# Patient Record
Sex: FEMALE | Race: WHITE | NOT HISPANIC OR LATINO | Employment: OTHER | URBAN - METROPOLITAN AREA
[De-identification: names, ages, dates, MRNs, and addresses within clinical notes are randomized per-mention and may not be internally consistent; named-entity substitution may affect disease eponyms.]

---

## 2017-01-09 ENCOUNTER — OFFICE VISIT (OUTPATIENT)
Dept: CARDIOLOGY | Facility: CLINIC | Age: 72
End: 2017-01-09

## 2017-01-09 VITALS
HEIGHT: 69 IN | SYSTOLIC BLOOD PRESSURE: 108 MMHG | WEIGHT: 149 LBS | BODY MASS INDEX: 22.07 KG/M2 | HEART RATE: 95 BPM | DIASTOLIC BLOOD PRESSURE: 66 MMHG

## 2017-01-09 DIAGNOSIS — Z79.01 ANTICOAGULATED: ICD-10-CM

## 2017-01-09 DIAGNOSIS — I48.91 ATRIAL FIBRILLATION, UNSPECIFIED TYPE (HCC): Primary | ICD-10-CM

## 2017-01-09 DIAGNOSIS — I48.20 CHRONIC ATRIAL FIBRILLATION (HCC): Primary | ICD-10-CM

## 2017-01-09 DIAGNOSIS — I10 BENIGN ESSENTIAL HTN: ICD-10-CM

## 2017-01-09 DIAGNOSIS — I49.5 SICK SINUS SYNDROME (HCC): ICD-10-CM

## 2017-01-09 PROCEDURE — 99214 OFFICE O/P EST MOD 30 MIN: CPT | Performed by: INTERNAL MEDICINE

## 2017-01-09 PROCEDURE — 93000 ELECTROCARDIOGRAM COMPLETE: CPT | Performed by: INTERNAL MEDICINE

## 2017-01-09 RX ORDER — SUMATRIPTAN 50 MG/1
50 TABLET, FILM COATED ORAL
Refills: 4 | COMMUNITY
Start: 2016-12-27 | End: 2023-02-12 | Stop reason: HOSPADM

## 2017-01-09 NOTE — PROGRESS NOTES
Subjective:       Raina Forrest is a 71 y.o. female who here for follow up    CC  Atrial fibrillation follow-up  HPI  71-year-old white female with known chronic atrial Coblation as well as a hypertension sick sinus syndrome on chronic anticoagulation here for the follow-up after being on medication    Patient denies any chest pains or tightness in chest but complains of significant shortness of breath weakness and fatigue     Problem List Items Addressed This Visit        Cardiovascular and Mediastinum    Chronic atrial fibrillation - Primary    Benign essential HTN    Sick sinus syndrome       Other    Anticoagulated        Previous treatments/evaluations include: ASA and beta blocker. Cardiac risk factors: advanced age (older than 55 for men, 65 for women) and hypertension.    The following portions of the patient's history were reviewed and updated as appropriate: allergies, current medications, past family history, past medical history, past social history, past surgical history and problem list.    Past Medical History   Diagnosis Date   • Abdominal pain    • Atrial fibrillation    • Fung's esophagus    • Benign essential hypertension    • Blood in stool    • Chest pain    • Chronic gastritis    • Congestive heart failure    • Degenerative disc disease    • Depression with anxiety    • Disease of thyroid gland    • Diverticulitis of colon    • Dysphagia    • GERD (gastroesophageal reflux disease)    • History of migraine headaches    • Osteoporosis    • Palpitations    • Thyroid disorder    • Ulcerative colitis    • Vomiting    • Weight loss     reports that she has never smoked. She does not have any smokeless tobacco history on file. She reports that she does not drink alcohol or use illicit drugs.  Family History   Problem Relation Age of Onset   • Lung cancer Other    • Ulcerative colitis Sister        Review of Systems  Constitutional: No wt loss, fever, fatigue  Gastrointestinal: No nausea,  "abdominal pain  Behavioral/Psych: No insomnia or anxiety   Cardiovascular shortness of breath  Objective:       Physical Exam             Physical Exam  Visit Vitals   • /66   • Pulse 95   • Ht 69\" (175.3 cm)   • Wt 149 lb (67.6 kg)   • BMI 22 kg/m2     General appearance: alert, appears stated age and cooperative, oriented x 3  Neck: no JVD and supple, symmetrical, trachea midline  Lungs: clear to auscultation bilaterally  Heart:Normal PMI,  S1, S2 irregular, no murmur, rub or gallop  Extremities: normal range of motion, no cyanosis or edema,  Pulses: 2+ and symmetric  Skin: Skin color, texture, turgor normal. No rashes or lesions  Psych:  Pleasant and cooperative        Cardiographics  @  ECG 12 Lead  Date/Time: 1/9/2017 2:53 PM  Performed by: GIACOMO PARKER  Authorized by: GIACOMO PARKER   Comparison: compared with previous ECG   Similar to previous ECG  Rhythm: atrial fibrillation  ST Flattening: all  T flattening: all  Clinical impression: abnormal ECG            Echocardiogram:        Current Outpatient Prescriptions:   •  apixaban (ELIQUIS) 5 MG tablet tablet, Take 1 tablet by mouth 2 (Two) Times a Day., Disp: 60 tablet, Rfl: 5  •  bisoprolol-hydrochlorothiazide (ZIAC) 5-6.25 MG per tablet, Take 1 tablet by mouth daily., Disp: 30 tablet, Rfl: 6  •  fluticasone (FLONASE) 50 MCG/ACT nasal spray, into each nostril., Disp: , Rfl:   •  furosemide (LASIX) 40 MG tablet, Take 1 tablet by mouth Daily., Disp: 30 tablet, Rfl: 6  •  levothyroxine (LEVOTHROID) 25 MCG tablet, Take 25 mcg by mouth., Disp: , Rfl:   •  lisinopril (PRINIVIL,ZESTRIL) 5 MG tablet, Take 1 tablet by mouth Every Night., Disp: 30 tablet, Rfl: 6  •  MECLIZINE HCL PO, Take 25 mg by mouth 3 (three) times a day., Disp: , Rfl:   •  potassium chloride (K-DUR,KLOR-CON) 10 MEQ CR tablet, Take 1 tablet by mouth Daily., Disp: 30 tablet, Rfl: 6  •  sertraline (ZOLOFT) 100 MG tablet, Take 1 tablet by mouth daily., Disp: , Rfl:   •  " SUMAtriptan (IMITREX) 50 MG tablet, TAKE 1 TABLET EVERY 2 HOURS AS NEEDED FOR MIGRAINE,MAY REPEAT ONCE IN 2 HOURS, Disp: , Rfl: 4   Assessment:        Patient Active Problem List   Diagnosis   • Chronic atrial fibrillation   • Benign essential HTN   • Bradycardia   • Chest pain   • Can't get food down   • Accumulation of fluid in tissues   • Esophageal lump   • Adynamia   • Itch of skin   • Anorexia   • Chronic nausea   • Gastroesophageal reflux disease   • Breath shortness   • Emesis   • Decreased body weight   • Anxiety   • Narrowing of intervertebral disc space   • Diverticulosis of intestine   • Hypertension   • Migraine   • Osteoporosis   • Palpitations   • Pituitary adenoma   • Sick sinus syndrome   • Diarrhea   • Paroxysmal atrial fibrillation   • Cardiomyopathy   • Anticoagulated               Plan:         71-year-old white female with the atrial fibrillation highly symptomatic with significant shortness of breath on minimum exertion, now has been on anticoagulation for more than one month will need cardioversion, patient will be started on IV amiodarone prior to that    ICD-10-CM ICD-9-CM   1. Chronic atrial fibrillation I48.2 427.31   2. Benign essential HTN I10 401.1   3. Sick sinus syndrome I49.5 427.81   4. Anticoagulated Z79.01 V58.61     cv stable    No more bloodin stool      Will proceed with cv    Procedure , risks and options of cardioversion has been explained. Raina Forrest understands well and agrees.    Amiodarone prior to that  COUNSELING:    Raina ForrestCounseling was given to patient for the following topics: diagnostic results, risk factor reductions, impressions, risks and benefits of treatment options and importance of treatment compliance .       SMOKING COUNSELING:    Counseling given: Not Answered      EMR Dragon/Transcription disclaimer:   Much of this encounter note is an electronic transcription/translation of spoken language to printed text. The electronic translation of  spoken language may permit erroneous, or at times, nonsensical words or phrases to be inadvertently transcribed; Although I have reviewed the note for such errors, some may still exist.

## 2017-01-09 NOTE — MR AVS SNAPSHOT
Raina Forrest   1/9/2017 2:00 PM   Office Visit    Dept Phone:  [phone]   Encounter #:  [id number]    Provider:  Robinson Salazar MD   Department:  [facility]                Your Full Care Plan              Today's Medication Changes          These changes are accurate as of: 1/9/17  3:11 PM.  If you have any questions, ask your nurse or doctor.               Stop taking medication(s)listed here:     SUMAtriptan 20 MG/ACT nasal spray   Commonly known as:  IMITREX   Stopped by:  Robinson Salazar MD                      Your Updated Medication List          This list is accurate as of: 1/9/17  3:11 PM.  Always use your most recent med list.                apixaban 5 MG tablet tablet   Commonly known as:  ELIQUIS   Take 1 tablet by mouth 2 (Two) Times a Day.       bisoprolol-hydrochlorothiazide 5-6.25 MG per tablet   Commonly known as:  ZIAC   Take 1 tablet by mouth daily.       fluticasone 50 MCG/ACT nasal spray   Commonly known as:  FLONASE       furosemide 40 MG tablet   Commonly known as:  LASIX   Take 1 tablet by mouth Daily.       LEVOTHROID 25 MCG tablet   Generic drug:  levothyroxine       lisinopril 5 MG tablet   Commonly known as:  PRINIVIL,ZESTRIL   Take 1 tablet by mouth Every Night.       MECLIZINE HCL PO       potassium chloride 10 MEQ CR tablet   Commonly known as:  K-DUR,KLOR-CON   Take 1 tablet by mouth Daily.       sertraline 100 MG tablet   Commonly known as:  ZOLOFT       SUMAtriptan 50 MG tablet   Commonly known as:  IMITREX               We Performed the Following     ECG 12 Lead       You Were Diagnosed With        Codes Comments    Chronic atrial fibrillation    -  Primary ICD-10-CM: I48.2  ICD-9-CM: 427.31     Benign essential HTN     ICD-10-CM: I10  ICD-9-CM: 401.1     Sick sinus syndrome     ICD-10-CM: I49.5  ICD-9-CM: 427.81     Anticoagulated     ICD-10-CM: Z79.01  ICD-9-CM: V58.61       Instructions     "   None    Patient Instructions History      Upcoming Appointments     Visit Type Date Time Department    OFFICE VISIT 1/9/2017  2:00 PM [facility]      MyChart Signup     Our records indicate that you have declined [facility] MyChart signup. If you would like to sign up for [facility], please email [facility], [place], operated by [facility] HealthRosa [email or web address] or call [phone] to obtain an activation code.             Other Info from Your Visit           Allergies     Amoxicillin-pot Clavulanate  Shortness Of Breath    CAN take 500mg Amoxils without itching    Bupivacaine Hcl  Anaphylaxis    Doxycycline  Shortness Of Breath    Nepafenac  Itching    Eye Drops    Amoxicillin  Hives    Bactrim [Sulfamethoxazole-trimethoprim]      Barium-containing Compounds      Bextra [Valdecoxib]  Itching    Bupivacaine      Cefdinir      Clarithromycin      Contrast Dye      Doxycycline Monohydrate      Iodinated Diagnostic Agents      Iodine      Keflex [Cephalexin]      Levaquin [Levofloxacin]      Medrol [Methylprednisolone]      Mesalamine      Nsaids      Polymyxin B-trimethoprim      Rivaroxaban      Tetanus Toxoids  Swelling    Tetanus-diphtheria Toxoids Td      Clindamycin  Rash      Reason for Visit     Follow-up Atrial Fibrillation      Vital Signs     Blood Pressure Pulse Height Weight Body Mass Index Smoking Status    108/66 95 69\" (175.3 cm) 149 lb (67.6 kg) 22 kg/m2 Never Smoker      Problems and Diagnoses Noted     Taking blood thinners    Benign essential HTN    Atrial fibrillation (irregular heartbeat)    Sick sinus syndrome        "

## 2017-01-18 ENCOUNTER — APPOINTMENT (OUTPATIENT)
Dept: GENERAL RADIOLOGY | Facility: HOSPITAL | Age: 72
End: 2017-01-18

## 2017-01-18 ENCOUNTER — HOSPITAL ENCOUNTER (OUTPATIENT)
Facility: HOSPITAL | Age: 72
Setting detail: OBSERVATION
Discharge: HOME | End: 2017-01-21
Attending: INTERNAL MEDICINE | Admitting: INTERNAL MEDICINE

## 2017-01-18 PROBLEM — I48.91 ATRIAL FIBRILLATION (HCC): Status: ACTIVE | Noted: 2017-01-18

## 2017-01-18 LAB
ALBUMIN SERPL-MCNC: 4.2 G/DL (ref 3.5–5.2)
ALBUMIN/GLOB SERPL: 1.8 G/DL
ALP SERPL-CCNC: 119 U/L (ref 39–117)
ALT SERPL W P-5'-P-CCNC: 18 U/L (ref 1–33)
ANION GAP SERPL CALCULATED.3IONS-SCNC: 10.2 MMOL/L
AST SERPL-CCNC: 29 U/L (ref 1–32)
BASOPHILS # BLD AUTO: 0.06 10*3/MM3 (ref 0–0.2)
BASOPHILS NFR BLD AUTO: 0.7 % (ref 0–1.5)
BILIRUB SERPL-MCNC: 0.5 MG/DL (ref 0.1–1.2)
BUN BLD-MCNC: 10 MG/DL (ref 8–23)
BUN/CREAT SERPL: 13.5 (ref 7–25)
CALCIUM SPEC-SCNC: 9.6 MG/DL (ref 8.6–10.5)
CHLORIDE SERPL-SCNC: 97 MMOL/L (ref 98–107)
CO2 SERPL-SCNC: 28.8 MMOL/L (ref 22–29)
CREAT BLD-MCNC: 0.74 MG/DL (ref 0.57–1)
DEPRECATED RDW RBC AUTO: 50.2 FL (ref 37–54)
EOSINOPHIL # BLD AUTO: 0.16 10*3/MM3 (ref 0–0.7)
EOSINOPHIL NFR BLD AUTO: 1.8 % (ref 0.3–6.2)
ERYTHROCYTE [DISTWIDTH] IN BLOOD BY AUTOMATED COUNT: 14 % (ref 11.7–13)
GFR SERPL CREATININE-BSD FRML MDRD: 77 ML/MIN/1.73
GLOBULIN UR ELPH-MCNC: 2.3 GM/DL
GLUCOSE BLD-MCNC: 107 MG/DL (ref 65–99)
HCT VFR BLD AUTO: 39.4 % (ref 35.6–45.5)
HGB BLD-MCNC: 13.1 G/DL (ref 11.9–15.5)
IMM GRANULOCYTES # BLD: 0.05 10*3/MM3 (ref 0–0.03)
IMM GRANULOCYTES NFR BLD: 0.6 % (ref 0–0.5)
INR PPP: 1.32 (ref 0.9–1.1)
LYMPHOCYTES # BLD AUTO: 1.64 10*3/MM3 (ref 0.9–4.8)
LYMPHOCYTES NFR BLD AUTO: 18.3 % (ref 19.6–45.3)
MAGNESIUM SERPL-MCNC: 2.1 MG/DL (ref 1.6–2.4)
MCH RBC QN AUTO: 32.3 PG (ref 26.9–32)
MCHC RBC AUTO-ENTMCNC: 33.2 G/DL (ref 32.4–36.3)
MCV RBC AUTO: 97 FL (ref 80.5–98.2)
MONOCYTES # BLD AUTO: 0.68 10*3/MM3 (ref 0.2–1.2)
MONOCYTES NFR BLD AUTO: 7.6 % (ref 5–12)
NEUTROPHILS # BLD AUTO: 6.38 10*3/MM3 (ref 1.9–8.1)
NEUTROPHILS NFR BLD AUTO: 71 % (ref 42.7–76)
NT-PROBNP SERPL-MCNC: 1637 PG/ML (ref 5–900)
PLATELET # BLD AUTO: 258 10*3/MM3 (ref 140–500)
PMV BLD AUTO: 10.5 FL (ref 6–12)
POTASSIUM BLD-SCNC: 4.9 MMOL/L (ref 3.5–5.2)
PROT SERPL-MCNC: 6.5 G/DL (ref 6–8.5)
PROTHROMBIN TIME: 15.9 SECONDS (ref 11.7–14.2)
RBC # BLD AUTO: 4.06 10*6/MM3 (ref 3.9–5.2)
SODIUM BLD-SCNC: 136 MMOL/L (ref 136–145)
T3FREE SERPL-MCNC: 2.99 PG/ML (ref 2–4.4)
TSH SERPL DL<=0.05 MIU/L-ACNC: 5.04 MIU/ML (ref 0.27–4.2)
WBC NRBC COR # BLD: 8.97 10*3/MM3 (ref 4.5–10.7)

## 2017-01-18 PROCEDURE — 25010000002 DIPHENHYDRAMINE PER 50 MG: Performed by: NURSE PRACTITIONER

## 2017-01-18 PROCEDURE — 96365 THER/PROPH/DIAG IV INF INIT: CPT

## 2017-01-18 PROCEDURE — 83735 ASSAY OF MAGNESIUM: CPT | Performed by: NURSE PRACTITIONER

## 2017-01-18 PROCEDURE — 25010000002 AMIODARONE IN DEXTROSE 5% 150 MG/100ML SOLUTION: Performed by: NURSE PRACTITIONER

## 2017-01-18 PROCEDURE — 84481 FREE ASSAY (FT-3): CPT | Performed by: NURSE PRACTITIONER

## 2017-01-18 PROCEDURE — 96375 TX/PRO/DX INJ NEW DRUG ADDON: CPT

## 2017-01-18 PROCEDURE — 84443 ASSAY THYROID STIM HORMONE: CPT | Performed by: NURSE PRACTITIONER

## 2017-01-18 PROCEDURE — G0378 HOSPITAL OBSERVATION PER HR: HCPCS

## 2017-01-18 PROCEDURE — 85610 PROTHROMBIN TIME: CPT | Performed by: NURSE PRACTITIONER

## 2017-01-18 PROCEDURE — 80053 COMPREHEN METABOLIC PANEL: CPT | Performed by: NURSE PRACTITIONER

## 2017-01-18 PROCEDURE — 85025 COMPLETE CBC W/AUTO DIFF WBC: CPT | Performed by: NURSE PRACTITIONER

## 2017-01-18 PROCEDURE — 83880 ASSAY OF NATRIURETIC PEPTIDE: CPT | Performed by: NURSE PRACTITIONER

## 2017-01-18 PROCEDURE — 71010 HC CHEST PA OR AP: CPT

## 2017-01-18 PROCEDURE — 93010 ELECTROCARDIOGRAM REPORT: CPT | Performed by: INTERNAL MEDICINE

## 2017-01-18 PROCEDURE — 25010000002 AMIODARONE IN DEXTROSE 5% 360 MG/200ML SOLUTION: Performed by: NURSE PRACTITIONER

## 2017-01-18 PROCEDURE — 93005 ELECTROCARDIOGRAM TRACING: CPT | Performed by: NURSE PRACTITIONER

## 2017-01-18 PROCEDURE — 96366 THER/PROPH/DIAG IV INF ADDON: CPT

## 2017-01-18 PROCEDURE — 99225 PR SBSQ OBSERVATION CARE/DAY 25 MINUTES: CPT | Performed by: INTERNAL MEDICINE

## 2017-01-18 RX ORDER — ACETAMINOPHEN 325 MG/1
650 TABLET ORAL EVERY 4 HOURS PRN
Status: DISCONTINUED | OUTPATIENT
Start: 2017-01-18 | End: 2017-01-21 | Stop reason: HOSPADM

## 2017-01-18 RX ORDER — LISINOPRIL 5 MG/1
5 TABLET ORAL NIGHTLY
Status: DISCONTINUED | OUTPATIENT
Start: 2017-01-18 | End: 2017-01-20

## 2017-01-18 RX ORDER — POTASSIUM CHLORIDE 750 MG/1
10 CAPSULE, EXTENDED RELEASE ORAL DAILY
Status: DISCONTINUED | OUTPATIENT
Start: 2017-01-18 | End: 2017-01-21 | Stop reason: HOSPADM

## 2017-01-18 RX ORDER — SODIUM CHLORIDE 0.9 % (FLUSH) 0.9 %
1-10 SYRINGE (ML) INJECTION AS NEEDED
Status: DISCONTINUED | OUTPATIENT
Start: 2017-01-18 | End: 2017-01-21 | Stop reason: HOSPADM

## 2017-01-18 RX ORDER — FLUTICASONE PROPIONATE 50 MCG
1 SPRAY, SUSPENSION (ML) NASAL DAILY
Status: DISCONTINUED | OUTPATIENT
Start: 2017-01-18 | End: 2017-01-21 | Stop reason: HOSPADM

## 2017-01-18 RX ORDER — POTASSIUM CHLORIDE 7.45 MG/ML
10 INJECTION INTRAVENOUS
Status: DISCONTINUED | OUTPATIENT
Start: 2017-01-18 | End: 2017-01-21 | Stop reason: HOSPADM

## 2017-01-18 RX ORDER — PANTOPRAZOLE SODIUM 40 MG/1
40 TABLET, DELAYED RELEASE ORAL
Status: DISCONTINUED | OUTPATIENT
Start: 2017-01-18 | End: 2017-01-21 | Stop reason: HOSPADM

## 2017-01-18 RX ORDER — NITROGLYCERIN 0.4 MG/1
0.4 TABLET SUBLINGUAL
Status: DISCONTINUED | OUTPATIENT
Start: 2017-01-18 | End: 2017-01-21 | Stop reason: HOSPADM

## 2017-01-18 RX ORDER — LEVOTHYROXINE SODIUM 0.03 MG/1
25 TABLET ORAL
Status: DISCONTINUED | OUTPATIENT
Start: 2017-01-19 | End: 2017-01-21 | Stop reason: HOSPADM

## 2017-01-18 RX ORDER — POTASSIUM CHLORIDE 1.5 G/1.77G
40 POWDER, FOR SOLUTION ORAL AS NEEDED
Status: DISCONTINUED | OUTPATIENT
Start: 2017-01-18 | End: 2017-01-21 | Stop reason: HOSPADM

## 2017-01-18 RX ORDER — POTASSIUM CHLORIDE 750 MG/1
40 CAPSULE, EXTENDED RELEASE ORAL AS NEEDED
Status: DISCONTINUED | OUTPATIENT
Start: 2017-01-18 | End: 2017-01-21 | Stop reason: HOSPADM

## 2017-01-18 RX ORDER — DIPHENHYDRAMINE HYDROCHLORIDE 50 MG/ML
25 INJECTION INTRAMUSCULAR; INTRAVENOUS ONCE
Status: COMPLETED | OUTPATIENT
Start: 2017-01-18 | End: 2017-01-18

## 2017-01-18 RX ORDER — FUROSEMIDE 40 MG/1
40 TABLET ORAL DAILY
Status: DISCONTINUED | OUTPATIENT
Start: 2017-01-18 | End: 2017-01-20

## 2017-01-18 RX ORDER — SERTRALINE HYDROCHLORIDE 100 MG/1
100 TABLET, FILM COATED ORAL DAILY
Status: DISCONTINUED | OUTPATIENT
Start: 2017-01-18 | End: 2017-01-21 | Stop reason: HOSPADM

## 2017-01-18 RX ORDER — BISOPROLOL FUMARATE AND HYDROCHLOROTHIAZIDE 5; 6.25 MG/1; MG/1
1 TABLET ORAL DAILY
Status: DISCONTINUED | OUTPATIENT
Start: 2017-01-18 | End: 2017-01-21

## 2017-01-18 RX ADMIN — AMIODARONE HYDROCHLORIDE 150 MG: 1.5 INJECTION, SOLUTION INTRAVENOUS at 12:56

## 2017-01-18 RX ADMIN — POTASSIUM CHLORIDE 10 MEQ: 750 CAPSULE, EXTENDED RELEASE ORAL at 14:40

## 2017-01-18 RX ADMIN — PANTOPRAZOLE SODIUM 40 MG: 40 TABLET, DELAYED RELEASE ORAL at 13:19

## 2017-01-18 RX ADMIN — AMIODARONE HYDROCHLORIDE 0.5 MG/MIN: 1.8 INJECTION, SOLUTION INTRAVENOUS at 19:55

## 2017-01-18 RX ADMIN — AMIODARONE HYDROCHLORIDE 0.5 MG/MIN: 1.8 INJECTION, SOLUTION INTRAVENOUS at 18:55

## 2017-01-18 RX ADMIN — APIXABAN 5 MG: 5 TABLET, FILM COATED ORAL at 18:48

## 2017-01-18 RX ADMIN — LISINOPRIL 5 MG: 5 TABLET ORAL at 19:55

## 2017-01-18 RX ADMIN — AMIODARONE HYDROCHLORIDE 1 MG/MIN: 1.8 INJECTION, SOLUTION INTRAVENOUS at 12:56

## 2017-01-18 RX ADMIN — DIPHENHYDRAMINE HYDROCHLORIDE 25 MG: 50 INJECTION, SOLUTION INTRAMUSCULAR; INTRAVENOUS at 13:19

## 2017-01-18 NOTE — H&P
Subjective   Raina Forrest is a 71 y.o. female who here for follow up     CC  Atrial fibrillation follow-up  HPI  71-year-old white female with known chronic atrial Coblation as well as a hypertension sick sinus syndrome on chronic anticoagulation here for the follow-up after being on medication     Patient denies any chest pains or tightness in chest but complains of significant shortness of breath weakness and fatigue          Problem List Items Addressed This Visit               Cardiovascular and Mediastinum     Chronic atrial fibrillation - Primary     Benign essential HTN     Sick sinus syndrome          Other     Anticoagulated          Previous treatments/evaluations include: ASA and beta blocker. Cardiac risk factors: advanced age (older than 55 for men, 65 for women) and hypertension.     The following portions of the patient's history were reviewed and updated as appropriate: allergies, current medications, past family history, past medical history, past social history, past surgical history and problem list.      Medical History         Past Medical History   Diagnosis Date   • Abdominal pain     • Atrial fibrillation     • Fung's esophagus     • Benign essential hypertension     • Blood in stool     • Chest pain     • Chronic gastritis     • Congestive heart failure     • Degenerative disc disease     • Depression with anxiety     • Disease of thyroid gland     • Diverticulitis of colon     • Dysphagia     • GERD (gastroesophageal reflux disease)     • History of migraine headaches     • Osteoporosis     • Palpitations     • Thyroid disorder     • Ulcerative colitis     • Vomiting     • Weight loss         reports that she has never smoked. She does not have any smokeless tobacco history on file. She reports that she does not drink alcohol or use illicit drugs.        Family History   Problem Relation Age of Onset   • Lung cancer Other     • Ulcerative colitis Sister           Review of  "Systems  Constitutional: No wt loss, fever, fatigue  Gastrointestinal: No nausea, abdominal pain  Behavioral/Psych: No insomnia or anxiety  Cardiovascular shortness of breath  Objective:       Objective   Physical Exam                Objective   Physical Exam       Visit Vitals   • /66   • Pulse 95   • Ht 69\" (175.3 cm)   • Wt 149 lb (67.6 kg)   • BMI 22 kg/m2      General appearance: alert, appears stated age and cooperative, oriented x 3  Neck: no JVD and supple, symmetrical, trachea midline  Lungs: clear to auscultation bilaterally  Heart:Normal PMI, S1, S2 irregular, no murmur, rub or gallop  Extremities: normal range of motion, no cyanosis or edema,  Pulses: 2+ and symmetric  Skin: Skin color, texture, turgor normal. No rashes or lesions  Psych: Pleasant and cooperative           Cardiographics  @  ECG 12 Lead  Date/Time: 1/9/2017 2:53 PM  Performed by: GIACOMO PARKER  Authorized by: GIACOMO PARKER   Comparison: compared with previous ECG   Similar to previous ECG  Rhythm: atrial fibrillation  ST Flattening: all  T flattening: all  Clinical impression: abnormal ECG            Echocardiogram:         Current Outpatient Prescriptions:   • apixaban (ELIQUIS) 5 MG tablet tablet, Take 1 tablet by mouth 2 (Two) Times a Day., Disp: 60 tablet, Rfl: 5  • bisoprolol-hydrochlorothiazide (ZIAC) 5-6.25 MG per tablet, Take 1 tablet by mouth daily., Disp: 30 tablet, Rfl: 6  • fluticasone (FLONASE) 50 MCG/ACT nasal spray, into each nostril., Disp: , Rfl:   • furosemide (LASIX) 40 MG tablet, Take 1 tablet by mouth Daily., Disp: 30 tablet, Rfl: 6  • levothyroxine (LEVOTHROID) 25 MCG tablet, Take 25 mcg by mouth., Disp: , Rfl:   • lisinopril (PRINIVIL,ZESTRIL) 5 MG tablet, Take 1 tablet by mouth Every Night., Disp: 30 tablet, Rfl: 6  • MECLIZINE HCL PO, Take 25 mg by mouth 3 (three) times a day., Disp: , Rfl:   • potassium chloride (K-DUR,KLOR-CON) 10 MEQ CR tablet, Take 1 tablet by mouth Daily., Disp: 30 " tablet, Rfl: 6  • sertraline (ZOLOFT) 100 MG tablet, Take 1 tablet by mouth daily., Disp: , Rfl:   • SUMAtriptan (IMITREX) 50 MG tablet, TAKE 1 TABLET EVERY 2 HOURS AS NEEDED FOR MIGRAINE,MAY REPEAT ONCE IN 2 HOURS, Disp: , Rfl: 4  Assessment:              Patient Active Problem List   Diagnosis   • Chronic atrial fibrillation   • Benign essential HTN   • Bradycardia   • Chest pain   • Can't get food down   • Accumulation of fluid in tissues   • Esophageal lump   • Adynamia   • Itch of skin   • Anorexia   • Chronic nausea   • Gastroesophageal reflux disease   • Breath shortness   • Emesis   • Decreased body weight   • Anxiety   • Narrowing of intervertebral disc space   • Diverticulosis of intestine   • Hypertension   • Migraine   • Osteoporosis   • Palpitations   • Pituitary adenoma   • Sick sinus syndrome   • Diarrhea   • Paroxysmal atrial fibrillation   • Cardiomyopathy   • Anticoagulated                 Plan:      Plan      71-year-old white female with the atrial fibrillation highly symptomatic with significant shortness of breath on minimum exertion, now has been on anticoagulation for more than one month will need cardioversion, patient will be started on IV amiodarone prior to that      ICD-10-CM ICD-9-CM   1. Chronic atrial fibrillation I48.2 427.31   2. Benign essential HTN I10 401.1   3. Sick sinus syndrome I49.5 427.81   4. Anticoagulated Z79.01 V58.61      cv stable     No more bloodin stool        Will proceed with cv     Procedure , risks and options of cardioversion has been explained. Raina Forrest understands well and agrees.     Amiodarone prior to that  COUNSELING:     Raina ForrestConay was given to patient for the following topics: diagnostic results, risk factor reductions, impressions, risks and benefits of treatment options and importance of treatment compliance .      H&P updated. The patient was examined and see progress note for any additions / changes/updates      EMR  Dragon/Transcription disclaimer:   Much of this encounter note is an electronic transcription/translation of spoken language to printed text. The electronic translation of spoken language may permit erroneous, or at times, nonsensical words or phrases to be inadvertently transcribed; Although I have reviewed the note for such errors, some may still exist.

## 2017-01-18 NOTE — IP AVS SNAPSHOT
AFTER VISIT SUMMARY             Raina Forrest           About your hospitalization     You were admitted on:  January 18, 2017 You last received care in the:  [address] [facility]       Procedures & Surgeries         Medications    If you or your caregiver advised us that you are currently taking a medication and that medication is marked below as “Resume”, this simply indicates that we have reviewed those medications to make sure our new therapy recommendations do not interfere.  If you have concerns about medications other than those new ones which we are prescribing today, please consult the physician who prescribed them (or your primary physician).  Our review of your home medications is not meant to indicate that we are directing their use.             Your Medications      START taking these medications     amiodarone 200 MG tablet   Take 1 tablet by mouth Every 12 (Twelve) Hours.   Last time this was given:  1/21/2017  8:41 PM   Commonly known as:  PACERONE   Next Dose Due:  01/22/17           pantoprazole 40 MG EC tablet   Take 1 tablet by mouth Daily.   Last time this was given:  1/20/2017  8:32 PM   Commonly known as:  PROTONIX   Next Dose Due:  01/22/17             CHANGE how you take these medications     bisoprolol-hydrochlorothiazide 5-6.25 MG per tablet   1/2 tablet by mouth daily   Commonly known as:  ZIAC   What changed:    - how much to take  - how to take this  - when to take this  - additional instructions   Next Dose Due:  01/22/17           furosemide 40 MG tablet   Take 0.5 tablets by mouth Daily.   Last time this was given:  1/21/2017  9:17 AM   Commonly known as:  LASIX   What changed:  how much to take   Next Dose Due:  01/22/17           lisinopril 5 MG tablet   Take 0.5 tablets by mouth Every Night.   Last time this was given:  1/20/2017  8:32 PM   Commonly known as:  PRINIVILZESTRIL   What changed:  how much to take   Next Dose Due:  01/22/17   Notes to Patient:   Please note, you are taking a 1/2 tablet every night.              CONTINUE taking these medications     apixaban 5 MG tablet tablet   Take 1 tablet by mouth 2 (Two) Times a Day.   Last time this was given:  1/21/2017  6:42 PM   Commonly known as:  ELIQUIS   Next Dose Due:  01/22/17           fluticasone 50 MCG/ACT nasal spray   into each nostril.   Last time this was given:  1/21/2017  9:23 AM   Commonly known as:  FLONASE   Next Dose Due:  01/22/17           LEVOTHROID 25 MCG tablet   Take 25 mcg by mouth.   Last time this was given:  1/21/2017  9:17 AM   Generic drug:  levothyroxine   Next Dose Due:  01/22/17           MECLIZINE HCL PO   Take 25 mg by mouth 3 (three) times a day.           potassium chloride 10 MEQ CR tablet   Take 1 tablet by mouth Daily.   Commonly known as:  K-DURKLOR-CON   Next Dose Due:  01/22/17           sertraline 100 MG tablet   Take 1 tablet by mouth daily.   Last time this was given:  1/21/2017  9:18 AM   Commonly known as:  ZOLOFT   Next Dose Due:  01/22/17           SUMAtriptan 50 MG tablet   TAKE 1 TABLET EVERY 2 HOURS AS NEEDED FOR MIGRAINE,MAY REPEAT ONCE IN 2 HOURS   Commonly known as:  IMITREX                Where to Get Your Medications      These medications were sent to [facility]pharm[facility] #[id number] - [place], [place] - [address] AT [facility] - [phone] [facility] [phone]   [address] [facility] [address]     Phone:  [phone]     amiodarone 200 MG tablet    pantoprazole 40 MG EC tablet         Information about where to get these medications is not yet available     ! Ask your nurse or doctor about these medications     bisoprolol-hydrochlorothiazide 5-6.25 MG per tablet    furosemide 40 MG tablet    lisinopril 5 MG tablet                  Your Medications      Your Medication List           Morning Noon Evening Bedtime As Needed    amiodarone 200 MG tablet   Take 1 tablet by mouth Every 12 (Twelve) Hours.   Commonly known as:  PACERONE                                       apixaban 5 MG tablet tablet   Take 1 tablet by mouth 2 (Two) Times a Day.   Commonly known as:  ELIQUIS                                      bisoprolol-hydrochlorothiazide 5-6.25 MG per tablet   1/2 tablet by mouth daily   Commonly known as:  ZIAC                                   fluticasone 50 MCG/ACT nasal spray   into each nostril.   Commonly known as:  FLONASE                                   furosemide 40 MG tablet   Take 0.5 tablets by mouth Daily.   Commonly known as:  LASIX                                   LEVOTHROID 25 MCG tablet   Take 25 mcg by mouth.   Generic drug:  levothyroxine                                   lisinopril 5 MG tablet   Take 0.5 tablets by mouth Every Night.   Commonly known as:  PRINIVIL,ZESTRIL   Notes to Patient:  Please note, you are taking a 1/2 tablet every night.                                    MECLIZINE HCL PO   Take 25 mg by mouth 3 (three) times a day.                                   pantoprazole 40 MG EC tablet   Take 1 tablet by mouth Daily.   Commonly known as:  PROTONIX                                   potassium chloride 10 MEQ CR tablet   Take 1 tablet by mouth Daily.   Commonly known as:  K-DUR,KLOR-CON                                   sertraline 100 MG tablet   Take 1 tablet by mouth daily.   Commonly known as:  ZOLOFT                                   SUMAtriptan 50 MG tablet   TAKE 1 TABLET EVERY 2 HOURS AS NEEDED FOR MIGRAINE,MAY REPEAT ONCE IN 2 HOURS   Commonly known as:  IMITREX                                            Instructions for After Discharge        Activity Instructions     Activity as Tolerated           Discharge Activity Restrictions       1) No driving for day of discharge and no longer taking narcotics.   2) May shower starting today.       Measure Blood Pressure       Check blood pressure every day and record for Dr. Salazar and review at your follow up.       Measure Weight       Weigh daily.  Call  Dr. Salazar's office for weight gain of 2 pounds in a day or 5 pounds in a week.             Diet Instructions     Diet: Regular, Cardiac; Thin Liquids, No Restrictions       Discharge Diet:   Regular  Cardiac      Fluid Consistency:  Thin Liquids, No Restrictions   Limit sodium to 2000 mg daily.             Discharge References/Attachments     ATRIAL FIBRILLATION (ENGLISH)    ELECTRICAL CARDIOVERSION (ENGLISH)    AMIODARONE TABLETS (ENGLISH)    CARDIAC DIET (ENGLISH)    HEART FAILURE (ENGLISH)       Follow-ups for After Discharge        Follow-up Information     Follow up with JORY Torre .    Specialty:  Nurse Practitioner    Contact information:    [address]  [place] [id number] [phone]        Referrals and Follow-ups to Schedule     Call MD With Problems / Concerns    As directed    Instructions: call Dr. Salazar for any swelling, increased weight of 2 pounds, or irregular heart rhythm.       Follow-Up    As directed    Follow up with Dr. Jean in 7-10 days with 12 lead EKG.  Pt to call office Monday 1/23/16 for apppointment.   Follow Up Details:  f/u with Dr. Salazar             [facility]     Our records indicate that you have declined [employer] signup. If you would like to sign up for [employer], please email [email or web address] or call [phone] to obtain an activation code.         Summary of Your Hospitalization        Reason for Hospitalization     Your primary diagnosis was:  Not on File    Your diagnoses also included:  Atrial Fibrillation (Irregular Heartbeat)      Care Providers     Provider Service Role Specialty    Robinson Salazar MD -- Attending Provider Cardiology      Your Allergies  Date Reviewed: 1/21/2017    Allergen Reactions    Amoxicillin-Pot Clavulanate Shortness Of Breath    CAN take 500mg Amoxils without itching         Bupivacaine Hcl Anaphylaxis         Doxycycline Shortness Of Breath         Nepafenac  Itching    Eye Drops         Amoxicillin Hives         Bactrim (Sulfamethoxazole-Trimethoprim) Not Noted         Barium-Containing Compounds Not Noted         Bextra (Valdecoxib) Itching         Bupivacaine Not Noted         Cefdinir Not Noted         Clarithromycin Not Noted         Contrast Dye Not Noted         Doxycycline Monohydrate Not Noted         Iodinated Diagnostic Agents Not Noted         Iodine Not Noted         Keflex (Cephalexin) Not Noted         Levaquin (Levofloxacin) Not Noted         Medrol (Methylprednisolone) Not Noted         Mesalamine Not Noted         Nsaids Not Noted         Polymyxin B-Trimethoprim Not Noted         Rivaroxaban Not Noted         Tetanus Toxoids Swelling         Tetanus-Diphtheria Toxoids Td Not Noted         Clindamycin Rash      Patient Belongings Returned     Document Return of Belongings Flowsheet     Were the patient bedside belongings sent home?   Yes   Belongings Retrieved from Security & Sent Home   Yes    Belongings Sent to Safe   --   Medications Retrieved from Pharmacy & Sent Home   N/A              More Information      Atrial Fibrillation  Atrial fibrillation is a type of irregular or rapid heartbeat (arrhythmia). In atrial fibrillation, the heart quivers continuously in a chaotic pattern. This occurs when parts of the heart receive disorganized signals that make the heart unable to pump blood normally. This can increase the risk for stroke, heart failure, and other heart-related conditions. There are different types of atrial fibrillation, including:  · Paroxysmal atrial fibrillation. This type starts suddenly, and it usually stops on its own shortly after it starts.  · Persistent atrial fibrillation. This type often lasts longer than a week. It may stop on its own or with treatment.  · Long-lasting persistent atrial fibrillation. This type lasts longer than 12 months.  · Permanent atrial fibrillation. This type does not go away.  Talk with your health care  provider to learn about the type of atrial fibrillation that you have.  CAUSES  This condition is caused by some heart-related conditions or procedures, including:  · A heart attack.  · Coronary artery disease.  · Heart failure.  · Heart valve conditions.  · High blood pressure.  · Inflammation of the sac that surrounds the heart (pericarditis).  · Heart surgery.  · Certain heart rhythm disorders, such as Richard-Parkinson-White syndrome.  Other causes include:  · Pneumonia.  · Obstructive sleep apnea.  · Blockage of an artery in the lungs (pulmonary embolism, or PE).  · Lung cancer.  · Chronic lung disease.  · Thyroid problems, especially if the thyroid is overactive (hyperthyroidism).  · Caffeine.  · Excessive alcohol use or illegal drug use.  · Use of some medicines, including certain decongestants and diet pills.  Sometimes, the cause cannot be found.  RISK FACTORS  This condition is more likely to develop in:  · People who are older in age.  · People who smoke.  · People who have diabetes mellitus.  · People who are overweight (obese).  · Athletes who exercise vigorously.  SYMPTOMS  Symptoms of this condition include:  · A feeling that your heart is beating rapidly or irregularly.  · A feeling of discomfort or pain in your chest.  · Shortness of breath.  · Sudden light-headedness or weakness.  · Getting tired easily during exercise.  In some cases, there are no symptoms.  DIAGNOSIS  Your health care provider may be able to detect atrial fibrillation when taking your pulse. If detected, this condition may be diagnosed with:  · An electrocardiogram (ECG).  · A Holter monitor test that records your heartbeat patterns over a 24-hour period.  · Transthoracic echocardiogram (TTE) to evaluate how blood flows through your heart.  · Transesophageal echocardiogram (MARTIN) to view more detailed images of your heart.  · A stress test.  · Imaging tests, such as a CT scan or chest X-ray.  · Blood tests.  TREATMENT  The main goals  of treatment are to prevent blood clots from forming and to keep your heart beating at a normal rate and rhythm. The type of treatment that you receive depends on many factors, such as your underlying medical conditions and how you feel when you are experiencing atrial fibrillation.  This condition may be treated with:  · Medicine to slow down the heart rate, bring the heart's rhythm back to normal, or prevent clots from forming.  · Electrical cardioversion. This is a procedure that resets your heart's rhythm by delivering a controlled, low-energy shock to the heart through your skin.  · Different types of ablation, such as catheter ablation, catheter ablation with pacemaker, or surgical ablation. These procedures destroy the heart tissues that send abnormal signals. When the pacemaker is used, it is placed under your skin to help your heart beat in a regular rhythm.  HOME CARE INSTRUCTIONS  · Take over-the counter and prescription medicines only as told by your health care provider.  · If your health care provider prescribed a blood-thinning medicine (anticoagulant), take it exactly as told. Taking too much blood-thinning medicine can cause bleeding. If you do not take enough blood-thinning medicine, you will not have the protection that you need against stroke and other problems.  · Do not use tobacco products, including cigarettes, chewing tobacco, and e-cigarettes. If you need help quitting, ask your health care provider.  · If you have obstructive sleep apnea, manage your condition as told by your health care provider.  · Do not drink alcohol.  · Do not drink beverages that contain caffeine, such as coffee, soda, and tea.  · Maintain a healthy weight. Do not use diet pills unless your health care provider approves. Diet pills may make heart problems worse.  · Follow diet instructions as told by your health care provider.  · Exercise regularly as told by your health care provider.  · Keep all follow-up visits as  told by your health care provider. This is important.  PREVENTION  · Avoid drinking beverages that contain caffeine or alcohol.  · Avoid certain medicines, especially medicines that are used for breathing problems.  · Avoid certain herbs and herbal medicines, such as those that contain ephedra or ginseng.  · Do not use illegal drugs, such as cocaine and amphetamines.  · Do not smoke.  · Manage your high blood pressure.  SEEK MEDICAL CARE IF:  · You notice a change in the rate, rhythm, or strength of your heartbeat.  · You are taking an anticoagulant and you notice increased bruising.  · You tire more easily when you exercise or exert yourself.  SEEK IMMEDIATE MEDICAL CARE IF:  · You have chest pain, abdominal pain, sweating, or weakness.  · You feel nauseous.  · You notice blood in your vomit, bowel movement, or urine.  · You have shortness of breath.  · You suddenly have swollen feet and ankles.  · You feel dizzy.  · You have sudden weakness or numbness of the face, arm, or leg, especially on one side of the body.  · You have trouble speaking, trouble understanding, or both (aphasia).  · Your face or your eyelid droops on one side.  These symptoms may represent a serious problem that is an emergency. Do not wait to see if the symptoms will go away. Get medical help right away. Call your local emergency services (911 in the [place] Do not drive yourself to the hospital.     This information is not intended to replace advice given to you by your health care provider. Make sure you discuss any questions you have with your health care provider.     Document Released: 12/18/2006 Document Revised: 09/07/2016 Document Reviewed: 04/13/2016  Elsevier Interactive Patient Education ©2016 [employer]          Electrical Cardioversion  Electrical cardioversion is the delivery of a jolt of electricity to change the rhythm of the heart. Sticky patches or metal paddles are placed on the chest to deliver the electricity from a  device. This is done to restore a normal rhythm. A rhythm that is too fast or not regular keeps the heart from pumping well.  Electrical cardioversion is done in an emergency if:   · There is low or no blood pressure as a result of the heart rhythm.    · Normal rhythm must be restored as fast as possible to protect the brain and heart from further damage.    · It may save a life.  Cardioversion may be done for heart rhythms that are not immediately life threatening, such as atrial fibrillation or flutter, in which:   · The heart is beating too fast or is not regular.    · Medicine to change the rhythm has not worked.    · It is safe to wait in order to allow time for preparation.  · Symptoms of the abnormal rhythm are bothersome.  · The risk of stroke and other serious problems can be reduced.  LET YOUR HEALTH CARE PROVIDER KNOW ABOUT:   · Any allergies you have.  · All medicines you are taking, including vitamins, herbs, eye drops, creams, and over-the-counter medicines.  · Previous problems you or members of your family have had with the use of anesthetics.    · Any blood disorders you have.    · Previous surgeries you have had.    · Medical conditions you have.  RISKS AND COMPLICATIONS   Generally, this is a safe procedure. However, problems can occur and include:   · Breathing problems related to the anesthetic used.  · A blood clot that breaks free and travels to other parts of your body. This could cause a stroke or other problems. The risk of this is lowered by use of blood-thinning medicine (anticoagulant) prior to the procedure.  · Cardiac arrest (rare).  BEFORE THE PROCEDURE   · You may have tests to detect blood clots in your heart and to evaluate heart function.   · You may start taking anticoagulants so your blood does not clot as easily.    · Medicines may be given to help stabilize your heart rate and rhythm.  PROCEDURE  · You will be given medicine through an IV tube to reduce discomfort and make you  sleepy (sedative).    · An electrical shock will be delivered.  AFTER THE PROCEDURE  Your heart rhythm will be watched to make sure it does not change.      This information is not intended to replace advice given to you by your health care provider. Make sure you discuss any questions you have with your health care provider.     Document Released: 12/08/2003 Document Revised: 01/08/2016 Document Reviewed: 07/02/2014  [employer] Interactive Patient Education ©2016 [employer] Inc.          Amiodarone tablets  What is this medicine?  AMIODARONE (a LAURITA oh da fausto) is an antiarrhythmic drug. It helps make your heart beat regularly. Because of the side effects caused by this medicine, it is only used when other medicines have not worked. It is usually used for heartbeat problems that may be life threatening.  This medicine may be used for other purposes; ask your health care provider or pharmacist if you have questions.  What should I tell my health care provider before I take this medicine?  They need to know if you have any of these conditions:  -liver disease  -lung disease  -other heart problems  -thyroid disease  -an unusual or allergic reaction to amiodarone, iodine, other medicines, foods, dyes, or preservatives  -pregnant or trying to get pregnant  -breast-feeding  How should I use this medicine?  Take this medicine by mouth with a glass of water. Follow the directions on the prescription label. You can take this medicine with or without food. However, you should always take it the same way each time. Take your doses at regular intervals. Do not take your medicine more often than directed. Do not stop taking except on the advice of your doctor or health care professional.  A special MedGuide will be given to you by the pharmacist with each prescription and refill. Be sure to read this information carefully each time.  Talk to your pediatrician regarding the use of this medicine in children. Special care may be  needed.  Overdosage: If you think you have taken too much of this medicine contact a poison control center or emergency room at once.  NOTE: This medicine is only for you. Do not share this medicine with others.  What if I miss a dose?  If you miss a dose, take it as soon as you can. If it is almost time for your next dose, take only that dose. Do not take double or extra doses.  What may interact with this medicine?  Do not take this medicine with any of the following medications:  -abarelix  -apomorphine  -arsenic trioxide  -certain antibiotics like erythromycin, gemifloxacin, levofloxacin, pentamidine  -certain medicines for depression like amoxapine, tricyclic antidepressants  -certain medicines for fungal infections like fluconazole, itraconazole, ketoconazole, posaconazole, voriconazole  -certain medicines for irregular heart beat like disopyramide, dofetilide, dronedarone, ibutilide, propafenone, sotalol  -certain medicines for malaria like chloroquine, halofantrine  -cisapride  -droperidol  -haloperidol  -hawthorn  -maprotiline  -methadone  -phenothiazines like chlorpromazine, mesoridazine, thioridazine  -pimozide  -ranolazine  -red yeast rice  -vardenafil  -ziprasidone  This medicine may also interact with the following medications:  -antiviral medicines for HIV or AIDS  -certain medicines for blood pressure, heart disease, irregular heart beat  -certain medicines for cholesterol like atorvastatin, cerivastatin, lovastatin, simvastatin  -certain medicines for hepatitis C like sofosbuvir and ledipasvir; sofosbuvir  -certain medicines for seizures like phenytoin  -certain medicines for thyroid problems  -certain medicines that treat or prevent blood clots like warfarin  -cholestyramine  -cimetidine  -clopidogrel  -cyclosporine  -dextromethorphan  -diuretics  -fentanyl  -general anesthetics  -grapefruit juice  -lidocaine  -loratadine  -methotrexate  -other medicines that prolong the QT interval (cause an  abnormal heart rhythm)  -procainamide  -quinidine  -rifabutin, rifampin, or rifapentine  -Millhousen's Wort  -trazodone  This list may not describe all possible interactions. Give your health care provider a list of all the medicines, herbs, non-prescription drugs, or dietary supplements you use. Also tell them if you smoke, drink alcohol, or use illegal drugs. Some items may interact with your medicine.  What should I watch for while using this medicine?  Your condition will be monitored closely when you first begin therapy. Often, this drug is first started in a hospital or other monitored health care setting. Once you are on maintenance therapy, visit your doctor or health care professional for regular checks on your progress. Because your condition and use of this medicine carry some risk, it is a good idea to carry an identification card, necklace or bracelet with details of your condition, medications, and doctor or health care professional.  You may get drowsy or dizzy. Do not drive, use machinery, or do anything that needs mental alertness until you know how this medicine affects you. Do not stand or sit up quickly, especially if you are an older patient. This reduces the risk of dizzy or fainting spells.  This medicine can make you more sensitive to the sun. Keep out of the sun. If you cannot avoid being in the sun, wear protective clothing and use sunscreen. Do not use sun lamps or tanning beds/booths.  You should have regular eye exams before and during treatment. Call your doctor if you have blurred vision, see halos, or your eyes become sensitive to light. Your eyes may get dry. It may be helpful to use a lubricating eye solution or artificial tears solution.  If you are going to have surgery or a procedure that requires contrast dyes, tell your doctor or health care professional that you are taking this medicine.  What side effects may I notice from receiving this medicine?  Side effects that you should  report to your doctor or health care professional as soon as possible:  -allergic reactions like skin rash, itching or hives, swelling of the face, lips, or tongue  -blue-gray coloring of the skin  -blurred vision, seeing blue green halos, increased sensitivity of the eyes to light  -breathing problems  -chest pain  -dark urine  -fast, irregular heartbeat  -feeling faint or light-headed  -intolerance to heat or cold  -nausea or vomiting  -pain and swelling of the scrotum  -pain, tingling, numbness in feet, hands  -redness, blistering, peeling or loosening of the skin, including inside the mouth  -spitting up blood  -stomach pain  -sweating  -unusual or uncontrolled movements of body  -unusually weak or tired  -weight gain or loss  -yellowing of the eyes or skin  Side effects that usually do not require medical attention (report to your doctor or health care professional if they continue or are bothersome):  -change in sex drive or performance  -constipation  -dizziness  -headache  -loss of appetite  -trouble sleeping  This list may not describe all possible side effects. Call your doctor for medical advice about side effects. You may report side effects to FDA at 7-938-FDA-1597.  Where should I keep my medicine?  Keep out of the reach of children.  Store at room temperature between 20 and 25 degrees C (68 and 77 degrees F). Protect from light. Keep container tightly closed. Throw away any unused medicine after the expiration date.  NOTE: This sheet is a summary. It may not cover all possible information. If you have questions about this medicine, talk to your doctor, pharmacist, or health care provider.     © 2016, [employer]. (2015-03-23 19:48:11)          Heart-Healthy Eating Plan  Many factors influence your heart health, including eating and exercise habits. Heart (coronary) risk increases with abnormal blood fat (lipid) levels. Heart-healthy meal planning includes limiting unhealthy fats, increasing  "healthy fats, and making other small dietary changes. This includes maintaining a healthy body weight to help keep lipid levels within a normal range.  WHAT IS MY PLAN?   Your health care provider recommends that you:  · Get no more than _________% of the total calories in your daily diet from fat.  · Limit your intake of saturated fat to less than _________% of your total calories each day.  · Limit the amount of cholesterol in your diet to less than _________ mg per day.  WHAT TYPES OF FAT SHOULD I CHOOSE?  · Choose healthy fats more often. Choose monounsaturated and polyunsaturated fats, such as olive oil and canola oil, flaxseeds, walnuts, almonds, and seeds.  · Eat more omega-3 fats. Good choices include salmon, mackerel, sardines, tuna, flaxseed oil, and ground flaxseeds. Aim to eat fish at least two times each week.  · Limit saturated fats. Saturated fats are primarily found in animal products, such as meats, butter, and cream. Plant sources of saturated fats include palm oil, palm kernel oil, and coconut oil.  · Avoid foods with partially hydrogenated oils in them. These contain trans fats. Examples of foods that contain trans fats are stick margarine, some tub margarines, cookies, crackers, and other baked goods.  WHAT GENERAL GUIDELINES DO I NEED TO FOLLOW?  · Check food labels carefully to identify foods with trans fats or high amounts of saturated fat.  · Fill one half of your plate with vegetables and green salads. Eat 4-5 servings of vegetables per day. A serving of vegetables equals 1 cup of raw leafy vegetables, ½ cup of raw or cooked cut-up vegetables, or ½ cup of vegetable juice.  · Fill one fourth of your plate with whole grains. Look for the word \"whole\" as the first word in the ingredient list.  · Fill one fourth of your plate with lean protein foods.  · Eat 4-5 servings of fruit per day. A serving of fruit equals one medium whole fruit, ¼ cup of dried fruit, ½ cup of fresh, frozen, or canned " fruit, or ½ cup of 100% fruit juice.  · Eat more foods that contain soluble fiber. Examples of foods that contain this type of fiber are apples, broccoli, carrots, beans, peas, and barley. Aim to get 20-30 g of fiber per day.  · Eat more home-cooked food and less restaurant, buffet, and fast food.  · Limit or avoid alcohol.  · Limit foods that are high in starch and sugar.  · Avoid fried foods.  · Cook foods by using methods other than frying. Baking, boiling, grilling, and broiling are all great options. Other fat-reducing suggestions include:    Removing the skin from poultry.    Removing all visible fats from meats.    Skimming the fat off of stews, soups, and gravies before serving them.    Steaming vegetables in water or broth.  · Lose weight if you are overweight. Losing just 5-10% of your initial body weight can help your overall health and prevent diseases such as diabetes and heart disease.  · Increase your consumption of nuts, legumes, and seeds to 4-5 servings per week. One serving of dried beans or legumes equals ½ cup after being cooked, one serving of nuts equals 1½ ounces, and one serving of seeds equals ½ ounce or 1 tablespoon.  · You may need to monitor your salt (sodium) intake, especially if you have high blood pressure. Talk with your health care provider or dietitian to get more information about reducing sodium.  WHAT FOODS CAN I EAT?  Grains  Breads, including Nigerian, white, dia, wheat, raisin, rye, oatmeal, and Italian. Tortillas that are neither fried nor made with lard or trans fat. Low-fat rolls, including hotdog and hamburger buns and English muffins. Biscuits. Muffins. Waffles. Pancakes. Light popcorn. Whole-grain cereals. Flatbread. Piper toast. Pretzels. Breadsticks. Rusks. Low-fat snacks and crackers, including oyster, saltine, matzo, pedro, animal, and rye. Rice and pasta, including brown rice and those that are made with whole wheat.  Vegetables  All vegetables.  Fruits  All  fruits, but limit coconut.  Meats and Other Protein Sources  Lean, well-trimmed beef, veal, pork, and lamb. Chicken and turkey without skin. All fish and shellfish. Wild duck, rabbit, pheasant, and venison. Egg whites or low-cholesterol egg substitutes. Dried beans, peas, lentils, and tofu. Seeds and most nuts.  Dairy  Low-fat or nonfat cheeses, including ricotta, string, and mozzarella. Skim or 1% milk that is liquid, powdered, or evaporated. Buttermilk that is made with low-fat milk. Nonfat or low-fat yogurt.  Beverages  Mineral water. Diet carbonated beverages.  Sweets and Desserts  Sherbets and fruit ices. Honey, jam, marmalade, jelly, and syrups. Meringues and gelatins. Pure sugar candy, such as hard candy, jelly beans, gumdrops, mints, marshmallows, and small amounts of dark chocolate. Mic food cake.  Eat all sweets and desserts in moderation.  Fats and Oils  Nonhydrogenated (trans-free) margarines. Vegetable oils, including soybean, sesame, sunflower, olive, peanut, safflower, corn, canola, and cottonseed. Salad dressings or mayonnaise that are made with a vegetable oil. Limit added fats and oils that you use for cooking, baking, salads, and as spreads.  Other  Cocoa powder. Coffee and tea. All seasonings and condiments.  The items listed above may not be a complete list of recommended foods or beverages. Contact your dietitian for more options.  WHAT FOODS ARE NOT RECOMMENDED?  Grains  Breads that are made with saturated or trans fats, oils, or whole milk. Croissants. Butter rolls. Cheese breads. Sweet rolls. Donuts. Buttered popcorn. Chow mein noodles. High-fat crackers, such as cheese or butter crackers.  Meats and Other Protein Sources  Fatty meats, such as hotdogs, short ribs, sausage, spareribs, man, ribeye roast or steak, and mutton. High-fat deli meats, such as salami and bologna. Caviar. Domestic duck and goose. Organ meats, such as kidney, liver, sweetbreads, brains, gizzard, chitterlings, and  heart.  Dairy  Cream, sour cream, cream cheese, and creamed cottage cheese. Whole milk cheeses, including blue (rosa), [place] Kaushik, Brie, Alejandro, American, Havarti, Swiss, cheddar, Camembert, and Quinton.  Whole or 2% milk that is liquid, evaporated, or condensed. Whole buttermilk. Cream sauce or high-fat cheese sauce. Yogurt that is made from whole milk.  Beverages  Regular sodas and drinks with added sugar.  Sweets and Desserts  Frosting. Pudding. Cookies. Cakes other than sima food cake. Candy that has milk chocolate or white chocolate, hydrogenated fat, butter, coconut, or unknown ingredients. Buttered syrups. Full-fat ice cream or ice cream drinks.  Fats and Oils  Gravy that has suet, meat fat, or shortening. Cocoa butter, hydrogenated oils, palm oil, coconut oil, palm kernel oil. These can often be found in baked products, candy, fried foods, nondairy creamers, and whipped toppings. Solid fats and shortenings, including man fat, salt pork, lard, and butter. Nondairy cream substitutes, such as coffee creamers and sour cream substitutes. Salad dressings that are made of unknown oils, cheese, or sour cream.  The items listed above may not be a complete list of foods and beverages to avoid. Contact your dietitian for more information.     This information is not intended to replace advice given to you by your health care provider. Make sure you discuss any questions you have with your health care provider.     Document Released: 09/26/2009 Document Revised: 01/08/2016 Document Reviewed: 06/11/2015  [employer] Interactive Patient Education ©2016 [employer]          Heart Failure  Heart failure is a condition in which the heart has trouble pumping blood. This means your heart does not pump blood efficiently for your body to work well. In some cases of heart failure, fluid may back up into your lungs or you may have swelling (edema) in your lower legs. Heart failure is usually a long-term (chronic) condition.  It is important for you to take good care of yourself and follow your health care provider's treatment plan.  CAUSES   Some health conditions can cause heart failure. Those health conditions include:  · High blood pressure (hypertension). Hypertension causes the heart muscle to work harder than normal. When pressure in the blood vessels is high, the heart needs to pump (contract) with more force in order to circulate blood throughout the body. High blood pressure eventually causes the heart to become stiff and weak.  · Coronary artery disease (CAD). CAD is the buildup of cholesterol and fat (plaque) in the arteries of the heart. The blockage in the arteries deprives the heart muscle of oxygen and blood. This can cause chest pain and may lead to a heart attack. High blood pressure can also contribute to CAD.  · Heart attack (myocardial infarction). A heart attack occurs when one or more arteries in the heart become blocked. The loss of oxygen damages the muscle tissue of the heart. When this happens, part of the heart muscle dies. The injured tissue does not contract as well and weakens the heart's ability to pump blood.  · Abnormal heart valves. When the heart valves do not open and close properly, it can cause heart failure. This makes the heart muscle pump harder to keep the blood flowing.  · Heart muscle disease (cardiomyopathy or myocarditis). Heart muscle disease is damage to the heart muscle from a variety of causes. These can include drug or alcohol abuse, infections, or unknown reasons. These can increase the risk of heart failure.  · Lung disease. Lung disease makes the heart work harder because the lungs do not work properly. This can cause a strain on the heart, leading it to fail.  · Diabetes. Diabetes increases the risk of heart failure. High blood sugar contributes to high fat (lipid) levels in the blood. Diabetes can also cause slow damage to tiny blood vessels that carry important nutrients to the  heart muscle. When the heart does not get enough oxygen and food, it can cause the heart to become weak and stiff. This leads to a heart that does not contract efficiently.  · Other conditions can contribute to heart failure. These include abnormal heart rhythms, thyroid problems, and low blood counts (anemia).  Certain unhealthy behaviors can increase the risk of heart failure, including:  · Being overweight.  · Smoking or chewing tobacco.  · Eating foods high in fat and cholesterol.  · Abusing illicit drugs or alcohol.  · Lacking physical activity.  SYMPTOMS   Heart failure symptoms may vary and can be hard to detect. Symptoms may include:  · Shortness of breath with activity, such as climbing stairs.  · Persistent cough.  · Swelling of the feet, ankles, legs, or abdomen.  · Unexplained weight gain.  · Difficulty breathing when lying flat (orthopnea).  · Waking from sleep because of the need to sit up and get more air.  · Rapid heartbeat.  · Fatigue and loss of energy.  · Feeling light-headed, dizzy, or close to fainting.  · Loss of appetite.  · Nausea.  · Increased urination during the night (nocturia).  DIAGNOSIS   A diagnosis of heart failure is based on your history, symptoms, physical examination, and diagnostic tests. Diagnostic tests for heart failure may include:  · Echocardiography.  · Electrocardiography.  · Chest X-ray.  · Blood tests.  · Exercise stress test.  · Cardiac angiography.  · Radionuclide scans.  TREATMENT   Treatment is aimed at managing the symptoms of heart failure. Medicines, behavioral changes, or surgical intervention may be necessary to treat heart failure.  · Medicines to help treat heart failure may include:    Angiotensin-converting enzyme (ACE) inhibitors. This type of medicine blocks the effects of a blood protein called angiotensin-converting enzyme. ACE inhibitors relax (dilate) the blood vessels and help lower blood pressure.    Angiotensin receptor blockers (ARBs). This type  of medicine blocks the actions of a blood protein called angiotensin. Angiotensin receptor blockers dilate the blood vessels and help lower blood pressure.    Water pills (diuretics). Diuretics cause the kidneys to remove salt and water from the blood. The extra fluid is removed through urination. This loss of extra fluid lowers the volume of blood the heart pumps.    Beta blockers. These prevent the heart from beating too fast and improve heart muscle strength.    Digitalis. This increases the force of the heartbeat.  · Healthy behavior changes include:    Obtaining and maintaining a healthy weight.    Stopping smoking or chewing tobacco.    Eating heart-healthy foods.    Limiting or avoiding alcohol.    Stopping illicit drug use.    Physical activity as directed by your health care provider.  · Surgical treatment for heart failure may include:    A procedure to open blocked arteries, repair damaged heart valves, or remove damaged heart muscle tissue.    A pacemaker to improve heart muscle function and control certain abnormal heart rhythms.    An internal cardioverter defibrillator to treat certain serious abnormal heart rhythms.    A left ventricular assist device (LVAD) to assist the pumping ability of the heart.  HOME CARE INSTRUCTIONS   · Take medicines only as directed by your health care provider. Medicines are important in reducing the workload of your heart, slowing the progression of heart failure, and improving your symptoms.    Do not stop taking your medicine unless directed by your health care provider.    Do not skip any dose of medicine.    Refill your prescriptions before you run out of medicine. Your medicines are needed every day.  · Engage in moderate physical activity if directed by your health care provider. Moderate physical activity can benefit some people. The elderly and people with severe heart failure should consult with a health care provider for physical activity  recommendations.  · Eat heart-healthy foods. Food choices should be free of trans fat and low in saturated fat, cholesterol, and salt (sodium). Healthy choices include fresh or frozen fruits and vegetables, fish, lean meats, legumes, fat-free or low-fat dairy products, and whole grain or high fiber foods. Talk to a dietitian to learn more about heart-healthy foods.  · Limit sodium if directed by your health care provider. Sodium restriction may reduce symptoms of heart failure in some people. Talk to a dietitian to learn more about heart-healthy seasonings.  · Use healthy cooking methods. Healthy cooking methods include roasting, grilling, broiling, baking, poaching, steaming, or stir-frying. Talk to a dietitian to learn more about healthy cooking methods.  · Limit fluids if directed by your health care provider. Fluid restriction may reduce symptoms of heart failure in some people.  · Weigh yourself every day. Daily weights are important in the early recognition of excess fluid. You should weigh yourself every morning after you urinate and before you eat breakfast. Wear the same amount of clothing each time you weigh yourself. Record your daily weight. Provide your health care provider with your weight record.  · Monitor and record your blood pressure if directed by your health care provider.  · Check your pulse if directed by your health care provider.  · Lose weight if directed by your health care provider. Weight loss may reduce symptoms of heart failure in some people.  · Stop smoking or chewing tobacco. Nicotine makes your heart work harder by causing your blood vessels to constrict. Do not use nicotine gum or patches before talking to your health care provider.  · Keep all follow-up visits as directed by your health care provider. This is important.  · Limit alcohol intake to no more than 1 drink per day for nonpregnant women and 2 drinks per day for men. One drink equals 12 ounces of beer, 5 ounces of wine,  or 1½ ounces of hard liquor. Drinking more than that is harmful to your heart. Tell your health care provider if you drink alcohol several times a week. Talk with your health care provider about whether alcohol is safe for you. If your heart has already been damaged by alcohol or you have severe heart failure, drinking alcohol should be stopped completely.  · Stop illicit drug use.  · Stay up-to-date with immunizations. It is especially important to prevent respiratory infections through current pneumococcal and influenza immunizations.  · Manage other health conditions such as hypertension, diabetes, thyroid disease, or abnormal heart rhythms as directed by your health care provider.  · Learn to manage stress.  · Plan rest periods when fatigued.  · Learn strategies to manage high temperatures. If the weather is extremely hot:    Avoid vigorous physical activity.    Use air conditioning or fans or seek a cooler location.    Avoid caffeine and alcohol.    Wear loose-fitting, lightweight, and light-colored clothing.  · Learn strategies to manage cold temperatures. If the weather is extremely cold:    Avoid vigorous physical activity.    Layer clothes.    Wear mittens or gloves, a hat, and a scarf when going outside.    Avoid alcohol.  · Obtain ongoing education and support as needed.  · Participate in or seek rehabilitation as needed to maintain or improve independence and quality of life.  SEEK MEDICAL CARE IF:   · You have a rapid weight gain.  · You have increasing shortness of breath that is unusual for you.  · You are unable to participate in your usual physical activities.  · You tire easily.  · You cough more than normal, especially with physical activity.  · You have any or more swelling in areas such as your hands, feet, ankles, or abdomen.  · You are unable to sleep because it is hard to breathe.  · You feel like your heart is beating fast (palpitations).  · You become dizzy or light-headed upon standing  up.  SEEK IMMEDIATE MEDICAL CARE IF:   · You have difficulty breathing.  · There is a change in mental status such as decreased alertness or difficulty with concentration.  · You have a pain or discomfort in your chest.  · You have an episode of fainting (syncope).  MAKE SURE YOU:   · Understand these instructions.  · Will watch your condition.  · Will get help right away if you are not doing well or get worse.     This information is not intended to replace advice given to you by your health care provider. Make sure you discuss any questions you have with your health care provider.     Document Released: 12/18/2006 Document Revised: 05/03/2016 Document Reviewed: 01/17/2014  [employer] Interactive Patient Education ©2016 [employer].            SYMPTOMS OF A STROKE    Call 911 or have someone take you to the Emergency Department if you have any of the following:    · Sudden numbness or weakness of your face, arm or leg especially on one side of the body  · Sudden confusion, diffiiculty speaking or trouble understanding   · Changes in your vision or loss of sight in one eye  · Sudden severe headache with no known cause  · sudden dizziness, trouble walking, loss of balance or coordination    It is important to seek emergency care right away if you have further stroke symptoms. If you get emergency help quickly, the powerful clot-dissolving medicines can reduce the disabilities caused by a stroke.     For more information:    American Stroke Association  3-343-1-STROKE  www.strokeassociation.org           IF YOU SMOKE OR USE TOBACCO PLEASE READ THE FOLLOWING:    Why is smoking bad for me?  Smoking increases the risk of heart disease, lung disease, vascular disease, stroke, and cancer.     If you smoke, STOP!    If you would like more information on quitting smoking, please visit the [employer] website: [email or web address]   This link will provide additional resources  including the QUIT line and the Beat the Pack support groups.     For more information:    American Cancer Society  ([phone]    American Heart Association  1-250.692.2027               YOU ARE THE MOST IMPORTANT FACTOR IN YOUR RECOVERY.     Follow all instructions carefully.     I have reviewed my discharge instructions with my nurse, including the following information, if applicable:     Information about my illness and diagnosis   Follow up appointments (including lab draws)   Wound Care   Equipment Needs   Medications (new and continuing) along with side effects   Preventative information such as vaccines and smoking cessations   Diet   Pain   I know when to contact my Doctor's office or seek emergency care      I want my nurse to describe the side effects of my medications: YES NO   If the answer is no, I understand the side effects of my medications: YES NO   My nurse described the side effects of my medications in a way that I could understand: YES NO   I have taken my personal belongings and my own medications with me at discharge: YES NO            I have received this information and my questions have been answered. I have discussed any concerns I see with this plan with the nurse or physician. I understand these instructions.    Signature of Patient or Responsible Person: _____________________________________    Date: _________________  Time: __________________    Signature of Healthcare Provider: _______________________________________  Date: _________________  Time: __________________

## 2017-01-19 ENCOUNTER — APPOINTMENT (OUTPATIENT)
Dept: POSTOP/PACU | Facility: HOSPITAL | Age: 72
End: 2017-01-19
Attending: INTERNAL MEDICINE

## 2017-01-19 ENCOUNTER — ANESTHESIA EVENT (OUTPATIENT)
Dept: POSTOP/PACU | Facility: HOSPITAL | Age: 72
End: 2017-01-19

## 2017-01-19 ENCOUNTER — ANESTHESIA (OUTPATIENT)
Dept: POSTOP/PACU | Facility: HOSPITAL | Age: 72
End: 2017-01-19

## 2017-01-19 VITALS — SYSTOLIC BLOOD PRESSURE: 99 MMHG | HEART RATE: 45 BPM | DIASTOLIC BLOOD PRESSURE: 52 MMHG

## 2017-01-19 LAB
ANION GAP SERPL CALCULATED.3IONS-SCNC: 10.1 MMOL/L
BUN BLD-MCNC: 12 MG/DL (ref 8–23)
BUN/CREAT SERPL: 19.4 (ref 7–25)
CALCIUM SPEC-SCNC: 9.3 MG/DL (ref 8.6–10.5)
CHLORIDE SERPL-SCNC: 102 MMOL/L (ref 98–107)
CHOLEST SERPL-MCNC: 151 MG/DL (ref 0–200)
CO2 SERPL-SCNC: 26.9 MMOL/L (ref 22–29)
CREAT BLD-MCNC: 0.62 MG/DL (ref 0.57–1)
GFR SERPL CREATININE-BSD FRML MDRD: 94 ML/MIN/1.73
GLUCOSE BLD-MCNC: 95 MG/DL (ref 65–99)
HDLC SERPL-MCNC: 38 MG/DL (ref 40–60)
LDLC SERPL CALC-MCNC: 96 MG/DL (ref 0–100)
LDLC/HDLC SERPL: 2.54 {RATIO}
POTASSIUM BLD-SCNC: 4.1 MMOL/L (ref 3.5–5.2)
SODIUM BLD-SCNC: 139 MMOL/L (ref 136–145)
TRIGL SERPL-MCNC: 83 MG/DL (ref 0–150)
VLDLC SERPL-MCNC: 16.6 MG/DL (ref 5–40)

## 2017-01-19 PROCEDURE — 93005 ELECTROCARDIOGRAM TRACING: CPT | Performed by: NURSE PRACTITIONER

## 2017-01-19 PROCEDURE — G0378 HOSPITAL OBSERVATION PER HR: HCPCS

## 2017-01-19 PROCEDURE — 25010000002 ONDANSETRON PER 1 MG: Performed by: NURSE PRACTITIONER

## 2017-01-19 PROCEDURE — 93005 ELECTROCARDIOGRAM TRACING: CPT | Performed by: INTERNAL MEDICINE

## 2017-01-19 PROCEDURE — 25010000002 AMIODARONE IN DEXTROSE 5% 360 MG/200ML SOLUTION: Performed by: NURSE PRACTITIONER

## 2017-01-19 PROCEDURE — 92960 CARDIOVERSION ELECTRIC EXT: CPT

## 2017-01-19 PROCEDURE — 80048 BASIC METABOLIC PNL TOTAL CA: CPT | Performed by: NURSE PRACTITIONER

## 2017-01-19 PROCEDURE — 25010000002 MIDAZOLAM PER 1 MG

## 2017-01-19 PROCEDURE — 25010000002 PROPOFOL 10 MG/ML EMULSION: Performed by: NURSE ANESTHETIST, CERTIFIED REGISTERED

## 2017-01-19 PROCEDURE — 80061 LIPID PANEL: CPT | Performed by: NURSE PRACTITIONER

## 2017-01-19 PROCEDURE — 93010 ELECTROCARDIOGRAM REPORT: CPT | Performed by: INTERNAL MEDICINE

## 2017-01-19 PROCEDURE — 25010000002 PHENYLEPHRINE PER 1 ML: Performed by: NURSE ANESTHETIST, CERTIFIED REGISTERED

## 2017-01-19 PROCEDURE — 92960 CARDIOVERSION ELECTRIC EXT: CPT | Performed by: INTERNAL MEDICINE

## 2017-01-19 RX ORDER — PROPOFOL 10 MG/ML
VIAL (ML) INTRAVENOUS AS NEEDED
Status: DISCONTINUED | OUTPATIENT
Start: 2017-01-19 | End: 2017-01-19 | Stop reason: SURG

## 2017-01-19 RX ORDER — SODIUM CHLORIDE, SODIUM LACTATE, POTASSIUM CHLORIDE, CALCIUM CHLORIDE 600; 310; 30; 20 MG/100ML; MG/100ML; MG/100ML; MG/100ML
9 INJECTION, SOLUTION INTRAVENOUS CONTINUOUS
Status: DISCONTINUED | OUTPATIENT
Start: 2017-01-19 | End: 2017-01-20

## 2017-01-19 RX ORDER — FAMOTIDINE 10 MG/ML
20 INJECTION, SOLUTION INTRAVENOUS ONCE
Status: COMPLETED | OUTPATIENT
Start: 2017-01-19 | End: 2017-01-19

## 2017-01-19 RX ORDER — MIDAZOLAM HYDROCHLORIDE 1 MG/ML
INJECTION INTRAMUSCULAR; INTRAVENOUS
Status: COMPLETED
Start: 2017-01-19 | End: 2017-01-19

## 2017-01-19 RX ORDER — FAMOTIDINE 10 MG/ML
INJECTION, SOLUTION INTRAVENOUS
Status: COMPLETED
Start: 2017-01-19 | End: 2017-01-19

## 2017-01-19 RX ORDER — SODIUM CHLORIDE 0.9 % (FLUSH) 0.9 %
1-10 SYRINGE (ML) INJECTION AS NEEDED
Status: DISCONTINUED | OUTPATIENT
Start: 2017-01-19 | End: 2017-01-21 | Stop reason: HOSPADM

## 2017-01-19 RX ORDER — AMIODARONE HYDROCHLORIDE 200 MG/1
200 TABLET ORAL EVERY 12 HOURS SCHEDULED
Status: DISCONTINUED | OUTPATIENT
Start: 2017-01-19 | End: 2017-01-21 | Stop reason: HOSPADM

## 2017-01-19 RX ORDER — MIDAZOLAM HYDROCHLORIDE 1 MG/ML
2 INJECTION INTRAMUSCULAR; INTRAVENOUS
Status: DISCONTINUED | OUTPATIENT
Start: 2017-01-19 | End: 2017-01-21 | Stop reason: HOSPADM

## 2017-01-19 RX ORDER — ONDANSETRON 2 MG/ML
4 INJECTION INTRAMUSCULAR; INTRAVENOUS ONCE
Status: COMPLETED | OUTPATIENT
Start: 2017-01-19 | End: 2017-01-19

## 2017-01-19 RX ORDER — MIDAZOLAM HYDROCHLORIDE 1 MG/ML
0.5 INJECTION INTRAMUSCULAR; INTRAVENOUS
Status: DISCONTINUED | OUTPATIENT
Start: 2017-01-19 | End: 2017-01-21 | Stop reason: HOSPADM

## 2017-01-19 RX ADMIN — APIXABAN 5 MG: 5 TABLET, FILM COATED ORAL at 18:17

## 2017-01-19 RX ADMIN — MIDAZOLAM 0.5 MG: 1 INJECTION INTRAMUSCULAR; INTRAVENOUS at 08:57

## 2017-01-19 RX ADMIN — AMIODARONE HYDROCHLORIDE 200 MG: 200 TABLET ORAL at 12:12

## 2017-01-19 RX ADMIN — SERTRALINE 100 MG: 100 TABLET, FILM COATED ORAL at 11:16

## 2017-01-19 RX ADMIN — PANTOPRAZOLE SODIUM 40 MG: 40 TABLET, DELAYED RELEASE ORAL at 06:23

## 2017-01-19 RX ADMIN — MIDAZOLAM HYDROCHLORIDE 0.5 MG: 1 INJECTION INTRAMUSCULAR; INTRAVENOUS at 08:57

## 2017-01-19 RX ADMIN — LISINOPRIL 5 MG: 5 TABLET ORAL at 21:40

## 2017-01-19 RX ADMIN — PHENYLEPHRINE HYDROCHLORIDE 50 MCG: 10 INJECTION INTRAVENOUS at 09:18

## 2017-01-19 RX ADMIN — PROPOFOL 70 MG: 10 INJECTION, EMULSION INTRAVENOUS at 09:08

## 2017-01-19 RX ADMIN — FAMOTIDINE 20 MG: 10 INJECTION, SOLUTION INTRAVENOUS at 08:55

## 2017-01-19 RX ADMIN — SODIUM CHLORIDE, POTASSIUM CHLORIDE, SODIUM LACTATE AND CALCIUM CHLORIDE: 600; 310; 30; 20 INJECTION, SOLUTION INTRAVENOUS at 09:05

## 2017-01-19 RX ADMIN — LEVOTHYROXINE SODIUM 25 MCG: 25 TABLET ORAL at 06:22

## 2017-01-19 RX ADMIN — ONDANSETRON 4 MG: 2 INJECTION INTRAMUSCULAR; INTRAVENOUS at 14:02

## 2017-01-19 RX ADMIN — APIXABAN 5 MG: 5 TABLET, FILM COATED ORAL at 11:17

## 2017-01-19 RX ADMIN — POTASSIUM CHLORIDE 10 MEQ: 750 CAPSULE, EXTENDED RELEASE ORAL at 11:17

## 2017-01-19 RX ADMIN — AMIODARONE HYDROCHLORIDE 200 MG: 200 TABLET ORAL at 21:40

## 2017-01-19 NOTE — ANESTHESIA PREPROCEDURE EVALUATION
Anesthesia Evaluation     Patient summary reviewed and Nursing notes reviewed    Airway   Mallampati: II  TM distance: >3 FB  Neck ROM: full  Dental - normal exam     Pulmonary - normal exam   (+) shortness of breath,   Cardiovascular - normal exam  (+) hypertension, dysrhythmias Atrial Fib, CHF (Cardiomyopathy),     Neuro/Psych  (+) headaches, psychiatric history Anxiety and Depression,    GI/Hepatic/Renal/Endo    (+)  GERD,     Musculoskeletal     Abdominal  - normal exam   Substance History - negative use     OB/GYN          Other   (+) arthritis                          Anesthesia Plan    ASA 3     MAC     Anesthetic plan and risks discussed with patient.

## 2017-01-19 NOTE — ANESTHESIA POSTPROCEDURE EVALUATION
Patient: Raina Forrest    Procedure Summary     Date Anesthesia Start Anesthesia Stop Room / Location    01/19/17 0905 0925 [facility] PACU       Procedure Diagnosis Scheduled Providers Provider    CARDIOVERSION EXTERNAL (afib) MD Moreno Oh MD          Anesthesia Type: MAC  Last vitals  BP 97/60 (01/19/17 1002)    Temp 36.4 °C (97.6 °F) (01/19/17 1002)    Pulse 52 (01/19/17 1002)   Resp 16 (01/19/17 1002)    SpO2 97 % (01/19/17 1002)      Post Anesthesia Care and Evaluation    Patient participation: complete - patient participated  Level of consciousness: awake  Pain management: adequate  Airway patency: patent  Anesthetic complications: No anesthetic complications    Cardiovascular status: acceptable  Respiratory status: acceptable  Hydration status: acceptable

## 2017-01-19 NOTE — PROGRESS NOTES
Discharge Planning Assessment  [facility]     Patient Name: Raina Forrest  MRN: [id number]  Today's Date: 1/19/2017    Admit Date: 1/18/2017          Discharge Needs Assessment       01/19/17 1643    Living Environment    Lives With spouse    Living Arrangements house    Provides Primary Care For no one    Quality Of Family Relationships supportive;helpful;involved    Discharge Needs Assessment    Concerns To Be Addressed denies needs/concerns at this time;no discharge needs identified    Anticipated Changes Related to Illness none    Equipment Currently Used at Home none    Transportation Available family or friend will provide    Discharge Disposition still a patient            Discharge Plan       01/19/17 1643    Case Management/Social Work Plan    Plan Home with spouse.  Denies d/c needs.     Patient/Family In Agreement With Plan unable to assess    Additional Comments S/W Pt at bedside.  Introduced self and role of CCP.  Confirmed information on f/s.  Pt IADL'S & drives short distances.  Pt denies HH, SNF AND DME in past.  Pt states she plans to return home with spouse and denies d/c needs.          Discharge Placement     No information found                Demographic Summary       01/19/17 1642    Referral Information    Admission Type observation    Primary Care Physician Information    Name JORY Torre            Functional Status       01/19/17 1642    Functional Status Current    Ambulation 0-->independent    Transferring 0-->independent    Toileting 0-->independent    Bathing 0-->independent    Dressing 0-->independent    Eating 0-->independent    Communication 0-->understands/communicates without difficulty    Swallowing (if score 2 or more for any item, consult Rehab Services) 0-->swallows foods/liquids without difficulty    Change in Functional Status Since Onset of Current Illness/Injury no    Functional Status Prior    Ambulation 0-->independent    Transferring 0-->independent     Toileting 0-->independent    Bathing 0-->independent    Dressing 0-->independent    Eating 0-->independent    Communication 0-->understands/communicates without difficulty    Swallowing 0-->swallows foods/liquids without difficulty    Cognitive/Perceptual/Developmental    Current Mental Status/Cognitive Functioning no deficits noted            Psychosocial     None            Abuse/Neglect     None            Legal     None            Substance Abuse     None            Patient Forms     None          Bertha Flynn, DEACON

## 2017-01-20 PROCEDURE — G0378 HOSPITAL OBSERVATION PER HR: HCPCS

## 2017-01-20 PROCEDURE — 99232 SBSQ HOSP IP/OBS MODERATE 35: CPT | Performed by: INTERNAL MEDICINE

## 2017-01-20 PROCEDURE — 93005 ELECTROCARDIOGRAM TRACING: CPT | Performed by: NURSE PRACTITIONER

## 2017-01-20 PROCEDURE — 93010 ELECTROCARDIOGRAM REPORT: CPT | Performed by: INTERNAL MEDICINE

## 2017-01-20 RX ORDER — BISOPROLOL FUMARATE AND HYDROCHLOROTHIAZIDE 2.5; 6.25 MG/1; MG/1
1 TABLET ORAL
Status: DISCONTINUED | OUTPATIENT
Start: 2017-01-20 | End: 2017-01-21 | Stop reason: HOSPADM

## 2017-01-20 RX ORDER — FUROSEMIDE 20 MG/1
20 TABLET ORAL DAILY
Status: DISCONTINUED | OUTPATIENT
Start: 2017-01-21 | End: 2017-01-21 | Stop reason: HOSPADM

## 2017-01-20 RX ORDER — LISINOPRIL 2.5 MG/1
2.5 TABLET ORAL NIGHTLY
Status: DISCONTINUED | OUTPATIENT
Start: 2017-01-20 | End: 2017-01-21 | Stop reason: HOSPADM

## 2017-01-20 RX ADMIN — AMIODARONE HYDROCHLORIDE 200 MG: 200 TABLET ORAL at 20:32

## 2017-01-20 RX ADMIN — SERTRALINE 100 MG: 100 TABLET, FILM COATED ORAL at 09:37

## 2017-01-20 RX ADMIN — PANTOPRAZOLE SODIUM 40 MG: 40 TABLET, DELAYED RELEASE ORAL at 20:32

## 2017-01-20 RX ADMIN — LISINOPRIL 2.5 MG: 2.5 TABLET ORAL at 20:32

## 2017-01-20 RX ADMIN — AMIODARONE HYDROCHLORIDE 200 MG: 200 TABLET ORAL at 09:37

## 2017-01-20 RX ADMIN — LEVOTHYROXINE SODIUM 25 MCG: 25 TABLET ORAL at 14:50

## 2017-01-20 RX ADMIN — APIXABAN 5 MG: 5 TABLET, FILM COATED ORAL at 09:36

## 2017-01-20 RX ADMIN — POTASSIUM CHLORIDE 10 MEQ: 750 CAPSULE, EXTENDED RELEASE ORAL at 09:36

## 2017-01-20 RX ADMIN — APIXABAN 5 MG: 5 TABLET, FILM COATED ORAL at 18:35

## 2017-01-20 NOTE — PROGRESS NOTES
"[facility]  Cardiology Progress Note    Patient Identification:  Name: Raina Forrest  Age: 71 y.o.  Sex: female  :  1945  MRN: [id number]                 Follow Up / Chief Complaint: Atrial fibrillation, sick sinus syndrome, hypertension,    Interval History:  71-year-old female with history of symptomatic atrial fibrillation seen in office reporting shortness of breath and dyspnea on exertion.  Admitted for initiation of antiarrhythmic therapy and cardioversion     Subjective:  Denies chest pain, pressure or shortness of breath but \"feel weak\".        Objective:  BP 90's/60's  Rhythm is sinus, rate 50s, no pauses    Past Medical History:  Past Medical History   Diagnosis Date   • Abdominal pain    • Atrial fibrillation    • Fung's esophagus    • Benign essential hypertension    • Blood in stool    • Chest pain    • Chronic gastritis    • Congestive heart failure    • Degenerative disc disease    • Depression with anxiety    • Disease of thyroid gland    • Diverticulitis of colon    • Dysphagia    • GERD (gastroesophageal reflux disease)    • History of migraine headaches    • Osteoporosis    • Palpitations    • Thyroid disorder    • Ulcerative colitis    • Vomiting    • Weight loss      Past Surgical History:  Past Surgical History   Procedure Laterality Date   • Bladder surgery     • Cholecystectomy     • Hysterectomy     • Knee surgery     • Thyroid surgery     • Tonsillectomy     • Appendectomy     • Colonoscopy     • Endoscopy  2015     submucosal nodule in esophagus, erythematous mucosa in antrum,moderate acute gastritis, no h-pylori   • Colonoscopy N/A 2016     Procedure: COLONOSCOPY;  Surgeon: Nomi Burnett MD;  Location: [facility] ENDOSCOPY;  Service:    • Endoscopy N/A 2016     Procedure: ESOPHAGOGASTRODUODENOSCOPY;  Surgeon: Nomi Burnett MD;  Location: [facility] ENDOSCOPY;  Service:    • Eye surgery     • Cardiac catheterization N/A 2016     " Procedure: Left Heart Cath;  Surgeon: Robinson Salazar MD;  Location:  [facility];  Service:         Social History:   Social History   Substance Use Topics   • Smoking status: Never Smoker   • Smokeless tobacco: Not on file   • Alcohol use No      Family History:  Family History   Problem Relation Age of Onset   • Lung cancer Other    • Ulcerative colitis Sister           Allergies:  Allergies   Allergen Reactions   • Amoxicillin-Pot Clavulanate Shortness Of Breath     CAN take 500mg Amoxils without itching   • Bupivacaine Hcl Anaphylaxis   • Doxycycline Shortness Of Breath   • Nepafenac Itching     Eye Drops   • Amoxicillin Hives   • Bactrim [Sulfamethoxazole-Trimethoprim]    • Barium-Containing Compounds    • Bextra [Valdecoxib] Itching   • Bupivacaine    • Cefdinir    • Clarithromycin    • Contrast Dye    • Doxycycline Monohydrate    • Iodinated Diagnostic Agents    • Iodine    • Keflex [Cephalexin]    • Levaquin [Levofloxacin]    • Medrol [Methylprednisolone]    • Mesalamine    • Nsaids    • Polymyxin B-Trimethoprim    • Rivaroxaban    • Tetanus Toxoids Swelling   • Tetanus-Diphtheria Toxoids Td    • Clindamycin Rash     Scheduled Meds:          INTAKE AND OUTPUT:  No intake or output data in the 24 hours ending 01/20/17 1253    Review of Systems:   GI:  intermittent (chronic) nausea. No vomiting  Cardiac: Denies chest pain  Pulmonary: GUIDRY, no cough    Constitutional:  Temp:  [97.5 °F (36.4 °C)-97.9 °F (36.6 °C)] 97.8 °F (36.6 °C)  Heart Rate:  [52-67] 55  Resp:  [16-18] 16  BP: ()/(47-83) 96/83    Physical Exam by Robinson Salazar MD  General:  Ambulating ad lorri. in her room  Appears in no acute distress  Eyes: PERTL,  HEENT:  No JVD. No mucosal pallor or cyanosis  Respiratory: Respirations regular and unlabored at rest. BBS with good air entry in all fields. No crackles or wheezes auscultated  Cardiovascular: S1S2 regular rate and rhythm. No murmur or gallop.  No pretibial  "pitting edema  Gastrointestinal: Abdomen soft, flat, non tender. Bowel sounds present.   Musculoskeletal: BROOKS x4. No abnormal movements  Extremities: No digital clubbing or cyanosis  Skin: Color pink. Skin warm and dry to touch. No rashes    Neuro: AAO x3 CN II-XII grossly intact  Psych: Mood and affect normal, pleasant and cooperative  Cardiographics  Telemetry: Sinus 55-60's, occasional PAC, no pauses    ECG:       Echocardiogram :   · The left ventricular cavity is mildly dilated.  · Left ventricular wall segments contract abnormally. Refer to wall scoring diagram for more information.  · Left atrial cavity size is mild-to-moderately dilated.  · Moderate mitral valve regurgitation is present  · Left Ventricle: Calculated EF = 45%  · Moderate mitral valve regurgitation  · Moderate tricuspid valve regurgitation is present.    Lab Review   pending        Assessment:  - symptomatic afib / sss  - elevated [id number] score   - HTN  - EF 45%, mod MR/TR        Plan:  - symptomatic afib / sss - s/p successful cardioversion, now on amiodarone twice a day.  Mildly bradycardic with complaint of weakness.  Normotensive    - elevated [id number] score -  on Eliquis    - HTN  Controlled on ACE-I, diuretic and BB    - EF 45% - clinically compensated. On ACE-I, diuretic, BB    Patient is maintaining a sinus rhythm but is mildly bradycardic and complaining of \"weakness\".  No near-syncope or syncopal symptoms.  Will cut back Ziac and diuretic.  Watch overnight.  Anticipate discharge tomorrow morning on titrating dose of amiodarone    While patient is taking Amiodarone,   yearly eye exams, thyroid function studies, liver function studies and chest x-ray are recommended    I reviewed the patient's new clinical results and treatment plan with Dr Salazar.  I personally viewed and interpreted the patient's EKG/Telemetry data    )1/20/2017  MD LYLE Oh Dragon/Transcription disclaimer:   Much of this " encounter note is an electronic transcription/translation of spoken language to printed text. The electronic translation of spoken language may permit erroneous, or at times, nonsensical words or phrases to be inadvertently transcribed; Although I have reviewed the note for such errors, some may still exist.

## 2017-01-20 NOTE — PLAN OF CARE
Problem: Patient Care Overview (Adult)  Goal: Plan of Care Review  Outcome: Ongoing (interventions implemented as appropriate)    01/20/17 0651   Coping/Psychosocial Response Interventions   Plan Of Care Reviewed With patient   Patient Care Overview   Progress improving   Outcome Evaluation   Outcome Summary/Follow up Plan VSS. S/P CV. Patient remains in SR with PAC's. No other c/o other than mild itching from the pads on her back. Continue to apply lotion. Should D/C today.

## 2017-01-20 NOTE — PLAN OF CARE
Problem: Patient Care Overview (Adult)  Goal: Plan of Care Review  Outcome: Ongoing (interventions implemented as appropriate)    01/20/17 1724   Coping/Psychosocial Response Interventions   Plan Of Care Reviewed With patient;spouse   Patient Care Overview   Progress improving   Outcome Evaluation   Outcome Summary/Follow up Plan pt meds adjusted. pt voiced complaints regarding SOA and nausea. RN notified Monica NP. No further orders at this time. patient stable.. Plans to DC pt tomorrow       Goal: Adult Individualization and Mutuality  Outcome: Ongoing (interventions implemented as appropriate)  Goal: Discharge Needs Assessment  Outcome: Ongoing (interventions implemented as appropriate)    Problem: Arrhythmia/Dysrhythmia (Symptomatic) (Adult)  Goal: Signs and Symptoms of Listed Potential Problems Will be Absent or Manageable (Arrhythmia/Dysrhythmia)  Outcome: Ongoing (interventions implemented as appropriate)

## 2017-01-21 VITALS
HEIGHT: 69 IN | BODY MASS INDEX: 22.81 KG/M2 | TEMPERATURE: 97.6 F | OXYGEN SATURATION: 98 % | SYSTOLIC BLOOD PRESSURE: 106 MMHG | DIASTOLIC BLOOD PRESSURE: 84 MMHG | WEIGHT: 154 LBS | RESPIRATION RATE: 18 BRPM | HEART RATE: 49 BPM

## 2017-01-21 PROCEDURE — 90661 CCIIV3 VAC ABX FR 0.5 ML IM: CPT | Performed by: INTERNAL MEDICINE

## 2017-01-21 PROCEDURE — 99238 HOSP IP/OBS DSCHRG MGMT 30/<: CPT | Performed by: INTERNAL MEDICINE

## 2017-01-21 PROCEDURE — 25010000004 INFLUENZA VAC SUBUNIT QUAD 0.5 ML SUSPENSION PREFILLED SYRINGE: Performed by: INTERNAL MEDICINE

## 2017-01-21 PROCEDURE — 93005 ELECTROCARDIOGRAM TRACING: CPT | Performed by: NURSE PRACTITIONER

## 2017-01-21 PROCEDURE — 93010 ELECTROCARDIOGRAM REPORT: CPT | Performed by: INTERNAL MEDICINE

## 2017-01-21 PROCEDURE — G0008 ADMIN INFLUENZA VIRUS VAC: HCPCS | Performed by: INTERNAL MEDICINE

## 2017-01-21 PROCEDURE — G0378 HOSPITAL OBSERVATION PER HR: HCPCS

## 2017-01-21 RX ORDER — AMIODARONE HYDROCHLORIDE 200 MG/1
200 TABLET ORAL EVERY 12 HOURS SCHEDULED
Qty: 60 TABLET | Refills: 3 | Status: SHIPPED | OUTPATIENT
Start: 2017-01-21 | End: 2017-02-02 | Stop reason: SINTOL

## 2017-01-21 RX ORDER — BISOPROLOL FUMARATE AND HYDROCHLOROTHIAZIDE 5; 6.25 MG/1; MG/1
TABLET ORAL
Qty: 30 TABLET | Refills: 6
Start: 2017-01-21 | End: 2018-10-31 | Stop reason: SDUPTHER

## 2017-01-21 RX ORDER — LISINOPRIL 5 MG/1
2.5 TABLET ORAL NIGHTLY
Qty: 30 TABLET | Refills: 6
Start: 2017-01-21 | End: 2018-06-08

## 2017-01-21 RX ORDER — PANTOPRAZOLE SODIUM 40 MG/1
40 TABLET, DELAYED RELEASE ORAL DAILY
Qty: 30 TABLET | Refills: 3 | Status: SHIPPED | OUTPATIENT
Start: 2017-01-21 | End: 2020-02-19

## 2017-01-21 RX ORDER — FUROSEMIDE 40 MG/1
20 TABLET ORAL DAILY
Qty: 30 TABLET | Refills: 6
Start: 2017-01-21 | End: 2018-05-01 | Stop reason: SDUPTHER

## 2017-01-21 RX ADMIN — POTASSIUM CHLORIDE 10 MEQ: 750 CAPSULE, EXTENDED RELEASE ORAL at 09:18

## 2017-01-21 RX ADMIN — AMIODARONE HYDROCHLORIDE 200 MG: 200 TABLET ORAL at 09:17

## 2017-01-21 RX ADMIN — BISOPROLOL FUMARATE AND HYDROCHLOROTHIAZIDE 1 TABLET: 6.25; 2.5 TABLET ORAL at 09:17

## 2017-01-21 RX ADMIN — FLUTICASONE PROPIONATE 1 SPRAY: 50 SPRAY, METERED NASAL at 09:23

## 2017-01-21 RX ADMIN — SERTRALINE 100 MG: 100 TABLET, FILM COATED ORAL at 09:18

## 2017-01-21 RX ADMIN — ACETAMINOPHEN 650 MG: 325 TABLET ORAL at 16:27

## 2017-01-21 RX ADMIN — APIXABAN 5 MG: 5 TABLET, FILM COATED ORAL at 09:17

## 2017-01-21 RX ADMIN — APIXABAN 5 MG: 5 TABLET, FILM COATED ORAL at 18:42

## 2017-01-21 RX ADMIN — INFLUENZA A VIRUS A/BRISBANE/10/2010 (H1N1) ANTIGEN (MDCK CELL DERIVED, PROPIOLACTONE INACTIVATED), INFLUENZA A VIRUS A/HONG KONG/4801/2014 (H3N2) ANTIGEN (MDCK CELL DERIVED, PROPIOLACTONE INACTIVATED), INFLUENZA B VIRUS B/UTAH/9/2014 ANTIGEN (MDCK CELL DERIVED, PROPIOLACTONE INACTIVATED) AND INFLUENZA B VIRUS B/HONG KONG/259/2010 ANTIGEN (MDCK CELL DERIVED, PROPIOLACTONE INACTIVATED) 0.5 ML: 15; 15; 15; 15 INJECTION, SUSPENSION INTRAMUSCULAR at 20:41

## 2017-01-21 RX ADMIN — LEVOTHYROXINE SODIUM 25 MCG: 25 TABLET ORAL at 09:17

## 2017-01-21 RX ADMIN — FUROSEMIDE 20 MG: 20 TABLET ORAL at 09:17

## 2017-01-21 RX ADMIN — AMIODARONE HYDROCHLORIDE 200 MG: 200 TABLET ORAL at 20:41

## 2017-01-21 NOTE — PLAN OF CARE
Problem: Patient Care Overview (Adult)  Goal: Plan of Care Review  Outcome: Ongoing (interventions implemented as appropriate)    01/21/17 0583   Coping/Psychosocial Response Interventions   Plan Of Care Reviewed With patient   Patient Care Overview   Progress improving   Outcome Evaluation   Outcome Summary/Follow up Plan VSS. Patient with no c/o this shift. Patient should D/C home today.

## 2017-01-22 NOTE — DISCHARGE SUMMARY
[facility]  Physician Discharge Summary    Patient Identification:  Name: Raina Forrest  Age: 71 y.o.  Sex: female  :  1945  MRN: [id number]    Admit date: 2017    Discharge date and time:        Admitting Physician: Robinson Salazar MD     Discharge Physician: Dr. Salazar    Discharge Diagnoses:   Patient Active Problem List   Diagnosis   • Chronic atrial fibrillation   • Benign essential HTN   • Bradycardia   • Chest pain   • Can't get food down   • Accumulation of fluid in tissues   • Esophageal lump   • Adynamia   • Itch of skin   • Anorexia   • Chronic nausea   • Gastroesophageal reflux disease   • Breath shortness   • Emesis   • Decreased body weight   • Anxiety   • Narrowing of intervertebral disc space   • Diverticulosis of intestine   • Hypertension   • Migraine   • Osteoporosis   • Palpitations   • Pituitary adenoma   • Sick sinus syndrome   • Diarrhea   • Paroxysmal atrial fibrillation   • Cardiomyopathy   • Anticoagulated   • Atrial fibrillation       Discharged Condition: stable    Hospital Course:   Patient is a [age]  female admitted on 2017 with atrial fibrillation, sick sinus syndrome, htn, and bleeding in stool while on Eliquis.  She was admitted for initiation of antiarrhythmic therapy as well as direct-current cardioversionevaluation and management and underwent  successful direct-current cardioversion on 2017.  Bleeding and stool resolved after admission.She did have echocardiogram performed 2016 showed an EF of 45% with moderate mitral valve regurg moderate and  tricuspid regurg.  Cardiac catheter performed 1121 on previous [place] showed normal coronary arteries.      Post cardioversion on 2017 it was noted patient had some mild bradycardia with heart rates dipping into the 50s with some complaints of weakness.  Blood pressures were trending in the low 80s systolic.  She was kept overnight and her  diuretic of Ziac and Lasix as well as her dose of lisinopril were all cut by half.  The day of discharge patient felt better and denied any further weakness and blood pressures are running in the 100s to 106 systolic.  HR at times while resting continued to run low 50's but would elevated up to 58- 60's with activity.  She denied any syncope or near syncopal type feeling, shortness of breath, chest pain, PND, orthopnea, or nausea/ vomiting. She was anxious for discharge home on 1/21/17.  Dr. Salazar did evaluated her and felt she was stable for discharge.     Consults:   None    Discharge Exam:   General: No acute distress   Skin: Warm and dry, no diaphoresis noted   EYES: PERTL   HEENT: external ear and nose normal; oral mucosa moist   Neck: Supple; no carotid bruits; no JVD   Heart: S1S2  Mild bradycard with HR at time mid 50's and SB- SR noted.  no murmurs; no gallop or rub appreciated   Pulmonary: Respirations regular, unlabored at rest, bilateral breath sounds have good air entry throughout all lung fields; no crackles, rubs or wheezes auscultated.     GI: Soft, non-tender, non-distended, positive bowel sounds  No hepatosplenomegaly   Extremities: Bilateral lower extremities have no pre-tibial pitting edema; DP/PT pulses are palpable   Neurological: Alert and oriented x 3; no neuro deficits        LABS:        Results from last 7 days  Lab Units 01/18/17  1155   MAGNESIUM mg/dL 2.1       Results from last 7 days  Lab Units 01/19/17  0445   SODIUM mmol/L 139   POTASSIUM mmol/L 4.1   BUN mg/dL 12   CREATININE mg/dL 0.62   CALCIUM mg/dL 9.3       Results from last 7 days  Lab Units 01/18/17  1155   WBC 10*3/mm3 8.97   HEMOGLOBIN g/dL 13.1   HEMATOCRIT % 39.4   PLATELETS 10*3/mm3 258       Results from last 7 days  Lab Units 01/18/17  1155   INR  1.32*       Results from last 7 days  Lab Units 01/19/17  0445   CHOLESTEROL mg/dL 151   TRIGLYCERIDES mg/dL 83   HDL CHOL mg/dL 38*       probnp 1637  1/18/17    Disposition:  home    Discharge Medications:    Raina Forrest   Home Medication Instructions JD:[id number]    Printed on:01/21/17 2114   Medication Information                      amiodarone (PACERONE) 200 MG tablet  Take 1 tablet by mouth Every 12 (Twelve) Hours.             apixaban (ELIQUIS) 5 MG tablet tablet  Take 1 tablet by mouth 2 (Two) Times a Day.             bisoprolol-hydrochlorothiazide (ZIAC) 5-6.25 MG per tablet  1/2 tablet by mouth daily             fluticasone (FLONASE) 50 MCG/ACT nasal spray  into each nostril.             furosemide (LASIX) 40 MG tablet  Take 0.5 tablets by mouth Daily.             levothyroxine (LEVOTHROID) 25 MCG tablet  Take 25 mcg by mouth.             lisinopril (PRINIVIL,ZESTRIL) 5 MG tablet  Take 0.5 tablets by mouth Every Night.             MECLIZINE HCL PO  Take 25 mg by mouth 3 (three) times a day.             pantoprazole (PROTONIX) 40 MG EC tablet  Take 1 tablet by mouth Daily.             potassium chloride (K-DUR,KLOR-CON) 10 MEQ CR tablet  Take 1 tablet by mouth Daily.             sertraline (ZOLOFT) 100 MG tablet  Take 1 tablet by mouth daily.             SUMAtriptan (IMITREX) 50 MG tablet  TAKE 1 TABLET EVERY 2 HOURS AS NEEDED FOR MIGRAINE,MAY REPEAT ONCE IN 2 HOURS                   Discharge Home Instructions:  1. Follow up with Dr. Salazar in 7-10 days with 12 lead EKG.    2.  She was counseled on need for yearly eye exams, liver function tests, thyroid function test, and chest x-ray while on amiodarone.  3.  She was instructed to contact our office for any swelling, weight gain of 2 pounds, shortness of breath, low blood pressure less than 100 systolic, bleeding, or orthopnea.  4.  She was to call 911 for any syncope or near syncopal type feelings.   5.  If patient is unable to maintain sinus rhythm or continues to have have symptomatic sick sinus syndrome symptoms of tachy/mira she may need permanent pacemaker implantation to help  regulate.       I reviewed the patient's new clinical results, discharge treatments, medications and follow up with Dr Salazar. I personally viewed and interpreted the patient's EKG/Telemetry data    Signed:  Robinson Salazar MD  1/21/2017  9:14 PM      EMR Dragon/Transcription disclaimer:   Much of this encounter note is an electronic transcription/translation of spoken language to printed text. The electronic translation of spoken language may permit erroneous, or at times, nonsensical words or phrases to be inadvertently transcribed; Although I have reviewed the note for such errors, some may still exist.

## 2017-01-23 ENCOUNTER — OFFICE VISIT (OUTPATIENT)
Dept: CARDIOLOGY | Facility: CLINIC | Age: 72
End: 2017-01-23

## 2017-01-23 VITALS
SYSTOLIC BLOOD PRESSURE: 104 MMHG | HEIGHT: 69 IN | HEART RATE: 53 BPM | BODY MASS INDEX: 22.81 KG/M2 | DIASTOLIC BLOOD PRESSURE: 62 MMHG | WEIGHT: 154 LBS

## 2017-01-23 DIAGNOSIS — Z79.01 ANTICOAGULATED: ICD-10-CM

## 2017-01-23 DIAGNOSIS — F41.9 ANXIETY: ICD-10-CM

## 2017-01-23 DIAGNOSIS — I48.0 PAROXYSMAL ATRIAL FIBRILLATION (HCC): ICD-10-CM

## 2017-01-23 DIAGNOSIS — R53.83 FATIGUE, UNSPECIFIED TYPE: ICD-10-CM

## 2017-01-23 DIAGNOSIS — Z79.899 ON AMIODARONE THERAPY: ICD-10-CM

## 2017-01-23 DIAGNOSIS — Z79.899 ON AMIODARONE THERAPY: Primary | ICD-10-CM

## 2017-01-23 DIAGNOSIS — I10 BENIGN ESSENTIAL HTN: Primary | ICD-10-CM

## 2017-01-23 DIAGNOSIS — I42.9 CARDIOMYOPATHY (HCC): ICD-10-CM

## 2017-01-23 PROCEDURE — 99213 OFFICE O/P EST LOW 20 MIN: CPT | Performed by: INTERNAL MEDICINE

## 2017-01-23 PROCEDURE — 93000 ELECTROCARDIOGRAM COMPLETE: CPT | Performed by: INTERNAL MEDICINE

## 2017-01-23 NOTE — MR AVS SNAPSHOT
Raina Forrest   1/23/2017 1:30 PM   Office Visit    Dept Phone:  [phone]   Encounter #:  [id number]    Provider:  Robinson Salazar MD   Department:  [facility]                Your Full Care Plan              Your Updated Medication List          This list is accurate as of: 1/23/17  2:56 PM.  Always use your most recent med list.                amiodarone 200 MG tablet   Commonly known as:  PACERONE   Take 1 tablet by mouth Every 12 (Twelve) Hours.       apixaban 5 MG tablet tablet   Commonly known as:  ELIQUIS   Take 1 tablet by mouth 2 (Two) Times a Day.       bisoprolol-hydrochlorothiazide 5-6.25 MG per tablet   Commonly known as:  ZIAC   1/2 tablet by mouth daily       fluticasone 50 MCG/ACT nasal spray   Commonly known as:  FLONASE       furosemide 40 MG tablet   Commonly known as:  LASIX   Take 0.5 tablets by mouth Daily.       LEVOTHROID 25 MCG tablet   Generic drug:  levothyroxine       lisinopril 5 MG tablet   Commonly known as:  PRINIVIL,ZESTRIL   Take 0.5 tablets by mouth Every Night.       MECLIZINE HCL PO       pantoprazole 40 MG EC tablet   Commonly known as:  PROTONIX   Take 1 tablet by mouth Daily.       potassium chloride 10 MEQ CR tablet   Commonly known as:  K-DUR,KLOR-CON   Take 1 tablet by mouth Daily.       sertraline 100 MG tablet   Commonly known as:  ZOLOFT       SUMAtriptan 50 MG tablet   Commonly known as:  IMITREX               We Performed the Following     ECG 12 Lead       You Were Diagnosed With        Codes Comments    Benign essential HTN    -  Primary ICD-10-CM: I10  ICD-9-CM: 401.1     Anticoagulated     ICD-10-CM: Z79.01  ICD-9-CM: V58.61     Paroxysmal atrial fibrillation     ICD-10-CM: I48.0  ICD-9-CM: 427.31     Anxiety     ICD-10-CM: F41.9  ICD-9-CM: 300.00     On amiodarone therapy     ICD-10-CM: Z79.899  ICD-9-CM: V58.69       Instructions     None    Patient Instructions History       "  Upcoming Appointments     Visit Type Date Time Department    HOSPITAL FOLLOW UP 1/23/2017  1:30 PM [facility]    FOLLOW UP 2/13/2017  2:45 PM [facility]      MyChart Signup     Our records indicate that you have declined [facility] MyChart signup. If you would like to sign up for MyChart, please email [email or web address] or call [phone] to obtain an activation code.             Other Info from Your Visit           Your Appointments     Feb 13, 2017  2:45 PM EST   Follow Up with Robinson Salazar MD   [facility] (--)    [address] [facility] [address] [phone]           Arrive 15 minutes prior to appointment.              Allergies     Amoxicillin-pot Clavulanate  Shortness Of Breath    CAN take 500mg Amoxils without itching    Bupivacaine Hcl  Anaphylaxis    Doxycycline  Shortness Of Breath    Nepafenac  Itching    Eye Drops    Amoxicillin  Hives    Bactrim [Sulfamethoxazole-trimethoprim]      Barium-containing Compounds      Bextra [Valdecoxib]  Itching    Bupivacaine      Cefdinir      Clarithromycin      Contrast Dye      Doxycycline Monohydrate      Iodinated Diagnostic Agents      Iodine      Keflex [Cephalexin]      Levaquin [Levofloxacin]      Medrol [Methylprednisolone]      Mesalamine      Nsaids      Polymyxin B-trimethoprim      Rivaroxaban      Tetanus Toxoids  Swelling    Tetanus-diphtheria Toxoids Td      Clindamycin  Rash      Reason for Visit     Follow-up Atrial Fibrillation s/p Cardioversion      Vital Signs     Blood Pressure Pulse Height Weight Body Mass Index Smoking Status    104/62 53 69\" (175.3 cm) 154 lb (69.9 kg) 22.74 kg/m2 Never Smoker      Problems and Diagnoses Noted     Taking blood thinners    Anxiety problem    Benign essential HTN    On amiodarone therapy    Atrial fibrillation (irregular heartbeat)        "

## 2017-01-23 NOTE — PROGRESS NOTES
Subjective:       Raina Forrest is a 71 y.o. female who here for follow up    CC  afib follow up  HPI  71-year-old white female with known history of atrial fibrillation, as well as a benign arterial hypertension on anticoagulation, also on the amiodarone here with significant nonspecific complaints of aching in the legs as well as headaches, shortness of breath on exertion with no angina pectoris     Problem List Items Addressed This Visit        Cardiovascular and Mediastinum    Benign essential HTN - Primary    Paroxysmal atrial fibrillation    Relevant Orders    ECG 12 Lead       Other    Anxiety    Anticoagulated    Relevant Orders    ECG 12 Lead    On amiodarone therapy        Previous treatments/evaluations include: ASA. Cardiac risk factors: advanced age (older than 55 for men, 65 for women) and hypertension.    The following portions of the patient's history were reviewed and updated as appropriate: allergies, current medications, past family history, past medical history, past social history, past surgical history and problem list.    Past Medical History   Diagnosis Date   • Abdominal pain    • Atrial fibrillation    • Fung's esophagus    • Benign essential hypertension    • Blood in stool    • Chest pain    • Chronic gastritis    • Congestive heart failure    • Degenerative disc disease    • Depression with anxiety    • Disease of thyroid gland    • Diverticulitis of colon    • Dysphagia    • GERD (gastroesophageal reflux disease)    • History of migraine headaches    • Osteoporosis    • Palpitations    • Thyroid disorder    • Ulcerative colitis    • Vomiting    • Weight loss     reports that she has never smoked. She does not have any smokeless tobacco history on file. She reports that she does not drink alcohol or use illicit drugs.  Family History   Problem Relation Age of Onset   • Lung cancer Other    • Ulcerative colitis Sister        Review of Systems  Constitutional: No wt loss, fever,  "fatigue  Gastrointestinal: No nausea, abdominal pain  Behavioral/Psych: No insomnia or anxiety   Cardiovascular no chest pains or tightness in chest  Objective:       Physical Exam           Physical Exam  Visit Vitals   • /62   • Pulse 53   • Ht 69\" (175.3 cm)   • Wt 154 lb (69.9 kg)   • BMI 22.74 kg/m2     General appearance: alert, appears stated age and cooperative, oriented x 3  Neck: no JVD and supple, symmetrical, trachea midline  Lungs: clear to auscultation bilaterally  Heart:Normal PMI,  S1, S2 normal, no murmur, rub or gallop  Extremities: normal range of motion, no cyanosis or edema,  Pulses: 2+ and symmetric  Skin: Skin color, texture, turgor normal. No rashes or lesions  Psych:  Pleasant and cooperative        Cardiographics  @  ECG 12 Lead  Date/Time: 1/23/2017 1:56 PM  Performed by: GIACOMO PARKER  Authorized by: GIACOMO PARKER   Comparison: compared with previous ECG   Comparison to previous ECG: Sinus bradycardia is new  Rhythm: sinus bradycardia  ST Flattening: all  T flattening: all  Clinical impression: abnormal ECG            Echocardiogram:        Current Outpatient Prescriptions:   •  amiodarone (PACERONE) 200 MG tablet, Take 1 tablet by mouth Every 12 (Twelve) Hours., Disp: 60 tablet, Rfl: 3  •  apixaban (ELIQUIS) 5 MG tablet tablet, Take 1 tablet by mouth 2 (Two) Times a Day., Disp: 42 tablet, Rfl: 0  •  bisoprolol-hydrochlorothiazide (ZIAC) 5-6.25 MG per tablet, 1/2 tablet by mouth daily, Disp: 30 tablet, Rfl: 6  •  fluticasone (FLONASE) 50 MCG/ACT nasal spray, into each nostril., Disp: , Rfl:   •  furosemide (LASIX) 40 MG tablet, Take 0.5 tablets by mouth Daily., Disp: 30 tablet, Rfl: 6  •  levothyroxine (LEVOTHROID) 25 MCG tablet, Take 25 mcg by mouth., Disp: , Rfl:   •  lisinopril (PRINIVIL,ZESTRIL) 5 MG tablet, Take 0.5 tablets by mouth Every Night., Disp: 30 tablet, Rfl: 6  •  MECLIZINE HCL PO, Take 25 mg by mouth 3 (three) times a day., Disp: , Rfl:   •  " pantoprazole (PROTONIX) 40 MG EC tablet, Take 1 tablet by mouth Daily., Disp: 30 tablet, Rfl: 3  •  potassium chloride (K-DUR,KLOR-CON) 10 MEQ CR tablet, Take 1 tablet by mouth Daily., Disp: 30 tablet, Rfl: 6  •  sertraline (ZOLOFT) 100 MG tablet, Take 1 tablet by mouth daily., Disp: , Rfl:   •  SUMAtriptan (IMITREX) 50 MG tablet, TAKE 1 TABLET EVERY 2 HOURS AS NEEDED FOR MIGRAINE,MAY REPEAT ONCE IN 2 HOURS, Disp: , Rfl: 4   Assessment:        Patient Active Problem List   Diagnosis   • Chronic atrial fibrillation   • Benign essential HTN   • Bradycardia   • Chest pain   • Can't get food down   • Accumulation of fluid in tissues   • Esophageal lump   • Adynamia   • Itch of skin   • Anorexia   • Chronic nausea   • Gastroesophageal reflux disease   • Breath shortness   • Emesis   • Decreased body weight   • Anxiety   • Narrowing of intervertebral disc space   • Diverticulosis of intestine   • Hypertension   • Migraine   • Osteoporosis   • Palpitations   • Pituitary adenoma   • Sick sinus syndrome   • Diarrhea   • Paroxysmal atrial fibrillation   • Cardiomyopathy   • Anticoagulated   • Atrial fibrillation               Plan:            ICD-10-CM ICD-9-CM   1. Benign essential HTN I10 401.1   2. Anticoagulated Z79.01 V58.61   3. Paroxysmal atrial fibrillation I48.0 427.31   4. Anxiety F41.9 300.00   5. On amiodarone therapy Z79.899 V58.69     Raina Forrest IS ON AMIODARONE,   Significant side effects associated with this medication has been explained     Pros and cons of the medications has been discussed, decision has been to continue the medication   6 months to yearly checkup for eye examination, thyroid function test, chest x-ray and liver function test has been advised    Patient understands well  1. Normal coronary arteries  2. LV function at lower limits of normal     Recommendations:      1. Medical management     Will proceed with CBC,TSH, CMP CXR    SEE US 2 WKS    May need pacemaker  COUNSELING:    Raina  KIM ForrestCohermelindoeling was given to patient for the following topics: diagnostic results, risk factor reductions, impressions, risks and benefits of treatment options and importance of treatment compliance .       SMOKING COUNSELING:    Counseling given: Not Answered      EMR Dragon/Transcription disclaimer:   Much of this encounter note is an electronic transcription/translation of spoken language to printed text. The electronic translation of spoken language may permit erroneous, or at times, nonsensical words or phrases to be inadvertently transcribed; Although I have reviewed the note for such errors, some may still exist.

## 2017-01-26 ENCOUNTER — LAB (OUTPATIENT)
Dept: LAB | Facility: HOSPITAL | Age: 72
End: 2017-01-26

## 2017-01-26 ENCOUNTER — HOSPITAL ENCOUNTER (OUTPATIENT)
Dept: GENERAL RADIOLOGY | Facility: HOSPITAL | Age: 72
Discharge: HOME | End: 2017-01-26
Attending: INTERNAL MEDICINE | Admitting: INTERNAL MEDICINE

## 2017-01-26 DIAGNOSIS — R53.83 FATIGUE, UNSPECIFIED TYPE: ICD-10-CM

## 2017-01-26 DIAGNOSIS — Z87.898 HX OF FATIGUE: ICD-10-CM

## 2017-01-26 DIAGNOSIS — Z79.899 ON AMIODARONE THERAPY: ICD-10-CM

## 2017-01-26 DIAGNOSIS — I42.9 CARDIOMYOPATHY (HCC): ICD-10-CM

## 2017-01-26 DIAGNOSIS — I48.0 PAROXYSMAL ATRIAL FIBRILLATION (HCC): ICD-10-CM

## 2017-01-26 LAB
ALBUMIN SERPL-MCNC: 4.2 G/DL (ref 3.5–5.2)
ALBUMIN/GLOB SERPL: 1.6 G/DL
ALP SERPL-CCNC: 109 U/L (ref 39–117)
ALT SERPL W P-5'-P-CCNC: 36 U/L (ref 1–33)
ANION GAP SERPL CALCULATED.3IONS-SCNC: 14.4 MMOL/L
AST SERPL-CCNC: 40 U/L (ref 1–32)
BASOPHILS # BLD AUTO: 0.05 10*3/MM3 (ref 0–0.2)
BASOPHILS NFR BLD AUTO: 0.7 % (ref 0–1.5)
BILIRUB SERPL-MCNC: 0.9 MG/DL (ref 0.1–1.2)
BUN BLD-MCNC: 10 MG/DL (ref 8–23)
BUN/CREAT SERPL: 12 (ref 7–25)
CALCIUM SPEC-SCNC: 9.2 MG/DL (ref 8.6–10.5)
CHLORIDE SERPL-SCNC: 97 MMOL/L (ref 98–107)
CO2 SERPL-SCNC: 24.6 MMOL/L (ref 22–29)
CREAT BLD-MCNC: 0.83 MG/DL (ref 0.57–1)
DEPRECATED RDW RBC AUTO: 51.1 FL (ref 37–54)
EOSINOPHIL # BLD AUTO: 0.09 10*3/MM3 (ref 0–0.7)
EOSINOPHIL NFR BLD AUTO: 1.3 % (ref 0.3–6.2)
ERYTHROCYTE [DISTWIDTH] IN BLOOD BY AUTOMATED COUNT: 14.1 % (ref 11.7–13)
GFR SERPL CREATININE-BSD FRML MDRD: 67 ML/MIN/1.73
GLOBULIN UR ELPH-MCNC: 2.6 GM/DL
GLUCOSE BLD-MCNC: 98 MG/DL (ref 65–99)
HCT VFR BLD AUTO: 38.2 % (ref 35.6–45.5)
HGB BLD-MCNC: 12.4 G/DL (ref 11.9–15.5)
IMM GRANULOCYTES # BLD: 0.02 10*3/MM3 (ref 0–0.03)
IMM GRANULOCYTES NFR BLD: 0.3 % (ref 0–0.5)
LYMPHOCYTES # BLD AUTO: 1.01 10*3/MM3 (ref 0.9–4.8)
LYMPHOCYTES NFR BLD AUTO: 14.7 % (ref 19.6–45.3)
MCH RBC QN AUTO: 32 PG (ref 26.9–32)
MCHC RBC AUTO-ENTMCNC: 32.5 G/DL (ref 32.4–36.3)
MCV RBC AUTO: 98.7 FL (ref 80.5–98.2)
MONOCYTES # BLD AUTO: 0.64 10*3/MM3 (ref 0.2–1.2)
MONOCYTES NFR BLD AUTO: 9.3 % (ref 5–12)
NEUTROPHILS # BLD AUTO: 5.08 10*3/MM3 (ref 1.9–8.1)
NEUTROPHILS NFR BLD AUTO: 73.7 % (ref 42.7–76)
PLATELET # BLD AUTO: 237 10*3/MM3 (ref 140–500)
PMV BLD AUTO: 12.3 FL (ref 6–12)
POTASSIUM BLD-SCNC: 3.6 MMOL/L (ref 3.5–5.2)
PROT SERPL-MCNC: 6.8 G/DL (ref 6–8.5)
RBC # BLD AUTO: 3.87 10*6/MM3 (ref 3.9–5.2)
SODIUM BLD-SCNC: 136 MMOL/L (ref 136–145)
T-UPTAKE NFR SERPL: 1.05 TBI (ref 0.8–1.3)
T4 SERPL-MCNC: 8.01 MCG/DL (ref 4.5–11.7)
TSH SERPL DL<=0.05 MIU/L-ACNC: 5.99 MIU/ML (ref 0.27–4.2)
WBC NRBC COR # BLD: 6.89 10*3/MM3 (ref 4.5–10.7)

## 2017-01-26 PROCEDURE — 71020 HC CHEST PA AND LATERAL: CPT

## 2017-01-26 PROCEDURE — 84436 ASSAY OF TOTAL THYROXINE: CPT

## 2017-01-26 PROCEDURE — 36415 COLL VENOUS BLD VENIPUNCTURE: CPT

## 2017-01-26 PROCEDURE — 80053 COMPREHEN METABOLIC PANEL: CPT

## 2017-01-26 PROCEDURE — 84443 ASSAY THYROID STIM HORMONE: CPT

## 2017-01-26 PROCEDURE — 84479 ASSAY OF THYROID (T3 OR T4): CPT

## 2017-01-26 PROCEDURE — 85025 COMPLETE CBC W/AUTO DIFF WBC: CPT

## 2017-02-02 ENCOUNTER — HOSPITAL ENCOUNTER (OUTPATIENT)
Dept: CARDIOLOGY | Facility: HOSPITAL | Age: 72
Discharge: HOME | End: 2017-02-02
Admitting: NURSE PRACTITIONER

## 2017-02-02 ENCOUNTER — OFFICE VISIT (OUTPATIENT)
Dept: CARDIOLOGY | Facility: CLINIC | Age: 72
End: 2017-02-02

## 2017-02-02 VITALS
HEIGHT: 69 IN | HEART RATE: 56 BPM | DIASTOLIC BLOOD PRESSURE: 46 MMHG | SYSTOLIC BLOOD PRESSURE: 98 MMHG | BODY MASS INDEX: 22.81 KG/M2 | WEIGHT: 154 LBS

## 2017-02-02 DIAGNOSIS — Z01.810 PRE-OPERATIVE CARDIOVASCULAR EXAMINATION: ICD-10-CM

## 2017-02-02 DIAGNOSIS — I49.5 SICK SINUS SYNDROME (HCC): ICD-10-CM

## 2017-02-02 DIAGNOSIS — I10 BENIGN ESSENTIAL HTN: ICD-10-CM

## 2017-02-02 DIAGNOSIS — I48.20 CHRONIC ATRIAL FIBRILLATION (HCC): ICD-10-CM

## 2017-02-02 DIAGNOSIS — R00.1 BRADYCARDIA: ICD-10-CM

## 2017-02-02 DIAGNOSIS — Z79.01 ANTICOAGULATED: ICD-10-CM

## 2017-02-02 DIAGNOSIS — I48.0 PAROXYSMAL ATRIAL FIBRILLATION (HCC): ICD-10-CM

## 2017-02-02 DIAGNOSIS — Z01.810 PRE-OPERATIVE CARDIOVASCULAR EXAMINATION: Primary | ICD-10-CM

## 2017-02-02 DIAGNOSIS — R00.1 BRADYCARDIA: Primary | ICD-10-CM

## 2017-02-02 DIAGNOSIS — R00.2 PALPITATIONS: ICD-10-CM

## 2017-02-02 LAB
ANION GAP SERPL CALCULATED.3IONS-SCNC: 13.9 MMOL/L
BASOPHILS # BLD AUTO: 0.07 10*3/MM3 (ref 0–0.2)
BASOPHILS NFR BLD AUTO: 0.7 % (ref 0–1.5)
BUN BLD-MCNC: 9 MG/DL (ref 8–23)
BUN/CREAT SERPL: 10.8 (ref 7–25)
CALCIUM SPEC-SCNC: 9.6 MG/DL (ref 8.6–10.5)
CHLORIDE SERPL-SCNC: 97 MMOL/L (ref 98–107)
CO2 SERPL-SCNC: 25.1 MMOL/L (ref 22–29)
CREAT BLD-MCNC: 0.83 MG/DL (ref 0.57–1)
DEPRECATED RDW RBC AUTO: 49.1 FL (ref 37–54)
EOSINOPHIL # BLD AUTO: 0.2 10*3/MM3 (ref 0–0.7)
EOSINOPHIL NFR BLD AUTO: 2 % (ref 0.3–6.2)
ERYTHROCYTE [DISTWIDTH] IN BLOOD BY AUTOMATED COUNT: 14.1 % (ref 11.7–13)
GFR SERPL CREATININE-BSD FRML MDRD: 67 ML/MIN/1.73
GLUCOSE BLD-MCNC: 91 MG/DL (ref 65–99)
HCT VFR BLD AUTO: 42.1 % (ref 35.6–45.5)
HGB BLD-MCNC: 14 G/DL (ref 11.9–15.5)
IMM GRANULOCYTES # BLD: 0.06 10*3/MM3 (ref 0–0.03)
IMM GRANULOCYTES NFR BLD: 0.6 % (ref 0–0.5)
LYMPHOCYTES # BLD AUTO: 1.9 10*3/MM3 (ref 0.9–4.8)
LYMPHOCYTES NFR BLD AUTO: 19.2 % (ref 19.6–45.3)
MCH RBC QN AUTO: 31.7 PG (ref 26.9–32)
MCHC RBC AUTO-ENTMCNC: 33.3 G/DL (ref 32.4–36.3)
MCV RBC AUTO: 95.2 FL (ref 80.5–98.2)
MONOCYTES # BLD AUTO: 0.89 10*3/MM3 (ref 0.2–1.2)
MONOCYTES NFR BLD AUTO: 9 % (ref 5–12)
NEUTROPHILS # BLD AUTO: 6.79 10*3/MM3 (ref 1.9–8.1)
NEUTROPHILS NFR BLD AUTO: 68.5 % (ref 42.7–76)
PLATELET # BLD AUTO: 294 10*3/MM3 (ref 140–500)
PMV BLD AUTO: 12.1 FL (ref 6–12)
POTASSIUM BLD-SCNC: 3.9 MMOL/L (ref 3.5–5.2)
RBC # BLD AUTO: 4.42 10*6/MM3 (ref 3.9–5.2)
SODIUM BLD-SCNC: 136 MMOL/L (ref 136–145)
WBC NRBC COR # BLD: 9.91 10*3/MM3 (ref 4.5–10.7)

## 2017-02-02 PROCEDURE — 85025 COMPLETE CBC W/AUTO DIFF WBC: CPT | Performed by: INTERNAL MEDICINE

## 2017-02-02 PROCEDURE — 36415 COLL VENOUS BLD VENIPUNCTURE: CPT | Performed by: INTERNAL MEDICINE

## 2017-02-02 PROCEDURE — 80048 BASIC METABOLIC PNL TOTAL CA: CPT | Performed by: INTERNAL MEDICINE

## 2017-02-02 PROCEDURE — 93000 ELECTROCARDIOGRAM COMPLETE: CPT | Performed by: INTERNAL MEDICINE

## 2017-02-02 PROCEDURE — 99214 OFFICE O/P EST MOD 30 MIN: CPT | Performed by: INTERNAL MEDICINE

## 2017-02-02 NOTE — PROGRESS NOTES
Subjective:       Raina Forrest is a 71 y.o. female who here for follow up    CC  Weakness and fatigue  HPI  71 yr old w/f here c/o weakness and fatigue gradually worsening over several months, Pt recently had CV for afib and has bradycardia, recent blood w/p also shows elevated liver enzymes and TSH    Pt denies cp,syncope near syncope     Problem List Items Addressed This Visit        Cardiovascular and Mediastinum    Benign essential HTN    Bradycardia - Primary    Relevant Orders    ECG 12 Lead    Palpitations    Sick sinus syndrome    Atrial fibrillation       Other    Anticoagulated        Previous treatments/evaluations include: ASA. Cardiac risk factors: advanced age (older than 55 for men, 65 for women).    The following portions of the patient's history were reviewed and updated as appropriate: allergies, current medications, past family history, past medical history, past social history, past surgical history and problem list.    Past Medical History   Diagnosis Date   • Abdominal pain    • Atrial fibrillation    • Fung's esophagus    • Benign essential hypertension    • Blood in stool    • Chest pain    • Chronic gastritis    • Congestive heart failure    • Degenerative disc disease    • Depression with anxiety    • Disease of thyroid gland    • Diverticulitis of colon    • Dysphagia    • GERD (gastroesophageal reflux disease)    • History of migraine headaches    • Osteoporosis    • Palpitations    • Thyroid disorder    • Ulcerative colitis    • Vomiting    • Weight loss     reports that she has never smoked. She does not have any smokeless tobacco history on file. She reports that she does not drink alcohol or use illicit drugs.  Family History   Problem Relation Age of Onset   • Lung cancer Other    • Ulcerative colitis Sister        Review of Systems  Constitutional: No wt loss, fever, fatigue  Gastrointestinal: No nausea, abdominal pain  Behavioral/Psych: No insomnia or anxiety    "Cardiovascular weakness, fatigue  Objective:       Physical Exam             Physical Exam  Visit Vitals   • BP 98/46   • Pulse 56   • Ht 69\" (175.3 cm)   • Wt 154 lb (69.9 kg)   • BMI 22.74 kg/m2       General appearance: NAD, conversant   Eyes: anicteric sclerae, moist conjunctivae; no lid-lag; PERRLA   HENT: Atraumatic; oropharynx clear with moist mucous membranes and no mucosal ulcerations;  normal hard and soft palate   Neck: Trachea midline; FROM, supple, no thyromegaly or lymphadenopathy   Lungs: CTA, with normal respiratory effort and no intercostal retractions   CV: S1-S2 regular, no murmurs, no rub, no gallop   Abdomen: Soft, non-tender; no masses or HSM   Extremities: No peripheral edema or extremity lymphadenopathy  Skin: Normal temperature, turgor and texture; no rash, ulcers or subcutaneous nodules   Psych: Appropriate affect, alert and oriented to person, place and time           Cardiographics  @  ECG 12 Lead  Date/Time: 2/2/2017 2:21 PM  Performed by: GIACOMO PARKER  Authorized by: GIACOMO PARKER   Comparison: compared with previous ECG   Comparison to previous ECG: Bradycardia is new  Rhythm: sinus bradycardia  Conduction: conduction normal  ST Flattening: all  T flattening: all  Clinical impression: abnormal ECG          TSH 0.270 - 4.200 mIU/mL 5.990 (H)   T Uptake 0.80 - 1.30 TBI 1.05   T4, Total 4.50 - 11.70 mcg/dL 8.01     Glucose 65 - 99 mg/dL 98   BUN 8 - 23 mg/dL 10   Creatinine 0.57 - 1.00 mg/dL 0.83   Sodium 136 - 145 mmol/L 136   Potassium 3.5 - 5.2 mmol/L 3.6   Chloride 98 - 107 mmol/L 97 (L)   CO2 22.0 - 29.0 mmol/L 24.6   Calcium 8.6 - 10.5 mg/dL 9.2   Total Protein 6.0 - 8.5 g/dL 6.8   Albumin 3.50 - 5.20 g/dL 4.20   ALT (SGPT) 1 - 33 U/L 36 (H)   AST (SGOT) 1 - 32 U/L 40 (H)   Alkaline Phosphatase 39 - 117 U/L 109   Total Bilirubin 0.1 - 1.2 mg/dL 0.9   eGFR Non African Amer >60 mL/min/1.73 67   Globulin gm/dL 2.6   A/G Ratio g/dL 1.6   BUN/Creatinine Ratio 7.0 - 25.0 " 12.0   Anion Gap mmol/L 14.4   Resulting Agency  [facility]        Ref Range & Units 7d ago     WBC 4.50 - 10.70 10*3/mm3 6.89   RBC 3.90 - 5.20 10*6/mm3 3.87 (L)   Hemoglobin 11.9 - 15.5 g/dL 12.4   Hematocrit 35.6 - 45.5 % 38.2   MCV 80.5 - 98.2 fL 98.7 (H)   MCH 26.9 - 32.0 pg 32.0   MCHC 32.4 - 36.3 g/dL 32.5   RDW 11.7 - 13.0 % 14.1 (H)   RDW-SD 37.0 - 54.0 fl 51.1   MPV 6.0 - 12.0 fL 12.3 (H)   Platelets 140 - 500 10*3/mm3 237   Neutrophil % 42.7 - 76.0 % 73.7   Lymphocyte % 19.6 - 45.3 % 14.7 (L)       Echocardiogram:        Current Outpatient Prescriptions:   •  amiodarone (PACERONE) 200 MG tablet, Take 1 tablet by mouth Every 12 (Twelve) Hours., Disp: 60 tablet, Rfl: 3  •  apixaban (ELIQUIS) 5 MG tablet tablet, Take 1 tablet by mouth 2 (Two) Times a Day., Disp: 42 tablet, Rfl: 0  •  bisoprolol-hydrochlorothiazide (ZIAC) 5-6.25 MG per tablet, 1/2 tablet by mouth daily, Disp: 30 tablet, Rfl: 6  •  fluticasone (FLONASE) 50 MCG/ACT nasal spray, into each nostril., Disp: , Rfl:   •  furosemide (LASIX) 40 MG tablet, Take 0.5 tablets by mouth Daily., Disp: 30 tablet, Rfl: 6  •  levothyroxine (LEVOTHROID) 25 MCG tablet, Take 25 mcg by mouth., Disp: , Rfl:   •  lisinopril (PRINIVIL,ZESTRIL) 5 MG tablet, Take 0.5 tablets by mouth Every Night., Disp: 30 tablet, Rfl: 6  •  MECLIZINE HCL PO, Take 25 mg by mouth 3 (three) times a day., Disp: , Rfl:   •  pantoprazole (PROTONIX) 40 MG EC tablet, Take 1 tablet by mouth Daily., Disp: 30 tablet, Rfl: 3  •  potassium chloride (K-DUR,KLOR-CON) 10 MEQ CR tablet, Take 1 tablet by mouth Daily., Disp: 30 tablet, Rfl: 6  •  sertraline (ZOLOFT) 100 MG tablet, Take 1 tablet by mouth daily., Disp: , Rfl:   •  SUMAtriptan (IMITREX) 50 MG tablet, TAKE 1 TABLET EVERY 2 HOURS AS NEEDED FOR MIGRAINE,MAY REPEAT ONCE IN 2 HOURS, Disp: , Rfl: 4   Assessment:        Patient Active Problem List   Diagnosis   • Chronic atrial fibrillation   • Benign essential HTN   • Bradycardia   • Chest pain    • Can't get food down   • Accumulation of fluid in tissues   • Esophageal lump   • Adynamia   • Itch of skin   • Anorexia   • Chronic nausea   • Gastroesophageal reflux disease   • Breath shortness   • Emesis   • Decreased body weight   • Anxiety   • Narrowing of intervertebral disc space   • Diverticulosis of intestine   • Hypertension   • Migraine   • Osteoporosis   • Palpitations   • Pituitary adenoma   • Sick sinus syndrome   • Diarrhea   • Paroxysmal atrial fibrillation   • Cardiomyopathy   • Anticoagulated   • Atrial fibrillation   • On amiodarone therapy               Plan:            ICD-10-CM ICD-9-CM   1. Bradycardia R00.1 427.89   2. Benign essential HTN I10 401.1   3. Sick sinus syndrome I49.5 427.81   4. Palpitations R00.2 785.1   5. Paroxysmal atrial fibrillation I48.0 427.31   6. Anticoagulated Z79.01 V58.61     Stop amiodarone due to high TSH  And elevated liver enzymes    Also needs pacer as pt is weak, fatigue and HR 42    Will proceed with Pacemaker    Procedure risks and options explained  COUNSELING:    Raina Perkins was given to patient for the following topics: diagnostic results, risk factor reductions, impressions, risks and benefits of treatment options and importance of treatment compliance .       SMOKING COUNSELING:    Counseling given: Not Answered      EMR Dragon/Transcription disclaimer:   Much of this encounter note is an electronic transcription/translation of spoken language to printed text. The electronic translation of spoken language may permit erroneous, or at times, nonsensical words or phrases to be inadvertently transcribed; Although I have reviewed the note for such errors, some may still exist.

## 2017-02-07 ENCOUNTER — HOSPITAL ENCOUNTER (OUTPATIENT)
Facility: HOSPITAL | Age: 72
Discharge: HOME | End: 2017-02-08
Attending: INTERNAL MEDICINE | Admitting: INTERNAL MEDICINE

## 2017-02-07 DIAGNOSIS — R00.1 BRADYCARDIA: ICD-10-CM

## 2017-02-07 LAB
ANION GAP SERPL CALCULATED.3IONS-SCNC: 11.7 MMOL/L
BASOPHILS # BLD AUTO: 0.05 10*3/MM3 (ref 0–0.2)
BASOPHILS NFR BLD AUTO: 0.6 % (ref 0–1.5)
BUN BLD-MCNC: 11 MG/DL (ref 8–23)
BUN/CREAT SERPL: 13.6 (ref 7–25)
CALCIUM SPEC-SCNC: 9.7 MG/DL (ref 8.6–10.5)
CHLORIDE SERPL-SCNC: 98 MMOL/L (ref 98–107)
CO2 SERPL-SCNC: 29.3 MMOL/L (ref 22–29)
CREAT BLD-MCNC: 0.81 MG/DL (ref 0.57–1)
DEPRECATED RDW RBC AUTO: 47.6 FL (ref 37–54)
EOSINOPHIL # BLD AUTO: 0.2 10*3/MM3 (ref 0–0.7)
EOSINOPHIL NFR BLD AUTO: 2.3 % (ref 0.3–6.2)
ERYTHROCYTE [DISTWIDTH] IN BLOOD BY AUTOMATED COUNT: 13.8 % (ref 11.7–13)
GFR SERPL CREATININE-BSD FRML MDRD: 69 ML/MIN/1.73
GLUCOSE BLD-MCNC: 89 MG/DL (ref 65–99)
HCT VFR BLD AUTO: 40.9 % (ref 35.6–45.5)
HGB BLD-MCNC: 13.9 G/DL (ref 11.9–15.5)
IMM GRANULOCYTES # BLD: 0.02 10*3/MM3 (ref 0–0.03)
IMM GRANULOCYTES NFR BLD: 0.2 % (ref 0–0.5)
LYMPHOCYTES # BLD AUTO: 1.99 10*3/MM3 (ref 0.9–4.8)
LYMPHOCYTES NFR BLD AUTO: 22.5 % (ref 19.6–45.3)
MCH RBC QN AUTO: 32.3 PG (ref 26.9–32)
MCHC RBC AUTO-ENTMCNC: 34 G/DL (ref 32.4–36.3)
MCV RBC AUTO: 95.1 FL (ref 80.5–98.2)
MONOCYTES # BLD AUTO: 0.75 10*3/MM3 (ref 0.2–1.2)
MONOCYTES NFR BLD AUTO: 8.5 % (ref 5–12)
NEUTROPHILS # BLD AUTO: 5.85 10*3/MM3 (ref 1.9–8.1)
NEUTROPHILS NFR BLD AUTO: 65.9 % (ref 42.7–76)
PLATELET # BLD AUTO: 241 10*3/MM3 (ref 140–500)
PMV BLD AUTO: 10.9 FL (ref 6–12)
POTASSIUM BLD-SCNC: 3.9 MMOL/L (ref 3.5–5.2)
RBC # BLD AUTO: 4.3 10*6/MM3 (ref 3.9–5.2)
SODIUM BLD-SCNC: 139 MMOL/L (ref 136–145)
WBC NRBC COR # BLD: 8.86 10*3/MM3 (ref 4.5–10.7)

## 2017-02-07 PROCEDURE — 80048 BASIC METABOLIC PNL TOTAL CA: CPT | Performed by: INTERNAL MEDICINE

## 2017-02-07 PROCEDURE — C1892 INTRO/SHEATH,FIXED,PEEL-AWAY: HCPCS | Performed by: INTERNAL MEDICINE

## 2017-02-07 PROCEDURE — 33208 INSRT HEART PM ATRIAL & VENT: CPT | Performed by: INTERNAL MEDICINE

## 2017-02-07 PROCEDURE — 25010000002 DIPHENHYDRAMINE PER 50 MG: Performed by: INTERNAL MEDICINE

## 2017-02-07 PROCEDURE — G0378 HOSPITAL OBSERVATION PER HR: HCPCS

## 2017-02-07 PROCEDURE — C1898 LEAD, PMKR, OTHER THAN TRANS: HCPCS | Performed by: INTERNAL MEDICINE

## 2017-02-07 PROCEDURE — 25010000002 FENTANYL CITRATE (PF) 100 MCG/2ML SOLUTION: Performed by: INTERNAL MEDICINE

## 2017-02-07 PROCEDURE — 99153 MOD SED SAME PHYS/QHP EA: CPT | Performed by: INTERNAL MEDICINE

## 2017-02-07 PROCEDURE — 25010000002 VANCOMYCIN PER 500 MG: Performed by: INTERNAL MEDICINE

## 2017-02-07 PROCEDURE — 99152 MOD SED SAME PHYS/QHP 5/>YRS: CPT | Performed by: INTERNAL MEDICINE

## 2017-02-07 PROCEDURE — 0 IOPAMIDOL PER 1 ML: Performed by: INTERNAL MEDICINE

## 2017-02-07 PROCEDURE — 25010000002 MIDAZOLAM PER 1 MG: Performed by: INTERNAL MEDICINE

## 2017-02-07 PROCEDURE — C1785 PMKR, DUAL, RATE-RESP: HCPCS | Performed by: INTERNAL MEDICINE

## 2017-02-07 PROCEDURE — 85025 COMPLETE CBC W/AUTO DIFF WBC: CPT | Performed by: INTERNAL MEDICINE

## 2017-02-07 PROCEDURE — 25010000002 CHLOROPROCAINE PER 30 ML: Performed by: INTERNAL MEDICINE

## 2017-02-07 DEVICE — LD PM CAPSUREFIX NOVUS5076 45CM: Type: IMPLANTABLE DEVICE | Status: FUNCTIONAL

## 2017-02-07 DEVICE — GEN PM ADVISA SURESCAN DR MRI: Type: IMPLANTABLE DEVICE | Status: FUNCTIONAL

## 2017-02-07 DEVICE — LD PM CAPSUREFIX NOVUS5076 52CM: Type: IMPLANTABLE DEVICE | Status: FUNCTIONAL

## 2017-02-07 RX ORDER — METOCLOPRAMIDE HYDROCHLORIDE 5 MG/ML
10 INJECTION INTRAMUSCULAR; INTRAVENOUS EVERY 6 HOURS PRN
Status: DISCONTINUED | OUTPATIENT
Start: 2017-02-07 | End: 2017-02-07 | Stop reason: HOSPADM

## 2017-02-07 RX ORDER — FENTANYL CITRATE 50 UG/ML
INJECTION, SOLUTION INTRAMUSCULAR; INTRAVENOUS AS NEEDED
Status: DISCONTINUED | OUTPATIENT
Start: 2017-02-07 | End: 2017-02-07 | Stop reason: HOSPADM

## 2017-02-07 RX ORDER — LISINOPRIL 2.5 MG/1
2.5 TABLET ORAL NIGHTLY
Status: DISCONTINUED | OUTPATIENT
Start: 2017-02-07 | End: 2017-02-08 | Stop reason: HOSPADM

## 2017-02-07 RX ORDER — POTASSIUM CHLORIDE 750 MG/1
10 CAPSULE, EXTENDED RELEASE ORAL DAILY
Status: DISCONTINUED | OUTPATIENT
Start: 2017-02-07 | End: 2017-02-08 | Stop reason: HOSPADM

## 2017-02-07 RX ORDER — BISOPROLOL FUMARATE AND HYDROCHLOROTHIAZIDE 5; 6.25 MG/1; MG/1
0.5 TABLET ORAL DAILY
Status: DISCONTINUED | OUTPATIENT
Start: 2017-02-07 | End: 2017-02-08 | Stop reason: HOSPADM

## 2017-02-07 RX ORDER — VANCOMYCIN HYDROCHLORIDE 1 G/200ML
INJECTION, SOLUTION INTRAVENOUS CONTINUOUS PRN
Status: DISCONTINUED | OUTPATIENT
Start: 2017-02-07 | End: 2017-02-07 | Stop reason: HOSPADM

## 2017-02-07 RX ORDER — PANTOPRAZOLE SODIUM 40 MG/1
40 TABLET, DELAYED RELEASE ORAL DAILY
Status: DISCONTINUED | OUTPATIENT
Start: 2017-02-07 | End: 2017-02-08 | Stop reason: HOSPADM

## 2017-02-07 RX ORDER — ONDANSETRON 2 MG/ML
4 INJECTION INTRAMUSCULAR; INTRAVENOUS EVERY 6 HOURS PRN
Status: DISCONTINUED | OUTPATIENT
Start: 2017-02-07 | End: 2017-02-07 | Stop reason: HOSPADM

## 2017-02-07 RX ORDER — DIPHENHYDRAMINE HYDROCHLORIDE 50 MG/ML
INJECTION INTRAMUSCULAR; INTRAVENOUS AS NEEDED
Status: DISCONTINUED | OUTPATIENT
Start: 2017-02-07 | End: 2017-02-07 | Stop reason: HOSPADM

## 2017-02-07 RX ORDER — FUROSEMIDE 20 MG/1
20 TABLET ORAL DAILY
Status: DISCONTINUED | OUTPATIENT
Start: 2017-02-07 | End: 2017-02-08 | Stop reason: HOSPADM

## 2017-02-07 RX ORDER — MIDAZOLAM HYDROCHLORIDE 1 MG/ML
1 INJECTION INTRAMUSCULAR; INTRAVENOUS
Status: DISCONTINUED | OUTPATIENT
Start: 2017-02-07 | End: 2017-02-07 | Stop reason: HOSPADM

## 2017-02-07 RX ORDER — MIDAZOLAM HYDROCHLORIDE 1 MG/ML
INJECTION INTRAMUSCULAR; INTRAVENOUS AS NEEDED
Status: DISCONTINUED | OUTPATIENT
Start: 2017-02-07 | End: 2017-02-07 | Stop reason: HOSPADM

## 2017-02-07 RX ORDER — SODIUM CHLORIDE 9 MG/ML
75 INJECTION, SOLUTION INTRAVENOUS CONTINUOUS
Status: DISCONTINUED | OUTPATIENT
Start: 2017-02-07 | End: 2017-02-08 | Stop reason: HOSPADM

## 2017-02-07 RX ORDER — ACETAMINOPHEN 325 MG/1
650 TABLET ORAL EVERY 4 HOURS PRN
Status: DISCONTINUED | OUTPATIENT
Start: 2017-02-07 | End: 2017-02-08 | Stop reason: HOSPADM

## 2017-02-07 RX ORDER — SODIUM CHLORIDE 0.9 % (FLUSH) 0.9 %
1-10 SYRINGE (ML) INJECTION AS NEEDED
Status: DISCONTINUED | OUTPATIENT
Start: 2017-02-07 | End: 2017-02-08 | Stop reason: HOSPADM

## 2017-02-07 RX ORDER — HYDROCODONE BITARTRATE AND ACETAMINOPHEN 5; 325 MG/1; MG/1
1 TABLET ORAL EVERY 4 HOURS PRN
Status: DISCONTINUED | OUTPATIENT
Start: 2017-02-07 | End: 2017-02-07 | Stop reason: HOSPADM

## 2017-02-07 RX ORDER — NITROGLYCERIN 0.4 MG/1
0.4 TABLET SUBLINGUAL
Status: DISCONTINUED | OUTPATIENT
Start: 2017-02-07 | End: 2017-02-07 | Stop reason: HOSPADM

## 2017-02-07 RX ORDER — ACETAMINOPHEN 325 MG/1
650 TABLET ORAL EVERY 4 HOURS PRN
Status: DISCONTINUED | OUTPATIENT
Start: 2017-02-07 | End: 2017-02-07 | Stop reason: HOSPADM

## 2017-02-07 RX ORDER — PROMETHAZINE HYDROCHLORIDE 25 MG/ML
12.5 INJECTION, SOLUTION INTRAMUSCULAR; INTRAVENOUS EVERY 4 HOURS PRN
Status: DISCONTINUED | OUTPATIENT
Start: 2017-02-07 | End: 2017-02-07 | Stop reason: HOSPADM

## 2017-02-07 RX ORDER — OXYCODONE HYDROCHLORIDE AND ACETAMINOPHEN 5; 325 MG/1; MG/1
1 TABLET ORAL EVERY 6 HOURS PRN
Status: DISCONTINUED | OUTPATIENT
Start: 2017-02-07 | End: 2017-02-08 | Stop reason: HOSPADM

## 2017-02-07 RX ORDER — FLUTICASONE PROPIONATE 50 MCG
2 SPRAY, SUSPENSION (ML) NASAL DAILY
Status: DISCONTINUED | OUTPATIENT
Start: 2017-02-07 | End: 2017-02-08 | Stop reason: HOSPADM

## 2017-02-07 RX ORDER — LEVOTHYROXINE SODIUM 0.03 MG/1
25 TABLET ORAL DAILY
Status: DISCONTINUED | OUTPATIENT
Start: 2017-02-07 | End: 2017-02-08 | Stop reason: HOSPADM

## 2017-02-07 RX ORDER — SODIUM CHLORIDE 0.9 % (FLUSH) 0.9 %
1-10 SYRINGE (ML) INJECTION AS NEEDED
Status: DISCONTINUED | OUTPATIENT
Start: 2017-02-07 | End: 2017-02-07 | Stop reason: HOSPADM

## 2017-02-07 RX ADMIN — LISINOPRIL 2.5 MG: 2.5 TABLET ORAL at 20:05

## 2017-02-07 RX ADMIN — APIXABAN 5 MG: 5 TABLET, FILM COATED ORAL at 13:47

## 2017-02-07 RX ADMIN — APIXABAN 5 MG: 5 TABLET, FILM COATED ORAL at 22:39

## 2017-02-07 RX ADMIN — SODIUM CHLORIDE 75 ML/HR: 9 INJECTION, SOLUTION INTRAVENOUS at 07:30

## 2017-02-07 RX ADMIN — POTASSIUM CHLORIDE 10 MEQ: 750 CAPSULE, EXTENDED RELEASE ORAL at 13:47

## 2017-02-07 RX ADMIN — ACETAMINOPHEN 650 MG: 325 TABLET ORAL at 20:04

## 2017-02-07 NOTE — PLAN OF CARE
Problem: Patient Care Overview (Adult)  Goal: Plan of Care Review  Outcome: Ongoing (interventions implemented as appropriate)    02/07/17 1504   Coping/Psychosocial Response Interventions   Plan Of Care Reviewed With patient   Patient Care Overview   Progress improving   Outcome Evaluation   Outcome Summary/Follow up Plan Left chest pacemaker dressing dry and intact. sling in place. Ice bag to incision for mild edema at site. VSS. Family at bedside.        Goal: Adult Individualization and Mutuality  Outcome: Ongoing (interventions implemented as appropriate)  Goal: Discharge Needs Assessment  Outcome: Ongoing (interventions implemented as appropriate)    Problem: Cardiac Rhythm Management Device (Adult)  Goal: Signs and Symptoms of Listed Potential Problems Will be Absent or Manageable (Cardiac Rhythm Management Device)  Outcome: Ongoing (interventions implemented as appropriate)

## 2017-02-08 ENCOUNTER — APPOINTMENT (OUTPATIENT)
Dept: GENERAL RADIOLOGY | Facility: HOSPITAL | Age: 72
End: 2017-02-08
Attending: INTERNAL MEDICINE

## 2017-02-08 VITALS
BODY MASS INDEX: 22.25 KG/M2 | HEART RATE: 59 BPM | TEMPERATURE: 98.2 F | DIASTOLIC BLOOD PRESSURE: 54 MMHG | SYSTOLIC BLOOD PRESSURE: 111 MMHG | OXYGEN SATURATION: 98 % | HEIGHT: 69 IN | RESPIRATION RATE: 18 BRPM | WEIGHT: 150.2 LBS

## 2017-02-08 LAB
INR PPP: 1.28 (ref 0.9–1.1)
PROTHROMBIN TIME: 15.5 SECONDS (ref 11.7–14.2)

## 2017-02-08 PROCEDURE — 85610 PROTHROMBIN TIME: CPT | Performed by: INTERNAL MEDICINE

## 2017-02-08 PROCEDURE — 71020 HC CHEST PA AND LATERAL: CPT

## 2017-02-08 PROCEDURE — 99024 POSTOP FOLLOW-UP VISIT: CPT | Performed by: INTERNAL MEDICINE

## 2017-02-08 PROCEDURE — G0378 HOSPITAL OBSERVATION PER HR: HCPCS

## 2017-02-08 PROCEDURE — 93005 ELECTROCARDIOGRAM TRACING: CPT | Performed by: INTERNAL MEDICINE

## 2017-02-08 PROCEDURE — 93010 ELECTROCARDIOGRAM REPORT: CPT | Performed by: INTERNAL MEDICINE

## 2017-02-08 RX ADMIN — FLUTICASONE PROPIONATE 2 SPRAY: 50 SPRAY, METERED NASAL at 08:45

## 2017-02-08 RX ADMIN — OXYCODONE HYDROCHLORIDE AND ACETAMINOPHEN 1 TABLET: 5; 325 TABLET ORAL at 09:13

## 2017-02-08 RX ADMIN — SERTRALINE 150 MG: 100 TABLET, FILM COATED ORAL at 08:43

## 2017-02-08 RX ADMIN — ACETAMINOPHEN 650 MG: 325 TABLET ORAL at 07:18

## 2017-02-08 RX ADMIN — FUROSEMIDE 20 MG: 20 TABLET ORAL at 08:44

## 2017-02-08 RX ADMIN — APIXABAN 5 MG: 5 TABLET, FILM COATED ORAL at 08:44

## 2017-02-08 RX ADMIN — POTASSIUM CHLORIDE 10 MEQ: 750 CAPSULE, EXTENDED RELEASE ORAL at 08:44

## 2017-02-08 RX ADMIN — LEVOTHYROXINE SODIUM 25 MCG: 25 TABLET ORAL at 08:43

## 2017-02-08 RX ADMIN — BISOPROLOL FUMARATE AND HYDROCHLOROTHIAZIDE 0.5 TABLET: 6.25; 5 TABLET ORAL at 08:44

## 2017-02-08 RX ADMIN — PANTOPRAZOLE SODIUM 40 MG: 40 TABLET, DELAYED RELEASE ORAL at 10:42

## 2017-02-08 NOTE — H&P
Subjective   Raina Forrest is a 71 y.o. female who here for follow up     CC  Weakness and fatigue  HPI  71 yr old w/f here c/o weakness and fatigue gradually worsening over several months, Pt recently had CV for afib and has bradycardia, recent blood w/p also shows elevated liver enzymes and TSH     Pt denies cp,syncope near syncope          Problem List Items Addressed This Visit               Cardiovascular and Mediastinum     Benign essential HTN     Bradycardia - Primary     Relevant Orders     ECG 12 Lead     Palpitations     Sick sinus syndrome     Atrial fibrillation          Other     Anticoagulated          Previous treatments/evaluations include: ASA. Cardiac risk factors: advanced age (older than 55 for men, 65 for women).     The following portions of the patient's history were reviewed and updated as appropriate: allergies, current medications, past family history, past medical history, past social history, past surgical history and problem list.      Medical History         Past Medical History   Diagnosis Date   • Abdominal pain     • Atrial fibrillation     • Fung's esophagus     • Benign essential hypertension     • Blood in stool     • Chest pain     • Chronic gastritis     • Congestive heart failure     • Degenerative disc disease     • Depression with anxiety     • Disease of thyroid gland     • Diverticulitis of colon     • Dysphagia     • GERD (gastroesophageal reflux disease)     • History of migraine headaches     • Osteoporosis     • Palpitations     • Thyroid disorder     • Ulcerative colitis     • Vomiting     • Weight loss         reports that she has never smoked. She does not have any smokeless tobacco history on file. She reports that she does not drink alcohol or use illicit drugs.        Family History   Problem Relation Age of Onset   • Lung cancer Other     • Ulcerative colitis Sister           Review of Systems  Constitutional: No wt loss, fever, fatigue  Gastrointestinal:  "No nausea, abdominal pain  Behavioral/Psych: No insomnia or anxiety  Cardiovascular weakness, fatigue  Objective:       Objective   Physical Exam                Objective   Physical Exam       Visit Vitals   • BP 98/46   • Pulse 56   • Ht 69\" (175.3 cm)   • Wt 154 lb (69.9 kg)   • BMI 22.74 kg/m2        General appearance: NAD, conversant   Eyes: anicteric sclerae, moist conjunctivae; no lid-lag; PERRLA   HENT: Atraumatic; oropharynx clear with moist mucous membranes and no mucosal ulcerations;  normal hard and soft palate   Neck: Trachea midline; FROM, supple, no thyromegaly or lymphadenopathy   Lungs: CTA, with normal respiratory effort and no intercostal retractions   CV: S1-S2 regular, no murmurs, no rub, no gallop   Abdomen: Soft, non-tender; no masses or HSM   Extremities: No peripheral edema or extremity lymphadenopathy  Skin: Normal temperature, turgor and texture; no rash, ulcers or subcutaneous nodules   Psych: Appropriate affect, alert and oriented to person, place and time             Cardiographics  @  ECG 12 Lead  Date/Time: 2/2/2017 2:21 PM  Performed by: GIACOMO PARKER  Authorized by: GIACOMO PARKER   Comparison: compared with previous ECG   Comparison to previous ECG: Bradycardia is new  Rhythm: sinus bradycardia  Conduction: conduction normal  ST Flattening: all  T flattening: all  Clinical impression: abnormal ECG         TSH 0.270 - 4.200 mIU/mL 5.990 (H)   T Uptake 0.80 - 1.30 TBI 1.05   T4, Total 4.50 - 11.70 mcg/dL 8.01      Glucose 65 - 99 mg/dL 98   BUN 8 - 23 mg/dL 10   Creatinine 0.57 - 1.00 mg/dL 0.83   Sodium 136 - 145 mmol/L 136   Potassium 3.5 - 5.2 mmol/L 3.6   Chloride 98 - 107 mmol/L 97 (L)   CO2 22.0 - 29.0 mmol/L 24.6   Calcium 8.6 - 10.5 mg/dL 9.2   Total Protein 6.0 - 8.5 g/dL 6.8   Albumin 3.50 - 5.20 g/dL 4.20   ALT (SGPT) 1 - 33 U/L 36 (H)   AST (SGOT) 1 - 32 U/L 40 (H)   Alkaline Phosphatase 39 - 117 U/L 109   Total Bilirubin 0.1 - 1.2 mg/dL 0.9   eGFR Non "  Amer >60 mL/min/1.73 67   Globulin gm/dL 2.6   A/G Ratio g/dL 1.6   BUN/Creatinine Ratio 7.0 - 25.0 12.0   Anion Gap mmol/L 14.4   Resulting Agency   [facility]         Ref Range & Units 7d ago    WBC 4.50 - 10.70 10*3/mm3 6.89   RBC 3.90 - 5.20 10*6/mm3 3.87 (L)   Hemoglobin 11.9 - 15.5 g/dL 12.4   Hematocrit 35.6 - 45.5 % 38.2   MCV 80.5 - 98.2 fL 98.7 (H)   MCH 26.9 - 32.0 pg 32.0   MCHC 32.4 - 36.3 g/dL 32.5   RDW 11.7 - 13.0 % 14.1 (H)   RDW-SD 37.0 - 54.0 fl 51.1   MPV 6.0 - 12.0 fL 12.3 (H)   Platelets 140 - 500 10*3/mm3 237   Neutrophil % 42.7 - 76.0 % 73.7   Lymphocyte % 19.6 - 45.3 % 14.7 (L)         Echocardiogram:         Current Outpatient Prescriptions:   • amiodarone (PACERONE) 200 MG tablet, Take 1 tablet by mouth Every 12 (Twelve) Hours., Disp: 60 tablet, Rfl: 3  • apixaban (ELIQUIS) 5 MG tablet tablet, Take 1 tablet by mouth 2 (Two) Times a Day., Disp: 42 tablet, Rfl: 0  • bisoprolol-hydrochlorothiazide (ZIAC) 5-6.25 MG per tablet, 1/2 tablet by mouth daily, Disp: 30 tablet, Rfl: 6  • fluticasone (FLONASE) 50 MCG/ACT nasal spray, into each nostril., Disp: , Rfl:   • furosemide (LASIX) 40 MG tablet, Take 0.5 tablets by mouth Daily., Disp: 30 tablet, Rfl: 6  • levothyroxine (LEVOTHROID) 25 MCG tablet, Take 25 mcg by mouth., Disp: , Rfl:   • lisinopril (PRINIVIL,ZESTRIL) 5 MG tablet, Take 0.5 tablets by mouth Every Night., Disp: 30 tablet, Rfl: 6  • MECLIZINE HCL PO, Take 25 mg by mouth 3 (three) times a day., Disp: , Rfl:   • pantoprazole (PROTONIX) 40 MG EC tablet, Take 1 tablet by mouth Daily., Disp: 30 tablet, Rfl: 3  • potassium chloride (K-DUR,KLOR-CON) 10 MEQ CR tablet, Take 1 tablet by mouth Daily., Disp: 30 tablet, Rfl: 6  • sertraline (ZOLOFT) 100 MG tablet, Take 1 tablet by mouth daily., Disp: , Rfl:   • SUMAtriptan (IMITREX) 50 MG tablet, TAKE 1 TABLET EVERY 2 HOURS AS NEEDED FOR MIGRAINE,MAY REPEAT ONCE IN 2 HOURS, Disp: , Rfl: 4  Assessment:              Patient Active Problem  List   Diagnosis   • Chronic atrial fibrillation   • Benign essential HTN   • Bradycardia   • Chest pain   • Can't get food down   • Accumulation of fluid in tissues   • Esophageal lump   • Adynamia   • Itch of skin   • Anorexia   • Chronic nausea   • Gastroesophageal reflux disease   • Breath shortness   • Emesis   • Decreased body weight   • Anxiety   • Narrowing of intervertebral disc space   • Diverticulosis of intestine   • Hypertension   • Migraine   • Osteoporosis   • Palpitations   • Pituitary adenoma   • Sick sinus syndrome   • Diarrhea   • Paroxysmal atrial fibrillation   • Cardiomyopathy   • Anticoagulated   • Atrial fibrillation   • On amiodarone therapy                 Plan:      Plan           ICD-10-CM ICD-9-CM   1. Bradycardia R00.1 427.89   2. Benign essential HTN I10 401.1   3. Sick sinus syndrome I49.5 427.81   4. Palpitations R00.2 785.1   5. Paroxysmal atrial fibrillation I48.0 427.31   6. Anticoagulated Z79.01 V58.61      Stop amiodarone due to high TSH And elevated liver enzymes     Also needs pacer as pt is weak, fatigue and HR 42     Will proceed with Pacemaker     Procedure risks and options explained  COUNSELING:     Raina Perkins was given to patient for the following topics: diagnostic results, risk factor reductions, impressions, risks and benefits of treatment options and importance of treatment compliance .    H&P reviewed. The patient was examined and there are no changes to the H&P.     Patient personally interviewed and above subjective findings personally confirmed during a face to face contact with patient today  All findings of physical examination confirmed  All labs, cardiac procedures rtinent radiographs of the last 24 hours personally reviewed  Impression and plans discussed/elaborated and implemented jointly as described above            EMR Dragon/Transcription disclaimer:   Much of this encounter note is an electronic transcription/translation of spoken  language to printed text. The electronic translation of spoken language may permit erroneous, or at times, nonsensical words or phrases to be inadvertently transcribed; Although I have reviewed the note for such errors, some may still exist.

## 2017-02-08 NOTE — PLAN OF CARE
Problem: Patient Care Overview (Adult)  Goal: Plan of Care Review  Outcome: Outcome(s) achieved Date Met:  02/08/17 02/08/17 6538   Coping/Psychosocial Response Interventions   Plan Of Care Reviewed With patient;spouse   Patient Care Overview   Progress improving   Outcome Evaluation   Outcome Summary/Follow up Plan Pt ready for discharge. VS stable. Pacemaker capturing and sensing appropriately. Education complete, questions answered.

## 2017-02-08 NOTE — DISCHARGE SUMMARY
[facility]  Physician Discharge Summary    Patient Identification:  Name: Raina Forrest  Age: 71 y.o.  Sex: female  :  1945  MRN: [id number]    Admit date: 2017    Discharge date and time:  17    Admitting Physician: Robinson Salazar MD     Discharge Physician: Dr Salazar    Discharge Diagnoses:   - symptomatic afib / sss  - s/p dual-chamber permanent pacemaker implant  - elevated [id number] score -> Eliquis  - HTN  - EF 45%, mod MR/TR    Discharged Condition: stable    Hospital Course:   This patient was brought in for elective permanent pacemaker implant due to symptomatic A. fib/sick sinus syndrome including generalized weakness fatigability.  Eliquis was held prior to admission.  She received IV antibiotic prophylaxis before implant of a [employer] dual chamber permanent pacemaker utilizing the left subclavian.  She tolerated the procedure without complications.  She was observed overnight on telemetry and her activity is been increased without difficulty    On postprocedure day #1, she is awake, alert and resting quietly.  Minimal incisional discomfort.  No shortness of breath.  Device interrogation showed normal function and this morning's chest x-ray showed no acute abnormalities.  She's been seen and evaluated and cleared for discharge home.  I reviewed activity restrictions, site care and follow-up appointments.  An appointment card was provided.  She was advised to call us in the interim for any questions or concerns otherwise we will see her as scheduled on .  All questions were answered.  She voiced understanding and agreement with treatment plan    Consults:   None    Discharge Exam:  General:  awake, alert sitting quietly.  Appears in no acute distress   Skin: Warm and dry, no diaphoresis noted   EYES: PERTL   HEENT: external ear and nose normal; oral mucosa moist   Neck: No JVD   Heart: S1S2 regular rate and rhythm; no murmur appreciated.  Left  subclavian procedure site with well approximated edges and dry, intact Steri-Strips.  No oozing, erythema, ecchymosis or hematoma    Pulmonary: Respirations regular, unlabored at rest, bilateral breath sounds have good air entry throughout all lung fields; no crackles or wheezes auscultated.     GI: Soft, non-tender, non-distended, positive bowel sounds   Extremities: Bilateral lower extremities have no pre-tibial pitting edema   Neurological: Alert and oriented x 3; no neuro deficits        Tele:       CXR 2-8-17 IMPRESSION:  Interval placement of left subclavian cardiac pacing device.  No pneumothorax or other change    EKG 2-8-17      LABS:    Results from last 7 days  Lab Units 02/08/17  0852   INR  1.28*         Disposition:  home    Discharge Medications:    Raina Forrest   Home Medication Instructions JD:[id number]    Printed on:02/08/17 3593   Medication Information                      apixaban (ELIQUIS) 5 MG tablet tablet  Take 1 tablet by mouth 2 (Two) Times a Day.             bisoprolol-hydrochlorothiazide (ZIAC) 5-6.25 MG per tablet  1/2 tablet by mouth daily             fluticasone (FLONASE) 50 MCG/ACT nasal spray  2 sprays into each nostril Daily.             furosemide (LASIX) 40 MG tablet  Take 0.5 tablets by mouth Daily.             levothyroxine (LEVOTHROID) 25 MCG tablet  Take 25 mcg by mouth Daily.             lisinopril (PRINIVIL,ZESTRIL) 5 MG tablet  Take 0.5 tablets by mouth Every Night.             MECLIZINE HCL PO  Take 25 mg by mouth 3 (Three) Times a Day As Needed.             pantoprazole (PROTONIX) 40 MG EC tablet  Take 1 tablet by mouth Daily.             potassium chloride (K-DUR,KLOR-CON) 10 MEQ CR tablet  Take 1 tablet by mouth Daily.             sertraline (ZOLOFT) 100 MG tablet  Take 150 mg by mouth Daily.             SUMAtriptan (IMITREX) 50 MG tablet  TAKE 1 TABLET EVERY 2 HOURS AS NEEDED FOR MIGRAINE,MAY REPEAT ONCE IN 2 HOURS                   Discharge Home  Instructions:  1. Follow up with Dr Salazar at the [facility] [address] office on February 22 at 10:30 AM  2. Routine post pacemaker implant discharge home care instructions:   Do not get site wet for 72 hours.   Do not submerge device site below water for 7-10 days.   No lifting left upper extremity above head.   No lifting objects greater than 1 pound with affected extremity.   No driving until cleared by cardiologist at follow up appointment. .Wear sling until cleared by cardiologist.   Call office at [phone] or [phone] for any fevers, chills, redness,bleeding, drainage, increased pain, etc.   3.  Follow-up with PCP 7-10 days          I reviewed the patient's new clinical results, discharge treatments, medications and follow up with Dr Salazar. I personally viewed and interpreted the patient's EKG/Telemetry data  Signed:  Monica Gomez, APRN  2/8/2017  12:27 PM      Patient personally interviewed and above subjective findings personally confirmed during a face to face contact with patient today  All findings of physical examination confirmed  All labs, cardiac procedures rtinent radiographs of the last 24 hours personally reviewed  Impression and plans discussed/elaborated and implemented jointly as described above       EMR Dragon/Transcription disclaimer:   Much of this encounter note is an electronic transcription/translation of spoken language to printed text. The electronic translation of spoken language may permit erroneous, or at times, nonsensical words or phrases to be inadvertently transcribed; Although I have reviewed the note for such errors, some may still exist.

## 2017-02-08 NOTE — PLAN OF CARE
Problem: Patient Care Overview (Adult)  Goal: Plan of Care Review  Outcome: Ongoing (interventions implemented as appropriate)    02/08/17 0401   Coping/Psychosocial Response Interventions   Plan Of Care Reviewed With patient   Patient Care Overview   Progress improving       Goal: Adult Individualization and Mutuality  Outcome: Ongoing (interventions implemented as appropriate)  Goal: Discharge Needs Assessment  Outcome: Ongoing (interventions implemented as appropriate)    02/08/17 0401   Discharge Needs Assessment   Concerns To Be Addressed no discharge needs identified   Readmission Within The Last 30 Days no previous admission in last 30 days   Equipment Needed After Discharge sling   Discharge Disposition still a patient   Current Health   Anticipated Changes Related to Illness none   Self-Care   Equipment Currently Used at Home none   Living Environment   Transportation Available family or friend will provide         Problem: Cardiac Rhythm Management Device (Adult)  Goal: Signs and Symptoms of Listed Potential Problems Will be Absent or Manageable (Cardiac Rhythm Management Device)  Outcome: Ongoing (interventions implemented as appropriate)    02/08/17 0401   Cardiac Rhythm Management Device   Problems Assessed (Cardiac Rhythm Management Device) all   Problems Present (Cardiac Rhythm Management Device) pain

## 2017-02-08 NOTE — PROGRESS NOTES
Continued Stay Note  [facility]     Patient Name: Raina Forrest  MRN: [id number]  Today's Date: 2/8/2017    Admit Date: 2/7/2017          Discharge Plan       02/08/17 1113    Case Management/Social Work Plan    Plan Plan home with family.   DEACON Aguilar    Patient/Family In Agreement With Plan yes    Additional Comments FACE SHEET VERIFIED.  Spoke with pt at bedside.  Pt lives in a single story house with  (Benito).  Pt is independent with ADL's.  Pt gets prescriptions from [facility]  ([address]  Pt is not current with HH.  Pt has not been in a SNF.   Plan home with family.   DEACON Aguilar              Discharge Codes     None            Cayla Garcia, RN

## 2017-02-22 ENCOUNTER — OFFICE VISIT (OUTPATIENT)
Dept: CARDIOLOGY | Facility: CLINIC | Age: 72
End: 2017-02-22

## 2017-02-22 VITALS
WEIGHT: 157 LBS | HEIGHT: 69 IN | SYSTOLIC BLOOD PRESSURE: 122 MMHG | BODY MASS INDEX: 23.25 KG/M2 | DIASTOLIC BLOOD PRESSURE: 74 MMHG | HEART RATE: 74 BPM

## 2017-02-22 DIAGNOSIS — I10 ESSENTIAL HYPERTENSION: ICD-10-CM

## 2017-02-22 DIAGNOSIS — I10 BENIGN ESSENTIAL HTN: Primary | ICD-10-CM

## 2017-02-22 DIAGNOSIS — Z95.0 PACEMAKER: ICD-10-CM

## 2017-02-22 DIAGNOSIS — R00.1 BRADYCARDIA: ICD-10-CM

## 2017-02-22 DIAGNOSIS — Z79.01 ANTICOAGULATED: ICD-10-CM

## 2017-02-22 DIAGNOSIS — I48.20 CHRONIC ATRIAL FIBRILLATION (HCC): ICD-10-CM

## 2017-02-22 DIAGNOSIS — I48.19 PERSISTENT ATRIAL FIBRILLATION (HCC): ICD-10-CM

## 2017-02-22 PROCEDURE — 99213 OFFICE O/P EST LOW 20 MIN: CPT | Performed by: INTERNAL MEDICINE

## 2017-02-22 NOTE — PROGRESS NOTES
Subjective:       Raina Forrest is a 71 y.o. female who here for follow up    CC   Follow-up after the change in medication for hypertension  HPI  71-year-old white female with known history of atrial fibrillation, as well as a benign arterial hypertension, pacemaker as well as anticoagulation here for the follow-up, patient recently underwent cardioversion without infarction complications    Patient also recently had undergone pacemaker implantation without any complications     Problem List Items Addressed This Visit        Cardiovascular and Mediastinum    Chronic atrial fibrillation    Benign essential HTN - Primary    Bradycardia    Hypertension    Atrial fibrillation       Other    Anticoagulated    Pacemaker        Previous treatments/evaluations include: ASA and beta blocker. Cardiac risk factors: advanced age (older than 55 for men, 65 for women) and hypertension.    The following portions of the patient's history were reviewed and updated as appropriate: allergies, current medications, past family history, past medical history, past social history, past surgical history and problem list.    Past Medical History   Diagnosis Date   • Abdominal pain    • Atrial fibrillation    • Fung's esophagus    • Benign essential hypertension    • Blood in stool    • Chest pain    • Chronic gastritis    • Congestive heart failure    • Degenerative disc disease    • Depression with anxiety    • Disease of thyroid gland    • Diverticulitis of colon    • Dysphagia    • GERD (gastroesophageal reflux disease)    • History of migraine headaches    • Osteoporosis    • Palpitations    • Thyroid disorder    • Ulcerative colitis    • Vomiting    • Weight loss     reports that she has never smoked. She has never used smokeless tobacco. She reports that she does not drink alcohol or use illicit drugs.  Family History   Problem Relation Age of Onset   • Lung cancer Other    • Ulcerative colitis Sister        Review of  "Systems  Constitutional: No wt loss, fever, fatigue  Gastrointestinal: No nausea, abdominal pain  Behavioral/Psych: No insomnia or anxiety   Cardiovascular no chest pains or tightness in chest  Objective:       Physical Exam             Physical Exam  Visit Vitals   • /74   • Pulse 74   • Ht 69\" (175.3 cm)   • Wt 157 lb (71.2 kg)   • BMI 23.18 kg/m2       General appearance: NAD, conversant   Eyes: anicteric sclerae, moist conjunctivae; no lid-lag; PERRLA   HENT: Atraumatic; oropharynx clear with moist mucous membranes and no mucosal ulcerations;  normal hard and soft palate   Neck: Trachea midline; FROM, supple, no thyromegaly or lymphadenopathy   Lungs: CTA, with normal respiratory effort and no intercostal retractions   CV: S1-S2 regular, no murmurs, no rub, no gallop   Abdomen: Soft, non-tender; no masses or HSM   Extremities: No peripheral edema or extremity lymphadenopathy  Skin: Normal temperature, turgor and texture; no rash, ulcers or subcutaneous nodules   Psych: Appropriate affect, alert and oriented to person, place and time           Cardiographics  @Procedures    Echocardiogram:        Current Outpatient Prescriptions:   •  apixaban (ELIQUIS) 5 MG tablet tablet, Take 1 tablet by mouth 2 (Two) Times a Day., Disp: 42 tablet, Rfl: 0  •  bisoprolol-hydrochlorothiazide (ZIAC) 5-6.25 MG per tablet, 1/2 tablet by mouth daily (Patient taking differently: Take 0.5 tablets by mouth Daily. 1/2 tablet by mouth daily), Disp: 30 tablet, Rfl: 6  •  fluticasone (FLONASE) 50 MCG/ACT nasal spray, 2 sprays into each nostril Daily., Disp: , Rfl:   •  furosemide (LASIX) 40 MG tablet, Take 0.5 tablets by mouth Daily., Disp: 30 tablet, Rfl: 6  •  levothyroxine (LEVOTHROID) 25 MCG tablet, Take 25 mcg by mouth Daily., Disp: , Rfl:   •  lisinopril (PRINIVIL,ZESTRIL) 5 MG tablet, Take 0.5 tablets by mouth Every Night. (Patient taking differently: Take 5 mg by mouth Every Night.), Disp: 30 tablet, Rfl: 6  •  MECLIZINE HCL " PO, Take 25 mg by mouth 3 (Three) Times a Day As Needed., Disp: , Rfl:   •  pantoprazole (PROTONIX) 40 MG EC tablet, Take 1 tablet by mouth Daily., Disp: 30 tablet, Rfl: 3  •  potassium chloride (K-DUR,KLOR-CON) 10 MEQ CR tablet, Take 1 tablet by mouth Daily., Disp: 30 tablet, Rfl: 6  •  sertraline (ZOLOFT) 100 MG tablet, Take 150 mg by mouth Daily., Disp: , Rfl:   •  SUMAtriptan (IMITREX) 50 MG tablet, TAKE 1 TABLET EVERY 2 HOURS AS NEEDED FOR MIGRAINE,MAY REPEAT ONCE IN 2 HOURS, Disp: , Rfl: 4   Assessment:        Patient Active Problem List   Diagnosis   • Chronic atrial fibrillation   • Benign essential HTN   • Bradycardia   • Chest pain   • Can't get food down   • Accumulation of fluid in tissues   • Esophageal lump   • Adynamia   • Itch of skin   • Anorexia   • Chronic nausea   • Gastroesophageal reflux disease   • Breath shortness   • Emesis   • Decreased body weight   • Anxiety   • Narrowing of intervertebral disc space   • Diverticulosis of intestine   • Hypertension   • Migraine   • Osteoporosis   • Palpitations   • Pituitary adenoma   • Sick sinus syndrome   • Diarrhea   • Paroxysmal atrial fibrillation   • Cardiomyopathy   • Anticoagulated   • Atrial fibrillation   • On amiodarone therapy     Discharge Diagnoses:   - symptomatic afib / sss  - s/p dual-chamber permanent pacemaker implant  - elevated [id number] score -> Eliquis  - HTN  - EF 45%, mod MR/TR          Plan:       Importance of controlling hypertension and blood pressure  checkup on the regular basis has been explained  Hypertension as a silent killer has been discussed  Risk reduction of the weight and regular exercises to control the hypertension has been explained    Pros and cons as well as indication of the anticoagulation has been explained to the patient in detail    There are no obvious complications at this stage    Risk of  the bleedings has been explained    Need for the regular blood workup and adjust the dose has been  explained    Need for proper follow-up on anticoagulation also has been explained        ICD-10-CM ICD-9-CM   1. Benign essential HTN I10 401.1   2. Bradycardia R00.1 427.89   3. Essential hypertension I10 401.9   4. Anticoagulated Z79.01 V58.61   5. Pacemaker Z95.0 V45.01   6. Chronic atrial fibrillation I48.2 427.31       CV STABLE    PACER HEALED WELL    SEE US 6 MONTHS    COUNSELING:    Raina Perkins was given to patient for the following topics: diagnostic results, risk factor reductions, impressions, risks and benefits of treatment options and importance of treatment compliance .       SMOKING COUNSELING:    Counseling given: Not Answered      EMR Dragon/Transcription disclaimer:   Much of this encounter note is an electronic transcription/translation of spoken language to printed text. The electronic translation of spoken language may permit erroneous, or at times, nonsensical words or phrases to be inadvertently transcribed; Although I have reviewed the note for such errors, some may still exist.

## 2017-02-24 ENCOUNTER — TELEPHONE (OUTPATIENT)
Dept: CARDIOLOGY | Facility: CLINIC | Age: 72
End: 2017-02-24

## 2017-03-01 ENCOUNTER — TELEPHONE (OUTPATIENT)
Dept: CARDIOLOGY | Facility: CLINIC | Age: 72
End: 2017-03-01

## 2017-03-01 NOTE — TELEPHONE ENCOUNTER
Let patient know we only have 3 boxes available.  She states she will pick them up tomorrow around 2:30pm.  aw

## 2017-03-02 RX ORDER — BISOPROLOL FUMARATE AND HYDROCHLOROTHIAZIDE 5; 6.25 MG/1; MG/1
TABLET ORAL
Qty: 30 TABLET | Refills: 6 | Status: SHIPPED | OUTPATIENT
Start: 2017-03-02 | End: 2017-03-23 | Stop reason: SDUPTHER

## 2017-03-07 ENCOUNTER — TELEPHONE (OUTPATIENT)
Dept: CARDIOLOGY | Facility: CLINIC | Age: 72
End: 2017-03-07

## 2017-03-08 ENCOUNTER — TELEPHONE (OUTPATIENT)
Dept: CARDIOLOGY | Facility: CLINIC | Age: 72
End: 2017-03-08

## 2017-03-08 NOTE — TELEPHONE ENCOUNTER
"----- Message from MICHELLE Terry sent at 3/6/2017 10:08 AM EST -----  Regarding: ISSUES OVER THE WEEKEND  Contact: [phone]  PATIENT CALLED THIS AM AND WANTED TO LET US KNOW WHAT WAS GOING ON.  HAD PACEMAKER PLACED 2/7/2017. NOW HER BLOOD PRESSURE IS RUNNING IN THE 'S OVER MID 50'S/LOW 60'S, HR 50-60'S. THEN OVER THE WEEKEND HER FEET AND KNEES WERE SWELLING AND SHE TOOK AND EXTRA LASIX AND THEY WENT DOWN, SHE ALSO HAD A DROOPY LEFT EYE & AT TIMES HER HAND AND TOES ARE TURNING WHITE.  SHE WILL SEE HER PCP ON Wednesday, BUT IS CONCERNED AND WOULD LIKE TO KNOW OF SHE NEEDS TO DO ANYTHING DIFFERENT OR SEE DR. PARKER.  SHE ALSO STATED SHE FELT A \"PINCHING \" IN HER CHEST AT PACEMAKER SIT.     THANK YOU  "

## 2017-03-23 ENCOUNTER — OFFICE VISIT (OUTPATIENT)
Dept: CARDIOLOGY | Facility: CLINIC | Age: 72
End: 2017-03-23

## 2017-03-23 VITALS
WEIGHT: 159 LBS | SYSTOLIC BLOOD PRESSURE: 121 MMHG | BODY MASS INDEX: 23.55 KG/M2 | DIASTOLIC BLOOD PRESSURE: 70 MMHG | HEART RATE: 72 BPM | HEIGHT: 69 IN

## 2017-03-23 DIAGNOSIS — Z95.0 PACEMAKER: Primary | ICD-10-CM

## 2017-03-23 PROCEDURE — 99212 OFFICE O/P EST SF 10 MIN: CPT | Performed by: INTERNAL MEDICINE

## 2017-03-23 RX ORDER — CIPROFLOXACIN 500 MG/1
500 TABLET, FILM COATED ORAL 2 TIMES DAILY
Qty: 20 TABLET | Refills: 0 | Status: SHIPPED | OUTPATIENT
Start: 2017-03-23 | End: 2017-04-02

## 2017-03-23 RX ORDER — AMOXICILLIN 500 MG/1
500 CAPSULE ORAL 3 TIMES DAILY
Qty: 30 CAPSULE | Refills: 0 | Status: SHIPPED | OUTPATIENT
Start: 2017-03-23 | End: 2017-03-23

## 2017-03-23 NOTE — PROGRESS NOTES
" Subjective:       Raina Forrest is a 71 y.o. female who here for follow up    CC  Pacemaker site appears to be red and swollen  HPI  71-year-old white female had undergone pacemaker implantation approximately one month ago, last couple weeks ago patient was found to have redness of the incision as well as a mild swelling with no fever or the chills     Problem List Items Addressed This Visit        Cardiovascular and Mediastinum    Pacemaker - Primary        Previous treatments/evaluations include: ASA. Cardiac risk factors: advanced age (older than 55 for men, 65 for women).    The following portions of the patient's history were reviewed and updated as appropriate: allergies, current medications, past family history, past medical history, past social history, past surgical history and problem list.    Past Medical History:   Diagnosis Date   • Abdominal pain    • Atrial fibrillation    • Fung's esophagus    • Benign essential hypertension    • Blood in stool    • Chest pain    • Chronic gastritis    • Congestive heart failure    • Degenerative disc disease    • Depression with anxiety    • Disease of thyroid gland    • Diverticulitis of colon    • Dysphagia    • GERD (gastroesophageal reflux disease)    • History of migraine headaches    • Osteoporosis    • Palpitations    • Thyroid disorder    • Ulcerative colitis    • Vomiting    • Weight loss     reports that she has never smoked. She has never used smokeless tobacco. She reports that she does not drink alcohol or use illicit drugs.  Family History   Problem Relation Age of Onset   • Lung cancer Other    • Ulcerative colitis Sister        Review of Systems  Constitutional: No wt loss, fever, fatigue  Gastrointestinal: No nausea, abdominal pain  Behavioral/Psych: No insomnia or anxiety   Cardiovascular no chest pains or tightness in the chest  Objective:       Physical Exam             Physical Exam  /70  Pulse 72  Ht 69\" (175.3 cm)  Wt 159 lb " (72.1 kg)  BMI 23.48 kg/m2    General appearance: NAD, conversant   Eyes: anicteric sclerae, moist conjunctivae; no lid-lag; PERRLA   HENT: Atraumatic; oropharynx clear with moist mucous membranes and no mucosal ulcerations;  normal hard and soft palate   Neck: Trachea midline; FROM, supple, no thyromegaly or lymphadenopathy   Lungs: CTA, with normal respiratory effort and no intercostal retractions   CV: S1-S2 regular, no murmurs, no rub, no gallop   Abdomen: Soft, non-tender; no masses or HSM   Extremities: No peripheral edema or extremity lymphadenopathy  Skin: Normal temperature, turgor and texture; no rash, ulcers or subcutaneous nodules   Psych: Appropriate affect, alert and oriented to person, place and time     Pacemaker site appears to be red, with no oozing      Cardiographics  @Procedures    Echocardiogram:        Current Outpatient Prescriptions:   •  apixaban (ELIQUIS) 5 MG tablet tablet, Take 1 tablet by mouth 2 (Two) Times a Day., Disp: 28 tablet, Rfl: 0  •  bisoprolol-hydrochlorothiazide (ZIAC) 5-6.25 MG per tablet, 1/2 tablet by mouth daily (Patient taking differently: Take 0.5 tablets by mouth Daily. 1/2 tablet by mouth daily), Disp: 30 tablet, Rfl: 6  •  fluticasone (FLONASE) 50 MCG/ACT nasal spray, 2 sprays into each nostril Daily., Disp: , Rfl:   •  furosemide (LASIX) 40 MG tablet, Take 0.5 tablets by mouth Daily., Disp: 30 tablet, Rfl: 6  •  levothyroxine (LEVOTHROID) 25 MCG tablet, Take 25 mcg by mouth Daily., Disp: , Rfl:   •  lisinopril (PRINIVIL,ZESTRIL) 5 MG tablet, Take 0.5 tablets by mouth Every Night. (Patient taking differently: Take 5 mg by mouth Every Night.), Disp: 30 tablet, Rfl: 6  •  MECLIZINE HCL PO, Take 25 mg by mouth 3 (Three) Times a Day As Needed., Disp: , Rfl:   •  pantoprazole (PROTONIX) 40 MG EC tablet, Take 1 tablet by mouth Daily., Disp: 30 tablet, Rfl: 3  •  potassium chloride (K-DUR,KLOR-CON) 10 MEQ CR tablet, Take 1 tablet by mouth Daily., Disp: 30 tablet, Rfl:  6  •  sertraline (ZOLOFT) 100 MG tablet, Take 150 mg by mouth Daily., Disp: , Rfl:   •  SUMAtriptan (IMITREX) 50 MG tablet, TAKE 1 TABLET EVERY 2 HOURS AS NEEDED FOR MIGRAINE,MAY REPEAT ONCE IN 2 HOURS, Disp: , Rfl: 4   Assessment:        Patient Active Problem List   Diagnosis   • Chronic atrial fibrillation   • Benign essential HTN   • Bradycardia   • Chest pain   • Can't get food down   • Accumulation of fluid in tissues   • Esophageal lump   • Adynamia   • Itch of skin   • Anorexia   • Chronic nausea   • Gastroesophageal reflux disease   • Breath shortness   • Emesis   • Decreased body weight   • Anxiety   • Narrowing of intervertebral disc space   • Diverticulosis of intestine   • Hypertension   • Migraine   • Osteoporosis   • Palpitations   • Pituitary adenoma   • Sick sinus syndrome   • Diarrhea   • Paroxysmal atrial fibrillation   • Cardiomyopathy   • Anticoagulated   • Atrial fibrillation   • On amiodarone therapy   • Pacemaker               Plan:            ICD-10-CM ICD-9-CM   1. Pacemaker Z95.0 V45.01     Post pacer 6 wks ago    Possible superficial infection    Will startcipro for 10 days  See us 2 wks  COUNSELING:    Raina Perkins was given to patient for the following topics: diagnostic results, risk factor reductions, impressions, risks and benefits of treatment options and importance of treatment compliance .       SMOKING COUNSELING:    Counseling given: Not Answered      EMR Dragon/Transcription disclaimer:   Much of this encounter note is an electronic transcription/translation of spoken language to printed text. The electronic translation of spoken language may permit erroneous, or at times, nonsensical words or phrases to be inadvertently transcribed; Although I have reviewed the note for such errors, some may still exist.

## 2017-03-28 PROBLEM — Z95.0 CARDIAC RESYNCHRONIZATION THERAPY PACEMAKER (CRT-P) IN PLACE: Status: ACTIVE | Noted: 2017-03-28

## 2017-04-03 ENCOUNTER — HOSPITAL ENCOUNTER (EMERGENCY)
Facility: HOSPITAL | Age: 72
Discharge: HOME | End: 2017-04-03
Attending: FAMILY MEDICINE | Admitting: FAMILY MEDICINE

## 2017-04-03 VITALS
RESPIRATION RATE: 18 BRPM | BODY MASS INDEX: 22.36 KG/M2 | SYSTOLIC BLOOD PRESSURE: 124 MMHG | TEMPERATURE: 98.4 F | HEART RATE: 63 BPM | DIASTOLIC BLOOD PRESSURE: 75 MMHG | OXYGEN SATURATION: 99 % | HEIGHT: 69 IN | WEIGHT: 151 LBS

## 2017-04-03 DIAGNOSIS — L03.313 CELLULITIS OF CHEST WALL: Primary | ICD-10-CM

## 2017-04-03 RX ORDER — CEFTRIAXONE SODIUM 1 G/50ML
1 INJECTION, SOLUTION INTRAVENOUS ONCE
Status: COMPLETED | OUTPATIENT
Start: 2017-04-03 | End: 2017-04-03

## 2017-04-03 RX ORDER — CEFDINIR 300 MG/1
300 CAPSULE ORAL 2 TIMES DAILY
Qty: 14 CAPSULE | Refills: 0 | Status: SHIPPED | OUTPATIENT
Start: 2017-04-03 | End: 2017-04-12 | Stop reason: HOSPADM

## 2017-04-03 RX ADMIN — CEFTRIAXONE SODIUM 1 G: 1 INJECTION, SOLUTION INTRAVENOUS at 14:35

## 2017-04-06 ENCOUNTER — OFFICE VISIT (OUTPATIENT)
Dept: CARDIOLOGY | Facility: CLINIC | Age: 72
End: 2017-04-06

## 2017-04-06 ENCOUNTER — APPOINTMENT (OUTPATIENT)
Dept: GENERAL RADIOLOGY | Facility: HOSPITAL | Age: 72
End: 2017-04-06

## 2017-04-06 ENCOUNTER — HOSPITAL ENCOUNTER (INPATIENT)
Facility: HOSPITAL | Age: 72
LOS: 6 days | Discharge: HOME HEALTH | End: 2017-04-12
Attending: INTERNAL MEDICINE | Admitting: INTERNAL MEDICINE

## 2017-04-06 VITALS
SYSTOLIC BLOOD PRESSURE: 123 MMHG | HEART RATE: 81 BPM | BODY MASS INDEX: 23.7 KG/M2 | WEIGHT: 160 LBS | HEIGHT: 69 IN | DIASTOLIC BLOOD PRESSURE: 73 MMHG

## 2017-04-06 DIAGNOSIS — I48.20 CHRONIC ATRIAL FIBRILLATION (HCC): Primary | ICD-10-CM

## 2017-04-06 DIAGNOSIS — R07.2 PRECORDIAL PAIN: ICD-10-CM

## 2017-04-06 DIAGNOSIS — T82.7XXD INFECTED PACEMAKER, SUBSEQUENT ENCOUNTER: Primary | ICD-10-CM

## 2017-04-06 DIAGNOSIS — I10 BENIGN ESSENTIAL HTN: ICD-10-CM

## 2017-04-06 DIAGNOSIS — Z79.01 ANTICOAGULATED: ICD-10-CM

## 2017-04-06 DIAGNOSIS — Z79.899 ON AMIODARONE THERAPY: ICD-10-CM

## 2017-04-06 PROBLEM — T82.7XXA INFECTED PACEMAKER: Status: ACTIVE | Noted: 2017-04-06

## 2017-04-06 LAB
ALBUMIN SERPL-MCNC: 4.5 G/DL (ref 3.5–5.2)
ALBUMIN/GLOB SERPL: 1.9 G/DL
ALP SERPL-CCNC: 113 U/L (ref 39–117)
ALT SERPL W P-5'-P-CCNC: 20 U/L (ref 1–33)
ANION GAP SERPL CALCULATED.3IONS-SCNC: 12.3 MMOL/L
AST SERPL-CCNC: 39 U/L (ref 1–32)
BASOPHILS # BLD AUTO: 0.04 10*3/MM3 (ref 0–0.2)
BASOPHILS NFR BLD AUTO: 0.4 % (ref 0–1.5)
BILIRUB SERPL-MCNC: 0.4 MG/DL (ref 0.1–1.2)
BUN BLD-MCNC: 8 MG/DL (ref 8–23)
BUN/CREAT SERPL: 11.3 (ref 7–25)
CALCIUM SPEC-SCNC: 9.2 MG/DL (ref 8.6–10.5)
CHLORIDE SERPL-SCNC: 95 MMOL/L (ref 98–107)
CO2 SERPL-SCNC: 27.7 MMOL/L (ref 22–29)
CREAT BLD-MCNC: 0.71 MG/DL (ref 0.57–1)
DEPRECATED RDW RBC AUTO: 47 FL (ref 37–54)
EOSINOPHIL # BLD AUTO: 0.14 10*3/MM3 (ref 0–0.7)
EOSINOPHIL NFR BLD AUTO: 1.5 % (ref 0.3–6.2)
ERYTHROCYTE [DISTWIDTH] IN BLOOD BY AUTOMATED COUNT: 13.7 % (ref 11.7–13)
GFR SERPL CREATININE-BSD FRML MDRD: 81 ML/MIN/1.73
GLOBULIN UR ELPH-MCNC: 2.4 GM/DL
GLUCOSE BLD-MCNC: 99 MG/DL (ref 65–99)
HCT VFR BLD AUTO: 40.3 % (ref 35.6–45.5)
HGB BLD-MCNC: 13.7 G/DL (ref 11.9–15.5)
IMM GRANULOCYTES # BLD: 0.04 10*3/MM3 (ref 0–0.03)
IMM GRANULOCYTES NFR BLD: 0.4 % (ref 0–0.5)
INR PPP: 1.26 (ref 0.9–1.1)
LYMPHOCYTES # BLD AUTO: 1.8 10*3/MM3 (ref 0.9–4.8)
LYMPHOCYTES NFR BLD AUTO: 18.7 % (ref 19.6–45.3)
MAGNESIUM SERPL-MCNC: 2.1 MG/DL (ref 1.6–2.4)
MCH RBC QN AUTO: 32.2 PG (ref 26.9–32)
MCHC RBC AUTO-ENTMCNC: 34 G/DL (ref 32.4–36.3)
MCV RBC AUTO: 94.6 FL (ref 80.5–98.2)
MONOCYTES # BLD AUTO: 0.73 10*3/MM3 (ref 0.2–1.2)
MONOCYTES NFR BLD AUTO: 7.6 % (ref 5–12)
NEUTROPHILS # BLD AUTO: 6.88 10*3/MM3 (ref 1.9–8.1)
NEUTROPHILS NFR BLD AUTO: 71.4 % (ref 42.7–76)
NT-PROBNP SERPL-MCNC: 564.5 PG/ML (ref 5–900)
PLATELET # BLD AUTO: 243 10*3/MM3 (ref 140–500)
PMV BLD AUTO: 11.7 FL (ref 6–12)
POTASSIUM BLD-SCNC: 4.7 MMOL/L (ref 3.5–5.2)
PROT SERPL-MCNC: 6.9 G/DL (ref 6–8.5)
PROTHROMBIN TIME: 15.4 SECONDS (ref 11.7–14.2)
RBC # BLD AUTO: 4.26 10*6/MM3 (ref 3.9–5.2)
SODIUM BLD-SCNC: 135 MMOL/L (ref 136–145)
WBC NRBC COR # BLD: 9.63 10*3/MM3 (ref 4.5–10.7)

## 2017-04-06 PROCEDURE — 25010000002 VANCOMYCIN: Performed by: INTERNAL MEDICINE

## 2017-04-06 PROCEDURE — 71020 HC CHEST PA AND LATERAL: CPT

## 2017-04-06 PROCEDURE — 87040 BLOOD CULTURE FOR BACTERIA: CPT | Performed by: INTERNAL MEDICINE

## 2017-04-06 PROCEDURE — 85610 PROTHROMBIN TIME: CPT | Performed by: NURSE PRACTITIONER

## 2017-04-06 PROCEDURE — 93010 ELECTROCARDIOGRAM REPORT: CPT | Performed by: INTERNAL MEDICINE

## 2017-04-06 PROCEDURE — 93005 ELECTROCARDIOGRAM TRACING: CPT | Performed by: NURSE PRACTITIONER

## 2017-04-06 PROCEDURE — G0378 HOSPITAL OBSERVATION PER HR: HCPCS

## 2017-04-06 PROCEDURE — 83735 ASSAY OF MAGNESIUM: CPT | Performed by: NURSE PRACTITIONER

## 2017-04-06 PROCEDURE — 80053 COMPREHEN METABOLIC PANEL: CPT | Performed by: NURSE PRACTITIONER

## 2017-04-06 PROCEDURE — 99024 POSTOP FOLLOW-UP VISIT: CPT | Performed by: INTERNAL MEDICINE

## 2017-04-06 PROCEDURE — 83880 ASSAY OF NATRIURETIC PEPTIDE: CPT | Performed by: NURSE PRACTITIONER

## 2017-04-06 PROCEDURE — 85025 COMPLETE CBC W/AUTO DIFF WBC: CPT | Performed by: NURSE PRACTITIONER

## 2017-04-06 RX ORDER — SODIUM CHLORIDE 0.9 % (FLUSH) 0.9 %
1-10 SYRINGE (ML) INJECTION AS NEEDED
Status: CANCELLED | OUTPATIENT
Start: 2017-04-06

## 2017-04-06 RX ORDER — LISINOPRIL 5 MG/1
5 TABLET ORAL NIGHTLY
Status: DISCONTINUED | OUTPATIENT
Start: 2017-04-06 | End: 2017-04-12 | Stop reason: HOSPADM

## 2017-04-06 RX ORDER — CEFDINIR 300 MG/1
300 CAPSULE ORAL 2 TIMES DAILY
Status: DISCONTINUED | OUTPATIENT
Start: 2017-04-06 | End: 2017-04-09

## 2017-04-06 RX ORDER — BISOPROLOL FUMARATE AND HYDROCHLOROTHIAZIDE 5; 6.25 MG/1; MG/1
0.5 TABLET ORAL DAILY
Status: DISCONTINUED | OUTPATIENT
Start: 2017-04-06 | End: 2017-04-12 | Stop reason: HOSPADM

## 2017-04-06 RX ORDER — LEVOTHYROXINE SODIUM 0.03 MG/1
25 TABLET ORAL EVERY MORNING
Status: DISCONTINUED | OUTPATIENT
Start: 2017-04-07 | End: 2017-04-12 | Stop reason: HOSPADM

## 2017-04-06 RX ORDER — PANTOPRAZOLE SODIUM 40 MG/1
40 TABLET, DELAYED RELEASE ORAL DAILY
Status: DISCONTINUED | OUTPATIENT
Start: 2017-04-06 | End: 2017-04-12 | Stop reason: HOSPADM

## 2017-04-06 RX ORDER — SODIUM CHLORIDE 0.9 % (FLUSH) 0.9 %
1-10 SYRINGE (ML) INJECTION AS NEEDED
Status: DISCONTINUED | OUTPATIENT
Start: 2017-04-06 | End: 2017-04-12 | Stop reason: HOSPADM

## 2017-04-06 RX ORDER — ACETAMINOPHEN 325 MG/1
650 TABLET ORAL EVERY 4 HOURS PRN
Status: DISCONTINUED | OUTPATIENT
Start: 2017-04-06 | End: 2017-04-12 | Stop reason: HOSPADM

## 2017-04-06 RX ORDER — ACETAMINOPHEN 325 MG/1
650 TABLET ORAL EVERY 4 HOURS PRN
Status: CANCELLED | OUTPATIENT
Start: 2017-04-06

## 2017-04-06 RX ORDER — FUROSEMIDE 20 MG/1
20 TABLET ORAL DAILY
Status: DISCONTINUED | OUTPATIENT
Start: 2017-04-06 | End: 2017-04-12 | Stop reason: HOSPADM

## 2017-04-06 RX ORDER — POTASSIUM CHLORIDE 750 MG/1
10 CAPSULE, EXTENDED RELEASE ORAL DAILY
Status: DISCONTINUED | OUTPATIENT
Start: 2017-04-06 | End: 2017-04-12 | Stop reason: HOSPADM

## 2017-04-06 RX ORDER — FLUTICASONE PROPIONATE 50 MCG
2 SPRAY, SUSPENSION (ML) NASAL DAILY
Status: DISCONTINUED | OUTPATIENT
Start: 2017-04-06 | End: 2017-04-12 | Stop reason: HOSPADM

## 2017-04-06 RX ADMIN — CEFDINIR 300 MG: 300 CAPSULE ORAL at 20:10

## 2017-04-06 RX ADMIN — APIXABAN 5 MG: 5 TABLET, FILM COATED ORAL at 20:10

## 2017-04-06 RX ADMIN — VANCOMYCIN HYDROCHLORIDE 1500 MG: 1 INJECTION, POWDER, LYOPHILIZED, FOR SOLUTION INTRAVENOUS at 23:08

## 2017-04-06 NOTE — PROGRESS NOTES
" Subjective:       Raina Forrest is a 71 y.o. female who here for follow up    CC  Follow up for afib, pacer  HPI    71 yr old w/f here for follow up c/o pain around the pacer site     Problem List Items Addressed This Visit        Cardiovascular and Mediastinum    Chronic atrial fibrillation - Primary    Benign essential HTN       Nervous and Auditory    Chest pain       Other    Anticoagulated    On amiodarone therapy        Previous treatments/evaluations include: ASA. Cardiac risk factors: advanced age (older than 55 for men, 65 for women).    The following portions of the patient's history were reviewed and updated as appropriate: allergies, current medications, past family history, past medical history, past social history, past surgical history and problem list.    Past Medical History:   Diagnosis Date   • Abdominal pain    • Atrial fibrillation    • Fung's esophagus    • Benign essential hypertension    • Blood in stool    • Chest pain    • Chronic gastritis    • Congestive heart failure    • Degenerative disc disease    • Depression with anxiety    • Disease of thyroid gland    • Diverticulitis of colon    • Dysphagia    • GERD (gastroesophageal reflux disease)    • History of migraine headaches    • Osteoporosis    • Palpitations    • Thyroid disorder    • Ulcerative colitis    • Vomiting    • Weight loss     reports that she has never smoked. She has never used smokeless tobacco. She reports that she does not drink alcohol or use illicit drugs.  Family History   Problem Relation Age of Onset   • Lung cancer Other    • Ulcerative colitis Sister        Review of Systems  Constitutional: No wt loss, fever, fatigue  Gastrointestinal: No nausea, abdominal pain  Behavioral/Psych: No insomnia or anxiety   Cardiovascular pain near pacer site  Objective:       Physical Exam           Physical Exam  /73  Pulse 81  Ht 69\" (175.3 cm)  Wt 160 lb (72.6 kg)  BMI 23.63 kg/m2    General appearance: NAD, " conversant   Eyes: anicteric sclerae, moist conjunctivae; no lid-lag; PERRLA   HENT: Atraumatic; oropharynx clear with moist mucous membranes and no mucosal ulcerations;  normal hard and soft palate   Neck: Trachea midline; FROM, supple, no thyromegaly or lymphadenopathy   Lungs: CTA, with normal respiratory effort and no intercostal retractions   CV: S1-S2 regular, no murmurs, no rub, no gallop   Abdomen: Soft, non-tender; no masses or HSM   Extremities: No peripheral edema or extremity lymphadenopathy  Skin: Normal temperature, turgor and texture; no rash, ulcers or subcutaneous nodules   Psych: Appropriate affect, alert and oriented to person, place and time       PACER SITE  NO SWELLING, SOME REDNESS    Cardiographics  @Procedures    Echocardiogram:        Current Outpatient Prescriptions:   •  apixaban (ELIQUIS) 5 MG tablet tablet, Take 1 tablet by mouth 2 (Two) Times a Day., Disp: 28 tablet, Rfl: 0  •  bisoprolol-hydrochlorothiazide (ZIAC) 5-6.25 MG per tablet, 1/2 tablet by mouth daily (Patient taking differently: Take 0.5 tablets by mouth Daily. 1/2 tablet by mouth daily), Disp: 30 tablet, Rfl: 6  •  cefdinir (OMNICEF) 300 MG capsule, Take 1 capsule by mouth 2 (Two) Times a Day., Disp: 14 capsule, Rfl: 0  •  fluticasone (FLONASE) 50 MCG/ACT nasal spray, 2 sprays into each nostril Daily., Disp: , Rfl:   •  furosemide (LASIX) 40 MG tablet, Take 0.5 tablets by mouth Daily., Disp: 30 tablet, Rfl: 6  •  levothyroxine (LEVOTHROID) 25 MCG tablet, Take 25 mcg by mouth Daily., Disp: , Rfl:   •  lisinopril (PRINIVIL,ZESTRIL) 5 MG tablet, Take 0.5 tablets by mouth Every Night. (Patient taking differently: Take 5 mg by mouth Every Night.), Disp: 30 tablet, Rfl: 6  •  MECLIZINE HCL PO, Take 25 mg by mouth 3 (Three) Times a Day As Needed., Disp: , Rfl:   •  pantoprazole (PROTONIX) 40 MG EC tablet, Take 1 tablet by mouth Daily., Disp: 30 tablet, Rfl: 3  •  potassium chloride (K-DUR,KLOR-CON) 10 MEQ CR tablet, Take 1 tablet  by mouth Daily., Disp: 30 tablet, Rfl: 6  •  sertraline (ZOLOFT) 100 MG tablet, Take 150 mg by mouth Daily., Disp: , Rfl:   •  SUMAtriptan (IMITREX) 50 MG tablet, TAKE 1 TABLET EVERY 2 HOURS AS NEEDED FOR MIGRAINE,MAY REPEAT ONCE IN 2 HOURS, Disp: , Rfl: 4   Assessment:        Patient Active Problem List   Diagnosis   • Chronic atrial fibrillation   • Benign essential HTN   • Bradycardia   • Chest pain   • Can't get food down   • Accumulation of fluid in tissues   • Esophageal lump   • Adynamia   • Itch of skin   • Anorexia   • Chronic nausea   • Gastroesophageal reflux disease   • Breath shortness   • Emesis   • Decreased body weight   • Anxiety   • Narrowing of intervertebral disc space   • Diverticulosis of intestine   • Hypertension   • Migraine   • Osteoporosis   • Palpitations   • Pituitary adenoma   • Sick sinus syndrome   • Diarrhea   • Paroxysmal atrial fibrillation   • Cardiomyopathy   • Anticoagulated   • Atrial fibrillation   • On amiodarone therapy   • Pacemaker   • Cardiac resynchronization therapy pacemaker (CRT-P) in place               Plan:            ICD-10-CM ICD-9-CM   1. Chronic atrial fibrillation I48.2 427.31   2. Benign essential HTN I10 401.1   3. On amiodarone therapy Z79.899 V58.69   4. Anticoagulated Z79.01 V58.61   5. Precordial pain R07.2 786.51     1. Chronic atrial fibrillation  NSR     2. Benign essential HTN  WELL CONTROLLED    3. On amiodarone therapy  Raina Forrest IS ON AMIODARONE,   Significant side effects associated with this medication has been explained     Pros and cons of the medications has been discussed, decision has been to continue the medication   6 months to yearly checkup for eye examination, thyroid function test, chest x-ray and liver function test has been advised    Patient understands well      4. Anticoagulated  Pros and cons as well as indication of the anticoagulation has been explained to the patient in detail    There are no obvious complications at  this stage    Risk of  the bleedings has been explained    Need for the regular blood workup and adjust the dose has been explained    Need for proper follow-up on anticoagulation also has been explained      5. Precordial pain  PT HAVING SIG PAIN AT PACER SITE    admit to hosp for possible pacer infection  COUNSELING:    Raina Perkins was given to patient for the following topics: diagnostic results, risk factor reductions, impressions, risks and benefits of treatment options and importance of treatment compliance .       SMOKING COUNSELING:    Counseling given: Not Answered      EMR Dragon/Transcription disclaimer:   Much of this encounter note is an electronic transcription/translation of spoken language to printed text. The electronic translation of spoken language may permit erroneous, or at times, nonsensical words or phrases to be inadvertently transcribed; Although I have reviewed the note for such errors, some may still exist.

## 2017-04-07 PROBLEM — T14.8XXA WOUND INFECTION: Status: ACTIVE | Noted: 2017-04-07

## 2017-04-07 PROBLEM — L08.9 WOUND INFECTION: Status: ACTIVE | Noted: 2017-04-07

## 2017-04-07 PROCEDURE — 25010000002 VANCOMYCIN: Performed by: INTERNAL MEDICINE

## 2017-04-07 PROCEDURE — 99024 POSTOP FOLLOW-UP VISIT: CPT | Performed by: INTERNAL MEDICINE

## 2017-04-07 PROCEDURE — 93005 ELECTROCARDIOGRAM TRACING: CPT | Performed by: NURSE PRACTITIONER

## 2017-04-07 PROCEDURE — 93010 ELECTROCARDIOGRAM REPORT: CPT | Performed by: INTERNAL MEDICINE

## 2017-04-07 RX ADMIN — CEFDINIR 300 MG: 300 CAPSULE ORAL at 17:55

## 2017-04-07 RX ADMIN — LEVOTHYROXINE SODIUM 25 MCG: 25 TABLET ORAL at 07:30

## 2017-04-07 RX ADMIN — APIXABAN 5 MG: 5 TABLET, FILM COATED ORAL at 08:33

## 2017-04-07 RX ADMIN — PANTOPRAZOLE SODIUM 40 MG: 40 TABLET, DELAYED RELEASE ORAL at 08:33

## 2017-04-07 RX ADMIN — POTASSIUM CHLORIDE 10 MEQ: 750 CAPSULE, EXTENDED RELEASE ORAL at 08:33

## 2017-04-07 RX ADMIN — FUROSEMIDE 20 MG: 20 TABLET ORAL at 08:33

## 2017-04-07 RX ADMIN — APIXABAN 5 MG: 5 TABLET, FILM COATED ORAL at 17:55

## 2017-04-07 RX ADMIN — VANCOMYCIN HYDROCHLORIDE 1250 MG: 1 INJECTION, POWDER, LYOPHILIZED, FOR SOLUTION INTRAVENOUS at 20:24

## 2017-04-07 RX ADMIN — CEFDINIR 300 MG: 300 CAPSULE ORAL at 08:33

## 2017-04-07 RX ADMIN — VANCOMYCIN HYDROCHLORIDE 1250 MG: 1 INJECTION, POWDER, LYOPHILIZED, FOR SOLUTION INTRAVENOUS at 08:33

## 2017-04-07 RX ADMIN — SERTRALINE 150 MG: 100 TABLET, FILM COATED ORAL at 08:33

## 2017-04-07 RX ADMIN — BISOPROLOL FUMARATE AND HYDROCHLOROTHIAZIDE 0.5 TABLET: 6.25; 5 TABLET ORAL at 08:33

## 2017-04-08 LAB — VANCOMYCIN TROUGH SERPL-MCNC: 12.8 MCG/ML (ref 5–20)

## 2017-04-08 PROCEDURE — 25010000002 VANCOMYCIN: Performed by: INTERNAL MEDICINE

## 2017-04-08 PROCEDURE — 99024 POSTOP FOLLOW-UP VISIT: CPT | Performed by: INTERNAL MEDICINE

## 2017-04-08 PROCEDURE — 80202 ASSAY OF VANCOMYCIN: CPT | Performed by: INTERNAL MEDICINE

## 2017-04-08 RX ADMIN — PANTOPRAZOLE SODIUM 40 MG: 40 TABLET, DELAYED RELEASE ORAL at 09:07

## 2017-04-08 RX ADMIN — CEFDINIR 300 MG: 300 CAPSULE ORAL at 17:33

## 2017-04-08 RX ADMIN — FUROSEMIDE 20 MG: 20 TABLET ORAL at 09:06

## 2017-04-08 RX ADMIN — POTASSIUM CHLORIDE 10 MEQ: 750 CAPSULE, EXTENDED RELEASE ORAL at 09:06

## 2017-04-08 RX ADMIN — SERTRALINE 150 MG: 100 TABLET, FILM COATED ORAL at 09:06

## 2017-04-08 RX ADMIN — VANCOMYCIN HYDROCHLORIDE 1250 MG: 1 INJECTION, POWDER, LYOPHILIZED, FOR SOLUTION INTRAVENOUS at 09:06

## 2017-04-08 RX ADMIN — CEFDINIR 300 MG: 300 CAPSULE ORAL at 09:07

## 2017-04-08 RX ADMIN — BISOPROLOL FUMARATE AND HYDROCHLOROTHIAZIDE 0.5 TABLET: 6.25; 5 TABLET ORAL at 09:07

## 2017-04-08 RX ADMIN — FLUTICASONE PROPIONATE 2 SPRAY: 50 SPRAY, METERED NASAL at 09:07

## 2017-04-08 RX ADMIN — VANCOMYCIN HYDROCHLORIDE 1500 MG: 1 INJECTION, POWDER, LYOPHILIZED, FOR SOLUTION INTRAVENOUS at 20:22

## 2017-04-08 RX ADMIN — APIXABAN 5 MG: 5 TABLET, FILM COATED ORAL at 09:07

## 2017-04-08 RX ADMIN — APIXABAN 5 MG: 5 TABLET, FILM COATED ORAL at 17:33

## 2017-04-09 LAB
ANION GAP SERPL CALCULATED.3IONS-SCNC: 9.9 MMOL/L
BUN BLD-MCNC: 10 MG/DL (ref 8–23)
BUN/CREAT SERPL: 16.4 (ref 7–25)
CALCIUM SPEC-SCNC: 9.1 MG/DL (ref 8.6–10.5)
CHLORIDE SERPL-SCNC: 103 MMOL/L (ref 98–107)
CO2 SERPL-SCNC: 28.1 MMOL/L (ref 22–29)
CREAT BLD-MCNC: 0.61 MG/DL (ref 0.57–1)
DEPRECATED RDW RBC AUTO: 48.7 FL (ref 37–54)
ERYTHROCYTE [DISTWIDTH] IN BLOOD BY AUTOMATED COUNT: 14 % (ref 11.7–13)
GFR SERPL CREATININE-BSD FRML MDRD: 96 ML/MIN/1.73
GLUCOSE BLD-MCNC: 68 MG/DL (ref 65–99)
HCT VFR BLD AUTO: 37.2 % (ref 35.6–45.5)
HGB BLD-MCNC: 12.5 G/DL (ref 11.9–15.5)
MCH RBC QN AUTO: 32.2 PG (ref 26.9–32)
MCHC RBC AUTO-ENTMCNC: 33.6 G/DL (ref 32.4–36.3)
MCV RBC AUTO: 95.9 FL (ref 80.5–98.2)
PLATELET # BLD AUTO: 220 10*3/MM3 (ref 140–500)
PMV BLD AUTO: 11.6 FL (ref 6–12)
POTASSIUM BLD-SCNC: 4.1 MMOL/L (ref 3.5–5.2)
RBC # BLD AUTO: 3.88 10*6/MM3 (ref 3.9–5.2)
SODIUM BLD-SCNC: 141 MMOL/L (ref 136–145)
WBC NRBC COR # BLD: 7.7 10*3/MM3 (ref 4.5–10.7)

## 2017-04-09 PROCEDURE — 99024 POSTOP FOLLOW-UP VISIT: CPT | Performed by: INTERNAL MEDICINE

## 2017-04-09 PROCEDURE — 93010 ELECTROCARDIOGRAM REPORT: CPT | Performed by: INTERNAL MEDICINE

## 2017-04-09 PROCEDURE — 85027 COMPLETE CBC AUTOMATED: CPT | Performed by: INTERNAL MEDICINE

## 2017-04-09 PROCEDURE — 25010000002 VANCOMYCIN: Performed by: INTERNAL MEDICINE

## 2017-04-09 PROCEDURE — 80048 BASIC METABOLIC PNL TOTAL CA: CPT | Performed by: INTERNAL MEDICINE

## 2017-04-09 PROCEDURE — 93005 ELECTROCARDIOGRAM TRACING: CPT | Performed by: INTERNAL MEDICINE

## 2017-04-09 RX ORDER — SENNA AND DOCUSATE SODIUM 50; 8.6 MG/1; MG/1
1 TABLET, FILM COATED ORAL 2 TIMES DAILY PRN
Status: DISCONTINUED | OUTPATIENT
Start: 2017-04-09 | End: 2017-04-12 | Stop reason: HOSPADM

## 2017-04-09 RX ADMIN — VANCOMYCIN HYDROCHLORIDE 1500 MG: 1 INJECTION, POWDER, LYOPHILIZED, FOR SOLUTION INTRAVENOUS at 08:08

## 2017-04-09 RX ADMIN — SERTRALINE 150 MG: 100 TABLET, FILM COATED ORAL at 08:07

## 2017-04-09 RX ADMIN — LEVOTHYROXINE SODIUM 25 MCG: 25 TABLET ORAL at 10:45

## 2017-04-09 RX ADMIN — POTASSIUM CHLORIDE 10 MEQ: 750 CAPSULE, EXTENDED RELEASE ORAL at 08:07

## 2017-04-09 RX ADMIN — APIXABAN 5 MG: 5 TABLET, FILM COATED ORAL at 08:07

## 2017-04-09 RX ADMIN — VANCOMYCIN HYDROCHLORIDE 1500 MG: 1 INJECTION, POWDER, LYOPHILIZED, FOR SOLUTION INTRAVENOUS at 20:28

## 2017-04-09 RX ADMIN — FUROSEMIDE 20 MG: 20 TABLET ORAL at 08:07

## 2017-04-09 RX ADMIN — BISOPROLOL FUMARATE AND HYDROCHLOROTHIAZIDE 0.5 TABLET: 6.25; 5 TABLET ORAL at 08:08

## 2017-04-09 RX ADMIN — APIXABAN 5 MG: 5 TABLET, FILM COATED ORAL at 17:00

## 2017-04-09 RX ADMIN — PANTOPRAZOLE SODIUM 40 MG: 40 TABLET, DELAYED RELEASE ORAL at 08:08

## 2017-04-10 LAB — VANCOMYCIN TROUGH SERPL-MCNC: 21.7 MCG/ML (ref 5–20)

## 2017-04-10 PROCEDURE — 80202 ASSAY OF VANCOMYCIN: CPT | Performed by: INTERNAL MEDICINE

## 2017-04-10 PROCEDURE — 99024 POSTOP FOLLOW-UP VISIT: CPT | Performed by: INTERNAL MEDICINE

## 2017-04-10 PROCEDURE — 25010000002 VANCOMYCIN: Performed by: INTERNAL MEDICINE

## 2017-04-10 PROCEDURE — C1751 CATH, INF, PER/CENT/MIDLINE: HCPCS

## 2017-04-10 RX ADMIN — POTASSIUM CHLORIDE 10 MEQ: 750 CAPSULE, EXTENDED RELEASE ORAL at 08:34

## 2017-04-10 RX ADMIN — ACETAMINOPHEN 650 MG: 325 TABLET ORAL at 16:19

## 2017-04-10 RX ADMIN — VANCOMYCIN HYDROCHLORIDE 1250 MG: 1 INJECTION, POWDER, LYOPHILIZED, FOR SOLUTION INTRAVENOUS at 23:04

## 2017-04-10 RX ADMIN — APIXABAN 5 MG: 5 TABLET, FILM COATED ORAL at 17:52

## 2017-04-10 RX ADMIN — BISOPROLOL FUMARATE AND HYDROCHLOROTHIAZIDE 0.5 TABLET: 6.25; 5 TABLET ORAL at 08:33

## 2017-04-10 RX ADMIN — SERTRALINE 150 MG: 100 TABLET, FILM COATED ORAL at 08:34

## 2017-04-10 RX ADMIN — LEVOTHYROXINE SODIUM 25 MCG: 25 TABLET ORAL at 11:20

## 2017-04-10 RX ADMIN — FUROSEMIDE 20 MG: 20 TABLET ORAL at 08:34

## 2017-04-10 RX ADMIN — PANTOPRAZOLE SODIUM 40 MG: 40 TABLET, DELAYED RELEASE ORAL at 08:33

## 2017-04-10 RX ADMIN — APIXABAN 5 MG: 5 TABLET, FILM COATED ORAL at 08:34

## 2017-04-10 RX ADMIN — VANCOMYCIN HYDROCHLORIDE 1250 MG: 1 INJECTION, POWDER, LYOPHILIZED, FOR SOLUTION INTRAVENOUS at 12:36

## 2017-04-11 LAB
ANION GAP SERPL CALCULATED.3IONS-SCNC: 11.1 MMOL/L
BACTERIA SPEC AEROBE CULT: NORMAL
BACTERIA SPEC AEROBE CULT: NORMAL
BUN BLD-MCNC: 11 MG/DL (ref 8–23)
BUN/CREAT SERPL: 16.2 (ref 7–25)
CALCIUM SPEC-SCNC: 9.3 MG/DL (ref 8.6–10.5)
CHLORIDE SERPL-SCNC: 103 MMOL/L (ref 98–107)
CO2 SERPL-SCNC: 26.9 MMOL/L (ref 22–29)
CREAT BLD-MCNC: 0.68 MG/DL (ref 0.57–1)
DEPRECATED RDW RBC AUTO: 47.2 FL (ref 37–54)
ERYTHROCYTE [DISTWIDTH] IN BLOOD BY AUTOMATED COUNT: 13.6 % (ref 11.7–13)
GFR SERPL CREATININE-BSD FRML MDRD: 85 ML/MIN/1.73
GLUCOSE BLD-MCNC: 90 MG/DL (ref 65–99)
HCT VFR BLD AUTO: 38 % (ref 35.6–45.5)
HGB BLD-MCNC: 12.7 G/DL (ref 11.9–15.5)
MCH RBC QN AUTO: 32 PG (ref 26.9–32)
MCHC RBC AUTO-ENTMCNC: 33.4 G/DL (ref 32.4–36.3)
MCV RBC AUTO: 95.7 FL (ref 80.5–98.2)
PLATELET # BLD AUTO: 200 10*3/MM3 (ref 140–500)
PMV BLD AUTO: 11.1 FL (ref 6–12)
POTASSIUM BLD-SCNC: 4.8 MMOL/L (ref 3.5–5.2)
RBC # BLD AUTO: 3.97 10*6/MM3 (ref 3.9–5.2)
SODIUM BLD-SCNC: 141 MMOL/L (ref 136–145)
WBC NRBC COR # BLD: 8.07 10*3/MM3 (ref 4.5–10.7)

## 2017-04-11 PROCEDURE — 80048 BASIC METABOLIC PNL TOTAL CA: CPT | Performed by: NURSE PRACTITIONER

## 2017-04-11 PROCEDURE — 25010000002 VANCOMYCIN: Performed by: INTERNAL MEDICINE

## 2017-04-11 PROCEDURE — 85027 COMPLETE CBC AUTOMATED: CPT | Performed by: NURSE PRACTITIONER

## 2017-04-11 PROCEDURE — 99024 POSTOP FOLLOW-UP VISIT: CPT | Performed by: INTERNAL MEDICINE

## 2017-04-11 RX ADMIN — APIXABAN 5 MG: 5 TABLET, FILM COATED ORAL at 18:05

## 2017-04-11 RX ADMIN — FUROSEMIDE 20 MG: 20 TABLET ORAL at 08:24

## 2017-04-11 RX ADMIN — SERTRALINE 150 MG: 100 TABLET, FILM COATED ORAL at 08:23

## 2017-04-11 RX ADMIN — VANCOMYCIN HYDROCHLORIDE 1250 MG: 1 INJECTION, POWDER, LYOPHILIZED, FOR SOLUTION INTRAVENOUS at 12:03

## 2017-04-11 RX ADMIN — APIXABAN 5 MG: 5 TABLET, FILM COATED ORAL at 08:24

## 2017-04-11 RX ADMIN — PANTOPRAZOLE SODIUM 40 MG: 40 TABLET, DELAYED RELEASE ORAL at 08:23

## 2017-04-11 RX ADMIN — LEVOTHYROXINE SODIUM 25 MCG: 25 TABLET ORAL at 08:23

## 2017-04-11 RX ADMIN — BISOPROLOL FUMARATE AND HYDROCHLOROTHIAZIDE 0.5 TABLET: 6.25; 5 TABLET ORAL at 08:24

## 2017-04-11 RX ADMIN — VANCOMYCIN HYDROCHLORIDE 1250 MG: 1 INJECTION, POWDER, LYOPHILIZED, FOR SOLUTION INTRAVENOUS at 23:47

## 2017-04-11 RX ADMIN — POTASSIUM CHLORIDE 10 MEQ: 750 CAPSULE, EXTENDED RELEASE ORAL at 08:24

## 2017-04-12 VITALS
SYSTOLIC BLOOD PRESSURE: 117 MMHG | RESPIRATION RATE: 18 BRPM | HEIGHT: 69 IN | DIASTOLIC BLOOD PRESSURE: 74 MMHG | TEMPERATURE: 97.9 F | OXYGEN SATURATION: 100 % | BODY MASS INDEX: 22.81 KG/M2 | HEART RATE: 58 BPM | WEIGHT: 154 LBS

## 2017-04-12 LAB
DEPRECATED RDW RBC AUTO: 46.1 FL (ref 37–54)
ERYTHROCYTE [DISTWIDTH] IN BLOOD BY AUTOMATED COUNT: 13.4 % (ref 11.7–13)
HCT VFR BLD AUTO: 37 % (ref 35.6–45.5)
HGB BLD-MCNC: 12.6 G/DL (ref 11.9–15.5)
MCH RBC QN AUTO: 32.1 PG (ref 26.9–32)
MCHC RBC AUTO-ENTMCNC: 34.1 G/DL (ref 32.4–36.3)
MCV RBC AUTO: 94.1 FL (ref 80.5–98.2)
PLATELET # BLD AUTO: 198 10*3/MM3 (ref 140–500)
PMV BLD AUTO: 11.3 FL (ref 6–12)
RBC # BLD AUTO: 3.93 10*6/MM3 (ref 3.9–5.2)
VANCOMYCIN TROUGH SERPL-MCNC: 18.3 MCG/ML (ref 5–20)
WBC NRBC COR # BLD: 7.12 10*3/MM3 (ref 4.5–10.7)

## 2017-04-12 PROCEDURE — 80202 ASSAY OF VANCOMYCIN: CPT | Performed by: INTERNAL MEDICINE

## 2017-04-12 PROCEDURE — 85027 COMPLETE CBC AUTOMATED: CPT | Performed by: NURSE PRACTITIONER

## 2017-04-12 PROCEDURE — 25010000002 VANCOMYCIN: Performed by: INTERNAL MEDICINE

## 2017-04-12 PROCEDURE — 99024 POSTOP FOLLOW-UP VISIT: CPT | Performed by: INTERNAL MEDICINE

## 2017-04-12 RX ORDER — ACETAMINOPHEN 325 MG/1
650 TABLET ORAL EVERY 6 HOURS PRN
Refills: 0
Start: 2017-04-12 | End: 2020-02-19

## 2017-04-12 RX ADMIN — POTASSIUM CHLORIDE 10 MEQ: 750 CAPSULE, EXTENDED RELEASE ORAL at 08:59

## 2017-04-12 RX ADMIN — BISOPROLOL FUMARATE AND HYDROCHLOROTHIAZIDE 0.5 TABLET: 6.25; 5 TABLET ORAL at 08:58

## 2017-04-12 RX ADMIN — SERTRALINE 150 MG: 100 TABLET, FILM COATED ORAL at 08:59

## 2017-04-12 RX ADMIN — APIXABAN 5 MG: 5 TABLET, FILM COATED ORAL at 08:59

## 2017-04-12 RX ADMIN — FUROSEMIDE 20 MG: 20 TABLET ORAL at 08:59

## 2017-04-12 RX ADMIN — LEVOTHYROXINE SODIUM 25 MCG: 25 TABLET ORAL at 07:40

## 2017-04-12 RX ADMIN — VANCOMYCIN HYDROCHLORIDE 1250 MG: 1 INJECTION, POWDER, LYOPHILIZED, FOR SOLUTION INTRAVENOUS at 10:52

## 2017-04-12 RX ADMIN — PANTOPRAZOLE SODIUM 40 MG: 40 TABLET, DELAYED RELEASE ORAL at 08:59

## 2017-04-17 ENCOUNTER — LAB REQUISITION (OUTPATIENT)
Dept: LAB | Facility: HOSPITAL | Age: 72
End: 2017-04-17

## 2017-04-17 DIAGNOSIS — Z00.00 ENCOUNTER FOR GENERAL ADULT MEDICAL EXAMINATION WITHOUT ABNORMAL FINDINGS: ICD-10-CM

## 2017-04-17 LAB
ALBUMIN SERPL-MCNC: 3.3 G/DL (ref 3.5–5.2)
ALBUMIN/GLOB SERPL: 1.8 G/DL
ALP SERPL-CCNC: 85 U/L (ref 39–117)
ALT SERPL W P-5'-P-CCNC: 13 U/L (ref 1–33)
ANION GAP SERPL CALCULATED.3IONS-SCNC: 11.2 MMOL/L
AST SERPL-CCNC: 22 U/L (ref 1–32)
BILIRUB SERPL-MCNC: 0.3 MG/DL (ref 0.1–1.2)
BUN BLD-MCNC: 6 MG/DL (ref 8–23)
BUN/CREAT SERPL: 10.7 (ref 7–25)
CALCIUM SPEC-SCNC: 7.2 MG/DL (ref 8.6–10.5)
CHLORIDE SERPL-SCNC: 109 MMOL/L (ref 98–107)
CO2 SERPL-SCNC: 23.8 MMOL/L (ref 22–29)
CREAT BLD-MCNC: 0.56 MG/DL (ref 0.57–1)
CRP SERPL-MCNC: 0.22 MG/DL (ref 0–0.5)
DEPRECATED RDW RBC AUTO: 47.8 FL (ref 37–54)
ERYTHROCYTE [DISTWIDTH] IN BLOOD BY AUTOMATED COUNT: 13.8 % (ref 11.7–13)
GFR SERPL CREATININE-BSD FRML MDRD: 106 ML/MIN/1.73
GLOBULIN UR ELPH-MCNC: 1.8 GM/DL
GLUCOSE BLD-MCNC: 88 MG/DL (ref 65–99)
HCT VFR BLD AUTO: 31.9 % (ref 35.6–45.5)
HGB BLD-MCNC: 10.9 G/DL (ref 11.9–15.5)
MCH RBC QN AUTO: 32.4 PG (ref 26.9–32)
MCHC RBC AUTO-ENTMCNC: 34.2 G/DL (ref 32.4–36.3)
MCV RBC AUTO: 94.9 FL (ref 80.5–98.2)
PLATELET # BLD AUTO: 169 10*3/MM3 (ref 140–500)
PMV BLD AUTO: 11.7 FL (ref 6–12)
POTASSIUM BLD-SCNC: 3.2 MMOL/L (ref 3.5–5.2)
PROT SERPL-MCNC: 5.1 G/DL (ref 6–8.5)
RBC # BLD AUTO: 3.36 10*6/MM3 (ref 3.9–5.2)
SODIUM BLD-SCNC: 144 MMOL/L (ref 136–145)
VANCOMYCIN TROUGH SERPL-MCNC: 20.3 MCG/ML (ref 5–20)
WBC NRBC COR # BLD: 6.5 10*3/MM3 (ref 4.5–10.7)

## 2017-04-17 PROCEDURE — 80202 ASSAY OF VANCOMYCIN: CPT | Performed by: INTERNAL MEDICINE

## 2017-04-17 PROCEDURE — 85027 COMPLETE CBC AUTOMATED: CPT | Performed by: INTERNAL MEDICINE

## 2017-04-17 PROCEDURE — 86140 C-REACTIVE PROTEIN: CPT | Performed by: INTERNAL MEDICINE

## 2017-04-17 PROCEDURE — 80053 COMPREHEN METABOLIC PANEL: CPT | Performed by: INTERNAL MEDICINE

## 2017-04-18 ENCOUNTER — TELEPHONE (OUTPATIENT)
Dept: CARDIOLOGY | Facility: CLINIC | Age: 72
End: 2017-04-18

## 2017-04-18 NOTE — TELEPHONE ENCOUNTER
Called both numbers available  No option for vm  Will need to know what ID says about the removal if it is necessary    [id number] spoke with patient she states that Dr Henderson office number that was given is being told that he does not see patients out of the hospital

## 2017-04-18 NOTE — TELEPHONE ENCOUNTER
----- Message from Mayo Cano sent at 4/18/2017  3:27 PM EDT -----  Regarding: Staff Infection  Contact: [phone]  Patient calling concerned about getting a staff infection and/or wants to know if she will have to get her pacemaker taken out     Patient was seen at [facility]  by Dr Quiroga     Pt was put on vactomycin antibotic 2x a day for 10 days     Please call patient home or cell @ [phone] with advice    Ok to leave a vm    Thanks

## 2017-04-19 NOTE — TELEPHONE ENCOUNTER
Spoke with Dr Henderson-  He needs to see Raina in office   Called Dr Henderson office at [phone] made pt appt  4/26 at 9:45    Spoke with Raina and gave her the instructions and office # to call with any questions. She verbalized understanding-ap

## 2017-04-26 ENCOUNTER — DOCUMENTATION (OUTPATIENT)
Dept: INTERNAL MEDICINE | Facility: HOSPITAL | Age: 72
End: 2017-04-26

## 2017-04-26 NOTE — PROGRESS NOTES
Infectious Diseases Progress Note    Elaien Henderson MD     [facility]  Patient Identification:  Name: Raina Forrest  Age: 71 y.o.  Sex: female  :  1945  MRN: [id number]         Primary Care Physician: Georgiana Cisneros MD            Subjective: Here for the follow-up after being discharged from the hospital on 2017 on IV vancomycin for 1 more week for the treatment of superficial pacemaker site infection.  She finished a course of treatment on 2017 and has been doing well since then.  Feeling better denies any fever and chills.  Claims the pain and discomfort at the permanent pacemaker site has improved significantly.  She also denies any pain in her back area.  Doing well since the completion of antibiotic therapy week ago.  Also had her midline  removed.  Interval History: This patient was admitted to the hospital from 2017 through 2017 for further evaluation and treatment after reporting persistent redness, swelling and tenderness at the site of pacemaker implant. She denied any fever or chills. She had already been placed on oral antibiotic with no improvement in symptoms     She was seen in consultation by me and was placed on IV vancomycin for treatment of superficial wound infection.  evidence of deep infection of permanent pacemaker involvement noted Length of treatment recommended at an additional 7 days Blood cultures were negative ×2. The patient was afebrile with normal white count. A midline was placed to the left upper extremity for home IV antibiotic and home health nursing has been arranged.     On day of discharge, patient's awake and alert she is ambulating ad lorri. She reports minimal pocket tenderness. She denies any palpitations, dizziness, lightheadedness, fever, rigors, chills or shortness of breath    Objective:    Scheduled Meds:    Vital signs in last 24 hours:  Afebrile, vital signs stable        Exam:    General Appearance:    Alert,  cooperative, no distress, AAOx3, does not appear to be toxic or septic.                          Head:    Normocephalic, without obvious abnormality, atraumatic                           Eyes:    PERRL, conjunctiva/corneas clear, EOM's intact, both eyes                         Throat:   Lips, tongue, gums normal; oral mucosa pink and moist                           Neck:   Supple, symmetrical, trachea midline, no JVD                         Lungs:    Clear to auscultation bilaterally, respirations unlabored                 Chest Wall:    Mild residual erythema at the incision site with no tenderness noted there is resolving bruise on the lateral portion of the pacemaker site in the left upper chest.                          Heart:    Regular rate and rhythm, S1 and S2 normal, no murmur,no  Rub                                        or gallop                  Abdomen:     Soft, non-tender, bowel sounds active, no masses, no                                                          organomegaly                  Extremities:   Extremities normal, atraumatic, no cyanosis or edema                        Pulses:   Pulses palpable in all extremities                            Skin:   Skin is warm and dry,  no rashes or palpable lesions                  Neurologic:   CNII-XII intact, motor strength grossly intact, sensation grossly                                           intact to light touch, no focal deficits noted       Data Review:    I reviewed the patient's new clinical results.              Assessment: Improved  - Suspected pacemaker pocket infection -superficial cellulitis with risk of deep infection though clinical evidence is not clearly present for pacemaker infection at this time status post empiric treatment for superficial infection with IV vancomycin finished on 4/13/2017.  - Symptomatic A. fib/sick sinus syndrome with history of dual-chamber pacemaker implant 2-2017  - Elevated chads 2 vascular  or-Eliquis  - Hypertension  - EF 45%, moderate MR/TR  - h/o angiographically normal coronary arteries   Plan:  Discharged from infectious disease clinic and follow-up only as as needed basis if referred by Dr. Marie Henderson MD  4/26/2017  12:43 PM    Much of this encounter note is an electronic transcription/translation of spoken language to printed text. The electronic translation of spoken language may permit erroneous, or at times, nonsensical words or phrases to be inadvertently transcribed; Although I have reviewed the note for such errors, some may still exist

## 2017-05-08 ENCOUNTER — CLINICAL SUPPORT NO REQUIREMENTS (OUTPATIENT)
Dept: CARDIOLOGY | Facility: CLINIC | Age: 72
End: 2017-05-08

## 2017-05-08 DIAGNOSIS — Z95.0 PACEMAKER: Primary | ICD-10-CM

## 2017-05-08 PROCEDURE — 93294 REM INTERROG EVL PM/LDLS PM: CPT | Performed by: INTERNAL MEDICINE

## 2017-05-08 PROCEDURE — 93296 REM INTERROG EVL PM/IDS: CPT | Performed by: INTERNAL MEDICINE

## 2017-05-11 ENCOUNTER — OFFICE VISIT (OUTPATIENT)
Dept: CARDIOLOGY | Facility: CLINIC | Age: 72
End: 2017-05-11

## 2017-05-11 VITALS
WEIGHT: 156 LBS | HEIGHT: 69 IN | BODY MASS INDEX: 23.11 KG/M2 | SYSTOLIC BLOOD PRESSURE: 117 MMHG | DIASTOLIC BLOOD PRESSURE: 72 MMHG | HEART RATE: 71 BPM

## 2017-05-11 DIAGNOSIS — L08.9 WOUND INFECTION: Primary | ICD-10-CM

## 2017-05-11 DIAGNOSIS — T14.8XXA WOUND INFECTION: Primary | ICD-10-CM

## 2017-05-11 PROCEDURE — 99024 POSTOP FOLLOW-UP VISIT: CPT | Performed by: INTERNAL MEDICINE

## 2017-05-24 ENCOUNTER — OFFICE VISIT (OUTPATIENT)
Dept: CARDIOLOGY | Facility: CLINIC | Age: 72
End: 2017-05-24

## 2017-05-24 VITALS
DIASTOLIC BLOOD PRESSURE: 66 MMHG | HEART RATE: 68 BPM | BODY MASS INDEX: 23.55 KG/M2 | SYSTOLIC BLOOD PRESSURE: 115 MMHG | HEIGHT: 69 IN | WEIGHT: 159 LBS

## 2017-05-24 DIAGNOSIS — Z95.0 PACEMAKER: Primary | ICD-10-CM

## 2017-05-24 PROCEDURE — 99024 POSTOP FOLLOW-UP VISIT: CPT | Performed by: INTERNAL MEDICINE

## 2017-06-21 ENCOUNTER — OFFICE VISIT (OUTPATIENT)
Dept: CARDIOLOGY | Facility: CLINIC | Age: 72
End: 2017-06-21

## 2017-06-21 VITALS
DIASTOLIC BLOOD PRESSURE: 70 MMHG | WEIGHT: 164 LBS | SYSTOLIC BLOOD PRESSURE: 113 MMHG | HEIGHT: 69 IN | HEART RATE: 75 BPM | BODY MASS INDEX: 24.29 KG/M2

## 2017-06-21 DIAGNOSIS — I10 BENIGN ESSENTIAL HTN: ICD-10-CM

## 2017-06-21 DIAGNOSIS — Z79.01 ANTICOAGULATED: ICD-10-CM

## 2017-06-21 DIAGNOSIS — I48.20 CHRONIC ATRIAL FIBRILLATION (HCC): Primary | ICD-10-CM

## 2017-06-21 DIAGNOSIS — I10 ESSENTIAL HYPERTENSION: ICD-10-CM

## 2017-06-21 DIAGNOSIS — Z95.0 PACEMAKER: ICD-10-CM

## 2017-06-21 PROCEDURE — 99213 OFFICE O/P EST LOW 20 MIN: CPT | Performed by: INTERNAL MEDICINE

## 2017-06-21 PROCEDURE — 93000 ELECTROCARDIOGRAM COMPLETE: CPT | Performed by: INTERNAL MEDICINE

## 2017-06-21 RX ORDER — ONDANSETRON HYDROCHLORIDE 8 MG/1
TABLET, FILM COATED ORAL
COMMUNITY
Start: 2017-06-12 | End: 2019-07-09

## 2017-07-10 RX ORDER — FUROSEMIDE 40 MG/1
TABLET ORAL
Qty: 30 TABLET | Refills: 6 | Status: SHIPPED | OUTPATIENT
Start: 2017-07-10 | End: 2018-01-31 | Stop reason: SDUPTHER

## 2017-08-16 ENCOUNTER — CLINICAL SUPPORT NO REQUIREMENTS (OUTPATIENT)
Dept: CARDIOLOGY | Facility: CLINIC | Age: 72
End: 2017-08-16

## 2017-08-16 DIAGNOSIS — Z95.0 CARDIAC PACEMAKER: Primary | ICD-10-CM

## 2017-08-16 PROCEDURE — 93294 REM INTERROG EVL PM/LDLS PM: CPT | Performed by: INTERNAL MEDICINE

## 2017-08-29 NOTE — TELEPHONE ENCOUNTER
PATIENT REQUEST TO TAKE ONLY 2.5 BID -DR PARKER TALKED WITH PATIENT AND EXPLAINED RISK -SHE AGREED AND UNDERSTOOD

## 2017-10-10 ENCOUNTER — OFFICE (OUTPATIENT)
Dept: URBAN - METROPOLITAN AREA CLINIC 75 | Facility: CLINIC | Age: 72
End: 2017-10-10

## 2017-10-10 VITALS
HEIGHT: 69 IN | SYSTOLIC BLOOD PRESSURE: 104 MMHG | HEART RATE: 82 BPM | DIASTOLIC BLOOD PRESSURE: 74 MMHG | WEIGHT: 153 LBS

## 2017-10-10 DIAGNOSIS — R10.12 LEFT UPPER QUADRANT PAIN: ICD-10-CM

## 2017-10-10 DIAGNOSIS — R11.2 NAUSEA WITH VOMITING, UNSPECIFIED: ICD-10-CM

## 2017-10-10 DIAGNOSIS — K59.00 CONSTIPATION, UNSPECIFIED: ICD-10-CM

## 2017-10-10 PROCEDURE — 99203 OFFICE O/P NEW LOW 30 MIN: CPT | Performed by: INTERNAL MEDICINE

## 2017-10-31 ENCOUNTER — OFFICE (OUTPATIENT)
Dept: URBAN - METROPOLITAN AREA CLINIC 75 | Facility: CLINIC | Age: 72
End: 2017-10-31

## 2017-10-31 VITALS
HEIGHT: 69 IN | SYSTOLIC BLOOD PRESSURE: 135 MMHG | DIASTOLIC BLOOD PRESSURE: 75 MMHG | HEART RATE: 77 BPM | WEIGHT: 153 LBS

## 2017-10-31 DIAGNOSIS — K59.00 CONSTIPATION, UNSPECIFIED: ICD-10-CM

## 2017-10-31 DIAGNOSIS — R10.12 LEFT UPPER QUADRANT PAIN: ICD-10-CM

## 2017-10-31 DIAGNOSIS — R74.8 ABNORMAL LEVELS OF OTHER SERUM ENZYMES: ICD-10-CM

## 2017-10-31 DIAGNOSIS — R10.32 LEFT LOWER QUADRANT PAIN: ICD-10-CM

## 2017-10-31 DIAGNOSIS — R11.2 NAUSEA WITH VOMITING, UNSPECIFIED: ICD-10-CM

## 2017-10-31 PROCEDURE — 99213 OFFICE O/P EST LOW 20 MIN: CPT | Performed by: INTERNAL MEDICINE

## 2017-11-02 ENCOUNTER — TELEPHONE (OUTPATIENT)
Dept: CARDIOLOGY | Facility: CLINIC | Age: 72
End: 2017-11-02

## 2017-11-02 NOTE — TELEPHONE ENCOUNTER
S/w pt needs to have colonoscopy and EGD done by Dr Johnson [phone] needs to know if can hold Eliquis

## 2017-11-02 NOTE — TELEPHONE ENCOUNTER
----- Message from Marita OLUIS Waldo sent at 11/2/2017 10:02 AM EDT -----  Regarding: RE ELIQUIS AND COLONOSCOPY   Contact: [phone]  NEEDS TO KNOW IF SHE NEEDS TO STOP ELIQUIS PRIOR TO HER COLONOSCOPY    CELL [phone]

## 2017-11-06 NOTE — TELEPHONE ENCOUNTER
Per dr victoria ok to hold Eliquis for 2 days prior      Called l/m on pt vm  Called l/m for dr arrington office to call back

## 2017-11-08 NOTE — TELEPHONE ENCOUNTER
DR. CUELLAR'S OFFICE CALLED BACK AND WAS GIVEN THE VERBAL OK FOR PATIENT TO HOLD HER ELIQUIS FOR 2 DAYS PRIOR TO THE PROCEDURES.  THEY REQUESTED SOMETHING IN WRITING STATING THAT TO BE SENT TO FAX [phone].    LETTER WAS ENTERED INTO PATIENTS CHART AND FAXED TO DR. CUELLAR'S OFFICE AT THE NUMBER PROVIDED.

## 2017-11-17 RX ORDER — BISOPROLOL FUMARATE AND HYDROCHLOROTHIAZIDE 5; 6.25 MG/1; MG/1
TABLET ORAL
Qty: 30 TABLET | Refills: 6 | Status: SHIPPED | OUTPATIENT
Start: 2017-11-17 | End: 2018-06-08

## 2017-11-20 ENCOUNTER — ON CAMPUS - OUTPATIENT (OUTPATIENT)
Dept: URBAN - METROPOLITAN AREA HOSPITAL 108 | Facility: HOSPITAL | Age: 72
End: 2017-11-20
Payer: MEDICARE

## 2017-11-20 DIAGNOSIS — B96.81 HELICOBACTER PYLORI [H. PYLORI] AS THE CAUSE OF DISEASES CLA: ICD-10-CM

## 2017-11-20 DIAGNOSIS — R11.2 NAUSEA WITH VOMITING, UNSPECIFIED: ICD-10-CM

## 2017-11-20 DIAGNOSIS — K44.9 DIAPHRAGMATIC HERNIA WITHOUT OBSTRUCTION OR GANGRENE: ICD-10-CM

## 2017-11-20 DIAGNOSIS — K29.50 UNSPECIFIED CHRONIC GASTRITIS WITHOUT BLEEDING: ICD-10-CM

## 2017-11-20 PROCEDURE — 43239 EGD BIOPSY SINGLE/MULTIPLE: CPT | Performed by: INTERNAL MEDICINE

## 2017-11-27 ENCOUNTER — OFFICE VISIT (OUTPATIENT)
Dept: CARDIOLOGY | Facility: CLINIC | Age: 72
End: 2017-11-27

## 2017-11-27 VITALS
HEIGHT: 70 IN | SYSTOLIC BLOOD PRESSURE: 113 MMHG | BODY MASS INDEX: 22.19 KG/M2 | DIASTOLIC BLOOD PRESSURE: 73 MMHG | WEIGHT: 155 LBS | HEART RATE: 85 BPM

## 2017-11-27 DIAGNOSIS — I42.0 DILATED CARDIOMYOPATHY (HCC): ICD-10-CM

## 2017-11-27 DIAGNOSIS — I48.20 CHRONIC ATRIAL FIBRILLATION (HCC): Primary | ICD-10-CM

## 2017-11-27 DIAGNOSIS — I10 BENIGN ESSENTIAL HTN: ICD-10-CM

## 2017-11-27 DIAGNOSIS — Z95.0 PACEMAKER: ICD-10-CM

## 2017-11-27 DIAGNOSIS — R00.2 PALPITATIONS: ICD-10-CM

## 2017-11-27 DIAGNOSIS — R94.31 ABNORMAL EKG: ICD-10-CM

## 2017-11-27 DIAGNOSIS — R06.02 SOB (SHORTNESS OF BREATH): ICD-10-CM

## 2017-11-27 DIAGNOSIS — R07.2 PRECORDIAL PAIN: ICD-10-CM

## 2017-11-27 PROCEDURE — 93000 ELECTROCARDIOGRAM COMPLETE: CPT | Performed by: INTERNAL MEDICINE

## 2017-11-27 PROCEDURE — 99214 OFFICE O/P EST MOD 30 MIN: CPT | Performed by: INTERNAL MEDICINE

## 2017-11-27 NOTE — PROGRESS NOTES
Subjective:       Raina Forrest is a 71 y.o. female who here for follow up    CC  Nausea    Pinkish urine    Pain at pacer site     Occasional cp  HPI  70-year-old female with a known history of the pacemaker chronic atrial fibrillation on chronic anticoagulation has been complaining of the nausea intermittent mild to moderate in intensity, occasional complains of pinkish urine and occasional atypical sharp shooting pain under the left breast mild-to-moderate in intensity lasting for a few minutes associated with shortness of breath     Problem List Items Addressed This Visit        Cardiovascular and Mediastinum    Chronic atrial fibrillation - Primary    Relevant Orders    Stress Test With Myocardial Perfusion One Day    Adult Transthoracic Echo Complete W/ Cont if Necessary Per Protocol    Lipid Panel    Comprehensive Metabolic Panel    Benign essential HTN    Palpitations    Relevant Orders    Stress Test With Myocardial Perfusion One Day    Adult Transthoracic Echo Complete W/ Cont if Necessary Per Protocol    Lipid Panel    Comprehensive Metabolic Panel    Cardiomyopathy    Relevant Orders    Stress Test With Myocardial Perfusion One Day    Adult Transthoracic Echo Complete W/ Cont if Necessary Per Protocol    Lipid Panel    Comprehensive Metabolic Panel    Pacemaker    Relevant Orders    ECG 12 Lead    Stress Test With Myocardial Perfusion One Day    Adult Transthoracic Echo Complete W/ Cont if Necessary Per Protocol    Lipid Panel    Comprehensive Metabolic Panel       Nervous and Auditory    Chest pain    Relevant Orders    Stress Test With Myocardial Perfusion One Day    Adult Transthoracic Echo Complete W/ Cont if Necessary Per Protocol    Lipid Panel    Comprehensive Metabolic Panel      Other Visit Diagnoses     SOB (shortness of breath)        Relevant Orders    Stress Test With Myocardial Perfusion One Day    Adult Transthoracic Echo Complete W/ Cont if Necessary Per Protocol    Lipid Panel     "Comprehensive Metabolic Panel    Abnormal EKG        Relevant Orders    Stress Test With Myocardial Perfusion One Day    Adult Transthoracic Echo Complete W/ Cont if Necessary Per Protocol    Lipid Panel    Comprehensive Metabolic Panel        .    The following portions of the patient's history were reviewed and updated as appropriate: allergies, current medications, past family history, past medical history, past social history, past surgical history and problem list.    Past Medical History:   Diagnosis Date   • Abdominal pain    • Atrial fibrillation    • Fung's esophagus    • Benign essential hypertension    • Blood in stool    • Chest pain    • Chronic gastritis    • Congestive heart failure    • Degenerative disc disease    • Depression with anxiety    • Disease of thyroid gland    • Diverticulitis of colon    • Dysphagia    • GERD (gastroesophageal reflux disease)    • History of migraine headaches    • Osteoporosis    • Palpitations    • Thyroid disorder    • Ulcerative colitis    • Vomiting    • Weight loss     reports that she has never smoked. She has never used smokeless tobacco. She reports that she does not drink alcohol or use illicit drugs.  Family History   Problem Relation Age of Onset   • Lung cancer Other    • Ulcerative colitis Sister        Review of Systems  Constitutional: No wt loss, fever, fatigue  Gastrointestinal: No nausea, abdominal pain  Behavioral/Psych: No insomnia or anxiety   Cardiovascular Chest pain and shortness of breath  Objective:       Physical Exam             Physical Exam  /73  Pulse 85  Ht 69.5\" (176.5 cm)  Wt 155 lb (70.3 kg)  BMI 22.56 kg/m2    General appearance: NAD, conversant   Eyes: anicteric sclerae, moist conjunctivae; no lid-lag; PERRLA   HENT: Atraumatic; oropharynx clear with moist mucous membranes and no mucosal ulcerations;  normal hard and soft palate   Neck: Trachea midline; FROM, supple, no thyromegaly or lymphadenopathy   Lungs: CTA, with " normal respiratory effort and no intercostal retractions   CV: S1-S2 regular, no murmurs, no rub, no gallop   Abdomen: Soft, non-tender; no masses or HSM   Extremities: No peripheral edema or extremity lymphadenopathy  Skin: Normal temperature, turgor and texture; no rash, ulcers or subcutaneous nodules   Psych: Appropriate affect, alert and oriented to person, place and time           Cardiographics  @  ECG 12 Lead  Date/Time: 11/27/2017 10:08 AM  Performed by: GIACOMO PARKER  Authorized by: GIACOMO PARKER   Comparison: compared with previous ECG   Similar to previous ECG  Rhythm: atrial fibrillation  ST Flattening: all  Clinical impression: abnormal ECG            Echocardiogram:        Current Outpatient Prescriptions:   •  acetaminophen (TYLENOL) 325 MG tablet, Take 2 tablets by mouth Every 6 (Six) Hours As Needed for Moderate Pain (4-6) or Fever., Disp: , Rfl: 0  •  apixaban (ELIQUIS) 5 MG tablet tablet, 1/2 TAB BID, Disp: 30 tablet, Rfl: 0  •  bisoprolol-hydrochlorothiazide (ZIAC) 5-6.25 MG per tablet, 1/2 tablet by mouth daily (Patient taking differently: Take 0.5 tablets by mouth Daily. 1/2 tablet by mouth daily), Disp: 30 tablet, Rfl: 6  •  bisoprolol-hydrochlorothiazide (ZIAC) 5-6.25 MG per tablet, TAKE 1 TABLET BY MOUTH DAILY., Disp: 30 tablet, Rfl: 6  •  fluticasone (FLONASE) 50 MCG/ACT nasal spray, 2 sprays into each nostril Daily., Disp: , Rfl:   •  furosemide (LASIX) 40 MG tablet, Take 0.5 tablets by mouth Daily., Disp: 30 tablet, Rfl: 6  •  furosemide (LASIX) 40 MG tablet, TAKE 1 TABLET BY MOUTH DAILY., Disp: 30 tablet, Rfl: 6  •  levothyroxine (LEVOTHROID) 25 MCG tablet, Take 25 mcg by mouth Daily., Disp: , Rfl:   •  lisinopril (PRINIVIL,ZESTRIL) 5 MG tablet, Take 0.5 tablets by mouth Every Night., Disp: 30 tablet, Rfl: 6  •  MECLIZINE HCL PO, Take 25 mg by mouth 3 (Three) Times a Day As Needed., Disp: , Rfl:   •  ondansetron (ZOFRAN) 8 MG tablet, TAKE 1 TABLET BY MOUTH EVERY 8 (EIGHT)  HOURS AS NEEDED FOR NAUSEA FOR UP TO 7 DAYS, Disp: , Rfl:   •  pantoprazole (PROTONIX) 40 MG EC tablet, Take 1 tablet by mouth Daily., Disp: 30 tablet, Rfl: 3  •  potassium chloride (K-DUR,KLOR-CON) 10 MEQ CR tablet, Take 1 tablet by mouth Daily., Disp: 30 tablet, Rfl: 6  •  sertraline (ZOLOFT) 100 MG tablet, Take 150 mg by mouth Daily., Disp: , Rfl:   •  SUMAtriptan (IMITREX) 50 MG tablet, TAKE 1 TABLET EVERY 2 HOURS AS NEEDED FOR MIGRAINE,MAY REPEAT ONCE IN 2 HOURS, Disp: , Rfl: 4   Assessment:        Patient Active Problem List   Diagnosis   • Chronic atrial fibrillation   • Benign essential HTN   • Bradycardia   • Chest pain   • Can't get food down   • Accumulation of fluid in tissues   • Esophageal lump   • Adynamia   • Itch of skin   • Anorexia   • Chronic nausea   • Gastroesophageal reflux disease   • Breath shortness   • Emesis   • Decreased body weight   • Anxiety   • Narrowing of intervertebral disc space   • Diverticulosis of intestine   • Hypertension   • Migraine   • Osteoporosis   • Palpitations   • Pituitary adenoma   • Sick sinus syndrome   • Diarrhea   • Paroxysmal atrial fibrillation   • Cardiomyopathy   • Anticoagulated   • Atrial fibrillation   • On amiodarone therapy   • Pacemaker   • Cardiac resynchronization therapy pacemaker (CRT-P) in place   • Infected pacemaker   • Wound infection     1. Normal coronary arteries  2. LV function at lower limits of normal     Recommendations:      1. Medical management                     Plan:            ICD-10-CM ICD-9-CM   1. Chronic atrial fibrillation I48.2 427.31   2. Palpitations R00.2 785.1   3. Dilated cardiomyopathy I42.0 425.4   4. Pacemaker Z95.0 V45.01   5. Precordial pain R07.2 786.51   6. SOB (shortness of breath) R06.02 786.05   7. Abnormal EKG R94.31 794.31   8. Benign essential HTN I10 401.1     1. Chronic atrial fibrillation  Rate controlled  - Stress Test With Myocardial Perfusion One Day  - Adult Transthoracic Echo Complete W/ Cont if  Necessary Per Protocol  - Lipid Panel  - Comprehensive Metabolic Panel    2. Palpitations  Under control  - Stress Test With Myocardial Perfusion One Day  - Adult Transthoracic Echo Complete W/ Cont if Necessary Per Protocol  - Lipid Panel  - Comprehensive Metabolic Panel    3. Dilated cardiomyopathy  Compensated  - Stress Test With Myocardial Perfusion One Day  - Adult Transthoracic Echo Complete W/ Cont if Necessary Per Protocol  - Lipid Panel  - Comprehensive Metabolic Panel    4. Pacemaker  Pacemaker functioning normally  - ECG 12 Lead  - Stress Test With Myocardial Perfusion One Day  - Adult Transthoracic Echo Complete W/ Cont if Necessary Per Protocol  - Lipid Panel  - Comprehensive Metabolic Panel    5. Precordial pain  Considering the patient's symptoms as well as clinical situation and  EKG findings, along with cardiac risk factors, ischemic workup is necessary to rule out ischemic cardiomyopathy, stress induced arrhythmias, and functional capacity for diagnosis as well as prognostic consideration    - Stress Test With Myocardial Perfusion One Day  - Adult Transthoracic Echo Complete W/ Cont if Necessary Per Protocol  - Lipid Panel  - Comprehensive Metabolic Panel    6. SOB (shortness of breath)  Considering patient's medical condition as well as the risk factors, patient will require echocardiogram for further evaluation for the LV function, four-chamber evaluation, including the pressures, valvular function and  pericardial disease and pericardial effusion    - Stress Test With Myocardial Perfusion One Day  - Adult Transthoracic Echo Complete W/ Cont if Necessary Per Protocol  - Lipid Panel  - Comprehensive Metabolic Panel    7. Abnormal EKG    - Stress Test With Myocardial Perfusion One Day  - Adult Transthoracic Echo Complete W/ Cont if Necessary Per Protocol  - Lipid Panel  - Comprehensive Metabolic Panel    8. Benign essential HTN  Blood pressure under control  WALKING N LEXISCAQN/ ECHO    FLP/  CMP    SEE 1 MONTH  COUNSELING:    Raina Perkins was given to patient for the following topics: diagnostic results, risk factor reductions, impressions, risks and benefits of treatment options and importance of treatment compliance .       SMOKING COUNSELING:    Counseling given: Not Answered      EMR Dragon/Transcription disclaimer:   Much of this encounter note is an electronic transcription/translation of spoken language to printed text. The electronic translation of spoken language may permit erroneous, or at times, nonsensical words or phrases to be inadvertently transcribed; Although I have reviewed the note for such errors, some may still exist.

## 2017-12-26 ENCOUNTER — HOSPITAL ENCOUNTER (OUTPATIENT)
Dept: CARDIOLOGY | Facility: HOSPITAL | Age: 72
Discharge: HOME | End: 2017-12-26
Attending: INTERNAL MEDICINE

## 2017-12-26 ENCOUNTER — HOSPITAL ENCOUNTER (OUTPATIENT)
Dept: CARDIOLOGY | Facility: HOSPITAL | Age: 72
Discharge: HOME | End: 2017-12-26
Attending: INTERNAL MEDICINE | Admitting: INTERNAL MEDICINE

## 2017-12-26 VITALS
DIASTOLIC BLOOD PRESSURE: 67 MMHG | RESPIRATION RATE: 18 BRPM | HEIGHT: 69 IN | HEART RATE: 65 BPM | BODY MASS INDEX: 22.36 KG/M2 | SYSTOLIC BLOOD PRESSURE: 116 MMHG | WEIGHT: 151 LBS

## 2017-12-26 PROCEDURE — 93017 CV STRESS TEST TRACING ONLY: CPT

## 2017-12-26 PROCEDURE — 78452 HT MUSCLE IMAGE SPECT MULT: CPT

## 2017-12-26 PROCEDURE — A9500 TC99M SESTAMIBI: HCPCS | Performed by: INTERNAL MEDICINE

## 2017-12-26 PROCEDURE — 93016 CV STRESS TEST SUPVJ ONLY: CPT | Performed by: INTERNAL MEDICINE

## 2017-12-26 PROCEDURE — 25010000002 REGADENOSON 0.4 MG/5ML SOLUTION: Performed by: INTERNAL MEDICINE

## 2017-12-26 PROCEDURE — 0399T HC MYOCARDL STRAIN IMAG QUAN ASSMT PER SESS: CPT

## 2017-12-26 PROCEDURE — 0 TECHNETIUM SESTAMIBI: Performed by: INTERNAL MEDICINE

## 2017-12-26 PROCEDURE — 93018 CV STRESS TEST I&R ONLY: CPT | Performed by: INTERNAL MEDICINE

## 2017-12-26 PROCEDURE — 78452 HT MUSCLE IMAGE SPECT MULT: CPT | Performed by: INTERNAL MEDICINE

## 2017-12-26 PROCEDURE — 93306 TTE W/DOPPLER COMPLETE: CPT

## 2017-12-26 PROCEDURE — 0399T ADULT TRANSTHORACIC ECHO COMPLETE W/ CONT IF NECESSARY PER PROTOCOL: CPT | Performed by: INTERNAL MEDICINE

## 2017-12-26 PROCEDURE — 93306 TTE W/DOPPLER COMPLETE: CPT | Performed by: INTERNAL MEDICINE

## 2017-12-26 RX ADMIN — TECHNETIUM TC-99M SESTAMIBI 1 DOSE: 1 INJECTION INTRAVENOUS at 07:43

## 2017-12-26 RX ADMIN — TECHNETIUM TC-99M SESTAMIBI 1 DOSE: 1 INJECTION INTRAVENOUS at 10:24

## 2017-12-26 RX ADMIN — REGADENOSON 0.4 MG: 0.08 INJECTION, SOLUTION INTRAVENOUS at 10:21

## 2017-12-27 LAB
BH CV ECHO MEAS - ACS: 1.7 CM
BH CV ECHO MEAS - AO MAX PG (FULL): 3.6 MMHG
BH CV ECHO MEAS - AO MAX PG: 6.8 MMHG
BH CV ECHO MEAS - AO MEAN PG (FULL): 1 MMHG
BH CV ECHO MEAS - AO MEAN PG: 3 MMHG
BH CV ECHO MEAS - AO ROOT AREA (BSA CORRECTED): 1.7
BH CV ECHO MEAS - AO ROOT AREA: 8 CM^2
BH CV ECHO MEAS - AO ROOT DIAM: 3.2 CM
BH CV ECHO MEAS - AO V2 MAX: 130 CM/SEC
BH CV ECHO MEAS - AO V2 MEAN: 84 CM/SEC
BH CV ECHO MEAS - AO V2 VTI: 27.1 CM
BH CV ECHO MEAS - AVA(I,A): 2.2 CM^2
BH CV ECHO MEAS - AVA(I,D): 2.2 CM^2
BH CV ECHO MEAS - AVA(V,A): 2.2 CM^2
BH CV ECHO MEAS - AVA(V,D): 2.2 CM^2
BH CV ECHO MEAS - BSA(HAYCOCK): 1.9 M^2
BH CV ECHO MEAS - BSA: 1.9 M^2
BH CV ECHO MEAS - BZI_BMI: 22.9 KILOGRAMS/M^2
BH CV ECHO MEAS - BZI_METRIC_HEIGHT: 175.3 CM
BH CV ECHO MEAS - BZI_METRIC_WEIGHT: 70.3 KG
BH CV ECHO MEAS - CONTRAST EF (2CH): 59.6 ML/M^2
BH CV ECHO MEAS - CONTRAST EF 4CH: 50 ML/M^2
BH CV ECHO MEAS - EDV(CUBED): 140.6 ML
BH CV ECHO MEAS - EDV(MOD-SP2): 52 ML
BH CV ECHO MEAS - EDV(MOD-SP4): 56 ML
BH CV ECHO MEAS - EDV(TEICH): 129.5 ML
BH CV ECHO MEAS - EF(CUBED): 64 %
BH CV ECHO MEAS - EF(MOD-SP2): 59.6 %
BH CV ECHO MEAS - EF(MOD-SP4): 50 %
BH CV ECHO MEAS - EF(TEICH): 55.1 %
BH CV ECHO MEAS - ESV(CUBED): 50.7 ML
BH CV ECHO MEAS - ESV(MOD-SP2): 21 ML
BH CV ECHO MEAS - ESV(MOD-SP4): 28 ML
BH CV ECHO MEAS - ESV(TEICH): 58.1 ML
BH CV ECHO MEAS - FS: 28.8 %
BH CV ECHO MEAS - IVS/LVPW: 1
BH CV ECHO MEAS - IVSD: 0.9 CM
BH CV ECHO MEAS - LAT PEAK E' VEL: 10 CM/SEC
BH CV ECHO MEAS - LV DIASTOLIC VOL/BSA (35-75): 30.2 ML/M^2
BH CV ECHO MEAS - LV MASS(C)D: 169 GRAMS
BH CV ECHO MEAS - LV MASS(C)DI: 91.2 GRAMS/M^2
BH CV ECHO MEAS - LV MAX PG: 3.2 MMHG
BH CV ECHO MEAS - LV MEAN PG: 2 MMHG
BH CV ECHO MEAS - LV SYSTOLIC VOL/BSA (12-30): 15.1 ML/M^2
BH CV ECHO MEAS - LV V1 MAX: 89.2 CM/SEC
BH CV ECHO MEAS - LV V1 MEAN: 61.2 CM/SEC
BH CV ECHO MEAS - LV V1 VTI: 19.3 CM
BH CV ECHO MEAS - LVIDD: 5.2 CM
BH CV ECHO MEAS - LVIDS: 3.7 CM
BH CV ECHO MEAS - LVLD AP2: 6.2 CM
BH CV ECHO MEAS - LVLD AP4: 6.4 CM
BH CV ECHO MEAS - LVLS AP2: 5.2 CM
BH CV ECHO MEAS - LVLS AP4: 5.6 CM
BH CV ECHO MEAS - LVOT AREA (M): 3.1 CM^2
BH CV ECHO MEAS - LVOT AREA: 3.1 CM^2
BH CV ECHO MEAS - LVOT DIAM: 2 CM
BH CV ECHO MEAS - LVPWD: 0.9 CM
BH CV ECHO MEAS - MED PEAK E' VEL: 8 CM/SEC
BH CV ECHO MEAS - MV DEC SLOPE: 633 CM/SEC^2
BH CV ECHO MEAS - MV DEC TIME: 0.2 SEC
BH CV ECHO MEAS - MV E MAX VEL: 113 CM/SEC
BH CV ECHO MEAS - MV MAX PG: 6.6 MMHG
BH CV ECHO MEAS - MV MEAN PG: 2 MMHG
BH CV ECHO MEAS - MV P1/2T MAX VEL: 121 CM/SEC
BH CV ECHO MEAS - MV P1/2T: 56 MSEC
BH CV ECHO MEAS - MV V2 MAX: 128 CM/SEC
BH CV ECHO MEAS - MV V2 MEAN: 57.4 CM/SEC
BH CV ECHO MEAS - MV V2 VTI: 23 CM
BH CV ECHO MEAS - MVA P1/2T LCG: 1.8 CM^2
BH CV ECHO MEAS - MVA(P1/2T): 3.9 CM^2
BH CV ECHO MEAS - MVA(VTI): 2.6 CM^2
BH CV ECHO MEAS - PA ACC TIME: 0.12 SEC
BH CV ECHO MEAS - PA MAX PG (FULL): -0.21 MMHG
BH CV ECHO MEAS - PA MAX PG: 1.2 MMHG
BH CV ECHO MEAS - PA PR(ACCEL): 25 MMHG
BH CV ECHO MEAS - PA V2 MAX: 55.7 CM/SEC
BH CV ECHO MEAS - PVA(V,A): 2.8 CM^2
BH CV ECHO MEAS - PVA(V,D): 2.8 CM^2
BH CV ECHO MEAS - QP/QS: 0.56
BH CV ECHO MEAS - RAP SYSTOLE: 8 MMHG
BH CV ECHO MEAS - RV MAX PG: 1.4 MMHG
BH CV ECHO MEAS - RV MEAN PG: 1 MMHG
BH CV ECHO MEAS - RV V1 MAX: 60.2 CM/SEC
BH CV ECHO MEAS - RV V1 MEAN: 38.8 CM/SEC
BH CV ECHO MEAS - RV V1 VTI: 13.4 CM
BH CV ECHO MEAS - RVOT AREA: 2.5 CM^2
BH CV ECHO MEAS - RVOT DIAM: 1.8 CM
BH CV ECHO MEAS - RVSP: 41.9 MMHG
BH CV ECHO MEAS - SI(AO): 117.6 ML/M^2
BH CV ECHO MEAS - SI(CUBED): 48.5 ML/M^2
BH CV ECHO MEAS - SI(LVOT): 32.7 ML/M^2
BH CV ECHO MEAS - SI(MOD-SP2): 16.7 ML/M^2
BH CV ECHO MEAS - SI(MOD-SP4): 15.1 ML/M^2
BH CV ECHO MEAS - SI(TEICH): 38.5 ML/M^2
BH CV ECHO MEAS - SV(AO): 218 ML
BH CV ECHO MEAS - SV(CUBED): 90 ML
BH CV ECHO MEAS - SV(LVOT): 60.6 ML
BH CV ECHO MEAS - SV(MOD-SP2): 31 ML
BH CV ECHO MEAS - SV(MOD-SP4): 28 ML
BH CV ECHO MEAS - SV(RVOT): 34.1 ML
BH CV ECHO MEAS - SV(TEICH): 71.4 ML
BH CV ECHO MEAS - TAPSE (>1.6): 2.2 CM2
BH CV ECHO MEAS - TR MAX VEL: 291 CM/SEC
BH CV STRESS BP STAGE 1: NORMAL
BH CV STRESS COMMENTS STAGE 1: NORMAL
BH CV STRESS DOSE REGADENOSON STAGE 1: 0.4
BH CV STRESS DURATION MIN STAGE 1: 2
BH CV STRESS DURATION SEC STAGE 1: 51
BH CV STRESS GRADE STAGE 1: 0
BH CV STRESS HR STAGE 1: 114
BH CV STRESS METS STAGE 1: 1.4
BH CV STRESS PROTOCOL 1: NORMAL
BH CV STRESS RECOVERY BP: NORMAL MMHG
BH CV STRESS RECOVERY HR: 88 BPM
BH CV STRESS SPEED STAGE 1: 1
BH CV STRESS STAGE 1: 1
BH CV VAS BP RIGHT ARM: NORMAL MMHG
BH CV XLRA - RV BASE: 3.7 CM
BH CV XLRA - TDI S': 9 CM/SEC
E/E' RATIO: 13
LEFT ATRIUM VOLUME INDEX: 32 ML/M2
LV EF NUC BP: 67 %
MAXIMAL PREDICTED HEART RATE: 146 BPM
MAXIMAL PREDICTED HEART RATE: 146 BPM
PERCENT MAX PREDICTED HR: 78.08 %
STRESS BASELINE BP: NORMAL MMHG
STRESS BASELINE HR: 83 BPM
STRESS PERCENT HR: 92 %
STRESS POST ESTIMATED WORKLOAD: 1.4 METS
STRESS POST EXERCISE DUR MIN: 2 MIN
STRESS POST EXERCISE DUR SEC: 51 SEC
STRESS POST PEAK BP: NORMAL MMHG
STRESS POST PEAK HR: 114 BPM
STRESS TARGET HR: 124 BPM
STRESS TARGET HR: 124 BPM

## 2018-01-02 RX ORDER — POTASSIUM CHLORIDE 750 MG/1
TABLET, EXTENDED RELEASE ORAL
Qty: 30 TABLET | Refills: 3 | Status: SHIPPED | OUTPATIENT
Start: 2018-01-02 | End: 2018-05-23 | Stop reason: SDUPTHER

## 2018-01-03 ENCOUNTER — OFFICE VISIT (OUTPATIENT)
Dept: CARDIOLOGY | Facility: CLINIC | Age: 73
End: 2018-01-03

## 2018-01-03 VITALS
BODY MASS INDEX: 22.76 KG/M2 | WEIGHT: 159 LBS | HEART RATE: 86 BPM | DIASTOLIC BLOOD PRESSURE: 78 MMHG | SYSTOLIC BLOOD PRESSURE: 114 MMHG | HEIGHT: 70 IN

## 2018-01-03 DIAGNOSIS — I48.20 CHRONIC ATRIAL FIBRILLATION (HCC): Primary | ICD-10-CM

## 2018-01-03 DIAGNOSIS — Z95.0 PACEMAKER: ICD-10-CM

## 2018-01-03 DIAGNOSIS — I10 BENIGN ESSENTIAL HTN: ICD-10-CM

## 2018-01-03 PROCEDURE — 99214 OFFICE O/P EST MOD 30 MIN: CPT | Performed by: INTERNAL MEDICINE

## 2018-01-03 NOTE — PROGRESS NOTES
Subjective:       Raina Forrest is a 72 y.o. female who here for follow up    CC  Leg swelling     Pain at pacer site  HPI  72-year-old female with known history of chronic atrial fibrillation, benign essential arterial hypertension and pacemaker here for the follow-up    Patient was complaining of the pain and the pacemaker site, also complains of the month legs swelling, for the last few days up to the ankle worse at the end of the day     Patient also complaining of the significant upper respiratory infection and bronchitis for the last few days  Problem List Items Addressed This Visit        Cardiovascular and Mediastinum    Chronic atrial fibrillation - Primary    Benign essential HTN    Pacemaker        .    The following portions of the patient's history were reviewed and updated as appropriate: allergies, current medications, past family history, past medical history, past social history, past surgical history and problem list.    Past Medical History:   Diagnosis Date   • Abdominal pain    • Atrial fibrillation    • Fung's esophagus    • Benign essential hypertension    • Blood in stool    • Chest pain    • Chronic gastritis    • Congestive heart failure    • Degenerative disc disease    • Depression with anxiety    • Disease of thyroid gland    • Diverticulitis of colon    • Dysphagia    • GERD (gastroesophageal reflux disease)    • History of migraine headaches    • Osteoporosis    • Palpitations    • Thyroid disorder    • Ulcerative colitis    • Vomiting    • Weight loss     reports that she has never smoked. She has never used smokeless tobacco. She reports that she does not drink alcohol or use illicit drugs.  Family History   Problem Relation Age of Onset   • Lung cancer Other    • Ulcerative colitis Sister    • Heart failure Mother    • Heart disease Mother    • Heart failure Father    • Heart disease Father        Review of Systems  Constitutional: No wt loss, fever,  "fatigue  Gastrointestinal: No nausea, abdominal pain  Behavioral/Psych: No insomnia or anxiety   Cardiovascular The leg swelling  Objective:       Physical Exam             Physical Exam  /78  Pulse 86  Ht 176.5 cm (69.5\")  Wt 72.1 kg (159 lb)  BMI 23.14 kg/m2    General appearance: NAD, conversant   Eyes: anicteric sclerae, moist conjunctivae; no lid-lag; PERRLA   HENT: Atraumatic; oropharynx clear with moist mucous membranes and no mucosal ulcerations;  normal hard and soft palate   Neck: Trachea midline; FROM, supple, no thyromegaly or lymphadenopathy   Lungs: CTA, with normal respiratory effort and no intercostal retractions   CV: S1-S2 regular, no murmurs, no rub, no gallop   Abdomen: Soft, non-tender; no masses or HSM   Extremities: No peripheral edema or extremity lymphadenopathy  Skin: Normal temperature, turgor and texture; no rash, ulcers or subcutaneous nodules   Psych: Appropriate affect, alert and oriented to person, place and time           Cardiographics  @Procedures    Echocardiogram:        Current Outpatient Prescriptions:   •  acetaminophen (TYLENOL) 325 MG tablet, Take 2 tablets by mouth Every 6 (Six) Hours As Needed for Moderate Pain (4-6) or Fever., Disp: , Rfl: 0  •  apixaban (ELIQUIS) 5 MG tablet tablet, 1/2 TAB BID, Disp: 42 tablet, Rfl: 0  •  bisoprolol-hydrochlorothiazide (ZIAC) 5-6.25 MG per tablet, 1/2 tablet by mouth daily (Patient taking differently: Take 0.5 tablets by mouth Daily. 1/2 tablet by mouth daily), Disp: 30 tablet, Rfl: 6  •  bisoprolol-hydrochlorothiazide (ZIAC) 5-6.25 MG per tablet, TAKE 1 TABLET BY MOUTH DAILY., Disp: 30 tablet, Rfl: 6  •  fluticasone (FLONASE) 50 MCG/ACT nasal spray, 2 sprays into each nostril Daily., Disp: , Rfl:   •  furosemide (LASIX) 40 MG tablet, Take 0.5 tablets by mouth Daily., Disp: 30 tablet, Rfl: 6  •  furosemide (LASIX) 40 MG tablet, TAKE 1 TABLET BY MOUTH DAILY., Disp: 30 tablet, Rfl: 6  •  KLOR-CON 10 MEQ CR tablet, TAKE 1 TABLET " BY MOUTH DAILY., Disp: 30 tablet, Rfl: 3  •  levothyroxine (LEVOTHROID) 25 MCG tablet, Take 25 mcg by mouth Daily., Disp: , Rfl:   •  lisinopril (PRINIVIL,ZESTRIL) 5 MG tablet, Take 0.5 tablets by mouth Every Night., Disp: 30 tablet, Rfl: 6  •  MECLIZINE HCL PO, Take 25 mg by mouth 3 (Three) Times a Day As Needed., Disp: , Rfl:   •  ondansetron (ZOFRAN) 8 MG tablet, TAKE 1 TABLET BY MOUTH EVERY 8 (EIGHT) HOURS AS NEEDED FOR NAUSEA FOR UP TO 7 DAYS, Disp: , Rfl:   •  pantoprazole (PROTONIX) 40 MG EC tablet, Take 1 tablet by mouth Daily., Disp: 30 tablet, Rfl: 3  •  sertraline (ZOLOFT) 100 MG tablet, Take 150 mg by mouth Daily., Disp: , Rfl:   •  SUMAtriptan (IMITREX) 50 MG tablet, TAKE 1 TABLET EVERY 2 HOURS AS NEEDED FOR MIGRAINE,MAY REPEAT ONCE IN 2 HOURS, Disp: , Rfl: 4   Assessment:        Patient Active Problem List   Diagnosis   • Chronic atrial fibrillation   • Benign essential HTN   • Bradycardia   • Chest pain   • Can't get food down   • Accumulation of fluid in tissues   • Esophageal lump   • Adynamia   • Itch of skin   • Anorexia   • Chronic nausea   • Gastroesophageal reflux disease   • Breath shortness   • Emesis   • Decreased body weight   • Anxiety   • Narrowing of intervertebral disc space   • Diverticulosis of intestine   • Hypertension   • Migraine   • Osteoporosis   • Palpitations   • Pituitary adenoma   • Sick sinus syndrome   • Diarrhea   • Paroxysmal atrial fibrillation   • Cardiomyopathy   • Anticoagulated   • Atrial fibrillation   • On amiodarone therapy   • Pacemaker   • Cardiac resynchronization therapy pacemaker (CRT-P) in place   • Infected pacemaker   • Wound infection     Interpretation Summary      · Findings consistent with an abnormal ECG stress test.  · Left ventricular ejection fraction is normal (Calculated EF = 67%).  · Myocardial perfusion imaging indicates a normal myocardial perfusion study with no evidence of ischemia.  · Impressions are consistent with a low risk study          Interpretation Summary   · Mild tricuspid valve regurgitation is present.  · Left atrial cavity size is mild-to-moderately dilated.  · Left Ventricle: Calculated EF = 50%  · Trace-to-mild mitral valve regurgitation  · Mild tricuspid valve regurgitation is present. E  · There is no evidence of pericardial effusion           Plan:            ICD-10-CM ICD-9-CM   1. Chronic atrial fibrillation I48.2 427.31   2. Benign essential HTN I10 401.1   3. Pacemaker Z95.0 V45.01     1. Chronic atrial fibrillation  Under controll    2. Benign essential HTN  Importance of controlling hypertension and blood pressure  checkup on the regular basis has been explained  Hypertension as a silent killer has been discussed  Risk reduction of the weight and regular exercises to control the hypertension has been explained      3. Pacemaker  Pacemaker functioning normally    4.  Leg swelling     Extra half lasix for 2 days    Pros and cons of this new medication / change medication has been explained to  the patient    Possible side effects has been explained    Associated need of the blood  Work has been explained    Need for the compliance of the medication has been explained    5.  Bronchitis  z pack for bronchitis  COUNSELING:    Raina Perkins was given to patient for the following topics: diagnostic results, risk factor reductions, impressions, risks and benefits of treatment options and importance of treatment compliance .       SMOKING COUNSELING:    Counseling given: Not Answered      EMR Dragon/Transcription disclaimer:   Much of this encounter note is an electronic transcription/translation of spoken language to printed text. The electronic translation of spoken language may permit erroneous, or at times, nonsensical words or phrases to be inadvertently transcribed; Although I have reviewed the note for such errors, some may still exist.

## 2018-01-19 ENCOUNTER — OFFICE (OUTPATIENT)
Dept: URBAN - METROPOLITAN AREA CLINIC 75 | Facility: CLINIC | Age: 73
End: 2018-01-19

## 2018-01-19 VITALS
DIASTOLIC BLOOD PRESSURE: 75 MMHG | HEART RATE: 110 BPM | HEIGHT: 69 IN | WEIGHT: 158 LBS | SYSTOLIC BLOOD PRESSURE: 120 MMHG

## 2018-01-19 DIAGNOSIS — R10.32 LEFT LOWER QUADRANT PAIN: ICD-10-CM

## 2018-01-19 DIAGNOSIS — B96.81 HELICOBACTER PYLORI [H. PYLORI] AS THE CAUSE OF DISEASES CLA: ICD-10-CM

## 2018-01-19 DIAGNOSIS — R11.2 NAUSEA WITH VOMITING, UNSPECIFIED: ICD-10-CM

## 2018-01-19 DIAGNOSIS — K59.00 CONSTIPATION, UNSPECIFIED: ICD-10-CM

## 2018-01-19 DIAGNOSIS — R10.12 LEFT UPPER QUADRANT PAIN: ICD-10-CM

## 2018-01-19 PROCEDURE — 99214 OFFICE O/P EST MOD 30 MIN: CPT | Performed by: INTERNAL MEDICINE

## 2018-01-19 RX ORDER — LUBIPROSTONE 8 UG/1
16 CAPSULE, GELATIN COATED ORAL
Qty: 180 | Refills: 1 | Status: COMPLETED
Start: 2018-01-19 | End: 2019-07-11

## 2018-01-31 RX ORDER — FUROSEMIDE 40 MG/1
TABLET ORAL
Qty: 30 TABLET | Refills: 6 | Status: SHIPPED | OUTPATIENT
Start: 2018-01-31 | End: 2018-07-01 | Stop reason: SDUPTHER

## 2018-03-06 ENCOUNTER — CLINICAL SUPPORT NO REQUIREMENTS (OUTPATIENT)
Dept: CARDIOLOGY | Facility: CLINIC | Age: 73
End: 2018-03-06

## 2018-03-06 DIAGNOSIS — Z95.0 PACEMAKER: Primary | ICD-10-CM

## 2018-03-06 PROCEDURE — 93296 REM INTERROG EVL PM/IDS: CPT | Performed by: INTERNAL MEDICINE

## 2018-03-06 PROCEDURE — 93294 REM INTERROG EVL PM/LDLS PM: CPT | Performed by: INTERNAL MEDICINE

## 2018-03-15 ENCOUNTER — OFFICE (OUTPATIENT)
Dept: URBAN - METROPOLITAN AREA CLINIC 75 | Facility: CLINIC | Age: 73
End: 2018-03-15

## 2018-03-15 VITALS
HEIGHT: 69 IN | SYSTOLIC BLOOD PRESSURE: 112 MMHG | DIASTOLIC BLOOD PRESSURE: 72 MMHG | HEART RATE: 72 BPM | WEIGHT: 162 LBS

## 2018-03-15 DIAGNOSIS — B96.81 HELICOBACTER PYLORI [H. PYLORI] AS THE CAUSE OF DISEASES CLA: ICD-10-CM

## 2018-03-15 DIAGNOSIS — R19.7 DIARRHEA, UNSPECIFIED: ICD-10-CM

## 2018-03-15 PROCEDURE — 99214 OFFICE O/P EST MOD 30 MIN: CPT | Performed by: INTERNAL MEDICINE

## 2018-05-01 RX ORDER — FUROSEMIDE 40 MG/1
20 TABLET ORAL DAILY
Qty: 30 TABLET | Refills: 6
Start: 2018-05-01 | End: 2018-05-03 | Stop reason: DRUGHIGH

## 2018-05-03 RX ORDER — FUROSEMIDE 40 MG/1
TABLET ORAL
Qty: 45 TABLET | Refills: 3
Start: 2018-05-03 | End: 2018-06-08

## 2018-05-23 RX ORDER — POTASSIUM CHLORIDE 750 MG/1
TABLET, EXTENDED RELEASE ORAL
Qty: 30 TABLET | Refills: 3 | Status: SHIPPED | OUTPATIENT
Start: 2018-05-23 | End: 2018-09-25 | Stop reason: SDUPTHER

## 2018-06-04 ENCOUNTER — TELEPHONE (OUTPATIENT)
Dept: CARDIOLOGY | Facility: CLINIC | Age: 73
End: 2018-06-04

## 2018-06-04 NOTE — TELEPHONE ENCOUNTER
S/w pt she states she will not be having testing at this time do to her not feeling well. Will let us know when she decides to do so    Per dr Salazar ok to hold beta blocker but will need to monitor BP and HR

## 2018-06-04 NOTE — TELEPHONE ENCOUNTER
----- Message from Marita Haas sent at 6/4/2018 11:28 AM EDT -----  Regarding: STOPPING BETA BLOCKER DAY BEFORE ALLERGY TESTING  Contact: [phone]  SHE IS SCHEDULED ON 6/11  PLEASE ADVISE  THANKS

## 2018-06-08 ENCOUNTER — APPOINTMENT (OUTPATIENT)
Dept: GENERAL RADIOLOGY | Facility: HOSPITAL | Age: 73
End: 2018-06-08

## 2018-06-08 ENCOUNTER — HOSPITAL ENCOUNTER (EMERGENCY)
Facility: HOSPITAL | Age: 73
Discharge: HOME | End: 2018-06-08
Attending: EMERGENCY MEDICINE | Admitting: EMERGENCY MEDICINE

## 2018-06-08 ENCOUNTER — APPOINTMENT (OUTPATIENT)
Dept: CT IMAGING | Facility: HOSPITAL | Age: 73
End: 2018-06-08

## 2018-06-08 VITALS
OXYGEN SATURATION: 100 % | DIASTOLIC BLOOD PRESSURE: 81 MMHG | HEIGHT: 69 IN | BODY MASS INDEX: 23.25 KG/M2 | RESPIRATION RATE: 18 BRPM | TEMPERATURE: 97.3 F | HEART RATE: 86 BPM | SYSTOLIC BLOOD PRESSURE: 115 MMHG | WEIGHT: 157 LBS

## 2018-06-08 DIAGNOSIS — R23.3 EASY BRUISING: ICD-10-CM

## 2018-06-08 DIAGNOSIS — M79.18 GLUTEAL PAIN: Primary | ICD-10-CM

## 2018-06-08 DIAGNOSIS — M25.475 SWELLING OF FOOT JOINT, LEFT: ICD-10-CM

## 2018-06-08 LAB
ALBUMIN SERPL-MCNC: 4.5 G/DL (ref 3.5–5.2)
ALBUMIN/GLOB SERPL: 1.7 G/DL
ALP SERPL-CCNC: 107 U/L (ref 39–117)
ALT SERPL W P-5'-P-CCNC: 21 U/L (ref 1–33)
ANION GAP SERPL CALCULATED.3IONS-SCNC: 13.5 MMOL/L
AST SERPL-CCNC: 31 U/L (ref 1–32)
BASOPHILS # BLD AUTO: 0.05 10*3/MM3 (ref 0–0.2)
BASOPHILS NFR BLD AUTO: 0.5 % (ref 0–1.5)
BILIRUB SERPL-MCNC: 0.9 MG/DL (ref 0.1–1.2)
BILIRUB UR QL STRIP: NEGATIVE
BUN BLD-MCNC: 8 MG/DL (ref 8–23)
BUN/CREAT SERPL: 11.9 (ref 7–25)
CALCIUM SPEC-SCNC: 9.5 MG/DL (ref 8.6–10.5)
CHLORIDE SERPL-SCNC: 94 MMOL/L (ref 98–107)
CLARITY UR: CLEAR
CO2 SERPL-SCNC: 26.5 MMOL/L (ref 22–29)
COLOR UR: YELLOW
CREAT BLD-MCNC: 0.67 MG/DL (ref 0.57–1)
DEPRECATED RDW RBC AUTO: 49.7 FL (ref 37–54)
EOSINOPHIL # BLD AUTO: 0.14 10*3/MM3 (ref 0–0.7)
EOSINOPHIL NFR BLD AUTO: 1.5 % (ref 0.3–6.2)
ERYTHROCYTE [DISTWIDTH] IN BLOOD BY AUTOMATED COUNT: 14.1 % (ref 11.7–13)
GFR SERPL CREATININE-BSD FRML MDRD: 86 ML/MIN/1.73
GLOBULIN UR ELPH-MCNC: 2.7 GM/DL
GLUCOSE BLD-MCNC: 81 MG/DL (ref 65–99)
GLUCOSE UR STRIP-MCNC: NEGATIVE MG/DL
HCT VFR BLD AUTO: 41.6 % (ref 35.6–45.5)
HGB BLD-MCNC: 14.2 G/DL (ref 11.9–15.5)
HGB UR QL STRIP.AUTO: NEGATIVE
IMM GRANULOCYTES # BLD: 0.04 10*3/MM3 (ref 0–0.03)
IMM GRANULOCYTES NFR BLD: 0.4 % (ref 0–0.5)
KETONES UR QL STRIP: NEGATIVE
LEUKOCYTE ESTERASE UR QL STRIP.AUTO: NEGATIVE
LYMPHOCYTES # BLD AUTO: 1.71 10*3/MM3 (ref 0.9–4.8)
LYMPHOCYTES NFR BLD AUTO: 17.8 % (ref 19.6–45.3)
MCH RBC QN AUTO: 33.1 PG (ref 26.9–32)
MCHC RBC AUTO-ENTMCNC: 34.1 G/DL (ref 32.4–36.3)
MCV RBC AUTO: 97 FL (ref 80.5–98.2)
MONOCYTES # BLD AUTO: 0.74 10*3/MM3 (ref 0.2–1.2)
MONOCYTES NFR BLD AUTO: 7.7 % (ref 5–12)
NEUTROPHILS # BLD AUTO: 6.95 10*3/MM3 (ref 1.9–8.1)
NEUTROPHILS NFR BLD AUTO: 72.1 % (ref 42.7–76)
NITRITE UR QL STRIP: NEGATIVE
PH UR STRIP.AUTO: 8 [PH] (ref 5–8)
PLATELET # BLD AUTO: 230 10*3/MM3 (ref 140–500)
PMV BLD AUTO: 10.8 FL (ref 6–12)
POTASSIUM BLD-SCNC: 3.6 MMOL/L (ref 3.5–5.2)
PROT SERPL-MCNC: 7.2 G/DL (ref 6–8.5)
PROT UR QL STRIP: NEGATIVE
RBC # BLD AUTO: 4.29 10*6/MM3 (ref 3.9–5.2)
SODIUM BLD-SCNC: 134 MMOL/L (ref 136–145)
SP GR UR STRIP: <1.005 (ref 1–1.03)
UROBILINOGEN UR QL STRIP: ABNORMAL
WBC NRBC COR # BLD: 9.63 10*3/MM3 (ref 4.5–10.7)

## 2018-06-08 PROCEDURE — 80053 COMPREHEN METABOLIC PANEL: CPT | Performed by: EMERGENCY MEDICINE

## 2018-06-08 PROCEDURE — 85025 COMPLETE CBC W/AUTO DIFF WBC: CPT | Performed by: EMERGENCY MEDICINE

## 2018-06-08 PROCEDURE — 70450 CT HEAD/BRAIN W/O DYE: CPT

## 2018-06-08 PROCEDURE — 72170 X-RAY EXAM OF PELVIS: CPT

## 2018-06-08 PROCEDURE — 73630 X-RAY EXAM OF FOOT: CPT

## 2018-06-08 PROCEDURE — 81003 URINALYSIS AUTO W/O SCOPE: CPT | Performed by: EMERGENCY MEDICINE

## 2018-06-08 PROCEDURE — 99284 EMERGENCY DEPT VISIT MOD MDM: CPT

## 2018-06-08 NOTE — ED TRIAGE NOTES
Pt states that she had a steroid injection in her left knee on 5/15 and developed some mild hives on one arm. Was given a steroid dose pack after that and it caused her to have wide spread hives and pain. Pt states that she was seen and given IV benadryl and told to take PO benadryl but that it has not helped. Pt reports that her eyes and face are swollen and that she has blisters on her tongue and hasn't been able to eat. Pt is anxious and tearful at time of triage and does have redness on her arms and legs. Pt complains of bilateral hip and back pain for 1.5 weeks.

## 2018-06-08 NOTE — ED PROVIDER NOTES
" EMERGENCY DEPARTMENT ENCOUNTER    CHIEF COMPLAINT  Chief Complaint: Hip pain  History given by: patient  History limited by: vague historian  Room Number: 18/18  PMD: Georgiana Cisneros MD   [facility]      HPI:  Pt is a 72 y.o. female who presents for multiple complaints.     Patient has had worsening bilat hip pain that began several weeks ago. Patient has made her PMD aware of her symptom and is scheduled with them next week. Patient has taken Tylenol for her sx with minimal relief.     Patient also notes blurred vision.    Patient also notes a rash to her extremities that has been ongoing for over a month and facial swelling. Patient has been on multiple types of steroids, PO and IV benadryl, and seen by her MD and other EDs for her sx with significant relief.     Patient will also notes blisters to her mouth directly after she eats anything, which she denies currently.     Patient has noted increased bruising to her extremities, but states that she \"can't stop my Eliquis otherwise I'll get blood clots\".     Duration:  Several weeks ago  Onset: gradual  Timing: constant  Location: Bilat hips  Radiation: none  Quality: pain  Intensity/Severity: moderate  Progression: worsening  Associated Symptoms: rash, facial swelling  Aggravating Factors: none  Alleviating Factors: none  Previous Episodes: none  Treatment before arrival: seen by PMD    PAST MEDICAL HISTORY  Active Ambulatory Problems     Diagnosis Date Noted   • Chronic atrial fibrillation 04/07/2016   • Benign essential HTN 04/07/2016   • Bradycardia 04/07/2016   • Chest pain 04/07/2016   • Can't get food down 04/07/2016   • Accumulation of fluid in tissues 04/07/2016   • Esophageal lump 04/07/2016   • Adynamia 04/07/2016   • Itch of skin 04/07/2016   • Anorexia 04/07/2016   • Chronic nausea 04/07/2016   • Gastroesophageal reflux disease 04/07/2016   • Breath shortness 04/07/2016   • Emesis 04/07/2016   • Decreased body weight 04/07/2016   • " Anxiety 04/21/2016   • Narrowing of intervertebral disc space 04/21/2016   • Diverticulosis of intestine 04/18/2013   • Hypertension 04/21/2016   • Migraine 04/21/2016   • Osteoporosis 04/21/2016   • Palpitations 10/02/2012   • Pituitary adenoma 04/21/2016   • Sick sinus syndrome 08/28/2013   • Diarrhea 07/21/2016   • Paroxysmal atrial fibrillation 11/15/2016   • Cardiomyopathy 12/01/2016   • Anticoagulated 12/01/2016   • Atrial fibrillation 01/18/2017   • On amiodarone therapy 01/23/2017   • Pacemaker 02/22/2017   • Cardiac resynchronization therapy pacemaker (CRT-P) in place 03/28/2017   • Infected pacemaker 04/06/2017   • Wound infection 04/07/2017     Resolved Ambulatory Problems     Diagnosis Date Noted   • No Resolved Ambulatory Problems     Past Medical History:   Diagnosis Date   • Abdominal pain    • Atrial fibrillation    • Fung's esophagus    • Benign essential hypertension    • Blood in stool    • Chest pain    • Chronic gastritis    • Congestive heart failure    • Degenerative disc disease    • Depression with anxiety    • Disease of thyroid gland    • Diverticulitis of colon    • Dysphagia    • GERD (gastroesophageal reflux disease)    • History of migraine headaches    • Osteoporosis    • Palpitations    • Thyroid disorder    • Ulcerative colitis    • Vomiting    • Weight loss        PAST SURGICAL HISTORY  Past Surgical History:   Procedure Laterality Date   • APPENDECTOMY     • BLADDER SURGERY     • CARDIAC CATHETERIZATION N/A 11/21/2016    Procedure: Left Heart Cath;  Surgeon: Robinson Salazar MD;  Location:  [facility];  Service:    • CARDIAC ELECTROPHYSIOLOGY PROCEDURE N/A 2/7/2017    Procedure: Pacemaker DC new   [employer];  Surgeon: Robinson Salazar MD;  Location:  [facility];  Service:    • CARDIOVERSION  01/18/2017   • CHOLECYSTECTOMY     • COLONOSCOPY     • COLONOSCOPY N/A 7/29/2016    normal   • ENDOSCOPY  05/06/2015    submucosal nodule in  esophagus, erythematous mucosa in antrum,moderate acute gastritis, no h-pylori   • ENDOSCOPY N/A 9/2/2016    Erythematous mucosa in the antrum   • EYE SURGERY     • HYSTERECTOMY     • KNEE SURGERY     • THYROID SURGERY     • TONSILLECTOMY         FAMILY HISTORY  Family History   Problem Relation Age of Onset   • Lung cancer Other    • Ulcerative colitis Sister    • Heart failure Mother    • Heart disease Mother    • Heart failure Father    • Heart disease Father        SOCIAL HISTORY  Social History     Social History   • Marital status:      Spouse name: N/A   • Number of children: N/A   • Years of education: N/A     Occupational History   • Not on file.     Social History Main Topics   • Smoking status: Never Smoker   • Smokeless tobacco: Never Used   • Alcohol use No   • Drug use: No   • Sexual activity: Defer     Other Topics Concern   • Not on file     Social History Narrative   • No narrative on file       ALLERGIES  Amoxicillin-pot clavulanate; Bupivacaine hcl; Doxycycline; Nepafenac; Bactrim [sulfamethoxazole-trimethoprim]; Barium-containing compounds; Bextra [valdecoxib]; Bupivacaine; Clarithromycin; Contrast dye; Doxycycline monohydrate; Iodinated diagnostic agents; Iodine; Keflex [cephalexin]; Levaquin [levofloxacin]; Medrol [methylprednisolone]; Mesalamine; Nsaids; Polymyxin b-trimethoprim; Rivaroxaban; Tetanus toxoids; Tetanus-diphtheria toxoids td; and Clindamycin    REVIEW OF SYSTEMS  Review of Systems   Constitutional: Negative for fever.   HENT: Positive for facial swelling. Negative for sore throat.    Eyes: Positive for visual disturbance.   Respiratory: Negative for cough and shortness of breath.    Cardiovascular: Negative for chest pain.   Gastrointestinal: Negative for abdominal pain, diarrhea and vomiting.   Genitourinary: Negative for dysuria.   Musculoskeletal: Positive for arthralgias (bilat hips). Negative for neck pain.   Skin: Positive for rash.   Allergic/Immunologic: Negative.     Neurological: Negative for weakness, numbness and headaches.   Hematological: Bruises/bleeds easily.   Psychiatric/Behavioral: Negative.    All other systems reviewed and are negative.      PHYSICAL EXAM  ED Triage Vitals   Temp Heart Rate Resp BP SpO2   06/08/18 1430 06/08/18 1430 06/08/18 1430 06/08/18 0137 06/08/18 1430   97.3 °F (36.3 °C) 102 18 137/79 100 %      Temp src Heart Rate Source Patient Position BP Location FiO2 (%)   06/08/18 1430 06/08/18 1430 -- -- --   Tympanic Monitor          Physical Exam   Constitutional: She is oriented to person, place, and time. No distress.   HENT:   Head: Normocephalic and atraumatic.   Mouth/Throat: Oropharynx is clear and moist.   No mouth blisters   Eyes: EOM are normal. Pupils are equal, round, and reactive to light.   Neck: Normal range of motion. Neck supple.   Cardiovascular: Normal rate and normal heart sounds.  An irregularly irregular rhythm present.   Pulmonary/Chest: Effort normal and breath sounds normal. No respiratory distress.   Abdominal: Soft. There is no tenderness. There is no rebound and no guarding.   Musculoskeletal: Normal range of motion. She exhibits no edema.   TTP SI joint bilaterally. Small bruises to the lateral upper thigh and dorsum of the L foot.    Neurological: She is alert and oriented to person, place, and time. She has normal sensation and normal strength.   Skin: Skin is warm and dry. No rash noted.   Psychiatric: Mood and affect normal.   Nursing note and vitals reviewed.      LAB RESULTS  Lab Results (last 24 hours)     Procedure Component Value Units Date/Time    CBC & Differential [[id number]] Collected:  06/08/18 1822    Specimen:  Blood Updated:  06/08/18 1844    Narrative:       The following orders were created for panel order CBC & Differential.  Procedure                               Abnormality         Status                     ---------                               -----------         ------                     CBC  Auto Differential[[id number]]        Abnormal            Final result                 Please view results for these tests on the individual orders.    Comprehensive Metabolic Panel [[id number]]  (Abnormal) Collected:  06/08/18 1822    Specimen:  Blood Updated:  06/08/18 1855     Glucose 81 mg/dL      BUN 8 mg/dL      Creatinine 0.67 mg/dL      Sodium 134 (L) mmol/L      Potassium 3.6 mmol/L      Chloride 94 (L) mmol/L      CO2 26.5 mmol/L      Calcium 9.5 mg/dL      Total Protein 7.2 g/dL      Albumin 4.50 g/dL      ALT (SGPT) 21 U/L      AST (SGOT) 31 U/L      Alkaline Phosphatase 107 U/L      Total Bilirubin 0.9 mg/dL      eGFR Non African Amer 86 mL/min/1.73      Globulin 2.7 gm/dL      A/G Ratio 1.7 g/dL      BUN/Creatinine Ratio 11.9     Anion Gap 13.5 mmol/L     Narrative:       The MDRD GFR formula is only valid for adults with stable renal function between ages 18 and 70.    CBC Auto Differential [[id number]]  (Abnormal) Collected:  06/08/18 1822    Specimen:  Blood Updated:  06/08/18 1844     WBC 9.63 10*3/mm3      RBC 4.29 10*6/mm3      Hemoglobin 14.2 g/dL      Hematocrit 41.6 %      MCV 97.0 fL      MCH 33.1 (H) pg      MCHC 34.1 g/dL      RDW 14.1 (H) %      RDW-SD 49.7 fl      MPV 10.8 fL      Platelets 230 10*3/mm3      Neutrophil % 72.1 %      Lymphocyte % 17.8 (L) %      Monocyte % 7.7 %      Eosinophil % 1.5 %      Basophil % 0.5 %      Immature Grans % 0.4 %      Neutrophils, Absolute 6.95 10*3/mm3      Lymphocytes, Absolute 1.71 10*3/mm3      Monocytes, Absolute 0.74 10*3/mm3      Eosinophils, Absolute 0.14 10*3/mm3      Basophils, Absolute 0.05 10*3/mm3      Immature Grans, Absolute 0.04 (H) 10*3/mm3     Urinalysis With / Microscopic If Indicated (No Culture) - Urine, Clean Catch [[id number]]  (Abnormal) Collected:  06/08/18 1916    Specimen:  Urine from Urine, Clean Catch Updated:  06/08/18 1931     Color, UA Yellow     Appearance, UA Clear     pH, UA 8.0     Specific Gravity, UA <1.005 (L)      Glucose, UA Negative     Ketones, UA Negative     Bilirubin, UA Negative     Blood, UA Negative     Protein, UA Negative     Leuk Esterase, UA Negative     Nitrite, UA Negative     Urobilinogen, UA 0.2 E.U./dL    Narrative:       Urine microscopic not indicated.          I ordered the above labs and reviewed the results    RADIOLOGY  CT Head Without Contrast   Final Result   No evidence for acute intracranial abnormality.       This report was finalized on 6/8/2018 7:32 PM by Dr. Jose Oakes M.D.          XR Foot 3+ View Left   Final Result       Soft tissue swelling. No acute fracture or erosion is identified. If   there is clinical suspicion for radiographically occult osteomyelitis,   MRI or bone scan could be considered.       This report was finalized on 6/8/2018 6:57 PM by Dr. Saeid Lopez M.D.          XR Pelvis 1 or 2 View   Final Result       As described.       This report was finalized on 6/8/2018 6:54 PM by Dr. Saeid Lopez M.D.               I ordered the above noted radiological studies. Interpreted by radiologist.     PROCEDURES  Procedures      PROGRESS AND CONSULTS     1746: labs and Xray pelvis ordered after my evaluation.     1755: I discussed plan fro labs and Xrays to be ordered. Pt understands and agrees with the plan, all questions answered.    1759: I added Head CT on based on the patient's blurred vision.     1806: I ordered a Foot Xray based on the patient's bruise to her L foot.     2000: Discussed case with Julia CORLEY for Dr. Cisneros, LMJAYLON concerning the patient and the findings in the ED. She agrees with the plan and advises the patient to follow up in the office next week as scheduled.      2005: Discussed case with Dr. Jaquez, cardiology concerning the patient and the findings in the ED. Dr. Jaquez will see the patient outpatient with the office next week.     2030: Rechecked pt. I discussed labs, Xrays, and consultation and no indication for further  workup in the ED. I discussed plan for the patient to follow up outpatinent with the office. Pt understands and agrees with the plan, all questions answered.    MEDICAL DECISION MAKING  Results were reviewed/discussed with the patient and they were also made aware of online access. Pt also made aware that some labs, such as cultures, will not be resulted during ER visit and follow up with PMD is necessary.     MDM  Number of Diagnoses or Management Options     Amount and/or Complexity of Data Reviewed  Clinical lab tests: reviewed and ordered (UA no UTI, BUN 8, creatinine 0.67, WBC 9.63)  Tests in the radiology section of CPT®: reviewed and ordered (Head CT: no abnormalities.   Foot and Pelvis Xray: no fracture)  Decide to obtain previous medical records or to obtain history from someone other than the patient: yes  Review and summarize past medical records: yes (Patient was seen on 5/21/18 for her sx and was placed on triamcinolone cream.     Patient had prednisone called in for her.     Patient has been seen at [facility] on May 28 and June 2 2018 for her rash. )    Patient Progress  Patient progress: stable         DIAGNOSIS  Final diagnoses:   Gluteal pain   Easy bruising   Swelling of foot joint, left       DISPOSITION  DISCHARGE    Patient discharged in stable condition.    Reviewed implications of results, diagnosis, meds, responsibility to follow up, warning signs and symptoms of possible worsening, potential complications and reasons to return to ER.    Patient/Family voiced understanding of above instructions.    Discussed plan for discharge, as there is no emergent indication for admission. Patient referred to primary care provider for BP management due to today's BP. Pt/family is agreeable and understands need for follow up and repeat testing.  Pt is aware that discharge does not mean that nothing is wrong but it indicates no emergency is present that requires admission and they must continue care with  follow-up as given below or physician of their choice.     FOLLOW-UP  Georgiana Cisneros MD  [address]  [place] [address] [phone]      Keep your appointment next week with their office    Robinson Salazar MD  [address] [facility] [address] [phone]    Schedule an appointment as soon as possible for a visit in 2 days  As needed, If symptoms worsen         Medication List      Changed    bisoprolol-hydrochlorothiazide 5-6.25 MG per tablet  Commonly known as:  ZIAC  1/2 tablet by mouth daily  What changed:  how much to take  how to take this  when to take this  additional instructions  Another medication with the same name was removed. Continue taking this   medication, and follow the directions you see here.     furosemide 40 MG tablet  Commonly known as:  LASIX  TAKE 1 TABLET BY MOUTH DAILY.  What changed:  Another medication with the same name was removed. Continue   taking this medication, and follow the directions you see here.        Stop    fluticasone 50 MCG/ACT nasal spray  Commonly known as:  FLONASE     lisinopril 5 MG tablet  Commonly known as:  PRINIVIL,ZESTRIL            Latest Documented Vital Signs:  As of 10:49 PM  BP- 115/81 HR- 86 Temp- 97.3 °F (36.3 °C) (Tympanic) O2 sat- 100%    --  Documentation assistance provided by nikki Rivera for Robbi Roa MD.  Information recorded by the scribherlinda was done at my direction and has been verified and validated by me.       Janiya Rivera  06/08/18 2911       Robbi Roa MD  06/08/18 1220

## 2018-07-02 RX ORDER — FUROSEMIDE 40 MG/1
TABLET ORAL
Qty: 45 TABLET | Refills: 1 | Status: SHIPPED | OUTPATIENT
Start: 2018-07-02 | End: 2018-08-28 | Stop reason: SDUPTHER

## 2018-07-03 ENCOUNTER — CLINICAL SUPPORT NO REQUIREMENTS (OUTPATIENT)
Dept: CARDIOLOGY | Facility: CLINIC | Age: 73
End: 2018-07-03

## 2018-07-03 ENCOUNTER — OFFICE VISIT (OUTPATIENT)
Dept: CARDIOLOGY | Facility: CLINIC | Age: 73
End: 2018-07-03

## 2018-07-03 VITALS
DIASTOLIC BLOOD PRESSURE: 79 MMHG | WEIGHT: 165 LBS | HEART RATE: 85 BPM | BODY MASS INDEX: 23.62 KG/M2 | HEIGHT: 70 IN | SYSTOLIC BLOOD PRESSURE: 117 MMHG

## 2018-07-03 DIAGNOSIS — Z95.0 PACEMAKER: Primary | ICD-10-CM

## 2018-07-03 DIAGNOSIS — I10 BENIGN ESSENTIAL HTN: ICD-10-CM

## 2018-07-03 DIAGNOSIS — I48.19 PERSISTENT ATRIAL FIBRILLATION (HCC): Primary | ICD-10-CM

## 2018-07-03 DIAGNOSIS — R00.2 PALPITATIONS: ICD-10-CM

## 2018-07-03 DIAGNOSIS — Z95.0 PACEMAKER: ICD-10-CM

## 2018-07-03 PROCEDURE — 93000 ELECTROCARDIOGRAM COMPLETE: CPT | Performed by: INTERNAL MEDICINE

## 2018-07-03 PROCEDURE — 93288 INTERROG EVL PM/LDLS PM IP: CPT | Performed by: INTERNAL MEDICINE

## 2018-07-03 PROCEDURE — 99213 OFFICE O/P EST LOW 20 MIN: CPT | Performed by: INTERNAL MEDICINE

## 2018-07-03 RX ORDER — LEVOTHYROXINE SODIUM 0.03 MG/1
TABLET ORAL
COMMUNITY
Start: 2018-04-18 | End: 2018-07-03

## 2018-07-03 RX ORDER — TRAMADOL HYDROCHLORIDE 50 MG/1
TABLET ORAL
COMMUNITY
Start: 2018-06-01 | End: 2019-04-09

## 2018-07-03 NOTE — PROGRESS NOTES
Subjective:       Raina Forrest is a 72 y.o. female who here for follow up    CC  Follow-up for the hypertension palpitation atrial fibrillation and pacemaker  HPI  72-year-old female with known history of the benign essential arterial hypertension, palpitation as well as a pacemaker here for the follow-up    Raina Forrest  here for follow up with no complaints of chest pain, sob, palpitation, syncope, near syncope  No side effects with current meds  No pnd, orthopnea       Problem List Items Addressed This Visit        Cardiovascular and Mediastinum    Benign essential HTN    Palpitations    Atrial fibrillation (CMS/HCC) - Primary    Pacemaker        .    The following portions of the patient's history were reviewed and updated as appropriate: allergies, current medications, past family history, past medical history, past social history, past surgical history and problem list.    Past Medical History:   Diagnosis Date   • Abdominal pain    • Atrial fibrillation (CMS/HCC)    • Fung's esophagus    • Benign essential hypertension    • Blood in stool    • Chest pain    • Chronic gastritis    • Congestive heart failure (CMS/HCC)    • Degenerative disc disease    • Depression with anxiety    • Disease of thyroid gland    • Diverticulitis of colon    • Dysphagia    • GERD (gastroesophageal reflux disease)    • History of migraine headaches    • Osteoporosis    • Palpitations    • Thyroid disorder    • Ulcerative colitis (CMS/HCC)    • Vomiting    • Weight loss     reports that she has never smoked. She has never used smokeless tobacco. She reports that she does not drink alcohol or use drugs.  Family History   Problem Relation Age of Onset   • Lung cancer Other    • Ulcerative colitis Sister    • Heart failure Mother    • Heart disease Mother    • Heart failure Father    • Heart disease Father        Review of Systems  Constitutional: No wt loss, fever, fatigue  Gastrointestinal: No nausea, abdominal  "pain  Behavioral/Psych: No insomnia or anxiety   Cardiovascular No chest pains or tightness in chest  Objective:       Physical Exam           Physical Exam  /79 (BP Location: Left arm, Patient Position: Sitting)   Pulse 85   Ht 176.5 cm (69.5\")   Wt 74.8 kg (165 lb)   BMI 24.02 kg/m²     General appearance: NAD, conversant   Eyes: anicteric sclerae, moist conjunctivae; no lid-lag; PERRLA   HENT: Atraumatic; oropharynx clear with moist mucous membranes and no mucosal ulcerations;  normal hard and soft palate   Neck: Trachea midline; FROM, supple, no thyromegaly or lymphadenopathy   Lungs: CTA, with normal respiratory effort and no intercostal retractions   CV: S1-S2 regular, no murmurs, no rub, no gallop   Abdomen: Soft, non-tender; no masses or HSM   Extremities: No peripheral edema or extremity lymphadenopathy  Skin: Normal temperature, turgor and texture; no rash, ulcers or subcutaneous nodules   Psych: Appropriate affect, alert and oriented to person, place and time           Cardiographics  @  ECG 12 Lead  Date/Time: 7/3/2018 11:58 AM  Performed by: GIACOMO PARKER  Authorized by: GIACOMO PARKER   Comparison: compared with previous ECG   Similar to previous ECG  Rhythm: atrial fibrillation  ST Flattening: all  Clinical impression: abnormal ECG            Echocardiogram:        Current Outpatient Prescriptions:   •  acetaminophen (TYLENOL) 325 MG tablet, Take 2 tablets by mouth Every 6 (Six) Hours As Needed for Moderate Pain (4-6) or Fever., Disp: , Rfl: 0  •  apixaban (ELIQUIS) 5 MG tablet tablet, 1/2 TAB BID, Disp: 42 tablet, Rfl: 0  •  bisoprolol-hydrochlorothiazide (ZIAC) 5-6.25 MG per tablet, 1/2 tablet by mouth daily (Patient taking differently: Take 0.5 tablets by mouth Daily. 1/2 tablet by mouth daily), Disp: 30 tablet, Rfl: 6  •  furosemide (LASIX) 40 MG tablet, TAKE 1 TABLET BY MOUTH EVERY MORNING AND TAKE 1/2 TABLET EVERY EVENING AS NEEDED, Disp: 45 tablet, Rfl: 1  •  KLOR-CON " 10 MEQ CR tablet, TAKE 1 TABLET BY MOUTH DAILY., Disp: 30 tablet, Rfl: 3  •  levothyroxine (LEVOTHROID) 25 MCG tablet, Take 25 mcg by mouth Daily., Disp: , Rfl:   •  MECLIZINE HCL PO, Take 25 mg by mouth 3 (Three) Times a Day As Needed., Disp: , Rfl:   •  ondansetron (ZOFRAN) 8 MG tablet, TAKE 1 TABLET BY MOUTH EVERY 8 (EIGHT) HOURS AS NEEDED FOR NAUSEA FOR UP TO 7 DAYS, Disp: , Rfl:   •  pantoprazole (PROTONIX) 40 MG EC tablet, Take 1 tablet by mouth Daily., Disp: 30 tablet, Rfl: 3  •  sertraline (ZOLOFT) 100 MG tablet, Take 150 mg by mouth Daily., Disp: , Rfl:   •  SUMAtriptan (IMITREX) 50 MG tablet, TAKE 1 TABLET EVERY 2 HOURS AS NEEDED FOR MIGRAINE,MAY REPEAT ONCE IN 2 HOURS, Disp: , Rfl: 4  •  traMADol (ULTRAM) 50 MG tablet, , Disp: , Rfl:    Assessment:        Patient Active Problem List   Diagnosis   • Chronic atrial fibrillation (CMS/HCC)   • Benign essential HTN   • Bradycardia   • Chest pain   • Can't get food down   • Accumulation of fluid in tissues   • Esophageal lump   • Adynamia   • Itch of skin   • Anorexia   • Chronic nausea   • Gastroesophageal reflux disease   • Breath shortness   • Emesis   • Decreased body weight   • Anxiety   • Narrowing of intervertebral disc space   • Diverticulosis of intestine   • Hypertension   • Migraine   • Osteoporosis   • Palpitations   • Pituitary adenoma (CMS/HCC)   • Sick sinus syndrome (CMS/HCC)   • Diarrhea   • Paroxysmal atrial fibrillation (CMS/HCC)   • Cardiomyopathy (CMS/HCC)   • Anticoagulated   • Atrial fibrillation (CMS/HCC)   • On amiodarone therapy   • Pacemaker   • Cardiac resynchronization therapy pacemaker (CRT-P) in place   • Infected pacemaker (CMS/HCC)   • Wound infection               Plan:            ICD-10-CM ICD-9-CM   1. Persistent atrial fibrillation (CMS/HCC) I48.1 427.31   2. Benign essential HTN I10 401.1   3. Palpitations R00.2 785.1   4. Pacemaker Z95.0 V45.01     1. Persistent atrial fibrillation (CMS/HCC)  Atrial fibrillation rate under  control  - ECG 12 Lead    2. Benign essential HTN  The blood pressures under control    3. Palpitations  Controlled    4. Pacemaker  Pacemaker functioning normally     cv stable    See us 1 yr  COUNSELING:    Raina Perkins was given to patient for the following topics: diagnostic results, risk factor reductions, impressions, risks and benefits of treatment options and importance of treatment compliance .       SMOKING COUNSELING:    Counseling given: Not Answered      EMR Dragon/Transcription disclaimer:   Much of this encounter note is an electronic transcription/translation of spoken language to printed text. The electronic translation of spoken language may permit erroneous, or at times, nonsensical words or phrases to be inadvertently transcribed; Although I have reviewed the note for such errors, some may still exist.

## 2018-08-23 ENCOUNTER — TELEPHONE (OUTPATIENT)
Dept: CARDIOLOGY | Facility: CLINIC | Age: 73
End: 2018-08-23

## 2018-08-23 ENCOUNTER — HOSPITAL ENCOUNTER (EMERGENCY)
Facility: HOSPITAL | Age: 73
Discharge: HOME | End: 2018-08-23
Attending: EMERGENCY MEDICINE | Admitting: EMERGENCY MEDICINE

## 2018-08-23 ENCOUNTER — APPOINTMENT (OUTPATIENT)
Dept: CT IMAGING | Facility: HOSPITAL | Age: 73
End: 2018-08-23

## 2018-08-23 VITALS
DIASTOLIC BLOOD PRESSURE: 90 MMHG | SYSTOLIC BLOOD PRESSURE: 129 MMHG | BODY MASS INDEX: 22.76 KG/M2 | HEIGHT: 70 IN | HEART RATE: 78 BPM | RESPIRATION RATE: 14 BRPM | OXYGEN SATURATION: 98 % | TEMPERATURE: 96.7 F | WEIGHT: 159 LBS

## 2018-08-23 DIAGNOSIS — S00.83XA CONTUSION OF FOREHEAD, INITIAL ENCOUNTER: Primary | ICD-10-CM

## 2018-08-23 DIAGNOSIS — S16.1XXA ACUTE STRAIN OF NECK MUSCLE, INITIAL ENCOUNTER: ICD-10-CM

## 2018-08-23 PROCEDURE — 72125 CT NECK SPINE W/O DYE: CPT

## 2018-08-23 PROCEDURE — 70450 CT HEAD/BRAIN W/O DYE: CPT

## 2018-08-23 PROCEDURE — 99284 EMERGENCY DEPT VISIT MOD MDM: CPT

## 2018-08-23 RX ORDER — HYDROCODONE BITARTRATE AND ACETAMINOPHEN 5; 325 MG/1; MG/1
1 TABLET ORAL EVERY 6 HOURS PRN
COMMUNITY
End: 2019-04-09

## 2018-08-23 RX ORDER — HYDROCODONE BITARTRATE AND ACETAMINOPHEN 5; 325 MG/1; MG/1
1 TABLET ORAL EVERY 6 HOURS PRN
Status: DISCONTINUED | OUTPATIENT
Start: 2018-08-23 | End: 2018-08-23 | Stop reason: HOSPADM

## 2018-08-23 RX ORDER — ONDANSETRON 4 MG/1
4 TABLET, ORALLY DISINTEGRATING ORAL ONCE
Status: COMPLETED | OUTPATIENT
Start: 2018-08-23 | End: 2018-08-23

## 2018-08-23 RX ADMIN — ONDANSETRON 4 MG: 4 TABLET, ORALLY DISINTEGRATING ORAL at 20:59

## 2018-08-23 RX ADMIN — HYDROCODONE BITARTRATE AND ACETAMINOPHEN 1 TABLET: 5; 325 TABLET ORAL at 20:59

## 2018-08-23 NOTE — TELEPHONE ENCOUNTER
----- Message from Ambar Michaels sent at 8/23/2018 12:14 PM EDT -----  Regarding: WANTS TO GO [place] [employer]   PATIENT WOULD LIKE A CALL BACK.  SHE WANTS TO EITHER GO OFF [employer] OR TAKE SOMETHING ELSE INSTEAD

## 2018-08-23 NOTE — TELEPHONE ENCOUNTER
"S/w pt she states she hit her head, and now has big red spots on her head. Pt states due to the Eliquis and her bruising she was a little scared if her head was \"bleeding inside\"  I instructed pt to have checked out and I would discuss with Dr Salazar on Monday RE she should switch to another blood thinner. Pt agreed and verbalized understanding  "

## 2018-08-28 ENCOUNTER — APPOINTMENT (OUTPATIENT)
Dept: GENERAL RADIOLOGY | Facility: HOSPITAL | Age: 73
End: 2018-08-28

## 2018-08-28 ENCOUNTER — HOSPITAL ENCOUNTER (EMERGENCY)
Facility: HOSPITAL | Age: 73
Discharge: HOME | End: 2018-08-28
Attending: EMERGENCY MEDICINE | Admitting: EMERGENCY MEDICINE

## 2018-08-28 ENCOUNTER — TELEPHONE (OUTPATIENT)
Dept: CARDIOLOGY | Facility: CLINIC | Age: 73
End: 2018-08-28

## 2018-08-28 VITALS
BODY MASS INDEX: 21.67 KG/M2 | SYSTOLIC BLOOD PRESSURE: 131 MMHG | HEART RATE: 84 BPM | HEIGHT: 72 IN | OXYGEN SATURATION: 92 % | RESPIRATION RATE: 16 BRPM | TEMPERATURE: 98.3 F | WEIGHT: 160 LBS | DIASTOLIC BLOOD PRESSURE: 83 MMHG

## 2018-08-28 DIAGNOSIS — R07.89 LEFT-SIDED CHEST WALL PAIN: Primary | ICD-10-CM

## 2018-08-28 LAB
ANION GAP SERPL CALCULATED.3IONS-SCNC: 11.5 MMOL/L
BASOPHILS # BLD AUTO: 0.03 10*3/MM3 (ref 0–0.2)
BASOPHILS NFR BLD AUTO: 0.3 % (ref 0–1.5)
BUN BLD-MCNC: 12 MG/DL (ref 8–23)
BUN/CREAT SERPL: 17.4 (ref 7–25)
CALCIUM SPEC-SCNC: 9.3 MG/DL (ref 8.6–10.5)
CHLORIDE SERPL-SCNC: 97 MMOL/L (ref 98–107)
CO2 SERPL-SCNC: 28.5 MMOL/L (ref 22–29)
CREAT BLD-MCNC: 0.69 MG/DL (ref 0.57–1)
DEPRECATED RDW RBC AUTO: 49.7 FL (ref 37–54)
EOSINOPHIL # BLD AUTO: 0.05 10*3/MM3 (ref 0–0.7)
EOSINOPHIL NFR BLD AUTO: 0.5 % (ref 0.3–6.2)
ERYTHROCYTE [DISTWIDTH] IN BLOOD BY AUTOMATED COUNT: 14 % (ref 11.7–13)
GFR SERPL CREATININE-BSD FRML MDRD: 83 ML/MIN/1.73
GLUCOSE BLD-MCNC: 95 MG/DL (ref 65–99)
HCT VFR BLD AUTO: 41.4 % (ref 35.6–45.5)
HGB BLD-MCNC: 13.8 G/DL (ref 11.9–15.5)
IMM GRANULOCYTES # BLD: 0.04 10*3/MM3 (ref 0–0.03)
IMM GRANULOCYTES NFR BLD: 0.4 % (ref 0–0.5)
INR PPP: 1.22 (ref 0.9–1.1)
LYMPHOCYTES # BLD AUTO: 1.05 10*3/MM3 (ref 0.9–4.8)
LYMPHOCYTES NFR BLD AUTO: 11.4 % (ref 19.6–45.3)
MCH RBC QN AUTO: 32.5 PG (ref 26.9–32)
MCHC RBC AUTO-ENTMCNC: 33.3 G/DL (ref 32.4–36.3)
MCV RBC AUTO: 97.4 FL (ref 80.5–98.2)
MONOCYTES # BLD AUTO: 0.67 10*3/MM3 (ref 0.2–1.2)
MONOCYTES NFR BLD AUTO: 7.3 % (ref 5–12)
NEUTROPHILS # BLD AUTO: 7.4 10*3/MM3 (ref 1.9–8.1)
NEUTROPHILS NFR BLD AUTO: 80.5 % (ref 42.7–76)
PLATELET # BLD AUTO: 242 10*3/MM3 (ref 140–500)
PMV BLD AUTO: 10.8 FL (ref 6–12)
POTASSIUM BLD-SCNC: 4.2 MMOL/L (ref 3.5–5.2)
PROTHROMBIN TIME: 15.2 SECONDS (ref 11.7–14.2)
RBC # BLD AUTO: 4.25 10*6/MM3 (ref 3.9–5.2)
SODIUM BLD-SCNC: 137 MMOL/L (ref 136–145)
WBC NRBC COR # BLD: 9.2 10*3/MM3 (ref 4.5–10.7)

## 2018-08-28 PROCEDURE — 85610 PROTHROMBIN TIME: CPT | Performed by: NURSE PRACTITIONER

## 2018-08-28 PROCEDURE — 71046 X-RAY EXAM CHEST 2 VIEWS: CPT

## 2018-08-28 PROCEDURE — 99283 EMERGENCY DEPT VISIT LOW MDM: CPT

## 2018-08-28 PROCEDURE — 80048 BASIC METABOLIC PNL TOTAL CA: CPT | Performed by: NURSE PRACTITIONER

## 2018-08-28 PROCEDURE — 85025 COMPLETE CBC W/AUTO DIFF WBC: CPT | Performed by: NURSE PRACTITIONER

## 2018-08-28 RX ORDER — FUROSEMIDE 40 MG/1
TABLET ORAL
Qty: 45 TABLET | Refills: 1 | Status: SHIPPED | OUTPATIENT
Start: 2018-08-28 | End: 2018-10-29 | Stop reason: SDUPTHER

## 2018-08-28 RX ORDER — SODIUM CHLORIDE 0.9 % (FLUSH) 0.9 %
10 SYRINGE (ML) INJECTION AS NEEDED
Status: DISCONTINUED | OUTPATIENT
Start: 2018-08-28 | End: 2018-08-28 | Stop reason: HOSPADM

## 2018-08-28 NOTE — TELEPHONE ENCOUNTER
"----- Message from Hayley Freeman sent at 8/28/2018 10:36 AM EDT -----  Regarding: Pacemaker site issue  Contact: [phone]  Starting yesterday her Pacemaker Site is \"puffed up\" and pretty sore.  "

## 2018-09-06 ENCOUNTER — OFFICE VISIT (OUTPATIENT)
Dept: CARDIOLOGY | Facility: CLINIC | Age: 73
End: 2018-09-06

## 2018-09-06 VITALS
DIASTOLIC BLOOD PRESSURE: 80 MMHG | HEART RATE: 89 BPM | SYSTOLIC BLOOD PRESSURE: 123 MMHG | BODY MASS INDEX: 22.21 KG/M2 | HEIGHT: 72 IN | WEIGHT: 164 LBS

## 2018-09-06 DIAGNOSIS — I42.0 DILATED CARDIOMYOPATHY (HCC): ICD-10-CM

## 2018-09-06 DIAGNOSIS — Z95.0 PACEMAKER: ICD-10-CM

## 2018-09-06 DIAGNOSIS — I10 ESSENTIAL HYPERTENSION: ICD-10-CM

## 2018-09-06 DIAGNOSIS — R00.2 PALPITATIONS: ICD-10-CM

## 2018-09-06 DIAGNOSIS — I48.0 PAROXYSMAL ATRIAL FIBRILLATION (HCC): Primary | ICD-10-CM

## 2018-09-06 PROCEDURE — 99213 OFFICE O/P EST LOW 20 MIN: CPT | Performed by: INTERNAL MEDICINE

## 2018-09-06 RX ORDER — GABAPENTIN 100 MG/1
CAPSULE ORAL
COMMUNITY
Start: 2018-08-30 | End: 2019-04-09

## 2018-09-06 NOTE — PROGRESS NOTES
Subjective:       Raina Forrest is a 72 y.o. female who here for follow up    CC  PAIN AT PACER SITE    PAINS IN HIP    BRUISING    DARK STOOL  HPI  72-year-old female with a known history of benign essential arterial hypertension, palpitations, paroxysmal atrial fibrillation cardiomyopathy and the pacemaker has been complaining of the pain at the pacemaker site, patient also complains of pain in the hip, also complains of dark stool and the bruising FOR THE LAST SEVERAL WEEKS     Problem List Items Addressed This Visit        Cardiovascular and Mediastinum    Hypertension    Palpitations    Paroxysmal atrial fibrillation (CMS/HCC) - Primary    Cardiomyopathy (CMS/HCC)    Pacemaker        .    The following portions of the patient's history were reviewed and updated as appropriate: allergies, current medications, past family history, past medical history, past social history, past surgical history and problem list.    Past Medical History:   Diagnosis Date   • Abdominal pain    • Atrial fibrillation (CMS/HCC)    • Fung's esophagus    • Benign essential hypertension    • Blood in stool    • Chest pain    • Chronic gastritis    • Congestive heart failure (CMS/HCC)    • Degenerative disc disease    • Depression with anxiety    • Disease of thyroid gland    • Diverticulitis of colon    • Dysphagia    • GERD (gastroesophageal reflux disease)    • History of migraine headaches    • Osteoporosis    • Palpitations    • Thyroid disorder    • Ulcerative colitis (CMS/HCC)    • Vomiting    • Weight loss     reports that she has never smoked. She has never used smokeless tobacco. She reports that she does not drink alcohol or use drugs.  Family History   Problem Relation Age of Onset   • Lung cancer Other    • Ulcerative colitis Sister    • Heart failure Mother    • Heart disease Mother    • Heart failure Father    • Heart disease Father        Review of Systems  Constitutional: No wt loss, fever,  "fatigue  Gastrointestinal: No nausea, abdominal pain  Behavioral/Psych: No insomnia or anxiety   Cardiovascular dark stools  Objective:                 Physical Exam  /80 (BP Location: Right arm, Patient Position: Sitting)   Pulse 89   Ht 182.9 cm (72\")   Wt 74.4 kg (164 lb)   BMI 22.24 kg/m²     General appearance: NAD, conversant   Eyes: anicteric sclerae, moist conjunctivae; no lid-lag; PERRLA   HENT: Atraumatic; oropharynx clear with moist mucous membranes and no mucosal ulcerations;  normal hard and soft palate   Neck: Trachea midline; FROM, supple, no thyromegaly or lymphadenopathy   Lungs: CTA, with normal respiratory effort and no intercostal retractions   CV: S1-S2 regular, no murmurs, no rub, no gallop   Abdomen: Soft, non-tender; no masses or HSM   Extremities: No peripheral edema or extremity lymphadenopathy  Skin: Normal temperature, turgor and texture; no rash, ulcers or subcutaneous nodules   Psych: Appropriate affect, alert and oriented to person, place and time           Cardiographics  @Procedures    Echocardiogram:        Current Outpatient Prescriptions:   •  acetaminophen (TYLENOL) 325 MG tablet, Take 2 tablets by mouth Every 6 (Six) Hours As Needed for Moderate Pain (4-6) or Fever., Disp: , Rfl: 0  •  apixaban (ELIQUIS) 5 MG tablet tablet, 1/2 TAB BID, Disp: 56 tablet, Rfl: 0  •  bisoprolol-hydrochlorothiazide (ZIAC) 5-6.25 MG per tablet, 1/2 tablet by mouth daily (Patient taking differently: Take 0.5 tablets by mouth Daily. 1/2 tablet by mouth daily), Disp: 30 tablet, Rfl: 6  •  furosemide (LASIX) 40 MG tablet, TAKE 1 TABLET BY MOUTH EVERY MORNING AND TAKE 1/2 TABLET EVERY EVENING AS NEEDED, Disp: 45 tablet, Rfl: 1  •  gabapentin (NEURONTIN) 100 MG capsule, , Disp: , Rfl:   •  HYDROcodone-acetaminophen (NORCO) 5-325 MG per tablet, Take 1 tablet by mouth Every 6 (Six) Hours As Needed for Moderate Pain ., Disp: , Rfl:   •  KLOR-CON 10 MEQ CR tablet, TAKE 1 TABLET BY MOUTH DAILY., Disp: " 30 tablet, Rfl: 3  •  levothyroxine (LEVOTHROID) 25 MCG tablet, Take 25 mcg by mouth Daily., Disp: , Rfl:   •  MECLIZINE HCL PO, Take 25 mg by mouth 3 (Three) Times a Day As Needed., Disp: , Rfl:   •  ondansetron (ZOFRAN) 8 MG tablet, TAKE 1 TABLET BY MOUTH EVERY 8 (EIGHT) HOURS AS NEEDED FOR NAUSEA FOR UP TO 7 DAYS, Disp: , Rfl:   •  pantoprazole (PROTONIX) 40 MG EC tablet, Take 1 tablet by mouth Daily., Disp: 30 tablet, Rfl: 3  •  sertraline (ZOLOFT) 100 MG tablet, Take 150 mg by mouth Daily., Disp: , Rfl:   •  SUMAtriptan (IMITREX) 50 MG tablet, TAKE 1 TABLET EVERY 2 HOURS AS NEEDED FOR MIGRAINE,MAY REPEAT ONCE IN 2 HOURS, Disp: , Rfl: 4  •  traMADol (ULTRAM) 50 MG tablet, , Disp: , Rfl:    Assessment:        Patient Active Problem List   Diagnosis   • Chronic atrial fibrillation (CMS/HCC)   • Benign essential HTN   • Bradycardia   • Chest pain   • Can't get food down   • Accumulation of fluid in tissues   • Esophageal lump   • Adynamia   • Itch of skin   • Anorexia   • Chronic nausea   • Gastroesophageal reflux disease   • Breath shortness   • Emesis   • Decreased body weight   • Anxiety   • Narrowing of intervertebral disc space   • Diverticulosis of intestine   • Hypertension   • Migraine   • Osteoporosis   • Palpitations   • Pituitary adenoma (CMS/HCC)   • Sick sinus syndrome (CMS/HCC)   • Diarrhea   • Paroxysmal atrial fibrillation (CMS/HCC)   • Cardiomyopathy (CMS/HCC)   • Anticoagulated   • Atrial fibrillation (CMS/HCC)   • On amiodarone therapy   • Pacemaker   • Cardiac resynchronization therapy pacemaker (CRT-P) in place   • Infected pacemaker (CMS/HCC)   • Wound infection               Plan:            ICD-10-CM ICD-9-CM   1. Paroxysmal atrial fibrillation (CMS/HCC) I48.0 427.31   2. Palpitations R00.2 785.1   3. Pacemaker Z95.0 V45.01   4. Essential hypertension I10 401.9   5. Dilated cardiomyopathy (CMS/HCC) I42.0 425.4     1. Paroxysmal atrial fibrillation (CMS/HCC)  Controlled    2.  Palpitations  Controlled    3. Pacemaker  Pacemaker functioning normally    4. Essential hypertension  Controlled    5. Dilated cardiomyopathy (CMS/HCC)  Compensated       STOP ELLOQUISE    Pros and cons of this new medication / change medication has been explained to  the patient    Possible side effects has been explained    Associated need of the blood  Work has been explained    Need for the compliance of the medication has been explained    LOCAL ANESTHESIA AST PACER SITE    WHIRLPOOL    Pros and cons of ASA in primary and secondary prevention of CAD has been discussed.  Risks of bleeding and other possible side effects have been discussed, Diff advantages and disadvantages of 325 vs 81  Mg of ASA were discussed, at this stage it has been recommended to start ASA 81 mg /day       SEE US 6 MONTHS    COUNSELING:    Raina Perkins was given to patient for the following topics: diagnostic results, risk factor reductions, impressions, risks and benefits of treatment options and importance of treatment compliance .       SMOKING COUNSELING:    Counseling given: Not Answered      EMR Dragon/Transcription disclaimer:   Much of this encounter note is an electronic transcription/translation of spoken language to printed text. The electronic translation of spoken language may permit erroneous, or at times, nonsensical words or phrases to be inadvertently transcribed; Although I have reviewed the note for such errors, some may still exist.

## 2018-09-25 RX ORDER — POTASSIUM CHLORIDE 750 MG/1
TABLET, EXTENDED RELEASE ORAL
Qty: 30 TABLET | Refills: 3 | Status: SHIPPED | OUTPATIENT
Start: 2018-09-25 | End: 2019-06-18 | Stop reason: SDUPTHER

## 2018-10-31 RX ORDER — FUROSEMIDE 40 MG/1
TABLET ORAL
Qty: 45 TABLET | Refills: 11 | Status: SHIPPED | OUTPATIENT
Start: 2018-10-31 | End: 2019-09-22 | Stop reason: SDUPTHER

## 2018-10-31 RX ORDER — BISOPROLOL FUMARATE AND HYDROCHLOROTHIAZIDE 5; 6.25 MG/1; MG/1
TABLET ORAL
Qty: 30 TABLET | Refills: 11 | Status: SHIPPED | OUTPATIENT
Start: 2018-10-31 | End: 2019-09-26 | Stop reason: SDUPTHER

## 2018-11-12 DIAGNOSIS — R06.02 SHORTNESS OF BREATH: Primary | ICD-10-CM

## 2018-11-13 ENCOUNTER — OFFICE VISIT (OUTPATIENT)
Dept: CARDIOLOGY | Facility: CLINIC | Age: 73
End: 2018-11-13

## 2018-11-13 ENCOUNTER — OFFICE (OUTPATIENT)
Dept: URBAN - METROPOLITAN AREA CLINIC 75 | Facility: CLINIC | Age: 73
End: 2018-11-13

## 2018-11-13 VITALS
DIASTOLIC BLOOD PRESSURE: 90 MMHG | WEIGHT: 160 LBS | HEART RATE: 86 BPM | HEIGHT: 69 IN | SYSTOLIC BLOOD PRESSURE: 130 MMHG

## 2018-11-13 VITALS
HEART RATE: 83 BPM | DIASTOLIC BLOOD PRESSURE: 87 MMHG | SYSTOLIC BLOOD PRESSURE: 134 MMHG | BODY MASS INDEX: 23.05 KG/M2 | HEIGHT: 70 IN | WEIGHT: 161 LBS

## 2018-11-13 DIAGNOSIS — Z95.0 PACEMAKER: ICD-10-CM

## 2018-11-13 DIAGNOSIS — I48.0 PAROXYSMAL ATRIAL FIBRILLATION (HCC): Primary | ICD-10-CM

## 2018-11-13 DIAGNOSIS — R07.2 PRECORDIAL PAIN: ICD-10-CM

## 2018-11-13 DIAGNOSIS — R00.2 PALPITATIONS: ICD-10-CM

## 2018-11-13 DIAGNOSIS — R10.10 UPPER ABDOMINAL PAIN, UNSPECIFIED: ICD-10-CM

## 2018-11-13 DIAGNOSIS — R11.2 NAUSEA WITH VOMITING, UNSPECIFIED: ICD-10-CM

## 2018-11-13 DIAGNOSIS — R94.5 ABNORMAL RESULTS OF LIVER FUNCTION STUDIES: ICD-10-CM

## 2018-11-13 DIAGNOSIS — I42.0 DILATED CARDIOMYOPATHY (HCC): ICD-10-CM

## 2018-11-13 PROCEDURE — 99213 OFFICE O/P EST LOW 20 MIN: CPT | Performed by: INTERNAL MEDICINE

## 2018-11-13 PROCEDURE — 99214 OFFICE O/P EST MOD 30 MIN: CPT | Performed by: INTERNAL MEDICINE

## 2018-11-13 NOTE — PROGRESS NOTES
Subjective:        Raina Forrest is a 72 y.o. female who here for follow up    CC  Er visit for sob    afib    Increasing sob    Fluid build up  HPI  72-year-old female recently went to the emergency room because of shortness of breath, patient will also has a history of atrial fibrillation and has been complaining of the fluid buildup         Problem List Items Addressed This Visit        Cardiovascular and Mediastinum    Palpitations    Paroxysmal atrial fibrillation (CMS/HCC) - Primary    Cardiomyopathy (CMS/HCC)    Pacemaker        .    The following portions of the patient's history were reviewed and updated as appropriate: allergies, current medications, past family history, past medical history, past social history, past surgical history and problem list.    Past Medical History:   Diagnosis Date   • Abdominal pain    • Atrial fibrillation (CMS/HCC)    • Fung's esophagus    • Benign essential hypertension    • Blood in stool    • Chest pain    • Chronic gastritis    • Congestive heart failure (CMS/HCC)    • Degenerative disc disease    • Depression with anxiety    • Disease of thyroid gland    • Diverticulitis of colon    • Dysphagia    • GERD (gastroesophageal reflux disease)    • History of migraine headaches    • Osteoporosis    • Palpitations    • Thyroid disorder    • Ulcerative colitis (CMS/HCC)    • Vomiting    • Weight loss      reports that  has never smoked. she has never used smokeless tobacco. She reports that she does not drink alcohol or use drugs.   Family History   Problem Relation Age of Onset   • Lung cancer Other    • Ulcerative colitis Sister    • Heart failure Mother    • Heart disease Mother    • Heart failure Father    • Heart disease Father        Review of Systems  Constitutional: No wt loss, fever, fatigue  Gastrointestinal: No nausea, abdominal pain  Behavioral/Psych: No insomnia or anxiety   Cardiovascular shortness of breath  Objective:                 Physical Exam  BP  "134/87   Pulse 83   Ht 177.8 cm (70\")   Wt 73 kg (161 lb)   BMI 23.10 kg/m²     General appearance: NAD, conversant   Eyes: anicteric sclerae, moist conjunctivae; no lid-lag; PERRLA   HENT: Atraumatic; oropharynx clear with moist mucous membranes and no mucosal ulcerations;  normal hard and soft palate   Neck: Trachea midline; FROM, supple, no thyromegaly or lymphadenopathy   Lungs: CTA, with normal respiratory effort and no intercostal retractions   CV: S1-S2 regular, no murmurs, no rub, no gallop   Abdomen: Soft, non-tender; no masses or HSM   Extremities: No peripheral edema or extremity lymphadenopathy  Skin: Normal temperature, turgor and texture; no rash, ulcers or subcutaneous nodules   Psych: Appropriate affect, alert and oriented to person, place and time           Procedures    ekg afib with controlled rate  Echocardiogram:        Current Outpatient Medications:   •  acetaminophen (TYLENOL) 325 MG tablet, Take 2 tablets by mouth Every 6 (Six) Hours As Needed for Moderate Pain (4-6) or Fever., Disp: , Rfl: 0  •  bisoprolol-hydrochlorothiazide (ZIAC) 5-6.25 MG per tablet, TAKE 1 TABLET BY MOUTH DAILY., Disp: 30 tablet, Rfl: 11  •  furosemide (LASIX) 40 MG tablet, TAKE 1 TABLET BY MOUTH EVERY MORNING AND TAKE 1/2 TABLET EVERY EVENING AS NEEDED (Patient taking differently: TAKE 1 TABLET BY MOUTH EVERY MORNING AND TAKE 1 TABLET EVERY EVENING AS NEEDED), Disp: 45 tablet, Rfl: 11  •  gabapentin (NEURONTIN) 100 MG capsule, , Disp: , Rfl:   •  HYDROcodone-acetaminophen (NORCO) 5-325 MG per tablet, Take 1 tablet by mouth Every 6 (Six) Hours As Needed for Moderate Pain ., Disp: , Rfl:   •  KLOR-CON 10 MEQ CR tablet, TAKE 1 TABLET BY MOUTH DAILY., Disp: 30 tablet, Rfl: 3  •  levothyroxine (LEVOTHROID) 25 MCG tablet, Take 25 mcg by mouth Daily., Disp: , Rfl:   •  MECLIZINE HCL PO, Take 25 mg by mouth 3 (Three) Times a Day As Needed., Disp: , Rfl:   •  ondansetron (ZOFRAN) 8 MG tablet, TAKE 1 TABLET BY MOUTH EVERY 8 " (EIGHT) HOURS AS NEEDED FOR NAUSEA FOR UP TO 7 DAYS, Disp: , Rfl:   •  pantoprazole (PROTONIX) 40 MG EC tablet, Take 1 tablet by mouth Daily., Disp: 30 tablet, Rfl: 3  •  sertraline (ZOLOFT) 100 MG tablet, Take 150 mg by mouth Daily., Disp: , Rfl:   •  SUMAtriptan (IMITREX) 50 MG tablet, TAKE 1 TABLET EVERY 2 HOURS AS NEEDED FOR MIGRAINE,MAY REPEAT ONCE IN 2 HOURS, Disp: , Rfl: 4  •  traMADol (ULTRAM) 50 MG tablet, , Disp: , Rfl:    Assessment:        Patient Active Problem List   Diagnosis   • Chronic atrial fibrillation (CMS/HCC)   • Benign essential HTN   • Bradycardia   • Chest pain   • Can't get food down   • Accumulation of fluid in tissues   • Esophageal lump   • Adynamia   • Itch of skin   • Anorexia   • Chronic nausea   • Gastroesophageal reflux disease   • Breath shortness   • Emesis   • Decreased body weight   • Anxiety   • Narrowing of intervertebral disc space   • Diverticulosis of intestine   • Hypertension   • Migraine   • Osteoporosis   • Palpitations   • Pituitary adenoma (CMS/HCC)   • Sick sinus syndrome (CMS/HCC)   • Diarrhea   • Paroxysmal atrial fibrillation (CMS/HCC)   • Cardiomyopathy (CMS/HCC)   • Anticoagulated   • Atrial fibrillation (CMS/HCC)   • On amiodarone therapy   • Pacemaker   • Cardiac resynchronization therapy pacemaker (CRT-P) in place   • Infected pacemaker (CMS/HCC)   • Wound infection               Plan:            ICD-10-CM ICD-9-CM   1. Paroxysmal atrial fibrillation (CMS/HCC) I48.0 427.31   2. Palpitations R00.2 785.1   3. Pacemaker Z95.0 V45.01   4. Dilated cardiomyopathy (CMS/HCC) I42.0 425.4     1. Paroxysmal atrial fibrillation (CMS/HCC)  Control    2. Palpitations  Controlled    3. Pacemaker  Functioning normally    4. Dilated cardiomyopathy (CMS/HCC)  Compensated    Patient Care Team:  Georgiana Cisneros MD as PCP - General (Pediatrics)  Clara Fischer APRN as PCP - Family Medicine (Nurse Practitioner)  Nathan Johnson MD as Resident  "(Gastroenterology)  Robinson Salazar MD as Consulting Physician (Cardiology)    CC:       Interval History:       Objective   Vital Signs  Heart Rate:  [83] 83  BP: (134)/(87) 134/87  [unfilled]  Flowsheet Rows      First Filed Value   Admission Height  177.8 cm (70\") Documented at 11/13/2018 1106   Admission Weight  73 kg (161 lb) Documented at 11/13/2018 1106            ROS    GENERAL    CARDIAC    PULMONARY    Physical Exam:      Physical Exam  /87   Pulse 83   Ht 177.8 cm (70\")   Wt 73 kg (161 lb)   BMI 23.10 kg/m²     General appearance: NAD, conversant   Eyes: anicteric sclerae, moist conjunctivae; no lid-lag; PERRLA   HENT: Atraumatic; oropharynx clear with moist mucous membranes and no mucosal ulcerations;  normal hard and soft palate   Neck: Trachea midline; FROM, supple, no thyromegaly or lymphadenopathy   Lungs: CTA, with normal respiratory effort and no intercostal retractions   CV: S1-S2 regular, no murmurs, no rub, no gallop   Abdomen: Soft, non-tender; no masses or HSM   Extremities: No peripheral edema or extremity lymphadenopathy  Skin: Normal temperature, turgor and texture; no rash, ulcers or subcutaneous nodules   Psych: Appropriate affect, alert and oriented to person, place and time                                   Results Review:                                  No current facility-administered medications for this visit.       I reviewed the patient's new clinical results.  I personally viewed and interpreted the patient's EKG/Telemetry data    Assessment/Plan  Patient Active Problem List   Diagnosis   • Chronic atrial fibrillation (CMS/HCC)   • Benign essential HTN   • Bradycardia   • Chest pain   • Can't get food down   • Accumulation of fluid in tissues   • Esophageal lump   • Adynamia   • Itch of skin   • Anorexia   • Chronic nausea   • Gastroesophageal reflux disease   • Breath shortness   • Emesis   • Decreased body weight   • Anxiety   • Narrowing of intervertebral disc " space   • Diverticulosis of intestine   • Hypertension   • Migraine   • Osteoporosis   • Palpitations   • Pituitary adenoma (CMS/HCC)   • Sick sinus syndrome (CMS/HCC)   • Diarrhea   • Paroxysmal atrial fibrillation (CMS/HCC)   • Cardiomyopathy (CMS/HCC)   • Anticoagulated   • Atrial fibrillation (CMS/HCC)   • On amiodarone therapy   • Pacemaker   • Cardiac resynchronization therapy pacemaker (CRT-P) in place   • Infected pacemaker (CMS/HCC)   • Wound infection     Robinson Salazar MD  11/13/18  11:34 AM             ICD-10-CM ICD-9-CM   1. Paroxysmal atrial fibrillation (CMS/HCC) I48.0 427.31   2. Palpitations R00.2 785.1   3. Pacemaker Z95.0 V45.01   4. Dilated cardiomyopathy (CMS/HCC) I42.0 425.4     1. Paroxysmal atrial fibrillation (CMS/HCC)  Controlled   2. Palpitations  Controlled    3. Pacemaker  Functioning normally    4. Dilated cardiomyopathy (CMS/HCC)  Compensated  Raina KIM Lon needs anti coagulation  At this point pt is not on anticoagulations.  Pros and cons of anticoagulations has been discussed  Alternate methods including Watchman has been discussed  At this stage it has been decided NO ANTICOAGULATIONS    [place], echo    See in 2 wks    COUNSELING:    Raina ForrestCounseling was given to patient for the following topics: diagnostic results, risk factor reductions, impressions, risks and benefits of treatment options and importance of treatment compliance .       SMOKING COUNSELING:    Counseling given: Not Answered      EMR Dragon/Transcription disclaimer:   Much of this encounter note is an electronic transcription/translation of spoken language to printed text. The electronic translation of spoken language may permit erroneous, or at times, nonsensical words or phrases to be inadvertently transcribed; Although I have reviewed the note for such errors, some may still exist.

## 2018-11-15 ENCOUNTER — APPOINTMENT (OUTPATIENT)
Dept: CARDIOLOGY | Facility: HOSPITAL | Age: 73
End: 2018-11-15
Attending: INTERNAL MEDICINE

## 2018-11-16 ENCOUNTER — APPOINTMENT (OUTPATIENT)
Dept: GENERAL RADIOLOGY | Facility: HOSPITAL | Age: 73
End: 2018-11-16

## 2018-11-16 ENCOUNTER — HOSPITAL ENCOUNTER (EMERGENCY)
Facility: HOSPITAL | Age: 73
Discharge: HOME | End: 2018-11-16
Attending: EMERGENCY MEDICINE | Admitting: EMERGENCY MEDICINE

## 2018-11-16 VITALS
BODY MASS INDEX: 22.9 KG/M2 | HEART RATE: 77 BPM | OXYGEN SATURATION: 96 % | WEIGHT: 160 LBS | DIASTOLIC BLOOD PRESSURE: 72 MMHG | SYSTOLIC BLOOD PRESSURE: 114 MMHG | HEIGHT: 70 IN | TEMPERATURE: 97.9 F | RESPIRATION RATE: 18 BRPM

## 2018-11-16 DIAGNOSIS — R06.00 DYSPNEA, UNSPECIFIED TYPE: Primary | ICD-10-CM

## 2018-11-16 LAB
ALBUMIN SERPL-MCNC: 4.7 G/DL (ref 3.5–5.2)
ALBUMIN/GLOB SERPL: 1.5 G/DL
ALP SERPL-CCNC: 118 U/L (ref 39–117)
ALT SERPL W P-5'-P-CCNC: 15 U/L (ref 1–33)
ANION GAP SERPL CALCULATED.3IONS-SCNC: 14.7 MMOL/L
AST SERPL-CCNC: 27 U/L (ref 1–32)
BASOPHILS # BLD AUTO: 0.05 10*3/MM3 (ref 0–0.2)
BASOPHILS NFR BLD AUTO: 0.5 % (ref 0–1.5)
BILIRUB SERPL-MCNC: 0.6 MG/DL (ref 0.1–1.2)
BUN BLD-MCNC: 10 MG/DL (ref 8–23)
BUN/CREAT SERPL: 12 (ref 7–25)
CALCIUM SPEC-SCNC: 9.9 MG/DL (ref 8.6–10.5)
CHLORIDE SERPL-SCNC: 93 MMOL/L (ref 98–107)
CO2 SERPL-SCNC: 25.3 MMOL/L (ref 22–29)
CREAT BLD-MCNC: 0.83 MG/DL (ref 0.57–1)
DEPRECATED RDW RBC AUTO: 46.9 FL (ref 37–54)
EOSINOPHIL # BLD AUTO: 0.16 10*3/MM3 (ref 0–0.7)
EOSINOPHIL NFR BLD AUTO: 1.5 % (ref 0.3–6.2)
ERYTHROCYTE [DISTWIDTH] IN BLOOD BY AUTOMATED COUNT: 13.5 % (ref 11.7–13)
GFR SERPL CREATININE-BSD FRML MDRD: 67 ML/MIN/1.73
GLOBULIN UR ELPH-MCNC: 3.1 GM/DL
GLUCOSE BLD-MCNC: 109 MG/DL (ref 65–99)
HCT VFR BLD AUTO: 42.3 % (ref 35.6–45.5)
HGB BLD-MCNC: 14.6 G/DL (ref 11.9–15.5)
HOLD SPECIMEN: NORMAL
HOLD SPECIMEN: NORMAL
IMM GRANULOCYTES # BLD: 0.07 10*3/MM3 (ref 0–0.03)
IMM GRANULOCYTES NFR BLD: 0.6 % (ref 0–0.5)
LIPASE SERPL-CCNC: 40 U/L (ref 13–60)
LYMPHOCYTES # BLD AUTO: 1.99 10*3/MM3 (ref 0.9–4.8)
LYMPHOCYTES NFR BLD AUTO: 18.1 % (ref 19.6–45.3)
MCH RBC QN AUTO: 32.9 PG (ref 26.9–32)
MCHC RBC AUTO-ENTMCNC: 34.5 G/DL (ref 32.4–36.3)
MCV RBC AUTO: 95.3 FL (ref 80.5–98.2)
MONOCYTES # BLD AUTO: 0.73 10*3/MM3 (ref 0.2–1.2)
MONOCYTES NFR BLD AUTO: 6.6 % (ref 5–12)
NEUTROPHILS # BLD AUTO: 8.07 10*3/MM3 (ref 1.9–8.1)
NEUTROPHILS NFR BLD AUTO: 73.3 % (ref 42.7–76)
NT-PROBNP SERPL-MCNC: 1443 PG/ML (ref 5–900)
PLATELET # BLD AUTO: 277 10*3/MM3 (ref 140–500)
PMV BLD AUTO: 11.6 FL (ref 6–12)
POTASSIUM BLD-SCNC: 3.7 MMOL/L (ref 3.5–5.2)
PROT SERPL-MCNC: 7.8 G/DL (ref 6–8.5)
RBC # BLD AUTO: 4.44 10*6/MM3 (ref 3.9–5.2)
SODIUM BLD-SCNC: 133 MMOL/L (ref 136–145)
TROPONIN T SERPL-MCNC: <0.01 NG/ML (ref 0–0.03)
WBC NRBC COR # BLD: 11 10*3/MM3 (ref 4.5–10.7)
WHOLE BLOOD HOLD SPECIMEN: NORMAL
WHOLE BLOOD HOLD SPECIMEN: NORMAL

## 2018-11-16 PROCEDURE — 99284 EMERGENCY DEPT VISIT MOD MDM: CPT

## 2018-11-16 PROCEDURE — 83880 ASSAY OF NATRIURETIC PEPTIDE: CPT | Performed by: PHYSICIAN ASSISTANT

## 2018-11-16 PROCEDURE — 93005 ELECTROCARDIOGRAM TRACING: CPT

## 2018-11-16 PROCEDURE — 93010 ELECTROCARDIOGRAM REPORT: CPT | Performed by: INTERNAL MEDICINE

## 2018-11-16 PROCEDURE — 83690 ASSAY OF LIPASE: CPT | Performed by: PHYSICIAN ASSISTANT

## 2018-11-16 PROCEDURE — 84484 ASSAY OF TROPONIN QUANT: CPT | Performed by: PHYSICIAN ASSISTANT

## 2018-11-16 PROCEDURE — 85025 COMPLETE CBC W/AUTO DIFF WBC: CPT | Performed by: PHYSICIAN ASSISTANT

## 2018-11-16 PROCEDURE — 93005 ELECTROCARDIOGRAM TRACING: CPT | Performed by: EMERGENCY MEDICINE

## 2018-11-16 PROCEDURE — 80053 COMPREHEN METABOLIC PANEL: CPT | Performed by: PHYSICIAN ASSISTANT

## 2018-11-16 PROCEDURE — 71046 X-RAY EXAM CHEST 2 VIEWS: CPT

## 2018-11-16 NOTE — ED PROVIDER NOTES
EMERGENCY DEPARTMENT ENCOUNTER    Room Number:  19/19  Date seen:  11/16/2018  Time seen: 3:16 PM  PCP: Georgiana Cisneros MD    HPI:  Chief complaint:Shortness of Air  Context:Raina Forrest is a 72 y.o. female who presents to the ED with c/o SOA that has been occurring for the past few days and has gotten much worse today. Pt states that it is constant and worse with activity. Pt also complains of left sided CP that is located around her left breast, that she states is off and on and is worse with activity. Pt states she currently has CHF and has a pacemaker. Pt also  Pt currently takes Lasix, which was recently increased.    Onset: gradual  Location:SOA  Radiation: none  Duration: a few days  Timing: constant  Character:SOA  Aggravating Factors: activity  Alleviating Factors: rest  Severity: moderate    ALLERGIES  Amoxicillin-pot clavulanate; Bupivacaine hcl; Doxycycline; Nepafenac; Bactrim [sulfamethoxazole-trimethoprim]; Barium-containing compounds; Bextra [valdecoxib]; Bupivacaine; Clarithromycin; Contrast dye; Doxycycline monohydrate; Iodinated diagnostic agents; Iodine; Keflex [cephalexin]; Levaquin [levofloxacin]; Medrol [methylprednisolone]; Mesalamine; Nsaids; Polymyxin b-trimethoprim; Rivaroxaban; Tetanus toxoids; Tetanus-diphtheria toxoids td; and Clindamycin    PAST MEDICAL HISTORY  Active Ambulatory Problems     Diagnosis Date Noted   • Chronic atrial fibrillation (CMS/HCC) 04/07/2016   • Benign essential HTN 04/07/2016   • Bradycardia 04/07/2016   • Chest pain 04/07/2016   • Can't get food down 04/07/2016   • Accumulation of fluid in tissues 04/07/2016   • Esophageal lump 04/07/2016   • Adynamia 04/07/2016   • Itch of skin 04/07/2016   • Anorexia 04/07/2016   • Chronic nausea 04/07/2016   • Gastroesophageal reflux disease 04/07/2016   • Breath shortness 04/07/2016   • Emesis 04/07/2016   • Decreased body weight 04/07/2016   • Anxiety 04/21/2016   • Narrowing of intervertebral disc space  04/21/2016   • Diverticulosis of intestine 04/18/2013   • Hypertension 04/21/2016   • Migraine 04/21/2016   • Osteoporosis 04/21/2016   • Palpitations 10/02/2012   • Pituitary adenoma (CMS/HCC) 04/21/2016   • Sick sinus syndrome (CMS/HCC) 08/28/2013   • Diarrhea 07/21/2016   • Paroxysmal atrial fibrillation (CMS/HCC) 11/15/2016   • Cardiomyopathy (CMS/HCC) 12/01/2016   • Anticoagulated 12/01/2016   • Atrial fibrillation (CMS/HCC) 01/18/2017   • On amiodarone therapy 01/23/2017   • Pacemaker 02/22/2017   • Cardiac resynchronization therapy pacemaker (CRT-P) in place 03/28/2017   • Infected pacemaker (CMS/[facility]) 04/06/2017   • Wound infection 04/07/2017     Resolved Ambulatory Problems     Diagnosis Date Noted   • No Resolved Ambulatory Problems     Past Medical History:   Diagnosis Date   • Abdominal pain    • Atrial fibrillation (CMS/[facility])    • Fung's esophagus    • Benign essential hypertension    • Blood in stool    • Chest pain    • Chronic gastritis    • Congestive heart failure (CMS/[facility])    • Degenerative disc disease    • Depression with anxiety    • Disease of thyroid gland    • Diverticulitis of colon    • Dysphagia    • GERD (gastroesophageal reflux disease)    • History of migraine headaches    • Osteoporosis    • Palpitations    • Thyroid disorder    • Ulcerative colitis (CMS/HCC)    • Vomiting    • Weight loss        PAST SURGICAL HISTORY  Past Surgical History:   Procedure Laterality Date   • APPENDECTOMY     • BLADDER SURGERY     • CARDIOVERSION  01/18/2017   • CHOLECYSTECTOMY     • COLONOSCOPY     • ENDOSCOPY  05/06/2015    submucosal nodule in esophagus, erythematous mucosa in antrum,moderate acute gastritis, no h-pylori   • EYE SURGERY     • HYSTERECTOMY     • KNEE SURGERY     • THYROID SURGERY     • TONSILLECTOMY         FAMILY HISTORY  Family History   Problem Relation Age of Onset   • Lung cancer Other    • Ulcerative colitis Sister    • Heart failure Mother    • Heart disease Mother    • Heart  failure Father    • Heart disease Father        SOCIAL HISTORY  Social History     Socioeconomic History   • Marital status:      Spouse name: Not on file   • Number of children: Not on file   • Years of education: Not on file   • Highest education level: Not on file   Social Needs   • Financial resource strain: Not on file   • Food insecurity - worry: Not on file   • Food insecurity - inability: Not on file   • Transportation needs - medical: Not on file   • Transportation needs - non-medical: Not on file   Occupational History   • Not on file   Tobacco Use   • Smoking status: Never Smoker   • Smokeless tobacco: Never Used   Substance and Sexual Activity   • Alcohol use: No   • Drug use: No   • Sexual activity: Defer     Birth control/protection: Post-menopausal   Other Topics Concern   • Not on file   Social History Narrative   • Not on file       REVIEW OF SYSTEMS  Review of Systems   Constitutional: Negative for chills and fever.   HENT: Negative.    Eyes: Negative.    Respiratory: Positive for shortness of breath.    Cardiovascular: Positive for chest pain (located around the left breast).   Gastrointestinal: Negative for abdominal pain.   Genitourinary: Negative.    Musculoskeletal: Negative.    Skin: Negative.  Negative for wound.   Neurological: Negative.  Negative for weakness and numbness.   Psychiatric/Behavioral: Negative.        PHYSICAL EXAM  ED Triage Vitals [11/16/18 1502]   Temp Heart Rate Resp BP SpO2   97.9 °F (36.6 °C) (!) 43 17 121/90 92 %      Temp src Heart Rate Source Patient Position BP Location FiO2 (%)   Tympanic Monitor Lying Right arm --     Physical Exam   Constitutional: She is oriented to person, place, and time and well-developed, well-nourished, and in no distress.   HENT:   Head: Normocephalic and atraumatic.   Right Ear: External ear normal.   Left Ear: External ear normal.   Nose: Nose normal.   Eyes: Conjunctivae are normal.   Neck: Normal range of motion.    Cardiovascular: Normal rate, regular rhythm and intact distal pulses.   No BLE edema   Pulmonary/Chest: Effort normal and breath sounds normal.   Pt is able to speak easily and with full sentences.    Abdominal: Soft. She exhibits no distension. There is no tenderness.   Musculoskeletal: Normal range of motion. She exhibits no edema.   Neurological: She is alert and oriented to person, place, and time.   Skin: Skin is warm and dry.   Psychiatric: Affect normal.   Nursing note and vitals reviewed.      LAB RESULTS  Recent Results (from the past 24 hour(s))   Light Blue Top    Collection Time: 11/16/18  3:11 PM   Result Value Ref Range    Extra Tube hold for add-on    Green Top (Gel)    Collection Time: 11/16/18  3:11 PM   Result Value Ref Range    Extra Tube Hold for add-ons.    Lavender Top    Collection Time: 11/16/18  3:11 PM   Result Value Ref Range    Extra Tube hold for add-on    Gold Top - SST    Collection Time: 11/16/18  3:11 PM   Result Value Ref Range    Extra Tube Hold for add-ons.    Comprehensive Metabolic Panel    Collection Time: 11/16/18  3:11 PM   Result Value Ref Range    Glucose 109 (H) 65 - 99 mg/dL    BUN 10 8 - 23 mg/dL    Creatinine 0.83 0.57 - 1.00 mg/dL    Sodium 133 (L) 136 - 145 mmol/L    Potassium 3.7 3.5 - 5.2 mmol/L    Chloride 93 (L) 98 - 107 mmol/L    CO2 25.3 22.0 - 29.0 mmol/L    Calcium 9.9 8.6 - 10.5 mg/dL    Total Protein 7.8 6.0 - 8.5 g/dL    Albumin 4.70 3.50 - 5.20 g/dL    ALT (SGPT) 15 1 - 33 U/L    AST (SGOT) 27 1 - 32 U/L    Alkaline Phosphatase 118 (H) 39 - 117 U/L    Total Bilirubin 0.6 0.1 - 1.2 mg/dL    eGFR Non African Amer 67 >60 mL/min/1.73    Globulin 3.1 gm/dL    A/G Ratio 1.5 g/dL    BUN/Creatinine Ratio 12.0 7.0 - 25.0    Anion Gap 14.7 mmol/L   Troponin    Collection Time: 11/16/18  3:11 PM   Result Value Ref Range    Troponin T <0.010 0.000 - 0.030 ng/mL   BNP    Collection Time: 11/16/18  3:11 PM   Result Value Ref Range    proBNP 1,443.0 (H) 5.0 - 900.0  pg/mL   Lipase    Collection Time: 11/16/18  3:11 PM   Result Value Ref Range    Lipase 40 13 - 60 U/L   CBC Auto Differential    Collection Time: 11/16/18  3:11 PM   Result Value Ref Range    WBC 11.00 (H) 4.50 - 10.70 10*3/mm3    RBC 4.44 3.90 - 5.20 10*6/mm3    Hemoglobin 14.6 11.9 - 15.5 g/dL    Hematocrit 42.3 35.6 - 45.5 %    MCV 95.3 80.5 - 98.2 fL    MCH 32.9 (H) 26.9 - 32.0 pg    MCHC 34.5 32.4 - 36.3 g/dL    RDW 13.5 (H) 11.7 - 13.0 %    RDW-SD 46.9 37.0 - 54.0 fl    MPV 11.6 6.0 - 12.0 fL    Platelets 277 140 - 500 10*3/mm3    Neutrophil % 73.3 42.7 - 76.0 %    Lymphocyte % 18.1 (L) 19.6 - 45.3 %    Monocyte % 6.6 5.0 - 12.0 %    Eosinophil % 1.5 0.3 - 6.2 %    Basophil % 0.5 0.0 - 1.5 %    Immature Grans % 0.6 (H) 0.0 - 0.5 %    Neutrophils, Absolute 8.07 1.90 - 8.10 10*3/mm3    Lymphocytes, Absolute 1.99 0.90 - 4.80 10*3/mm3    Monocytes, Absolute 0.73 0.20 - 1.20 10*3/mm3    Eosinophils, Absolute 0.16 0.00 - 0.70 10*3/mm3    Basophils, Absolute 0.05 0.00 - 0.20 10*3/mm3    Immature Grans, Absolute 0.07 (H) 0.00 - 0.03 10*3/mm3       I ordered the above labs and reviewed the results    RADIOLOGY  Xr Chest 2 View    Result Date: 11/16/2018  Narrative: TWO-VIEW CHEST  HISTORY: Chest pain and shortness of breath.  FINDINGS:  The lungs are well-expanded and clear. There is moderate cardiomegaly with a pacemaker in place and the overall appearance shows no change from 08/28/2018.  This report was finalized on 11/16/2018 4:36 PM by Dr. Dwayne Romo M.D.          I ordered the above noted radiological studies and reviewed the images on the PACS system.      MEDICATIONS GIVEN IN ER  Medications - No data to display    EKG  Interpreted by ED Physician Dr. Soto.    PROCEDURES  Procedures      PROGRESS AND CONSULTS    Progress Notes:       1243  Discussed plan to order imaging and labs for further pt evaluation. Pt understands and agrees with the plan, all questions answered.    1618  Discussed pt case with  "PETER Soto who will examine the pt.     1646  Reviewed pt's history and workup with Dr. Soto.  After a bedside evaluation; Dr Soto agrees with the plan of care    1709  Placed call to Dr. Salazar (Cardiology)    1711  Discussed pt with Dr. Salazar who agrees with plan to d/c pt after reviewing pt case.     1721  BP- 121/90 HR- 89 Temp- 97.9 °F (36.6 °C) (Tympanic) O2 sat- 97%  Rechecked the patient who is in NAD and is resting comfortably.   Discussed unremarkable imaging and lab results. Told pt of discussion with Dr. Bartlett and plan to d/c pt. Advised pt to f/u with Dr. Salazar. She has an appointment with him in 4 days. Pt understands and agrees with the plan, all questions answered.        Disposition vitals:  /90 (BP Location: Right arm, Patient Position: Lying)   Pulse 89   Temp 97.9 °F (36.6 °C) (Tympanic)   Resp 17   Ht 177.8 cm (70\")   Wt 72.6 kg (160 lb)   SpO2 97%   BMI 22.96 kg/m²       DIAGNOSIS  Final diagnoses:   Dyspnea, unspecified type       FOLLOW UP   Robinson Salazar MD  [address] [id number] [phone]          Georgiana Cisneros MD  [address] [place] [address] [address]  [place] [id number] [phone]    Schedule an appointment as soon as possible for a visit         RX     Medication List      Changed    furosemide 40 MG tablet  Commonly known as:  LASIX  TAKE 1 TABLET BY MOUTH EVERY MORNING AND TAKE 1/2 TABLET EVERY EVENING AS   NEEDED  What changed:  See the new instructions.            Documentation assistance provided by nikki Parra for Nerissa Sorenson PA-C.  Information recorded by the nikki was done at my direction and has been verified and validated by me.         Nawaf Parra  11/16/18 0404       Nerissa Sorenson PA  11/16/18 0140    "

## 2018-11-16 NOTE — ED PROVIDER NOTES
"MD ATTESTATION NOTE    The JANINE and I have discussed this patient's history, physical exam, and treatment plan.  I have reviewed the documentation and personally had a face to face interaction with the patient. I affirm the documentation and agree with the treatment and plan.  The attached note describes my personal findings.        Pt with h/o HTN presents to the ED c/o intermittent episodes of dyspnea onset about 1-2 weeks ago. Pt states that she has also had a productive cough with thick sputum, abdominal pain (\"for a while\"), and mild chest pain. Pt denies documented fever, palpitations, new/worsening BLE edema, diaphoresis, sore throat, nausea, and vomiting. Pt reports that she has had elevated BP worsening from pt's baseline BPs over the past several days (systolic BP earlier today was 159). Pt reports that she called her cardiologist's office for this earlier today and was referred to the ER for further evaluation. Pt reports that she is scheduled for an appointment with her cardiologist on 11/20/18.     Dr. Salazar - cardiologist      On physical exam, pt is alert and oriented x3. Heart is irregular; normal heart rate. Lungs are CTAB. Mild generalized abdominal tenderness. No significant BLE edema.     Pt's troponin is negative. CXR is negative acute. Pt's BP is 114/72. O2 sat is 96% on room air. Pt was advised to f/u with her cardiologist as scheduled next week. RTER warnings given. Pt will be discharged.         EKG          EKG time: 15:08  Rhythm/Rate: A-Fib rate 93  P waves and SC: None present  QRS, axis: Normal QRS   ST and T waves: Repolarization abnormalities diffusely     Interpreted Contemporaneously by me, independently viewed  Unchanged compared to prior 07/03/18          Documentation assistance provided by Joon Brand. Information recorded by the scribe was done at my direction and has been verified and validated by me.     Entered by Joon Brand, acting as scribe for Dr. Soto, " MD.           Joon Brand  11/16/18 1837       Palomo Soto MD  11/16/18 1849

## 2018-11-16 NOTE — DISCHARGE INSTRUCTIONS
Activities as tolerated.  Recheck with your primary care provider in 2 days and follow up with Dr. Salazar.  Return to the ER for increasing frequency or severity of chest pain, feeling lightheaded or passing out, increasing shortness of breath, any concerns.

## 2018-11-20 ENCOUNTER — HOSPITAL ENCOUNTER (OUTPATIENT)
Dept: CARDIOLOGY | Facility: HOSPITAL | Age: 73
Discharge: HOME | End: 2018-11-20
Attending: INTERNAL MEDICINE

## 2018-11-20 ENCOUNTER — HOSPITAL ENCOUNTER (OUTPATIENT)
Dept: CARDIOLOGY | Facility: HOSPITAL | Age: 73
Discharge: HOME | End: 2018-11-20
Attending: INTERNAL MEDICINE | Admitting: INTERNAL MEDICINE

## 2018-11-20 VITALS
SYSTOLIC BLOOD PRESSURE: 132 MMHG | WEIGHT: 160 LBS | HEIGHT: 70 IN | RESPIRATION RATE: 18 BRPM | HEART RATE: 77 BPM | BODY MASS INDEX: 22.9 KG/M2 | DIASTOLIC BLOOD PRESSURE: 75 MMHG

## 2018-11-20 VITALS
HEIGHT: 70 IN | WEIGHT: 160 LBS | DIASTOLIC BLOOD PRESSURE: 71 MMHG | HEART RATE: 72 BPM | BODY MASS INDEX: 22.9 KG/M2 | SYSTOLIC BLOOD PRESSURE: 123 MMHG

## 2018-11-20 LAB
AORTIC DIMENSIONLESS INDEX: 0.8 (DI)
BH CV ECHO MEAS - ACS: 1.8 CM
BH CV ECHO MEAS - AI DEC SLOPE: 361 CM/SEC^2
BH CV ECHO MEAS - AI MAX PG: 77.4 MMHG
BH CV ECHO MEAS - AI MAX VEL: 440 CM/SEC
BH CV ECHO MEAS - AI P1/2T: 357 MSEC
BH CV ECHO MEAS - AO MAX PG (FULL): 1.8 MMHG
BH CV ECHO MEAS - AO MAX PG: 6.1 MMHG
BH CV ECHO MEAS - AO MEAN PG (FULL): 1 MMHG
BH CV ECHO MEAS - AO MEAN PG: 3 MMHG
BH CV ECHO MEAS - AO ROOT AREA (BSA CORRECTED): 1.7
BH CV ECHO MEAS - AO ROOT AREA: 8 CM^2
BH CV ECHO MEAS - AO ROOT DIAM: 3.2 CM
BH CV ECHO MEAS - AO V2 MAX: 123 CM/SEC
BH CV ECHO MEAS - AO V2 MEAN: 86.6 CM/SEC
BH CV ECHO MEAS - AO V2 VTI: 25.9 CM
BH CV ECHO MEAS - ASC AORTA: 3.1 CM
BH CV ECHO MEAS - AVA(I,A): 2.7 CM^2
BH CV ECHO MEAS - AVA(I,D): 2.7 CM^2
BH CV ECHO MEAS - AVA(V,A): 2.9 CM^2
BH CV ECHO MEAS - AVA(V,D): 2.9 CM^2
BH CV ECHO MEAS - BSA(HAYCOCK): 1.9 M^2
BH CV ECHO MEAS - BSA: 1.9 M^2
BH CV ECHO MEAS - BZI_BMI: 23 KILOGRAMS/M^2
BH CV ECHO MEAS - BZI_METRIC_HEIGHT: 177.8 CM
BH CV ECHO MEAS - BZI_METRIC_WEIGHT: 72.6 KG
BH CV ECHO MEAS - EDV(CUBED): 125 ML
BH CV ECHO MEAS - EDV(MOD-SP2): 53 ML
BH CV ECHO MEAS - EDV(MOD-SP4): 57 ML
BH CV ECHO MEAS - EDV(TEICH): 118.2 ML
BH CV ECHO MEAS - EF(CUBED): 80.5 %
BH CV ECHO MEAS - EF(MOD-BP): 66.2 %
BH CV ECHO MEAS - EF(MOD-SP2): 64.2 %
BH CV ECHO MEAS - EF(MOD-SP4): 68.4 %
BH CV ECHO MEAS - EF(TEICH): 72.8 %
BH CV ECHO MEAS - ESV(CUBED): 24.4 ML
BH CV ECHO MEAS - ESV(MOD-SP2): 19 ML
BH CV ECHO MEAS - ESV(MOD-SP4): 18 ML
BH CV ECHO MEAS - ESV(TEICH): 32.2 ML
BH CV ECHO MEAS - FS: 42 %
BH CV ECHO MEAS - IVS/LVPW: 1
BH CV ECHO MEAS - IVSD: 0.8 CM
BH CV ECHO MEAS - LAT PEAK E' VEL: 12 CM/SEC
BH CV ECHO MEAS - LV DIASTOLIC VOL/BSA (35-75): 30 ML/M^2
BH CV ECHO MEAS - LV MASS(C)D: 135.8 GRAMS
BH CV ECHO MEAS - LV MASS(C)DI: 71.5 GRAMS/M^2
BH CV ECHO MEAS - LV MAX PG: 4.2 MMHG
BH CV ECHO MEAS - LV MEAN PG: 2 MMHG
BH CV ECHO MEAS - LV SYSTOLIC VOL/BSA (12-30): 9.5 ML/M^2
BH CV ECHO MEAS - LV V1 MAX: 103 CM/SEC
BH CV ECHO MEAS - LV V1 MEAN: 63.1 CM/SEC
BH CV ECHO MEAS - LV V1 VTI: 20 CM
BH CV ECHO MEAS - LVIDD: 5 CM
BH CV ECHO MEAS - LVIDS: 2.9 CM
BH CV ECHO MEAS - LVLD AP2: 6.2 CM
BH CV ECHO MEAS - LVLD AP4: 6 CM
BH CV ECHO MEAS - LVLS AP2: 5.1 CM
BH CV ECHO MEAS - LVLS AP4: 4.9 CM
BH CV ECHO MEAS - LVOT AREA (M): 3.5 CM^2
BH CV ECHO MEAS - LVOT AREA: 3.5 CM^2
BH CV ECHO MEAS - LVOT DIAM: 2.1 CM
BH CV ECHO MEAS - LVPWD: 0.8 CM
BH CV ECHO MEAS - MED PEAK E' VEL: 10 CM/SEC
BH CV ECHO MEAS - MR MAX PG: 65.3 MMHG
BH CV ECHO MEAS - MR MAX VEL: 404 CM/SEC
BH CV ECHO MEAS - MV DEC SLOPE: 731 CM/SEC^2
BH CV ECHO MEAS - MV DEC TIME: 0.09 SEC
BH CV ECHO MEAS - MV E MAX VEL: 97 CM/SEC
BH CV ECHO MEAS - MV MAX PG: 8.3 MMHG
BH CV ECHO MEAS - MV MEAN PG: 2 MMHG
BH CV ECHO MEAS - MV P1/2T MAX VEL: 143 CM/SEC
BH CV ECHO MEAS - MV P1/2T: 57.3 MSEC
BH CV ECHO MEAS - MV V2 MAX: 144 CM/SEC
BH CV ECHO MEAS - MV V2 MEAN: 53.6 CM/SEC
BH CV ECHO MEAS - MV V2 VTI: 31.8 CM
BH CV ECHO MEAS - MVA P1/2T LCG: 1.5 CM^2
BH CV ECHO MEAS - MVA(P1/2T): 3.8 CM^2
BH CV ECHO MEAS - MVA(VTI): 2.2 CM^2
BH CV ECHO MEAS - PA ACC TIME: 0.16 SEC
BH CV ECHO MEAS - PA MAX PG (FULL): 0.75 MMHG
BH CV ECHO MEAS - PA MAX PG: 2.1 MMHG
BH CV ECHO MEAS - PA PR(ACCEL): 7.9 MMHG
BH CV ECHO MEAS - PA V2 MAX: 73.3 CM/SEC
BH CV ECHO MEAS - PULM DIAS VEL: 25.7 CM/SEC
BH CV ECHO MEAS - PULM S/D: 3.4
BH CV ECHO MEAS - PULM SYS VEL: 87.8 CM/SEC
BH CV ECHO MEAS - PVA(V,A): 3.1 CM^2
BH CV ECHO MEAS - PVA(V,D): 3.1 CM^2
BH CV ECHO MEAS - QP/QS: 0.72
BH CV ECHO MEAS - RAP SYSTOLE: 8 MMHG
BH CV ECHO MEAS - RV MAX PG: 1.4 MMHG
BH CV ECHO MEAS - RV MEAN PG: 1 MMHG
BH CV ECHO MEAS - RV V1 MAX: 59.2 CM/SEC
BH CV ECHO MEAS - RV V1 MEAN: 45.1 CM/SEC
BH CV ECHO MEAS - RV V1 VTI: 13.1 CM
BH CV ECHO MEAS - RVOT AREA: 3.8 CM^2
BH CV ECHO MEAS - RVOT DIAM: 2.2 CM
BH CV ECHO MEAS - RVSP: 45 MMHG
BH CV ECHO MEAS - SI(AO): 109.7 ML/M^2
BH CV ECHO MEAS - SI(CUBED): 53 ML/M^2
BH CV ECHO MEAS - SI(LVOT): 36.5 ML/M^2
BH CV ECHO MEAS - SI(MOD-SP2): 17.9 ML/M^2
BH CV ECHO MEAS - SI(MOD-SP4): 20.5 ML/M^2
BH CV ECHO MEAS - SI(TEICH): 45.3 ML/M^2
BH CV ECHO MEAS - SV(AO): 208.3 ML
BH CV ECHO MEAS - SV(CUBED): 100.6 ML
BH CV ECHO MEAS - SV(LVOT): 69.3 ML
BH CV ECHO MEAS - SV(MOD-SP2): 34 ML
BH CV ECHO MEAS - SV(MOD-SP4): 39 ML
BH CV ECHO MEAS - SV(RVOT): 49.8 ML
BH CV ECHO MEAS - SV(TEICH): 86 ML
BH CV ECHO MEAS - TAPSE (>1.6): 1.87 CM2
BH CV ECHO MEAS - TR MAX VEL: 303 CM/SEC
BH CV ECHO MEASUREMENTS AVERAGE E/E' RATIO: 8.82
BH CV VAS BP LEFT ARM: NORMAL MMHG
BH CV XLRA - RV BASE: 3.77 CM
BH CV XLRA - TDI S': 12.3 CM/SEC
LEFT ATRIUM VOLUME INDEX: 37.3 ML/M2
MAXIMAL PREDICTED HEART RATE: 146 BPM
STRESS TARGET HR: 124 BPM

## 2018-11-20 PROCEDURE — 78452 HT MUSCLE IMAGE SPECT MULT: CPT

## 2018-11-20 PROCEDURE — 0 TECHNETIUM SESTAMIBI: Performed by: INTERNAL MEDICINE

## 2018-11-20 PROCEDURE — 78452 HT MUSCLE IMAGE SPECT MULT: CPT | Performed by: INTERNAL MEDICINE

## 2018-11-20 PROCEDURE — A9500 TC99M SESTAMIBI: HCPCS | Performed by: INTERNAL MEDICINE

## 2018-11-20 PROCEDURE — 93017 CV STRESS TEST TRACING ONLY: CPT

## 2018-11-20 PROCEDURE — 93306 TTE W/DOPPLER COMPLETE: CPT | Performed by: INTERNAL MEDICINE

## 2018-11-20 PROCEDURE — 93306 TTE W/DOPPLER COMPLETE: CPT

## 2018-11-20 PROCEDURE — 25010000002 REGADENOSON 0.4 MG/5ML SOLUTION: Performed by: INTERNAL MEDICINE

## 2018-11-20 PROCEDURE — 93018 CV STRESS TEST I&R ONLY: CPT | Performed by: INTERNAL MEDICINE

## 2018-11-20 PROCEDURE — 93016 CV STRESS TEST SUPVJ ONLY: CPT | Performed by: INTERNAL MEDICINE

## 2018-11-20 RX ADMIN — TECHNETIUM TC 99M SESTAMIBI 1 DOSE: 1 INJECTION INTRAVENOUS at 08:37

## 2018-11-20 RX ADMIN — TECHNETIUM TC 99M SESTAMIBI 1 DOSE: 1 INJECTION INTRAVENOUS at 11:57

## 2018-11-20 RX ADMIN — REGADENOSON 0.4 MG: 0.08 INJECTION, SOLUTION INTRAVENOUS at 11:59

## 2018-11-26 ENCOUNTER — ON CAMPUS - OUTPATIENT (OUTPATIENT)
Dept: URBAN - METROPOLITAN AREA HOSPITAL 108 | Facility: HOSPITAL | Age: 73
End: 2018-11-26
Payer: MEDICARE

## 2018-11-26 DIAGNOSIS — K57.30 DIVERTICULOSIS OF LARGE INTESTINE WITHOUT PERFORATION OR ABS: ICD-10-CM

## 2018-11-26 DIAGNOSIS — R11.2 NAUSEA WITH VOMITING, UNSPECIFIED: ICD-10-CM

## 2018-11-26 DIAGNOSIS — K64.8 OTHER HEMORRHOIDS: ICD-10-CM

## 2018-11-26 DIAGNOSIS — K29.50 UNSPECIFIED CHRONIC GASTRITIS WITHOUT BLEEDING: ICD-10-CM

## 2018-11-26 DIAGNOSIS — D12.5 BENIGN NEOPLASM OF SIGMOID COLON: ICD-10-CM

## 2018-11-26 DIAGNOSIS — R10.84 GENERALIZED ABDOMINAL PAIN: ICD-10-CM

## 2018-11-26 LAB
BH CV STRESS BP STAGE 1: NORMAL
BH CV STRESS COMMENTS STAGE 1: NORMAL
BH CV STRESS DOSE REGADENOSON STAGE 1: 0.4
BH CV STRESS DURATION MIN STAGE 1: 1
BH CV STRESS DURATION SEC STAGE 1: 0
BH CV STRESS HR STAGE 1: 101
BH CV STRESS O2 STAGE 1: 100
BH CV STRESS PROTOCOL 1: NORMAL
BH CV STRESS RECOVERY BP: NORMAL MMHG
BH CV STRESS RECOVERY HR: 93 BPM
BH CV STRESS RECOVERY O2: 100 %
BH CV STRESS STAGE 1: 1
LV EF NUC BP: 60 %
MAXIMAL PREDICTED HEART RATE: 146 BPM
PERCENT MAX PREDICTED HR: 69.18 %
STRESS BASELINE BP: NORMAL MMHG
STRESS BASELINE HR: 75 BPM
STRESS O2 SAT REST: 100 %
STRESS PERCENT HR: 81 %
STRESS POST ESTIMATED WORKLOAD: 1 METS
STRESS POST EXERCISE DUR MIN: 1 MIN
STRESS POST EXERCISE DUR SEC: 0 SEC
STRESS POST O2 SAT PEAK: 100 %
STRESS POST PEAK BP: NORMAL MMHG
STRESS POST PEAK HR: 101 BPM
STRESS TARGET HR: 124 BPM

## 2018-11-26 PROCEDURE — 45380 COLONOSCOPY AND BIOPSY: CPT | Performed by: INTERNAL MEDICINE

## 2018-11-26 PROCEDURE — 43239 EGD BIOPSY SINGLE/MULTIPLE: CPT | Performed by: INTERNAL MEDICINE

## 2018-11-29 ENCOUNTER — OFFICE VISIT (OUTPATIENT)
Dept: CARDIOLOGY | Facility: CLINIC | Age: 73
End: 2018-11-29

## 2018-11-29 VITALS
BODY MASS INDEX: 23.48 KG/M2 | DIASTOLIC BLOOD PRESSURE: 78 MMHG | WEIGHT: 164 LBS | SYSTOLIC BLOOD PRESSURE: 120 MMHG | HEIGHT: 70 IN | HEART RATE: 86 BPM

## 2018-11-29 DIAGNOSIS — I10 BENIGN ESSENTIAL HTN: ICD-10-CM

## 2018-11-29 DIAGNOSIS — Z95.0 PACEMAKER: ICD-10-CM

## 2018-11-29 DIAGNOSIS — I48.0 PAROXYSMAL ATRIAL FIBRILLATION (HCC): Primary | ICD-10-CM

## 2018-11-29 DIAGNOSIS — R00.2 PALPITATIONS: ICD-10-CM

## 2018-11-29 PROCEDURE — 99212 OFFICE O/P EST SF 10 MIN: CPT | Performed by: INTERNAL MEDICINE

## 2018-11-29 NOTE — PROGRESS NOTES
Subjective:        Raina Forrest is a 72 y.o. female who here for follow up    CC  Follow up after stress test amnd echo  HPI  72-year-old female known history of the hypertension palpitations atrial fibrillation pacemaker here for the follow-up after the stress test and echocardiogram which are normal  Patient has no chest pains or tightness in chest     Problem List Items Addressed This Visit        Cardiovascular and Mediastinum    Benign essential HTN    Palpitations    Paroxysmal atrial fibrillation (CMS/HCC) - Primary    Pacemaker        .Interpretation Summary     · Mild mitral valve regurgitation is present  · The left ventricular cavity is borderline dilated.  · Right ventricular cavity is borderline dilated.  · Left atrial cavity size is mildly dilated.  · Calculated EF = 66.2%.  · There is no evidence of pericardial effusion.        Interpretation Summary        · Findings consistent with an equivocal ECG stress test.  · Left ventricular ejection fraction is normal (Calculated EF = 60%).  · Myocardial perfusion imaging indicates a normal myocardial perfusion study with no evidence of ischemia.  · Impressions are consistent with a low risk study.         The following portions of the patient's history were reviewed and updated as appropriate: allergies, current medications, past family history, past medical history, past social history, past surgical history and problem list.    Past Medical History:   Diagnosis Date   • Abdominal pain    • Atrial fibrillation (CMS/HCC)    • Fung's esophagus    • Benign essential hypertension    • Blood in stool    • Chest pain    • Chronic gastritis    • Congestive heart failure (CMS/HCC)    • Degenerative disc disease    • Depression with anxiety    • Disease of thyroid gland    • Diverticulitis of colon    • Dysphagia    • GERD (gastroesophageal reflux disease)    • History of migraine headaches    • Osteoporosis    • Palpitations    • Thyroid disorder    •  "Ulcerative colitis (CMS/HCC)    • Vomiting    • Weight loss      reports that  has never smoked. she has never used smokeless tobacco. She reports that she does not drink alcohol or use drugs.   Family History   Problem Relation Age of Onset   • Lung cancer Other    • Ulcerative colitis Sister    • Heart failure Mother    • Heart disease Mother    • Heart failure Father    • Heart disease Father        Review of Systems  Constitutional: No wt loss, fever, fatigue  Gastrointestinal: No nausea, abdominal pain  Behavioral/Psych: No insomnia or anxiety   Cardiovascular no chest pains or tightness in chest  Objective:                 Physical Exam  /78   Pulse 86   Ht 177.8 cm (70\")   Wt 74.4 kg (164 lb)   BMI 23.53 kg/m²     General appearance: NAD, conversant   Eyes: anicteric sclerae, moist conjunctivae; no lid-lag; PERRLA   HENT: Atraumatic; oropharynx clear with moist mucous membranes and no mucosal ulcerations;  normal hard and soft palate   Neck: Trachea midline; FROM, supple, no thyromegaly or lymphadenopathy   Lungs: CTA, with normal respiratory effort and no intercostal retractions   CV: S1-S2 regular, no murmurs, no rub, no gallop   Abdomen: Soft, non-tender; no masses or HSM   Extremities: No peripheral edema or extremity lymphadenopathy  Skin: Normal temperature, turgor and texture; no rash, ulcers or subcutaneous nodules   Psych: Appropriate affect, alert and oriented to person, place and time           Procedures      Echocardiogram:        Current Outpatient Medications:   •  acetaminophen (TYLENOL) 325 MG tablet, Take 2 tablets by mouth Every 6 (Six) Hours As Needed for Moderate Pain (4-6) or Fever., Disp: , Rfl: 0  •  bisoprolol-hydrochlorothiazide (ZIAC) 5-6.25 MG per tablet, TAKE 1 TABLET BY MOUTH DAILY., Disp: 30 tablet, Rfl: 11  •  furosemide (LASIX) 40 MG tablet, TAKE 1 TABLET BY MOUTH EVERY MORNING AND TAKE 1/2 TABLET EVERY EVENING AS NEEDED (Patient taking differently: TAKE 1 TABLET BY " MOUTH EVERY MORNING AND TAKE 1 TABLET EVERY EVENING AS NEEDED), Disp: 45 tablet, Rfl: 11  •  gabapentin (NEURONTIN) 100 MG capsule, , Disp: , Rfl:   •  HYDROcodone-acetaminophen (NORCO) 5-325 MG per tablet, Take 1 tablet by mouth Every 6 (Six) Hours As Needed for Moderate Pain ., Disp: , Rfl:   •  KLOR-CON 10 MEQ CR tablet, TAKE 1 TABLET BY MOUTH DAILY., Disp: 30 tablet, Rfl: 3  •  levothyroxine (LEVOTHROID) 25 MCG tablet, Take 25 mcg by mouth Daily., Disp: , Rfl:   •  MECLIZINE HCL PO, Take 25 mg by mouth 3 (Three) Times a Day As Needed., Disp: , Rfl:   •  ondansetron (ZOFRAN) 8 MG tablet, TAKE 1 TABLET BY MOUTH EVERY 8 (EIGHT) HOURS AS NEEDED FOR NAUSEA FOR UP TO 7 DAYS, Disp: , Rfl:   •  pantoprazole (PROTONIX) 40 MG EC tablet, Take 1 tablet by mouth Daily., Disp: 30 tablet, Rfl: 3  •  sertraline (ZOLOFT) 100 MG tablet, Take 150 mg by mouth Daily., Disp: , Rfl:   •  SUMAtriptan (IMITREX) 50 MG tablet, TAKE 1 TABLET EVERY 2 HOURS AS NEEDED FOR MIGRAINE,MAY REPEAT ONCE IN 2 HOURS, Disp: , Rfl: 4  •  traMADol (ULTRAM) 50 MG tablet, , Disp: , Rfl:    Assessment:        Patient Active Problem List   Diagnosis   • Chronic atrial fibrillation (CMS/HCC)   • Benign essential HTN   • Bradycardia   • Chest pain   • Can't get food down   • Accumulation of fluid in tissues   • Esophageal lump   • Adynamia   • Itch of skin   • Anorexia   • Chronic nausea   • Gastroesophageal reflux disease   • Breath shortness   • Emesis   • Decreased body weight   • Anxiety   • Narrowing of intervertebral disc space   • Diverticulosis of intestine   • Hypertension   • Migraine   • Osteoporosis   • Palpitations   • Pituitary adenoma (CMS/HCC)   • Sick sinus syndrome (CMS/HCC)   • Diarrhea   • Paroxysmal atrial fibrillation (CMS/HCC)   • Cardiomyopathy (CMS/HCC)   • Anticoagulated   • Atrial fibrillation (CMS/HCC)   • On amiodarone therapy   • Pacemaker   • Cardiac resynchronization therapy pacemaker (CRT-P) in place   • Infected pacemaker  (CMS/[facility])   • Wound infection               Plan:            ICD-10-CM ICD-9-CM   1. Paroxysmal atrial fibrillation (CMS/HCC) I48.0 427.31   2. Palpitations R00.2 785.1   3. Pacemaker Z95.0 V45.01   4. Benign essential HTN I10 401.1     1. Paroxysmal atrial fibrillation (CMS/HCC)  Controlled    2. Palpitations  Controlled    3. Pacemaker  Pacemaker functioning normally    4. Benign essential HTN  Blood pressure controlled    Specificity and sensitivity of the stress test/ cardiac workup has been explained. Pt has been explained if  Symptoms continue please go to ER, and further w/p will be required.    Also explained this does not rule out coronary artery disease or the future events, continue to emphasize on risk reductions for coronary artery disease    Pt also advised to contact PCP for other causes of symptoms         See in 6 months  COUNSELING:    Raina Perkins was given to patient for the following topics: diagnostic results, risk factor reductions, impressions, risks and benefits of treatment options and importance of treatment compliance .       SMOKING COUNSELING:    Counseling given: Not Answered      EMR Dragon/Transcription disclaimer:   Much of this encounter note is an electronic transcription/translation of spoken language to printed text. The electronic translation of spoken language may permit erroneous, or at times, nonsensical words or phrases to be inadvertently transcribed; Although I have reviewed the note for such errors, some may still exist.

## 2019-01-07 ENCOUNTER — TELEPHONE (OUTPATIENT)
Dept: CARDIOLOGY | Facility: CLINIC | Age: 74
End: 2019-01-07

## 2019-01-07 NOTE — TELEPHONE ENCOUNTER
Taking baby ASA daily. Having thyroid biopsy on 1/14/19. Needs to know if she can be off ASA for 5 days.    -ALP

## 2019-01-08 ENCOUNTER — ON CAMPUS - OUTPATIENT (OUTPATIENT)
Dept: URBAN - METROPOLITAN AREA HOSPITAL 108 | Facility: HOSPITAL | Age: 74
End: 2019-01-08

## 2019-01-08 DIAGNOSIS — K29.70 GASTRITIS, UNSPECIFIED, WITHOUT BLEEDING: ICD-10-CM

## 2019-01-08 DIAGNOSIS — R11.2 NAUSEA WITH VOMITING, UNSPECIFIED: ICD-10-CM

## 2019-01-08 DIAGNOSIS — R63.4 ABNORMAL WEIGHT LOSS: ICD-10-CM

## 2019-01-08 DIAGNOSIS — D49.0 NEOPLASM OF UNSPECIFIED BEHAVIOR OF DIGESTIVE SYSTEM: ICD-10-CM

## 2019-01-08 DIAGNOSIS — R19.09 OTHER INTRA-ABDOMINAL AND PELVIC SWELLING, MASS AND LUMP: ICD-10-CM

## 2019-01-08 PROCEDURE — 43259 EGD US EXAM DUODENUM/JEJUNUM: CPT

## 2019-04-08 NOTE — PROGRESS NOTES
Patient Name: Raina Forrest  :1946  Age: 73 y.o.  Primary Cardiologist: Robinson Salazar MD  Encounter Provider:  JORY Irvin      Chief Complaint:   Chief Complaint   Patient presents with   • Atrial Fibrillation     6 MO FOLLOW UP WITH DEVICE CHECK         HPI This is a 73 year old female, new to this provider, who comes in follow up for hypertension, atrial fibrillation, CHF, and SSS for which a PPM was implanted.  The patient has previously been anticoagulated on Eliquis, currently is atrial fibrillation on ECG today with rate of 85.  Last echo was 2018 and revealed EF 62%, mild dilation of LAC, and mild MR.  Last ischemic workup was 2018 and showed no s/s of ischemia.  Cardiac catheterization 2016 revealed normal coronary arteries. The patient did experience a superficial infection s/p PPM implant, which did not require explant and was treated with antibiotics successfully.  Device interrogation today shows 38 monitored episodes of reported VT lasting less than 1 second, believed to be atrial fibrillation with RVR.  The patient continues to be in atrial fibrillation 100% of the time.  Patient denies any chest pain, palpitations, shortness of breath, dizziness, weakness, syncope, or near syncope.    Patient Active Problem List   Diagnosis   • Chronic atrial fibrillation (CMS/HCC)   • Benign essential HTN   • Bradycardia   • Chest pain   • Can't get food down   • Accumulation of fluid in tissues   • Esophageal lump   • Adynamia   • Itch of skin   • Anorexia   • Chronic nausea   • Gastroesophageal reflux disease   • Breath shortness   • Emesis   • Decreased body weight   • Anxiety   • Narrowing of intervertebral disc space   • Diverticulosis of intestine   • Hypertension   • Migraine   • Osteoporosis   • Palpitations   • Pituitary adenoma (CMS/HCC)   • Sick sinus syndrome (CMS/HCC)   • Diarrhea   • Paroxysmal atrial fibrillation (CMS/HCC)   • Cardiomyopathy (CMS/HCC)   •  Anticoagulated   • Atrial fibrillation (CMS/HCC)   • On amiodarone therapy   • Pacemaker   • Cardiac resynchronization therapy pacemaker (CRT-P) in place   • Infected pacemaker (CMS/HCC)   • Wound infection           The following portions of the patient's history were reviewed and updated as appropriate: allergies, current medications, past family history, past medical history, past social history, past surgical history and problem list.    Current Outpatient Medications on File Prior to Visit   Medication Sig Dispense Refill   • acetaminophen (TYLENOL) 325 MG tablet Take 2 tablets by mouth Every 6 (Six) Hours As Needed for Moderate Pain (4-6) or Fever.  0   • bisoprolol-hydrochlorothiazide (ZIAC) 5-6.25 MG per tablet TAKE 1 TABLET BY MOUTH DAILY. 30 tablet 11   • cyclobenzaprine (FLEXERIL) 10 MG tablet Take  by mouth.     • furosemide (LASIX) 40 MG tablet TAKE 1 TABLET BY MOUTH EVERY MORNING AND TAKE 1/2 TABLET EVERY EVENING AS NEEDED (Patient taking differently: TAKE 1 TABLET BY MOUTH EVERY MORNING AND TAKE 1 TABLET EVERY EVENING AS NEEDED) 45 tablet 11   • KLOR-CON 10 MEQ CR tablet TAKE 1 TABLET BY MOUTH DAILY. 30 tablet 3   • levothyroxine (LEVOTHROID) 25 MCG tablet Take 25 mcg by mouth Daily.     • MECLIZINE HCL PO Take 25 mg by mouth 3 (Three) Times a Day As Needed.     • ondansetron (ZOFRAN) 8 MG tablet TAKE 1 TABLET BY MOUTH EVERY 8 (EIGHT) HOURS AS NEEDED FOR NAUSEA FOR UP TO 7 DAYS     • pantoprazole (PROTONIX) 40 MG EC tablet Take 1 tablet by mouth Daily. 30 tablet 3   • sertraline (ZOLOFT) 100 MG tablet Take 150 mg by mouth Daily.     • SUMAtriptan (IMITREX) 50 MG tablet TAKE 1 TABLET EVERY 2 HOURS AS NEEDED FOR MIGRAINE,MAY REPEAT ONCE IN 2 HOURS  4   • [DISCONTINUED] gabapentin (NEURONTIN) 100 MG capsule      • [DISCONTINUED] HYDROcodone-acetaminophen (NORCO) 5-325 MG per tablet Take 1 tablet by mouth Every 6 (Six) Hours As Needed for Moderate Pain .     • [DISCONTINUED] traMADol (ULTRAM) 50 MG tablet         No current facility-administered medications on file prior to visit.        Past Medical History:   Diagnosis Date   • Abdominal pain    • Atrial fibrillation (CMS/HCC)    • Fung's esophagus    • Benign essential hypertension    • Blood in stool    • Chest pain    • Chronic gastritis    • Congestive heart failure (CMS/HCC)    • Degenerative disc disease    • Depression with anxiety    • Disease of thyroid gland    • Diverticulitis of colon    • Dysphagia    • GERD (gastroesophageal reflux disease)    • History of migraine headaches    • Osteoporosis    • Palpitations    • Thyroid disorder    • Ulcerative colitis (CMS/HCC)    • Vomiting    • Weight loss        Past Surgical History:   Procedure Laterality Date   • APPENDECTOMY     • BLADDER SURGERY     • CARDIAC CATHETERIZATION N/A 11/21/2016    Procedure: Left Heart Cath;  Surgeon: Robinson Salazar MD;  Location:  [facility];  Service:    • CARDIAC ELECTROPHYSIOLOGY PROCEDURE N/A 2/7/2017    Procedure: Pacemaker DC new   [employer];  Surgeon: Robinson Salazar MD;  Location:  [facility];  Service:    • CARDIOVERSION  01/18/2017   • CHOLECYSTECTOMY     • COLONOSCOPY     • COLONOSCOPY N/A 7/29/2016    normal   • ENDOSCOPY  05/06/2015    submucosal nodule in esophagus, erythematous mucosa in antrum,moderate acute gastritis, no h-pylori   • ENDOSCOPY N/A 9/2/2016    Erythematous mucosa in the antrum   • EYE SURGERY     • HYSTERECTOMY     • KNEE SURGERY     • THYROID SURGERY     • TONSILLECTOMY         Family History   Problem Relation Age of Onset   • Lung cancer Other    • Ulcerative colitis Sister    • Heart failure Mother    • Heart disease Mother    • Heart failure Father    • Heart disease Father        Social History     Socioeconomic History   • Marital status:      Spouse name: Not on file   • Number of children: Not on file   • Years of education: Not on file   • Highest education level: Not on file  "  Tobacco Use   • Smoking status: Never Smoker   • Smokeless tobacco: Never Used   Substance and Sexual Activity   • Alcohol use: No   • Drug use: No   • Sexual activity: Defer     Birth control/protection: Post-menopausal       Review of Systems   Constitution: Negative for diaphoresis, weakness and malaise/fatigue.   HENT: Negative for nosebleeds and stridor.    Cardiovascular: Negative for chest pain, claudication, dyspnea on exertion, irregular heartbeat, leg swelling, near-syncope, orthopnea, palpitations, paroxysmal nocturnal dyspnea and syncope.   Respiratory: Negative for cough, hemoptysis, shortness of breath and wheezing.    Gastrointestinal: Negative for abdominal pain, constipation, diarrhea, hematemesis, hematochezia, melena, nausea and vomiting.   Neurological: Negative for dizziness, focal weakness and light-headedness.       OBJECTIVE:   Vital Signs  Vitals:    04/09/19 0952   BP: 120/78   Pulse: 83     Estimated body mass index is 24.16 kg/m² as calculated from the following:    Height as of this encounter: 176.5 cm (69.5\").    Weight as of this encounter: 75.3 kg (166 lb).    Physical Exam   Constitutional: She is oriented to person, place, and time. She appears well-developed and well-nourished. No distress.   HENT:   Head: Normocephalic and atraumatic.   Eyes: Conjunctivae and EOM are normal. Pupils are equal, round, and reactive to light.   Neck: Normal range of motion. Neck supple. No JVD present. No tracheal deviation present. No thyromegaly present.   Cardiovascular: Normal rate, regular rhythm and intact distal pulses. Exam reveals no gallop and no friction rub.   Murmur heard.  Pulmonary/Chest: Effort normal and breath sounds normal. No respiratory distress. She has no wheezes. She has no rales. She exhibits no tenderness.   Abdominal: Soft. Bowel sounds are normal. She exhibits no distension. There is no tenderness.   Musculoskeletal: Normal range of motion. She exhibits no edema, " tenderness or deformity.   Neurological: She is alert and oriented to person, place, and time.   Skin: Skin is warm and dry. No rash noted. She is not diaphoretic. No erythema. No pallor.   Psychiatric: She has a normal mood and affect. Her behavior is normal.         ECG 12 Lead  Date/Time: 2019 10:06 AM  Performed by: Minoo Lauren APRN  Authorized by: Minoo Lauren APRN   Comparison: compared with previous ECG   Similar to previous ECG  Rhythm: atrial fibrillation  Rate: normal            Cardiac Cath:  2016  Conclusion        · Successful left heart catheter  · Normal coronary arteries  · LV function at lower limit of normal         Stress Testin2018  Interpretation Summary        · Findings consistent with an equivocal ECG stress test.  · Left ventricular ejection fraction is normal (Calculated EF = 60%).  · Myocardial perfusion imaging indicates a normal myocardial perfusion study with no evidence of ischemia.  · Impressions are consistent with a low risk study.         Cardiac Echo:  2018  Interpretation Summary     · Mild mitral valve regurgitation is present  · The left ventricular cavity is borderline dilated.  · Right ventricular cavity is borderline dilated.  · Left atrial cavity size is mildly dilated.  · Calculated EF = 66.2%.  · There is no evidence of pericardial effusion.           ASSESSMENT:      Diagnosis Plan   1. Paroxysmal atrial fibrillation (CMS/[facility])  ECG 12 Lead         PLAN OF CARE:     1.  Atrial fibrillation-Patient is currently in atrial l fibrillation on ECG today with rate in the 80s.  Anticoagulated on baby aspirin, as she had issues with bleeding and bruising on Eliquis previously.  Last echo and stress test as dictated above.  Device interrogation today revealed the patient is in atrial fibrillation 100% of the time, and 38 monitored episodes of VT were noted.  I believe that these episodes of VT are actually atrial fibrillation with RVR as they each have  lasted less than 1 second and the patient has been asymptomatic.    2.  Hypertension- BP at home is 120s-130s over 70s-80s.  Controlled on Ziac.  No lipid panel on file.  Will obtain panel and CMP and have the patient follow-up with Dr. Salazar in 3 months    3.  SSS- Interrogation of PPM interrogation as dictated above.  No syncope or near syncope noted    4.  Congestive Heart failure- Last echo as dictated above.  No exacerbation on Lasix.  The patient states she occasionally has mild swelling of bilateral lower extremities, but this is rare.  No edema or adventitious lung sounds are noted today.  Continue current medication regimen.          Sincerely,   JORY Irvin  [facility]  04/09/19  10:18 AM

## 2019-04-09 ENCOUNTER — OFFICE VISIT (OUTPATIENT)
Dept: CARDIOLOGY | Facility: CLINIC | Age: 74
End: 2019-04-09

## 2019-04-09 ENCOUNTER — CLINICAL SUPPORT NO REQUIREMENTS (OUTPATIENT)
Dept: CARDIOLOGY | Facility: CLINIC | Age: 74
End: 2019-04-09

## 2019-04-09 VITALS
WEIGHT: 166 LBS | HEART RATE: 83 BPM | DIASTOLIC BLOOD PRESSURE: 78 MMHG | HEIGHT: 70 IN | SYSTOLIC BLOOD PRESSURE: 120 MMHG | BODY MASS INDEX: 23.77 KG/M2

## 2019-04-09 DIAGNOSIS — R07.82 INTERCOSTAL PAIN: ICD-10-CM

## 2019-04-09 DIAGNOSIS — I48.0 PAROXYSMAL ATRIAL FIBRILLATION (HCC): Primary | ICD-10-CM

## 2019-04-09 DIAGNOSIS — Z95.0 PACEMAKER: Primary | ICD-10-CM

## 2019-04-09 PROCEDURE — 93000 ELECTROCARDIOGRAM COMPLETE: CPT | Performed by: NURSE PRACTITIONER

## 2019-04-09 PROCEDURE — 99213 OFFICE O/P EST LOW 20 MIN: CPT | Performed by: NURSE PRACTITIONER

## 2019-04-09 PROCEDURE — 93288 INTERROG EVL PM/LDLS PM IP: CPT | Performed by: INTERNAL MEDICINE

## 2019-04-09 RX ORDER — CYCLOBENZAPRINE HCL 10 MG
TABLET ORAL
COMMUNITY
End: 2020-01-22

## 2019-05-13 ENCOUNTER — TELEPHONE (OUTPATIENT)
Dept: CARDIOLOGY | Facility: CLINIC | Age: 74
End: 2019-05-13

## 2019-05-13 NOTE — PROGRESS NOTES
Patient Name: Raina Forrest  :1946  Age: 73 y.o.  Primary Cardiologist: Robinson Salazar MD  Encounter Provider:  JORY Irvin      Chief Complaint:   Chief Complaint   Patient presents with   • Pacemaker Check   • Hypertension         HPI this is a 73-year-old female, known to this provider, who comes today in follow-up for breast pain which she suspects to be related to her pacemaker which was implanted 2017.  Pacemaker site today is free of any signs or symptoms of infection, hematoma, edema at this time.  History to include chronic atrial fibrillation, hypertension, bradycardia, chest pain, esophageal lump, chronic nausea, GERD, anxiety, osteoporosis, pituitary adenoma, sick sinus syndrome, cardiomyopathy.  Last echo in 2018 revealed mild MR, borderline dilation of LV C, borderline dilation of RBC, mild dilation of LAC, EF 66.2% with no evidence of pericardial effusion.  Stress test in 2018 showed no evidence of ischemia.  Patient is currently complaining of left rib pain, this is worse when she lies on that side and when she moves her left arm.  This pain occurred initially last night, is not accompanied by any chest pain radiating anywhere, shortness of breath, dizziness, weakness, syncope, or near syncope.    Patient Active Problem List   Diagnosis   • Chronic atrial fibrillation (CMS/HCC)   • Benign essential HTN   • Bradycardia   • Chest pain   • Can't get food down   • Accumulation of fluid in tissues   • Esophageal lump   • Adynamia   • Itch of skin   • Anorexia   • Chronic nausea   • Gastroesophageal reflux disease   • Breath shortness   • Emesis   • Decreased body weight   • Anxiety   • Narrowing of intervertebral disc space   • Diverticulosis of intestine   • Hypertension   • Migraine   • Osteoporosis   • Palpitations   • Pituitary adenoma (CMS/HCC)   • Sick sinus syndrome (CMS/HCC)   • Diarrhea   • Paroxysmal atrial fibrillation (CMS/HCC)   •  Cardiomyopathy (CMS/HCC)   • Anticoagulated   • Atrial fibrillation (CMS/HCC)   • On amiodarone therapy   • Pacemaker   • Cardiac resynchronization therapy pacemaker (CRT-P) in place   • Infected pacemaker (CMS/HCC)   • Wound infection           The following portions of the patient's history were reviewed and updated as appropriate: allergies, current medications, past family history, past medical history, past social history, past surgical history and problem list.    Current Outpatient Medications on File Prior to Visit   Medication Sig Dispense Refill   • acetaminophen (TYLENOL) 325 MG tablet Take 2 tablets by mouth Every 6 (Six) Hours As Needed for Moderate Pain (4-6) or Fever.  0   • bisoprolol-hydrochlorothiazide (ZIAC) 5-6.25 MG per tablet TAKE 1 TABLET BY MOUTH DAILY. 30 tablet 11   • cyclobenzaprine (FLEXERIL) 10 MG tablet Take  by mouth.     • furosemide (LASIX) 40 MG tablet TAKE 1 TABLET BY MOUTH EVERY MORNING AND TAKE 1/2 TABLET EVERY EVENING AS NEEDED (Patient taking differently: TAKE 1 TABLET BY MOUTH EVERY MORNING AND TAKE 1 TABLET EVERY EVENING AS NEEDED) 45 tablet 11   • KLOR-CON 10 MEQ CR tablet TAKE 1 TABLET BY MOUTH DAILY. 30 tablet 3   • levothyroxine (LEVOTHROID) 25 MCG tablet Take 25 mcg by mouth Daily.     • MECLIZINE HCL PO Take 25 mg by mouth 3 (Three) Times a Day As Needed.     • ondansetron (ZOFRAN) 8 MG tablet TAKE 1 TABLET BY MOUTH EVERY 8 (EIGHT) HOURS AS NEEDED FOR NAUSEA FOR UP TO 7 DAYS     • pantoprazole (PROTONIX) 40 MG EC tablet Take 1 tablet by mouth Daily. 30 tablet 3   • sertraline (ZOLOFT) 100 MG tablet Take 150 mg by mouth Daily.     • SUMAtriptan (IMITREX) 50 MG tablet TAKE 1 TABLET EVERY 2 HOURS AS NEEDED FOR MIGRAINE,MAY REPEAT ONCE IN 2 HOURS  4     No current facility-administered medications on file prior to visit.        Past Medical History:   Diagnosis Date   • Abdominal pain    • Atrial fibrillation (CMS/HCC)    • Fung's esophagus    • Benign essential  hypertension    • Blood in stool    • Chest pain    • Chronic gastritis    • Congestive heart failure (CMS/HCC)    • Degenerative disc disease    • Depression with anxiety    • Disease of thyroid gland    • Diverticulitis of colon    • Dysphagia    • GERD (gastroesophageal reflux disease)    • History of migraine headaches    • Osteoporosis    • Palpitations    • Thyroid disorder    • Ulcerative colitis (CMS/HCC)    • Vomiting    • Weight loss        Past Surgical History:   Procedure Laterality Date   • APPENDECTOMY     • BLADDER SURGERY     • CARDIAC CATHETERIZATION N/A 11/21/2016    Procedure: Left Heart Cath;  Surgeon: Robinson Salazar MD;  Location:  [facility];  Service:    • CARDIAC ELECTROPHYSIOLOGY PROCEDURE N/A 2/7/2017    Procedure: Pacemaker DC new   [employer];  Surgeon: Robinson Salazar MD;  Location:  [facility];  Service:    • CARDIOVERSION  01/18/2017   • CHOLECYSTECTOMY     • COLONOSCOPY     • COLONOSCOPY N/A 7/29/2016    normal   • ENDOSCOPY  05/06/2015    submucosal nodule in esophagus, erythematous mucosa in antrum,moderate acute gastritis, no h-pylori   • ENDOSCOPY N/A 9/2/2016    Erythematous mucosa in the antrum   • EYE SURGERY     • HYSTERECTOMY     • KNEE SURGERY     • THYROID SURGERY     • TONSILLECTOMY         Family History   Problem Relation Age of Onset   • Lung cancer Other    • Ulcerative colitis Sister    • Heart failure Mother    • Heart disease Mother    • Heart failure Father    • Heart disease Father        Social History     Socioeconomic History   • Marital status:      Spouse name: Not on file   • Number of children: Not on file   • Years of education: Not on file   • Highest education level: Not on file   Tobacco Use   • Smoking status: Never Smoker   • Smokeless tobacco: Never Used   Substance and Sexual Activity   • Alcohol use: No   • Drug use: No   • Sexual activity: Defer     Birth control/protection: Post-menopausal  "      Review of Systems   Constitution: Negative for diaphoresis, weakness and malaise/fatigue.   HENT: Negative for nosebleeds and stridor.    Cardiovascular: Negative for chest pain, claudication, dyspnea on exertion, irregular heartbeat, leg swelling, near-syncope, orthopnea, palpitations, paroxysmal nocturnal dyspnea and syncope.        Rib pain noted with arm movement and when lying on affected (left) side   Respiratory: Negative for cough, hemoptysis, shortness of breath and wheezing.    Gastrointestinal: Negative for abdominal pain, constipation, diarrhea, hematemesis, hematochezia, melena, nausea and vomiting.   Neurological: Negative for dizziness, focal weakness and light-headedness.       OBJECTIVE:   Vital Signs  Vitals:    05/14/19 1157   BP: 147/96   Pulse: 94     Estimated body mass index is 24.81 kg/m² as calculated from the following:    Height as of this encounter: 175.3 cm (69\").    Weight as of this encounter: 76.2 kg (168 lb).    Physical Exam   Constitutional: She is oriented to person, place, and time. She appears well-developed and well-nourished. No distress.   HENT:   Head: Normocephalic and atraumatic.   Eyes: Conjunctivae and EOM are normal. Pupils are equal, round, and reactive to light.   Neck: Normal range of motion. Neck supple. No JVD present. No tracheal deviation present. No thyromegaly present.   Cardiovascular: Normal rate, regular rhythm, normal heart sounds and intact distal pulses. Exam reveals no gallop and no friction rub.   No murmur heard.  Pulmonary/Chest: Effort normal and breath sounds normal. No respiratory distress. She has no wheezes. She has no rales. She exhibits no tenderness.   Abdominal: Soft. Bowel sounds are normal. She exhibits no distension. There is no tenderness.   Musculoskeletal: Normal range of motion. She exhibits no edema, tenderness or deformity.   Neurological: She is alert and oriented to person, place, and time.   Skin: Skin is warm and dry. No " rash noted. She is not diaphoretic. No erythema. No pallor.   Psychiatric: She has a normal mood and affect. Her behavior is normal.       Procedures    Cardiac catheterization  2016  Conclusion        · Successful left heart catheter  · Normal coronary arteries  · LV function at lower limit of normal         Stress Testin2018  Interpretation Summary        · Findings consistent with an equivocal ECG stress test.  · Left ventricular ejection fraction is normal (Calculated EF = 60%).  · Myocardial perfusion imaging indicates a normal myocardial perfusion study with no evidence of ischemia.  · Impressions are consistent with a low risk study.         Cardiac Echo:  2018  Interpretation Summary     · Mild mitral valve regurgitation is present  · The left ventricular cavity is borderline dilated.  · Right ventricular cavity is borderline dilated.  · Left atrial cavity size is mildly dilated.  · Calculated EF = 66.2%.  · There is no evidence of pericardial effusion.           ASSESSMENT:      Diagnosis Plan   1. Chest pain, atypical  XR Chest 2 View    Adult Transthoracic Echo Complete W/ Cont if Necessary Per Protocol         PLAN OF CARE:     1.  Presence of cardiac pacemaker-implanted in 2017 for sick sinus syndrome.  Patient complaining of breast pain which she believes secondary to this pacemaker.  The pain is noted to be in her left ribs, this is exacerbated by lying on the affected side as well as moving the left arm.  This started yesterday evening, and has persisted through the day today.  She denies any chest pain, shortness of breath, dizziness, weakness, or syncope.  Will obtain 2 view chest x-ray as well as echo to ensure no structural changes have occurred and lead placement is still correct.  We will have the patient follow-up in 2 to 4 weeks with Dr. Salazar regarding these results.  Pacemaker site is free of any signs or symptoms of edema, hematoma, or infection at this  time.    2.  Hypertension- in office today blood pressure is slightly elevated at 147/96, but the patient states she has good control at home.  Continue current medication regimen.  Controlled    Patient to have 2 view chest x-ray and echo and follow-up with Dr. Salazar in 2 to 4 weeks.      Sincerely,   JORY Irvin  [facility]  05/15/19  12:30 PM

## 2019-05-13 NOTE — TELEPHONE ENCOUNTER
Concerned about pain near left breast since that is the side her pacemaker is on.  She thinks maybe she slept wrong.     #  [phone] (h)  [phone](c)    Pt has been scheduled to see Odalis SIMPSON on 5/14/19

## 2019-05-14 ENCOUNTER — HOSPITAL ENCOUNTER (OUTPATIENT)
Dept: GENERAL RADIOLOGY | Facility: HOSPITAL | Age: 74
Discharge: HOME | End: 2019-05-14
Admitting: NURSE PRACTITIONER

## 2019-05-14 ENCOUNTER — OFFICE VISIT (OUTPATIENT)
Dept: CARDIOLOGY | Facility: CLINIC | Age: 74
End: 2019-05-14

## 2019-05-14 VITALS
BODY MASS INDEX: 24.88 KG/M2 | WEIGHT: 168 LBS | SYSTOLIC BLOOD PRESSURE: 147 MMHG | HEART RATE: 94 BPM | HEIGHT: 69 IN | DIASTOLIC BLOOD PRESSURE: 96 MMHG

## 2019-05-14 DIAGNOSIS — R07.89 CHEST PAIN, ATYPICAL: Primary | ICD-10-CM

## 2019-05-14 PROCEDURE — 99213 OFFICE O/P EST LOW 20 MIN: CPT | Performed by: NURSE PRACTITIONER

## 2019-05-14 PROCEDURE — 71046 X-RAY EXAM CHEST 2 VIEWS: CPT

## 2019-05-15 ENCOUNTER — HOSPITAL ENCOUNTER (OUTPATIENT)
Dept: CARDIOLOGY | Facility: HOSPITAL | Age: 74
Discharge: HOME | End: 2019-05-15
Admitting: NURSE PRACTITIONER

## 2019-05-15 VITALS
HEART RATE: 60 BPM | BODY MASS INDEX: 24.88 KG/M2 | HEIGHT: 69 IN | SYSTOLIC BLOOD PRESSURE: 121 MMHG | WEIGHT: 168 LBS | DIASTOLIC BLOOD PRESSURE: 81 MMHG

## 2019-05-15 LAB
BH CV ECHO MEAS - ACS: 1.9 CM
BH CV ECHO MEAS - AI DEC SLOPE: 279 CM/SEC^2
BH CV ECHO MEAS - AI MAX PG: 82.4 MMHG
BH CV ECHO MEAS - AI MAX VEL: 454 CM/SEC
BH CV ECHO MEAS - AI P1/2T: 476.6 MSEC
BH CV ECHO MEAS - AO MAX PG (FULL): 3.6 MMHG
BH CV ECHO MEAS - AO MAX PG: 5.2 MMHG
BH CV ECHO MEAS - AO MEAN PG (FULL): 2 MMHG
BH CV ECHO MEAS - AO MEAN PG: 3 MMHG
BH CV ECHO MEAS - AO ROOT AREA (BSA CORRECTED): 1.6
BH CV ECHO MEAS - AO ROOT AREA: 7.1 CM^2
BH CV ECHO MEAS - AO ROOT DIAM: 3 CM
BH CV ECHO MEAS - AO V2 MAX: 114 CM/SEC
BH CV ECHO MEAS - AO V2 MEAN: 82.8 CM/SEC
BH CV ECHO MEAS - AO V2 VTI: 23.9 CM
BH CV ECHO MEAS - ASC AORTA: 2.9 CM
BH CV ECHO MEAS - AVA(I,A): 1.8 CM^2
BH CV ECHO MEAS - AVA(I,D): 1.8 CM^2
BH CV ECHO MEAS - AVA(V,A): 1.7 CM^2
BH CV ECHO MEAS - AVA(V,D): 1.7 CM^2
BH CV ECHO MEAS - BSA(HAYCOCK): 1.9 M^2
BH CV ECHO MEAS - BSA: 1.9 M^2
BH CV ECHO MEAS - BZI_BMI: 24.8 KILOGRAMS/M^2
BH CV ECHO MEAS - BZI_METRIC_HEIGHT: 175.3 CM
BH CV ECHO MEAS - BZI_METRIC_WEIGHT: 76.2 KG
BH CV ECHO MEAS - EDV(CUBED): 91.1 ML
BH CV ECHO MEAS - EDV(MOD-SP2): 48 ML
BH CV ECHO MEAS - EDV(MOD-SP4): 65 ML
BH CV ECHO MEAS - EDV(TEICH): 92.4 ML
BH CV ECHO MEAS - EF(CUBED): 75.9 %
BH CV ECHO MEAS - EF(MOD-BP): 64 %
BH CV ECHO MEAS - EF(MOD-SP2): 58.3 %
BH CV ECHO MEAS - EF(MOD-SP4): 72.3 %
BH CV ECHO MEAS - EF(TEICH): 68 %
BH CV ECHO MEAS - ESV(CUBED): 22 ML
BH CV ECHO MEAS - ESV(MOD-SP2): 20 ML
BH CV ECHO MEAS - ESV(MOD-SP4): 18 ML
BH CV ECHO MEAS - ESV(TEICH): 29.6 ML
BH CV ECHO MEAS - FS: 37.8 %
BH CV ECHO MEAS - IVS/LVPW: 0.82
BH CV ECHO MEAS - IVSD: 0.9 CM
BH CV ECHO MEAS - LAT PEAK E' VEL: 11.1 CM/SEC
BH CV ECHO MEAS - LV DIASTOLIC VOL/BSA (35-75): 33.9 ML/M^2
BH CV ECHO MEAS - LV MASS(C)D: 153.3 GRAMS
BH CV ECHO MEAS - LV MASS(C)DI: 79.9 GRAMS/M^2
BH CV ECHO MEAS - LV MAX PG: 1.6 MMHG
BH CV ECHO MEAS - LV MEAN PG: 1 MMHG
BH CV ECHO MEAS - LV SYSTOLIC VOL/BSA (12-30): 9.4 ML/M^2
BH CV ECHO MEAS - LV V1 MAX: 63.5 CM/SEC
BH CV ECHO MEAS - LV V1 MEAN: 43.4 CM/SEC
BH CV ECHO MEAS - LV V1 VTI: 13.4 CM
BH CV ECHO MEAS - LVIDD: 4.5 CM
BH CV ECHO MEAS - LVIDS: 2.8 CM
BH CV ECHO MEAS - LVLD AP2: 6.2 CM
BH CV ECHO MEAS - LVLD AP4: 6.2 CM
BH CV ECHO MEAS - LVLS AP2: 5.5 CM
BH CV ECHO MEAS - LVLS AP4: 4.9 CM
BH CV ECHO MEAS - LVOT AREA (M): 3.1 CM^2
BH CV ECHO MEAS - LVOT AREA: 3.1 CM^2
BH CV ECHO MEAS - LVOT DIAM: 2 CM
BH CV ECHO MEAS - LVPWD: 1.1 CM
BH CV ECHO MEAS - MED PEAK E' VEL: 11.4 CM/SEC
BH CV ECHO MEAS - MR MAX PG: 68.2 MMHG
BH CV ECHO MEAS - MR MAX VEL: 413 CM/SEC
BH CV ECHO MEAS - MV DEC SLOPE: 284 CM/SEC^2
BH CV ECHO MEAS - MV DEC TIME: 0.1 SEC
BH CV ECHO MEAS - MV E MAX VEL: 96 CM/SEC
BH CV ECHO MEAS - MV MAX PG: 5 MMHG
BH CV ECHO MEAS - MV MEAN PG: 2 MMHG
BH CV ECHO MEAS - MV P1/2T MAX VEL: 108 CM/SEC
BH CV ECHO MEAS - MV P1/2T: 111.4 MSEC
BH CV ECHO MEAS - MV V2 MAX: 112 CM/SEC
BH CV ECHO MEAS - MV V2 MEAN: 61 CM/SEC
BH CV ECHO MEAS - MV V2 VTI: 27.1 CM
BH CV ECHO MEAS - MVA P1/2T LCG: 2 CM^2
BH CV ECHO MEAS - MVA(P1/2T): 2 CM^2
BH CV ECHO MEAS - MVA(VTI): 1.6 CM^2
BH CV ECHO MEAS - PA ACC TIME: 0.09 SEC
BH CV ECHO MEAS - PA MAX PG (FULL): 1.2 MMHG
BH CV ECHO MEAS - PA MAX PG: 2 MMHG
BH CV ECHO MEAS - PA PR(ACCEL): 37.6 MMHG
BH CV ECHO MEAS - PA V2 MAX: 70.8 CM/SEC
BH CV ECHO MEAS - PI END-D VEL: 105 CM/SEC
BH CV ECHO MEAS - PVA(V,A): 2.6 CM^2
BH CV ECHO MEAS - PVA(V,D): 2.6 CM^2
BH CV ECHO MEAS - QP/QS: 0.9
BH CV ECHO MEAS - RAP SYSTOLE: 3 MMHG
BH CV ECHO MEAS - RV MAX PG: 0.76 MMHG
BH CV ECHO MEAS - RV MEAN PG: 0 MMHG
BH CV ECHO MEAS - RV V1 MAX: 43.6 CM/SEC
BH CV ECHO MEAS - RV V1 MEAN: 30.9 CM/SEC
BH CV ECHO MEAS - RV V1 VTI: 9.2 CM
BH CV ECHO MEAS - RVOT AREA: 4.2 CM^2
BH CV ECHO MEAS - RVOT DIAM: 2.3 CM
BH CV ECHO MEAS - RVSP: 46.8 MMHG
BH CV ECHO MEAS - SI(AO): 88.1 ML/M^2
BH CV ECHO MEAS - SI(CUBED): 36.1 ML/M^2
BH CV ECHO MEAS - SI(LVOT): 21.9 ML/M^2
BH CV ECHO MEAS - SI(MOD-SP2): 14.6 ML/M^2
BH CV ECHO MEAS - SI(MOD-SP4): 24.5 ML/M^2
BH CV ECHO MEAS - SI(TEICH): 32.8 ML/M^2
BH CV ECHO MEAS - SV(AO): 168.9 ML
BH CV ECHO MEAS - SV(CUBED): 69.2 ML
BH CV ECHO MEAS - SV(LVOT): 42.1 ML
BH CV ECHO MEAS - SV(MOD-SP2): 28 ML
BH CV ECHO MEAS - SV(MOD-SP4): 47 ML
BH CV ECHO MEAS - SV(RVOT): 38 ML
BH CV ECHO MEAS - SV(TEICH): 62.9 ML
BH CV ECHO MEAS - TAPSE (>1.6): 1.3 CM2
BH CV ECHO MEAS - TR MAX VEL: 331 CM/SEC
BH CV ECHO MEASUREMENTS AVERAGE E/E' RATIO: 8.53
BH CV XLRA - RV BASE: 3.2 CM
BH CV XLRA - RV LENGTH: 4.8 CM
BH CV XLRA - RV MID: 2 CM
BH CV XLRA - TDI S': 12.7 CM/SEC
LEFT ATRIUM VOLUME INDEX: 42 ML/M2
MAXIMAL PREDICTED HEART RATE: 145 BPM
STRESS TARGET HR: 123 BPM

## 2019-05-15 PROCEDURE — 93306 TTE W/DOPPLER COMPLETE: CPT | Performed by: INTERNAL MEDICINE

## 2019-05-15 PROCEDURE — 93306 TTE W/DOPPLER COMPLETE: CPT

## 2019-05-16 ENCOUNTER — TELEPHONE (OUTPATIENT)
Dept: CARDIOLOGY | Facility: CLINIC | Age: 74
End: 2019-05-16

## 2019-05-16 NOTE — TELEPHONE ENCOUNTER
----- Message from JORY Irvin sent at 5/16/2019  9:12 AM EDT -----  Could someone please let Ms. Forrest know that her chest xray is acceptable and her echo has some mild changes, but nothing to be concerned about, and Dr. ALVARADO will go over everything with her in greater detail at her appointment? She should see her PCP about the chest wall/rib pain she has been having if it has not resolved.     Thanks!  Odalis        ----- Message -----  From: Demetrius Coronado MD  Sent: 5/15/2019   4:56 PM  To: JORY Irvin

## 2019-06-13 ENCOUNTER — OFFICE VISIT (OUTPATIENT)
Dept: CARDIOLOGY | Facility: CLINIC | Age: 74
End: 2019-06-13

## 2019-06-13 VITALS
DIASTOLIC BLOOD PRESSURE: 85 MMHG | HEART RATE: 81 BPM | SYSTOLIC BLOOD PRESSURE: 128 MMHG | HEIGHT: 69 IN | BODY MASS INDEX: 25.03 KG/M2 | WEIGHT: 169 LBS

## 2019-06-13 DIAGNOSIS — R00.2 PALPITATIONS: ICD-10-CM

## 2019-06-13 DIAGNOSIS — Z95.0 PACEMAKER: ICD-10-CM

## 2019-06-13 DIAGNOSIS — I10 ESSENTIAL HYPERTENSION: ICD-10-CM

## 2019-06-13 DIAGNOSIS — I48.0 PAROXYSMAL ATRIAL FIBRILLATION (HCC): Primary | ICD-10-CM

## 2019-06-13 DIAGNOSIS — R07.2 PRECORDIAL PAIN: ICD-10-CM

## 2019-06-13 PROCEDURE — 99213 OFFICE O/P EST LOW 20 MIN: CPT | Performed by: INTERNAL MEDICINE

## 2019-06-13 RX ORDER — ASPIRIN 81 MG/1
81 TABLET ORAL EVERY OTHER DAY
COMMUNITY
End: 2020-06-19 | Stop reason: HOSPADM

## 2019-06-18 RX ORDER — POTASSIUM CHLORIDE 750 MG/1
10 TABLET, EXTENDED RELEASE ORAL DAILY
Qty: 30 TABLET | Refills: 11 | Status: SHIPPED | OUTPATIENT
Start: 2019-06-18 | End: 2020-08-21

## 2019-07-09 ENCOUNTER — APPOINTMENT (OUTPATIENT)
Dept: CT IMAGING | Facility: HOSPITAL | Age: 74
End: 2019-07-09

## 2019-07-09 ENCOUNTER — TELEPHONE (OUTPATIENT)
Dept: CARDIOLOGY | Facility: CLINIC | Age: 74
End: 2019-07-09

## 2019-07-09 ENCOUNTER — HOSPITAL ENCOUNTER (EMERGENCY)
Facility: HOSPITAL | Age: 74
Discharge: HOME | End: 2019-07-09
Attending: EMERGENCY MEDICINE | Admitting: EMERGENCY MEDICINE

## 2019-07-09 ENCOUNTER — APPOINTMENT (OUTPATIENT)
Dept: GENERAL RADIOLOGY | Facility: HOSPITAL | Age: 74
End: 2019-07-09

## 2019-07-09 VITALS
WEIGHT: 162.6 LBS | HEART RATE: 91 BPM | OXYGEN SATURATION: 96 % | TEMPERATURE: 98.2 F | SYSTOLIC BLOOD PRESSURE: 117 MMHG | HEIGHT: 69 IN | BODY MASS INDEX: 24.08 KG/M2 | RESPIRATION RATE: 16 BRPM | DIASTOLIC BLOOD PRESSURE: 75 MMHG

## 2019-07-09 DIAGNOSIS — R11.2 NAUSEA AND VOMITING, INTRACTABILITY OF VOMITING NOT SPECIFIED, UNSPECIFIED VOMITING TYPE: Primary | ICD-10-CM

## 2019-07-09 LAB
ALBUMIN SERPL-MCNC: 4.5 G/DL (ref 3.5–5.2)
ALBUMIN/GLOB SERPL: 2.3 G/DL
ALP SERPL-CCNC: 90 U/L (ref 39–117)
ALT SERPL W P-5'-P-CCNC: 17 U/L (ref 1–33)
ANION GAP SERPL CALCULATED.3IONS-SCNC: 14.1 MMOL/L (ref 5–15)
AST SERPL-CCNC: 24 U/L (ref 1–32)
BASOPHILS # BLD AUTO: 0.1 10*3/MM3 (ref 0–0.2)
BASOPHILS NFR BLD AUTO: 0.7 % (ref 0–1.5)
BILIRUB SERPL-MCNC: 0.7 MG/DL (ref 0.2–1.2)
BILIRUB UR QL STRIP: NEGATIVE
BUN BLD-MCNC: 13 MG/DL (ref 8–23)
BUN/CREAT SERPL: 14.6 (ref 7–25)
CALCIUM SPEC-SCNC: 9.3 MG/DL (ref 8.6–10.5)
CHLORIDE SERPL-SCNC: 91 MMOL/L (ref 98–107)
CLARITY UR: CLEAR
CO2 SERPL-SCNC: 28.9 MMOL/L (ref 22–29)
COLOR UR: YELLOW
CREAT BLD-MCNC: 0.89 MG/DL (ref 0.57–1)
DEPRECATED RDW RBC AUTO: 47.6 FL (ref 37–54)
EOSINOPHIL # BLD AUTO: 0.13 10*3/MM3 (ref 0–0.4)
EOSINOPHIL NFR BLD AUTO: 1 % (ref 0.3–6.2)
ERYTHROCYTE [DISTWIDTH] IN BLOOD BY AUTOMATED COUNT: 13.4 % (ref 12.3–15.4)
GFR SERPL CREATININE-BSD FRML MDRD: 62 ML/MIN/1.73
GLOBULIN UR ELPH-MCNC: 2 GM/DL
GLUCOSE BLD-MCNC: 109 MG/DL (ref 65–99)
GLUCOSE UR STRIP-MCNC: NEGATIVE MG/DL
HCT VFR BLD AUTO: 40.5 % (ref 34–46.6)
HGB BLD-MCNC: 14.1 G/DL (ref 12–15.9)
HGB UR QL STRIP.AUTO: NEGATIVE
IMM GRANULOCYTES # BLD AUTO: 0.17 10*3/MM3 (ref 0–0.05)
IMM GRANULOCYTES NFR BLD AUTO: 1.3 % (ref 0–0.5)
KETONES UR QL STRIP: NEGATIVE
LEUKOCYTE ESTERASE UR QL STRIP.AUTO: NEGATIVE
LIPASE SERPL-CCNC: 31 U/L (ref 13–60)
LYMPHOCYTES # BLD AUTO: 1.52 10*3/MM3 (ref 0.7–3.1)
LYMPHOCYTES NFR BLD AUTO: 11.4 % (ref 19.6–45.3)
MCH RBC QN AUTO: 33.7 PG (ref 26.6–33)
MCHC RBC AUTO-ENTMCNC: 34.8 G/DL (ref 31.5–35.7)
MCV RBC AUTO: 96.9 FL (ref 79–97)
MONOCYTES # BLD AUTO: 1.08 10*3/MM3 (ref 0.1–0.9)
MONOCYTES NFR BLD AUTO: 8.1 % (ref 5–12)
NEUTROPHILS # BLD AUTO: 10.37 10*3/MM3 (ref 1.7–7)
NEUTROPHILS NFR BLD AUTO: 77.5 % (ref 42.7–76)
NITRITE UR QL STRIP: NEGATIVE
NRBC BLD AUTO-RTO: 0 /100 WBC (ref 0–0.2)
NT-PROBNP SERPL-MCNC: 1450 PG/ML (ref 5–1800)
PH UR STRIP.AUTO: 7.5 [PH] (ref 5–8)
PLATELET # BLD AUTO: 268 10*3/MM3 (ref 140–450)
PMV BLD AUTO: 10.3 FL (ref 6–12)
POTASSIUM BLD-SCNC: 3.3 MMOL/L (ref 3.5–5.2)
PROT SERPL-MCNC: 6.5 G/DL (ref 6–8.5)
PROT UR QL STRIP: NEGATIVE
RBC # BLD AUTO: 4.18 10*6/MM3 (ref 3.77–5.28)
SODIUM BLD-SCNC: 134 MMOL/L (ref 136–145)
SP GR UR STRIP: 1.01 (ref 1–1.03)
TROPONIN T SERPL-MCNC: <0.01 NG/ML (ref 0–0.03)
UROBILINOGEN UR QL STRIP: NORMAL
WBC NRBC COR # BLD: 13.37 10*3/MM3 (ref 3.4–10.8)

## 2019-07-09 PROCEDURE — 84484 ASSAY OF TROPONIN QUANT: CPT | Performed by: NURSE PRACTITIONER

## 2019-07-09 PROCEDURE — 74176 CT ABD & PELVIS W/O CONTRAST: CPT

## 2019-07-09 PROCEDURE — 80053 COMPREHEN METABOLIC PANEL: CPT | Performed by: NURSE PRACTITIONER

## 2019-07-09 PROCEDURE — 71046 X-RAY EXAM CHEST 2 VIEWS: CPT

## 2019-07-09 PROCEDURE — 96374 THER/PROPH/DIAG INJ IV PUSH: CPT

## 2019-07-09 PROCEDURE — 99284 EMERGENCY DEPT VISIT MOD MDM: CPT

## 2019-07-09 PROCEDURE — 85025 COMPLETE CBC W/AUTO DIFF WBC: CPT | Performed by: NURSE PRACTITIONER

## 2019-07-09 PROCEDURE — 25010000002 ONDANSETRON PER 1 MG: Performed by: NURSE PRACTITIONER

## 2019-07-09 PROCEDURE — 96361 HYDRATE IV INFUSION ADD-ON: CPT

## 2019-07-09 PROCEDURE — 83690 ASSAY OF LIPASE: CPT | Performed by: NURSE PRACTITIONER

## 2019-07-09 PROCEDURE — 81003 URINALYSIS AUTO W/O SCOPE: CPT | Performed by: NURSE PRACTITIONER

## 2019-07-09 PROCEDURE — 83880 ASSAY OF NATRIURETIC PEPTIDE: CPT | Performed by: NURSE PRACTITIONER

## 2019-07-09 RX ORDER — ONDANSETRON 4 MG/1
4 TABLET, FILM COATED ORAL EVERY 6 HOURS PRN
Qty: 12 TABLET | Refills: 0 | Status: SHIPPED | OUTPATIENT
Start: 2019-07-09 | End: 2020-01-22

## 2019-07-09 RX ORDER — ONDANSETRON 2 MG/ML
4 INJECTION INTRAMUSCULAR; INTRAVENOUS ONCE
Status: COMPLETED | OUTPATIENT
Start: 2019-07-09 | End: 2019-07-09

## 2019-07-09 RX ADMIN — ONDANSETRON HYDROCHLORIDE 4 MG: 2 SOLUTION INTRAMUSCULAR; INTRAVENOUS at 16:31

## 2019-07-09 RX ADMIN — SODIUM CHLORIDE 500 ML: 9 INJECTION, SOLUTION INTRAVENOUS at 16:29

## 2019-07-09 NOTE — ED PROVIDER NOTES
"Pt presents to the ED c/o L-sided chest swelling \"beneath the pt's pacemaker\", which was placed by Dr. Salazar. Pt also c/o nausea and decreased appetite.     PHYSICAL EXAM  A&Ox3. NAD, PERRL, moist mucous membranes. Heart is RRR, lungs are CTAB. Abd is soft, non tender, non distended, hypoactive bowel sounds. No pedal edema.  Normocephalic, atraumatic. Normal neuro exam. Pacemaker sight does not appear to be swollen and there is tenderness inferiorly, but no erythema, warmth, or fluctuance.     Vital signs and nursing notes reviewed.    LAB RESULTS AND RADIOLOGY  I have reviewed the patient's labs and imaging studies.    PROGRESS NOTES    1925 Spoke to midlevel provider JORY Aquino, about the pt. After performing my own physical exam, I agree with the plan of care.    Attestation:    The JANINE and I have discussed this patient's history, physical exam, and treatment plan.  I have reviewed the documentation and personally had a face to face interaction with the patient. I affirm the documentation and agree with the treatment and plan.  The attached note describes my personal findings.    Documentation assistance provided by nikki Alfonso for Dr. Yarbrough. Information recorded by the nikki was done at my direction and has been verified and validated by me.       Henrique Alfonso  07/09/19 1944       Jose Yarbrough MD  07/09/19 6625    "

## 2019-07-09 NOTE — TELEPHONE ENCOUNTER
Pt called states she was very sick to her stomach and was having chest discomfort. Also states she was SOA off and on.  I informed pt to be seen in ER to make sure every thing was ok.   Pt agreed and verbalized understanding

## 2019-07-09 NOTE — ED NOTES
Pt c/o swelling underneath of pacemaker and pain that began a couple days ago. She also c/o n/v for the 2-3 weeks and cannot keep anything down. She was seen for this at [facility] last week and had neg CT of abd. Denies blood noted. She c/o generalized abd soreness.      Sylvia Meyers, RN  07/09/19 8814

## 2019-07-09 NOTE — ED PROVIDER NOTES
"EMERGENCY DEPARTMENT ENCOUNTER    CHIEF COMPLAINT  Chief Complaint: Chest swelling  History given by:Patient  History limited by:None  Time Seen: 1613  Room Number: 29/29  PMD: Georgiana Cisneros MD  Cardiologist: Dr. Marie MD    HPI:  Pt is a 73 y.o. female who presents with chest swelling \"beneath her pacemaker\" that began one week ago. Pt denies palpitations, or chest pain. Pt reports associated nausea/vomiting for 2 weeks, SOA, chills, decreased appetite, distention, generalized abdominal pain, and left breast tenderness. Pt reports upcoming vascular surgery on the 7/12/19. Pt reports she has been taking Phenergan for nausea/vomiting w/o relief. Pt reports she was seen at [facility] for nausea/vomiting last week, and she had a CT done.     Duration: one week  Timing:constant  Location:chest  Radiation:none  Quality:swelling  Intensity/Severity:moderate  Progression:worsened  Associated Symptoms:distention, n/v, decreased appetite, generalized abdominal pain, chills, SOA  Aggravating Factors:none  Alleviating Factors:none  Previous Episodes:no  Treatment before arrival:Pt has been taking Phenergan without relief.    PAST MEDICAL HISTORY  Active Ambulatory Problems     Diagnosis Date Noted   • Chronic atrial fibrillation (CMS/HCC) 04/07/2016   • Benign essential HTN 04/07/2016   • Bradycardia 04/07/2016   • Chest pain 04/07/2016   • Can't get food down 04/07/2016   • Accumulation of fluid in tissues 04/07/2016   • Esophageal lump 04/07/2016   • Adynamia 04/07/2016   • Itch of skin 04/07/2016   • Anorexia 04/07/2016   • Chronic nausea 04/07/2016   • Gastroesophageal reflux disease 04/07/2016   • Breath shortness 04/07/2016   • Emesis 04/07/2016   • Decreased body weight 04/07/2016   • Anxiety 04/21/2016   • Narrowing of intervertebral disc space 04/21/2016   • Diverticulosis of intestine 04/18/2013   • Hypertension 04/21/2016   • Migraine 04/21/2016   • Osteoporosis 04/21/2016   • " Palpitations 10/02/2012   • Pituitary adenoma (CMS/[facility]) 04/21/2016   • Sick sinus syndrome (CMS/[facility]) 08/28/2013   • Diarrhea 07/21/2016   • Paroxysmal atrial fibrillation (CMS/[facility]) 11/15/2016   • Cardiomyopathy (CMS/[facility]) 12/01/2016   • Anticoagulated 12/01/2016   • Atrial fibrillation (CMS/[facility]) 01/18/2017   • On amiodarone therapy 01/23/2017   • Pacemaker 02/22/2017   • Cardiac resynchronization therapy pacemaker (CRT-P) in place 03/28/2017   • Infected pacemaker (CMS/[facility]) 04/06/2017   • Wound infection 04/07/2017     Resolved Ambulatory Problems     Diagnosis Date Noted   • No Resolved Ambulatory Problems     Past Medical History:   Diagnosis Date   • Abdominal pain    • Atrial fibrillation (CMS/[facility])    • Fung's esophagus    • Benign essential hypertension    • Blood in stool    • Chest pain    • Chronic gastritis    • Congestive heart failure (CMS/[facility])    • Degenerative disc disease    • Depression with anxiety    • Disease of thyroid gland    • Diverticulitis of colon    • Dysphagia    • GERD (gastroesophageal reflux disease)    • History of migraine headaches    • Osteoporosis    • Palpitations    • Thyroid disorder    • Ulcerative colitis (CMS/[facility])    • Vomiting    • Weight loss        PAST SURGICAL HISTORY  Past Surgical History:   Procedure Laterality Date   • APPENDECTOMY     • BLADDER SURGERY     • CARDIAC CATHETERIZATION N/A 11/21/2016    Procedure: Left Heart Cath;  Surgeon: Robinson Salazar MD;  Location:  [facility];  Service:    • CARDIAC ELECTROPHYSIOLOGY PROCEDURE N/A 2/7/2017    Procedure: Pacemaker DC new   [employer];  Surgeon: Robinson Salazar MD;  Location:  [facility];  Service:    • CARDIOVERSION  01/18/2017   • CHOLECYSTECTOMY     • COLONOSCOPY     • COLONOSCOPY N/A 7/29/2016    normal   • ENDOSCOPY  05/06/2015    submucosal nodule in esophagus, erythematous mucosa in antrum,moderate acute gastritis, no h-pylori   • ENDOSCOPY N/A 9/2/2016     Erythematous mucosa in the antrum   • EYE SURGERY     • HYSTERECTOMY     • KNEE SURGERY     • THYROID SURGERY     • TONSILLECTOMY         FAMILY HISTORY  Family History   Problem Relation Age of Onset   • Lung cancer Other    • Ulcerative colitis Sister    • Heart failure Mother    • Heart disease Mother    • Heart failure Father    • Heart disease Father        SOCIAL HISTORY  Social History     Socioeconomic History   • Marital status:      Spouse name: Not on file   • Number of children: Not on file   • Years of education: Not on file   • Highest education level: Not on file   Tobacco Use   • Smoking status: Never Smoker   • Smokeless tobacco: Never Used   Substance and Sexual Activity   • Alcohol use: No   • Drug use: No   • Sexual activity: Defer     Birth control/protection: Post-menopausal         ALLERGIES  Amoxicillin-pot clavulanate; Bupivacaine hcl; Doxycycline; Nepafenac; Bactrim [sulfamethoxazole-trimethoprim]; Barium-containing compounds; Bextra [valdecoxib]; Bupivacaine; Clarithromycin; Contrast dye; Cymbalta [duloxetine hcl]; Doxycycline monohydrate; Iodinated diagnostic agents; Iodine; Keflex [cephalexin]; Levaquin [levofloxacin]; Medrol [methylprednisolone]; Mesalamine; Nsaids; Polymyxin b-trimethoprim; Rivaroxaban; Tetanus toxoids; Tetanus-diphtheria toxoids td; and Clindamycin    REVIEW OF SYSTEMS  Review of Systems   Constitutional: Positive for appetite change ( decreased) and chills. Negative for activity change, fatigue and fever.   HENT: Negative.  Negative for congestion, ear pain, rhinorrhea and sore throat.    Eyes: Negative.    Respiratory: Positive for shortness of breath. Negative for cough.    Cardiovascular: Negative.  Negative for chest pain, palpitations and leg swelling ( pedal).   Gastrointestinal: Positive for abdominal pain, nausea and vomiting. Negative for constipation and diarrhea.   Endocrine: Negative.    Genitourinary: Negative.  Negative for decreased urine  volume, difficulty urinating, dysuria, menstrual problem, pelvic pain, urgency and vaginal discharge.        + left breast pain   Musculoskeletal: Negative.  Negative for back pain.   Skin: Negative.  Negative for rash.   Allergic/Immunologic: Negative.    Neurological: Negative.  Negative for dizziness, weakness, light-headedness, numbness and headaches.   Hematological: Negative.    Psychiatric/Behavioral: Negative.  The patient is not nervous/anxious.    All other systems reviewed and are negative.      PHYSICAL EXAM  ED Triage Vitals [07/09/19 1601]   Temp Heart Rate Resp BP SpO2   99.2 °F (37.3 °C) 87 16 -- 100 %      Temp src Heart Rate Source Patient Position BP Location FiO2 (%)   Tympanic Monitor -- -- --       Physical Exam   Constitutional: She is well-developed, well-nourished, and in no distress. No distress.   HENT:   Head: Normocephalic and atraumatic.   Mouth/Throat: Oropharynx is clear and moist and mucous membranes are normal.   Eyes: Pupils are equal, round, and reactive to light.   Neck: Normal range of motion.   Cardiovascular: Normal rate, regular rhythm and normal heart sounds.   Pulmonary/Chest: Effort normal and breath sounds normal. She has no wheezes.   No swelling or redness noted around pacemaker   Abdominal: Soft. Bowel sounds are normal. There is no tenderness.   Musculoskeletal: Normal range of motion. She exhibits no edema.   Neurological: She is alert.   Skin: Skin is warm and dry. No rash noted.   Psychiatric: Mood, memory, affect and judgment normal.   Nursing note and vitals reviewed.      LAB RESULTS  Recent Results (from the past 24 hour(s))   Comprehensive Metabolic Panel    Collection Time: 07/09/19  4:29 PM   Result Value Ref Range    Glucose 109 (H) 65 - 99 mg/dL    BUN 13 8 - 23 mg/dL    Creatinine 0.89 0.57 - 1.00 mg/dL    Sodium 134 (L) 136 - 145 mmol/L    Potassium 3.3 (L) 3.5 - 5.2 mmol/L    Chloride 91 (L) 98 - 107 mmol/L    CO2 28.9 22.0 - 29.0 mmol/L    Calcium 9.3  8.6 - 10.5 mg/dL    Total Protein 6.5 6.0 - 8.5 g/dL    Albumin 4.50 3.50 - 5.20 g/dL    ALT (SGPT) 17 1 - 33 U/L    AST (SGOT) 24 1 - 32 U/L    Alkaline Phosphatase 90 39 - 117 U/L    Total Bilirubin 0.7 0.2 - 1.2 mg/dL    eGFR Non African Amer 62 >60 mL/min/1.73    Globulin 2.0 gm/dL    A/G Ratio 2.3 g/dL    BUN/Creatinine Ratio 14.6 7.0 - 25.0    Anion Gap 14.1 5.0 - 15.0 mmol/L   Lipase    Collection Time: 07/09/19  4:29 PM   Result Value Ref Range    Lipase 31 13 - 60 U/L   Troponin    Collection Time: 07/09/19  4:29 PM   Result Value Ref Range    Troponin T <0.010 0.000 - 0.030 ng/mL   BNP    Collection Time: 07/09/19  4:29 PM   Result Value Ref Range    proBNP 1,450.0 5.0-1,800.0 pg/mL   CBC Auto Differential    Collection Time: 07/09/19  4:29 PM   Result Value Ref Range    WBC 13.37 (H) 3.40 - 10.80 10*3/mm3    RBC 4.18 3.77 - 5.28 10*6/mm3    Hemoglobin 14.1 12.0 - 15.9 g/dL    Hematocrit 40.5 34.0 - 46.6 %    MCV 96.9 79.0 - 97.0 fL    MCH 33.7 (H) 26.6 - 33.0 pg    MCHC 34.8 31.5 - 35.7 g/dL    RDW 13.4 12.3 - 15.4 %    RDW-SD 47.6 37.0 - 54.0 fl    MPV 10.3 6.0 - 12.0 fL    Platelets 268 140 - 450 10*3/mm3    Neutrophil % 77.5 (H) 42.7 - 76.0 %    Lymphocyte % 11.4 (L) 19.6 - 45.3 %    Monocyte % 8.1 5.0 - 12.0 %    Eosinophil % 1.0 0.3 - 6.2 %    Basophil % 0.7 0.0 - 1.5 %    Immature Grans % 1.3 (H) 0.0 - 0.5 %    Neutrophils, Absolute 10.37 (H) 1.70 - 7.00 10*3/mm3    Lymphocytes, Absolute 1.52 0.70 - 3.10 10*3/mm3    Monocytes, Absolute 1.08 (H) 0.10 - 0.90 10*3/mm3    Eosinophils, Absolute 0.13 0.00 - 0.40 10*3/mm3    Basophils, Absolute 0.10 0.00 - 0.20 10*3/mm3    Immature Grans, Absolute 0.17 (H) 0.00 - 0.05 10*3/mm3    nRBC 0.0 0.0 - 0.2 /100 WBC   Urinalysis With Microscopic If Indicated (No Culture) - Urine, Clean Catch    Collection Time: 07/09/19  5:43 PM   Result Value Ref Range    Color, UA Yellow Yellow, Straw    Appearance, UA Clear Clear    pH, UA 7.5 5.0 - 8.0    Specific Gravity, UA  1.008 1.005 - 1.030    Glucose, UA Negative Negative    Ketones, UA Negative Negative    Bilirubin, UA Negative Negative    Blood, UA Negative Negative    Protein, UA Negative Negative    Leuk Esterase, UA Negative Negative    Nitrite, UA Negative Negative    Urobilinogen, UA 0.2 E.U./dL 0.2 - 1.0 E.U./dL       I ordered the above labs and reviewed the results    RADIOLOGY  CT Abdomen Pelvis Without Contrast   Preliminary Result   1.  No findings of acute intraabdominal pathology.   2.  Mild splenomegaly, as before.   3.  Small hiatal hernia.   4.  Other findings as above.       The above findings were discussed with Dalila Jarrell by telephone by   Efrain Lee at 8:50 PM on 07/09/2019.                      XR Chest 2 View   Final Result   No findings of acute cardiopulmonary pathology. Mild to moderate   cardiomegaly, as before.       This report was finalized on 7/9/2019 5:05 PM by Dr. Efrain Lee M.D.              I ordered the above noted radiological studies and reviewed the images on the PACS system.   Reviewed negative CT results. Independently viewed by me. Interpreted by radiologist. Discussed with Dr. Yarbrough.        MEDICAL RECORD REVIEW  Pt has surgery scheduled for July 12, 2019 with Dr. Rosales, vascular surgery, at [facility].    PROGRESS AND CONSULTS  1932 Reviewed pt's history and workup with Dr. Yarbrough.   After a bedside evaluation; Dr. Yarbrough agrees with the plan to order CT abdomen for evaluation.     1932 Ordered CT abdomen for evaluation.    2122 Rechecked the pt. Informed the pt of all negative labs and imaging, and mildly elevated WBC. Discussed the plan to discharge home with instructions to f/u with GI for scope. Pt understands and agrees with the plan, all questions answered.    COURSE & MEDICAL DECISION MAKING  Pertinent Labs and Imaging studies that were ordered and reviewed are noted above.  Results were reviewed/discussed with the patient and they were also made aware of online assess.   Pt  "also made aware that some labs, such as cultures, will not be resulted during ER visit and follow up with PMD is necessary.     MEDICATIONS GIVEN IN ER  Medications   ondansetron (ZOFRAN) injection 4 mg (4 mg Intravenous Given 7/9/19 1631)   sodium chloride 0.9 % bolus 500 mL (0 mL Intravenous Stopped 7/9/19 1730)       /75 (BP Location: Right arm, Patient Position: Lying)   Pulse 91   Temp 98.2 °F (36.8 °C) (Oral)   Resp 16   Ht 175.3 cm (69\")   Wt 73.8 kg (162 lb 9.6 oz)   SpO2 96%   BMI 24.01 kg/m²     Discussed all results and noted any abnormalities with patient.  Discussed absoute need to recheck abnormalities with PCP.    Reviewed implications of results, diagnosis, meds, responsibility to follow up, warning signs and symptoms of possible worsening, potential complications and reasons to return to ER with patient    Discussed plan for discharge, as there is no emergent indication for admission.  Pt is agreeable and understands need for follow up and repeat testing.  Pt is aware that discharge does not mean that nothing is wrong but it indicates no emergency is present and they must continue care with PCP.  Pt is discharged with instructions to follow up with primary care doctor to have their blood pressure rechecked.       DIAGNOSIS  Final diagnoses:   Nausea and vomiting, intractability of vomiting not specified, unspecified vomiting type       FOLLOW UP   Georgaina Cisneros MD  [address]  [facility] [address] [phone]    Schedule an appointment as soon as possible for a visit       Nomi Burnett MD  [address]  [facility] [address]  [place] [id number] [phone]    Schedule an appointment as soon as possible for a visit         RX     Medication List      New Prescriptions    ondansetron 4 MG tablet  Commonly known as:  ZOFRAN  Take 1 tablet by mouth Every 6 (Six) Hours As Needed for Nausea or   Vomiting.        Changed    furosemide 40 MG tablet  Commonly known as:  " LASIX  TAKE 1 TABLET BY MOUTH EVERY MORNING AND TAKE 1/2 TABLET EVERY EVENING AS   NEEDED  What changed:  See the new instructions.          I personally reviewed the past medical history, past surgical history, social history, family history, current medications and allergies as they appear in this chart.  The scribe's note accurately reflects the work and decisions made by me.           Documentation assistance provided by nikki Jones for JORY Aquino.  Information recorded by the scribe was done at my direction and has been verified and validated by me.                     Prachi Jones  07/09/19 212       Ivis Jarrell APRN  07/09/19 [id number]

## 2019-07-11 ENCOUNTER — OFFICE (OUTPATIENT)
Dept: URBAN - METROPOLITAN AREA CLINIC 75 | Facility: CLINIC | Age: 74
End: 2019-07-11

## 2019-07-11 VITALS
HEART RATE: 114 BPM | WEIGHT: 165 LBS | SYSTOLIC BLOOD PRESSURE: 124 MMHG | HEIGHT: 69 IN | DIASTOLIC BLOOD PRESSURE: 86 MMHG

## 2019-07-11 DIAGNOSIS — R11.2 NAUSEA WITH VOMITING, UNSPECIFIED: ICD-10-CM

## 2019-07-11 DIAGNOSIS — K59.04 CHRONIC IDIOPATHIC CONSTIPATION: ICD-10-CM

## 2019-07-11 PROCEDURE — 99213 OFFICE O/P EST LOW 20 MIN: CPT | Performed by: INTERNAL MEDICINE

## 2019-07-11 RX ORDER — LUBIPROSTONE 24 UG/1
48 CAPSULE, GELATIN COATED ORAL
Qty: 60 | Refills: 3 | Status: ACTIVE
Start: 2019-07-11 | End: 2020-10-02

## 2019-07-20 ENCOUNTER — CLINICAL SUPPORT NO REQUIREMENTS (OUTPATIENT)
Dept: CARDIOLOGY | Facility: CLINIC | Age: 74
End: 2019-07-20

## 2019-07-20 DIAGNOSIS — Z95.0 PACEMAKER: Primary | ICD-10-CM

## 2019-07-20 PROCEDURE — 93294 REM INTERROG EVL PM/LDLS PM: CPT | Performed by: INTERNAL MEDICINE

## 2019-07-20 PROCEDURE — 93296 REM INTERROG EVL PM/IDS: CPT | Performed by: INTERNAL MEDICINE

## 2019-08-01 ENCOUNTER — OFFICE (OUTPATIENT)
Dept: URBAN - METROPOLITAN AREA CLINIC 75 | Facility: CLINIC | Age: 74
End: 2019-08-01

## 2019-08-01 VITALS
SYSTOLIC BLOOD PRESSURE: 122 MMHG | WEIGHT: 166 LBS | DIASTOLIC BLOOD PRESSURE: 78 MMHG | HEIGHT: 69 IN | HEART RATE: 90 BPM

## 2019-08-01 DIAGNOSIS — K59.04 CHRONIC IDIOPATHIC CONSTIPATION: ICD-10-CM

## 2019-08-01 PROCEDURE — 99213 OFFICE O/P EST LOW 20 MIN: CPT | Performed by: INTERNAL MEDICINE

## 2019-08-01 RX ORDER — LUBIPROSTONE 24 UG/1
48 CAPSULE, GELATIN COATED ORAL
Qty: 60 | Refills: 3 | Status: ACTIVE
Start: 2019-07-11 | End: 2020-10-02

## 2019-08-21 ENCOUNTER — TELEPHONE (OUTPATIENT)
Dept: CARDIOLOGY | Facility: CLINIC | Age: 74
End: 2019-08-21

## 2019-08-21 ENCOUNTER — OFFICE VISIT (OUTPATIENT)
Dept: CARDIOLOGY | Facility: CLINIC | Age: 74
End: 2019-08-21

## 2019-08-21 VITALS
HEART RATE: 105 BPM | BODY MASS INDEX: 23.77 KG/M2 | DIASTOLIC BLOOD PRESSURE: 74 MMHG | HEIGHT: 70 IN | SYSTOLIC BLOOD PRESSURE: 119 MMHG | WEIGHT: 166 LBS

## 2019-08-21 DIAGNOSIS — R60.9 SWELLING: ICD-10-CM

## 2019-08-21 DIAGNOSIS — I48.19 ATRIAL FIBRILLATION, PERSISTENT (HCC): ICD-10-CM

## 2019-08-21 DIAGNOSIS — I42.9 CARDIOMYOPATHY, UNSPECIFIED TYPE (HCC): ICD-10-CM

## 2019-08-21 DIAGNOSIS — I49.5 SSS (SICK SINUS SYNDROME) (HCC): ICD-10-CM

## 2019-08-21 DIAGNOSIS — I10 ESSENTIAL HYPERTENSION: ICD-10-CM

## 2019-08-21 DIAGNOSIS — R06.02 SOB (SHORTNESS OF BREATH): Primary | ICD-10-CM

## 2019-08-21 DIAGNOSIS — Z95.0 PRESENCE OF CARDIAC PACEMAKER: ICD-10-CM

## 2019-08-21 PROCEDURE — 99213 OFFICE O/P EST LOW 20 MIN: CPT | Performed by: NURSE PRACTITIONER

## 2019-08-21 NOTE — TELEPHONE ENCOUNTER
----- Message from Hayley Freeman sent at 8/20/2019  3:20 PM EDT -----  Regarding: Feet are swelling  Contact: [phone]  Both her feet are swelling, hip hurts all the way down the left leg        Pt coming in to have checked with Obdulio CORLEY

## 2019-08-21 NOTE — PROGRESS NOTES
Subjective:        Raina Forrest is a 73 y.o. female who here for follow up    No chief complaint on file.    She is here today for bilateral leg swelling  HPI    Raina Forrest is a 73-year-old female, who is new to this provider.  She has a history of atrial fibrillation, Fung's esophagus, and benign essential hypertension.  He is here today for bilateral leg swelling.  Echo on 11/20/2018 showed EF 66.2%, LV regurgitation, LV cavity is borderline dilated, RV cavity is borderline dilated, LAC size is mildly dilated, no evidence of pericardial effusion.  Stress test on 11/26/2018 showed normal myocardial perfusion study with no evidence of ischemia.      Denies chest pain,  palpitations, syncope, near syncope. + BLE leg swelling      The following portions of the patient's history were reviewed and updated as appropriate: allergies, current medications, past family history, past medical history, past social history, past surgical history and problem list.    Past Medical History:   Diagnosis Date   • Abdominal pain    • Atrial fibrillation (CMS/HCC)    • Fung's esophagus    • Benign essential hypertension    • Blood in stool    • Chest pain    • Chronic gastritis    • Congestive heart failure (CMS/HCC)    • Degenerative disc disease    • Depression with anxiety    • Disease of thyroid gland    • Diverticulitis of colon    • Dysphagia    • GERD (gastroesophageal reflux disease)    • History of migraine headaches    • Osteoporosis    • Palpitations    • Thyroid disorder    • Ulcerative colitis (CMS/HCC)    • Vomiting    • Weight loss          reports that she has never smoked. She has never used smokeless tobacco. She reports that she does not drink alcohol or use drugs.     Family History   Problem Relation Age of Onset   • Lung cancer Other    • Ulcerative colitis Sister    • Heart failure Mother    • Heart disease Mother    • Heart failure Father    • Heart disease Father        ROS     Review of  Systems  Constitutional: No weight loss, fever, fatigue   gastrointestinal: No nausea, abdominal pain   behavioral/Psych: No insomnia or anxiety  Cardiovascular:       Objective:           Physical Exam   Constitutional: She is oriented to person, place, and time. She appears well-developed and well-nourished.   HENT:   Head: Normocephalic.   Right Ear: External ear normal.   Left Ear: External ear normal.   Eyes: EOM are normal.   Neck: Normal range of motion. No JVD present.   Cardiovascular: Normal rate, regular rhythm, normal heart sounds and intact distal pulses. Exam reveals no gallop and no friction rub.   No murmur heard.  1+ right lower leg and trace on left lower leg.    Pulmonary/Chest: Effort normal and breath sounds normal. No stridor. No respiratory distress. She has no rales.   Abdominal: Soft. Bowel sounds are normal. She exhibits no distension. There is no tenderness. There is no guarding.   Musculoskeletal: Normal range of motion. She exhibits no edema or tenderness.   Neurological: She is alert and oriented to person, place, and time. She has normal reflexes.   Skin: Skin is warm.   Psychiatric: She has a normal mood and affect. Judgment normal.   Nursing note and vitals reviewed.         Procedures:  Echo 11/20/18  Interpretation Summary     · Mild mitral valve regurgitation is present  · The left ventricular cavity is borderline dilated.  · Right ventricular cavity is borderline dilated.  · Left atrial cavity size is mildly dilated.  · Calculated EF = 66.2%.  · There is no evidence of pericardial effusion.       stress test 11/26/2018  Interpretation Summary        · Findings consistent with an equivocal ECG stress test.  · Left ventricular ejection fraction is normal (Calculated EF = 60%).  · Myocardial perfusion imaging indicates a normal myocardial perfusion study with no evidence of ischemia.  · Impressions are consistent with a low risk study.           Current Outpatient Medications:   •   acetaminophen (TYLENOL) 325 MG tablet, Take 2 tablets by mouth Every 6 (Six) Hours As Needed for Moderate Pain (4-6) or Fever., Disp: , Rfl: 0  •  aspirin 81 MG EC tablet, Take 81 mg by mouth Daily., Disp: , Rfl:   •  bisoprolol-hydrochlorothiazide (ZIAC) 5-6.25 MG per tablet, TAKE 1 TABLET BY MOUTH DAILY., Disp: 30 tablet, Rfl: 11  •  cyclobenzaprine (FLEXERIL) 10 MG tablet, Take  by mouth., Disp: , Rfl:   •  furosemide (LASIX) 40 MG tablet, TAKE 1 TABLET BY MOUTH EVERY MORNING AND TAKE 1/2 TABLET EVERY EVENING AS NEEDED (Patient taking differently: TAKE 1 TABLET BY MOUTH EVERY MORNING AND TAKE 1 TABLET EVERY EVENING AS NEEDED), Disp: 45 tablet, Rfl: 11  •  levothyroxine (LEVOTHROID) 25 MCG tablet, Take 25 mcg by mouth Daily., Disp: , Rfl:   •  MECLIZINE HCL PO, Take 25 mg by mouth 3 (Three) Times a Day As Needed., Disp: , Rfl:   •  ondansetron (ZOFRAN) 4 MG tablet, Take 1 tablet by mouth Every 6 (Six) Hours As Needed for Nausea or Vomiting., Disp: 12 tablet, Rfl: 0  •  pantoprazole (PROTONIX) 40 MG EC tablet, Take 1 tablet by mouth Daily., Disp: 30 tablet, Rfl: 3  •  potassium chloride (KLOR-CON) 10 MEQ CR tablet, Take 1 tablet by mouth Daily., Disp: 30 tablet, Rfl: 11  •  sertraline (ZOLOFT) 100 MG tablet, Take 150 mg by mouth Daily., Disp: , Rfl:   •  SUMAtriptan (IMITREX) 50 MG tablet, TAKE 1 TABLET EVERY 2 HOURS AS NEEDED FOR MIGRAINE,MAY REPEAT ONCE IN 2 HOURS, Disp: , Rfl: 4     Assessment:        Patient Active Problem List   Diagnosis   • Chronic atrial fibrillation (CMS/HCC)   • Benign essential HTN   • Bradycardia   • Chest pain   • Can't get food down   • Accumulation of fluid in tissues   • Esophageal lump   • Adynamia   • Itch of skin   • Anorexia   • Chronic nausea   • Gastroesophageal reflux disease   • Breath shortness   • Emesis   • Decreased body weight   • Anxiety   • Narrowing of intervertebral disc space   • Diverticulosis of intestine   • Hypertension   • Migraine   • Osteoporosis   •  Palpitations   • Pituitary adenoma (CMS/HCC)   • Sick sinus syndrome (CMS/HCC)   • Diarrhea   • Paroxysmal atrial fibrillation (CMS/HCC)   • Cardiomyopathy (CMS/HCC)   • Anticoagulated   • Atrial fibrillation (CMS/HCC)   • On amiodarone therapy   • Pacemaker   • Cardiac resynchronization therapy pacemaker (CRT-P) in place   • Infected pacemaker (CMS/HCC)   • Wound infection               Plan:   1. New problem of BLE Leg swelling: Right greater than left.  Educated dependent pedal edema, symmetrical and contributed with a venous insufficiency.  Instructed her to use jobst stalkings, and elevate legs, along with control of fluids, and salt restriction has been explained in detail.  Advised her to watch weight gain of gaining more than 2 to 3 pounds and 3 to 5 days.  At this time we will do an echo and have her take a half tab as needed for swelling at night for the next 3 days.  She will follow-up with results with Dr. Salazar.       2. atrial fibrillation: No A/C due to bleeding and bruising issues.  Denies palpitations, heart rate low 100s.  Continue beta-blocker and asa.    3.  Essential hypertension: Last echo showed EF 66.2%, see full report as above.  Today in the office her blood pressure is 119/74 heart rate low 100s.  Last lipid panel 1/19/2017 showed , HDL 38, LDL 96, and triglycerides 83. Cholesterol controlled with diet.  She states she is taking her medications as she has been instructed.  Continue beta-blocker, aspirin.    Educated patient on exercising for at least 30 minutes a day for 2 to 3 days a week. Importance of controlling hypertension and blood pressure checkup on the regular basis has been explained. Hypertension as a silent killer has been discussed. Risk reduction of the weight and regular exercises to control the hypertension has been explained.    4.  SSS/Medtronic dual pacemaker: Last checked 7/20 100% A. fib.  Functioning normally       No diagnosis found.    There are no  diagnoses linked to this encounter.    COUNSELING:    Raina Perkins was given to patient for the following topics: diagnostic results, risk factor reductions, impressions, risks and benefits of treatment options and importance of treatment compliance .       SMOKING COUNSELING:    Counseling given: Not Answered    Will do echo and have her take a 1/2 extra tab of lasix as needed for swelling of r ble for three days. She will follow up wit Dr. Salazar for results.    Sincerely,   JORY Manzano  [facility]  08/21/19  9:14 AM

## 2019-09-16 DIAGNOSIS — I48.20 CHRONIC ATRIAL FIBRILLATION (HCC): Primary | ICD-10-CM

## 2019-09-16 DIAGNOSIS — I42.9 CARDIOMYOPATHY, UNSPECIFIED TYPE (HCC): ICD-10-CM

## 2019-09-16 DIAGNOSIS — I10 HYPERTENSION, UNSPECIFIED TYPE: ICD-10-CM

## 2019-09-16 DIAGNOSIS — R06.02 SHORTNESS OF BREATH: ICD-10-CM

## 2019-09-16 DIAGNOSIS — Z01.818 PRE-OPERATIVE CLEARANCE: ICD-10-CM

## 2019-09-17 ENCOUNTER — TELEPHONE (OUTPATIENT)
Dept: CARDIOLOGY | Facility: CLINIC | Age: 74
End: 2019-09-17

## 2019-09-17 NOTE — TELEPHONE ENCOUNTER
----- Message from Safia Frey sent at 9/16/2019  2:20 PM EDT -----  Regarding: clearance  Contact: [phone]  Is scheduled for back surgery on 9/25 and needs clearance.      DR CHRISTIANO Kaur fax [phone]      Pt is scheduled for Cardiolite 9/18/19      Pt clear and can hold ASA 5 days prior

## 2019-09-18 ENCOUNTER — HOSPITAL ENCOUNTER (OUTPATIENT)
Dept: CARDIOLOGY | Facility: HOSPITAL | Age: 74
Discharge: HOME | End: 2019-09-18

## 2019-09-18 VITALS
DIASTOLIC BLOOD PRESSURE: 68 MMHG | SYSTOLIC BLOOD PRESSURE: 115 MMHG | HEIGHT: 69 IN | BODY MASS INDEX: 24.59 KG/M2 | WEIGHT: 166 LBS | HEART RATE: 100 BPM

## 2019-09-18 PROCEDURE — A9500 TC99M SESTAMIBI: HCPCS | Performed by: INTERNAL MEDICINE

## 2019-09-18 PROCEDURE — 93017 CV STRESS TEST TRACING ONLY: CPT

## 2019-09-18 PROCEDURE — 25010000002 REGADENOSON 0.4 MG/5ML SOLUTION: Performed by: INTERNAL MEDICINE

## 2019-09-18 PROCEDURE — 0 TECHNETIUM SESTAMIBI: Performed by: INTERNAL MEDICINE

## 2019-09-18 PROCEDURE — 93018 CV STRESS TEST I&R ONLY: CPT | Performed by: INTERNAL MEDICINE

## 2019-09-18 PROCEDURE — 78452 HT MUSCLE IMAGE SPECT MULT: CPT

## 2019-09-18 PROCEDURE — 93016 CV STRESS TEST SUPVJ ONLY: CPT | Performed by: NURSE PRACTITIONER

## 2019-09-18 PROCEDURE — 78452 HT MUSCLE IMAGE SPECT MULT: CPT | Performed by: INTERNAL MEDICINE

## 2019-09-18 RX ORDER — EPINEPHRINE 0.3 MG/.3ML
0.3 INJECTION SUBCUTANEOUS DAILY PRN
COMMUNITY
Start: 2017-04-10 | End: 2023-02-12 | Stop reason: HOSPADM

## 2019-09-18 RX ADMIN — REGADENOSON 0.4 MG: 0.08 INJECTION, SOLUTION INTRAVENOUS at 09:45

## 2019-09-18 RX ADMIN — TECHNETIUM TC 99M SESTAMIBI 1 DOSE: 1 INJECTION INTRAVENOUS at 07:41

## 2019-09-18 RX ADMIN — TECHNETIUM TC 99M SESTAMIBI 1 DOSE: 1 INJECTION INTRAVENOUS at 09:45

## 2019-09-18 NOTE — NURSING NOTE
Review of   Computer says  1946 but patient says 1945.  She has her original birth certificate when shows the line drawn through the 1946 date with the correction, of 1945 . She states her father was a coal minor in [place], and she was born at home in [place].  Her family has had the information corrected with Medicare and , but not has not received her new cards.  Billing staff inform me that this date can't be changed until she gets her new cards with the corrected date for billing purposes.    Patient states she carries her original Birth certificate with her and it has been scanned in our computer system.

## 2019-09-19 LAB
BH CV STRESS BP STAGE 1: NORMAL
BH CV STRESS COMMENTS STAGE 1: NORMAL
BH CV STRESS DOSE REGADENOSON STAGE 1: 0.4
BH CV STRESS DURATION MIN STAGE 1: 1
BH CV STRESS DURATION SEC STAGE 1: 0
BH CV STRESS HR STAGE 1: 104
BH CV STRESS PROTOCOL 1: NORMAL
BH CV STRESS RECOVERY BP: NORMAL MMHG
BH CV STRESS RECOVERY HR: 93 BPM
BH CV STRESS STAGE 1: 1
LV EF NUC BP: 60 %
MAXIMAL PREDICTED HEART RATE: 145 BPM
PERCENT MAX PREDICTED HR: 71.72 %
STRESS BASELINE BP: NORMAL MMHG
STRESS BASELINE HR: 93 BPM
STRESS PERCENT HR: 84 %
STRESS POST ESTIMATED WORKLOAD: 1 METS
STRESS POST EXERCISE DUR MIN: 1 MIN
STRESS POST EXERCISE DUR SEC: 0 SEC
STRESS POST PEAK BP: NORMAL MMHG
STRESS POST PEAK HR: 104 BPM
STRESS TARGET HR: 123 BPM

## 2019-09-23 RX ORDER — FUROSEMIDE 40 MG/1
TABLET ORAL
Qty: 135 TABLET | Refills: 3 | Status: SHIPPED | OUTPATIENT
Start: 2019-09-23 | End: 2020-02-19

## 2019-09-25 ENCOUNTER — TELEPHONE (OUTPATIENT)
Dept: CARDIOLOGY | Facility: CLINIC | Age: 74
End: 2019-09-25

## 2019-09-25 NOTE — TELEPHONE ENCOUNTER
----- Message from Ambar Michaels sent at 9/20/2019 11:15 AM EDT -----  Regarding: STOPPING ASPIRIN  PLEASE CALL PATIENT ABOUT STOPPING HER ASPIRIN FOR HER SURGERY ON 9/30/19.          Per Odalis CORLEY pt ok to proceed hold ASA 5 days prior    Pt verbalized understanding and states she will probably postponing surgery for couple weeks      Faxed letter to Dr Schwarz [phone]

## 2019-09-26 RX ORDER — BISOPROLOL FUMARATE AND HYDROCHLOROTHIAZIDE 5; 6.25 MG/1; MG/1
TABLET ORAL
Qty: 90 TABLET | Refills: 0 | Status: SHIPPED | OUTPATIENT
Start: 2019-09-26 | End: 2020-02-10

## 2019-10-03 ENCOUNTER — OFFICE VISIT (OUTPATIENT)
Dept: CARDIOLOGY | Facility: CLINIC | Age: 74
End: 2019-10-03

## 2019-10-03 VITALS
HEART RATE: 97 BPM | DIASTOLIC BLOOD PRESSURE: 71 MMHG | SYSTOLIC BLOOD PRESSURE: 133 MMHG | BODY MASS INDEX: 24.51 KG/M2 | HEIGHT: 69 IN

## 2019-10-03 DIAGNOSIS — Z95.0 PACEMAKER: ICD-10-CM

## 2019-10-03 DIAGNOSIS — I48.0 PAROXYSMAL ATRIAL FIBRILLATION (HCC): Primary | ICD-10-CM

## 2019-10-03 DIAGNOSIS — R00.2 PALPITATIONS: ICD-10-CM

## 2019-10-03 DIAGNOSIS — I10 ESSENTIAL HYPERTENSION: ICD-10-CM

## 2019-10-03 PROCEDURE — 99213 OFFICE O/P EST LOW 20 MIN: CPT | Performed by: INTERNAL MEDICINE

## 2019-10-03 NOTE — PROGRESS NOTES
Subjective:        Raina Forrest is a 73 y.o. female who here for follow up    CC  Follow-up hypertension palpitation paroxysmal atrial fibrillation  HPI  73-year-old female with a known history of paroxysmal atrial fibrillation, hypertension palpitations pacemaker here for the follow-up     Problem List Items Addressed This Visit        Cardiovascular and Mediastinum    Hypertension    Palpitations    Paroxysmal atrial fibrillation (CMS/HCC) - Primary    Pacemaker        .    The following portions of the patient's history were reviewed and updated as appropriate: allergies, current medications, past family history, past medical history, past social history, past surgical history and problem list.    Past Medical History:   Diagnosis Date   • Abdominal pain    • Atrial fibrillation (CMS/HCC)    • Fung's esophagus    • Benign essential hypertension    • Blood in stool    • Chest pain    • Chronic gastritis    • Congestive heart failure (CMS/HCC)    • Degenerative disc disease    • Depression with anxiety    • Disease of thyroid gland    • Diverticulitis of colon    • Dysphagia    • GERD (gastroesophageal reflux disease)    • History of migraine headaches    • Osteoporosis    • Palpitations    • Thyroid disorder    • Ulcerative colitis (CMS/HCC)    • Vomiting    • Weight loss      reports that she has never smoked. She has never used smokeless tobacco. She reports that she does not drink alcohol or use drugs.   Family History   Problem Relation Age of Onset   • Lung cancer Other    • Ulcerative colitis Sister    • Heart failure Mother    • Heart disease Mother    • Heart failure Father    • Heart disease Father    Interpretation Summary   Interpretation Summary     · Left ventricular systolic function is normal. Calculated EF = 64%. Estimated EF was in agreement with the calculated EF. Normal left ventricular cavity size noted. All left ventricular wall segments contract normally. Left ventricular wall  "thickness is consistent with mild concentric hypertrophy. Left ventricular diastolic function was indeterminate.  · Left atrial cavity size is moderately dilated.  · Right atrial cavity size is moderately dilated.  · The aortic valve is abnormal in structure. The valve exhibits sclerosis. Trace-to-mild aortic valve regurgitation is present. No aortic valve stenosis is present.  · There is mild bileaflet mitral valve thickening present. Mild-to-moderate mitral valve regurgitation is present. No significant mitral valve stenosis is present.  · Moderate tricuspid valve regurgitation is present. Estimated right ventricular systolic pressure from tricuspid regurgitation is moderately elevated (45-55 mmHg). Calculated right ventricular systolic pressure from tricuspid regurgitation is 46.8 mmHg.          · Findings consistent with a normal ECG stress test.  · Left ventricular ejection fraction is normal (Calculated EF = 60%).  · Myocardial perfusion imaging indicates a normal myocardial perfusion study with no evidence of ischemia.  · Impressions are consistent with a low risk study.         Review of Systems  Constitutional: No wt loss, fever, fatigue  Gastrointestinal: No nausea, abdominal pain  Behavioral/Psych: No insomnia or anxiety   Cardiovascular no chest pains or tightness no chest  Objective:       Physical Exam  /71 (BP Location: Right arm, Patient Position: Sitting)   Pulse 97   Ht 175.3 cm (69\")   BMI 24.51 kg/m²   General appearance: No acute changes   Neck: Trachea midline; NECK, supple, no thyromegaly or lymphadenopathy   Lungs: Normal size and shape, normal breath sounds, equal distribution of air, no rales and rhonchi   CV: S1-S2 regular, no murmurs, no rub, no gallop   Abdomen: Soft, non-tender; no masses , no abnormal abdominal sounds   Extremities: No deformity , normal color , no peripheral edema   Skin: Normal temperature, turgor and texture; no rash, " ulcers          Procedures      Echocardiogram:        Current Outpatient Medications:   •  acetaminophen (TYLENOL) 325 MG tablet, Take 2 tablets by mouth Every 6 (Six) Hours As Needed for Moderate Pain (4-6) or Fever., Disp: , Rfl: 0  •  aspirin 81 MG EC tablet, Take 81 mg by mouth Daily., Disp: , Rfl:   •  bisoprolol-hydrochlorothiazide (ZIAC) 5-6.25 MG per tablet, TAKE 1 TABLET BY MOUTH DAILY., Disp: 90 tablet, Rfl: 0  •  calcium-vitamin D (OSCAL 500/200 D-3) 500-200 MG-UNIT per tablet, Take 1 tablet by mouth Daily., Disp: , Rfl:   •  cyclobenzaprine (FLEXERIL) 10 MG tablet, Take  by mouth., Disp: , Rfl:   •  EPINEPHrine (EPIPEN) 0.3 MG/0.3ML solution auto-injector injection, Inject 0.3 mL into the appropriate muscle as directed by prescriber., Disp: , Rfl:   •  furosemide (LASIX) 40 MG tablet, TAKE 1 TABLET BY MOUTH EVERY MORNING AND TAKE 1/2 TABLET EVERY EVENING AS NEEDED, Disp: 135 tablet, Rfl: 3  •  levothyroxine (LEVOTHROID) 25 MCG tablet, Take 25 mcg by mouth Daily., Disp: , Rfl:   •  MECLIZINE HCL PO, Take 25 mg by mouth 3 (Three) Times a Day As Needed., Disp: , Rfl:   •  ondansetron (ZOFRAN) 4 MG tablet, Take 1 tablet by mouth Every 6 (Six) Hours As Needed for Nausea or Vomiting., Disp: 12 tablet, Rfl: 0  •  pantoprazole (PROTONIX) 40 MG EC tablet, Take 1 tablet by mouth Daily., Disp: 30 tablet, Rfl: 3  •  potassium chloride (KLOR-CON) 10 MEQ CR tablet, Take 1 tablet by mouth Daily., Disp: 30 tablet, Rfl: 11  •  sertraline (ZOLOFT) 100 MG tablet, Take 150 mg by mouth Daily., Disp: , Rfl:   •  SUMAtriptan (IMITREX) 50 MG tablet, TAKE 1 TABLET EVERY 2 HOURS AS NEEDED FOR MIGRAINE,MAY REPEAT ONCE IN 2 HOURS, Disp: , Rfl: 4   Assessment:        Patient Active Problem List   Diagnosis   • Chronic atrial fibrillation   • Benign essential HTN   • Bradycardia   • Chest pain   • Can't get food down   • Accumulation of fluid in tissues   • Esophageal lump   • Adynamia   • Itch of skin   • Anorexia   • Chronic  nausea   • Gastroesophageal reflux disease   • Breath shortness   • Emesis   • Decreased body weight   • Anxiety   • Narrowing of intervertebral disc space   • Diverticulosis of intestine   • Hypertension   • Migraine   • Osteoporosis   • Palpitations   • Pituitary adenoma (CMS/HCC)   • Sick sinus syndrome (CMS/HCC)   • Diarrhea   • Paroxysmal atrial fibrillation (CMS/HCC)   • Cardiomyopathy (CMS/HCC)   • Anticoagulated   • Atrial fibrillation (CMS/HCC)   • On amiodarone therapy   • Pacemaker   • Cardiac resynchronization therapy pacemaker (CRT-P) in place   • Infected pacemaker (CMS/HCC)   • Wound infection               Plan:            ICD-10-CM ICD-9-CM   1. Paroxysmal atrial fibrillation (CMS/HCC) I48.0 427.31   2. Palpitations R00.2 785.1   3. Pacemaker Z95.0 V45.01   4. Essential hypertension I10 401.9     1. Paroxysmal atrial fibrillation (CMS/HCC)  controlled    2. Palpitations  controlled    3. Pacemaker  Functioning normal    4. Essential hypertension  bp controlled       SEE IN 1 YR  COUNSELING:    Raina Perkins was given to patient for the following topics: diagnostic results, risk factor reductions, impressions, risks and benefits of treatment options and importance of treatment compliance .       SMOKING COUNSELING:    [unfilled]    Dictated using Dragon dictation

## 2019-11-05 ENCOUNTER — CLINICAL SUPPORT NO REQUIREMENTS (OUTPATIENT)
Dept: CARDIOLOGY | Facility: CLINIC | Age: 74
End: 2019-11-05

## 2019-11-05 DIAGNOSIS — Z95.0 PACEMAKER: Primary | ICD-10-CM

## 2019-11-05 PROCEDURE — 93288 INTERROG EVL PM/LDLS PM IP: CPT | Performed by: INTERNAL MEDICINE

## 2019-12-19 ENCOUNTER — TELEPHONE (OUTPATIENT)
Dept: CARDIOLOGY | Facility: CLINIC | Age: 74
End: 2019-12-19

## 2019-12-19 NOTE — TELEPHONE ENCOUNTER
----- Message from Marita Haas sent at 12/19/2019 12:02 PM EST -----  Regarding: RASH AROUND PACEMAKER SITE DOESN'T KNOW IF IT'S HER BRA OR NOT  Contact: [phone]  LEAVING AFTER 2:00 TODAY

## 2020-01-13 NOTE — PROGRESS NOTES
Subjective:        Raina Forrest is a 74 y.o. female who here for follow up    No chief complaint on file.    She is here today because he is concerned about his pacemaker.     HPI  Raina Forrest percent is a 74-year-old female.  She has a history of chronic atrial fibrillation, benign essential hypertension, bradycardia, hypertension, palpitations, SSS, with pacemaker, cardiomyopathy, DDD, congestive heart failure, Fung's esophagus, benign essential hypertension, and GERD.    Stress test on 9/19/2019 showed a normal myocardial perfusion study.  Echo on 5/15/2018 showed EF 64%, mild LVH, LV diastolic function was indeterminate, LAC size is moderately dilated, R AC size is moderately dilated, the AV is abnormal in structure, the valve exhibits sclerosis, trace to mild AV regurgitation is present, no aortic valve stenosis is present, mild bileaflet MV thickening present, mild to moderate MV stenosis is present, moderate TV regurgitation is present, estimated ventricular systolic pressure from tricuspid regurgitation is moderately elevated 45 to 55 mmHg.  Calculated right ventricular systolic pressure from TV regurgitation is 46.8 mmHg.  His cardiac cath 2016 showed normal coronary arteries.     Denies chest pain, shortness of breath, syncope and near syncope    The following portions of the patient's history were reviewed and updated as appropriate: allergies, current medications, past family history, past medical history, past social history, past surgical history and problem list.    Past Medical History:   Diagnosis Date   • Abdominal pain    • Atrial fibrillation (CMS/HCC)    • Fung's esophagus    • Benign essential hypertension    • Blood in stool    • Chest pain    • Chronic gastritis    • Congestive heart failure (CMS/HCC)    • Degenerative disc disease    • Depression with anxiety    • Disease of thyroid gland    • Diverticulitis of colon    • Dysphagia    • GERD (gastroesophageal reflux disease)     • History of migraine headaches    • Osteoporosis    • Palpitations    • Thyroid disorder    • Ulcerative colitis (CMS/HCC)    • Vomiting    • Weight loss          reports that she has never smoked. She has never used smokeless tobacco. She reports that she does not drink alcohol or use drugs.     Family History   Problem Relation Age of Onset   • Lung cancer Other    • Ulcerative colitis Sister    • Heart failure Mother    • Heart disease Mother    • Heart failure Father    • Heart disease Father        ROS     Review of Systems  Constitutional: No weight loss, fever, fatigue   gastrointestinal: No nausea or abdominal pain     Behavioral/Psych: No insomnia or anxiety    Cardiovascular: Denies chest pain, shortness of breath syncope and near syncope      Objective:         Physical Exam   Constitutional: She is oriented to person, place, and time. She appears well-developed and well-nourished.   HENT:   Head: Normocephalic.   Right Ear: External ear normal.   Left Ear: External ear normal.   Eyes: EOM are normal.   Neck: Normal range of motion. No JVD present.   Cardiovascular: Normal rate, regular rhythm, normal heart sounds and intact distal pulses. Exam reveals no gallop and no friction rub.   No murmur heard.  Pulmonary/Chest: Effort normal and breath sounds normal. No stridor. No respiratory distress. She has no rales.   Abdominal: Soft. Bowel sounds are normal. She exhibits no distension. There is no tenderness. There is no guarding.   Musculoskeletal: Normal range of motion. She exhibits no edema or tenderness.   Neurological: She is alert and oriented to person, place, and time. She has normal reflexes.   Skin: Skin is warm.   L upper chest no s/s of infecteion   Psychiatric: She has a normal mood and affect. Judgment normal.   Nursing note and vitals reviewed.        ECG 12 Lead  Date/Time: 1/15/2020 3:49 PM  Performed by: Georgiana Zacarias APRN  Authorized by: Georgiana Zacarias APRN   Comparison:  compared with previous ECG from 4/9/2019  Rhythm: atrial fibrillation  Rate: normal             9/19/19  Interpretation Summary        · Findings consistent with a normal ECG stress test.  · Left ventricular ejection fraction is normal (Calculated EF = 60%).  · Myocardial perfusion imaging indicates a normal myocardial perfusion study with no evidence of ischemia.  · Impressions are consistent with a low risk study.     5/15/2019  Interpretation Summary     · Left ventricular systolic function is normal. Calculated EF = 64%. Estimated EF was in agreement with the calculated EF. Normal left ventricular cavity size noted. All left ventricular wall segments contract normally. Left ventricular wall thickness is consistent with mild concentric hypertrophy. Left ventricular diastolic function was indeterminate.  · Left atrial cavity size is moderately dilated.  · Right atrial cavity size is moderately dilated.  · The aortic valve is abnormal in structure. The valve exhibits sclerosis. Trace-to-mild aortic valve regurgitation is present. No aortic valve stenosis is present.  · There is mild bileaflet mitral valve thickening present. Mild-to-moderate mitral valve regurgitation is present. No significant mitral valve stenosis is present.  · Moderate tricuspid valve regurgitation is present. Estimated right ventricular systolic pressure from tricuspid regurgitation is moderately elevated (45-55 mmHg). Calculated right ventricular systolic pressure from tricuspid regurgitation is 46.8 mmHg.         Conclusion        · Successful left heart catheter  · Normal coronary arteries  · LV function at lower limit of normal                Current Outpatient Medications:   •  acetaminophen (TYLENOL) 325 MG tablet, Take 2 tablets by mouth Every 6 (Six) Hours As Needed for Moderate Pain (4-6) or Fever., Disp: , Rfl: 0  •  aspirin 81 MG EC tablet, Take 81 mg by mouth Daily., Disp: , Rfl:   •  bisoprolol-hydrochlorothiazide (ZIAC) 5-6.25 MG  per tablet, TAKE 1 TABLET BY MOUTH DAILY., Disp: 90 tablet, Rfl: 0  •  calcium-vitamin D (OSCAL 500/200 D-3) 500-200 MG-UNIT per tablet, Take 1 tablet by mouth Daily., Disp: , Rfl:   •  cyclobenzaprine (FLEXERIL) 10 MG tablet, Take  by mouth., Disp: , Rfl:   •  EPINEPHrine (EPIPEN) 0.3 MG/0.3ML solution auto-injector injection, Inject 0.3 mL into the appropriate muscle as directed by prescriber., Disp: , Rfl:   •  furosemide (LASIX) 40 MG tablet, TAKE 1 TABLET BY MOUTH EVERY MORNING AND TAKE 1/2 TABLET EVERY EVENING AS NEEDED, Disp: 135 tablet, Rfl: 3  •  levothyroxine (LEVOTHROID) 25 MCG tablet, Take 25 mcg by mouth Daily., Disp: , Rfl:   •  MECLIZINE HCL PO, Take 25 mg by mouth 3 (Three) Times a Day As Needed., Disp: , Rfl:   •  ondansetron (ZOFRAN) 4 MG tablet, Take 1 tablet by mouth Every 6 (Six) Hours As Needed for Nausea or Vomiting., Disp: 12 tablet, Rfl: 0  •  pantoprazole (PROTONIX) 40 MG EC tablet, Take 1 tablet by mouth Daily., Disp: 30 tablet, Rfl: 3  •  potassium chloride (KLOR-CON) 10 MEQ CR tablet, Take 1 tablet by mouth Daily., Disp: 30 tablet, Rfl: 11  •  sertraline (ZOLOFT) 100 MG tablet, Take 150 mg by mouth Daily., Disp: , Rfl:   •  SUMAtriptan (IMITREX) 50 MG tablet, TAKE 1 TABLET EVERY 2 HOURS AS NEEDED FOR MIGRAINE,MAY REPEAT ONCE IN 2 HOURS, Disp: , Rfl: 4     Assessment:        Patient Active Problem List   Diagnosis   • Chronic atrial fibrillation   • Benign essential HTN   • Bradycardia   • Chest pain   • Can't get food down   • Accumulation of fluid in tissues   • Esophageal lump   • Adynamia   • Itch of skin   • Anorexia   • Chronic nausea   • Gastroesophageal reflux disease   • Breath shortness   • Emesis   • Decreased body weight   • Anxiety   • Narrowing of intervertebral disc space   • Diverticulosis of intestine   • Hypertension   • Migraine   • Osteoporosis   • Palpitations   • Pituitary adenoma (CMS/HCC)   • Sick sinus syndrome (CMS/HCC)   • Diarrhea   • Paroxysmal atrial  fibrillation (CMS/HCC)   • Cardiomyopathy (CMS/HCC)   • Anticoagulated   • Atrial fibrillation (CMS/HCC)   • On amiodarone therapy   • Pacemaker   • Cardiac resynchronization therapy pacemaker (CRT-P) in place   • Infected pacemaker (CMS/HCC)   • Wound infection               Plan:   1.  Paroxysmal atrial fibrillation: Today in the office his heart rate is controlled.  Previous stress test on 9/16/2019 revealed a normal myocardial perfusion study with no evidence of ischemia.  Previous cardiac cath 2016 showed normal coronary arteries.  Continue BB.  Historically not anticoagulated due to bruising issues per patient. She states she will retry Eliquis. Will do a cmp, to restart Eliqiuis.     Pros and cons as well as indication of the anticoagulation has been explained to the patient in detail. There are no obvious complications at this stage. Risk of  the bleedings has been explained. Need for the regular blood workup and adjust the dose has been explained. Need for proper follow-up on anticoagulation also has been explained.     2.  Palpitations: Controlled    3.  Dual [employer] pacemaker: Functioning normally. L upper chest does not show any signs or symptoms of infection.     4.  Essential hypertension: Today in the office his blood pressures controlled on current medications.  Last lipid profile on 1/19/2017 showed , HDL 38, LDL 96, and triglycerides 83.    Educated patient on exercising for at least 30 minutes a day for 2 to 3 days a week. Importance of controlling hypertension and blood pressure checkup on the regular basis has been explained. Hypertension as a silent killer has been discussed. Risk reduction of the weight and regular exercises to control the hypertension has been explained.    No diagnosis found.    There are no diagnoses linked to this encounter.    COUNSELING:    Raina Perkins was given to patient for the following topics: diagnostic results, risk factor reductions,  impressions, risks and benefits of treatment options and importance of treatment compliance .       SMOKING COUNSELING:  Denies    She will have a CMP, tomorrow. Once we get results I will restart Eliquis per patients request. She then will have a cmp and lipid profile and follow up with Dr. Novoa in 6 months.     Sincerely,   JORY Manzano  [facility]   1/15/19  0007    EMR Dragon/Transcription disclaimer:   Much of this encounter note is an electronic transcription/translation of spoken language to printed text. The electronic translation of spoken language may permit erroneous, or at times, nonsensical words or phrases to be inadvertently transcribed; Although I have reviewed the note for such errors, some may still exist.

## 2020-01-15 ENCOUNTER — OFFICE VISIT (OUTPATIENT)
Dept: CARDIOLOGY | Facility: CLINIC | Age: 75
End: 2020-01-15

## 2020-01-15 VITALS
WEIGHT: 173 LBS | DIASTOLIC BLOOD PRESSURE: 70 MMHG | BODY MASS INDEX: 24.22 KG/M2 | HEIGHT: 71 IN | HEART RATE: 67 BPM | SYSTOLIC BLOOD PRESSURE: 116 MMHG

## 2020-01-15 DIAGNOSIS — Z95.0 PRESENCE OF CARDIAC PACEMAKER: ICD-10-CM

## 2020-01-15 DIAGNOSIS — I10 ESSENTIAL HYPERTENSION: ICD-10-CM

## 2020-01-15 DIAGNOSIS — I48.20 CHRONIC ATRIAL FIBRILLATION (HCC): Primary | ICD-10-CM

## 2020-01-15 DIAGNOSIS — R00.2 PALPITATIONS: ICD-10-CM

## 2020-01-15 PROCEDURE — 93000 ELECTROCARDIOGRAM COMPLETE: CPT | Performed by: NURSE PRACTITIONER

## 2020-01-15 PROCEDURE — 99213 OFFICE O/P EST LOW 20 MIN: CPT | Performed by: NURSE PRACTITIONER

## 2020-01-16 ENCOUNTER — ANTICOAGULATION VISIT (OUTPATIENT)
Dept: CARDIOLOGY | Facility: CLINIC | Age: 75
End: 2020-01-16

## 2020-01-16 ENCOUNTER — APPOINTMENT (OUTPATIENT)
Dept: PREADMISSION TESTING | Facility: HOSPITAL | Age: 75
End: 2020-01-16

## 2020-01-16 ENCOUNTER — HOSPITAL ENCOUNTER (OUTPATIENT)
Dept: CARDIOLOGY | Facility: HOSPITAL | Age: 75
Discharge: HOME | End: 2020-01-16
Admitting: NURSE PRACTITIONER

## 2020-01-16 LAB
ALBUMIN SERPL-MCNC: 4.5 G/DL (ref 3.5–5.2)
ALBUMIN/GLOB SERPL: 1.6 G/DL
ALP SERPL-CCNC: 79 U/L (ref 39–117)
ALT SERPL W P-5'-P-CCNC: 17 U/L (ref 1–33)
ANION GAP SERPL CALCULATED.3IONS-SCNC: 13 MMOL/L (ref 5–15)
AST SERPL-CCNC: 28 U/L (ref 1–32)
BILIRUB SERPL-MCNC: 0.8 MG/DL (ref 0.2–1.2)
BUN BLD-MCNC: 14 MG/DL (ref 8–23)
BUN/CREAT SERPL: 17.7 (ref 7–25)
CALCIUM SPEC-SCNC: 9.4 MG/DL (ref 8.6–10.5)
CHLORIDE SERPL-SCNC: 96 MMOL/L (ref 98–107)
CO2 SERPL-SCNC: 26 MMOL/L (ref 22–29)
CREAT BLD-MCNC: 0.79 MG/DL (ref 0.57–1)
GFR SERPL CREATININE-BSD FRML MDRD: 71 ML/MIN/1.73
GLOBULIN UR ELPH-MCNC: 2.8 GM/DL
GLUCOSE BLD-MCNC: 93 MG/DL (ref 65–99)
POTASSIUM BLD-SCNC: 3.7 MMOL/L (ref 3.5–5.2)
PROT SERPL-MCNC: 7.3 G/DL (ref 6–8.5)
SODIUM BLD-SCNC: 135 MMOL/L (ref 136–145)

## 2020-01-16 PROCEDURE — 36415 COLL VENOUS BLD VENIPUNCTURE: CPT | Performed by: NURSE PRACTITIONER

## 2020-01-16 PROCEDURE — 80053 COMPREHEN METABOLIC PANEL: CPT | Performed by: NURSE PRACTITIONER

## 2020-01-17 ENCOUNTER — TELEPHONE (OUTPATIENT)
Dept: CARDIOLOGY | Facility: CLINIC | Age: 75
End: 2020-01-17

## 2020-01-17 NOTE — TELEPHONE ENCOUNTER
----- Message from Fang Beatty sent at 1/16/2020  4:23 PM EST -----  Regarding: NEEDS NOTE FOR SX CLEARANCE   Pt was seen yesterday by NP and she was supposed to get a letter for sx clearance for her knee surgery with Dr Castañeda. There was no letter in chart. Please contact patient. Thank you.    I will discuss with Dr ALVARADO RE clearance    Per Obdulio CORLEY will start Eliquis 5 mg bid     S/w pt instructed her RE starting Eliquis.    Pt states she was put on ASA every other day when she d/c Eliquis due to bleeding     Pt will start Eliquis and d/c ASA per Obdulio CORLEY due to pt bleeding concerns    Pt called states she wants to wait to start Eliquis until after her surgery.  Per pt she will continue to take her ASA 81 mg Every other day until after her surgery    Per Dr CHRISTIANO fagan to proceed with Surgery

## 2020-01-22 ENCOUNTER — HOSPITAL ENCOUNTER (OUTPATIENT)
Dept: GENERAL RADIOLOGY | Facility: HOSPITAL | Age: 75
Discharge: HOME | End: 2020-01-22

## 2020-01-22 ENCOUNTER — APPOINTMENT (OUTPATIENT)
Dept: PREADMISSION TESTING | Facility: HOSPITAL | Age: 75
End: 2020-01-22

## 2020-01-22 ENCOUNTER — HOSPITAL ENCOUNTER (OUTPATIENT)
Dept: GENERAL RADIOLOGY | Facility: HOSPITAL | Age: 75
Discharge: HOME | End: 2020-01-22
Admitting: ORTHOPAEDIC SURGERY

## 2020-01-22 VITALS
SYSTOLIC BLOOD PRESSURE: 115 MMHG | RESPIRATION RATE: 16 BRPM | HEART RATE: 91 BPM | OXYGEN SATURATION: 100 % | TEMPERATURE: 97.5 F | WEIGHT: 173 LBS | HEIGHT: 70 IN | BODY MASS INDEX: 24.77 KG/M2 | DIASTOLIC BLOOD PRESSURE: 76 MMHG

## 2020-01-22 LAB
ALBUMIN SERPL-MCNC: 4.9 G/DL (ref 3.5–5.2)
ALBUMIN/GLOB SERPL: 1.8 G/DL
ALP SERPL-CCNC: 92 U/L (ref 39–117)
ALT SERPL W P-5'-P-CCNC: 14 U/L (ref 1–33)
ANION GAP SERPL CALCULATED.3IONS-SCNC: 18.3 MMOL/L (ref 5–15)
AST SERPL-CCNC: 31 U/L (ref 1–32)
BACTERIA UR QL AUTO: NORMAL /HPF
BILIRUB SERPL-MCNC: 0.7 MG/DL (ref 0.2–1.2)
BILIRUB UR QL STRIP: NEGATIVE
BUN BLD-MCNC: 15 MG/DL (ref 8–23)
BUN/CREAT SERPL: 20.8 (ref 7–25)
CALCIUM SPEC-SCNC: 9.6 MG/DL (ref 8.6–10.5)
CHLORIDE SERPL-SCNC: 98 MMOL/L (ref 98–107)
CLARITY UR: CLEAR
CO2 SERPL-SCNC: 23.7 MMOL/L (ref 22–29)
COLOR UR: YELLOW
CREAT BLD-MCNC: 0.72 MG/DL (ref 0.57–1)
DEPRECATED RDW RBC AUTO: 48.1 FL (ref 37–54)
ERYTHROCYTE [DISTWIDTH] IN BLOOD BY AUTOMATED COUNT: 14.2 % (ref 12.3–15.4)
GFR SERPL CREATININE-BSD FRML MDRD: 79 ML/MIN/1.73
GLOBULIN UR ELPH-MCNC: 2.8 GM/DL
GLUCOSE BLD-MCNC: 99 MG/DL (ref 65–99)
GLUCOSE UR STRIP-MCNC: NEGATIVE MG/DL
HCT VFR BLD AUTO: 43.4 % (ref 34–46.6)
HGB BLD-MCNC: 14.7 G/DL (ref 12–15.9)
HGB UR QL STRIP.AUTO: NEGATIVE
HYALINE CASTS UR QL AUTO: NORMAL /LPF
KETONES UR QL STRIP: NEGATIVE
LEUKOCYTE ESTERASE UR QL STRIP.AUTO: NEGATIVE
MCH RBC QN AUTO: 31.6 PG (ref 26.6–33)
MCHC RBC AUTO-ENTMCNC: 33.9 G/DL (ref 31.5–35.7)
MCV RBC AUTO: 93.3 FL (ref 79–97)
NITRITE UR QL STRIP: NEGATIVE
PH UR STRIP.AUTO: 7.5 [PH] (ref 5–8)
PLATELET # BLD AUTO: 257 10*3/MM3 (ref 140–450)
PMV BLD AUTO: 10.9 FL (ref 6–12)
POTASSIUM BLD-SCNC: 3.7 MMOL/L (ref 3.5–5.2)
PROT SERPL-MCNC: 7.7 G/DL (ref 6–8.5)
PROT UR QL STRIP: NEGATIVE
RBC # BLD AUTO: 4.65 10*6/MM3 (ref 3.77–5.28)
RBC # UR: NORMAL /HPF
REF LAB TEST METHOD: NORMAL
SODIUM BLD-SCNC: 140 MMOL/L (ref 136–145)
SP GR UR STRIP: 1.01 (ref 1–1.03)
SQUAMOUS #/AREA URNS HPF: NORMAL /HPF
UROBILINOGEN UR QL STRIP: NORMAL
WBC NRBC COR # BLD: 9 10*3/MM3 (ref 3.4–10.8)
WBC UR QL AUTO: NORMAL /HPF

## 2020-01-22 PROCEDURE — 80053 COMPREHEN METABOLIC PANEL: CPT | Performed by: ORTHOPAEDIC SURGERY

## 2020-01-22 PROCEDURE — 85027 COMPLETE CBC AUTOMATED: CPT | Performed by: ORTHOPAEDIC SURGERY

## 2020-01-22 PROCEDURE — 81001 URINALYSIS AUTO W/SCOPE: CPT | Performed by: ORTHOPAEDIC SURGERY

## 2020-01-22 PROCEDURE — 71046 X-RAY EXAM CHEST 2 VIEWS: CPT

## 2020-01-22 PROCEDURE — 73560 X-RAY EXAM OF KNEE 1 OR 2: CPT

## 2020-01-22 PROCEDURE — 36415 COLL VENOUS BLD VENIPUNCTURE: CPT

## 2020-01-22 RX ORDER — GABAPENTIN 100 MG/1
100 CAPSULE ORAL NIGHTLY
COMMUNITY
End: 2020-02-19

## 2020-01-22 RX ORDER — FUROSEMIDE 20 MG/1
20 TABLET ORAL EVERY EVENING
COMMUNITY
End: 2020-08-18 | Stop reason: SDUPTHER

## 2020-01-22 ASSESSMENT — KOOS JR
KOOS JR SCORE: 20
KOOS JR SCORE: 36.931

## 2020-01-22 NOTE — DISCHARGE INSTRUCTIONS
Take the following medications the morning of surgery:    PROTONIX, SERTRALINE AND ZIAC    ARRIVE AT 7:30    General Instructions:  • Do not eat solid food after midnight the night before surgery.  • You may drink clear liquids day of surgery but must stop at least one hour before your hospital arrival time.  • It is beneficial for you to have a clear drink that contains carbohydrates the day of surgery.  We suggest a 12 to 20 ounce bottle of Gatorade or Powerade for non-diabetic patients or a 12 to 20 ounce bottle of G2 or Powerade Zero for diabetic patients. (Pediatric patients, are not advised to drink a 12 to 20 ounce carbohydrate drink)    Clear liquids are liquids you can see through.  Nothing red in color.     Plain water                               Sports drinks  Sodas                                   Gelatin (Jell-O)  Fruit juices without pulp such as white grape juice and apple juice  Popsicles that contain no fruit or yogurt  Tea or coffee (no cream or milk added)  Gatorade / Powerade  G2 / Powerade Zero    • Infants may have breast milk up to four hours before surgery.  • Infants drinking formula may drink formula up to six hours before surgery.   • Patients who avoid smoking, chewing tobacco and alcohol for 4 weeks prior to surgery have a reduced risk of post-operative complications.  Quit smoking as many days before surgery as you can.  • Do not smoke, use chewing tobacco or drink alcohol the day of surgery.   • If applicable bring your C-PAP/ BI-PAP machine.  • Bring any papers given to you in the doctor’s office.  • Wear clean comfortable clothes.  • Do not wear contact lenses, false eyelashes or make-up.  Bring a case for your glasses.   • Bring crutches or walker if applicable.  • Remove all piercings.  Leave jewelry and any other valuables at home.  • Hair extensions with metal clips must be removed prior to surgery.  • The Pre-Admission Testing nurse will instruct you to bring medications if  unable to obtain an accurate list in Pre-Admission Testing.        If you were given a blood bank ID arm band remember to bring it with you the day of surgery.    Preventing a Surgical Site Infection:  • For 2 to 3 days before surgery, avoid shaving with a razor because the razor can irritate skin and make it easier to develop an infection.    • Any areas of open skin can increase the risk of a post-operative wound infection by allowing bacteria to enter and travel throughout the body.  Notify your surgeon if you have any skin wounds / rashes even if it is not near the expected surgical site.  The area will need assessed to determine if surgery should be delayed until it is healed.  • The night prior to surgery sleep in a clean bed with clean clothing.  Do not allow pets to sleep with you.  • Shower on the morning of surgery using a fresh bar of anti-bacterial soap (such as Dial) and clean washcloth.  Dry with a clean towel and dress in clean clothing.  • Ask your surgeon if you will be receiving antibiotics prior to surgery.  • Make sure you, your family, and all healthcare providers clean their hands with soap and water or an alcohol based hand  before caring for you or your wound.    Day of surgery:  Your arrival time is approximately two hours before your scheduled surgery time.  Upon arrival, a Pre-op nurse and Anesthesiologist will review your health history, obtain vital signs, and answer questions you may have.  The only belongings needed at this time will be a list of your home medications and if applicable your C-PAP/BI-PAP machine.  If you are staying overnight your family can leave the rest of your belongings in the car and bring them to your room later.  A Pre-op nurse will start an IV and you may receive medication in preparation for surgery, including something to help you relax.  Your family will be able to see you in the Pre-op area.  Two visitors at a time will be allowed in the Pre-op  room.  While you are in surgery your family should notify the waiting room  if they leave the waiting room area and provide a contact phone number.    Please be aware that surgery does come with discomfort.  We want to make every effort to control your discomfort so please discuss any uncontrolled symptoms with your nurse.   Your doctor will most likely have prescribed pain medications.      If you are going home after surgery you will receive individualized written care instructions before being discharged.  A responsible adult must drive you to and from the hospital on the day of your surgery and stay with you for 24 hours.    If you are staying overnight following surgery, you will be transported to your hospital room following the recovery period.  [facility] has all private rooms.    If you have any questions please call Pre-Admission Testing at [phone].  Deductibles and co-payments are collected on the day of service. Please be prepared to pay the required co-pay, deductible or deposit on the day of service as defined by your plan.  2% CHLORHEXIDINE GLUCONATE* CLOTH  Preparing or “prepping” skin before surgery can reduce the risk of infection at the surgical site. To make the process easier, [facility] has chosen disposable cloths moistened with a rinse-free, 2% Chlorhexidine Gluconate (CHG) antiseptic solution. The steps below outline the prepping process and should be carefully followed.        Use the prep cloth on the area that is circled in the diagram             Directions Night before Surgery  1) Shower using a fresh bar of anti-bacterial soap (such as Dial) and clean washcloth.  Use a clean towel to completely dry your skin.  2) Do not use any lotions, oils or creams on your skin.  3) Open the package and remove 1 cloth, wipe your skin for 30 seconds in a circular motion.  Allow to dry for 3 minutes.  4) Repeat #3 with second cloth.  5) Do not touch your  eyes, ears, or mouth with the prep cloth.  6) Allow the wet prep solution to air dry.  7) Discard the prep cloth and wash your hands with soap and water.   8) Dress in clean bed clothes and sleep on fresh clean bed sheets.   9) You may experience some temporary itching after the prep.    Directions Day of Surgery  1) Repeat steps 1,2,3,4,5,6,7, and 9.   2) Dress in clean clothes before coming to the hospital.    BACTROBAN NASAL OINTMENT  There are many germs normally in your nose. Bactroban is an ointment that will help reduce these germs. Please follow these instructions for Bactroban use:      ____The day before surgery in the morning  Date________    ____The day before surgery in the evening              Date________    ____The day of surgery in the morning    Date________    **Squirt ½ package of Bactroban Ointment onto a cotton applicator and apply to inside of 1st nostril.  Squirt the remaining Bactroban and apply to the inside of the other nostril.    PERIDEX- ORAL:  Use only if your surgeon has ordered  Use the night before and morning of surgery - Swish, gargle, and spit - do not swallow.

## 2020-02-07 ENCOUNTER — CLINICAL SUPPORT NO REQUIREMENTS (OUTPATIENT)
Dept: CARDIOLOGY | Facility: CLINIC | Age: 75
End: 2020-02-07

## 2020-02-07 DIAGNOSIS — Z95.0 PACEMAKER: Primary | ICD-10-CM

## 2020-02-07 PROCEDURE — 93294 REM INTERROG EVL PM/LDLS PM: CPT | Performed by: INTERNAL MEDICINE

## 2020-02-07 PROCEDURE — 93296 REM INTERROG EVL PM/IDS: CPT | Performed by: INTERNAL MEDICINE

## 2020-02-10 RX ORDER — BISOPROLOL FUMARATE AND HYDROCHLOROTHIAZIDE 5; 6.25 MG/1; MG/1
TABLET ORAL
Qty: 90 TABLET | Refills: 0 | Status: SHIPPED | OUTPATIENT
Start: 2020-02-10 | End: 2020-06-11

## 2020-02-17 ENCOUNTER — TELEPHONE (OUTPATIENT)
Dept: CARDIOLOGY | Facility: CLINIC | Age: 75
End: 2020-02-17

## 2020-02-17 NOTE — TELEPHONE ENCOUNTER
----- Message from Fang Beatty sent at 2/17/2020 11:00 AM EST -----  Regarding: RASH AROUND PACEMAKER   PATIENT WAS SCHEDULED TO SEE DR ALVARADO ON 1/30/2020 FOR A RASH AROUND PACEMAKER. SHE LATER CANCELED THAT APPT. I HAVE SCHEDULED HER AGAIN WITH DR ALVARADO IN April BUT SHE WOULD LIKE SOMEONE TO CALL HER AND TELL HER WHAT SHE CAN DO FOR RASH IN THE MEANTIME

## 2020-02-19 ENCOUNTER — APPOINTMENT (OUTPATIENT)
Dept: PREADMISSION TESTING | Facility: HOSPITAL | Age: 75
End: 2020-02-19

## 2020-02-19 VITALS
BODY MASS INDEX: 25.05 KG/M2 | SYSTOLIC BLOOD PRESSURE: 128 MMHG | HEART RATE: 99 BPM | WEIGHT: 175 LBS | TEMPERATURE: 97.5 F | RESPIRATION RATE: 20 BRPM | HEIGHT: 70 IN | DIASTOLIC BLOOD PRESSURE: 85 MMHG

## 2020-02-19 LAB
ALBUMIN SERPL-MCNC: 4.6 G/DL (ref 3.5–5.2)
ALBUMIN/GLOB SERPL: 2.2 G/DL
ALP SERPL-CCNC: 88 U/L (ref 39–117)
ALT SERPL W P-5'-P-CCNC: 15 U/L (ref 1–33)
ANION GAP SERPL CALCULATED.3IONS-SCNC: 13.8 MMOL/L (ref 5–15)
AST SERPL-CCNC: 26 U/L (ref 1–32)
BACTERIA UR QL AUTO: ABNORMAL /HPF
BILIRUB SERPL-MCNC: 0.5 MG/DL (ref 0.2–1.2)
BILIRUB UR QL STRIP: NEGATIVE
BUN BLD-MCNC: 12 MG/DL (ref 8–23)
BUN/CREAT SERPL: 14.1 (ref 7–25)
CALCIUM SPEC-SCNC: 9.5 MG/DL (ref 8.6–10.5)
CHLORIDE SERPL-SCNC: 99 MMOL/L (ref 98–107)
CLARITY UR: CLEAR
CO2 SERPL-SCNC: 28.2 MMOL/L (ref 22–29)
COLOR UR: YELLOW
CREAT BLD-MCNC: 0.85 MG/DL (ref 0.57–1)
DEPRECATED RDW RBC AUTO: 50.4 FL (ref 37–54)
ERYTHROCYTE [DISTWIDTH] IN BLOOD BY AUTOMATED COUNT: 14.5 % (ref 12.3–15.4)
GFR SERPL CREATININE-BSD FRML MDRD: 65 ML/MIN/1.73
GLOBULIN UR ELPH-MCNC: 2.1 GM/DL
GLUCOSE BLD-MCNC: 93 MG/DL (ref 65–99)
GLUCOSE UR STRIP-MCNC: NEGATIVE MG/DL
HCT VFR BLD AUTO: 39 % (ref 34–46.6)
HGB BLD-MCNC: 13.4 G/DL (ref 12–15.9)
HGB UR QL STRIP.AUTO: NEGATIVE
HYALINE CASTS UR QL AUTO: ABNORMAL /LPF
INR PPP: 1.06 (ref 0.9–1.1)
KETONES UR QL STRIP: NEGATIVE
LEUKOCYTE ESTERASE UR QL STRIP.AUTO: NEGATIVE
MCH RBC QN AUTO: 32.5 PG (ref 26.6–33)
MCHC RBC AUTO-ENTMCNC: 34.4 G/DL (ref 31.5–35.7)
MCV RBC AUTO: 94.7 FL (ref 79–97)
NITRITE UR QL STRIP: NEGATIVE
PH UR STRIP.AUTO: <=5 [PH] (ref 5–8)
PLATELET # BLD AUTO: 220 10*3/MM3 (ref 140–450)
PMV BLD AUTO: 11 FL (ref 6–12)
POTASSIUM BLD-SCNC: 3.9 MMOL/L (ref 3.5–5.2)
PROT SERPL-MCNC: 6.7 G/DL (ref 6–8.5)
PROT UR QL STRIP: NEGATIVE
PROTHROMBIN TIME: 13.6 SECONDS (ref 11.7–14.2)
RBC # BLD AUTO: 4.12 10*6/MM3 (ref 3.77–5.28)
RBC # UR: ABNORMAL /HPF
REF LAB TEST METHOD: ABNORMAL
SODIUM BLD-SCNC: 141 MMOL/L (ref 136–145)
SP GR UR STRIP: 1.01 (ref 1–1.03)
SQUAMOUS #/AREA URNS HPF: ABNORMAL /HPF
UROBILINOGEN UR QL STRIP: NORMAL
WBC NRBC COR # BLD: 8.03 10*3/MM3 (ref 3.4–10.8)
WBC UR QL AUTO: ABNORMAL /HPF

## 2020-02-19 PROCEDURE — 85610 PROTHROMBIN TIME: CPT | Performed by: ORTHOPAEDIC SURGERY

## 2020-02-19 PROCEDURE — 36415 COLL VENOUS BLD VENIPUNCTURE: CPT

## 2020-02-19 PROCEDURE — 81001 URINALYSIS AUTO W/SCOPE: CPT | Performed by: ORTHOPAEDIC SURGERY

## 2020-02-19 PROCEDURE — 85027 COMPLETE CBC AUTOMATED: CPT | Performed by: ORTHOPAEDIC SURGERY

## 2020-02-19 PROCEDURE — 80053 COMPREHEN METABOLIC PANEL: CPT | Performed by: ORTHOPAEDIC SURGERY

## 2020-02-19 RX ORDER — LEVOTHYROXINE SODIUM 0.03 MG/1
25 TABLET ORAL DAILY
COMMUNITY
End: 2023-02-12 | Stop reason: HOSPADM

## 2020-02-19 RX ORDER — FLUTICASONE PROPIONATE 50 MCG
2 SPRAY, SUSPENSION (ML) NASAL DAILY
COMMUNITY
End: 2023-02-12 | Stop reason: HOSPADM

## 2020-02-19 RX ORDER — FUROSEMIDE 40 MG/1
40 TABLET ORAL EVERY MORNING
COMMUNITY
End: 2020-08-18 | Stop reason: SDUPTHER

## 2020-02-19 RX ORDER — ACETAMINOPHEN 500 MG
1000 TABLET ORAL EVERY 6 HOURS PRN
COMMUNITY
End: 2020-06-11

## 2020-02-19 ASSESSMENT — KOOS JR
KOOS JR SCORE: 39.625
KOOS JR SCORE: 19

## 2020-02-19 NOTE — DISCHARGE INSTRUCTIONS
Take the following medications the morning of surgery:    Levothyroxine   ziac   zoloft   Pain med(if needed)    General Instructions:  • Do not eat solid food after midnight the night before surgery.  • You may drink clear liquids day of surgery but must stop at least one hour before your hospital arrival time.  • It is beneficial for you to have a clear drink that contains carbohydrates the day of surgery.  We suggest a 12 to 20 ounce bottle of Gatorade or Powerade for non-diabetic patients or a 12 to 20 ounce bottle of G2 or Powerade Zero for diabetic patients. (Pediatric patients, are not advised to drink a 12 to 20 ounce carbohydrate drink)    Clear liquids are liquids you can see through.  Nothing red in color.     Plain water                               Sports drinks  Sodas                                   Gelatin (Jell-O)  Fruit juices without pulp such as white grape juice and apple juice  Popsicles that contain no fruit or yogurt  Tea or coffee (no cream or milk added)  Gatorade / Powerade  G2 / Powerade Zero    • Infants may have breast milk up to four hours before surgery.  • Infants drinking formula may drink formula up to six hours before surgery.   • Patients who avoid smoking, chewing tobacco and alcohol for 4 weeks prior to surgery have a reduced risk of post-operative complications.  Quit smoking as many days before surgery as you can.  • Do not smoke, use chewing tobacco or drink alcohol the day of surgery.   • If applicable bring your C-PAP/ BI-PAP machine.  • Bring any papers given to you in the doctor’s office.  • Wear clean comfortable clothes.  • Do not wear contact lenses, false eyelashes or make-up.  Bring a case for your glasses.   • Bring crutches or walker if applicable.  • Remove all piercings.  Leave jewelry and any other valuables at home.  • Hair extensions with metal clips must be removed prior to surgery.  • The Pre-Admission Testing nurse will instruct you to bring medications  if unable to obtain an accurate list in Pre-Admission Testing.        If you were given a blood bank ID arm band remember to bring it with you the day of surgery.    Preventing a Surgical Site Infection:  • For 2 to 3 days before surgery, avoid shaving with a razor because the razor can irritate skin and make it easier to develop an infection.    • Any areas of open skin can increase the risk of a post-operative wound infection by allowing bacteria to enter and travel throughout the body.  Notify your surgeon if you have any skin wounds / rashes even if it is not near the expected surgical site.  The area will need assessed to determine if surgery should be delayed until it is healed.  • The night prior to surgery sleep in a clean bed with clean clothing.  Do not allow pets to sleep with you.  • Shower on the morning of surgery using a fresh bar of anti-bacterial soap (such as Dial) and clean washcloth.  Dry with a clean towel and dress in clean clothing.  • Ask your surgeon if you will be receiving antibiotics prior to surgery.  • Make sure you, your family, and all healthcare providers clean their hands with soap and water or an alcohol based hand  before caring for you or your wound.    Day of surgery:  Your arrival time is approximately two hours before your scheduled surgery time.  Upon arrival, a Pre-op nurse and Anesthesiologist will review your health history, obtain vital signs, and answer questions you may have.  The only belongings needed at this time will be a list of your home medications and if applicable your C-PAP/BI-PAP machine.  If you are staying overnight your family can leave the rest of your belongings in the car and bring them to your room later.  A Pre-op nurse will start an IV and you may receive medication in preparation for surgery, including something to help you relax.  Your family will be able to see you in the Pre-op area.  Two visitors at a time will be allowed in the Pre-op  room.  While you are in surgery your family should notify the waiting room  if they leave the waiting room area and provide a contact phone number.    Please be aware that surgery does come with discomfort.  We want to make every effort to control your discomfort so please discuss any uncontrolled symptoms with your nurse.   Your doctor will most likely have prescribed pain medications.      If you are going home after surgery you will receive individualized written care instructions before being discharged.  A responsible adult must drive you to and from the hospital on the day of your surgery and stay with you for 24 hours.    If you are staying overnight following surgery, you will be transported to your hospital room following the recovery period.  [facility] has all private rooms.    If you have any questions please call Pre-Admission Testing at [phone].  Deductibles and co-payments are collected on the day of service. Please be prepared to pay the required co-pay, deductible or deposit on the day of service as defined by your plan.  2% CHLORHEXIDINE GLUCONATE* CLOTH  Preparing or “prepping” skin before surgery can reduce the risk of infection at the surgical site. To make the process easier, [facility] has chosen disposable cloths moistened with a rinse-free, 2% Chlorhexidine Gluconate (CHG) antiseptic solution. The steps below outline the prepping process and should be carefully followed.        Use the prep cloth on the area that is circled in the diagram             Directions Night before Surgery  1) Shower using a fresh bar of anti-bacterial soap (such as Dial) and clean washcloth.  Use a clean towel to completely dry your skin.  2) Do not use any lotions, oils or creams on your skin.  3) Open the package and remove 1 cloth, wipe your skin for 30 seconds in a circular motion.  Allow to dry for 3 minutes.  4) Repeat #3 with second cloth.  5) Do not touch your  eyes, ears, or mouth with the prep cloth.  6) Allow the wet prep solution to air dry.  7) Discard the prep cloth and wash your hands with soap and water.   8) Dress in clean bed clothes and sleep on fresh clean bed sheets.   9) You may experience some temporary itching after the prep.    Directions Day of Surgery  1) Repeat steps 1,2,3,4,5,6,7, and 9.   2) Dress in clean clothes before coming to the hospital.    BACTROBAN NASAL OINTMENT  There are many germs normally in your nose. Bactroban is an ointment that will help reduce these germs. Please follow these instructions for Bactroban use:      _1___The day before surgery in the morning  Date_2/24/2020_______    __2__The day before surgery in the evening              Date________    _3___The day of surgery in the morning    Date________    **Squirt ½ package of Bactroban Ointment onto a cotton applicator and apply to inside of 1st nostril.  Squirt the remaining Bactroban and apply to the inside of the other nostril.    PERIDEX- ORAL:  Use only if your surgeon has ordered  Use the night before and morning of surgery - Swish, gargle, and spit - do not swallow.

## 2020-02-20 ENCOUNTER — OFFICE VISIT (OUTPATIENT)
Dept: CARDIOLOGY | Facility: CLINIC | Age: 75
End: 2020-02-20

## 2020-02-20 VITALS
SYSTOLIC BLOOD PRESSURE: 133 MMHG | DIASTOLIC BLOOD PRESSURE: 83 MMHG | HEIGHT: 70 IN | HEART RATE: 83 BPM | BODY MASS INDEX: 25.47 KG/M2

## 2020-02-20 DIAGNOSIS — Z79.01 ANTICOAGULATED: ICD-10-CM

## 2020-02-20 DIAGNOSIS — Z79.899 ON AMIODARONE THERAPY: ICD-10-CM

## 2020-02-20 DIAGNOSIS — Z95.0 PACEMAKER: ICD-10-CM

## 2020-02-20 DIAGNOSIS — I10 ESSENTIAL HYPERTENSION: ICD-10-CM

## 2020-02-20 DIAGNOSIS — I48.20 CHRONIC ATRIAL FIBRILLATION (HCC): Primary | ICD-10-CM

## 2020-02-20 DIAGNOSIS — I48.0 PAROXYSMAL ATRIAL FIBRILLATION (HCC): ICD-10-CM

## 2020-02-20 DIAGNOSIS — R00.2 PALPITATIONS: ICD-10-CM

## 2020-02-20 PROCEDURE — 99213 OFFICE O/P EST LOW 20 MIN: CPT | Performed by: INTERNAL MEDICINE

## 2020-02-20 NOTE — PROGRESS NOTES
Subjective:        Raina Forrest is a 74 y.o. female who here for follow up    CC  SORE AT PACER SITE    KNEE SURG CLEARANCE  HPI  74-year-old old female with known history of the atrial fibrillation, hypertension, palpitations atrial fibrillation and the pacemaker has been complaining of the sore at the pacemaker site patient needs a knee surgery clearance denies any chest pains or tightness in the chest     Problem List Items Addressed This Visit        Cardiovascular and Mediastinum    Chronic atrial fibrillation - Primary    Relevant Orders    Adult Transthoracic Echo Complete W/ Cont if Necessary Per Protocol    Hypertension    Palpitations    Paroxysmal atrial fibrillation (CMS/HCC)    Pacemaker       Other    Anticoagulated    On amiodarone therapy        .    The following portions of the patient's history were reviewed and updated as appropriate: allergies, current medications, past family history, past medical history, past social history, past surgical history and problem list.    Past Medical History:   Diagnosis Date   • Atrial fibrillation (CMS/HCC)    • Fung's esophagus    • Benign essential hypertension    • Blood clot in vein    • Cardiomyopathy (CMS/HCC)    • Chronic gastritis    • Congestive heart failure (CMS/HCC)    • Degenerative disc disease    • Depression with anxiety    • Disease of thyroid gland    • Diverticulitis of colon    • Dysphagia    • GERD (gastroesophageal reflux disease)    • History of migraine    • History of migraine headaches    • Left knee pain    • Migraine    • Osteoporosis    • Palpitations    • Thyroid disorder    • Thyroid nodule    • Ulcerative colitis (CMS/HCC)      reports that she has never smoked. She has never used smokeless tobacco. She reports that she does not drink alcohol or use drugs.   Family History   Problem Relation Age of Onset   • Lung cancer Other    • Ulcerative colitis Sister    • Heart failure Mother    • Heart disease Mother    • Heart  "failure Father    • Heart disease Father    • Malig Hyperthermia Neg Hx        Review of Systems  Constitutional: No wt loss, fever, fatigue  Gastrointestinal: No nausea, abdominal pain  Behavioral/Psych: No insomnia or anxiety   Cardiovascular no chest pains or tightness in the chest  Objective:       Physical Exam  PACEMAKER SITE OK             Physical Exam  /83 (BP Location: Right arm, Patient Position: Sitting)   Pulse 83   Ht 176.5 cm (69.5\")   BMI 25.47 kg/m²     General appearance: No acute changes   Eyes: Sclera conjunctiva normal, pupils reactive   HENT: Atraumatic; oropharynx clear with moist mucous membranes and no mucosal ulcerations;  Neck: Trachea midline; NECK, supple, no thyromegaly or lymphadenopathy   Lungs: Normal size and shape, normal breath sounds, equal distribution of air, no rales and rhonchi   CV: S1-S2 regular, no murmurs, no rub, no gallop   Abdomen: Soft, non-tender; no masses , no abnormal abdominal sounds   Extremities: No deformity , normal color , no peripheral edema   Skin: Normal temperature, turgor and texture; no rash, ulcers  Psych: Appropriate affect, alert and oriented to person, place and time         Procedures      Echocardiogram:        Current Outpatient Medications:   •  acetaminophen (TYLENOL) 500 MG tablet, Take 1,000 mg by mouth Every 6 (Six) Hours As Needed for Mild Pain ., Disp: , Rfl:   •  aspirin 81 MG EC tablet, Take 81 mg by mouth Every Other Day. TO HOLD  BEFORE SURGERY, Disp: , Rfl:   •  bisoprolol-hydrochlorothiazide (ZIAC) 5-6.25 MG per tablet, TAKE 1 TABLET BY MOUTH DAILY. (Patient taking differently: Take 0.5 tablets by mouth Every Morning.), Disp: 90 tablet, Rfl: 0  •  calcium-vitamin D (OSCAL 500/200 D-3) 500-200 MG-UNIT per tablet, Take 1 tablet by mouth Daily., Disp: , Rfl:   •  Carboxymethylcellulose Sodium (EYE DROPS OP), Apply  to eye(s) as directed by provider. Pt to bring  To surgery, Disp: , Rfl:   •  Chlorhexidine Gluconate 2 % pads, " Apply  topically. As directed pre op, Disp: , Rfl:   •  EPINEPHrine (EPIPEN) 0.3 MG/0.3ML solution auto-injector injection, Inject 0.3 mL into the appropriate muscle as directed by prescriber Daily As Needed., Disp: , Rfl:   •  fluticasone (FLONASE) 50 MCG/ACT nasal spray, 2 sprays into the nostril(s) as directed by provider Daily., Disp: , Rfl:   •  furosemide (LASIX) 20 MG tablet, Take 20 mg by mouth Every Evening., Disp: , Rfl:   •  furosemide (LASIX) 40 MG tablet, Take 40 mg by mouth Every Morning., Disp: , Rfl:   •  levothyroxine (SYNTHROID, LEVOTHROID) 25 MCG tablet, Take 25 mcg by mouth Daily., Disp: , Rfl:   •  Multiple Vitamins-Minerals (MULTIVITAMIN ADULT PO), Take 1 tablet by mouth Every Morning. TO HOLD 1 WEEK BEFORE SURGERY, Disp: , Rfl:   •  mupirocin (BACTROBAN) 2 % nasal ointment, into the nostril(s) as directed by provider. As directed pre op, Disp: , Rfl:   •  potassium chloride (KLOR-CON) 10 MEQ CR tablet, Take 1 tablet by mouth Daily., Disp: 30 tablet, Rfl: 11  •  sertraline (ZOLOFT) 100 MG tablet, Take 100 mg by mouth Every Morning., Disp: , Rfl:   •  SUMAtriptan (IMITREX) 50 MG tablet, Take 50 mg by mouth Every 2 (Two) Hours As Needed for Migraine., Disp: , Rfl: 4  No current facility-administered medications for this visit.     Facility-Administered Medications Ordered in Other Visits:   •  mupirocin (BACTROBAN) 2 % nasal ointment, , Each Nare, BID, Jamal Castañeda MD   Assessment:        Patient Active Problem List   Diagnosis   • Chronic atrial fibrillation   • Benign essential HTN   • Bradycardia   • Chest pain   • Can't get food down   • Accumulation of fluid in tissues   • Esophageal lump   • Adynamia   • Itch of skin   • Anorexia   • Chronic nausea   • Gastroesophageal reflux disease   • Breath shortness   • Emesis   • Decreased body weight   • Anxiety   • Narrowing of intervertebral disc space   • Diverticulosis of intestine   • Hypertension   • Migraine   • Osteoporosis   •  Palpitations   • Pituitary adenoma (CMS/HCC)   • Sick sinus syndrome (CMS/HCC)   • Diarrhea   • Paroxysmal atrial fibrillation (CMS/HCC)   • Cardiomyopathy (CMS/HCC)   • Anticoagulated   • Atrial fibrillation (CMS/HCC)   • On amiodarone therapy   • Pacemaker   • Cardiac resynchronization therapy pacemaker (CRT-P) in place   • Infected pacemaker (CMS/HCC)   • Wound infection               Plan:            ICD-10-CM ICD-9-CM   1. Chronic atrial fibrillation I48.20 427.31   2. Paroxysmal atrial fibrillation (CMS/HCC) I48.0 427.31   3. Palpitations R00.2 785.1   4. Pacemaker Z95.0 V45.01   5. Essential hypertension I10 401.9   6. On amiodarone therapy Z79.899 V58.69   7. Anticoagulated Z79.01 V58.61     1. Chronic atrial fibrillation  Atrial fibrillation rate controlled  - Adult Transthoracic Echo Complete W/ Cont if Necessary Per Protocol; Future    2. Paroxysmal atrial fibrillation (CMS/HCC)  Controlled    3. Palpitations  Controlled    4. Pacemaker  Pacemaker functioning normally    5. Essential hypertension  Blood pressure under control    6. On amiodarone therapy  Raina Forrest IS ON AMIODARONE,   Significant side effects associated with this medication has been explained     Pros and cons of the medications has been discussed, decision has been to continue the medication   6 months to yearly checkup for eye examination, thyroid function test, chest x-ray and liver function test has been advised    Patient understands well      7. Anticoagulated  Pros and cons as well as indication of the anticoagulation has been explained to the patient in detail    There are no obvious complications at this stage    Risk of  the bleedings has been explained    Need for the regular blood workup and adjust the dose has been explained    Need for proper follow-up on anticoagulation also has been explained         CV STABLE 1 YR WITH ECHO    OK FOR KNEE SURGERY  COUNSELING:    Rainaela Perkins was given to patient for  the following topics: diagnostic results, risk factor reductions, impressions, risks and benefits of treatment options and importance of treatment compliance .       SMOKING COUNSELING:    [unfilled]    Dictated using Dragon dictation

## 2020-05-08 ENCOUNTER — CLINICAL SUPPORT NO REQUIREMENTS (OUTPATIENT)
Dept: CARDIOLOGY | Facility: CLINIC | Age: 75
End: 2020-05-08

## 2020-05-08 DIAGNOSIS — Z95.0 PACEMAKER: Primary | ICD-10-CM

## 2020-05-08 PROCEDURE — 93296 REM INTERROG EVL PM/IDS: CPT | Performed by: INTERNAL MEDICINE

## 2020-05-08 PROCEDURE — 93294 REM INTERROG EVL PM/LDLS PM: CPT | Performed by: INTERNAL MEDICINE

## 2020-06-09 ENCOUNTER — TELEPHONE (OUTPATIENT)
Dept: CARDIOLOGY | Facility: CLINIC | Age: 75
End: 2020-06-09

## 2020-06-09 ENCOUNTER — TRANSCRIBE ORDERS (OUTPATIENT)
Dept: PREADMISSION TESTING | Facility: HOSPITAL | Age: 75
End: 2020-06-09

## 2020-06-09 DIAGNOSIS — Z01.818 OTHER SPECIFIED PRE-OPERATIVE EXAMINATION: Primary | ICD-10-CM

## 2020-06-09 NOTE — TELEPHONE ENCOUNTER
----- Message from Fang Beatty sent at 6/8/2020  2:16 PM EDT -----  Regarding: NEEDS CALL BACK   Contact: [phone]  HAVING SX ON 6/16/2020 ON KNEE, NEEDS OK TO BE OFF ASPIRIN FOR 1-2 WEEKS PLEASE CALL PATIENT

## 2020-06-11 ENCOUNTER — HOSPITAL ENCOUNTER (OUTPATIENT)
Dept: GENERAL RADIOLOGY | Facility: HOSPITAL | Age: 75
Discharge: HOME | End: 2020-06-11
Admitting: ORTHOPAEDIC SURGERY

## 2020-06-11 ENCOUNTER — APPOINTMENT (OUTPATIENT)
Dept: PREADMISSION TESTING | Facility: HOSPITAL | Age: 75
End: 2020-06-11

## 2020-06-11 VITALS
RESPIRATION RATE: 20 BRPM | SYSTOLIC BLOOD PRESSURE: 121 MMHG | DIASTOLIC BLOOD PRESSURE: 63 MMHG | TEMPERATURE: 97.6 F | OXYGEN SATURATION: 100 % | HEART RATE: 87 BPM | BODY MASS INDEX: 22.68 KG/M2 | HEIGHT: 71 IN | WEIGHT: 162 LBS

## 2020-06-11 LAB
ALBUMIN SERPL-MCNC: 4.6 G/DL (ref 3.5–5.2)
ALBUMIN/GLOB SERPL: 1.8 G/DL
ALP SERPL-CCNC: 105 U/L (ref 39–117)
ALT SERPL W P-5'-P-CCNC: 17 U/L (ref 1–33)
ANION GAP SERPL CALCULATED.3IONS-SCNC: 15.3 MMOL/L (ref 5–15)
AST SERPL-CCNC: 27 U/L (ref 1–32)
BACTERIA UR QL AUTO: NORMAL /HPF
BILIRUB SERPL-MCNC: 0.8 MG/DL (ref 0.2–1.2)
BILIRUB UR QL STRIP: NEGATIVE
BUN BLD-MCNC: 15 MG/DL (ref 8–23)
BUN/CREAT SERPL: 19.2 (ref 7–25)
CALCIUM SPEC-SCNC: 9.4 MG/DL (ref 8.6–10.5)
CHLORIDE SERPL-SCNC: 96 MMOL/L (ref 98–107)
CLARITY UR: CLEAR
CO2 SERPL-SCNC: 23.7 MMOL/L (ref 22–29)
COLOR UR: YELLOW
CREAT BLD-MCNC: 0.78 MG/DL (ref 0.57–1)
GFR SERPL CREATININE-BSD FRML MDRD: 72 ML/MIN/1.73
GLOBULIN UR ELPH-MCNC: 2.5 GM/DL
GLUCOSE BLD-MCNC: 116 MG/DL (ref 65–99)
GLUCOSE UR STRIP-MCNC: NEGATIVE MG/DL
HGB UR QL STRIP.AUTO: NEGATIVE
HYALINE CASTS UR QL AUTO: NORMAL /LPF
INR PPP: 1.04 (ref 0.9–1.1)
KETONES UR QL STRIP: NEGATIVE
LEUKOCYTE ESTERASE UR QL STRIP.AUTO: NEGATIVE
NITRITE UR QL STRIP: NEGATIVE
PH UR STRIP.AUTO: 7 [PH] (ref 5–8)
POTASSIUM BLD-SCNC: 3.5 MMOL/L (ref 3.5–5.2)
PROT SERPL-MCNC: 7.1 G/DL (ref 6–8.5)
PROT UR QL STRIP: NEGATIVE
PROTHROMBIN TIME: 13.3 SECONDS (ref 11.7–14.2)
RBC # UR: NORMAL /HPF
REF LAB TEST METHOD: NORMAL
SODIUM BLD-SCNC: 135 MMOL/L (ref 136–145)
SP GR UR STRIP: 1.01 (ref 1–1.03)
SQUAMOUS #/AREA URNS HPF: NORMAL /HPF
UROBILINOGEN UR QL STRIP: NORMAL
WBC UR QL AUTO: NORMAL /HPF

## 2020-06-11 PROCEDURE — 71046 X-RAY EXAM CHEST 2 VIEWS: CPT

## 2020-06-11 PROCEDURE — 81001 URINALYSIS AUTO W/SCOPE: CPT | Performed by: ORTHOPAEDIC SURGERY

## 2020-06-11 PROCEDURE — 36415 COLL VENOUS BLD VENIPUNCTURE: CPT

## 2020-06-11 PROCEDURE — 85610 PROTHROMBIN TIME: CPT | Performed by: ORTHOPAEDIC SURGERY

## 2020-06-11 PROCEDURE — 80053 COMPREHEN METABOLIC PANEL: CPT | Performed by: ORTHOPAEDIC SURGERY

## 2020-06-11 RX ORDER — BISOPROLOL FUMARATE AND HYDROCHLOROTHIAZIDE 5; 6.25 MG/1; MG/1
TABLET ORAL
Qty: 90 TABLET | Refills: 0 | Status: SHIPPED | OUTPATIENT
Start: 2020-06-11 | End: 2020-09-09

## 2020-06-11 ASSESSMENT — KOOS JR
KOOS JR SCORE: 19
KOOS JR SCORE: 39.625

## 2020-06-11 NOTE — DISCHARGE INSTRUCTIONS
Take the following medications the morning of surgery:    levothyroxin   zoloft    General Instructions:  • Do not eat solid food after midnight the night before surgery.  • You may drink clear liquids day of surgery but must stop at least one hour before your hospital arrival time.  • It is beneficial for you to have a clear drink that contains carbohydrates the day of surgery.  We suggest a 12 to 20 ounce bottle of Gatorade or Powerade for non-diabetic patients or a 12 to 20 ounce bottle of G2 or Powerade Zero for diabetic patients. (Pediatric patients, are not advised to drink a 12 to 20 ounce carbohydrate drink)    Clear liquids are liquids you can see through.  Nothing red in color.     Plain water                               Sports drinks  Sodas                                   Gelatin (Jell-O)  Fruit juices without pulp such as white grape juice and apple juice  Popsicles that contain no fruit or yogurt  Tea or coffee (no cream or milk added)  Gatorade / Powerade  G2 / Powerade Zero    • Infants may have breast milk up to four hours before surgery.  • Infants drinking formula may drink formula up to six hours before surgery.   • Patients who avoid smoking, chewing tobacco and alcohol for 4 weeks prior to surgery have a reduced risk of post-operative complications.  Quit smoking as many days before surgery as you can.  • Do not smoke, use chewing tobacco or drink alcohol the day of surgery.   • If applicable bring your C-PAP/ BI-PAP machine.  • Bring any papers given to you in the doctor’s office.  • Wear clean comfortable clothes.  • Do not wear contact lenses, false eyelashes or make-up.  Bring a case for your glasses.   • Bring crutches or walker if applicable.  • Remove all piercings.  Leave jewelry and any other valuables at home.  • Hair extensions with metal clips must be removed prior to surgery.  • The Pre-Admission Testing nurse will instruct you to bring medications if unable to obtain an  accurate list in Pre-Admission Testing.        If you were given a blood bank ID arm band remember to bring it with you the day of surgery.    Preventing a Surgical Site Infection:  • For 2 to 3 days before surgery, avoid shaving with a razor because the razor can irritate skin and make it easier to develop an infection.    • Any areas of open skin can increase the risk of a post-operative wound infection by allowing bacteria to enter and travel throughout the body.  Notify your surgeon if you have any skin wounds / rashes even if it is not near the expected surgical site.  The area will need assessed to determine if surgery should be delayed until it is healed.  • The night prior to surgery shower using a fresh bar of anti-bacterial soap (such as Dial) and clean washcloth.  Sleep in a clean bed with clean clothing.  Do not allow pets to sleep with you.  • Shower on the morning of surgery using a fresh bar of anti-bacterial soap (such as Dial) and clean washcloth.  Dry with a clean towel and dress in clean clothing.  • Ask your surgeon if you will be receiving antibiotics prior to surgery.  • Make sure you, your family, and all healthcare providers clean their hands with soap and water or an alcohol based hand  before caring for you or your wound.    Day of surgery:6/16/2020   1200  Your arrival time is approximately two hours before your scheduled surgery time.  Upon arrival, a Pre-op nurse and Anesthesiologist will review your health history, obtain vital signs, and answer questions you may have.  The only belongings needed at this time will be a list of your home medications and if applicable your C-PAP/BI-PAP machine.  If you are staying overnight your family can leave the rest of your belongings in the car and bring them to your room later.  A Pre-op nurse will start an IV and you may receive medication in preparation for surgery, including something to help you relax.  Your family will be able to see  you in the Pre-op area.  Two visitors at a time will be allowed in the Pre-op room.  While you are in surgery your family should notify the waiting room  if they leave the waiting room area and provide a contact phone number.    Please be aware that surgery does come with discomfort.  We want to make every effort to control your discomfort so please discuss any uncontrolled symptoms with your nurse.   Your doctor will most likely have prescribed pain medications.      If you are going home after surgery you will receive individualized written care instructions before being discharged.  A responsible adult must drive you to and from the hospital on the day of your surgery and stay with you for 24 hours.    If you are staying overnight following surgery, you will be transported to your hospital room following the recovery period.  [facility] has all private rooms.    If you have any questions please call Pre-Admission Testing at [phone].  Deductibles and co-payments are collected on the day of service. Please be prepared to pay the required co-pay, deductible or deposit on the day of service as defined by your plan.    Patient Education for Self-Quarantine Process    Following your COVID testing, we strongly recommend that you do not leave your home after you have been tested for COVID except to get medical care. This includes not going to work, school or to public areas.  If this is not possible for you to do please limit your activities to only required outings.  Be sure to wear a mask when you are with other people, practice social distancing and wash your hands frequently.      The following items provide additional details to keep you safe.  • Wash your hands with soap and water frequently for at least 20 seconds.   • Avoid touching your eyes, nose and mouth with unwashed hands.  • Do not share anything - utensils, towels, food from the same bowl.   • Have your own utensils,  drinking glass, dishes, towels and bedding.   • Do not have visitors.   • Do use FaceTime to stay in touch with family and friends.  • You should stay in a specific room away from others if possible.   • Stay at least 6 feet away from others in the home if you cannot have a dedicated room to yourself.   • Do not snuggle with your pet. While the CDC says there is no evidence that pets can spread COVID-19 or be infected from humans, it is probably best to avoid “petting, snuggling, being kissed or licked and sharing food (during self-quarantine)”, according to the CDC.   • Sanitize household surfaces daily. Include all high touch areas (door handles, light switches, phones, countertops, etc.)  • Do not share a bathroom with others, if possible.   • Wear a mask around others in your home if you are unable to stay in a separate room or 6 feet apart. If  you are unable to wear a mask, have your family member wear a mask if they must be within 6 feet of you.   Call your surgeon immediately if you experience any of the following symptoms:  • Sore Throat  • Shortness of Breath or difficulty breathing  • Cough  • Chills  • Body soreness or muscle pain  • Headache  • Fever  • New loss of taste or smell  • Do not arrive for your surgery ill.  Your procedure will need to be rescheduled to another time.  You will need to call your physician before the day of surgery to avoid any unnecessary exposure to hospital staff as well as other patients.    CHLORHEXIDINE CLOTH INSTRUCTIONS  The morning of surgery follow these instructions using the Chlorhexidine cloths you've been given.  These steps reduce bacteria on the body.  Do not use the cloths near your eyes, ears mouth, genitalia or on open wounds.  Throw the cloths away after use but do not try to flush them down a toilet.      • Open and remove one cloth at a time from the package.    • Leave the cloth unfolded and begin the bathing.  • Massage the skin with the cloths using  gentle pressure to remove bacteria.  Do not scrub harshly.   • Follow the steps below with one 2% CHG cloth per area (6 total cloths).  • One cloth for neck, shoulders and chest.  • One cloth for both arms, hands, fingers and underarms (do underarms last).  • One cloth for the abdomen followed by groin.  • One cloth for right leg and foot including between the toes.  • One cloth for left leg and foot including between the toes.  • The last cloth is to be used for the back of the neck, back and buttocks.    Allow the CHG to air dry 3 minutes on the skin which will give it time to work and decrease the chance of irritation.  The skin may feel sticky until it is dry.  Do not rinse with water or any other liquid or you will lose the beneficial effects of the CHG.  If mild skin irritation occurs, do rinse the skin to remove the CHG.  Report this to the nurse at time of admission.  Do not apply lotions, creams, ointments, deodorants or perfumes after using the clothes. Dress in clean clothes before coming to the hospital.    BACTROBAN NASAL OINTMENT  There are many germs normally in your nose. Bactroban is an ointment that will help reduce these germs. Please follow these instructions for Bactroban use:      _1___The day before surgery in the morning  Date__6/15/2020______    __2__The day before surgery in the evening              Date__6/15/2020______    __3__The day of surgery in the morning    Date_6/16/2020_______    **Squirt ½ package of Bactroban Ointment onto a cotton applicator and apply to inside of 1st nostril.  Squirt the remaining Bactroban and apply to the inside of the other nostril.    PERIDEX- ORAL:  Use only if your surgeon has ordered  Use the night before and morning of surgery - Swish, gargle, and spit - do not swallow.

## 2020-06-11 NOTE — PAT
Called lab and spoke with Eloisa in hematology and she said not enough blood specimen in tubes for CBC and Eloisa in lab reentered lab to be done.

## 2020-06-13 ENCOUNTER — LAB (OUTPATIENT)
Dept: LAB | Facility: HOSPITAL | Age: 75
End: 2020-06-13

## 2020-06-13 PROCEDURE — U0004 COV-19 TEST NON-CDC HGH THRU: HCPCS

## 2020-06-13 PROCEDURE — C9803 HOPD COVID-19 SPEC COLLECT: HCPCS

## 2020-06-15 LAB
REF LAB TEST METHOD: NORMAL
SARS-COV-2 RNA RESP QL NAA+PROBE: NOT DETECTED

## 2020-06-16 ENCOUNTER — ANESTHESIA EVENT (OUTPATIENT)
Dept: PERIOP | Facility: HOSPITAL | Age: 75
End: 2020-06-16

## 2020-06-16 ENCOUNTER — APPOINTMENT (OUTPATIENT)
Dept: GENERAL RADIOLOGY | Facility: HOSPITAL | Age: 75
End: 2020-06-16

## 2020-06-16 ENCOUNTER — ANESTHESIA (OUTPATIENT)
Dept: PERIOP | Facility: HOSPITAL | Age: 75
End: 2020-06-16

## 2020-06-16 ENCOUNTER — HOSPITAL ENCOUNTER (OUTPATIENT)
Facility: HOSPITAL | Age: 75
Discharge: HOME HEALTH | End: 2020-06-19
Attending: ORTHOPAEDIC SURGERY | Admitting: ORTHOPAEDIC SURGERY

## 2020-06-16 DIAGNOSIS — M17.12 PRIMARY OSTEOARTHRITIS OF LEFT KNEE: Primary | ICD-10-CM

## 2020-06-16 PROBLEM — M19.90 DJD (DEGENERATIVE JOINT DISEASE): Status: ACTIVE | Noted: 2020-06-16

## 2020-06-16 LAB
DEPRECATED RDW RBC AUTO: 49.9 FL (ref 37–54)
ERYTHROCYTE [DISTWIDTH] IN BLOOD BY AUTOMATED COUNT: 14.2 % (ref 12.3–15.4)
HCT VFR BLD AUTO: 40.7 % (ref 34–46.6)
HGB BLD-MCNC: 13.9 G/DL (ref 12–15.9)
MCH RBC QN AUTO: 32.9 PG (ref 26.6–33)
MCHC RBC AUTO-ENTMCNC: 34.2 G/DL (ref 31.5–35.7)
MCV RBC AUTO: 96.2 FL (ref 79–97)
PLATELET # BLD AUTO: 215 10*3/MM3 (ref 140–450)
PMV BLD AUTO: 11.2 FL (ref 6–12)
RBC # BLD AUTO: 4.23 10*6/MM3 (ref 3.77–5.28)
WBC NRBC COR # BLD: 9.49 10*3/MM3 (ref 3.4–10.8)

## 2020-06-16 PROCEDURE — C1776 JOINT DEVICE (IMPLANTABLE): HCPCS | Performed by: ORTHOPAEDIC SURGERY

## 2020-06-16 PROCEDURE — 25010000002 PROPOFOL 10 MG/ML EMULSION: Performed by: ANESTHESIOLOGY

## 2020-06-16 PROCEDURE — 25010000002 FENTANYL CITRATE (PF) 100 MCG/2ML SOLUTION: Performed by: ANESTHESIOLOGY

## 2020-06-16 PROCEDURE — 25010000002 KETOROLAC TROMETHAMINE PER 15 MG: Performed by: ORTHOPAEDIC SURGERY

## 2020-06-16 PROCEDURE — 25010000002 DEXAMETHASONE PER 1 MG: Performed by: ANESTHESIOLOGY

## 2020-06-16 PROCEDURE — 25010000002 CHLOROPROCAINE HCL (PF) 3 % SOLUTION: Performed by: ORTHOPAEDIC SURGERY

## 2020-06-16 PROCEDURE — 73560 X-RAY EXAM OF KNEE 1 OR 2: CPT

## 2020-06-16 PROCEDURE — 25010000002 HYDROMORPHONE PER 4 MG: Performed by: NURSE ANESTHETIST, CERTIFIED REGISTERED

## 2020-06-16 PROCEDURE — C1713 ANCHOR/SCREW BN/BN,TIS/BN: HCPCS | Performed by: ORTHOPAEDIC SURGERY

## 2020-06-16 PROCEDURE — 25010000002 ONDANSETRON PER 1 MG: Performed by: NURSE ANESTHETIST, CERTIFIED REGISTERED

## 2020-06-16 PROCEDURE — 85027 COMPLETE CBC AUTOMATED: CPT | Performed by: ORTHOPAEDIC SURGERY

## 2020-06-16 PROCEDURE — 25010000002 VANCOMYCIN PER 500 MG: Performed by: ORTHOPAEDIC SURGERY

## 2020-06-16 PROCEDURE — 25010000002 MAGNESIUM SULFATE PER 500 MG OF MAGNESIUM: Performed by: ANESTHESIOLOGY

## 2020-06-16 PROCEDURE — 25010000002 NEOSTIGMINE PER 0.5 MG: Performed by: NURSE ANESTHETIST, CERTIFIED REGISTERED

## 2020-06-16 DEVICE — TRY TIB INTERLOK PRI 75MM: Type: IMPLANTABLE DEVICE | Site: KNEE | Status: FUNCTIONAL

## 2020-06-16 DEVICE — COMP FEM/KN VANGUARD INTLK CR 70MM NS LT: Type: IMPLANTABLE DEVICE | Site: KNEE | Status: FUNCTIONAL

## 2020-06-16 DEVICE — PAT 3PEG THN 37X9.6 37MM: Type: IMPLANTABLE DEVICE | Site: KNEE | Status: FUNCTIONAL

## 2020-06-16 DEVICE — BEAR TIB/KN VANGUARD CR DCM 14X71/75MM NS: Type: IMPLANTABLE DEVICE | Site: KNEE | Status: FUNCTIONAL

## 2020-06-16 DEVICE — CMT BONE R 1X40: Type: IMPLANTABLE DEVICE | Site: KNEE | Status: FUNCTIONAL

## 2020-06-16 DEVICE — CAP TOTL KN CMT PREMIUM: Type: IMPLANTABLE DEVICE | Site: KNEE | Status: FUNCTIONAL

## 2020-06-16 DEVICE — STEM TIB PRI FINN 46X40MM: Type: IMPLANTABLE DEVICE | Site: KNEE | Status: FUNCTIONAL

## 2020-06-16 RX ORDER — OXYCODONE HYDROCHLORIDE AND ACETAMINOPHEN 5; 325 MG/1; MG/1
1 TABLET ORAL EVERY 4 HOURS PRN
Status: DISCONTINUED | OUTPATIENT
Start: 2020-06-16 | End: 2020-06-17

## 2020-06-16 RX ORDER — DIAZEPAM 5 MG/1
5 TABLET ORAL 2 TIMES DAILY PRN
Status: DISCONTINUED | OUTPATIENT
Start: 2020-06-16 | End: 2020-06-19 | Stop reason: HOSPADM

## 2020-06-16 RX ORDER — TRANEXAMIC ACID 100 MG/ML
INJECTION, SOLUTION INTRAVENOUS AS NEEDED
Status: DISCONTINUED | OUTPATIENT
Start: 2020-06-16 | End: 2020-06-16 | Stop reason: SURG

## 2020-06-16 RX ORDER — LEVOTHYROXINE SODIUM 0.03 MG/1
25 TABLET ORAL DAILY
Status: DISCONTINUED | OUTPATIENT
Start: 2020-06-16 | End: 2020-06-19 | Stop reason: HOSPADM

## 2020-06-16 RX ORDER — MIDAZOLAM HYDROCHLORIDE 1 MG/ML
0.5 INJECTION INTRAMUSCULAR; INTRAVENOUS
Status: DISCONTINUED | OUTPATIENT
Start: 2020-06-16 | End: 2020-06-16 | Stop reason: HOSPADM

## 2020-06-16 RX ORDER — ONDANSETRON 2 MG/ML
4 INJECTION INTRAMUSCULAR; INTRAVENOUS EVERY 6 HOURS PRN
Status: DISCONTINUED | OUTPATIENT
Start: 2020-06-16 | End: 2020-06-19 | Stop reason: HOSPADM

## 2020-06-16 RX ORDER — POTASSIUM CHLORIDE 750 MG/1
20 CAPSULE, EXTENDED RELEASE ORAL 2 TIMES DAILY WITH MEALS
Status: DISCONTINUED | OUTPATIENT
Start: 2020-06-16 | End: 2020-06-19 | Stop reason: HOSPADM

## 2020-06-16 RX ORDER — ONDANSETRON 2 MG/ML
INJECTION INTRAMUSCULAR; INTRAVENOUS AS NEEDED
Status: DISCONTINUED | OUTPATIENT
Start: 2020-06-16 | End: 2020-06-16 | Stop reason: SURG

## 2020-06-16 RX ORDER — VANCOMYCIN HYDROCHLORIDE 1 G/200ML
15 INJECTION, SOLUTION INTRAVENOUS EVERY 12 HOURS
Status: COMPLETED | OUTPATIENT
Start: 2020-06-17 | End: 2020-06-17

## 2020-06-16 RX ORDER — SUMATRIPTAN 100 MG/1
50 TABLET, FILM COATED ORAL
Status: DISCONTINUED | OUTPATIENT
Start: 2020-06-16 | End: 2020-06-19 | Stop reason: HOSPADM

## 2020-06-16 RX ORDER — ACETAMINOPHEN 325 MG/1
650 TABLET ORAL ONCE AS NEEDED
Status: DISCONTINUED | OUTPATIENT
Start: 2020-06-16 | End: 2020-06-16 | Stop reason: HOSPADM

## 2020-06-16 RX ORDER — FERROUS SULFATE 325(65) MG
325 TABLET ORAL
Status: DISCONTINUED | OUTPATIENT
Start: 2020-06-17 | End: 2020-06-19 | Stop reason: HOSPADM

## 2020-06-16 RX ORDER — SODIUM CHLORIDE 0.9 % (FLUSH) 0.9 %
1-10 SYRINGE (ML) INJECTION AS NEEDED
Status: DISCONTINUED | OUTPATIENT
Start: 2020-06-16 | End: 2020-06-19 | Stop reason: HOSPADM

## 2020-06-16 RX ORDER — PROMETHAZINE HYDROCHLORIDE 25 MG/ML
12.5 INJECTION, SOLUTION INTRAMUSCULAR; INTRAVENOUS ONCE AS NEEDED
Status: DISCONTINUED | OUTPATIENT
Start: 2020-06-16 | End: 2020-06-16 | Stop reason: HOSPADM

## 2020-06-16 RX ORDER — ACETAMINOPHEN 500 MG
1000 TABLET ORAL ONCE
Status: COMPLETED | OUTPATIENT
Start: 2020-06-16 | End: 2020-06-16

## 2020-06-16 RX ORDER — FENTANYL CITRATE 50 UG/ML
INJECTION, SOLUTION INTRAMUSCULAR; INTRAVENOUS AS NEEDED
Status: DISCONTINUED | OUTPATIENT
Start: 2020-06-16 | End: 2020-06-16 | Stop reason: SURG

## 2020-06-16 RX ORDER — DEXAMETHASONE SODIUM PHOSPHATE 10 MG/ML
INJECTION INTRAMUSCULAR; INTRAVENOUS AS NEEDED
Status: DISCONTINUED | OUTPATIENT
Start: 2020-06-16 | End: 2020-06-16 | Stop reason: SURG

## 2020-06-16 RX ORDER — KETOROLAC TROMETHAMINE 15 MG/ML
15 INJECTION, SOLUTION INTRAMUSCULAR; INTRAVENOUS EVERY 8 HOURS PRN
Status: COMPLETED | OUTPATIENT
Start: 2020-06-16 | End: 2020-06-18

## 2020-06-16 RX ORDER — LABETALOL HYDROCHLORIDE 5 MG/ML
5 INJECTION, SOLUTION INTRAVENOUS
Status: DISCONTINUED | OUTPATIENT
Start: 2020-06-16 | End: 2020-06-16 | Stop reason: HOSPADM

## 2020-06-16 RX ORDER — FUROSEMIDE 40 MG/1
40 TABLET ORAL EVERY MORNING
Status: DISCONTINUED | OUTPATIENT
Start: 2020-06-17 | End: 2020-06-19 | Stop reason: HOSPADM

## 2020-06-16 RX ORDER — SODIUM CHLORIDE 0.9 % (FLUSH) 0.9 %
3-10 SYRINGE (ML) INJECTION AS NEEDED
Status: DISCONTINUED | OUTPATIENT
Start: 2020-06-16 | End: 2020-06-16 | Stop reason: HOSPADM

## 2020-06-16 RX ORDER — SODIUM CHLORIDE 0.9 % (FLUSH) 0.9 %
3 SYRINGE (ML) INJECTION EVERY 12 HOURS SCHEDULED
Status: DISCONTINUED | OUTPATIENT
Start: 2020-06-16 | End: 2020-06-16 | Stop reason: HOSPADM

## 2020-06-16 RX ORDER — DIPHENHYDRAMINE HCL 25 MG
25 CAPSULE ORAL
Status: DISCONTINUED | OUTPATIENT
Start: 2020-06-16 | End: 2020-06-16 | Stop reason: HOSPADM

## 2020-06-16 RX ORDER — OXYCODONE AND ACETAMINOPHEN 7.5; 325 MG/1; MG/1
1 TABLET ORAL ONCE AS NEEDED
Status: DISCONTINUED | OUTPATIENT
Start: 2020-06-16 | End: 2020-06-16 | Stop reason: HOSPADM

## 2020-06-16 RX ORDER — OXYCODONE HYDROCHLORIDE AND ACETAMINOPHEN 5; 325 MG/1; MG/1
2 TABLET ORAL EVERY 4 HOURS PRN
Status: DISCONTINUED | OUTPATIENT
Start: 2020-06-16 | End: 2020-06-17

## 2020-06-16 RX ORDER — DIPHENHYDRAMINE HYDROCHLORIDE 50 MG/ML
12.5 INJECTION INTRAMUSCULAR; INTRAVENOUS
Status: DISCONTINUED | OUTPATIENT
Start: 2020-06-16 | End: 2020-06-16 | Stop reason: HOSPADM

## 2020-06-16 RX ORDER — FLUTICASONE PROPIONATE 50 MCG
2 SPRAY, SUSPENSION (ML) NASAL DAILY
Status: DISCONTINUED | OUTPATIENT
Start: 2020-06-16 | End: 2020-06-19 | Stop reason: HOSPADM

## 2020-06-16 RX ORDER — SODIUM CHLORIDE 450 MG/100ML
100 INJECTION, SOLUTION INTRAVENOUS CONTINUOUS
Status: DISCONTINUED | OUTPATIENT
Start: 2020-06-16 | End: 2020-06-19 | Stop reason: HOSPADM

## 2020-06-16 RX ORDER — MAGNESIUM HYDROXIDE 1200 MG/15ML
LIQUID ORAL AS NEEDED
Status: DISCONTINUED | OUTPATIENT
Start: 2020-06-16 | End: 2020-06-16 | Stop reason: HOSPADM

## 2020-06-16 RX ORDER — TETRAHYDROZOLINE HCL 0.05 %
1 DROPS OPHTHALMIC (EYE) 2 TIMES DAILY PRN
Status: DISCONTINUED | OUTPATIENT
Start: 2020-06-16 | End: 2020-06-19 | Stop reason: HOSPADM

## 2020-06-16 RX ORDER — ONDANSETRON 4 MG/1
4 TABLET, FILM COATED ORAL EVERY 6 HOURS PRN
Status: DISCONTINUED | OUTPATIENT
Start: 2020-06-16 | End: 2020-06-19 | Stop reason: HOSPADM

## 2020-06-16 RX ORDER — PROMETHAZINE HYDROCHLORIDE 25 MG/ML
12.5 INJECTION, SOLUTION INTRAMUSCULAR; INTRAVENOUS EVERY 4 HOURS PRN
Status: DISCONTINUED | OUTPATIENT
Start: 2020-06-16 | End: 2020-06-19 | Stop reason: HOSPADM

## 2020-06-16 RX ORDER — PROMETHAZINE HYDROCHLORIDE 25 MG/1
25 TABLET ORAL ONCE AS NEEDED
Status: DISCONTINUED | OUTPATIENT
Start: 2020-06-16 | End: 2020-06-16 | Stop reason: HOSPADM

## 2020-06-16 RX ORDER — MORPHINE SULFATE 2 MG/ML
4 INJECTION, SOLUTION INTRAMUSCULAR; INTRAVENOUS
Status: DISCONTINUED | OUTPATIENT
Start: 2020-06-16 | End: 2020-06-19 | Stop reason: HOSPADM

## 2020-06-16 RX ORDER — HYDROCODONE BITARTRATE AND ACETAMINOPHEN 7.5; 325 MG/1; MG/1
1 TABLET ORAL ONCE AS NEEDED
Status: DISCONTINUED | OUTPATIENT
Start: 2020-06-16 | End: 2020-06-16 | Stop reason: HOSPADM

## 2020-06-16 RX ORDER — PROMETHAZINE HYDROCHLORIDE 25 MG/1
25 SUPPOSITORY RECTAL ONCE AS NEEDED
Status: DISCONTINUED | OUTPATIENT
Start: 2020-06-16 | End: 2020-06-16 | Stop reason: HOSPADM

## 2020-06-16 RX ORDER — MAGNESIUM SULFATE HEPTAHYDRATE 500 MG/ML
INJECTION, SOLUTION INTRAMUSCULAR; INTRAVENOUS AS NEEDED
Status: DISCONTINUED | OUTPATIENT
Start: 2020-06-16 | End: 2020-06-16 | Stop reason: SURG

## 2020-06-16 RX ORDER — GLYCOPYRROLATE 0.2 MG/ML
INJECTION INTRAMUSCULAR; INTRAVENOUS AS NEEDED
Status: DISCONTINUED | OUTPATIENT
Start: 2020-06-16 | End: 2020-06-16 | Stop reason: SURG

## 2020-06-16 RX ORDER — OXYCODONE HYDROCHLORIDE AND ACETAMINOPHEN 5; 325 MG/1; MG/1
1-2 TABLET ORAL EVERY 4 HOURS PRN
Qty: 84 TABLET | Refills: 0 | Status: SHIPPED | OUTPATIENT
Start: 2020-06-16 | End: 2021-03-30

## 2020-06-16 RX ORDER — ACETAMINOPHEN 325 MG/1
325 TABLET ORAL EVERY 4 HOURS PRN
Status: DISCONTINUED | OUTPATIENT
Start: 2020-06-16 | End: 2020-06-19 | Stop reason: HOSPADM

## 2020-06-16 RX ORDER — SODIUM CHLORIDE, SODIUM LACTATE, POTASSIUM CHLORIDE, CALCIUM CHLORIDE 600; 310; 30; 20 MG/100ML; MG/100ML; MG/100ML; MG/100ML
9 INJECTION, SOLUTION INTRAVENOUS CONTINUOUS
Status: DISCONTINUED | OUTPATIENT
Start: 2020-06-16 | End: 2020-06-19 | Stop reason: HOSPADM

## 2020-06-16 RX ORDER — HYDROMORPHONE HYDROCHLORIDE 1 MG/ML
0.5 INJECTION, SOLUTION INTRAMUSCULAR; INTRAVENOUS; SUBCUTANEOUS
Status: DISCONTINUED | OUTPATIENT
Start: 2020-06-16 | End: 2020-06-16 | Stop reason: HOSPADM

## 2020-06-16 RX ORDER — HYDRALAZINE HYDROCHLORIDE 20 MG/ML
5 INJECTION INTRAMUSCULAR; INTRAVENOUS
Status: DISCONTINUED | OUTPATIENT
Start: 2020-06-16 | End: 2020-06-16 | Stop reason: HOSPADM

## 2020-06-16 RX ORDER — FENTANYL CITRATE 50 UG/ML
50 INJECTION, SOLUTION INTRAMUSCULAR; INTRAVENOUS
Status: DISCONTINUED | OUTPATIENT
Start: 2020-06-16 | End: 2020-06-16 | Stop reason: HOSPADM

## 2020-06-16 RX ORDER — PROMETHAZINE HYDROCHLORIDE 12.5 MG/1
12.5 TABLET ORAL EVERY 6 HOURS PRN
Status: DISCONTINUED | OUTPATIENT
Start: 2020-06-16 | End: 2020-06-19 | Stop reason: HOSPADM

## 2020-06-16 RX ORDER — ONDANSETRON 2 MG/ML
4 INJECTION INTRAMUSCULAR; INTRAVENOUS ONCE AS NEEDED
Status: DISCONTINUED | OUTPATIENT
Start: 2020-06-16 | End: 2020-06-16 | Stop reason: HOSPADM

## 2020-06-16 RX ORDER — PROMETHAZINE HYDROCHLORIDE 25 MG/ML
6.25 INJECTION, SOLUTION INTRAMUSCULAR; INTRAVENOUS
Status: DISCONTINUED | OUTPATIENT
Start: 2020-06-16 | End: 2020-06-16 | Stop reason: HOSPADM

## 2020-06-16 RX ORDER — FUROSEMIDE 20 MG/1
20 TABLET ORAL EVERY EVENING
Status: DISCONTINUED | OUTPATIENT
Start: 2020-06-17 | End: 2020-06-19 | Stop reason: HOSPADM

## 2020-06-16 RX ORDER — EPHEDRINE SULFATE 50 MG/ML
5 INJECTION, SOLUTION INTRAVENOUS ONCE AS NEEDED
Status: DISCONTINUED | OUTPATIENT
Start: 2020-06-16 | End: 2020-06-16 | Stop reason: HOSPADM

## 2020-06-16 RX ORDER — HYDROMORPHONE HCL 110MG/55ML
PATIENT CONTROLLED ANALGESIA SYRINGE INTRAVENOUS AS NEEDED
Status: DISCONTINUED | OUTPATIENT
Start: 2020-06-16 | End: 2020-06-16 | Stop reason: SURG

## 2020-06-16 RX ORDER — CHOLECALCIFEROL (VITAMIN D3) 125 MCG
5 CAPSULE ORAL NIGHTLY PRN
Status: DISCONTINUED | OUTPATIENT
Start: 2020-06-16 | End: 2020-06-19 | Stop reason: HOSPADM

## 2020-06-16 RX ORDER — VANCOMYCIN HYDROCHLORIDE 1 G/200ML
1 INJECTION, SOLUTION INTRAVENOUS ONCE
Status: COMPLETED | OUTPATIENT
Start: 2020-06-16 | End: 2020-06-16

## 2020-06-16 RX ORDER — SODIUM CHLORIDE 0.9 % (FLUSH) 0.9 %
3 SYRINGE (ML) INJECTION EVERY 12 HOURS SCHEDULED
Status: DISCONTINUED | OUTPATIENT
Start: 2020-06-16 | End: 2020-06-19 | Stop reason: HOSPADM

## 2020-06-16 RX ORDER — CHLOROPROCAINE HYDROCHLORIDE 30 MG/ML
INJECTION, SOLUTION EPIDURAL; INFILTRATION; INTRACAUDAL; PERINEURAL AS NEEDED
Status: DISCONTINUED | OUTPATIENT
Start: 2020-06-16 | End: 2020-06-16 | Stop reason: HOSPADM

## 2020-06-16 RX ORDER — PROPOFOL 10 MG/ML
VIAL (ML) INTRAVENOUS AS NEEDED
Status: DISCONTINUED | OUTPATIENT
Start: 2020-06-16 | End: 2020-06-16 | Stop reason: SURG

## 2020-06-16 RX ORDER — FAMOTIDINE 10 MG/ML
20 INJECTION, SOLUTION INTRAVENOUS ONCE
Status: COMPLETED | OUTPATIENT
Start: 2020-06-16 | End: 2020-06-16

## 2020-06-16 RX ORDER — CELECOXIB 200 MG/1
200 CAPSULE ORAL ONCE
Status: COMPLETED | OUTPATIENT
Start: 2020-06-16 | End: 2020-06-16

## 2020-06-16 RX ORDER — FLUMAZENIL 0.1 MG/ML
0.2 INJECTION INTRAVENOUS AS NEEDED
Status: DISCONTINUED | OUTPATIENT
Start: 2020-06-16 | End: 2020-06-16 | Stop reason: HOSPADM

## 2020-06-16 RX ORDER — NALOXONE HCL 0.4 MG/ML
0.2 VIAL (ML) INJECTION AS NEEDED
Status: DISCONTINUED | OUTPATIENT
Start: 2020-06-16 | End: 2020-06-16 | Stop reason: HOSPADM

## 2020-06-16 RX ORDER — ASPIRIN 325 MG
325 TABLET, DELAYED RELEASE (ENTERIC COATED) ORAL 2 TIMES DAILY WITH MEALS
Status: DISCONTINUED | OUTPATIENT
Start: 2020-06-16 | End: 2020-06-19 | Stop reason: HOSPADM

## 2020-06-16 RX ORDER — ROCURONIUM BROMIDE 10 MG/ML
INJECTION, SOLUTION INTRAVENOUS AS NEEDED
Status: DISCONTINUED | OUTPATIENT
Start: 2020-06-16 | End: 2020-06-16 | Stop reason: SURG

## 2020-06-16 RX ORDER — NALOXONE HCL 0.4 MG/ML
0.4 VIAL (ML) INJECTION
Status: DISCONTINUED | OUTPATIENT
Start: 2020-06-16 | End: 2020-06-19 | Stop reason: HOSPADM

## 2020-06-16 RX ADMIN — KETOROLAC TROMETHAMINE 15 MG: 15 INJECTION, SOLUTION INTRAMUSCULAR; INTRAVENOUS at 22:12

## 2020-06-16 RX ADMIN — MAGNESIUM SULFATE HEPTAHYDRATE 1 G: 500 INJECTION, SOLUTION INTRAMUSCULAR; INTRAVENOUS at 15:46

## 2020-06-16 RX ADMIN — FLUTICASONE PROPIONATE 2 SPRAY: 50 SPRAY, METERED NASAL at 22:12

## 2020-06-16 RX ADMIN — HYDROMORPHONE HYDROCHLORIDE 0.5 MG: 2 INJECTION, SOLUTION INTRAMUSCULAR; INTRAVENOUS; SUBCUTANEOUS at 17:32

## 2020-06-16 RX ADMIN — LEVOTHYROXINE SODIUM 25 MCG: 25 TABLET ORAL at 22:13

## 2020-06-16 RX ADMIN — ASPIRIN 325 MG: 325 TABLET, COATED ORAL at 22:13

## 2020-06-16 RX ADMIN — FENTANYL CITRATE 50 MCG: 50 INJECTION, SOLUTION INTRAMUSCULAR; INTRAVENOUS at 18:00

## 2020-06-16 RX ADMIN — CELECOXIB 200 MG: 200 CAPSULE ORAL at 13:49

## 2020-06-16 RX ADMIN — OXYCODONE HYDROCHLORIDE AND ACETAMINOPHEN 2 TABLET: 5; 325 TABLET ORAL at 23:50

## 2020-06-16 RX ADMIN — VANCOMYCIN HYDROCHLORIDE 1 G: 1 INJECTION, SOLUTION INTRAVENOUS at 14:36

## 2020-06-16 RX ADMIN — MAGNESIUM SULFATE HEPTAHYDRATE 1 G: 500 INJECTION, SOLUTION INTRAMUSCULAR; INTRAVENOUS at 15:48

## 2020-06-16 RX ADMIN — DEXAMETHASONE SODIUM PHOSPHATE 10 MG: 10 INJECTION INTRAMUSCULAR; INTRAVENOUS at 16:07

## 2020-06-16 RX ADMIN — FENTANYL CITRATE 50 MCG: 50 INJECTION INTRAMUSCULAR; INTRAVENOUS at 16:19

## 2020-06-16 RX ADMIN — FAMOTIDINE 20 MG: 10 INJECTION, SOLUTION INTRAVENOUS at 14:36

## 2020-06-16 RX ADMIN — SODIUM CHLORIDE, PRESERVATIVE FREE 3 ML: 5 INJECTION INTRAVENOUS at 22:13

## 2020-06-16 RX ADMIN — TRANEXAMIC ACID 1000 MG: 100 INJECTION, SOLUTION INTRAVENOUS at 16:00

## 2020-06-16 RX ADMIN — SODIUM CHLORIDE 100 ML/HR: 4.5 INJECTION, SOLUTION INTRAVENOUS at 22:13

## 2020-06-16 RX ADMIN — FENTANYL CITRATE 50 MCG: 50 INJECTION INTRAMUSCULAR; INTRAVENOUS at 15:46

## 2020-06-16 RX ADMIN — GLYCOPYRROLATE 0.2 MG: 0.2 INJECTION INTRAMUSCULAR; INTRAVENOUS at 17:22

## 2020-06-16 RX ADMIN — ACETAMINOPHEN 1000 MG: 500 TABLET, FILM COATED ORAL at 13:49

## 2020-06-16 RX ADMIN — OXYCODONE HYDROCHLORIDE AND ACETAMINOPHEN 2 TABLET: 5; 325 TABLET ORAL at 19:38

## 2020-06-16 RX ADMIN — SODIUM CHLORIDE, POTASSIUM CHLORIDE, SODIUM LACTATE AND CALCIUM CHLORIDE 9 ML/HR: 600; 310; 30; 20 INJECTION, SOLUTION INTRAVENOUS at 14:36

## 2020-06-16 RX ADMIN — NEOSTIGMINE METHYLSULFATE 1 MG: 1 INJECTION INTRAMUSCULAR; INTRAVENOUS; SUBCUTANEOUS at 17:22

## 2020-06-16 RX ADMIN — PROPOFOL 40 MG: 10 INJECTION, EMULSION INTRAVENOUS at 15:48

## 2020-06-16 RX ADMIN — PROPOFOL 100 MG: 10 INJECTION, EMULSION INTRAVENOUS at 15:46

## 2020-06-16 RX ADMIN — POTASSIUM CHLORIDE 20 MEQ: 10 CAPSULE, COATED, EXTENDED RELEASE ORAL at 22:13

## 2020-06-16 RX ADMIN — ROCURONIUM BROMIDE 30 MG: 10 INJECTION, SOLUTION INTRAVENOUS at 15:46

## 2020-06-16 RX ADMIN — MUPIROCIN 1 APPLICATION: 20 OINTMENT TOPICAL at 22:12

## 2020-06-16 RX ADMIN — DIAZEPAM 5 MG: 5 TABLET ORAL at 23:50

## 2020-06-16 RX ADMIN — ONDANSETRON HYDROCHLORIDE 4 MG: 2 SOLUTION INTRAMUSCULAR; INTRAVENOUS at 17:20

## 2020-06-16 RX ADMIN — SODIUM CHLORIDE, POTASSIUM CHLORIDE, SODIUM LACTATE AND CALCIUM CHLORIDE: 600; 310; 30; 20 INJECTION, SOLUTION INTRAVENOUS at 17:35

## 2020-06-16 NOTE — H&P
"  Orthopaedic Surgery History and Physical    Patient Name:  Raina Forrest  YOB: 1945   Age: 74 y.o.  Medical Records Number:  [id number]    Date of Admission:  6/16/2020 11:57 AM    Chief Complaint:  Primary osteoarthritis of left knee [M17.12]    Raina Forrest is a 74 y.o. female who presents c/o severe left knee pain.  The pain has been on and off for many years, worsening to the point where the pain is becoming disabling.  The pain is a constant dull ache with occasional sharp, stabbing pain.  The patient has failed conservative treatment and would like to proceed with left total knee arthroplasty.    /65 (BP Location: Right arm, Patient Position: Lying)   Pulse 83   Temp 98 °F (36.7 °C) (Oral)   Resp 18   Ht 177.8 cm (70\")   Wt 74.4 kg (164 lb 2 oz)   SpO2 97%   BMI 23.55 kg/m²     Past Medical History:    Past Medical History:   Diagnosis Date   • Atrial fibrillation (CMS/HCC)    • Fung's esophagus    • Benign essential hypertension    • Blood clot in vein    • Cardiomyopathy (CMS/HCC)    • Chronic gastritis    • Congestive heart failure (CMS/HCC)    • Degenerative disc disease    • Depression with anxiety    • Disease of thyroid gland    • Diverticulitis of colon    • Dysphagia    • GERD (gastroesophageal reflux disease)    • History of chest pain    • History of migraine    • History of migraine headaches    • Left knee pain    • Osteoporosis    • Palpitations    • Thyroid disorder    • Thyroid nodule    • Ulcerative colitis (CMS/HCC)        Past Surgical History:   Past Surgical History:   Procedure Laterality Date   • APPENDECTOMY     • BLADDER SURGERY     • CARDIAC CATHETERIZATION N/A 11/21/2016    Procedure: Left Heart Cath;  Surgeon: Robinson Salazar MD;  Location: [facility] INVASIVE LOCATION;  Service:    • CARDIAC ELECTROPHYSIOLOGY PROCEDURE N/A 2/7/2017    Procedure: Pacemaker DC new   [employer];  Surgeon: Robinson Salazar MD;  Location: [facility]" CATH INVASIVE LOCATION;  Service:    • CARDIOVERSION  01/18/2017   • CHOLECYSTECTOMY     • COLONOSCOPY     • COLONOSCOPY N/A 7/29/2016    normal   • ENDOSCOPY  05/06/2015    submucosal nodule in esophagus, erythematous mucosa in antrum,moderate acute gastritis, no h-pylori   • ENDOSCOPY N/A 9/2/2016    Erythematous mucosa in the antrum   • EYE SURGERY Left     cataract   • HYSTERECTOMY     • KNEE MENISCAL REPAIR Left    • THYROID SURGERY Left    • TONSILLECTOMY         Social History:    Social History     Socioeconomic History   • Marital status:      Spouse name: Not on file   • Number of children: Not on file   • Years of education: Not on file   • Highest education level: Not on file   Tobacco Use   • Smoking status: Never Smoker   • Smokeless tobacco: Never Used   Substance and Sexual Activity   • Alcohol use: No   • Drug use: No       Family History:    Family History   Problem Relation Age of Onset   • Lung cancer Other    • Ulcerative colitis Sister    • Heart failure Mother    • Heart disease Mother    • Heart failure Father    • Heart disease Father    • Malig Hyperthermia Neg Hx        Current Medications:  Scheduled Meds:  orthopedic surgery cocktail 1 ([facility])  Injection Once     Continuous Infusions:   PRN Meds:.    Current Facility-Administered Medications:   •  ropivacaine (NAROPIN) 0.5 % 50 mL, Morphine (PF) 5 mg, ketorolac (TORADOL) 30 mg, EPINEPHrine (ADRENALIN) 30 MG/30ML 0.3 mg in sodium chloride 0.9 % 101.8 mL, , Injection, Once, Jamal Castañeda MD    Facility-Administered Medications Ordered in Other Encounters:   •  ropivacaine (NAROPIN) 0.5 % 50 mL, Morphine (PF) 5 mg, ketorolac (TORADOL) 30 mg, EPINEPHrine (ADRENALIN) 30 MG/30ML 0.3 mg in sodium chloride 0.9 % 101.8 mL, , Injection, Once, Jamal Castañeda MD    Allergies:    Allergies   Allergen Reactions   • Amoxicillin-Pot Clavulanate Shortness Of Breath     CAN take 500mg Amoxils without itching   • Bupivacaine Hcl Anaphylaxis    • Doxycycline Shortness Of Breath   • Nepafenac Itching     Eye Drops   • Bactrim [Sulfamethoxazole-Trimethoprim] Diarrhea   • Barium-Containing Compounds Other (See Comments)     CONTRAST CAUSES DIARRHEA   • Bextra [Valdecoxib] Itching   • Clarithromycin Other (See Comments)     REACTION UNKNOWN   • Contrast Dye Other (See Comments)     REACTION UNKNOWN   • Cortisone Hives   • Cymbalta [Duloxetine Hcl] Itching   • Doxycycline Monohydrate Other (See Comments)     REACTION UNKNOWN   • Iodinated Diagnostic Agents Other (See Comments)     REACTION UNKNOWN   • Iodine Other (See Comments)     REACTION UNKNOWN   • Keflex [Cephalexin] Other (See Comments)     REACTION UNKNOWN   • Levaquin [Levofloxacin] Other (See Comments)     REACTION UNKNOWN   • Medrol [Methylprednisolone] Other (See Comments)     REACTION UNKNOWN   • Mesalamine Other (See Comments)     REACTION UNKNOWN   • Polymyxin B-Trimethoprim Other (See Comments)     REACTION UNKNOWN   • Rivaroxaban Other (See Comments)     REACTION UNKNOWN   • Tetanus Toxoids Swelling   • Tetanus-Diphtheria Toxoids Td Swelling   • Clindamycin Rash       Review of Systems:   HEENT: Patient denies any headaches, vision changes, change in hearing, or tinnitus, Patient denies any rhinorrhea,epistaxis, sinus pain, mouth or dental problems, sore throat or hoarseness, or dysphagia  Pulmonary: Patient denies any cough, congestion, SOA, or wheezing  Cardiovascular: Patient denies any chest pain, dyspnea, palpitations, weakness, intolerance of exercise, varicosities, swelling of extremities, known murmur  Gastrointestinal:  Patient denies nausea, vomiting, diarrhea, constipation, loss  of appetite, change in appetite, dysphagia, gas, heartburn, melena, change in bowel habits, use of laxatives or other drugs to alter the function of the gastrointestinal tract.  Genital/Urinary: Patient denies dysuria, change in color of urine, change in frequency of urination, pain with urgency,  incontinence, retention, or nocturia.  Musculoskeletal: Patient denies increased warmth; redness; or swelling of joints; limitation of function; deformity; crepitation: pain in a joint or an extremity, the neck, or the back, especially with movement.  Neurological: Patient denies dizziness, tremor, ataxia, difficulty in speaking, change in speech, paresthesia, loss of sensation, seizures, syncope, changes in memory.  Endocrine system: Patient denies tremors, palpitations, intolerance of heat or cold, polyuria, polydipsia, polyphagia, diaphoresis, exophthalmos, or goiter.  Psychological: Patient denies thoughts/plans or harming self or other; depression,  insomnia, night terrors, latesha, memory loss, disorientation.  Skin: Patient denies any bruising, rashes, discoloration, pruritus, wounds, ulcers, decubiti, changes in the hair or nails  Hematopoietic: Patient denies history of spontaneous or excessive bleeding, epistaxis, hematuria, melena, fatigue, enlarged or tender lymph nodes, pallor, history of anemia.        Physical Exam:   Awake, A&O x3, affect normal, no acute distress  Ambulating with a limp due to knee pain  Knee ROM is limited due to pain (5-115)  Strength is 4/5 in the quad, hamstring and calf  Cap refill is normal, Sensation intact    Card:  RR, HD Stable  Pulm:  Regular breathing, no S.O.A  Abd:  Soft, NT, ND    Lab Results (last 24 hours)     Procedure Component Value Units Date/Time    CBC (No Diff) [[id number]]  (Normal) Collected:  06/16/20 1304    Specimen:  Blood Updated:  06/16/20 1331     WBC 9.49 10*3/mm3      RBC 4.23 10*6/mm3      Hemoglobin 13.9 g/dL      Hematocrit 40.7 %      MCV 96.2 fL      MCH 32.9 pg      MCHC 34.2 g/dL      RDW 14.2 %      RDW-SD 49.9 fl      MPV 11.2 fL      Platelets 215 10*3/mm3           Xr Chest Pa & Lateral    Result Date: 6/11/2020  Narrative: PA AND LATERAL CHEST X-RAY  HISTORY: Pacemaker. Preoperative evaluation for knee surgery.  Chest x-ray consisting of  PA and lateral views is provided.  Comparison exams: Chest x-ray 01/22/2020.  FINDINGS: There is a cardiac pacing device and clips from cholecystectomy. The cardiomediastinal silhouette is normal. The lungs are hyperexpanded but clear. The costophrenic sulci are dry and the bones appear normal. There is no pneumothorax.      Impression: Negative.  This report was finalized on 6/11/2020 9:40 AM by Dr. Andi Chambers M.D.          Assessment:  End-stage Primary Left Knee Osteoarthritis    Plan:  Patient's pain is becoming disabling, despite extensive conservative treatment.  Radiographs reveal end-stage degenerative changes.  The risks of surgery, including, but not limited to, heart attack, stroke, dying, DVT, nerve injury, vascular injury, arthrofibrosis and infection were discussed.  The alternatives and benefits were also discussed.  All questions answered and the patient wishes to proceed with left total knee arthroplasty.    Jamal Patel PA-C  [facility]  [address] [place] [address]  [phone]    6/16/2020    CC: Georgiana Cisneros MD, Jamal Castañeda MD

## 2020-06-16 NOTE — OP NOTE
Orthopaedic Surgery Operative Note    Patient Name:  Raina Forrest  YOB: 1945  Age: 74 y.o.  Medical Records Number:  [id number]    Date of Procedure:  6/16/2020    Pre-operative Diagnosis:  Primary Osteoarthritis Left Knee    Post-operative Diagnosis:  Primary Osteoarthritis Left Knee    Procedure Performed:  Left Total Knee Arthroplasty    Implants:  Biomet Vanguard TKA, 70 Femoral Component, 75 Tibial Tray with a 40 mm Modular Stem, 37 3-Peg Patella, 14 std  Polyethylene Insert    Surgeon:  Jamal Castañeda M.D.    Assistant: Waleska Cavazos (who was present during the critical portions of the case, thereby decreasing operative time and patient morbidity)    Anesthetic Type:  General    Estimated Blood Loss:  250cc's    Specimens:   Order Name Source Comment Collection Info Order Time   CBC (NO DIFF)   Collected By: Meeta Mace RN 6/16/2020  1:04 PM       No Complications      Indications for Procedure:  Raina Forrest is a 74 y.o. female suffering from end stage degenerative changes in the left knee.  The patients pain is becoming disabling, despite extensive conservative care, including NSAIDS, therapy and injections. The risks, benefits and alternatives were discussed and the patient wishes to proceed with left total knee arthroplasty.      Procedure Performed:    After informed consent was obtained, the correct patient identified, the correct operative side marked by the operative surgeon, and pre-operative IV Kefzol  given (prior to tourniquet inflation), the patient was taken to the operating room and placed supine on the operating table.  After general anesthesia was induced, a surgical time out was performed and 1 gm of Tranxemic Acid given, a well-padded tourniquet was placed and the patient's left lower extremity was prepped with chloraprep and draped in a sterile fashion.    A midline incision was made overlying the left knee and we sharply dissected down to expose the  parapatellar retinaculum.  A muscle sparing, mid-vastus, parapatellar arthrotomy was performed and we elevated the soft tissue both medially and laterally.  The menisci and ACL were excised.  We everted the patella and measured this to be 25 mm and we removed 10 mm of bone down to 15 mm.  We measured the patella to be 37 and drilled our three drill holes.  We protected the patella with the patella protector and turned our attention to the femur and the tibia.    We drilled drill holes into the femoral and the tibial intramedullary canals and proceeded to irrigate the canals to remove the fatty marrow.  We used the intramedullary zac and the 5 degree valgus cut block to make our distal femoral cut.  Once we had a smooth surface, we measured the femur to be 70.  We assured we had rotational alignment using the epicondylar axis, then we used the four-in-one cut block to make our anterior, posterior, anterior and posterior chamfer cuts.  We placed our femoral trial, had excellent fit, extended the knee and marked [place]'s line on the tibia.      We then turned our attention to the tibia, where we irrigated the canal again.  We used the intramedullary zac and the 3 degree posterior sloped cut block to remove 2 mm of bone from the effected side of the tibia.  Prior to making our cut, we assured we had rotational alignment using the external guide and [place]'s line.  Once we had a smooth surface, we measured the tibia to be 75.  We then removed soft tissue and bony debris from the posterior aspect of the knee.    We placed our trial implants and had excellent fit, range of motion, stability and ligament balance, throughout a complete arc of motion with a 14 std polyethylene liner.  We removed our trial implants, punched the tibial keel, copiously irrigated the knee, then cemented our implants in place using Biomet cement.  Once the cement cured, we trialed again with the 12, 14 and 16 AS, standard and posterior  "lipped polyethylene liners.  The 14 std gave us the best range of motion, stability and ligament balance, throughout a complete arc of motion.  We removed the trials, copiously irrigated the knee, gave IV antibiotics and local anesthetic, then placed our permanent polyethylene liner.  We placed a 1/8\" hemovac drain, then closed the arthrotomy with #1 Vicryl pop-off sutures.  The subcutaneous tissue was closed with 2-0 vicryl pop-off sutures.  The skin was closed with staples.  We placed a sterile dressing of Xeroform, 4x4's, abdominal pads, cast padding and an Ace Wrap.  All sponge and needle counts were correct.  The patient was then awakened from general anesthesia and taken to the recovery room in stable condition.    The patient will be started on Aspirin 325 mg twice daily for DVT prophylaxis.  IV antibiotics will be discontinued within 24 hours of surgery.  Immediately prior to surgery, there were no acute Thromboembolic nor Cardiovascular risk factors.  An updated Medical Reconciliation form is on the chart.    Jamal Patel PA-C  [facility]  [address] [place], [place] [address] [phone]    6/16/2020      "

## 2020-06-16 NOTE — CONSULTS
Visited pt to respond to Spiritual care consult. Offered support and prayer. Pt appreciative of visit.

## 2020-06-16 NOTE — ANESTHESIA POSTPROCEDURE EVALUATION
Patient: Raina Forrest    Procedure Summary     Date:  06/16/20 Room / Location:  [facility] OR [address] [place] [address] MAIN OR    Anesthesia Start:  1538 Anesthesia Stop:  1756    Procedure:  LEFT TOTAL KNEE ARTHOPLASTY+ (Left Knee) Diagnosis:      Surgeon:  Jamal Castañeda MD Provider:  Clara Devlin MD    Anesthesia Type:  general ASA Status:  3          Anesthesia Type: general    Vitals  Vitals Value Taken Time   /68 6/16/2020  6:30 PM   Temp 36.7 °C (98.1 °F) 6/16/2020  6:30 PM   Pulse 72 6/16/2020  6:42 PM   Resp 16 6/16/2020  6:30 PM   SpO2 100 % 6/16/2020  6:42 PM   Vitals shown include unvalidated device data.        Post Anesthesia Care and Evaluation    Patient location during evaluation: PACU  Anesthetic complications: No anesthetic complications

## 2020-06-16 NOTE — DISCHARGE PLACEMENT REQUEST
"Shannan Forrest (74 y.o. Female)     Date of Birth Social Security Number Address Home Phone MRN    [id number]  [id number]  [facility] [address] [phone] [id number]    Yazidism Marital Status          Denominational        Admission Date Admission Type Admitting Provider Attending Provider Department, Room/Bed    6/16/20 Elective Jamal Castañeda MD Goodin, Robert A, MD [facility] MAIN OR, [facility] Main OR/MAIN OR    Discharge Date Discharge Disposition Discharge Destination                       Attending Provider:  Jamal Castañeda MD    Allergies:  Amoxicillin-pot Clavulanate, Bupivacaine Hcl, Doxycycline, Nepafenac, Bactrim [Sulfamethoxazole-trimethoprim], Barium-containing Compounds, Bextra [Valdecoxib], Clarithromycin, Contrast Dye, Cortisone, Cymbalta [Duloxetine Hcl], Doxycycline Monohydrate, Iodinated Diagnostic Agents, Iodine, Keflex [Cephalexin], Levaquin [Levofloxacin], Medrol [Methylprednisolone], Mesalamine, Polymyxin B-trimethoprim, Rivaroxaban, Tetanus Toxoids, Tetanus-diphtheria Toxoids Td, Clindamycin    Isolation:  None   Infection:  None   Code Status:  Prior    Ht:  177.8 cm (70\")   Wt:  74.4 kg (164 lb 2 oz)    Admission Cmt:  None   Principal Problem:  None                Active Insurance as of 6/16/2020     Primary Coverage     Payor Plan Insurance Group Employer/Plan Group    HUMANA MEDICARE REPLACEMENT HUMANA MEDICARE REPLACEMENT [id number]     Payor Plan Address Payor Plan Phone Number Payor Plan Fax Number Effective Dates    [address] [address] [phone]  1/1/2018 - None Entered    [facility] [address]       Subscriber Name Subscriber Birth Date Member ID       SHANNAN FORREST 1945 [id number]                 Emergency Contacts      (Rel.) Home Phone Work Phone Mobile Phone    Benito Forrest (Spouse) [phone] -- [phone]    Maik Forrest (Son) -- -- [phone]              "

## 2020-06-16 NOTE — ANESTHESIA PREPROCEDURE EVALUATION
Anesthesia Evaluation     Patient summary reviewed and Nursing notes reviewed   no history of anesthetic complications:  NPO Solid Status: > 8 hours  NPO Liquid Status: > 8 hours           Airway   Mallampati: II  TM distance: >3 FB  Neck ROM: full  No difficulty expected  Dental    (+) partials    Comment: Upper partial    Pulmonary - normal exam    breath sounds clear to auscultation  (+) shortness of breath,   Cardiovascular - normal exam    Rhythm: regular  Rate: normal    (+) pacemaker pacemaker, hypertension 2 medications or greater, dysrhythmias Atrial Fib, CHF ,       Neuro/Psych  (+) headaches, psychiatric history Anxiety and Depression,       ROS Comment: Pituitary adenoma  GI/Hepatic/Renal/Endo    (+)  GERD,      Musculoskeletal     Abdominal  - normal exam   Substance History - negative use     OB/GYN negative ob/gyn ROS         Other   arthritis,                      Anesthesia Plan    ASA 3     general   (NO block.  Surgeon aware of pt's report of anaphylaxis to bupivacaine.)  intravenous induction     Anesthetic plan, all risks, benefits, and alternatives have been provided, discussed and informed consent has been obtained with: patient.

## 2020-06-17 LAB
ANION GAP SERPL CALCULATED.3IONS-SCNC: 11.5 MMOL/L (ref 5–15)
BUN BLD-MCNC: 12 MG/DL (ref 8–23)
BUN/CREAT SERPL: 16.9 (ref 7–25)
CALCIUM SPEC-SCNC: 8.3 MG/DL (ref 8.6–10.5)
CHLORIDE SERPL-SCNC: 94 MMOL/L (ref 98–107)
CO2 SERPL-SCNC: 25.5 MMOL/L (ref 22–29)
CREAT BLD-MCNC: 0.71 MG/DL (ref 0.57–1)
DEPRECATED RDW RBC AUTO: 46.7 FL (ref 37–54)
ERYTHROCYTE [DISTWIDTH] IN BLOOD BY AUTOMATED COUNT: 13.6 % (ref 12.3–15.4)
GFR SERPL CREATININE-BSD FRML MDRD: 80 ML/MIN/1.73
GLUCOSE BLD-MCNC: 136 MG/DL (ref 65–99)
HCT VFR BLD AUTO: 32.9 % (ref 34–46.6)
HGB BLD-MCNC: 11.6 G/DL (ref 12–15.9)
MCH RBC QN AUTO: 33.1 PG (ref 26.6–33)
MCHC RBC AUTO-ENTMCNC: 35.3 G/DL (ref 31.5–35.7)
MCV RBC AUTO: 94 FL (ref 79–97)
PLATELET # BLD AUTO: 193 10*3/MM3 (ref 140–450)
PMV BLD AUTO: 11.4 FL (ref 6–12)
POTASSIUM BLD-SCNC: 3.7 MMOL/L (ref 3.5–5.2)
RBC # BLD AUTO: 3.5 10*6/MM3 (ref 3.77–5.28)
SODIUM BLD-SCNC: 131 MMOL/L (ref 136–145)
WBC NRBC COR # BLD: 16.28 10*3/MM3 (ref 3.4–10.8)

## 2020-06-17 PROCEDURE — 97110 THERAPEUTIC EXERCISES: CPT

## 2020-06-17 PROCEDURE — 25010000002 MORPHINE PER 10 MG: Performed by: ORTHOPAEDIC SURGERY

## 2020-06-17 PROCEDURE — 25010000002 VANCOMYCIN PER 500 MG: Performed by: ORTHOPAEDIC SURGERY

## 2020-06-17 PROCEDURE — 25010000002 KETOROLAC TROMETHAMINE PER 15 MG: Performed by: ORTHOPAEDIC SURGERY

## 2020-06-17 PROCEDURE — 80048 BASIC METABOLIC PNL TOTAL CA: CPT | Performed by: ORTHOPAEDIC SURGERY

## 2020-06-17 PROCEDURE — 97116 GAIT TRAINING THERAPY: CPT

## 2020-06-17 PROCEDURE — 85027 COMPLETE CBC AUTOMATED: CPT | Performed by: ORTHOPAEDIC SURGERY

## 2020-06-17 PROCEDURE — 97162 PT EVAL MOD COMPLEX 30 MIN: CPT

## 2020-06-17 RX ORDER — OXYCODONE AND ACETAMINOPHEN 7.5; 325 MG/1; MG/1
1 TABLET ORAL EVERY 4 HOURS PRN
Status: DISCONTINUED | OUTPATIENT
Start: 2020-06-17 | End: 2020-06-19 | Stop reason: HOSPADM

## 2020-06-17 RX ORDER — OXYCODONE AND ACETAMINOPHEN 7.5; 325 MG/1; MG/1
2 TABLET ORAL EVERY 4 HOURS PRN
Status: DISCONTINUED | OUTPATIENT
Start: 2020-06-17 | End: 2020-06-19 | Stop reason: HOSPADM

## 2020-06-17 RX ADMIN — OXYCODONE HYDROCHLORIDE AND ACETAMINOPHEN 2 TABLET: 5; 325 TABLET ORAL at 04:01

## 2020-06-17 RX ADMIN — LEVOTHYROXINE SODIUM 25 MCG: 25 TABLET ORAL at 08:07

## 2020-06-17 RX ADMIN — FUROSEMIDE 20 MG: 20 TABLET ORAL at 16:50

## 2020-06-17 RX ADMIN — MUPIROCIN 1 APPLICATION: 20 OINTMENT TOPICAL at 08:06

## 2020-06-17 RX ADMIN — DIAZEPAM 5 MG: 5 TABLET ORAL at 13:07

## 2020-06-17 RX ADMIN — OXYCODONE HYDROCHLORIDE AND ACETAMINOPHEN 2 TABLET: 5; 325 TABLET ORAL at 12:13

## 2020-06-17 RX ADMIN — POTASSIUM CHLORIDE 20 MEQ: 10 CAPSULE, COATED, EXTENDED RELEASE ORAL at 08:07

## 2020-06-17 RX ADMIN — MUPIROCIN 1 APPLICATION: 20 OINTMENT TOPICAL at 20:11

## 2020-06-17 RX ADMIN — SODIUM CHLORIDE, PRESERVATIVE FREE 3 ML: 5 INJECTION INTRAVENOUS at 20:12

## 2020-06-17 RX ADMIN — FLUTICASONE PROPIONATE 2 SPRAY: 50 SPRAY, METERED NASAL at 08:07

## 2020-06-17 RX ADMIN — SODIUM CHLORIDE, PRESERVATIVE FREE 3 ML: 5 INJECTION INTRAVENOUS at 08:08

## 2020-06-17 RX ADMIN — OXYCODONE HYDROCHLORIDE AND ACETAMINOPHEN 2 TABLET: 5; 325 TABLET ORAL at 08:06

## 2020-06-17 RX ADMIN — ASPIRIN 325 MG: 325 TABLET, COATED ORAL at 08:07

## 2020-06-17 RX ADMIN — FERROUS SULFATE TAB 325 MG (65 MG ELEMENTAL FE) 325 MG: 325 (65 FE) TAB at 08:07

## 2020-06-17 RX ADMIN — FUROSEMIDE 40 MG: 40 TABLET ORAL at 08:06

## 2020-06-17 RX ADMIN — MORPHINE SULFATE 4 MG: 2 INJECTION, SOLUTION INTRAMUSCULAR; INTRAVENOUS at 02:46

## 2020-06-17 RX ADMIN — ASPIRIN 325 MG: 325 TABLET, COATED ORAL at 16:51

## 2020-06-17 RX ADMIN — OXYCODONE HYDROCHLORIDE AND ACETAMINOPHEN 2 TABLET: 5; 325 TABLET ORAL at 16:51

## 2020-06-17 RX ADMIN — SERTRALINE 150 MG: 100 TABLET, FILM COATED ORAL at 08:07

## 2020-06-17 RX ADMIN — OXYCODONE HYDROCHLORIDE AND ACETAMINOPHEN 2 TABLET: 5; 325 TABLET ORAL at 21:15

## 2020-06-17 RX ADMIN — MORPHINE SULFATE 4 MG: 2 INJECTION, SOLUTION INTRAMUSCULAR; INTRAVENOUS at 20:11

## 2020-06-17 RX ADMIN — VANCOMYCIN HYDROCHLORIDE 1000 MG: 1 INJECTION, SOLUTION INTRAVENOUS at 13:07

## 2020-06-17 RX ADMIN — KETOROLAC TROMETHAMINE 15 MG: 15 INJECTION, SOLUTION INTRAMUSCULAR; INTRAVENOUS at 20:11

## 2020-06-17 RX ADMIN — VANCOMYCIN HYDROCHLORIDE 1000 MG: 1 INJECTION, SOLUTION INTRAVENOUS at 02:46

## 2020-06-17 NOTE — PLAN OF CARE
Problem: Patient Care Overview  Goal: Plan of Care Review  Outcome: Ongoing (interventions implemented as appropriate)  Flowsheets  Taken 6/17/2020 0306 by Chito Dominguez, RN  Progress: improving  Taken 6/17/2020 1109 by Heather Whitaker PT  Plan of Care Reviewed With: patient  Taken 6/17/2020 1724 by Aminah Terrell, RN  Outcome Summary: pt POD 1 LTK. Increasaed pain today. controlled with PO pain medication. pt plans to D/C home with HH in am, up with PT today. ambulates with walker and stand by assist x1. voiding intact. Cap refill WNL. VSS. NVI. educated on the importance of monitoring HR and medications RT HO Afib. Verbalized understandingg. will monitor.

## 2020-06-17 NOTE — PLAN OF CARE
Problem: Patient Care Overview  Goal: Plan of Care Review  Flowsheets (Taken 6/17/2020 1108)  Plan of Care Reviewed With: patient  Outcome Summary: Patient is a 75 yo female with h/o pacemaker, A-fib, and HTN s/p left TKA 6/16/20. She is independent with mobility at baseline, lives with spouse in a home with 2 MATTY. She currently presents with post-op pain and weakness., Today she was able to safely ambulate x 200 ft with RW Sup A and 4 steps CGA. Anticipate home with A and HHPT after discharge.   ..Patient was wearing a face mask during this therapy encounter. Therapist used appropriate personal protective equipment including mask and gloves.  Mask used was standard procedure mask. Appropriate PPE was worn during the entire therapy session. Hand hygiene was completed before and after therapy session. Patient is not in enhanced droplet precautions.

## 2020-06-17 NOTE — PROGRESS NOTES
Continued Stay Note  [facility]     Patient Name: Raina Forrest  MRN: [id number]  Today's Date: 6/17/2020    Admit Date: 6/16/2020    Discharge Plan     Row Name 06/17/20 1304       Plan    Plan  [facility]    Patient/Family in Agreement with Plan  yes    Plan Comments  Spoke with pt, verified correct information on facesheet and explained the role of CCP. Pt would like to d/c home with [facility], referral sent in [facility] which states they are able to accept. Plan will be to d/c home with [facility] and family support. No other needs identified.         Discharge Codes    No documentation.             Geni Osborne RN

## 2020-06-17 NOTE — PROGRESS NOTES
Orthopaedic Surgery  Progress Note  6/17/2020    Patients Name:  Raina Forrest  YOB: 1945  Age: 74 y.o.  Medical Records Number:  [id number]  Date of Admission: 6/16/2020    No complaints except pain    Vitals:  Vitals:    06/16/20 1830 06/16/20 1851 06/16/20 2321 06/17/20 0247   BP: 111/68 112/64 96/53 103/61   BP Location: Left arm Left arm Left arm Left arm   Patient Position: Lying Lying Sitting Lying   Pulse: 76 78 88 75   Resp: 16 16 16 16   Temp: 98.1 °F (36.7 °C) 97.1 °F (36.2 °C) 97.5 °F (36.4 °C) 97.1 °F (36.2 °C)   TempSrc: Oral Skin Skin Skin   SpO2: 100% 98% 96% 100%   Weight:       Height:           LLE:  NVI, calf nontender, Sensation intact  No signs of DVT    Incision: clean, no signs of infection    Lab Results (last 24 hours)     Procedure Component Value Units Date/Time    CBC (No Diff) [[id number]]  (Abnormal) Collected:  06/17/20 0323    Specimen:  Blood Updated:  06/17/20 0537     WBC 16.28 10*3/mm3      RBC 3.50 10*6/mm3      Hemoglobin 11.6 g/dL      Hematocrit 32.9 %      MCV 94.0 fL      MCH 33.1 pg      MCHC 35.3 g/dL      RDW 13.6 %      RDW-SD 46.7 fl      MPV 11.4 fL      Platelets 193 10*3/mm3     Basic Metabolic Panel [[id number]]  (Abnormal) Collected:  06/17/20 0323    Specimen:  Blood Updated:  06/17/20 0435     Glucose 136 mg/dL      BUN 12 mg/dL      Creatinine 0.71 mg/dL      Sodium 131 mmol/L      Potassium 3.7 mmol/L      Chloride 94 mmol/L      CO2 25.5 mmol/L      Calcium 8.3 mg/dL      eGFR Non African Amer 80 mL/min/1.73      BUN/Creatinine Ratio 16.9     Anion Gap 11.5 mmol/L     Narrative:       GFR Normal >60  Chronic Kidney Disease <60  Kidney Failure <15      CBC (No Diff) [[id number]]  (Normal) Collected:  06/16/20 1304    Specimen:  Blood Updated:  06/16/20 1331     WBC 9.49 10*3/mm3      RBC 4.23 10*6/mm3      Hemoglobin 13.9 g/dL      Hematocrit 40.7 %      MCV 96.2 fL      MCH 32.9 pg      MCHC 34.2 g/dL      RDW 14.2 %      RDW-SD 49.9 fl       MPV 11.2 fL      Platelets 215 10*3/mm3           Xr Knee 1 Or 2 View Left    Result Date: 6/16/2020  Narrative: XR KNEE 1 OR 2 VW LEFT-  06/16/2020  HISTORY: Postop left knee arthroplasty.  Distal femoral and proximal tibia components of the left knee prosthesis are well-seated with no abnormal surrounding bony lucencies. Soft tissue air skin staples and drainage catheter are seen.      Impression: Satisfactory postoperative appearance of the left knee.  This report was finalized on 6/16/2020 6:11 PM by Dr. Walter Doshi M.D.      Xr Chest Pa & Lateral    Result Date: 6/11/2020  Narrative: PA AND LATERAL CHEST X-RAY  HISTORY: Pacemaker. Preoperative evaluation for knee surgery.  Chest x-ray consisting of PA and lateral views is provided.  Comparison exams: Chest x-ray 01/22/2020.  FINDINGS: There is a cardiac pacing device and clips from cholecystectomy. The cardiomediastinal silhouette is normal. The lungs are hyperexpanded but clear. The costophrenic sulci are dry and the bones appear normal. There is no pneumothorax.      Impression: Negative.  This report was finalized on 6/11/2020 9:40 AM by Dr. Andi Chambers M.D.        Assesment/Plan:    Procedures:  Left TKA  Postoperative Day: 1  Weightbearing Status:  WBAT with walker  DVT Prophylaxis:  ASA for DVT prophylaxis    Disposition:  Home with home health after PT tomorrow, if comfortable and mobilizing safely    Jamal Patel PACHILO  [facility]  [address] [place] [address]  [phone]    6/17/2020

## 2020-06-17 NOTE — PLAN OF CARE
Problem: Patient Care Overview  Goal: Plan of Care Review  Outcome: Ongoing (interventions implemented as appropriate)  Flowsheets (Taken 6/17/2020 0306)  Progress: improving  Plan of Care Reviewed With: patient  Outcome Summary: POD 0 LTKA. VSS, pain controlled with prn percocet, morphine, toradol, and valium; client c/o pain to surgical site, as well as significant pain to medial back - which is baseline for client. ambulates well with assist x1, walker, and gait belt - tolerated well. dsg c,d,i - hvac draining and patent. d/c plans pending at this time, would prefer home with HH. discussed BP monitoring r/t hx HTN.  Goal: Individualization and Mutuality  Outcome: Ongoing (interventions implemented as appropriate)  Goal: Discharge Needs Assessment  Outcome: Ongoing (interventions implemented as appropriate)  Goal: Interprofessional Rounds/Family Conf  Outcome: Ongoing (interventions implemented as appropriate)     Problem: Pain, Chronic (Adult)  Goal: Acceptable Pain/Comfort Level and Functional Ability  Outcome: Ongoing (interventions implemented as appropriate)  Flowsheets (Taken 6/17/2020 0306)  Acceptable Pain/Comfort Level and Functional Ability: making progress toward outcome     Problem: Fall Risk (Adult)  Goal: Absence of Fall  Outcome: Ongoing (interventions implemented as appropriate)  Flowsheets (Taken 6/17/2020 0306)  Absence of Fall: achieves outcome     Problem: Knee Arthroplasty (Total, Partial) (Adult)  Goal: Signs and Symptoms of Listed Potential Problems Will be Absent, Minimized or Managed (Knee Arthroplasty)  Outcome: Ongoing (interventions implemented as appropriate)  Flowsheets (Taken 6/17/2020 0306)  Problems Assessed (Knee Arthroplasty): all  Problems Present (Knee Arthroplasty): pain; range of motion decreased  Goal: Anesthesia/Sedation Recovery  Outcome: Ongoing (interventions implemented as appropriate)  Flowsheets (Taken 6/17/2020 0306)  Anesthesia/Sedation Recovery: progressing  toward baseline

## 2020-06-17 NOTE — THERAPY EVALUATION
Patient Name: Raina Forrest  : 1945    MRN: [id number]                              Today's Date: 2020       Admit Date: 2020    Visit Dx:     ICD-10-CM ICD-9-CM   1. Primary osteoarthritis of left knee M17.12 715.16     Patient Active Problem List   Diagnosis   • Chronic atrial fibrillation (CMS/HCC)   • Benign essential HTN   • Bradycardia   • Chest pain   • Can't get food down   • Accumulation of fluid in tissues   • Esophageal lump   • Adynamia   • Itch of skin   • Anorexia   • Chronic nausea   • Gastroesophageal reflux disease   • Breath shortness   • Emesis   • Decreased body weight   • Anxiety   • Narrowing of intervertebral disc space   • Diverticulosis of intestine   • Hypertension   • Migraine   • Osteoporosis   • Palpitations   • Pituitary adenoma (CMS/HCC)   • Sick sinus syndrome (CMS/HCC)   • Diarrhea   • Paroxysmal atrial fibrillation (CMS/HCC)   • Cardiomyopathy (CMS/HCC)   • Anticoagulated   • Atrial fibrillation (CMS/HCC)   • On amiodarone therapy   • Pacemaker   • Cardiac resynchronization therapy pacemaker (CRT-P) in place   • Infected pacemaker (CMS/HCC)   • Wound infection   • Primary osteoarthritis of left knee   • DJD (degenerative joint disease)     Past Medical History:   Diagnosis Date   • Atrial fibrillation (CMS/HCC)    • Fung's esophagus    • Benign essential hypertension    • Blood clot in vein    • Cardiomyopathy (CMS/HCC)    • Chronic gastritis    • Congestive heart failure (CMS/HCC)    • Degenerative disc disease    • Depression with anxiety    • Disease of thyroid gland    • Diverticulitis of colon    • Dysphagia    • GERD (gastroesophageal reflux disease)    • History of chest pain    • History of migraine    • History of migraine headaches    • Left knee pain    • Osteoporosis    • Palpitations    • Thyroid disorder    • Thyroid nodule    • Ulcerative colitis (CMS/HCC)      Past Surgical History:   Procedure Laterality Date   • APPENDECTOMY     • BLADDER  SURGERY     • CARDIAC CATHETERIZATION N/A 11/21/2016    Procedure: Left Heart Cath;  Surgeon: Robinson Salazar MD;  Location:  [facility];  Service:    • CARDIAC ELECTROPHYSIOLOGY PROCEDURE N/A 2/7/2017    Procedure: Pacemaker DC new   [employer];  Surgeon: Robinson Salazar MD;  Location:  [facility];  Service:    • CARDIOVERSION  01/18/2017   • CHOLECYSTECTOMY     • COLONOSCOPY     • COLONOSCOPY N/A 7/29/2016    normal   • ENDOSCOPY  05/06/2015    submucosal nodule in esophagus, erythematous mucosa in antrum,moderate acute gastritis, no h-pylori   • ENDOSCOPY N/A 9/2/2016    Erythematous mucosa in the antrum   • EYE SURGERY Left     cataract   • HYSTERECTOMY     • KNEE MENISCAL REPAIR Left    • THYROID SURGERY Left    • TONSILLECTOMY       General Information     Row Name 06/17/20 1042          PT Evaluation Time/Intention    Document Type  evaluation  -MW     Mode of Treatment  physical therapy  -MW     Row Name 06/17/20 1042          General Information    Patient Profile Reviewed?  yes  -MW     Prior Level of Function  independent:;gait no AD  -MW     Existing Precautions/Restrictions  fall  -MW     Barriers to Rehab  none identified  -MW     Row Name 06/17/20 1042          Home Main Entrance    Number of Stairs, Main Entrance  two  -MW     Stair Railings, Main Entrance  railing on left side (ascending)  -MW     Row Name 06/17/20 1042          Cognitive Assessment/Intervention- PT/OT    Orientation Status (Cognition)  oriented x 3  -MW     Row Name 06/17/20 1042          Safety Issues, Functional Mobility    Safety Issues Affecting Function (Mobility)  ability to follow commands  -MW     Impairments Affecting Function (Mobility)  pain;range of motion (ROM);strength;endurance/activity tolerance  -MW     Comment, Safety Issues/Impairments (Mobility)  Gait belt used for safety  -MW       User Key  (r) = Recorded By, (t) = Taken By, (c) = Cosigned By    Initials Name  Provider Type    Heather Roman PT Physical Therapist        Mobility     Row Name 06/17/20 1043          Bed Mobility Assessment/Treatment    Comment (Bed Mobility)  NT OOB  -MW     Row Name 06/17/20 1043          Sit-Stand Transfer    Sit-Stand [place] (Transfers)  conditional independence;supervision  -MW     Assistive Device (Sit-Stand Transfers)  walker, front-wheeled  -MW     Row Name 06/17/20 1043          Gait/Stairs Assessment/Training    Gait/Stairs Assessment/Training  gait/ambulation independence;gait/ambulation assistive device  -MW     [place] Level (Gait)  supervision  -MW     Assistive Device (Gait)  walker, front-wheeled  -MW     Distance in Feet (Gait)  200  -MW     Pattern (Gait)  step-through  -MW     Deviations/Abnormal Patterns (Gait)  gait speed decreased;stride length decreased  -MW     Left Sided Gait Deviations  weight shift ability decreased;heel strike decreased  -MW     Negotiation (Stairs)  stairs independence  -MW     [place] Level (Stairs)  contact guard;supervision;verbal cues  -MW     Handrail Location (Stairs)  (S) left side (ascending);other (see comments) HHA on R side  -MW     Number of Steps (Stairs)  4  -MW     Ascending Technique (Stairs)  step-to-step  -MW     Descending Technique (Stairs)  step-to-step  -MW     Comment (Gait/Stairs)  Decreased L knee flexion during swing; able to negotiate steps safely with 1 rail and 1 HHA  -MW     Row Name 06/17/20 1043          Mobility Assessment/Intervention    Extremity Weight-bearing Status  left lower extremity  -MW     Left Lower Extremity (Weight-bearing Status)  weight-bearing as tolerated (WBAT)  -MW       User Key  (r) = Recorded By, (t) = Taken By, (c) = Cosigned By    Initials Name Provider Type    Heather Roman PT Physical Therapist        Obj/Interventions     Row Name 06/17/20 1107          General ROM    GENERAL ROM COMMENTS  L knee 5-70  -MW     Row Name 06/17/20 1107          MMT (Manual Muscle  Testing)    General MMT Comments  WFL except LLE post-op weakness  -     Row Name 06/17/20 1107          Therapeutic Exercise    Comment (Therapeutic Exercise)  Left TKA ex program x 10 reps each  -[facility] Name 06/17/20 1107          Static Standing Balance    Level of [place] (Supported Standing, Static Balance)  supervision  -MW       User Key  (r) = Recorded By, (t) = Taken By, (c) = Cosigned By    Initials Name Provider Type     Heather Whitaker, PT Physical Therapist        Goals/Plan     Row Name 06/17/20 1113          Bed Mobility Goal 1 (PT)    Activity/Assistive Device (Bed Mobility Goal 1, PT)  bed mobility activities, all  -MW     [place] Level/Cues Needed (Bed Mobility Goal 1, PT)  supervision required  -MW     Time Frame (Bed Mobility Goal 1, PT)  3 days  -[facility] Name 06/17/20 1113          Transfer Goal 1 (PT)    Activity/Assistive Device (Transfer Goal 1, PT)  transfers, all;walker, rolling  -MW     [place] Level/Cues Needed (Transfer Goal 1, PT)  supervision required  -MW     Time Frame (Transfer Goal 1, PT)  2 - 3 days  -MW     Row Name 06/17/20 1113          Gait Training Goal 1 (PT)    Activity/Assistive Device (Gait Training Goal 1, PT)  gait (walking locomotion);assistive device use;walker, rolling  -MW     [place] Level (Gait Training Goal 1, PT)  supervision required;conditional independence  -MW     Distance (Gait Goal 1, PT)  200  -MW     Time Frame (Gait Training Goal 1, PT)  2 - 3 days  -[facility] Name 06/17/20 1113          ROM Goal 1 (PT)    ROM Goal 1 (PT)  0-90 L knee  -MW     Time Frame (ROM Goal 1, PT)  2 - 3 days  -MW     Row Name 06/17/20 1113          Stairs Goal 1 (PT)    Activity/Assistive Device (Stairs Goal 1, PT)  stairs, all skills  -MW     [place] Level/Cues Needed (Stairs Goal 1, PT)  contact guard assist  -MW     Number of Stairs (Stairs Goal 1, PT)  2  -MW     Time Frame (Stairs Goal 1, PT)  3 days;2 days  -MW       User Key  (r) =  Recorded By, (t) = Taken By, (c) = Cosigned By    Initials Name Provider Type    MW Heather Whitaker, PT Physical Therapist        Clinical Impression     Row Name 06/17/20 1109          Pain Assessment    Additional Documentation  Pain Scale: Numbers Pre/Post-Treatment (Group)  -     Row Name 06/17/20 1109          Pain Scale: Numbers Pre/Post-Treatment    Pain Scale: Numbers, Pretreatment  8/10  -MW     Pain Scale: Numbers, Post-Treatment  5/10  -MW     Pain Location - Side  Left  -MW     Pain Location  knee  -MW     Pain Intervention(s)  Repositioned;Ambulation/increased activity;Rest  -MW     Row Name 06/17/20 1109          Plan of Care Review    Plan of Care Reviewed With  patient  -     Outcome Summary  Patient is a 75 yo female with h/o pacemaker, A-fib, and HTN s/p left TKA 6/16/20. She is independent with mobility at baseline, lives with spouse in a home with 2 MATTY. She currently presents with post-op pain and weakness., Today she was able to safely ambulate x 200 ft with RW Sup A and 4 steps CGA. Anticipate home with A and HHPT after discharge.  -     Row Name 06/17/20 1109          Physical Therapy Clinical Impression    Criteria for Skilled Interventions Met (PT Clinical Impression)  yes  -MW     Rehab Potential (PT Clinical Summary)  good, to achieve stated therapy goals  -MW     Predicted Duration of Therapy (PT)  3 days  -MW     Row Name 06/17/20 1109          Vital Signs    O2 Delivery Pre Treatment  room air  -MW     O2 Delivery Post Treatment  room air  -MW     Pre Patient Position  Sitting  -MW     Intra Patient Position  Standing  -MW     Post Patient Position  Sitting  -MW     Row Name 06/17/20 1109          Positioning and Restraints    Pre-Treatment Position  bathroom  -MW     Post Treatment Position  chair  -MW     In Chair  reclined;sitting;call light within reach;encouraged to call for assist;exit alarm on  -MW       User Key  (r) = Recorded By, (t) = Taken By, (c) = Cosigned By     Initials Name Provider Type    Heather Roman PT Physical Therapist        Outcome Measures     Row Name 06/17/20 1114          How much help from another person do you currently need...    Turning from your back to your side while in flat bed without using bedrails?  4  -MW     Moving from lying on back to sitting on the side of a flat bed without bedrails?  4  -MW     Moving to and from a bed to a chair (including a wheelchair)?  3  -MW     Standing up from a chair using your arms (e.g., wheelchair, bedside chair)?  4  -MW     Climbing 3-5 steps with a railing?  3  -MW     To walk in hospital room?  4  -MW     AM-PAC 6 Clicks Score (PT)  22  -MW     Row Name 06/17/20 1114          Functional Assessment    Outcome Measure Options  AM-PAC 6 Clicks Basic Mobility (PT)  -MW       User Key  (r) = Recorded By, (t) = Taken By, (c) = Cosigned By    Initials Name Provider Type    Heather Roman PT Physical Therapist        Physical Therapy Education                 Title: PT OT SLP Therapies (Done)     Topic: Physical Therapy (Done)     Point: Mobility training (Done)     Description:   Instruct learner(s) on safety and technique for assisting patient out of bed, chair or wheelchair.  Instruct in the proper use of assistive devices, such as walker, crutches, cane or brace.              Patient Friendly Description:   It's important to get you on your feet again, but we need to do so in a way that is safe for you. Falling has serious consequences, and your personal safety is the most important thing of all.        When it's time to get out of bed, one of us or a family member will sit next to you on the bed to give you support.     If your doctor or nurse tells you to use a walker, crutches, a cane, or a brace, be sure you use it every time you get out of bed, even if you think you don't need it.    Learning Progress Summary           Patient Acceptance, E, VU by DIANA at 6/17/2020 1115    Comment:  HEP, safety with  mobility stair training                   Point: Home exercise program (Done)     Description:   Instruct learner(s) on appropriate technique for monitoring, assisting and/or progressing patient with therapeutic exercises and activities.              Learning Progress Summary           Patient Acceptance, E, VU by  at 6/17/2020 1115    Comment:  HEP, safety with mobility stair training                   Point: Body mechanics (Done)     Description:   Instruct learner(s) on proper positioning and spine alignment for patient and/or caregiver during mobility tasks and/or exercises.              Learning Progress Summary           Patient Acceptance, E, VU by  at 6/17/2020 1115    Comment:  HEP, safety with mobility stair training                   Point: Precautions (Done)     Description:   Instruct learner(s) on prescribed precautions during mobility and gait tasks              Learning Progress Summary           Patient Acceptance, E, VU by  at 6/17/2020 1115    Comment:  HEP, safety with mobility stair training                               User Key     Initials Effective Dates Name Provider Type Discipline     09/17/19 -  Heather Whitaker, PT Physical Therapist PT              PT Recommendation and Plan  Planned Therapy Interventions (PT Eval): balance training, bed mobility training, gait training, home exercise program, patient/family education, ROM (range of motion), stair training, strengthening, transfer training  Outcome Summary/Treatment Plan (PT)  Anticipated Equipment Needs at Discharge (PT): front wheeled walker  Anticipated Discharge Disposition (PT): home with home health, home with assist  Plan of Care Reviewed With: patient  Outcome Summary: Patient is a 75 yo female with h/o pacemaker, A-fib, and HTN s/p left TKA 6/16/20. She is independent with mobility at baseline, lives with spouse in a home with 2 MATTY. She currently presents with post-op pain and weakness., Today she was able to safely  ambulate x 200 ft with RW Sup A and 4 steps CGA. Anticipate home with A and HHPT after discharge.     Time Calculation:   PT Charges     Row Name 06/17/20 1116             Time Calculation    Start Time  1027  -MW      Stop Time  1051  -MW      Time Calculation (min)  24 min  -MW      PT Received On  06/17/20  -MW      PT - Next Appointment  06/18/20  -MW      PT Goal Re-Cert Due Date  06/24/20  -MW         Time Calculation- PT    Total Timed Code Minutes- PT  23 minute(s)  -MW        User Key  (r) = Recorded By, (t) = Taken By, (c) = Cosigned By    Initials Name Provider Type    Heather Roman, PT Physical Therapist        Therapy Charges for Today     Code Description Service Date Service Provider Modifiers Qty    51747970631 HC PT EVAL MOD COMPLEXITY 2 6/17/2020 Heather Whitaker, PT GP 1    94262996777 HC PT THER PROC EA 15 MIN 6/17/2020 Heather Whitaker, PT GP 1    75785997993 HC GAIT TRAINING EA 15 MIN 6/17/2020 Heather Whitaker, PT GP 1          PT G-Codes  Outcome Measure Options: AM-PAC 6 Clicks Basic Mobility (PT)  AM-PAC 6 Clicks Score (PT): 22    Heather Whitaker PT  6/17/2020

## 2020-06-17 NOTE — DISCHARGE SUMMARY
Orthopaedic Surgery Discharge Summary    Patient Name:  Raina Forrest  YOB: 1945  Age: 74 y.o.  Medical Records Number:  [id number]    Date of Admission:  6/16/2020  Date of Discharge:  6/19/2020    Primary Discharge Diagnosis:  Primary osteoarthritis of left knee [M17.12]  DJD (degenerative joint disease) [M19.90]    Secondary Discharge Diagnosis:    Problem List Items Addressed This Visit        Musculoskeletal and Integument    Primary osteoarthritis of left knee - Primary    Relevant Medications    oxyCODONE-acetaminophen (PERCOCET) 5-325 MG per tablet          Procedures Performed:  Left Total Knee Arthroplasty      Hospital Course:    Raina Forrest is a 74 y.o.  female who underwent successful left tka on 6/16/2020.  Raina Forrest was started on Aspirin 325 mg po twice daily immediately post-operatively for DVT prophylaxis.  On post-op day 1 the patients dressing was changed and their incision was clean, with no signs of infection and their calf was soft, with no signs of DVT.  The patient progressed well with physical therapy and the patients hemoglobin remained stable. On post-operative day 3 the patient was felt ready for discharge.     Vitals:  Vitals:    06/16/20 1830 06/16/20 1851 06/16/20 2321 06/17/20 0247   BP: 111/68 112/64 96/53 103/61   BP Location: Left arm Left arm Left arm Left arm   Patient Position: Lying Lying Sitting Lying   Pulse: 76 78 88 75   Resp: 16 16 16 16   Temp: 98.1 °F (36.7 °C) 97.1 °F (36.2 °C) 97.5 °F (36.4 °C) 97.1 °F (36.2 °C)   TempSrc: Oral Skin Skin Skin   SpO2: 100% 98% 96% 100%   Weight:       Height:           Discharge Medications:      Discharge Medications      New Medications      Instructions Start Date   oxyCODONE-acetaminophen 5-325 MG per tablet  Commonly known as:  PERCOCET   1-2 tablets, Oral, Every 4 Hours PRN         ASK your doctor about these medications      Instructions Start Date   aspirin 81 MG EC tablet   81 mg, Oral,  Every Other Day      bisoprolol-hydrochlorothiazide 5-6.25 MG per tablet  Commonly known as:  ZIAC   TAKE 1 TABLET BY MOUTH EVERY DAY      Chlorhexidine Gluconate 2 % pads   Apply externally, As directed pre op       EPINEPHrine 0.3 MG/0.3ML solution auto-injector injection  Commonly known as:  EPIPEN   0.3 mL, Intramuscular, Daily PRN      EYE DROPS OP   Ophthalmic, Pt to bring  To surgery       fluticasone 50 MCG/ACT nasal spray  Commonly known as:  FLONASE   2 sprays, Nasal, Daily      Lasix 20 MG tablet  Generic drug:  furosemide   20 mg, Oral, Every Evening      furosemide 40 MG tablet  Commonly known as:  LASIX   40 mg, Oral, Every Morning      levothyroxine 25 MCG tablet  Commonly known as:  SYNTHROID, LEVOTHROID   25 mcg, Oral, Daily      MULTIVITAMIN ADULT PO   1 tablet, Oral, Every Morning      mupirocin 2 % nasal ointment  Commonly known as:  BACTROBAN   Nasal, As directed pre op       Oscal 500/200 D-3 500-200 MG-UNIT per tablet  Generic drug:  calcium-vitamin D   1 tablet, Oral, Daily      potassium chloride 10 MEQ CR tablet  Commonly known as:  KLOR-CON   10 mEq, Oral, Daily      sertraline 100 MG tablet  Commonly known as:  ZOLOFT   150 mg, Oral, Every Morning      SUMAtriptan 50 MG tablet  Commonly known as:  IMITREX   50 mg, Oral, Every 2 Hours PRN             Pain Medications:  Percocet 5/325mg 1-2 po q 4-6 hours prn pain    DVT Prophylaxis:  Enteric Coated Aspirin 325 mg po twice daily for 2 weeks, then one daily for 4 weeks      Total Knee Joint Replacement Discharge Instructions:    I. ACTIVITIES:  1. Exercises:  ? Complete exercise program as taught by the hospital physical therapist 2 times per day  ? Exercise program will be advanced by the physical therapist  ? During the day be up ambulating every 2 hours (while awake) for short distances  ? Complete the ankle pump exercises at least 10 times per hour (while awake)  ? Elevate legs most of the day the first week post operatively and  thereafter elevate legs when in bed and for at least 30 minutes during the day. Caution must be taken to avoid pillow placement under the bend of the knee as this can led to flexion contractures of the knee.  ? Use cold packs 20-30 minutes approximately 5 times per day. This should be done before and after completing your exercises and at any time you are experiencing pain/ stiffness in your operative extremity.      2. Activities of Daily Living:  ? No tub baths, hot tubs, or swimming pools for 4 weeks  ? May shower and let water run over the incision on post-operative day #5 if no drainage. Do not scrub or rub the incision. Simply let the water run over the incision and pat dry.    II. Restrictions  ? Do not cross legs or kneel  ? Your surgeon will discuss with you when you will be able to drive again.  ? Weight bearing is as tolerated  ? First week stay inside on even terrain. May go up and down stairs one stair at a time utilizing the hand rail  ? After one week, you may venture outside.    III. Precautions:  ? Everyone that comes near you should wash their hands  ? No elective dental, genital-urinary, or colon procedures or surgical procedures for 12 weeks after surgery unless absolutely necessary.  ?  If dental work or surgical procedure is deemed absolutely necessary, you will need to contact your surgeon as you will need to take antibiotics 1 hour prior to any dental work (including teeth cleanings).  ? Please discuss with your surgeon prophylactic antibiotics as the length of time this intervention will be necessary for you varies with each patient’s health history and situation.  ? Avoid sick people. If you must be around someone who is ill, they should wear a mask.  ? Avoid visits to the Emergency Room or Urgent Care unless you are having a life threatening event.   ? If ordered stockings are to be placed on in the morning and removed at night. Monitor the stockings to ensure that any swelling is not  causing the stockings to become too tight. In this case, remove stockings immediately.    IV. INCISION CARE:  ? Wash your hands prior to dressing changes  ? Change the dressing as needed to keep incision clean and dry. Utilize dry gauze and paper tape. Avoid touching the side of the gauze that goes against the incision with your hands.  ? No creams or ointments to the incision  ? May remove dressing once the incision is free of drainage  ? Do not touch or pick at the incision  ? Check incision every day and notify surgeon immediately if any of the following signs or symptoms are noted:  o Increase in redness  o Increase in swelling around the incision and of the entire extremity  o Increase in pain  o Drainage oozing from the incision  o Pulling apart of the edges of the incision  o Increase in overall body temperature (greater than 100.5 degrees)  ? Your surgeon will instruct you regarding suture or staple removal    V. Medications:   1. Anticoagulants: You will be discharged on an anticoagulant. This is a prophylactic medication that helps prevent blood clots during your post-operative period. The type and length of dosage varies based on your individual needs, procedure performed, and surgeon’s preference.  ? While taking the anticoagulant, you should avoid taking any additional aspirin, ibuprofen (Advil or Motrin), Aleve (Naprosyn) or other non-steroidal anti-inflammatory medications.   ? Notify surgeon immediately if any sander bleeding is noted in the urine, stool, emesis, or from the nose or the incision. Blood in the stool will often appear as black rather than red. Blood in urine may appear as pink. Blood in emesis may appear as brown/black like coffee grounds.  ? You will need to apply pressure for longer periods of time to any cuts or abrasions to stop bleeding  ? Avoid alcohol while taking anticoagulants    2. Stool Softeners: You will be at greater risk of constipation after surgery due to being less  mobile and the pain medications.   ? Take stool softeners as instructed by your surgeon while on pain medications. Over the counter Colace 100 mg 1-2 capsules twice daily.   ? If stools become too loose or too frequent, please decreases the dosage or stop the stool softener.  ? If constipation occurs despite use of stool softeners, you are to continue the stool softeners and add a laxative (Milk of Magnesia 1 ounce daily as needed)  ? Drink plenty of fluids, and eat fruits and vegetables during your recovery time    3. Pain Medications utilized after surgery are narcotics and the law requires that the following information be given to all patients that are prescribed narcotics:  ? CLASSIFICATION: Pain medications are called Opioids and are narcotics  ? LEGALITIES: It is illegal to share narcotics with others and to drive within 24 hours of taking narcotics  ? POTENTIAL SIDE EFFECTS: Potential side effects of opioids include: nausea, vomiting, itching, dizziness, drowsiness, dry mouth, constipation, and difficulty urinating.  ? POTENTIAL ADVERSE EFFECTS:   o Opioid tolerance can develop with use of pain medications and this simply means that it requires more and more of the medication to control pain; however, this is seen more in patients that use opioids for longer periods of time.  o Opioid dependence can develop with use of Opioids and this simply means that to stop the medication can cause withdrawal symptoms; however, this is seen with patients that use Opioids for longer periods of time.  o Opioid addiction can develop with use of Opioids and the incidence of this is very unlikely in patients who take the medications as ordered and stop the medications as instructed.  o Opioid overdose can be dangerous, but is unlikely when the medication is taken as ordered and stopped when ordered. It is important not to mix opioids with alcohol or with and type of sedative such as Benadryl as this can lead to over sedation  and respiratory difficulty.  ? DOSAGE:   o Pain medications will need to be taken consistently for the first week to decrease pain and promote adequate pain relief and participation in physical therapy.  o After the initial surgical pain begins to resolve, you may begin to decrease the pain medication. By the end of 6-8 weeks, you should be off of pain medications.  o Refills will not be given by the office during evening hours, on weekends, or after 6-8 weeks post-op.  o To seek refills on pain medications during the initial 6 week post-operative period, you must call the office 48 hours in advance to request the refill. The office will then notify you when to  the prescription. DO NOT wait until you are out of the medication to request a refill.    V. FOLLOW-UP VISITS:  ? You will need to follow up in the office with your surgeon in 3 weeks. Please call this number [phone] to schedule this appointment.  If you have any concerns or suspected complications prior to your follow up visit, please call your surgeons office. Do not wait until your appointment time if you suspect complications. These will need to be addressed in the office promptly.    Jamal Patel PA-C  [facility]  [address] [place] [address]  [phone]    6/17/2020    CC:Georgiana Cisneros MD:ROSEMARY Craig

## 2020-06-18 PROCEDURE — 97110 THERAPEUTIC EXERCISES: CPT

## 2020-06-18 PROCEDURE — 25010000002 KETOROLAC TROMETHAMINE PER 15 MG: Performed by: ORTHOPAEDIC SURGERY

## 2020-06-18 PROCEDURE — 25010000002 ONDANSETRON PER 1 MG: Performed by: ORTHOPAEDIC SURGERY

## 2020-06-18 RX ADMIN — FERROUS SULFATE TAB 325 MG (65 MG ELEMENTAL FE) 325 MG: 325 (65 FE) TAB at 07:56

## 2020-06-18 RX ADMIN — KETOROLAC TROMETHAMINE 15 MG: 15 INJECTION, SOLUTION INTRAMUSCULAR; INTRAVENOUS at 07:56

## 2020-06-18 RX ADMIN — OXYCODONE HYDROCHLORIDE AND ACETAMINOPHEN 1 TABLET: 7.5; 325 TABLET ORAL at 07:56

## 2020-06-18 RX ADMIN — POTASSIUM CHLORIDE 20 MEQ: 10 CAPSULE, COATED, EXTENDED RELEASE ORAL at 07:56

## 2020-06-18 RX ADMIN — SERTRALINE 150 MG: 100 TABLET, FILM COATED ORAL at 07:56

## 2020-06-18 RX ADMIN — OXYCODONE HYDROCHLORIDE AND ACETAMINOPHEN 2 TABLET: 7.5; 325 TABLET ORAL at 01:10

## 2020-06-18 RX ADMIN — SODIUM CHLORIDE, PRESERVATIVE FREE 3 ML: 5 INJECTION INTRAVENOUS at 20:07

## 2020-06-18 RX ADMIN — FLUTICASONE PROPIONATE 2 SPRAY: 50 SPRAY, METERED NASAL at 07:56

## 2020-06-18 RX ADMIN — ASPIRIN 325 MG: 325 TABLET, COATED ORAL at 18:28

## 2020-06-18 RX ADMIN — SODIUM CHLORIDE, PRESERVATIVE FREE 3 ML: 5 INJECTION INTRAVENOUS at 07:59

## 2020-06-18 RX ADMIN — DIAZEPAM 5 MG: 5 TABLET ORAL at 01:10

## 2020-06-18 RX ADMIN — OXYCODONE HYDROCHLORIDE AND ACETAMINOPHEN 2 TABLET: 7.5; 325 TABLET ORAL at 20:07

## 2020-06-18 RX ADMIN — LEVOTHYROXINE SODIUM 25 MCG: 25 TABLET ORAL at 07:56

## 2020-06-18 RX ADMIN — MUPIROCIN 1 APPLICATION: 20 OINTMENT TOPICAL at 07:55

## 2020-06-18 RX ADMIN — POTASSIUM CHLORIDE 20 MEQ: 10 CAPSULE, COATED, EXTENDED RELEASE ORAL at 18:28

## 2020-06-18 RX ADMIN — OXYCODONE HYDROCHLORIDE AND ACETAMINOPHEN 2 TABLET: 7.5; 325 TABLET ORAL at 15:23

## 2020-06-18 RX ADMIN — ASPIRIN 325 MG: 325 TABLET, COATED ORAL at 07:56

## 2020-06-18 RX ADMIN — ONDANSETRON 4 MG: 2 INJECTION INTRAMUSCULAR; INTRAVENOUS at 13:12

## 2020-06-18 NOTE — PLAN OF CARE
Problem: Patient Care Overview  Goal: Plan of Care Review  Outcome: Ongoing (interventions implemented as appropriate)  Flowsheets (Taken 6/18/2020 0151)  Progress: improving  Plan of Care Reviewed With: patient  Outcome Summary: POD 1 LTKA. VSS, pain medication dosage changed r/t ineffective PO pain management, client states that control is much better at this time. ambulates well with walker, assist x1 and gait belt - tolerated well. d/c plans are for home with  6/18. discussed BP monitoring and medications used to decrease BP r/t hx HTN.  Goal: Individualization and Mutuality  Outcome: Ongoing (interventions implemented as appropriate)  Goal: Discharge Needs Assessment  Outcome: Ongoing (interventions implemented as appropriate)  Goal: Interprofessional Rounds/Family Conf  Outcome: Ongoing (interventions implemented as appropriate)     Problem: Pain, Chronic (Adult)  Goal: Acceptable Pain/Comfort Level and Functional Ability  Outcome: Ongoing (interventions implemented as appropriate)  Flowsheets (Taken 6/18/2020 0151)  Acceptable Pain/Comfort Level and Functional Ability: making progress toward outcome     Problem: Fall Risk (Adult)  Goal: Absence of Fall  Outcome: Ongoing (interventions implemented as appropriate)  Flowsheets (Taken 6/18/2020 0151)  Absence of Fall: achieves outcome     Problem: Knee Arthroplasty (Total, Partial) (Adult)  Goal: Signs and Symptoms of Listed Potential Problems Will be Absent, Minimized or Managed (Knee Arthroplasty)  Outcome: Ongoing (interventions implemented as appropriate)  Flowsheets (Taken 6/18/2020 0151)  Problems Assessed (Knee Arthroplasty): all  Problems Present (Knee Arthroplasty): pain; range of motion decreased  Goal: Anesthesia/Sedation Recovery  Outcome: Ongoing (interventions implemented as appropriate)  Flowsheets (Taken 6/18/2020 0151)  Anesthesia/Sedation Recovery: recovered to baseline; criteria met for discharge

## 2020-06-18 NOTE — PLAN OF CARE
Problem: Patient Care Overview  Goal: Plan of Care Review  Outcome: Ongoing (interventions implemented as appropriate)  Flowsheets (Taken 6/18/2020 1123)  Progress: improving  Plan of Care Reviewed With: patient  Outcome Summary: Pt tolerated treatment well this date. Ambulated 200ft w/ Rw and CGA. Completed L TKR exercises w/ minimal pain. Encouraged pt to ambulate w/ nsg throughout the day, and to perform her exercises that are marked in her book once this evening. Patient was wearing a face mask during this therapy encounter. Therapist used appropriate personal protective equipment including mask and gloves. Mask used was standard procedure mask. Appropriate PPE was worn during the entire therapy session. Hand hygiene was completed before and after therapy session. Patient is not in enhanced droplet precautions.

## 2020-06-18 NOTE — THERAPY TREATMENT NOTE
Patient Name: Raina Forrest  : 1945    MRN: [id number]                              Today's Date: 2020       Admit Date: 2020    Visit Dx:     ICD-10-CM ICD-9-CM   1. Primary osteoarthritis of left knee M17.12 715.16     Patient Active Problem List   Diagnosis   • Chronic atrial fibrillation (CMS/HCC)   • Benign essential HTN   • Bradycardia   • Chest pain   • Can't get food down   • Accumulation of fluid in tissues   • Esophageal lump   • Adynamia   • Itch of skin   • Anorexia   • Chronic nausea   • Gastroesophageal reflux disease   • Breath shortness   • Emesis   • Decreased body weight   • Anxiety   • Narrowing of intervertebral disc space   • Diverticulosis of intestine   • Hypertension   • Migraine   • Osteoporosis   • Palpitations   • Pituitary adenoma (CMS/HCC)   • Sick sinus syndrome (CMS/HCC)   • Diarrhea   • Paroxysmal atrial fibrillation (CMS/HCC)   • Cardiomyopathy (CMS/HCC)   • Anticoagulated   • Atrial fibrillation (CMS/HCC)   • On amiodarone therapy   • Pacemaker   • Cardiac resynchronization therapy pacemaker (CRT-P) in place   • Infected pacemaker (CMS/HCC)   • Wound infection   • Primary osteoarthritis of left knee   • DJD (degenerative joint disease)     Past Medical History:   Diagnosis Date   • Atrial fibrillation (CMS/HCC)    • Fung's esophagus    • Benign essential hypertension    • Blood clot in vein    • Cardiomyopathy (CMS/HCC)    • Chronic gastritis    • Congestive heart failure (CMS/HCC)    • Degenerative disc disease    • Depression with anxiety    • Disease of thyroid gland    • Diverticulitis of colon    • Dysphagia    • GERD (gastroesophageal reflux disease)    • History of chest pain    • History of migraine    • History of migraine headaches    • Left knee pain    • Osteoporosis    • Palpitations    • Thyroid disorder    • Thyroid nodule    • Ulcerative colitis (CMS/HCC)      Past Surgical History:   Procedure Laterality Date   • APPENDECTOMY     • BLADDER  SURGERY     • CARDIAC CATHETERIZATION N/A 11/21/2016    Procedure: Left Heart Cath;  Surgeon: Robinson Salazar MD;  Location:  [facility];  Service:    • CARDIAC ELECTROPHYSIOLOGY PROCEDURE N/A 2/7/2017    Procedure: Pacemaker DC new   [employer];  Surgeon: Robinson Salazar MD;  Location:  [facility];  Service:    • CARDIOVERSION  01/18/2017   • CHOLECYSTECTOMY     • COLONOSCOPY     • COLONOSCOPY N/A 7/29/2016    normal   • ENDOSCOPY  05/06/2015    submucosal nodule in esophagus, erythematous mucosa in antrum,moderate acute gastritis, no h-pylori   • ENDOSCOPY N/A 9/2/2016    Erythematous mucosa in the antrum   • EYE SURGERY Left     cataract   • HYSTERECTOMY     • KNEE MENISCAL REPAIR Left    • THYROID SURGERY Left    • TONSILLECTOMY       General Information     Row Name 06/18/20 1120          PT Evaluation Time/Intention    Document Type  therapy note (daily note)  -     Mode of Treatment  physical therapy  -     Row Name 06/18/20 1120          General Information    Existing Precautions/Restrictions  fall  -     Row Name 06/18/20 1120          Cognitive Assessment/Intervention- PT/OT    Orientation Status (Cognition)  oriented x 3  -       User Key  (r) = Recorded By, (t) = Taken By, (c) = Cosigned By    Initials Name Provider Type     Bri Moreira PTA Physical Therapy Assistant        Mobility     Row Name 06/18/20 1120          Bed Mobility Assessment/Treatment    Bed Mobility Assessment/Treatment  supine-sit  -     Supine-Sit [place] (Bed Mobility)  supervision  -     Assistive Device (Bed Mobility)  head of bed elevated  -     Row Name 06/18/20 1120          Sit-Stand Transfer    Sit-Stand [place] (Transfers)  stand by assist  -     Assistive Device (Sit-Stand Transfers)  walker, front-wheeled  -     Row Name 06/18/20 1120          Gait/Stairs Assessment/Training    Sunflower Level (Gait)  contact guard  -     Assistive  Device (Gait)  walker, front-wheeled  -SM     Distance in Feet (Gait)  200  -SM     Pattern (Gait)  step-through  -SM     Deviations/Abnormal Patterns (Gait)  mio decreased;stride length decreased;antalgic  -SM     Bilateral Gait Deviations  forward flexed posture  -SM     Left Sided Gait Deviations  weight shift ability decreased  -SM       User Key  (r) = Recorded By, (t) = Taken By, (c) = Cosigned By    Initials Name Provider Type    Bri Lester PTA Physical Therapy Assistant        Obj/Interventions     Row Name 06/18/20 1122          Therapeutic Exercise    Comment (Therapeutic Exercise)  L TKR protocol x15 reps  -       User Key  (r) = Recorded By, (t) = Taken By, (c) = Cosigned By    Initials Name Provider Type    Bri Lester PTA Physical Therapy Assistant        Goals/Plan    No documentation.       Clinical Impression     Row Name 06/18/20 1122          Pain Assessment    Additional Documentation  Pain Scale: Numbers Pre/Post-Treatment (Group)  -SM     Row Name 06/18/20 1122          Pain Scale: Numbers Pre/Post-Treatment    Pain Scale: Numbers, Pretreatment  2/10  -SM     Pain Scale: Numbers, Post-Treatment  4/10  -SM     Pain Location - Side  Left  -SM     Pain Location  knee  -SM     Pain Intervention(s)  Repositioned;Ambulation/increased activity;Rest  -SM     Row Name 06/18/20 1122          Positioning and Restraints    Pre-Treatment Position  in bed  -     Post Treatment Position  bathroom  -     Bathroom  with nsg  -SM       User Key  (r) = Recorded By, (t) = Taken By, (c) = Cosigned By    Initials Name Provider Type    Bri Lester PTA Physical Therapy Assistant        Outcome Measures     Row Name 06/18/20 1123          How much help from another person do you currently need...    Turning from your back to your side while in flat bed without using bedrails?  4  -SM     Moving from lying on back to sitting on the side of a flat bed without bedrails?  3   -SM     Moving to and from a bed to a chair (including a wheelchair)?  3  -SM     Standing up from a chair using your arms (e.g., wheelchair, bedside chair)?  3  -SM     Climbing 3-5 steps with a railing?  3  -SM     To walk in hospital room?  3  -SM     AM-PAC 6 Clicks Score (PT)  19  -SM     Row Name 06/18/20 1123          Functional Assessment    Outcome Measure Options  AM-PAC 6 Clicks Basic Mobility (PT)  -       User Key  (r) = Recorded By, (t) = Taken By, (c) = Cosigned By    Initials Name Provider Type    Bri Lester, PTA Physical Therapy Assistant        Physical Therapy Education                 Title: PT OT SLP Therapies (Done)     Topic: Physical Therapy (Done)     Point: Mobility training (Done)     Description:   Instruct learner(s) on safety and technique for assisting patient out of bed, chair or wheelchair.  Instruct in the proper use of assistive devices, such as walker, crutches, cane or brace.              Patient Friendly Description:   It's important to get you on your feet again, but we need to do so in a way that is safe for you. Falling has serious consequences, and your personal safety is the most important thing of all.        When it's time to get out of bed, one of us or a family member will sit next to you on the bed to give you support.     If your doctor or nurse tells you to use a walker, crutches, a cane, or a brace, be sure you use it every time you get out of bed, even if you think you don't need it.    Learning Progress Summary           Patient Acceptance, E,TB,D, VU by  at 6/18/2020 1123    Acceptance, E, VU by  at 6/17/2020 1115    Comment:  HEP, safety with mobility stair training                   Point: Home exercise program (Done)     Description:   Instruct learner(s) on appropriate technique for monitoring, assisting and/or progressing patient with therapeutic exercises and activities.              Learning Progress Summary           Patient Acceptance,  E,TB,D, VU by  at 6/18/2020 1123    Acceptance, E, VU by  at 6/17/2020 1115    Comment:  HEP, safety with mobility stair training                   Point: Body mechanics (Done)     Description:   Instruct learner(s) on proper positioning and spine alignment for patient and/or caregiver during mobility tasks and/or exercises.              Learning Progress Summary           Patient Acceptance, E,TB,D, VU by  at 6/18/2020 1123    Acceptance, E, VU by  at 6/17/2020 1115    Comment:  HEP, safety with mobility stair training                   Point: Precautions (Done)     Description:   Instruct learner(s) on prescribed precautions during mobility and gait tasks              Learning Progress Summary           Patient Acceptance, E,TB,D, VU by  at 6/18/2020 1123    Acceptance, E, VU by  at 6/17/2020 1115    Comment:  HEP, safety with mobility stair training                               User Key     Initials Effective Dates Name Provider Type Discipline     03/07/18 -  Bri Moreira, PTA Physical Therapy Assistant PT     09/17/19 -  Heather Whitaker PT Physical Therapist PT              PT Recommendation and Plan     Outcome Summary/Treatment Plan (PT)  Anticipated Discharge Disposition (PT): home with assist, home with home health  Plan of Care Reviewed With: patient  Progress: improving  Outcome Summary: Pt tolerated treatment well this date. Ambulated 200ft w/ Rw and CGA. Completed L TKR exercises w/ minimal pain. Encouraged pt to ambulate w/ nsg throughout the day, and to perform her exercises that are marked in her book once this evening. Patient was wearing a face mask during this therapy encounter. Therapist used appropriate personal protective equipment including mask and gloves. Mask used was standard procedure mask. Appropriate PPE was worn during the entire therapy session. Hand hygiene was completed before and after therapy session. Patient is not in enhanced droplet precautions.     Time  Calculation:   PT Charges     Row Name 06/18/20 1125             Time Calculation    Start Time  0813  -      Stop Time  0842  -      Time Calculation (min)  29 min  -      PT Received On  06/18/20  -      PT - Next Appointment  06/19/20  -        User Key  (r) = Recorded By, (t) = Taken By, (c) = Cosigned By    Initials Name Provider Type     Bri Moreira PTA Physical Therapy Assistant        Therapy Charges for Today     Code Description Service Date Service Provider Modifiers Qty    [id number] HC PT THER PROC EA 15 MIN 6/18/2020 Bri Moreira PTA GP 2          PT G-Codes  Outcome Measure Options: AM-PAC 6 Clicks Basic Mobility (PT)  AM-PAC 6 Clicks Score (PT): 19    Bri Moreira PTA  6/18/2020

## 2020-06-18 NOTE — PLAN OF CARE
Problem: Patient Care Overview  Goal: Plan of Care Review  Outcome: Ongoing (interventions implemented as appropriate)  Flowsheets  Taken 6/18/2020 1123 by Bri Moreira PTA  Progress: improving  Plan of Care Reviewed With: patient  Taken 6/18/2020 1705 by Israel Brennan, RN  Outcome Summary: POD#2 of L TKA. A&Ox4, but forgetful at times. Have to remind patient not to get up by herself and needs to call for help. Bed alarm and chair alarm on. UP with assistance. Voiding on her own. Good sensation and motion to ble. Dressing changed today. Dressing c/d/i. Swelling to left knee, ice applied. Lasix held today d/t to BP. C/O alot of pain today. IV toradol given this am with one percocet d/t BP. Tolerated it well. C.O alot of pain later today and tolerated 2 percocets for pain management. BP a little better this evening. VSS. Educated on pain management, fall precautions, and BP monitoring.

## 2020-06-18 NOTE — PROGRESS NOTES
Orthopaedic Surgery  Progress Note  6/18/2020    Patient Name:  Raina Forrest  YOB: 1945  Age: 74 y.o.  Medical Records Number:  [id number]  Date of Admission: 6/16/2020    No complaintVitals:  Vitals:    06/17/20 1500 06/17/20 1932 06/17/20 2314 06/18/20 0350   BP: 97/52 104/59 96/56 107/62   BP Location: Right arm Right arm Left arm Right arm   Patient Position: Lying Lying Lying Lying   Pulse:  90 96 96   Resp: 16 16 16 16   Temp: 98 °F (36.7 °C) 98.6 °F (37 °C) 97.3 °F (36.3 °C) 97.3 °F (36.3 °C)   TempSrc: Skin Skin Skin Skin   SpO2: 100% 99% 95% 96%   Weight:       Height:       s except pain    LLE:  NVI, calf nontender, sensation intact  No signs of DVT    Incision: clean, no signs of infection    Lab Results (last 24 hours)     ** No results found for the last 24 hours. **          Assesment/Plan:    Procedures:  Left TKA  Postoperative Day: 2  Weightbearing Status:  WBAT with walker  DVT Prophylaxis:  ASA for DVT prophylaxis    Reason for Inpatient Admission:  I certify that this patient requires inpatient admission for greater than 2 midnights due to fall risk with history of frequent falls    Disposition:  Home with home health after PT tomorrow, if comfortable and mobilizing safely    Jamal Patel  [facility]  [address] [place], [place] [id number] [phone]    6/18/2020

## 2020-06-19 VITALS
RESPIRATION RATE: 16 BRPM | SYSTOLIC BLOOD PRESSURE: 96 MMHG | BODY MASS INDEX: 23.5 KG/M2 | TEMPERATURE: 96.9 F | HEIGHT: 70 IN | DIASTOLIC BLOOD PRESSURE: 63 MMHG | HEART RATE: 95 BPM | OXYGEN SATURATION: 97 % | WEIGHT: 164.13 LBS

## 2020-06-19 PROCEDURE — 97110 THERAPEUTIC EXERCISES: CPT

## 2020-06-19 RX ORDER — DOCUSATE SODIUM 250 MG
250 CAPSULE ORAL 2 TIMES DAILY PRN
Qty: 30 CAPSULE | Refills: 1 | Status: SHIPPED | OUTPATIENT
Start: 2020-06-19

## 2020-06-19 RX ORDER — OXYCODONE HYDROCHLORIDE AND ACETAMINOPHEN 5; 325 MG/1; MG/1
1-2 TABLET ORAL EVERY 4 HOURS PRN
Qty: 84 TABLET | Refills: 0 | Status: SHIPPED | OUTPATIENT
Start: 2020-06-19 | End: 2020-12-09 | Stop reason: SDUPTHER

## 2020-06-19 RX ADMIN — SERTRALINE 150 MG: 100 TABLET, FILM COATED ORAL at 09:02

## 2020-06-19 RX ADMIN — POTASSIUM CHLORIDE 20 MEQ: 10 CAPSULE, COATED, EXTENDED RELEASE ORAL at 09:02

## 2020-06-19 RX ADMIN — LEVOTHYROXINE SODIUM 25 MCG: 25 TABLET ORAL at 09:02

## 2020-06-19 RX ADMIN — OXYCODONE HYDROCHLORIDE AND ACETAMINOPHEN 2 TABLET: 7.5; 325 TABLET ORAL at 10:09

## 2020-06-19 RX ADMIN — ASPIRIN 325 MG: 325 TABLET, COATED ORAL at 09:02

## 2020-06-19 RX ADMIN — SODIUM CHLORIDE, PRESERVATIVE FREE 3 ML: 5 INJECTION INTRAVENOUS at 09:02

## 2020-06-19 RX ADMIN — FERROUS SULFATE TAB 325 MG (65 MG ELEMENTAL FE) 325 MG: 325 (65 FE) TAB at 09:02

## 2020-06-19 RX ADMIN — OXYCODONE HYDROCHLORIDE AND ACETAMINOPHEN 2 TABLET: 7.5; 325 TABLET ORAL at 02:17

## 2020-06-19 RX ADMIN — OXYCODONE HYDROCHLORIDE AND ACETAMINOPHEN 2 TABLET: 7.5; 325 TABLET ORAL at 06:17

## 2020-06-19 NOTE — PLAN OF CARE
Problem: Patient Care Overview  Goal: Plan of Care Review  Outcome: Ongoing (interventions implemented as appropriate)  Flowsheets  Taken 6/19/2020 0413 by Kianna White, RN  Progress: improving  Plan of Care Reviewed With: patient  Taken 6/19/2020 1056 by Salma White, RN  Outcome Summary: Pain controlled with PO pain medication. Ambulates with assist x1 and walker. VSS. Voiding without difficulty. DC home with home health today.

## 2020-06-19 NOTE — PLAN OF CARE
Problem: Patient Care Overview  Goal: Plan of Care Review  6/19/2020 1232 by Bri Moreira PTA  Outcome: Outcome(s) achieved  Flowsheets (Taken 6/19/2020 1232)  Progress: improving  Plan of Care Reviewed With: patient  Outcome Summary: Pt tolerated treatment well this date. Reported having more soreness/pain, though ambulated 150ft w/ Rw and SBA. Completed stairs and L TKR exercises. Safe to d/c home w/ HH from PT standpoint. Patient was wearing a face mask during this therapy encounter. Therapist used appropriate personal protective equipment including mask and gloves. Mask used was standard procedure mask. Appropriate PPE was worn during the entire therapy session. Hand hygiene was completed before and after therapy session. Patient is not in enhanced droplet precautions.  6/19/2020 1232 by Bri Moreira PTA  Reactivated

## 2020-06-19 NOTE — PROGRESS NOTES
Orthopaedic Surgery  Progress Note  6/19/2020    Patient Name:  Raina Forrest  YOB: 1945  Age: 74 y.o.  Medical Records Number:  [id number]  Date of Admission: 6/16/2020    No complaintVitals:  Vitals:    06/18/20 1942 06/18/20 2311 06/19/20 0327 06/19/20 0657   BP: 107/68 90/55 92/53 96/63   BP Location: Right arm Right arm Right arm Right arm   Patient Position: Lying Lying Lying Lying   Pulse: 67 95 92 95   Resp: 16 16 16 16   Temp: 97.8 °F (36.6 °C) 97.5 °F (36.4 °C) 96.9 °F (36.1 °C) 96.9 °F (36.1 °C)   TempSrc: Skin Skin Skin Temporal   SpO2: 97% 97% 97% 97%   Weight:       Height:       s except pain    LLE:  NVI, calf nontender, sensation intact  No signs of DVT    Incision: clean, no signs of infection    Lab Results (last 24 hours)     ** No results found for the last 24 hours. **          Assesment/Plan:    Procedures:  Left TKA  Postoperative Day: 3  Weightbearing Status:  WBAT with walker  DVT Prophylaxis:  ASA for DVT prophylaxis    Reason for Inpatient Admission:  I certify that this patient requires inpatient admission for greater than 2 midnights due to fall risk with history of frequent falls    Disposition:  Home with home health after PT today, if comfortable and mobilizing safely    Jamal Patel  [facility]  [address] [place], [place] [id number] [phone]    6/19/2020

## 2020-06-19 NOTE — PROGRESS NOTES
Continued Stay Note  [facility]     Patient Name: Raina Forrest  MRN: [id number]  Today's Date: 6/19/2020    Admit Date: 6/16/2020    Discharge Plan     Row Name 06/19/20 1209       Plan    Final Discharge Disposition Code  06 - home with home health care    Final Note  Pt to d/c home with [facility].        Discharge Codes    No documentation.       Expected Discharge Date and Time     Expected Discharge Date Expected Discharge Time    Jun 19, 2020             Geni Osborne RN

## 2020-06-19 NOTE — PLAN OF CARE
Problem: Patient Care Overview  Goal: Plan of Care Review  Outcome: Ongoing (interventions implemented as appropriate)  Flowsheets (Taken 6/19/2020 0410)  Progress: improving  Plan of Care Reviewed With: patient  Outcome Summary: VSS during shift. Pain well controlled with oral pain medication. Amb well with 1 assist and walker. NVI. Voiding per bathroom. Plan is to d/c home once stable.

## 2020-06-19 NOTE — THERAPY TREATMENT NOTE
Patient Name: Raina Forrest  : 1945    MRN: [id number]                              Today's Date: 2020       Admit Date: 2020    Visit Dx:     ICD-10-CM ICD-9-CM   1. Primary osteoarthritis of left knee M17.12 715.16     Patient Active Problem List   Diagnosis   • Chronic atrial fibrillation (CMS/HCC)   • Benign essential HTN   • Bradycardia   • Chest pain   • Can't get food down   • Accumulation of fluid in tissues   • Esophageal lump   • Adynamia   • Itch of skin   • Anorexia   • Chronic nausea   • Gastroesophageal reflux disease   • Breath shortness   • Emesis   • Decreased body weight   • Anxiety   • Narrowing of intervertebral disc space   • Diverticulosis of intestine   • Hypertension   • Migraine   • Osteoporosis   • Palpitations   • Pituitary adenoma (CMS/HCC)   • Sick sinus syndrome (CMS/HCC)   • Diarrhea   • Paroxysmal atrial fibrillation (CMS/HCC)   • Cardiomyopathy (CMS/HCC)   • Anticoagulated   • Atrial fibrillation (CMS/HCC)   • On amiodarone therapy   • Pacemaker   • Cardiac resynchronization therapy pacemaker (CRT-P) in place   • Infected pacemaker (CMS/HCC)   • Wound infection   • Primary osteoarthritis of left knee   • DJD (degenerative joint disease)     Past Medical History:   Diagnosis Date   • Atrial fibrillation (CMS/HCC)    • Fung's esophagus    • Benign essential hypertension    • Blood clot in vein    • Cardiomyopathy (CMS/HCC)    • Chronic gastritis    • Congestive heart failure (CMS/HCC)    • Degenerative disc disease    • Depression with anxiety    • Disease of thyroid gland    • Diverticulitis of colon    • Dysphagia    • GERD (gastroesophageal reflux disease)    • History of chest pain    • History of migraine    • History of migraine headaches    • Left knee pain    • Osteoporosis    • Palpitations    • Thyroid disorder    • Thyroid nodule    • Ulcerative colitis (CMS/HCC)      Past Surgical History:   Procedure Laterality Date   • APPENDECTOMY     • BLADDER  SURGERY     • CARDIAC CATHETERIZATION N/A 11/21/2016    Procedure: Left Heart Cath;  Surgeon: Robinson Salazar MD;  Location:  [facility];  Service:    • CARDIAC ELECTROPHYSIOLOGY PROCEDURE N/A 2/7/2017    Procedure: Pacemaker DC new   [employer];  Surgeon: Robinson Salazar MD;  Location:  [facility];  Service:    • CARDIOVERSION  01/18/2017   • CHOLECYSTECTOMY     • COLONOSCOPY     • COLONOSCOPY N/A 7/29/2016    normal   • ENDOSCOPY  05/06/2015    submucosal nodule in esophagus, erythematous mucosa in antrum,moderate acute gastritis, no h-pylori   • ENDOSCOPY N/A 9/2/2016    Erythematous mucosa in the antrum   • EYE SURGERY Left     cataract   • HYSTERECTOMY     • KNEE MENISCAL REPAIR Left    • THYROID SURGERY Left    • TONSILLECTOMY       General Information     Row Name 06/19/20 1229          PT Evaluation Time/Intention    Document Type  therapy note (daily note)  -     Mode of Treatment  physical therapy  -     Row Name 06/19/20 1229          General Information    Existing Precautions/Restrictions  fall  -     Row Name 06/19/20 1229          Cognitive Assessment/Intervention- PT/OT    Orientation Status (Cognition)  oriented x 4  -       User Key  (r) = Recorded By, (t) = Taken By, (c) = Cosigned By    Initials Name Provider Type     Bri Moreira PTA Physical Therapy Assistant        Mobility     Row Name 06/19/20 1229          Bed Mobility Assessment/Treatment    Comment (Bed Mobility)  up in chair`  -     Row Name 06/19/20 1229          Sit-Stand Transfer    Sit-Stand [place] (Transfers)  stand by assist  -     Assistive Device (Sit-Stand Transfers)  walker, front-wheeled  -     Row Name 06/19/20 1229          Gait/Stairs Assessment/Training    [place] Level (Gait)  stand by assist  -     Assistive Device (Gait)  walker, front-wheeled  -     Distance in Feet (Gait)  150  -     Pattern (Gait)  step-through  -      Deviations/Abnormal Patterns (Gait)  mio decreased;stride length decreased;antalgic  -SM     Bilateral Gait Deviations  forward flexed posture  -SM     Left Sided Gait Deviations  weight shift ability decreased  -SM     [place] Level (Stairs)  contact guard  -SM     Handrail Location (Stairs)  both sides  -SM     Number of Steps (Stairs)  4  -SM     Ascending Technique (Stairs)  step-to-step  -SM     Descending Technique (Stairs)  step-to-step  -SM       User Key  (r) = Recorded By, (t) = Taken By, (c) = Cosigned By    Initials Name Provider Type    Bri Lester PTA Physical Therapy Assistant        Obj/Interventions     Row Name 06/19/20 1231          Therapeutic Exercise    Comment (Therapeutic Exercise)  L TKR protocol x20 reps  -       User Key  (r) = Recorded By, (t) = Taken By, (c) = Cosigned By    Initials Name Provider Type    Bri Lester PTA Physical Therapy Assistant        Goals/Plan    No documentation.       Clinical Impression     Row Name 06/19/20 1231          Pain Assessment    Additional Documentation  Pain Scale: Numbers Pre/Post-Treatment (Group)  -[facility] Name 06/19/20 1231          Pain Scale: Numbers Pre/Post-Treatment    Pain Scale: Numbers, Pretreatment  5/10  -SM     Pain Scale: Numbers, Post-Treatment  5/10  -SM     Pain Location - Side  Left  -SM     Pain Location  knee  -SM     Pain Intervention(s)  Repositioned;Ambulation/increased activity;Rest  -     Row Name 06/19/20 1231          Positioning and Restraints    Pre-Treatment Position  sitting in chair/recliner  -     Post Treatment Position  chair  -SM     In Chair  reclined;call light within reach;encouraged to call for assist;exit alarm on  -       User Key  (r) = Recorded By, (t) = Taken By, (c) = Cosigned By    Initials Name Provider Type    Bri Lester PTA Physical Therapy Assistant        Outcome Measures     Row Name 06/19/20 1232          How much help from another person  do you currently need...    Turning from your back to your side while in flat bed without using bedrails?  4  -SM     Moving from lying on back to sitting on the side of a flat bed without bedrails?  3  -SM     Moving to and from a bed to a chair (including a wheelchair)?  3  -SM     Standing up from a chair using your arms (e.g., wheelchair, bedside chair)?  4  -SM     Climbing 3-5 steps with a railing?  3  -SM     To walk in hospital room?  3  -SM     AM-PAC 6 Clicks Score (PT)  20  -SM     Row Name 06/19/20 1232          Functional Assessment    Outcome Measure Options  AM-PAC 6 Clicks Basic Mobility (PT)  -       User Key  (r) = Recorded By, (t) = Taken By, (c) = Cosigned By    Initials Name Provider Type    Bri Lester, TORIBIO Physical Therapy Assistant        Physical Therapy Education                 Title: PT OT SLP Therapies (Resolved)     Topic: Physical Therapy (Resolved)     Point: Mobility training (Resolved)     Description:   Instruct learner(s) on safety and technique for assisting patient out of bed, chair or wheelchair.  Instruct in the proper use of assistive devices, such as walker, crutches, cane or brace.              Patient Friendly Description:   It's important to get you on your feet again, but we need to do so in a way that is safe for you. Falling has serious consequences, and your personal safety is the most important thing of all.        When it's time to get out of bed, one of us or a family member will sit next to you on the bed to give you support.     If your doctor or nurse tells you to use a walker, crutches, a cane, or a brace, be sure you use it every time you get out of bed, even if you think you don't need it.    Learning Progress Summary           Patient Acceptance, E,TB,D, VU by  at 6/19/2020 1232    Acceptance, E,TB,D, VU by CHEKO at 6/18/2020 1123    Acceptance, E, VU by  at 6/17/2020 1115    Comment:  HEP, safety with mobility stair training                    Point: Home exercise program (Resolved)     Description:   Instruct learner(s) on appropriate technique for monitoring, assisting and/or progressing patient with therapeutic exercises and activities.              Learning Progress Summary           Patient Acceptance, E,TB,D, VU by  at 6/19/2020 1232    Acceptance, E,TB,D, VU by  at 6/18/2020 1123    Acceptance, E, VU by  at 6/17/2020 1115    Comment:  HEP, safety with mobility stair training                   Point: Body mechanics (Resolved)     Description:   Instruct learner(s) on proper positioning and spine alignment for patient and/or caregiver during mobility tasks and/or exercises.              Learning Progress Summary           Patient Acceptance, E,TB,D, VU by  at 6/19/2020 1232    Acceptance, E,TB,D, VU by  at 6/18/2020 1123    Acceptance, E, VU by  at 6/17/2020 1115    Comment:  HEP, safety with mobility stair training                   Point: Precautions (Resolved)     Description:   Instruct learner(s) on prescribed precautions during mobility and gait tasks              Learning Progress Summary           Patient Acceptance, E,TB,D, VU by  at 6/19/2020 1232    Acceptance, E,TB,D, VU by  at 6/18/2020 1123    Acceptance, E, VU by  at 6/17/2020 1115    Comment:  HEP, safety with mobility stair training                               User Key     Initials Effective Dates Name Provider Type Discipline     03/07/18 -  Bri Moreira, PTA Physical Therapy Assistant PT     09/17/19 -  Heather Whitaker PT Physical Therapist PT              PT Recommendation and Plan     Outcome Summary/Treatment Plan (PT)  Anticipated Discharge Disposition (PT): home with assist, home with home health  Plan of Care Reviewed With: patient  Progress: improving  Outcome Summary: Pt tolerated treatment well this date. Reported having more soreness/pain, though ambulated 150ft w/ Rw and SBA. Completed stairs and L TKR exercises. Safe to d/c home w/ HH from  PT standpoint. Patient was wearing a face mask during this therapy encounter. Therapist used appropriate personal protective equipment including mask and gloves. Mask used was standard procedure mask. Appropriate PPE was worn during the entire therapy session. Hand hygiene was completed before and after therapy session. Patient is not in enhanced droplet precautions.     Time Calculation:   PT Charges     Row Name 06/19/20 1234             Time Calculation    Start Time  0919  -      Stop Time  0942  -      Time Calculation (min)  23 min  -      PT Received On  06/19/20  -      PT - Next Appointment  06/20/20  -        User Key  (r) = Recorded By, (t) = Taken By, (c) = Cosigned By    Initials Name Provider Type    Bri Lester PTA Physical Therapy Assistant        Therapy Charges for Today     Code Description Service Date Service Provider Modifiers Qty    91000132484 HC PT THER PROC EA 15 MIN 6/18/2020 Bri Moreira PTA GP 2    87306261770 HC PT THER PROC EA 15 MIN 6/19/2020 Bri Moreira PTA GP 2          PT G-Codes  Outcome Measure Options: AM-PAC 6 Clicks Basic Mobility (PT)  AM-PAC 6 Clicks Score (PT): 20    Bri Moreira PTA  6/19/2020

## 2020-07-02 ENCOUNTER — TRANSCRIBE ORDERS (OUTPATIENT)
Dept: PHYSICAL THERAPY | Facility: CLINIC | Age: 75
End: 2020-07-02

## 2020-07-02 DIAGNOSIS — Z96.659 STATUS POST TOTAL KNEE REPLACEMENT, UNSPECIFIED LATERALITY: Primary | ICD-10-CM

## 2020-07-07 ENCOUNTER — TREATMENT (OUTPATIENT)
Dept: PHYSICAL THERAPY | Facility: CLINIC | Age: 75
End: 2020-07-07

## 2020-07-07 DIAGNOSIS — M25.562 ACUTE PAIN OF LEFT KNEE: Primary | ICD-10-CM

## 2020-07-07 DIAGNOSIS — Z96.652 TOTAL KNEE REPLACEMENT STATUS, LEFT: ICD-10-CM

## 2020-07-07 DIAGNOSIS — M25.462 SWELLING OF JOINT OF LEFT KNEE: ICD-10-CM

## 2020-07-07 PROCEDURE — 97161 PT EVAL LOW COMPLEX 20 MIN: CPT | Performed by: PHYSICAL THERAPIST

## 2020-07-07 PROCEDURE — G0283 ELEC STIM OTHER THAN WOUND: HCPCS | Performed by: PHYSICAL THERAPIST

## 2020-07-07 PROCEDURE — 97140 MANUAL THERAPY 1/> REGIONS: CPT | Performed by: PHYSICAL THERAPIST

## 2020-07-07 PROCEDURE — 97110 THERAPEUTIC EXERCISES: CPT | Performed by: PHYSICAL THERAPIST

## 2020-07-07 NOTE — PROGRESS NOTES
"Physical Therapy Initial Evaluation and Plan of Care    Patient: Raina Forrest   : 1945  Diagnosis/ICD-10 Code:  Acute pain of left knee [M25.562]  Referring practitioner: Jamal Castañeda MD  Date of Initial Evaluation:  Type: THERAPY  Noted: 2020    Subjective Evaluation    History of Present Illness  Date of surgery: 2020  Mechanism of injury: Pt is 73 yo female s/p LTKA on 20.  She had HH PT for one week. She is concerned about swelling in her LLE and reports having a \"leaking vein\" in her leg.  She sees Maikel Rosales MD.  Her knee is hot, swollen and tender. She uses a walker for walking outside of the home but does not use it in the home. Two steps going into the house. Takes Tylenol for pain.     Subjective comment: Knee pain and swelling s/p LTKAQuality of life: good    Pain  Current pain ratin  At best pain ratin  At worst pain ratin  Quality: dull ache, discomfort, pressure, tight and throbbing  Relieving factors: ice, medications, rest and change in position  Aggravating factors: movement, stairs, standing, ambulation, squatting, repetitive movement and prolonged positioning  Progression: no change    Social Support  Lives in: one-story house    Hand dominance: right    Diagnostic Tests  X-ray: abnormal    Treatments  Previous treatment: home therapy and medication  Current treatment: home therapy and medication  Patient Goals  Patient goals for therapy: decreased edema, decreased pain, increased motion, increased strength, independence with ADLs/IADLs and return to sport/leisure activities             Objective          Static Posture     Head  Forward.    Shoulders  Rounded.    Lumbar Spine   Decreased lordosis.     Observations   Left Knee   Positive for edema, effusion and incision.     Palpation   Left   Tenderness of the distal semimembranosus, distal semitendinosus, rectus femoris and vastus medialis.     Tenderness   Left Knee   No tenderness in the lateral " joint line, medial joint line and superior patella.     Active Range of Motion   Left Knee   Flexion: 109 degrees with pain  Extension: 0 degrees     Right Knee   Flexion: 140 degrees   Extension: 0 degrees     Strength/Myotome Testing     Left Hip   Planes of Motion   Flexion: 4  Extension: 4-  Abduction: 4-    Right Hip   Planes of Motion   Flexion: 5  Extension: 4-  Abduction: 5    Left Knee   Flexion: 4-  Extension: 4-  Quadriceps contraction: good    Right Knee   Normal strength  Quadriceps contraction: good    Swelling     Left Knee Girth Measurement (cm)   Joint line: 43 cm    Right Knee Girth Measurement (cm)   Joint line: 49 cm    Ambulation     Observational Gait   Gait: antalgic and circumduction   Increased left swing time. Decreased walking speed, stride length, left stance time and left step length.   Left foot contact pattern: foot flat  Left arm swing: increased  Base of support: increased     General Comments     Knee Comments  6' below joint line left 35.5 cm;  Right 34 cm  [place]  Left 24.5 cm; Right 23.5 cm     Subjective Questionnaire: LEFS: 76% or 19/80        Assessment & Plan     Assessment  Impairments: abnormal gait, abnormal or restricted ROM, activity intolerance, impaired physical strength, pain with function and weight-bearing intolerance  Assessment details: Raina Forrest is a 74 y.o. year-old female referred to physical therapy s/p L TKA. She presents with a evolving clinical presentation with decreased ability to walk beyond household distances without an AD, persistent swelling in knee and pain in the knee that impairs her sleep.  She has multiple comorbidities including chronic atrial fibilation,bradycardia, HTN, pacemaker, osteoporosis, and history of a DVT but no personal factors that may affect her progress in the plan of care.  She presents with antalgic gait, decreased left knee AROM, decreased left knee strength, significant swelling at joint line and high perceived  level of disability per LEFS.  Prognosis: good  Functional Limitations: lifting, sleeping, walking, pulling, pushing, uncomfortable because of pain, moving in bed, sitting and standing  Goals  Plan Goals: STG (4 weeks)  1.  Pt will be independent with initial HEP for improved ROM, strength and gait for ease with functional tasks and ADL's  2.  Pt will demonstrate decreased edema at joint line from 49 cm to 46 cm for decreased pain and discomfort.   3.  Pt will ambulate with normalize gait in community level ambulation.  4.  Pt will demonstrate improved left knee ROM to 0 - 115.    LTG (6 weeks)  1.  Pt will be independent with progressed LE strengthening and stabilization exercises for return to normal activity level  2. Pt will demonstrate improved left knee ROM to 0 - 120 for ease with all functional tasks and ADL's.  3.  Pt will demonstrate improved left LE strength to 4+/5 or better for stability with all weight bearing tasks.  4.  Pt will report sleeping through the night without awakening in pain from her surgical knee.   5. Pt will report improved perceived disability via LEFS from 76% to 45% or less disability.       Plan  Therapy options: will be seen for skilled physical therapy services  Planned modality interventions: cryotherapy, electrical stimulation/Russian stimulation, thermotherapy (hydrocollator packs) and ultrasound  Planned therapy interventions: balance/weight-bearing training, flexibility, functional ROM exercises, gait training, home exercise program, manual therapy, neuromuscular re-education, soft tissue mobilization, strengthening, stretching and therapeutic activities  Frequency: 2x week  Duration in weeks: 8  Treatment plan discussed with: patient  Plan details: Begin on Nustep, progress in standing strengthening exercises.         Manual Therapy:    15     mins  52815;  Therapeutic Exercise:    15     mins  31860;     Neuromuscular Ananth:        mins  34804;    Therapeutic Activity:           mins  73120;     Gait Training:           mins  72170;     Ultrasound:          mins  89505;    Work Hardening                 mins 42344  Iontophoresis                  mins 49689  Electrical Stimulation 15 min    Timed Treatment:   30   mins   Total Treatment:     60   mins    PT SIGNATURE: Deborah Spence, PT   DATE TREATMENT INITIATED: 7/7/2020    Initial Certification  Certification Period: 10/5/2020  I certify that the therapy services are furnished while this patient is under my care.  The services outlined above are required by this patient, and will be reviewed every 90 days.     PHYSICIAN: Jamal Castañeda MD      DATE:     Please sign and return via fax to [phone].. Thank you, [facility].

## 2020-07-14 ENCOUNTER — TREATMENT (OUTPATIENT)
Dept: PHYSICAL THERAPY | Facility: CLINIC | Age: 75
End: 2020-07-14

## 2020-07-14 DIAGNOSIS — Z96.652 TOTAL KNEE REPLACEMENT STATUS, LEFT: ICD-10-CM

## 2020-07-14 DIAGNOSIS — M25.462 SWELLING OF JOINT OF LEFT KNEE: ICD-10-CM

## 2020-07-14 DIAGNOSIS — M25.562 ACUTE PAIN OF LEFT KNEE: Primary | ICD-10-CM

## 2020-07-14 PROCEDURE — 97140 MANUAL THERAPY 1/> REGIONS: CPT | Performed by: PHYSICAL THERAPIST

## 2020-07-14 PROCEDURE — G0283 ELEC STIM OTHER THAN WOUND: HCPCS | Performed by: PHYSICAL THERAPIST

## 2020-07-14 PROCEDURE — 97110 THERAPEUTIC EXERCISES: CPT | Performed by: PHYSICAL THERAPIST

## 2020-07-14 NOTE — PROGRESS NOTES
Physical Therapy Daily Progress Note      Visit # 2      Subjective Evaluation    History of Present Illness    Subjective comment: No new problems.  Saw Dr. Castañeda and he was pleased with her progress.  She is still concerned with the swellingPain  Current pain ratin           Objective   See Exercise, Manual, and Modality Logs for complete treatment.       Assessment & Plan     Assessment  Assessment details: She is doing well with exercise and tolerated exercise progression without increased pain. She does require cuing initially with exercises then is able to complete the exercise. Her affect was good today.                       Manual Therapy:    15     mins  49219;  Therapeutic Exercise:    31     mins  16279;     Neuromuscular Ananth:        mins  17152;    Therapeutic Activity:          mins  81628;     Gait Training:           mins  64802;     Ultrasound:          mins  10260;    Work Hardening                 mins 51939  Iontophoresis                  mins 72003  Electrical Stimulation   15 min    Timed Treatment:   46   mins   Total Treatment:     61   mins    Deborah Spence, PT  Physical Therapist

## 2020-07-16 ENCOUNTER — TREATMENT (OUTPATIENT)
Dept: PHYSICAL THERAPY | Facility: CLINIC | Age: 75
End: 2020-07-16

## 2020-07-16 DIAGNOSIS — Z96.652 TOTAL KNEE REPLACEMENT STATUS, LEFT: ICD-10-CM

## 2020-07-16 DIAGNOSIS — M25.462 SWELLING OF JOINT OF LEFT KNEE: ICD-10-CM

## 2020-07-16 DIAGNOSIS — M25.562 ACUTE PAIN OF LEFT KNEE: Primary | ICD-10-CM

## 2020-07-16 PROCEDURE — 97140 MANUAL THERAPY 1/> REGIONS: CPT | Performed by: PHYSICAL THERAPIST

## 2020-07-16 PROCEDURE — 97110 THERAPEUTIC EXERCISES: CPT | Performed by: PHYSICAL THERAPIST

## 2020-07-16 PROCEDURE — G0283 ELEC STIM OTHER THAN WOUND: HCPCS | Performed by: PHYSICAL THERAPIST

## 2020-07-16 NOTE — PROGRESS NOTES
Physical Therapy Daily Progress Note      Visit # 3      Subjective Evaluation    History of Present Illness    Subjective comment: Knee is feeling better.  Able to move it more easily.  Pain  Current pain ratin           Objective   See Exercise, Manual, and Modality Logs for complete treatment.       Assessment & Plan     Assessment  Assessment details: Raina's left knee is looking better with minimal redness and swelling.  She does continue to have more discomfort on the lateral aspect of her knee.  The incisional adhesions are looser compared to initial evaluation.  She was overly focused on the number of appointments she has scheduled and required frequent redirection to exercise.  She did not have increased discomfort with additional exercises.                      Manual Therapy:    15     mins  20802;  Therapeutic Exercise:    37     mins  06148;     Neuromuscular Ananth:        mins  50453;    Therapeutic Activity:          mins  36346;     Gait Training:           mins  85145;     Ultrasound:          mins  76056;    Work Hardening                mins 11179  Iontophoresis                  mins 39718  Electrical Stimulation 15 mi    Timed Treatment:   52   mins   Total Treatment:     67   mins    Deborah Spence, PT  Physical Therapist

## 2020-07-21 ENCOUNTER — TREATMENT (OUTPATIENT)
Dept: PHYSICAL THERAPY | Facility: CLINIC | Age: 75
End: 2020-07-21

## 2020-07-21 DIAGNOSIS — M25.562 ACUTE PAIN OF LEFT KNEE: Primary | ICD-10-CM

## 2020-07-21 DIAGNOSIS — Z96.652 TOTAL KNEE REPLACEMENT STATUS, LEFT: ICD-10-CM

## 2020-07-21 DIAGNOSIS — M25.462 SWELLING OF JOINT OF LEFT KNEE: ICD-10-CM

## 2020-07-21 PROCEDURE — 97110 THERAPEUTIC EXERCISES: CPT | Performed by: PHYSICAL THERAPIST

## 2020-07-21 PROCEDURE — 97530 THERAPEUTIC ACTIVITIES: CPT | Performed by: PHYSICAL THERAPIST

## 2020-07-21 PROCEDURE — G0283 ELEC STIM OTHER THAN WOUND: HCPCS | Performed by: PHYSICAL THERAPIST

## 2020-07-21 NOTE — PROGRESS NOTES
"Physical Therapy Daily Progress Note      Visit # 4      Subjective Evaluation    History of Present Illness    Subjective comment: Has has swelling in her left knee and lower leg.  State that the knee has become \"blood red\" at times. After discussion with patient discovered her left kneeis most swolllen and red when she gets up in the morning. Once she starts moving the knee around th eknee begins to feel betterPain  Current pain ratin           Objective          Swelling     Left Knee Girth Measurement (cm)   Joint line: 44 cm    Right Knee Girth Measurement (cm)   Joint line: 40 cm     General Comments     Knee Comments  2 inches above joint line Right 39.5 cm, left 46 cm  3 inches below joint line right left 38 cm, 35  Mid foot 23 cm,  25 cm     See Exercise, Manual, and Modality Logs for complete treatment.       Assessment & Plan     Assessment  Assessment details: Raina is concerned about the swelling in her knee but the swelling is actually less compared to the initial evaluation. See objective section for measurements above and below the left knee to the foot.   Once she began exercising the knee her pain decreased.  She does have more redness in the knee when the lower leg is in a dependent position.                       Manual Therapy:         mins  05155;  Therapeutic Exercise:    30     mins  94472;     Neuromuscular Ananth:        mins  46843;    Therapeutic Activity:     10     mins  53827;     Gait Training:           mins  61904;     Ultrasound:          mins  23008;    Work Hardening                 mins 07564  Iontophoresis                  mins 87371  Electrical Stimulatin 15 min    Timed Treatment:   40   mins   Total Treatment:     55   mins    Deborah Spence, PT  Physical Therapist  "

## 2020-07-23 ENCOUNTER — TRANSCRIBE ORDERS (OUTPATIENT)
Dept: ADMINISTRATIVE | Facility: HOSPITAL | Age: 75
End: 2020-07-23

## 2020-07-23 ENCOUNTER — TELEPHONE (OUTPATIENT)
Dept: PHYSICAL THERAPY | Facility: CLINIC | Age: 75
End: 2020-07-23

## 2020-07-23 ENCOUNTER — HOSPITAL ENCOUNTER (OUTPATIENT)
Dept: CARDIOLOGY | Facility: HOSPITAL | Age: 75
Discharge: HOME | End: 2020-07-23
Admitting: ORTHOPAEDIC SURGERY

## 2020-07-23 ENCOUNTER — TREATMENT (OUTPATIENT)
Dept: PHYSICAL THERAPY | Facility: CLINIC | Age: 75
End: 2020-07-23

## 2020-07-23 DIAGNOSIS — M25.562 ACUTE PAIN OF LEFT KNEE: Primary | ICD-10-CM

## 2020-07-23 DIAGNOSIS — M79.669 PAIN AND SWELLING OF LOWER LEG, UNSPECIFIED LATERALITY: ICD-10-CM

## 2020-07-23 DIAGNOSIS — Z96.652 TOTAL KNEE REPLACEMENT STATUS, LEFT: ICD-10-CM

## 2020-07-23 DIAGNOSIS — M79.89 PAIN AND SWELLING OF LOWER LEG, UNSPECIFIED LATERALITY: ICD-10-CM

## 2020-07-23 DIAGNOSIS — M25.462 SWELLING OF JOINT OF LEFT KNEE: ICD-10-CM

## 2020-07-23 DIAGNOSIS — M79.605 LEFT LEG PAIN: Primary | ICD-10-CM

## 2020-07-23 DIAGNOSIS — M79.605 LEFT LEG PAIN: ICD-10-CM

## 2020-07-23 LAB
BH CV LOWER VASCULAR LEFT COMMON FEMORAL AUGMENT: NORMAL
BH CV LOWER VASCULAR LEFT COMMON FEMORAL COMPETENT: NORMAL
BH CV LOWER VASCULAR LEFT COMMON FEMORAL COMPRESS: NORMAL
BH CV LOWER VASCULAR LEFT COMMON FEMORAL PHASIC: NORMAL
BH CV LOWER VASCULAR LEFT COMMON FEMORAL SPONT: NORMAL
BH CV LOWER VASCULAR LEFT DISTAL FEMORAL COMPRESS: NORMAL
BH CV LOWER VASCULAR LEFT GASTRONEMIUS COMPRESS: NORMAL
BH CV LOWER VASCULAR LEFT GREATER SAPH AK COMPRESS: NORMAL
BH CV LOWER VASCULAR LEFT GREATER SAPH BK COMPRESS: NORMAL
BH CV LOWER VASCULAR LEFT LESSER SAPH COMPRESS: NORMAL
BH CV LOWER VASCULAR LEFT MID FEMORAL AUGMENT: NORMAL
BH CV LOWER VASCULAR LEFT MID FEMORAL COMPETENT: NORMAL
BH CV LOWER VASCULAR LEFT MID FEMORAL COMPRESS: NORMAL
BH CV LOWER VASCULAR LEFT MID FEMORAL PHASIC: NORMAL
BH CV LOWER VASCULAR LEFT MID FEMORAL SPONT: NORMAL
BH CV LOWER VASCULAR LEFT PERONEAL COMPRESS: NORMAL
BH CV LOWER VASCULAR LEFT POPLITEAL AUGMENT: NORMAL
BH CV LOWER VASCULAR LEFT POPLITEAL COMPETENT: NORMAL
BH CV LOWER VASCULAR LEFT POPLITEAL COMPRESS: NORMAL
BH CV LOWER VASCULAR LEFT POPLITEAL PHASIC: NORMAL
BH CV LOWER VASCULAR LEFT POPLITEAL SPONT: NORMAL
BH CV LOWER VASCULAR LEFT POSTERIOR TIBIAL COMPRESS: NORMAL
BH CV LOWER VASCULAR LEFT PROFUNDA FEMORAL COMPRESS: NORMAL
BH CV LOWER VASCULAR LEFT PROXIMAL FEMORAL COMPRESS: NORMAL
BH CV LOWER VASCULAR LEFT SAPHENOFEMORAL JUNCTION COMPRESS: NORMAL
BH CV LOWER VASCULAR LEFT SOLEAL COMPRESS: NORMAL
BH CV LOWER VASCULAR LEFT VARICOSITY BK COMPRESS: NORMAL
BH CV LOWER VASCULAR RIGHT COMMON FEMORAL AUGMENT: NORMAL
BH CV LOWER VASCULAR RIGHT COMMON FEMORAL COMPETENT: NORMAL
BH CV LOWER VASCULAR RIGHT COMMON FEMORAL COMPRESS: NORMAL
BH CV LOWER VASCULAR RIGHT COMMON FEMORAL PHASIC: NORMAL
BH CV LOWER VASCULAR RIGHT COMMON FEMORAL SPONT: NORMAL

## 2020-07-23 PROCEDURE — 97110 THERAPEUTIC EXERCISES: CPT | Performed by: PHYSICAL THERAPIST

## 2020-07-23 PROCEDURE — 93971 EXTREMITY STUDY: CPT

## 2020-07-23 PROCEDURE — 97530 THERAPEUTIC ACTIVITIES: CPT | Performed by: PHYSICAL THERAPIST

## 2020-07-23 NOTE — TELEPHONE ENCOUNTER
Called Dr. Castañeda office about Mrs. Forrest's edema, redness and pain.  They scheduled a Doppler for her and will speak with Dr. Trevizo tomorrow about her knee.

## 2020-07-23 NOTE — PROGRESS NOTES
LLE Venous doppler study complete. Preliminary negative for DVT and SVT called to [place] at the office

## 2020-07-23 NOTE — PROGRESS NOTES
"Physical Therapy Daily Progress Note      Visit # 5      Subjective Evaluation    History of Present Illness    Subjective comment: Left knee and leg is so swollen today.  the knee has more heat in it today.  Pain  Current pain ratin  Quality: tight, discomfort and dull ache           Objective          Swelling     Left Knee Girth Measurement (cm)   Joint line: 43 cm    Right Knee Girth Measurement (cm)   Joint line: 40 cm     General Comments     Knee Comments                                  Left         Right  2\" above jt line      46 cm        40.5 cm  3\" below jt line       38 cm        34.5  6\" below jt line       35.5 cm     34.5     See Exercise, Manual, and Modality Logs for complete treatment.       Assessment & Plan     Assessment  Assessment details: Pt with pitting edema, redness, heat and pain in left knee and lower leg much more than on Tuesday.  Called Dr. Castañeda's office and his staff scheduled a Doppler for her at [facility] at 4:15 today.                      Manual Therapy:         mins  62081;  Therapeutic Exercise:    15     mins  34600;     Neuromuscular Anatnh:        mins  08994;    Therapeutic Activity:     15     mins  92409;     Gait Training:           mins  10702;     Ultrasound:          mins  29392;    Work Hardening                 mins 07270  Iontophoresis                  mins 11915    Timed Treatment:   30   mins   Total Treatment:     45   mins    Deborah Spence, PT  Physical Therapist  "

## 2020-07-28 ENCOUNTER — TREATMENT (OUTPATIENT)
Dept: PHYSICAL THERAPY | Facility: CLINIC | Age: 75
End: 2020-07-28

## 2020-07-28 DIAGNOSIS — M25.562 ACUTE PAIN OF LEFT KNEE: Primary | ICD-10-CM

## 2020-07-28 DIAGNOSIS — Z96.652 TOTAL KNEE REPLACEMENT STATUS, LEFT: ICD-10-CM

## 2020-07-28 DIAGNOSIS — M25.462 SWELLING OF JOINT OF LEFT KNEE: ICD-10-CM

## 2020-07-28 PROCEDURE — 97110 THERAPEUTIC EXERCISES: CPT | Performed by: PHYSICAL THERAPIST

## 2020-07-28 NOTE — PROGRESS NOTES
Physical Therapy Daily Progress Note    Visit # 6    Raina Forrest reports: Knee is still sore and swollen.  Saw Dr. Castañeda yesterday he said it may stay that way for a while.  There is no blood clot and he said he will check it out again in 2 weeks.He put me on a prednisone pack.  He wants me to continue therapy.     Subjective     Objective   See Exercise, Manual, and Modality Logs for complete treatment.   *Initiated sidelying clamshell    Assessment & Plan     Assessment  Assessment details: Patient benefits from tactile cueing to prevent substitution patterns of hip flexor utilization during hip abduction and pelvic retraction during clamshells. She demonstrates good quantity and quality of available knee flexion and extension ROM during all activities performed.  She responds positively to cueing for increased hip extensor recruitment during partial squat with fair to good return demo.  Moderate swelling and mild erythema and warmth of (L) knee and lower leg persist though doppler imaging has ruled out presence of DVT.  She is currently compliant with prednisone regimen and is encouraged in persistent ice application to further reduce inflammation and swelling.        Progress per Plan of Care. Reassess (L) knee ROM.         Manual Therapy:         mins  70799;  Therapeutic Exercise:    46     mins  49010;     Neuromuscular Ananth:        mins  85827;    Therapeutic Activity:          mins  15349;     Gait Training:           mins  65127;     Ultrasound:          mins  70214;    Electrical Stimulation:         mins  75793 ( );  Dry Needling          mins self-pay    Timed Treatment:   46   mins   Total Treatment:     56   mins    Merle Abebe PT, DPT  Physical Therapist  [place] License # [id number]

## 2020-07-30 ENCOUNTER — TREATMENT (OUTPATIENT)
Dept: PHYSICAL THERAPY | Facility: CLINIC | Age: 75
End: 2020-07-30

## 2020-07-30 DIAGNOSIS — M25.462 SWELLING OF JOINT OF LEFT KNEE: ICD-10-CM

## 2020-07-30 DIAGNOSIS — M25.562 ACUTE PAIN OF LEFT KNEE: Primary | ICD-10-CM

## 2020-07-30 DIAGNOSIS — Z96.652 TOTAL KNEE REPLACEMENT STATUS, LEFT: ICD-10-CM

## 2020-07-30 PROCEDURE — 97110 THERAPEUTIC EXERCISES: CPT | Performed by: PHYSICAL THERAPIST

## 2020-07-30 NOTE — PROGRESS NOTES
Physical Therapy Daily Progress Note    Visit # 7    Raina Forrest reports: My knee is so hot and swollen.  Could I be allergic to the knee that they put in there?    Subjective     Objective   See Exercise, Manual, and Modality Logs for complete treatment.   *Increased therapeutic exercise repetitions where appropriate  *Increased NuStep resistance    Assessment & Plan     Assessment  Assessment details: Patient displays mild erythema (L) anterior knee lateral to TKA incision, slightly increased from most recent treatment session.  Discussed with patient that this is not immediately cause for concern but will monitor closely.  She is able to progress LE reps and NuStep resistance this date while keeping good form and without symptom provocation or excessive fatigue.  Issued updated HEP to facilitate compliance and recall.         Progress strengthening /stabilization /functional activity         Manual Therapy:         mins  00353;  Therapeutic Exercise:    44     mins  93914;     Neuromuscular Ananth:        mins  47257;    Therapeutic Activity:          mins  36441;     Gait Training:           mins  28630;     Ultrasound:          mins  43924;    Electrical Stimulation:         mins  81170 ( );  Dry Needling          mins self-pay    Timed Treatment:   44   mins   Total Treatment:     44   mins    Merle Abebe PT, DPT  Physical Therapist  [place] License # [id number]

## 2020-08-04 ENCOUNTER — TELEPHONE (OUTPATIENT)
Dept: PHYSICAL THERAPY | Facility: CLINIC | Age: 75
End: 2020-08-04

## 2020-08-11 ENCOUNTER — TREATMENT (OUTPATIENT)
Dept: PHYSICAL THERAPY | Facility: CLINIC | Age: 75
End: 2020-08-11

## 2020-08-11 DIAGNOSIS — Z96.652 TOTAL KNEE REPLACEMENT STATUS, LEFT: Primary | ICD-10-CM

## 2020-08-11 DIAGNOSIS — M25.462 SWELLING OF JOINT OF LEFT KNEE: ICD-10-CM

## 2020-08-11 DIAGNOSIS — M25.562 ACUTE PAIN OF LEFT KNEE: ICD-10-CM

## 2020-08-11 PROCEDURE — 97110 THERAPEUTIC EXERCISES: CPT | Performed by: PHYSICAL THERAPIST

## 2020-08-11 NOTE — PROGRESS NOTES
Re-Assessment / Re-Certification      Patient: Raina Forrest   : 1945  Diagnosis/ICD-10 Code:  Total knee replacement status, left [Z96.652]  Referring practitioner: Jamal Castañeda MD  Date of Initial Visit: Type: THERAPY  Noted: 2020  Today's Date: 2020  Patient seen for 8 sessions      Subjective:   Raina Forrest reports: My knee is feeling better, and I have felt more confident when walking. I have been sleeping better and can walk down my basement steps. It is still a little warm, but overall is much better.    Clinical Progress: improved  Home Program Compliance: Yes  Treatment has included: therapeutic exercise, manual therapy, gait training and cryotherapy    Subjective   Objective          Active Range of Motion   Left Knee   Flexion: 123 degrees   Extension: 0 degrees     Strength/Myotome Testing     Left Knee   Flexion: 4+  Extension: 5 (lacking TKE)    Swelling     Left Knee Girth Measurement (cm)   Joint line: 40 cm      Assessment & Plan     Assessment  Assessment details: Patient has been compliant and motivated with PT interventions. Her (L) knee AROM and strength have improved, and the swelling in her knee has also decreased. She is now more confident when walking, and is able to walk down her basement steps in a non-reciprocal pattern. She has also been able to consistently sleep throughout the night without waking up due to pain. She will continue to benefit from physical therapy to further strengthen her (L) LE to normalize her gait pattern and improve functional use of her (L) leg in weight bearing positions.    Goals  Plan Goals: Plan Goals: STG (2 weeks)  1.  Pt will be independent with initial HEP for improved ROM, strength and gait for ease with functional tasks and ADL's - MET  2.  Pt will demonstrate decreased edema at joint line from 49 cm to 46 cm for decreased pain and discomfort. - MET  3.  Pt will ambulate with normalize gait in community level ambulation. -  PARTIALLY MET  4.  Pt will demonstrate improved left knee ROM to 0 - 115. - MET    LTG (4 weeks)  1.  Pt will be independent with progressed LE strengthening and stabilization exercises for return to normal activity level - MET  2. Pt will demonstrate improved left knee ROM to 0 - 120 for ease with all functional tasks and ADL's. - MET  3.  Pt will demonstrate improved left LE strength to 4+/5 or better for stability with all weight bearing tasks. - PARTIALLY MET  4.  Pt will report sleeping through the night without awakening in pain from her surgical knee. - MET  5. Pt will report improved perceived disability via LEFS from 76% to 45% or less disability. - NOT ASSESSED  6. Patient will demonstrate ability to reciprocally negotiate 1 flight of steps with single handrail assist for improved navigation of basement steps      Progress toward previous goals: Partially Met  Recommendations: Continue as planned  Timeframe: 1 month  Prognosis to achieve goals: good    PT Signature: Merle Abebe PT      Based upon review of the patient's progress and continued therapy plan, it is my medical opinion that Raina Forrest should continue physical therapy treatment at [facility]  [address] [place] [id number] [phone].    Signature: __________________________________  Jamal Castañeda MD    Manual Therapy:         mins  42680;  Therapeutic Exercise:    40     mins  91500;     Neuromuscular Ananth:        mins  57913;    Therapeutic Activity:          mins  95109;     Gait Training:           mins  16877;     Ultrasound:          mins  41514;    Electrical Stimulation:         mins  83072 ( );  Dry Needling     50     mins self-pay    Timed Treatment:   40   mins   Total Treatment:     50   mins

## 2020-08-13 ENCOUNTER — TREATMENT (OUTPATIENT)
Dept: PHYSICAL THERAPY | Facility: CLINIC | Age: 75
End: 2020-08-13

## 2020-08-13 DIAGNOSIS — M25.562 ACUTE PAIN OF LEFT KNEE: ICD-10-CM

## 2020-08-13 DIAGNOSIS — Z96.652 TOTAL KNEE REPLACEMENT STATUS, LEFT: Primary | ICD-10-CM

## 2020-08-13 DIAGNOSIS — M25.462 SWELLING OF JOINT OF LEFT KNEE: ICD-10-CM

## 2020-08-13 PROCEDURE — 97110 THERAPEUTIC EXERCISES: CPT | Performed by: PHYSICAL THERAPIST

## 2020-08-13 NOTE — PROGRESS NOTES
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      Subjective     Objective   See Exercise, Manual, and Modality Logs for complete treatment.       Assessment & Plan     Assessment  Assessment details: Patient continues to tolerate all exercise progressions well and shows good improvements in her LE strength. She demonstrates good concentric and eccentric control of her (L) knee during step up exercises. Initiated heel raises to increase plantar flexion strength and TKE exercise to facilitate full knee ROM during gait.        Progress strengthening /stabilization /functional activity           Manual Therapy:         mins  58371;  Therapeutic Exercise:    25     mins  79268;     Neuromuscular Ananth:        mins  72314;    Therapeutic Activity:          mins  37250;     Gait Training:           mins  76380;     Ultrasound:          mins  60507;    Electrical Stimulation:         mins  95903 ( );  Dry Needling          mins self-pay    Timed Treatment:   25   mins   Total Treatment:     35   mins    Merle Abebe PT  Physical Therapist

## 2020-08-18 RX ORDER — FUROSEMIDE 20 MG/1
20 TABLET ORAL EVERY EVENING
Qty: 30 TABLET | Refills: 1 | Status: SHIPPED | OUTPATIENT
Start: 2020-08-18 | End: 2020-09-08 | Stop reason: SDUPTHER

## 2020-08-18 RX ORDER — FUROSEMIDE 40 MG/1
40 TABLET ORAL EVERY MORNING
Qty: 30 TABLET | Refills: 1 | Status: SHIPPED | OUTPATIENT
Start: 2020-08-18 | End: 2020-09-10

## 2020-08-20 ENCOUNTER — TREATMENT (OUTPATIENT)
Dept: PHYSICAL THERAPY | Facility: CLINIC | Age: 75
End: 2020-08-20

## 2020-08-20 DIAGNOSIS — Z96.652 TOTAL KNEE REPLACEMENT STATUS, LEFT: Primary | ICD-10-CM

## 2020-08-20 DIAGNOSIS — M25.462 SWELLING OF JOINT OF LEFT KNEE: ICD-10-CM

## 2020-08-20 DIAGNOSIS — M25.562 ACUTE PAIN OF LEFT KNEE: ICD-10-CM

## 2020-08-20 PROCEDURE — 97110 THERAPEUTIC EXERCISES: CPT | Performed by: PHYSICAL THERAPIST

## 2020-08-20 NOTE — PROGRESS NOTES
Physical Therapy Daily Progress Note    Visit # 10    Raina Forrest reports: Knee feels pretty good.  It's just a little sore.  It seems to be getting better week to week.    Subjective     Objective   See Exercise, Manual, and Modality Logs for complete treatment.   *Increased reps of strengthening activities where appropriate    Assessment & Plan     Assessment  Assessment details: Patient is able to partially progress LE strengthening repetitions but does exhibit signs of increased muscular and aerobic fatigue.  She requires brief intermittent rest breaks in order to complete all activities.  She performs exercises through full ROM arcs with good concentric and eccentric control.  She may benefit from a short course of continued PT to emphasize stability with static and dynamic balance activities including navigation of uneven terrain.        Progress strengthening /stabilization /functional activity         Manual Therapy:         mins  25978;  Therapeutic Exercise:    39     mins  37441;     Neuromuscular Ananth:        mins  95984;    Therapeutic Activity:          mins  32364;     Gait Training:           mins  03424;     Ultrasound:          mins  69692;    Electrical Stimulation:         mins  51190 ( );  Dry Needling          mins self-pay    Timed Treatment:   39   mins   Total Treatment:     49   mins    Merle Abebe PT, DPT  Physical Therapist  [place] License # [id number]

## 2020-08-21 RX ORDER — POTASSIUM CHLORIDE 750 MG/1
TABLET, EXTENDED RELEASE ORAL
Qty: 90 TABLET | Refills: 1 | Status: SHIPPED | OUTPATIENT
Start: 2020-08-21 | End: 2021-01-25

## 2020-08-26 ENCOUNTER — TREATMENT (OUTPATIENT)
Dept: PHYSICAL THERAPY | Facility: CLINIC | Age: 75
End: 2020-08-26

## 2020-08-26 ENCOUNTER — TELEPHONE (OUTPATIENT)
Dept: ORTHOPEDICS | Facility: OTHER | Age: 75
End: 2020-08-26

## 2020-08-26 DIAGNOSIS — Z96.652 TOTAL KNEE REPLACEMENT STATUS, LEFT: Primary | ICD-10-CM

## 2020-08-26 DIAGNOSIS — M25.462 SWELLING OF JOINT OF LEFT KNEE: ICD-10-CM

## 2020-08-26 PROCEDURE — 97110 THERAPEUTIC EXERCISES: CPT | Performed by: PHYSICAL THERAPIST

## 2020-08-26 NOTE — PROGRESS NOTES
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 It has been feeling and looking really good lately.      Subjective     Objective   See Exercise, Manual, and Modality Logs for complete treatment.       Assessment & Plan     Assessment  Assessment details: Patient continues to handle all exercise progressions well without experiencing pain, although shows some signs of muscular fatigue during standing exercises.  She demonstrates increasing strength and stability of her (L) LE during step ups, showing good concentric and eccentric control. She shows increasing independence with HEP. Anticipate 1-2 additional PT sessions to finalize HEP and ensure patient has met all functional goals.        Progress strengthening /stabilization /functional activity           Manual Therapy:         mins  25204;  Therapeutic Exercise:    41     mins  24135;     Neuromuscular Ananth:        mins  71510;    Therapeutic Activity:          mins  41472;     Gait Training:           mins  83664;     Ultrasound:          mins  53091;    Electrical Stimulation:         mins  53386 (MC );  Dry Needling          mins self-pay    Timed Treatment:   41   mins   Total Treatment:     41   mins    Merle Abebe PT  Physical Therapist

## 2020-09-04 ENCOUNTER — TREATMENT (OUTPATIENT)
Dept: PHYSICAL THERAPY | Facility: CLINIC | Age: 75
End: 2020-09-04

## 2020-09-04 DIAGNOSIS — M25.562 ACUTE PAIN OF LEFT KNEE: ICD-10-CM

## 2020-09-04 DIAGNOSIS — Z96.652 TOTAL KNEE REPLACEMENT STATUS, LEFT: Primary | ICD-10-CM

## 2020-09-04 DIAGNOSIS — M25.462 SWELLING OF JOINT OF LEFT KNEE: ICD-10-CM

## 2020-09-04 PROCEDURE — 97110 THERAPEUTIC EXERCISES: CPT | Performed by: PHYSICAL THERAPIST

## 2020-09-04 NOTE — PROGRESS NOTES
"Physical Therapy Daily Progress Note    Visit # 12    Raina Forrest reports: \"My knee has been feeling really good.  It doesn't hurt and I've not really been having problems with anything.\"    Subjective Evaluation    Pain  Current pain ratin  Location: (L) knee           Objective          Observations   Left Knee   Positive for incision.     Additional Knee Observation Details  (L) TKA incision well-healed without signs of infection    Tenderness     Additional Tenderness Details  Neg TTP (L) knee    Active Range of Motion   Left Knee   Flexion: 131 degrees   Extension: 0 degrees   Extensor la degrees     Strength/Myotome Testing     Left Hip   Planes of Motion   Flexion: 4+    Left Knee   Flexion: 4+  Extension: 4+  Quadriceps contraction: good    Functional Assessment     Single Leg Stance - Eyes Open   Left  Trial 1: 4 seconds  Trial 2: 5 seconds  Trial 3: 4 seconds  Average: 4.33 seconds       See Exercise, Manual, and Modality Logs for complete treatment.       Assessment & Plan     Assessment  Assessment details: Patient has been compliant and motivated with PT interventions.  She consistently reports no pain (L) knee.  She is nontender with a well-healed incision and AROM and strength are WNL.  She ambulates community distances without assistive device and consistently negotiates steps reciprocally.  She is independent with a progressed HEP.  She has met all functional PT goals (except LEFS score - not assessed).  At this time she exhibits readiness for D/C from skilled PT services.  She has been encouraged in persistent HEP compliance and verbalizes understanding.  All questions are answered to patient's satisfaction.    Goals  Plan Goals: STG   1.  Pt will be independent with initial HEP for improved ROM, strength and gait for ease with functional tasks and ADL's - MET  2.  Pt will demonstrate decreased edema at joint line from 49 cm to 46 cm for decreased pain and discomfort. - MET  3.  Pt will " ambulate with normalize gait in community level ambulation.- MET  4.  Pt will demonstrate improved left knee ROM to 0 - 115. - MET    LTG  1.  Pt will be independent with progressed LE strengthening and stabilization exercises for return to normal activity level - MET  2. Pt will demonstrate improved left knee ROM to 0 - 120 for ease with all functional tasks and ADL's. - MET  3.  Pt will demonstrate improved left LE strength to 4+/5 or better for stability with all weight bearing tasks. - MET  4.  Pt will report sleeping through the night without awakening in pain from her surgical knee. - MET  5. Pt will report improved perceived disability via LEFS from 76% to 45% or less disability. - NOT ASSESSED        Other - D/C skilled PT services to independence with progressed HEP.         Manual Therapy:         mins  67313;  Therapeutic Exercise:    38     mins  90867;     Neuromuscular Ananth:        mins  32455;    Therapeutic Activity:          mins  66483;     Gait Training:           mins  05737;     Ultrasound:          mins  00406;    Electrical Stimulation:         mins  37930 ( );  Dry Needling          mins self-pay    Timed Treatment:   38   mins   Total Treatment:     38   mins    Merle Abebe PT, DPT  Physical Therapist  [place] License # [id number]

## 2020-09-08 RX ORDER — FUROSEMIDE 20 MG/1
20 TABLET ORAL EVERY EVENING
Qty: 30 TABLET | Refills: 1 | Status: SHIPPED | OUTPATIENT
Start: 2020-09-08 | End: 2020-09-10

## 2020-09-09 RX ORDER — BISOPROLOL FUMARATE AND HYDROCHLOROTHIAZIDE 5; 6.25 MG/1; MG/1
TABLET ORAL
Qty: 90 TABLET | Refills: 0 | Status: SHIPPED | OUTPATIENT
Start: 2020-09-09 | End: 2020-09-18

## 2020-09-10 RX ORDER — FUROSEMIDE 40 MG/1
TABLET ORAL
Qty: 30 TABLET | Refills: 1 | Status: SHIPPED | OUTPATIENT
Start: 2020-09-10 | End: 2020-10-02 | Stop reason: SDUPTHER

## 2020-09-10 RX ORDER — FUROSEMIDE 20 MG/1
TABLET ORAL
Qty: 30 TABLET | Refills: 1 | Status: SHIPPED | OUTPATIENT
Start: 2020-09-10 | End: 2020-10-02 | Stop reason: SDUPTHER

## 2020-09-18 RX ORDER — BISOPROLOL FUMARATE AND HYDROCHLOROTHIAZIDE 5; 6.25 MG/1; MG/1
TABLET ORAL
Qty: 90 TABLET | Refills: 1 | Status: SHIPPED | OUTPATIENT
Start: 2020-09-18 | End: 2021-11-30

## 2020-10-01 ENCOUNTER — TELEPHONE (OUTPATIENT)
Dept: CARDIOLOGY | Facility: CLINIC | Age: 75
End: 2020-10-01

## 2020-10-01 NOTE — TELEPHONE ENCOUNTER
PATIENT CALLED TO SAY SHE PICKED UP HER MEDICINES (FUROSEMIDE AND BISOPROLOL)FROM THE PHARMACY AND SHE THINKS THAT THEY DID NOT GIVE HER ENOUGH PILLS AND SHE WILL RUN OUT BEFORE A MONTH IS UP, SHE ALSO HAS 2 DIFFERENT DOSES OF THE FUROSEMIDE, LOOKS LIKE WE REFILLED THEM ON 9/10/2020. PLEASE CALL PATIENT TO ADVISE.

## 2020-10-02 ENCOUNTER — OFFICE (OUTPATIENT)
Dept: URBAN - METROPOLITAN AREA CLINIC 75 | Facility: CLINIC | Age: 75
End: 2020-10-02

## 2020-10-02 VITALS — HEIGHT: 69 IN | WEIGHT: 166 LBS

## 2020-10-02 DIAGNOSIS — K92.1 MELENA: ICD-10-CM

## 2020-10-02 DIAGNOSIS — K59.04 CHRONIC IDIOPATHIC CONSTIPATION: ICD-10-CM

## 2020-10-02 PROCEDURE — 99214 OFFICE O/P EST MOD 30 MIN: CPT | Performed by: INTERNAL MEDICINE

## 2020-10-02 RX ORDER — FUROSEMIDE 40 MG/1
TABLET ORAL
Qty: 45 TABLET | Refills: 1 | Status: SHIPPED | OUTPATIENT
Start: 2020-10-02 | End: 2020-11-04

## 2020-10-07 ENCOUNTER — DOCUMENTATION (OUTPATIENT)
Dept: PHYSICAL THERAPY | Facility: CLINIC | Age: 75
End: 2020-10-07

## 2020-10-07 ENCOUNTER — TELEPHONE (OUTPATIENT)
Dept: CARDIOLOGY | Facility: CLINIC | Age: 75
End: 2020-10-07

## 2020-10-07 NOTE — PROGRESS NOTES
Discharge summary    Patient has been compliant and motivated with PT interventions. She consistently reports no pain (L) knee.  She is nontender with a well-healed incision and AROM and strength are WNL.  She ambulates community distances without assistive device and consistently negotiates steps reciprocally.  She is independent with a progressed HEP.  She has met all functional PT goals (except LEFS score - not assessed).  At this time she exhibits readiness for D/C from skilled PT services.  She has been encouraged in persistent HEP compliance and verbalizes understanding.  All questions are answered to patient's satisfaction.     Goals  Plan Goals: STG   1.  Pt will be independent with initial HEP for improved ROM, strength and gait for ease with functional tasks and ADL's - MET  2.  Pt will demonstrate decreased edema at joint line from 49 cm to 46 cm for decreased pain and discomfort. - MET  3.  Pt will ambulate with normalize gait in community level ambulation.- MET  4.  Pt will demonstrate improved left knee ROM to 0 - 115. - MET    LTG  1.  Pt will be independent with progressed LE strengthening and stabilization exercises for return to normal activity level - MET  2. Pt will demonstrate improved left knee ROM to 0 - 120 for ease with all functional tasks and ADL's. - MET  3.  Pt will demonstrate improved left LE strength to 4+/5 or better for stability with all weight bearing tasks. - MET  4.  Pt will report sleeping through the night without awakening in pain from her surgical knee. - MET  5. Pt will report improved perceived disability via LEFS from 76% to 45% or less disability. - NOT ASSESSED       Pt is discharged from PT

## 2020-10-07 NOTE — TELEPHONE ENCOUNTER
PT CALLED WITH QUESTIONS/CONCERNS ABOUT HER FUROSEMIDE RX.  I ASSURED HER THAT THE REFILL WAS SENT IN ON 10/2 AND VERIFIED THE PHARMACY.  PT STATES SHE WILL CALL THE PHARMACY TO SEE IF IT IS READY.

## 2020-10-08 VITALS
OXYGEN SATURATION: 99 % | RESPIRATION RATE: 5 BRPM | SYSTOLIC BLOOD PRESSURE: 85 MMHG | DIASTOLIC BLOOD PRESSURE: 43 MMHG | RESPIRATION RATE: 14 BRPM | HEART RATE: 84 BPM | DIASTOLIC BLOOD PRESSURE: 54 MMHG | DIASTOLIC BLOOD PRESSURE: 66 MMHG | DIASTOLIC BLOOD PRESSURE: 45 MMHG | HEART RATE: 79 BPM | HEART RATE: 89 BPM | HEART RATE: 100 BPM | DIASTOLIC BLOOD PRESSURE: 76 MMHG | HEART RATE: 88 BPM | SYSTOLIC BLOOD PRESSURE: 110 MMHG | RESPIRATION RATE: 20 BRPM | OXYGEN SATURATION: 93 % | OXYGEN SATURATION: 100 % | SYSTOLIC BLOOD PRESSURE: 125 MMHG | SYSTOLIC BLOOD PRESSURE: 95 MMHG | RESPIRATION RATE: 15 BRPM | DIASTOLIC BLOOD PRESSURE: 67 MMHG | DIASTOLIC BLOOD PRESSURE: 72 MMHG | SYSTOLIC BLOOD PRESSURE: 80 MMHG | SYSTOLIC BLOOD PRESSURE: 91 MMHG | SYSTOLIC BLOOD PRESSURE: 101 MMHG | DIASTOLIC BLOOD PRESSURE: 59 MMHG | HEART RATE: 92 BPM | SYSTOLIC BLOOD PRESSURE: 135 MMHG | SYSTOLIC BLOOD PRESSURE: 99 MMHG | HEART RATE: 83 BPM | DIASTOLIC BLOOD PRESSURE: 52 MMHG | DIASTOLIC BLOOD PRESSURE: 75 MMHG | TEMPERATURE: 98.3 F | HEIGHT: 69 IN | TEMPERATURE: 97.9 F | DIASTOLIC BLOOD PRESSURE: 98 MMHG | HEART RATE: 87 BPM | HEART RATE: 85 BPM | HEART RATE: 82 BPM | HEART RATE: 74 BPM | RESPIRATION RATE: 12 BRPM | DIASTOLIC BLOOD PRESSURE: 62 MMHG | SYSTOLIC BLOOD PRESSURE: 84 MMHG | RESPIRATION RATE: 13 BRPM | HEART RATE: 76 BPM | RESPIRATION RATE: 18 BRPM | HEART RATE: 78 BPM | HEART RATE: 99 BPM | SYSTOLIC BLOOD PRESSURE: 123 MMHG | RESPIRATION RATE: 16 BRPM | DIASTOLIC BLOOD PRESSURE: 77 MMHG | DIASTOLIC BLOOD PRESSURE: 53 MMHG | WEIGHT: 166 LBS | SYSTOLIC BLOOD PRESSURE: 136 MMHG | SYSTOLIC BLOOD PRESSURE: 97 MMHG | SYSTOLIC BLOOD PRESSURE: 103 MMHG | OXYGEN SATURATION: 98 %

## 2020-10-12 ENCOUNTER — AMBULATORY SURGICAL CENTER (OUTPATIENT)
Dept: URBAN - METROPOLITAN AREA SURGERY 17 | Facility: SURGERY | Age: 75
End: 2020-10-12

## 2020-10-12 ENCOUNTER — OFFICE (OUTPATIENT)
Dept: URBAN - METROPOLITAN AREA PATHOLOGY 4 | Facility: PATHOLOGY | Age: 75
End: 2020-10-12

## 2020-10-12 DIAGNOSIS — K31.89 OTHER DISEASES OF STOMACH AND DUODENUM: ICD-10-CM

## 2020-10-12 DIAGNOSIS — B96.81 HELICOBACTER PYLORI [H. PYLORI] AS THE CAUSE OF DISEASES CLA: ICD-10-CM

## 2020-10-12 DIAGNOSIS — K64.4 RESIDUAL HEMORRHOIDAL SKIN TAGS: ICD-10-CM

## 2020-10-12 DIAGNOSIS — D12.4 BENIGN NEOPLASM OF DESCENDING COLON: ICD-10-CM

## 2020-10-12 DIAGNOSIS — K62.1 RECTAL POLYP: ICD-10-CM

## 2020-10-12 DIAGNOSIS — K22.8 OTHER SPECIFIED DISEASES OF ESOPHAGUS: ICD-10-CM

## 2020-10-12 DIAGNOSIS — D12.2 BENIGN NEOPLASM OF ASCENDING COLON: ICD-10-CM

## 2020-10-12 DIAGNOSIS — K57.30 DIVERTICULOSIS OF LARGE INTESTINE WITHOUT PERFORATION OR ABS: ICD-10-CM

## 2020-10-12 DIAGNOSIS — K25.9 GASTRIC ULCER, UNSPECIFIED AS ACUTE OR CHRONIC, WITHOUT HEMO: ICD-10-CM

## 2020-10-12 DIAGNOSIS — D12.8 BENIGN NEOPLASM OF RECTUM: ICD-10-CM

## 2020-10-12 DIAGNOSIS — K29.50 UNSPECIFIED CHRONIC GASTRITIS WITHOUT BLEEDING: ICD-10-CM

## 2020-10-12 DIAGNOSIS — K92.1 MELENA: ICD-10-CM

## 2020-10-12 DIAGNOSIS — D12.3 BENIGN NEOPLASM OF TRANSVERSE COLON: ICD-10-CM

## 2020-10-12 PROBLEM — K63.5 POLYP OF COLON: Status: ACTIVE | Noted: 2020-10-12

## 2020-10-12 LAB
GI HISTOLOGY: A. UNSPECIFIED: (no result)
GI HISTOLOGY: B. UNSPECIFIED: (no result)
GI HISTOLOGY: C. UNSPECIFIED: (no result)
GI HISTOLOGY: D. UNSPECIFIED: (no result)
GI HISTOLOGY: E. UNSPECIFIED: (no result)
GI HISTOLOGY: F. UNSPECIFIED: (no result)
GI HISTOLOGY: PDF REPORT: (no result)

## 2020-10-12 PROCEDURE — 43239 EGD BIOPSY SINGLE/MULTIPLE: CPT | Performed by: INTERNAL MEDICINE

## 2020-10-12 PROCEDURE — 88305 TISSUE EXAM BY PATHOLOGIST: CPT | Performed by: INTERNAL MEDICINE

## 2020-10-12 PROCEDURE — 45380 COLONOSCOPY AND BIOPSY: CPT | Performed by: INTERNAL MEDICINE

## 2020-10-12 PROCEDURE — 88342 IMHCHEM/IMCYTCHM 1ST ANTB: CPT | Performed by: INTERNAL MEDICINE

## 2020-10-12 RX ORDER — PANTOPRAZOLE SODIUM 40 MG/1
40 TABLET, DELAYED RELEASE ORAL
Qty: 90 | Refills: 3 | Status: ACTIVE
Start: 2020-10-12

## 2020-10-12 NOTE — SERVICEHPINOTES
Patient is a 75 yo F with h/o COPD, diverticulosis, PPM, osteoporosis, CHF, afib on aspirin here for follow up. I had been seeing her for constipation with LLQ abdominal pain and h. pylori. I had previously treated her h. pylori with doxycycline, nitazoxanide, Levaquin and PPI. Repeat testing showed positive h. pylori so she was referred to ID and was given abx. Most recently EGD bx showed no HP. She reports mucous in her stool. She passes a lot of gas. She has constipation issues and has to take laxative for this. She was previously given amitiza, but is not currently taking it. She denies having issues with the medication. Unclear why she stopped it. She had blood in her stool that was bright red. This happened once. She reports black stools. She denies iron supplementation or pepto bismol use. It happens intermittently. It is sticky when black. It started in February. She says it happens 2-3 times per week. She has decreased PO intake due to nausea, vomiting. Per chart reviewBRFebruary 3, 2014 EGD by Dr. Chin Sandhu showed gastritis. Colonoscopy the same day showed mild sigmoid diverticulosis otherwise unremarkable. Procedures were done according to his report for change in bowel habit and generalized abdominal pain. Biopsies showed normal SB. +HP. BRPer notes treated for HP 2012 with prevpak. Treated in 2014 with pylera for 10 days. BR10/17- CT ABD &amp Pelvis - sigmoid diverticulosis, moderate stool burden BR12/14/2017 - RUQ US - no acute abnormalities. s/p pgonpUU62/22/2017 -EGD- what looked like GAVE and PH, EGD Pathology - h. pylori. no GAVE or PHG BR9/14/2016 - GET oukuovUR06/31/2017 CBC With Differential/Platelet,GGT - normal GGT and CBC BR11/10/2018 - CT - liver ULN, spleen mildly enlarged. mild rectal wall thickening likely due to under distention. diverticulosis BR11/10/2018 - ER LABS - occult blood negative, K 3.9 from 5.9. hgb 15.5, plat 284, creat 0.8, AST 49, ALT 20, TB 1.8, calc 10.3, lipase 109 BR11/13/2018 CMP - creat 0.74, normal PXScLL34/26/2018 EGD/CN - submucosal lesion GE junction, gastritis, white plaque pylorus, small amt bilious fluid in stomach, normal duodenum. nL TI, sig diverticulosis, 2mm sig polyp, ext hemorrhoidsBRPATHOLOGY - EGD/CN - chronic inactive gastritis, chemical/reflux gastropathy. neg HP. sigmoid- TABR1/8/2019 - EUS Report - benign cyst in lower esophagusBR7/9/2019 - cxr - mild to mod cardiomegalyBR7/9/2019 - ct w/o - small HH, normal liver, pancreas, renal cysts. spleen borderline enlarged as before. diverticulosis. stool present throughout entire colonBR7/9/2019 - labs - WBC 13.37, hgb 14.1, plat 268, creat 0.89, normal LFTs, lipase 31BR7/9/2019 - er - given Zofran, adjusted lasix BR9/17/2019 - lab - WBC 9.56, hgb 13.8, plat 262, creat 0.7 BR1/16/2020 - lab - creat 0.79, normal LFTs BR2/19/2020 - LAB - creat 0.85, normal LFTs, WBC 8.03, hgb 13.4, plat 220 BR2/20/2020 - CARDIO OV - CHANDIRAMANI - chronic afib. echo ordered. PPM functioning. on amiodarone. BR2/27/2020 - LAB - WBC 7.27, hgb 14.4, plat 235, creat 0.6, TB 0.8, AST 44, ALT 26, AP 71, lipase 101 2/27/2020 - ER - seen for n/v/diarrhea BR2/27/2020 - CT ABD PELVIS w/o - s/p alee w/ slight prominence of CBD unchanged, c/w remove cholecystectomy. normal liver, pancreas. fecal retention. several areas of narrowing &amp slight prominence in DC/sig colon- likely foc contrac BR6/17/2020 - lab - creat 0.71, WBC 16.28, hgb 11.6, MCV 94, plat 193 Discussed r/b/a of the procedure with the patient in the pre-op area.

## 2020-11-03 ENCOUNTER — CLINICAL SUPPORT NO REQUIREMENTS (OUTPATIENT)
Dept: CARDIOLOGY | Facility: CLINIC | Age: 75
End: 2020-11-03

## 2020-11-03 DIAGNOSIS — Z95.0 PACEMAKER: Primary | ICD-10-CM

## 2020-11-03 PROCEDURE — 93288 INTERROG EVL PM/LDLS PM IP: CPT | Performed by: INTERNAL MEDICINE

## 2020-11-04 RX ORDER — FUROSEMIDE 40 MG/1
TABLET ORAL
Qty: 45 TABLET | Refills: 1 | Status: SHIPPED | OUTPATIENT
Start: 2020-11-04 | End: 2020-12-04

## 2020-12-04 RX ORDER — FUROSEMIDE 40 MG/1
TABLET ORAL
Qty: 45 TABLET | Refills: 1 | Status: SHIPPED | OUTPATIENT
Start: 2020-12-04 | End: 2021-01-04

## 2020-12-08 ENCOUNTER — TRANSCRIBE ORDERS (OUTPATIENT)
Dept: ADMINISTRATIVE | Facility: HOSPITAL | Age: 75
End: 2020-12-08

## 2020-12-08 DIAGNOSIS — M51.36 DEGENERATIVE LUMBAR DISC: Primary | ICD-10-CM

## 2020-12-09 RX ORDER — TRAMADOL HYDROCHLORIDE 50 MG/1
TABLET ORAL
COMMUNITY
Start: 2020-12-02 | End: 2020-12-10

## 2020-12-09 RX ORDER — PANTOPRAZOLE SODIUM 40 MG/1
TABLET, DELAYED RELEASE ORAL
COMMUNITY
Start: 2020-10-12

## 2020-12-09 RX ORDER — ONDANSETRON 4 MG/1
4 TABLET, FILM COATED ORAL EVERY 8 HOURS PRN
COMMUNITY
Start: 2020-09-17 | End: 2020-12-10

## 2020-12-10 ENCOUNTER — OFFICE VISIT (OUTPATIENT)
Dept: CARDIOLOGY | Facility: CLINIC | Age: 75
End: 2020-12-10

## 2020-12-10 VITALS
HEART RATE: 89 BPM | SYSTOLIC BLOOD PRESSURE: 115 MMHG | BODY MASS INDEX: 24.34 KG/M2 | WEIGHT: 170 LBS | HEIGHT: 70 IN | DIASTOLIC BLOOD PRESSURE: 71 MMHG

## 2020-12-10 DIAGNOSIS — R94.31 ABNORMAL EKG: Primary | ICD-10-CM

## 2020-12-10 DIAGNOSIS — I10 ESSENTIAL HYPERTENSION: ICD-10-CM

## 2020-12-10 DIAGNOSIS — I48.21 PERMANENT ATRIAL FIBRILLATION (HCC): ICD-10-CM

## 2020-12-10 DIAGNOSIS — Z95.0 PACEMAKER: ICD-10-CM

## 2020-12-10 DIAGNOSIS — I10 BENIGN ESSENTIAL HTN: ICD-10-CM

## 2020-12-10 PROCEDURE — 99214 OFFICE O/P EST MOD 30 MIN: CPT | Performed by: INTERNAL MEDICINE

## 2020-12-10 PROCEDURE — 93000 ELECTROCARDIOGRAM COMPLETE: CPT | Performed by: INTERNAL MEDICINE

## 2020-12-10 NOTE — PROGRESS NOTES
Follow up.   Patient is having edema    Subjective:        Raina Forrest is a 74 y.o. female who here for follow up    CC  Leg swelling      HPI  74 years old female with a known history of the benign essential arterial hypertension as well as atrial fibrillation, pacemaker, has been complaining of the increasing bilateral feet and ankle swelling mild to moderate in intensity with no chest pain     Problems Addressed this Visit        Cardiovascular and Mediastinum    Benign essential HTN    Relevant Orders    Stress Test With Myocardial Perfusion One Day    Adult Transthoracic Echo Complete W/ Cont if Necessary Per Protocol    Hypertension    Relevant Orders    Stress Test With Myocardial Perfusion One Day    Adult Transthoracic Echo Complete W/ Cont if Necessary Per Protocol    Atrial fibrillation (CMS/HCC)    Relevant Orders    Stress Test With Myocardial Perfusion One Day    Adult Transthoracic Echo Complete W/ Cont if Necessary Per Protocol    Pacemaker      Other Visit Diagnoses     Abnormal EKG    -  Primary    Relevant Orders    Stress Test With Myocardial Perfusion One Day    Adult Transthoracic Echo Complete W/ Cont if Necessary Per Protocol      Diagnoses       Codes Comments    Abnormal EKG    -  Primary ICD-10-CM: R94.31  ICD-9-CM: 794.31     Permanent atrial fibrillation (CMS/HCC)     ICD-10-CM: I48.21  ICD-9-CM: 427.31     Essential hypertension     ICD-10-CM: I10  ICD-9-CM: 401.9     Benign essential HTN     ICD-10-CM: I10  ICD-9-CM: 401.1     Pacemaker     ICD-10-CM: Z95.0  ICD-9-CM: V45.01         .    The following portions of the patient's history were reviewed and updated as appropriate: allergies, current medications, past family history, past medical history, past social history, past surgical history and problem list.    Past Medical History:   Diagnosis Date   • Atrial fibrillation (CMS/HCC)    • Fung's esophagus    • Benign essential hypertension    • Blood clot in vein    •  "Cardiomyopathy (CMS/HCC)    • Chronic gastritis    • Congestive heart failure (CMS/HCC)    • Degenerative disc disease    • Dementia (CMS/HCC)     Pt  states she has slight dementia   • Depression with anxiety    • Disease of thyroid gland    • Diverticulitis of colon    • Dysphagia    • GERD (gastroesophageal reflux disease)    • History of chest pain    • History of migraine    • History of migraine headaches    • Left knee pain    • Osteoporosis    • Palpitations    • Thyroid disorder    • Thyroid nodule    • Ulcerative colitis (CMS/HCC)      reports that she has never smoked. She has never used smokeless tobacco. She reports that she does not drink alcohol or use drugs.   Family History   Problem Relation Age of Onset   • Lung cancer Other    • Ulcerative colitis Sister    • Heart failure Mother    • Heart disease Mother    • Heart failure Father    • Heart disease Father    • Malig Hyperthermia Neg Hx        Review of Systems  Constitutional: No wt loss, fever, fatigue  Gastrointestinal: No nausea, abdominal pain  Behavioral/Psych: No insomnia or anxiety   Cardiovascular ankle swelling  Objective:       Physical Exam  /71 (BP Location: Left arm)   Pulse 89   Ht 177.8 cm (70\")   Wt 77.1 kg (170 lb)   BMI 24.39 kg/m²   General appearance: No acute changes   Neck: Trachea midline; NECK, supple, no thyromegaly or lymphadenopathy   Lungs: Normal size and shape, normal breath sounds, equal distribution of air, no rales and rhonchi   CV: S1-S2 regular, no murmurs, no rub, no gallop   Abdomen: Soft, non-tender; no masses , no abnormal abdominal sounds   Extremities: No deformity , normal color , no peripheral edema   Skin: Normal temperature, turgor and texture; no rash, ulcers            ECG 12 Lead    Date/Time: 12/10/2020 10:18 AM  Performed by: Robinson Salazar MD  Authorized by: Robinson Salazar MD   Comparison: compared with previous ECG   Similar to previous ECG  Rhythm: atrial " fibrillation  ST Flattening: all    Clinical impression: abnormal EKG              Echocardiogram:        Current Outpatient Medications:   •  aspirin  MG EC tablet, Take 1 tablet by mouth 2 (Two) Times a Day With Meals., Disp: 60 tablet, Rfl: 0  •  bisoprolol-hydrochlorothiazide (ZIAC) 5-6.25 MG per tablet, TAKE 1 TABLET BY MOUTH EVERY DAY, Disp: 90 tablet, Rfl: 1  •  calcium-vitamin D (OSCAL 500/200 D-3) 500-200 MG-UNIT per tablet, Take 1 tablet by mouth Daily., Disp: , Rfl:   •  Carboxymethylcellulose Sodium (EYE DROPS OP), Apply  to eye(s) as directed by provider. Pt to bring  To surgery, Disp: , Rfl:   •  docusate sodium (COLACE) 250 MG capsule, Take 1 capsule by mouth 2 (Two) Times a Day As Needed for Constipation., Disp: 30 capsule, Rfl: 1  •  EPINEPHrine (EPIPEN) 0.3 MG/0.3ML solution auto-injector injection, Inject 0.3 mL into the appropriate muscle as directed by prescriber Daily As Needed., Disp: , Rfl:   •  fluticasone (FLONASE) 50 MCG/ACT nasal spray, 2 sprays into the nostril(s) as directed by provider Daily., Disp: , Rfl:   •  furosemide (LASIX) 40 MG tablet, TAKE 1 TABLET IN MORNING AND 1/2 TABLET IN EVENG, Disp: 45 tablet, Rfl: 1  •  KLOR-CON 10 MEQ CR tablet, TAKE 1 TABLET BY MOUTH EVERY DAY, Disp: 90 tablet, Rfl: 1  •  levothyroxine (SYNTHROID, LEVOTHROID) 25 MCG tablet, Take 25 mcg by mouth Daily., Disp: , Rfl:   •  Multiple Vitamins-Minerals (MULTIVITAMIN ADULT PO), Take 1 tablet by mouth Every Morning., Disp: , Rfl:   •  oxyCODONE-acetaminophen (PERCOCET) 5-325 MG per tablet, Take 1-2 tablets by mouth Every 4 (Four) Hours As Needed (pain)., Disp: 84 tablet, Rfl: 0  •  pantoprazole (PROTONIX) 40 MG EC tablet, TAKE 1 TABLET BY MOUTH EVERY MORNING 30MIN BEFORE BREAKFAST FOR 90 DAYS, Disp: , Rfl:   •  sertraline (ZOLOFT) 100 MG tablet, Take 150 mg by mouth Every Morning., Disp: , Rfl:   •  SUMAtriptan (IMITREX) 50 MG tablet, Take 50 mg by mouth Every 2 (Two) Hours As Needed for Migraine.,  Disp: , Rfl: 4  No current facility-administered medications for this visit.     Facility-Administered Medications Ordered in Other Visits:   •  ropivacaine (NAROPIN) 0.5 % 50 mL, Morphine (PF) 5 mg, ketorolac (TORADOL) 30 mg, EPINEPHrine (ADRENALIN) 30 MG/30ML 0.3 mg in sodium chloride 0.9 % 101.8 mL, , Injection, Once, Jamal Castañeda MD   Assessment:        Patient Active Problem List   Diagnosis   • Chronic atrial fibrillation (CMS/HCC)   • Benign essential HTN   • Bradycardia   • Chest pain   • Can't get food down   • Accumulation of fluid in tissues   • Esophageal lump   • Adynamia   • Itch of skin   • Anorexia   • Chronic nausea   • Gastroesophageal reflux disease   • Breath shortness   • Emesis   • Decreased body weight   • Anxiety   • Narrowing of intervertebral disc space   • Diverticulosis of intestine   • Hypertension   • Migraine   • Osteoporosis   • Palpitations   • Pituitary adenoma (CMS/HCC)   • Sick sinus syndrome (CMS/HCC)   • Diarrhea   • Paroxysmal atrial fibrillation (CMS/HCC)   • Cardiomyopathy (CMS/HCC)   • Anticoagulated   • Atrial fibrillation (CMS/HCC)   • On amiodarone therapy   • Pacemaker   • Cardiac resynchronization therapy pacemaker (CRT-P) in place   • Infected pacemaker (CMS/HCC)   • Wound infection   • Primary osteoarthritis of left knee   • DJD (degenerative joint disease)               Plan:            ICD-10-CM ICD-9-CM   1. Abnormal EKG  R94.31 794.31   2. Permanent atrial fibrillation (CMS/HCC)  I48.21 427.31   3. Essential hypertension  I10 401.9   4. Benign essential HTN  I10 401.1   5. Pacemaker  Z95.0 V45.01     1. Permanent atrial fibrillation (CMS/HCC)  Considering the patient's symptoms as well as clinical situation and  EKG findings, along with cardiac risk factors, ischemic workup is necessary to rule out ischemic cardiomyopathy, stress induced arrhythmias, and functional capacity for diagnosis as well as prognostic consideration    Considering patient's medical  condition as well as the risk factors, patient will require echocardiogram for further evaluation for the LV function, four-chamber evaluation, including the pressures, valvular function and  pericardial disease and pericardial effusion    - Stress Test With Myocardial Perfusion One Day  - Adult Transthoracic Echo Complete W/ Cont if Necessary Per Protocol    2. Essential hypertension  Blood pressure under control  - Stress Test With Myocardial Perfusion One Day  - Adult Transthoracic Echo Complete W/ Cont if Necessary Per Protocol    3. Benign essential HTN  Under control  - Stress Test With Myocardial Perfusion One Day  - Adult Transthoracic Echo Complete W/ Cont if Necessary Per Protocol    4. Abnormal EKG  Considering the patient's symptoms as well as clinical situation and  EKG findings, along with cardiac risk factors, ischemic workup is necessary to rule out ischemic cardiomyopathy, stress induced arrhythmias, and functional capacity for diagnosis as well as prognostic consideration    - Stress Test With Myocardial Perfusion One Day  - Adult Transthoracic Echo Complete W/ Cont if Necessary Per Protocol    5. Pacemaker  Functioning normally     6.  Anticoagulation  Patient had stopped taking Eliquis because of the bruising patient is willing to restart pros and cons has been discussed  Dc asa    Start elloquise     Support shocks    jeovany    Echo    1 monthu  COUNSELING:    Raina Perkins was given to patient for the following topics: diagnostic results, risk factor reductions, impressions, risks and benefits of treatment options and importance of treatment compliance .       SMOKING COUNSELING:    [unfilled]    Dictated using Dragon dictation

## 2020-12-30 ENCOUNTER — HOSPITAL ENCOUNTER (OUTPATIENT)
Dept: MRI IMAGING | Facility: HOSPITAL | Age: 75
Discharge: HOME | End: 2020-12-30
Admitting: ORTHOPAEDIC SURGERY

## 2020-12-30 VITALS
DIASTOLIC BLOOD PRESSURE: 66 MMHG | HEART RATE: 87 BPM | SYSTOLIC BLOOD PRESSURE: 113 MMHG | RESPIRATION RATE: 18 BRPM | OXYGEN SATURATION: 100 %

## 2020-12-30 DIAGNOSIS — M51.36 DEGENERATIVE LUMBAR DISC: ICD-10-CM

## 2020-12-30 PROCEDURE — 72148 MRI LUMBAR SPINE W/O DYE: CPT

## 2020-12-30 NOTE — NURSING NOTE
MRI complete [employer] adjusted pacer. Patient dressed herself. Taken by wheelchair to Entrance A to meet her .    Nurse wearing mask and goggles during all interactions with patient. Patient wore mask.

## 2020-12-30 NOTE — NURSING NOTE
Patient arrived for MRI with pacer. [employer] arrived to address pacer. Patient in Afib. I will accompany her to MRI.    Nurse wearing mask and goggles during all interactions with patient. Patient wore mask.

## 2021-01-04 RX ORDER — FUROSEMIDE 40 MG/1
TABLET ORAL
Qty: 45 TABLET | Refills: 1 | Status: SHIPPED | OUTPATIENT
Start: 2021-01-04 | End: 2021-02-01

## 2021-01-10 ENCOUNTER — ON CAMPUS - OUTPATIENT (OUTPATIENT)
Dept: URBAN - METROPOLITAN AREA HOSPITAL 108 | Facility: HOSPITAL | Age: 76
End: 2021-01-10

## 2021-01-10 DIAGNOSIS — R10.9 UNSPECIFIED ABDOMINAL PAIN: ICD-10-CM

## 2021-01-10 DIAGNOSIS — R19.4 CHANGE IN BOWEL HABIT: ICD-10-CM

## 2021-01-10 DIAGNOSIS — R11.2 NAUSEA WITH VOMITING, UNSPECIFIED: ICD-10-CM

## 2021-01-10 DIAGNOSIS — R93.2 ABNORMAL FINDINGS ON DIAGNOSTIC IMAGING OF LIVER AND BILIARY: ICD-10-CM

## 2021-01-10 PROCEDURE — 99214 OFFICE O/P EST MOD 30 MIN: CPT | Performed by: INTERNAL MEDICINE

## 2021-01-11 ENCOUNTER — INPATIENT HOSPITAL (OUTPATIENT)
Dept: URBAN - METROPOLITAN AREA HOSPITAL 107 | Facility: HOSPITAL | Age: 76
End: 2021-01-11

## 2021-01-11 DIAGNOSIS — R10.9 UNSPECIFIED ABDOMINAL PAIN: ICD-10-CM

## 2021-01-11 DIAGNOSIS — K83.8 OTHER SPECIFIED DISEASES OF BILIARY TRACT: ICD-10-CM

## 2021-01-11 DIAGNOSIS — R93.3 ABNORMAL FINDINGS ON DIAGNOSTIC IMAGING OF OTHER PARTS OF DI: ICD-10-CM

## 2021-01-11 DIAGNOSIS — R11.2 NAUSEA WITH VOMITING, UNSPECIFIED: ICD-10-CM

## 2021-01-11 DIAGNOSIS — R19.4 CHANGE IN BOWEL HABIT: ICD-10-CM

## 2021-01-11 DIAGNOSIS — R74.8 ABNORMAL LEVELS OF OTHER SERUM ENZYMES: ICD-10-CM

## 2021-01-11 PROCEDURE — 99231 SBSQ HOSP IP/OBS SF/LOW 25: CPT | Performed by: PHYSICIAN ASSISTANT

## 2021-01-15 ENCOUNTER — OFFICE (OUTPATIENT)
Dept: URBAN - METROPOLITAN AREA CLINIC 75 | Facility: CLINIC | Age: 76
End: 2021-01-15

## 2021-01-15 VITALS — HEIGHT: 69 IN | WEIGHT: 144 LBS | TEMPERATURE: 96.3 F

## 2021-01-15 DIAGNOSIS — R93.3 ABNORMAL FINDINGS ON DIAGNOSTIC IMAGING OF OTHER PARTS OF DI: ICD-10-CM

## 2021-01-15 DIAGNOSIS — R74.8 ABNORMAL LEVELS OF OTHER SERUM ENZYMES: ICD-10-CM

## 2021-01-15 DIAGNOSIS — B96.81 HELICOBACTER PYLORI [H. PYLORI] AS THE CAUSE OF DISEASES CLA: ICD-10-CM

## 2021-01-15 DIAGNOSIS — R11.2 NAUSEA WITH VOMITING, UNSPECIFIED: ICD-10-CM

## 2021-01-15 PROCEDURE — 99213 OFFICE O/P EST LOW 20 MIN: CPT | Performed by: INTERNAL MEDICINE

## 2021-01-25 RX ORDER — POTASSIUM CHLORIDE 750 MG/1
TABLET, EXTENDED RELEASE ORAL
Qty: 90 TABLET | Refills: 0 | Status: SHIPPED | OUTPATIENT
Start: 2021-01-25 | End: 2021-03-09

## 2021-01-26 ENCOUNTER — TRANSCRIBE ORDERS (OUTPATIENT)
Dept: ADMINISTRATIVE | Facility: HOSPITAL | Age: 76
End: 2021-01-26

## 2021-01-26 DIAGNOSIS — S32.2XXB: Primary | ICD-10-CM

## 2021-01-26 DIAGNOSIS — S32.10XB: Primary | ICD-10-CM

## 2021-02-01 RX ORDER — FUROSEMIDE 40 MG/1
TABLET ORAL
Qty: 45 TABLET | Refills: 1 | Status: SHIPPED | OUTPATIENT
Start: 2021-02-01 | End: 2021-02-26

## 2021-02-04 RX ORDER — POTASSIUM CHLORIDE 750 MG/1
TABLET, EXTENDED RELEASE ORAL
Qty: 90 TABLET | Refills: 0 | OUTPATIENT
Start: 2021-02-04

## 2021-02-15 ENCOUNTER — TRANSCRIBE ORDERS (OUTPATIENT)
Dept: SLEEP MEDICINE | Facility: HOSPITAL | Age: 76
End: 2021-02-15

## 2021-02-15 DIAGNOSIS — Z01.818 OTHER SPECIFIED PRE-OPERATIVE EXAMINATION: Primary | ICD-10-CM

## 2021-02-16 ENCOUNTER — LAB (OUTPATIENT)
Dept: LAB | Facility: HOSPITAL | Age: 76
End: 2021-02-16

## 2021-02-16 DIAGNOSIS — Z01.818 OTHER SPECIFIED PRE-OPERATIVE EXAMINATION: ICD-10-CM

## 2021-02-16 PROCEDURE — C9803 HOPD COVID-19 SPEC COLLECT: HCPCS

## 2021-02-16 PROCEDURE — U0004 COV-19 TEST NON-CDC HGH THRU: HCPCS

## 2021-02-17 LAB — SARS-COV-2 RNA RESP QL NAA+PROBE: NOT DETECTED

## 2021-02-18 ENCOUNTER — APPOINTMENT (OUTPATIENT)
Dept: CARDIOLOGY | Facility: HOSPITAL | Age: 76
End: 2021-02-18

## 2021-02-18 ENCOUNTER — HOSPITAL ENCOUNTER (OUTPATIENT)
Dept: CARDIOLOGY | Facility: HOSPITAL | Age: 76
End: 2021-02-18

## 2021-02-26 RX ORDER — FUROSEMIDE 40 MG/1
TABLET ORAL
Qty: 45 TABLET | Refills: 1 | Status: SHIPPED | OUTPATIENT
Start: 2021-02-26 | End: 2021-03-24

## 2021-03-05 ENCOUNTER — TRANSCRIBE ORDERS (OUTPATIENT)
Dept: LAB | Facility: HOSPITAL | Age: 76
End: 2021-03-05

## 2021-03-05 DIAGNOSIS — Z01.818 OTHER SPECIFIED PRE-OPERATIVE EXAMINATION: Primary | ICD-10-CM

## 2021-03-09 RX ORDER — POTASSIUM CHLORIDE 750 MG/1
TABLET, EXTENDED RELEASE ORAL
Qty: 90 TABLET | Refills: 0 | Status: SHIPPED | OUTPATIENT
Start: 2021-03-09 | End: 2021-08-03

## 2021-03-15 ENCOUNTER — TRANSCRIBE ORDERS (OUTPATIENT)
Dept: LAB | Facility: HOSPITAL | Age: 76
End: 2021-03-15

## 2021-03-15 VITALS
SYSTOLIC BLOOD PRESSURE: 121 MMHG | DIASTOLIC BLOOD PRESSURE: 78 MMHG | HEART RATE: 69 BPM | TEMPERATURE: 96.9 F | HEIGHT: 69 IN | OXYGEN SATURATION: 96 % | WEIGHT: 161 LBS

## 2021-03-15 DIAGNOSIS — Z01.818 OTHER SPECIFIED PRE-OPERATIVE EXAMINATION: Primary | ICD-10-CM

## 2021-03-16 ENCOUNTER — LAB (OUTPATIENT)
Dept: LAB | Facility: HOSPITAL | Age: 76
End: 2021-03-16

## 2021-03-16 ENCOUNTER — TRANSCRIBE ORDERS (OUTPATIENT)
Dept: SLEEP MEDICINE | Facility: HOSPITAL | Age: 76
End: 2021-03-16

## 2021-03-16 ENCOUNTER — OFFICE (OUTPATIENT)
Dept: URBAN - METROPOLITAN AREA CLINIC 75 | Facility: CLINIC | Age: 76
End: 2021-03-16

## 2021-03-16 DIAGNOSIS — R22.9 LOCALIZED SWELLING, MASS AND LUMP, UNSPECIFIED: ICD-10-CM

## 2021-03-16 DIAGNOSIS — Z01.818 OTHER SPECIFIED PRE-OPERATIVE EXAMINATION: Primary | ICD-10-CM

## 2021-03-16 DIAGNOSIS — K25.9 GASTRIC ULCER, UNSPECIFIED AS ACUTE OR CHRONIC, WITHOUT HEMO: ICD-10-CM

## 2021-03-16 DIAGNOSIS — B96.81 HELICOBACTER PYLORI [H. PYLORI] AS THE CAUSE OF DISEASES CLA: ICD-10-CM

## 2021-03-16 DIAGNOSIS — Z01.818 OTHER SPECIFIED PRE-OPERATIVE EXAMINATION: ICD-10-CM

## 2021-03-16 PROCEDURE — U0004 COV-19 TEST NON-CDC HGH THRU: HCPCS

## 2021-03-16 PROCEDURE — 99214 OFFICE O/P EST MOD 30 MIN: CPT | Performed by: INTERNAL MEDICINE

## 2021-03-16 PROCEDURE — C9803 HOPD COVID-19 SPEC COLLECT: HCPCS

## 2021-03-17 LAB — SARS-COV-2 RNA RESP QL NAA+PROBE: NOT DETECTED

## 2021-03-18 ENCOUNTER — HOSPITAL ENCOUNTER (OUTPATIENT)
Dept: CT IMAGING | Facility: HOSPITAL | Age: 76
Discharge: HOME | End: 2021-03-18
Admitting: NEUROLOGICAL SURGERY

## 2021-03-18 DIAGNOSIS — S32.10XB: ICD-10-CM

## 2021-03-18 DIAGNOSIS — S32.2XXB: ICD-10-CM

## 2021-03-18 PROCEDURE — 72192 CT PELVIS W/O DYE: CPT

## 2021-03-24 RX ORDER — FUROSEMIDE 40 MG/1
TABLET ORAL
Qty: 45 TABLET | Refills: 0 | Status: SHIPPED | OUTPATIENT
Start: 2021-03-24 | End: 2021-04-20

## 2021-03-27 ENCOUNTER — LAB (OUTPATIENT)
Dept: LAB | Facility: HOSPITAL | Age: 76
End: 2021-03-27

## 2021-03-27 DIAGNOSIS — Z01.818 OTHER SPECIFIED PRE-OPERATIVE EXAMINATION: ICD-10-CM

## 2021-03-27 PROCEDURE — C9803 HOPD COVID-19 SPEC COLLECT: HCPCS

## 2021-03-27 PROCEDURE — U0004 COV-19 TEST NON-CDC HGH THRU: HCPCS

## 2021-03-29 LAB — SARS-COV-2 RNA RESP QL NAA+PROBE: NOT DETECTED

## 2021-03-30 ENCOUNTER — APPOINTMENT (OUTPATIENT)
Dept: CARDIOLOGY | Facility: HOSPITAL | Age: 76
End: 2021-03-30

## 2021-03-30 ENCOUNTER — HOSPITAL ENCOUNTER (OUTPATIENT)
Dept: CARDIOLOGY | Facility: HOSPITAL | Age: 76
Discharge: HOME | End: 2021-03-30

## 2021-03-30 ENCOUNTER — HOSPITAL ENCOUNTER (OUTPATIENT)
Dept: CARDIOLOGY | Facility: HOSPITAL | Age: 76
Discharge: HOME | End: 2021-03-30
Admitting: INTERNAL MEDICINE

## 2021-03-30 VITALS
HEART RATE: 90 BPM | DIASTOLIC BLOOD PRESSURE: 64 MMHG | HEIGHT: 70 IN | SYSTOLIC BLOOD PRESSURE: 112 MMHG | WEIGHT: 170 LBS | BODY MASS INDEX: 24.34 KG/M2 | RESPIRATION RATE: 18 BRPM

## 2021-03-30 VITALS
WEIGHT: 170 LBS | SYSTOLIC BLOOD PRESSURE: 127 MMHG | HEART RATE: 99 BPM | DIASTOLIC BLOOD PRESSURE: 83 MMHG | HEIGHT: 70 IN | BODY MASS INDEX: 24.34 KG/M2

## 2021-03-30 DIAGNOSIS — I48.20 CHRONIC ATRIAL FIBRILLATION (HCC): ICD-10-CM

## 2021-03-30 LAB
AORTIC DIMENSIONLESS INDEX: 0.6 (DI)
ASCENDING AORTA: 2.8 CM
BH CV ECHO MEAS - ACS: 1.7 CM
BH CV ECHO MEAS - AI DEC SLOPE: 205.6 CM/SEC^2
BH CV ECHO MEAS - AI MAX PG: 35.4 MMHG
BH CV ECHO MEAS - AI MAX VEL: 297.5 CM/SEC
BH CV ECHO MEAS - AI P1/2T: 423.9 MSEC
BH CV ECHO MEAS - AO MAX PG (FULL): 2.4 MMHG
BH CV ECHO MEAS - AO MAX PG: 5.2 MMHG
BH CV ECHO MEAS - AO MEAN PG (FULL): 1.4 MMHG
BH CV ECHO MEAS - AO MEAN PG: 2.8 MMHG
BH CV ECHO MEAS - AO ROOT AREA (BSA CORRECTED): 1.4
BH CV ECHO MEAS - AO ROOT AREA: 6 CM^2
BH CV ECHO MEAS - AO ROOT DIAM: 2.8 CM
BH CV ECHO MEAS - AO V2 MAX: 114 CM/SEC
BH CV ECHO MEAS - AO V2 MEAN: 77 CM/SEC
BH CV ECHO MEAS - AO V2 VTI: 25 CM
BH CV ECHO MEAS - ASC AORTA: 2.8 CM
BH CV ECHO MEAS - AVA(I,A): 1.7 CM^2
BH CV ECHO MEAS - AVA(I,D): 1.7 CM^2
BH CV ECHO MEAS - AVA(V,A): 1.9 CM^2
BH CV ECHO MEAS - AVA(V,D): 1.9 CM^2
BH CV ECHO MEAS - BSA(HAYCOCK): 2 M^2
BH CV ECHO MEAS - BSA: 1.9 M^2
BH CV ECHO MEAS - BZI_BMI: 24.4 KILOGRAMS/M^2
BH CV ECHO MEAS - BZI_METRIC_HEIGHT: 177.8 CM
BH CV ECHO MEAS - BZI_METRIC_WEIGHT: 77.1 KG
BH CV ECHO MEAS - EDV(CUBED): 73.4 ML
BH CV ECHO MEAS - EDV(MOD-SP2): 50 ML
BH CV ECHO MEAS - EDV(MOD-SP4): 49 ML
BH CV ECHO MEAS - EDV(TEICH): 78 ML
BH CV ECHO MEAS - EF(CUBED): 72.5 %
BH CV ECHO MEAS - EF(MOD-BP): 61.8 %
BH CV ECHO MEAS - EF(MOD-SP2): 62 %
BH CV ECHO MEAS - EF(MOD-SP4): 61.2 %
BH CV ECHO MEAS - EF(TEICH): 64.7 %
BH CV ECHO MEAS - ESV(CUBED): 20.2 ML
BH CV ECHO MEAS - ESV(MOD-SP2): 19 ML
BH CV ECHO MEAS - ESV(MOD-SP4): 19 ML
BH CV ECHO MEAS - ESV(TEICH): 27.6 ML
BH CV ECHO MEAS - FS: 35 %
BH CV ECHO MEAS - IVS/LVPW: 0.9
BH CV ECHO MEAS - IVSD: 0.89 CM
BH CV ECHO MEAS - LAT PEAK E' VEL: 16.3 CM/SEC
BH CV ECHO MEAS - LV DIASTOLIC VOL/BSA (35-75): 25.2 ML/M^2
BH CV ECHO MEAS - LV MASS(C)D: 126 GRAMS
BH CV ECHO MEAS - LV MASS(C)DI: 64.7 GRAMS/M^2
BH CV ECHO MEAS - LV MAX PG: 2.8 MMHG
BH CV ECHO MEAS - LV MEAN PG: 1.4 MMHG
BH CV ECHO MEAS - LV SYSTOLIC VOL/BSA (12-30): 9.8 ML/M^2
BH CV ECHO MEAS - LV V1 MAX: 83.4 CM/SEC
BH CV ECHO MEAS - LV V1 MEAN: 53.8 CM/SEC
BH CV ECHO MEAS - LV V1 VTI: 16.3 CM
BH CV ECHO MEAS - LVIDD: 4.2 CM
BH CV ECHO MEAS - LVIDS: 2.7 CM
BH CV ECHO MEAS - LVLD AP2: 6.2 CM
BH CV ECHO MEAS - LVLD AP4: 5.9 CM
BH CV ECHO MEAS - LVLS AP2: 5.1 CM
BH CV ECHO MEAS - LVLS AP4: 5 CM
BH CV ECHO MEAS - LVOT AREA (M): 2.5 CM^2
BH CV ECHO MEAS - LVOT AREA: 2.6 CM^2
BH CV ECHO MEAS - LVOT DIAM: 1.8 CM
BH CV ECHO MEAS - LVPWD: 0.99 CM
BH CV ECHO MEAS - MED PEAK E' VEL: 10.6 CM/SEC
BH CV ECHO MEAS - MR MAX PG: 63.2 MMHG
BH CV ECHO MEAS - MR MAX VEL: 397.4 CM/SEC
BH CV ECHO MEAS - MV DEC TIME: 222 SEC
BH CV ECHO MEAS - MV E MAX VEL: 102.3 CM/SEC
BH CV ECHO MEAS - PA ACC TIME: 0.09 SEC
BH CV ECHO MEAS - PA MAX PG (FULL): 0.9 MMHG
BH CV ECHO MEAS - PA MAX PG: 2 MMHG
BH CV ECHO MEAS - PA PR(ACCEL): 39.8 MMHG
BH CV ECHO MEAS - PA V2 MAX: 70.6 CM/SEC
BH CV ECHO MEAS - PULM DIAS VEL: 68.9 CM/SEC
BH CV ECHO MEAS - PULM S/D: 0.66
BH CV ECHO MEAS - PULM SYS VEL: 45.6 CM/SEC
BH CV ECHO MEAS - PVA(V,A): 3.7 CM^2
BH CV ECHO MEAS - PVA(V,D): 3.7 CM^2
BH CV ECHO MEAS - QP/QS: 1.4
BH CV ECHO MEAS - RAP SYSTOLE: 3 MMHG
BH CV ECHO MEAS - RV MAX PG: 1.1 MMHG
BH CV ECHO MEAS - RV MEAN PG: 0.59 MMHG
BH CV ECHO MEAS - RV V1 MAX: 52.4 CM/SEC
BH CV ECHO MEAS - RV V1 MEAN: 35.7 CM/SEC
BH CV ECHO MEAS - RV V1 VTI: 11.8 CM
BH CV ECHO MEAS - RVOT AREA: 5 CM^2
BH CV ECHO MEAS - RVOT DIAM: 2.5 CM
BH CV ECHO MEAS - RVSP: 39 MMHG
BH CV ECHO MEAS - SI(AO): 77.1 ML/M^2
BH CV ECHO MEAS - SI(CUBED): 27.3 ML/M^2
BH CV ECHO MEAS - SI(LVOT): 21.5 ML/M^2
BH CV ECHO MEAS - SI(MOD-SP2): 15.9 ML/M^2
BH CV ECHO MEAS - SI(MOD-SP4): 15.4 ML/M^2
BH CV ECHO MEAS - SI(TEICH): 25.9 ML/M^2
BH CV ECHO MEAS - SV(AO): 150.3 ML
BH CV ECHO MEAS - SV(CUBED): 53.2 ML
BH CV ECHO MEAS - SV(LVOT): 41.8 ML
BH CV ECHO MEAS - SV(MOD-SP2): 31 ML
BH CV ECHO MEAS - SV(MOD-SP4): 30 ML
BH CV ECHO MEAS - SV(RVOT): 58.5 ML
BH CV ECHO MEAS - SV(TEICH): 50.4 ML
BH CV ECHO MEAS - TAPSE (>1.6): 1 CM
BH CV ECHO MEAS - TR MAX PG: 36 MMHG
BH CV ECHO MEAS - TR MAX VEL: 301.6 CM/SEC
BH CV ECHO MEASUREMENTS AVERAGE E/E' RATIO: 7.61
BH CV XLRA - RV BASE: 3.9 CM
BH CV XLRA - RV LENGTH: 5.7 CM
BH CV XLRA - RV MID: 3.6 CM
BH CV XLRA - TDI S': 9.4 CM/SEC
LEFT ATRIUM VOLUME INDEX: 47.9 ML/M2
SINUS: 2.8 CM
STJ: 2.3 CM

## 2021-03-30 PROCEDURE — 93306 TTE W/DOPPLER COMPLETE: CPT

## 2021-03-30 PROCEDURE — 78452 HT MUSCLE IMAGE SPECT MULT: CPT | Performed by: INTERNAL MEDICINE

## 2021-03-30 PROCEDURE — A9500 TC99M SESTAMIBI: HCPCS | Performed by: INTERNAL MEDICINE

## 2021-03-30 PROCEDURE — 93306 TTE W/DOPPLER COMPLETE: CPT | Performed by: INTERNAL MEDICINE

## 2021-03-30 PROCEDURE — 78452 HT MUSCLE IMAGE SPECT MULT: CPT

## 2021-03-30 PROCEDURE — 93017 CV STRESS TEST TRACING ONLY: CPT

## 2021-03-30 PROCEDURE — 93018 CV STRESS TEST I&R ONLY: CPT | Performed by: INTERNAL MEDICINE

## 2021-03-30 PROCEDURE — 0 TECHNETIUM SESTAMIBI: Performed by: INTERNAL MEDICINE

## 2021-03-30 PROCEDURE — 93016 CV STRESS TEST SUPVJ ONLY: CPT | Performed by: NURSE PRACTITIONER

## 2021-03-30 RX ADMIN — TECHNETIUM TC 99M SESTAMIBI 1 DOSE: 1 INJECTION INTRAVENOUS at 09:38

## 2021-03-30 RX ADMIN — TECHNETIUM TC 99M SESTAMIBI 1 DOSE: 1 INJECTION INTRAVENOUS at 07:35

## 2021-04-01 LAB
BH CV REST NUCLEAR ISOTOPE DOSE: 10.9 MCI
BH CV STRESS BP STAGE 1: NORMAL
BH CV STRESS BP STAGE 2: NORMAL
BH CV STRESS DURATION MIN STAGE 1: 2
BH CV STRESS DURATION SEC STAGE 1: 50
BH CV STRESS DURATION SEC STAGE 2: 30
BH CV STRESS GRADE STAGE 1: 0
BH CV STRESS GRADE STAGE 2: 3
BH CV STRESS HR STAGE 1: 120
BH CV STRESS HR STAGE 2: 129
BH CV STRESS METS STAGE 1: 1.6
BH CV STRESS METS STAGE 2: 2
BH CV STRESS NUCLEAR ISOTOPE DOSE: 32.9 MCI
BH CV STRESS PROTOCOL 1: NORMAL
BH CV STRESS RECOVERY BP: NORMAL MMHG
BH CV STRESS RECOVERY HR: 87 BPM
BH CV STRESS SPEED STAGE 1: 0.9
BH CV STRESS SPEED STAGE 2: 0.9
BH CV STRESS STAGE 1: 0
BH CV STRESS STAGE 2: NORMAL
LV EF NUC BP: 76 %
MAXIMAL PREDICTED HEART RATE: 143 BPM
PERCENT MAX PREDICTED HR: 90.21 %
STRESS BASELINE BP: NORMAL MMHG
STRESS BASELINE HR: 86 BPM
STRESS PERCENT HR: 106 %
STRESS POST ESTIMATED WORKLOAD: 2 METS
STRESS POST EXERCISE DUR MIN: 3 MIN
STRESS POST EXERCISE DUR SEC: 20 SEC
STRESS POST PEAK BP: NORMAL MMHG
STRESS POST PEAK HR: 129 BPM
STRESS TARGET HR: 122 BPM

## 2021-04-05 ENCOUNTER — OFFICE VISIT (OUTPATIENT)
Dept: CARDIOLOGY | Facility: CLINIC | Age: 76
End: 2021-04-05

## 2021-04-05 VITALS
SYSTOLIC BLOOD PRESSURE: 115 MMHG | WEIGHT: 162 LBS | HEART RATE: 91 BPM | HEIGHT: 70 IN | DIASTOLIC BLOOD PRESSURE: 77 MMHG | BODY MASS INDEX: 23.19 KG/M2

## 2021-04-05 DIAGNOSIS — I10 ESSENTIAL HYPERTENSION: ICD-10-CM

## 2021-04-05 DIAGNOSIS — I48.20 CHRONIC ATRIAL FIBRILLATION (HCC): ICD-10-CM

## 2021-04-05 DIAGNOSIS — I10 BENIGN ESSENTIAL HTN: Primary | ICD-10-CM

## 2021-04-05 DIAGNOSIS — R00.1 BRADYCARDIA: ICD-10-CM

## 2021-04-05 PROCEDURE — 99213 OFFICE O/P EST LOW 20 MIN: CPT | Performed by: INTERNAL MEDICINE

## 2021-04-05 PROCEDURE — 93000 ELECTROCARDIOGRAM COMPLETE: CPT | Performed by: INTERNAL MEDICINE

## 2021-04-05 NOTE — PROGRESS NOTES
1 YR FOLLOW UP     STRESS/ECHO RESULTS    Subjective:        Raina Forrest is a 75 y.o. female who here for follow up    CC  AFIB  HPI  75-year-old female with a known history of chronic atrial fibrillation, hypertension bradycardia and hypertension here for the follow-up has been doing well with no complaints of chest pains tightness heaviness or the pressure sensation     Problems Addressed this Visit        Cardiac and Vasculature    Chronic atrial fibrillation (CMS/HCC)    Benign essential HTN - Primary    Bradycardia    Hypertension      Diagnoses       Codes Comments    Benign essential HTN    -  Primary ICD-10-CM: I10  ICD-9-CM: 401.1     Chronic atrial fibrillation (CMS/HCC)     ICD-10-CM: I48.20  ICD-9-CM: 427.31     Bradycardia     ICD-10-CM: R00.1  ICD-9-CM: 427.89     Essential hypertension     ICD-10-CM: I10  ICD-9-CM: 401.9         .    The following portions of the patient's history were reviewed and updated as appropriate: allergies, current medications, past family history, past medical history, past social history, past surgical history and problem list.    Past Medical History:   Diagnosis Date   • Atrial fibrillation (CMS/HCC)    • Fung's esophagus    • Benign essential hypertension    • Blood clot in vein    • Cardiomyopathy (CMS/HCC)    • Chronic gastritis    • Congestive heart failure (CMS/HCC)    • Degenerative disc disease    • Dementia (CMS/HCC)     Pt  states she has slight dementia   • Depression with anxiety    • Disease of thyroid gland    • Diverticulitis of colon    • Dysphagia    • GERD (gastroesophageal reflux disease)    • History of chest pain    • History of migraine    • History of migraine headaches    • Left knee pain    • Osteoporosis    • Palpitations    • Thyroid disorder    • Thyroid nodule    • Ulcerative colitis (CMS/HCC)      reports that she has never smoked. She has never used smokeless tobacco. She reports that she does not drink alcohol and does not  "use drugs.   Family History   Problem Relation Age of Onset   • Lung cancer Other    • Ulcerative colitis Sister    • Heart failure Mother    • Heart disease Mother    • Heart failure Father    • Heart disease Father    • Malig Hyperthermia Neg Hx        Review of Systems  Constitutional: No wt loss, fever, fatigue  Gastrointestinal: No nausea, abdominal pain  Behavioral/Psych: No insomnia or anxiety   Cardiovascular no chest pains or tightness in the chest  Objective:       Physical Exam  /77   Pulse 91   Ht 177.8 cm (70\")   Wt 73.5 kg (162 lb)   BMI 23.24 kg/m²   General appearance: No acute changes   Neck: Trachea midline; NECK, supple, no thyromegaly or lymphadenopathy   Lungs: Normal size and shape, normal breath sounds, equal distribution of air, no rales and rhonchi   CV: S1-S2 irregular, no murmurs, no rub, no gallop   Abdomen: Soft, non-tender; no masses , no abnormal abdominal sounds   Extremities: No deformity , normal color , no peripheral edema   Skin: Normal temperature, turgor and texture; no rash, ulcers            ECG 12 Lead    Date/Time: 4/5/2021 1:37 PM  Performed by: Robinson Salazar MD  Authorized by: Robinson Salazar MD   Comparison: compared with previous ECG   Comparison to previous ECG: AFIB  Rhythm: atrial fibrillation  ST Flattening: all    Clinical impression: abnormal EKG              Echocardiogram:        Current Outpatient Medications:   •  aspirin  MG EC tablet, Take 1 tablet by mouth 2 (Two) Times a Day With Meals. (Patient taking differently: Take 325 mg by mouth Daily.), Disp: 60 tablet, Rfl: 0  •  bisoprolol-hydrochlorothiazide (ZIAC) 5-6.25 MG per tablet, TAKE 1 TABLET BY MOUTH EVERY DAY, Disp: 90 tablet, Rfl: 1  •  calcium-vitamin D (OSCAL 500/200 D-3) 500-200 MG-UNIT per tablet, Take 1 tablet by mouth Daily., Disp: , Rfl:   •  Carboxymethylcellulose Sodium (EYE DROPS OP), Apply  to eye(s) as directed by provider. Pt to bring  To surgery, Disp: , " Rfl:   •  docusate sodium (COLACE) 250 MG capsule, Take 1 capsule by mouth 2 (Two) Times a Day As Needed for Constipation., Disp: 30 capsule, Rfl: 1  •  EPINEPHrine (EPIPEN) 0.3 MG/0.3ML solution auto-injector injection, Inject 0.3 mL into the appropriate muscle as directed by prescriber Daily As Needed., Disp: , Rfl:   •  fluticasone (FLONASE) 50 MCG/ACT nasal spray, 2 sprays into the nostril(s) as directed by provider Daily., Disp: , Rfl:   •  furosemide (LASIX) 40 MG tablet, TAKE 1 TABLET IN MORNING AND 1/2 TABLET IN EVENING, Disp: 45 tablet, Rfl: 0  •  KLOR-CON 10 MEQ CR tablet, TAKE 1 TABLET BY MOUTH EVERY DAY, Disp: 90 tablet, Rfl: 0  •  levothyroxine (SYNTHROID, LEVOTHROID) 25 MCG tablet, Take 25 mcg by mouth Daily., Disp: , Rfl:   •  Multiple Vitamins-Minerals (MULTIVITAMIN ADULT PO), Take 1 tablet by mouth Every Morning., Disp: , Rfl:   •  pantoprazole (PROTONIX) 40 MG EC tablet, TAKE 1 TABLET BY MOUTH EVERY MORNING 30MIN BEFORE BREAKFAST FOR 90 DAYS, Disp: , Rfl:   •  sertraline (ZOLOFT) 100 MG tablet, Take 150 mg by mouth Every Morning., Disp: , Rfl:   •  SUMAtriptan (IMITREX) 50 MG tablet, Take 50 mg by mouth Every 2 (Two) Hours As Needed for Migraine., Disp: , Rfl: 4   Assessment:        Patient Active Problem List   Diagnosis   • Chronic atrial fibrillation (CMS/HCC)   • Benign essential HTN   • Bradycardia   • Chest pain   • Can't get food down   • Accumulation of fluid in tissues   • Esophageal lump   • Adynamia   • Itch of skin   • Anorexia   • Chronic nausea   • Gastroesophageal reflux disease   • Breath shortness   • Emesis   • Decreased body weight   • Anxiety   • Narrowing of intervertebral disc space   • Diverticulosis of intestine   • Hypertension   • Migraine   • Osteoporosis   • Palpitations   • Pituitary adenoma (CMS/HCC)   • Sick sinus syndrome (CMS/HCC)   • Diarrhea   • Cardiomyopathy (CMS/HCC)   • Anticoagulated   • Atrial fibrillation (CMS/HCC)   • On amiodarone therapy   • Pacemaker    • Cardiac resynchronization therapy pacemaker (CRT-P) in place   • Infected pacemaker (CMS/HCC)   • Wound infection   • Primary osteoarthritis of left knee   • DJD (degenerative joint disease)     Interpretation Summary       · Findings consistent with an equivocal ECG stress test.  · Left ventricular ejection fraction is hyperdynamic (Calculated EF > 70%). .  · Myocardial perfusion imaging indicates a normal myocardial perfusion study with no evidence of ischemia.  · Impressions are consistent with a low risk study.               Plan:            ICD-10-CM ICD-9-CM   1. Benign essential HTN  I10 401.1   2. Chronic atrial fibrillation (CMS/HCC)  I48.20 427.31   3. Bradycardia  R00.1 427.89   4. Essential hypertension  I10 401.9     1. Benign essential HTN  Blood pressure under control    2. Chronic atrial fibrillation (CMS/HCC)  Under control    3. Bradycardia  Under control    4. Essential hypertension         Raina Forrest needs anti coagulation  At this point pt is not on anticoagulations.  Pros and cons of anticoagulations has been discussed  Alternate methods including Watchman has been discussed  At this stage it has been decided NO ANTICOAGULATIONS      1 YR WITH ECHO  COUNSELING:    Raina Perkins was given to patient for the following topics: diagnostic results, risk factor reductions, impressions, risks and benefits of treatment options and importance of treatment compliance .       SMOKING COUNSELING:    [unfilled]    Dictated using Dragon dictation

## 2021-04-21 RX ORDER — FUROSEMIDE 40 MG/1
TABLET ORAL
Qty: 45 TABLET | Refills: 0 | Status: SHIPPED | OUTPATIENT
Start: 2021-04-21 | End: 2021-05-03

## 2021-05-03 RX ORDER — FUROSEMIDE 40 MG/1
TABLET ORAL
Qty: 45 TABLET | Refills: 5 | Status: SHIPPED | OUTPATIENT
Start: 2021-05-03 | End: 2021-06-21 | Stop reason: ALTCHOICE

## 2021-05-04 ENCOUNTER — CLINICAL SUPPORT NO REQUIREMENTS (OUTPATIENT)
Dept: CARDIOLOGY | Facility: CLINIC | Age: 76
End: 2021-05-04

## 2021-05-04 DIAGNOSIS — Z95.0 PACEMAKER: Primary | ICD-10-CM

## 2021-05-04 PROCEDURE — 93288 INTERROG EVL PM/LDLS PM IP: CPT | Performed by: INTERNAL MEDICINE

## 2021-05-11 ENCOUNTER — TELEPHONE (OUTPATIENT)
Dept: CARDIOLOGY | Facility: CLINIC | Age: 76
End: 2021-05-11

## 2021-05-11 NOTE — TELEPHONE ENCOUNTER
----- Message from Mayo Flores sent at 5/10/2021  2:41 PM EDT -----  Regarding: feet swelling  Pt last saw Dr. ALVARADO on April 5 and C/O feet swelling pretty badly. She said they were not swelling before her last visit with him. Shoes are really tight. She takes a diuretic twice a day (one whole pill in the morning and a half pill in the evening) and is not seeing any results. She states that in the AM they're not really swollen but by the end of the day they are really big. She said that she does elevate them but it does not help. She is going to see her family doctor tomorrow and will mention it to him as well. Will wait to hear back to see if she needs to be seen.    Please advise [phone]    Spoke with patient, she has been watching her salt intake, she does wear stockings everyday, she states that it does help but no swollen as much. No weight gain. Patient is taking Lasix 40 mg and she takes Ziac 5-6.25 but only takes a half of tablet instead of 1 full tab.     Per Dr. ALVARADO patient is to elevate her legs and if not any better need to call to possible switch medications.     Called patient and no answer will call back.

## 2021-05-25 VITALS
HEART RATE: 68 BPM | RESPIRATION RATE: 15 BRPM | SYSTOLIC BLOOD PRESSURE: 117 MMHG | SYSTOLIC BLOOD PRESSURE: 101 MMHG | HEART RATE: 79 BPM | TEMPERATURE: 96.9 F | RESPIRATION RATE: 9 BRPM | OXYGEN SATURATION: 92 % | WEIGHT: 161 LBS | HEART RATE: 93 BPM | DIASTOLIC BLOOD PRESSURE: 65 MMHG | SYSTOLIC BLOOD PRESSURE: 129 MMHG | SYSTOLIC BLOOD PRESSURE: 120 MMHG | SYSTOLIC BLOOD PRESSURE: 119 MMHG | OXYGEN SATURATION: 95 % | HEART RATE: 71 BPM | DIASTOLIC BLOOD PRESSURE: 92 MMHG | DIASTOLIC BLOOD PRESSURE: 63 MMHG | DIASTOLIC BLOOD PRESSURE: 61 MMHG | RESPIRATION RATE: 18 BRPM | DIASTOLIC BLOOD PRESSURE: 86 MMHG | HEART RATE: 77 BPM | OXYGEN SATURATION: 99 % | HEART RATE: 84 BPM | OXYGEN SATURATION: 100 % | HEIGHT: 69 IN | TEMPERATURE: 98.1 F | SYSTOLIC BLOOD PRESSURE: 105 MMHG

## 2021-05-26 ENCOUNTER — OFFICE (OUTPATIENT)
Dept: URBAN - METROPOLITAN AREA PATHOLOGY 4 | Facility: PATHOLOGY | Age: 76
End: 2021-05-26
Payer: MEDICARE

## 2021-05-26 ENCOUNTER — OFFICE (OUTPATIENT)
Dept: URBAN - METROPOLITAN AREA PATHOLOGY 4 | Facility: PATHOLOGY | Age: 76
End: 2021-05-26

## 2021-05-26 ENCOUNTER — AMBULATORY SURGICAL CENTER (OUTPATIENT)
Dept: URBAN - METROPOLITAN AREA SURGERY 17 | Facility: SURGERY | Age: 76
End: 2021-05-26

## 2021-05-26 DIAGNOSIS — K29.50 UNSPECIFIED CHRONIC GASTRITIS WITHOUT BLEEDING: ICD-10-CM

## 2021-05-26 DIAGNOSIS — B96.81 HELICOBACTER PYLORI [H. PYLORI] AS THE CAUSE OF DISEASES CLA: ICD-10-CM

## 2021-05-26 DIAGNOSIS — K44.9 DIAPHRAGMATIC HERNIA WITHOUT OBSTRUCTION OR GANGRENE: ICD-10-CM

## 2021-05-26 LAB
GI HISTOLOGY: A. SELECT: (no result)
GI HISTOLOGY: PDF REPORT: (no result)

## 2021-05-26 PROCEDURE — 88342 IMHCHEM/IMCYTCHM 1ST ANTB: CPT | Performed by: INTERNAL MEDICINE

## 2021-05-26 PROCEDURE — 88305 TISSUE EXAM BY PATHOLOGIST: CPT | Performed by: INTERNAL MEDICINE

## 2021-05-26 PROCEDURE — 43239 EGD BIOPSY SINGLE/MULTIPLE: CPT | Performed by: INTERNAL MEDICINE

## 2021-06-01 ENCOUNTER — OFFICE (OUTPATIENT)
Dept: URBAN - METROPOLITAN AREA CLINIC 75 | Facility: CLINIC | Age: 76
End: 2021-06-01

## 2021-06-01 VITALS
HEART RATE: 96 BPM | SYSTOLIC BLOOD PRESSURE: 110 MMHG | WEIGHT: 160 LBS | DIASTOLIC BLOOD PRESSURE: 70 MMHG | OXYGEN SATURATION: 97 % | TEMPERATURE: 97.2 F | HEIGHT: 69 IN

## 2021-06-01 DIAGNOSIS — K64.4 RESIDUAL HEMORRHOIDAL SKIN TAGS: ICD-10-CM

## 2021-06-01 DIAGNOSIS — A04.8 OTHER SPECIFIED BACTERIAL INTESTINAL INFECTIONS: ICD-10-CM

## 2021-06-01 DIAGNOSIS — B96.81 HELICOBACTER PYLORI [H. PYLORI] AS THE CAUSE OF DISEASES CLA: ICD-10-CM

## 2021-06-01 PROCEDURE — 99213 OFFICE O/P EST LOW 20 MIN: CPT | Performed by: INTERNAL MEDICINE

## 2021-06-21 ENCOUNTER — OFFICE VISIT (OUTPATIENT)
Dept: CARDIOLOGY | Facility: CLINIC | Age: 76
End: 2021-06-21

## 2021-06-21 VITALS
BODY MASS INDEX: 23.34 KG/M2 | SYSTOLIC BLOOD PRESSURE: 120 MMHG | HEIGHT: 70 IN | DIASTOLIC BLOOD PRESSURE: 75 MMHG | HEART RATE: 96 BPM | WEIGHT: 163 LBS

## 2021-06-21 DIAGNOSIS — I42.9 CARDIOMYOPATHY, UNSPECIFIED TYPE (HCC): ICD-10-CM

## 2021-06-21 DIAGNOSIS — Z79.899 ON AMIODARONE THERAPY: ICD-10-CM

## 2021-06-21 DIAGNOSIS — R06.02 SHORTNESS OF BREATH: ICD-10-CM

## 2021-06-21 DIAGNOSIS — I50.9 CONGESTIVE HEART FAILURE, UNSPECIFIED HF CHRONICITY, UNSPECIFIED HEART FAILURE TYPE (HCC): ICD-10-CM

## 2021-06-21 DIAGNOSIS — I48.20 CHRONIC ATRIAL FIBRILLATION (HCC): ICD-10-CM

## 2021-06-21 DIAGNOSIS — M79.89 LEG SWELLING: Primary | ICD-10-CM

## 2021-06-21 DIAGNOSIS — Z95.0 PACEMAKER: ICD-10-CM

## 2021-06-21 DIAGNOSIS — I10 BENIGN ESSENTIAL HTN: ICD-10-CM

## 2021-06-21 PROCEDURE — 99214 OFFICE O/P EST MOD 30 MIN: CPT | Performed by: INTERNAL MEDICINE

## 2021-06-21 PROCEDURE — 93000 ELECTROCARDIOGRAM COMPLETE: CPT | Performed by: INTERNAL MEDICINE

## 2021-06-21 RX ORDER — TORSEMIDE 20 MG/1
20 TABLET ORAL DAILY
Qty: 30 TABLET | Refills: 6 | Status: SHIPPED | OUTPATIENT
Start: 2021-06-21 | End: 2021-09-24

## 2021-06-30 ENCOUNTER — TELEPHONE (OUTPATIENT)
Dept: CARDIOLOGY | Facility: CLINIC | Age: 76
End: 2021-06-30

## 2021-06-30 NOTE — TELEPHONE ENCOUNTER
Pt called stating her feet and ankles are still swollen. She has been taking the 20mg of Torsemide and she also take Ziac 5-6.25mg daily. She has been watching her salt intake as well of elevating her feet. She is concerned what she needs to do.     Will discuss with Dr. ALVARADO

## 2021-07-01 ENCOUNTER — TELEPHONE (OUTPATIENT)
Dept: CARDIOLOGY | Facility: CLINIC | Age: 76
End: 2021-07-01

## 2021-07-01 NOTE — TELEPHONE ENCOUNTER
Per Dr. ALVARADO patient needs to wear compression stockings and continue to elevate her feet and ankles.     LVM for patient to call the office back    Pt informed

## 2021-07-08 ENCOUNTER — OFFICE (OUTPATIENT)
Dept: URBAN - METROPOLITAN AREA CLINIC 75 | Facility: CLINIC | Age: 76
End: 2021-07-08

## 2021-07-08 VITALS
WEIGHT: 163 LBS | HEART RATE: 97 BPM | DIASTOLIC BLOOD PRESSURE: 76 MMHG | HEIGHT: 69 IN | SYSTOLIC BLOOD PRESSURE: 110 MMHG | OXYGEN SATURATION: 100 %

## 2021-07-08 DIAGNOSIS — R14.0 ABDOMINAL DISTENSION (GASEOUS): ICD-10-CM

## 2021-07-08 DIAGNOSIS — K64.4 RESIDUAL HEMORRHOIDAL SKIN TAGS: ICD-10-CM

## 2021-07-08 DIAGNOSIS — K59.04 CHRONIC IDIOPATHIC CONSTIPATION: ICD-10-CM

## 2021-07-08 PROBLEM — K62.1 RECTAL POLYP: Status: ACTIVE | Noted: 2020-10-12

## 2021-07-08 PROCEDURE — 99213 OFFICE O/P EST LOW 20 MIN: CPT | Performed by: INTERNAL MEDICINE

## 2021-07-14 ENCOUNTER — HOSPITAL ENCOUNTER (OUTPATIENT)
Dept: CARDIOLOGY | Facility: HOSPITAL | Age: 76
Discharge: HOME | End: 2021-07-14
Admitting: INTERNAL MEDICINE

## 2021-07-14 VITALS
HEIGHT: 70 IN | WEIGHT: 163 LBS | BODY MASS INDEX: 23.34 KG/M2 | SYSTOLIC BLOOD PRESSURE: 128 MMHG | DIASTOLIC BLOOD PRESSURE: 74 MMHG | HEART RATE: 104 BPM

## 2021-07-14 LAB
AORTIC DIMENSIONLESS INDEX: 0.8 (DI)
ASCENDING AORTA: 3 CM
BH CV ECHO MEAS - ACS: 1.9 CM
BH CV ECHO MEAS - AI DEC SLOPE: 222.1 CM/SEC^2
BH CV ECHO MEAS - AI MAX PG: 43.1 MMHG
BH CV ECHO MEAS - AI MAX VEL: 328.2 CM/SEC
BH CV ECHO MEAS - AI P1/2T: 432.7 MSEC
BH CV ECHO MEAS - AO MAX PG (FULL): 2.2 MMHG
BH CV ECHO MEAS - AO MAX PG: 5.2 MMHG
BH CV ECHO MEAS - AO MEAN PG (FULL): 1.1 MMHG
BH CV ECHO MEAS - AO MEAN PG: 2.6 MMHG
BH CV ECHO MEAS - AO ROOT AREA (BSA CORRECTED): 1.8
BH CV ECHO MEAS - AO ROOT AREA: 9.2 CM^2
BH CV ECHO MEAS - AO ROOT DIAM: 3.4 CM
BH CV ECHO MEAS - AO V2 MAX: 113.8 CM/SEC
BH CV ECHO MEAS - AO V2 MEAN: 73.4 CM/SEC
BH CV ECHO MEAS - AO V2 VTI: 21.3 CM
BH CV ECHO MEAS - ASC AORTA: 3 CM
BH CV ECHO MEAS - AVA(I,A): 2.5 CM^2
BH CV ECHO MEAS - AVA(I,D): 2.5 CM^2
BH CV ECHO MEAS - AVA(V,A): 2.3 CM^2
BH CV ECHO MEAS - AVA(V,D): 2.3 CM^2
BH CV ECHO MEAS - BSA(HAYCOCK): 1.9 M^2
BH CV ECHO MEAS - BSA: 1.9 M^2
BH CV ECHO MEAS - BZI_BMI: 23.4 KILOGRAMS/M^2
BH CV ECHO MEAS - BZI_METRIC_HEIGHT: 177.8 CM
BH CV ECHO MEAS - BZI_METRIC_WEIGHT: 73.9 KG
BH CV ECHO MEAS - EDV(CUBED): 70.7 ML
BH CV ECHO MEAS - EDV(MOD-SP2): 40 ML
BH CV ECHO MEAS - EDV(MOD-SP4): 49 ML
BH CV ECHO MEAS - EDV(TEICH): 75.7 ML
BH CV ECHO MEAS - EF(CUBED): 65.7 %
BH CV ECHO MEAS - EF(MOD-BP): 58.8 %
BH CV ECHO MEAS - EF(MOD-SP2): 55 %
BH CV ECHO MEAS - EF(MOD-SP4): 63.3 %
BH CV ECHO MEAS - EF(TEICH): 57.7 %
BH CV ECHO MEAS - ESV(CUBED): 24.2 ML
BH CV ECHO MEAS - ESV(MOD-SP2): 18 ML
BH CV ECHO MEAS - ESV(MOD-SP4): 18 ML
BH CV ECHO MEAS - ESV(TEICH): 32 ML
BH CV ECHO MEAS - FS: 30 %
BH CV ECHO MEAS - IVS/LVPW: 1
BH CV ECHO MEAS - IVSD: 0.9 CM
BH CV ECHO MEAS - LAT PEAK E' VEL: 12.3 CM/SEC
BH CV ECHO MEAS - LV DIASTOLIC VOL/BSA (35-75): 25.6 ML/M^2
BH CV ECHO MEAS - LV MASS(C)D: 113.5 GRAMS
BH CV ECHO MEAS - LV MASS(C)DI: 59.3 GRAMS/M^2
BH CV ECHO MEAS - LV MAX PG: 3 MMHG
BH CV ECHO MEAS - LV MEAN PG: 1.5 MMHG
BH CV ECHO MEAS - LV SYSTOLIC VOL/BSA (12-30): 9.4 ML/M^2
BH CV ECHO MEAS - LV V1 MAX: 86.4 CM/SEC
BH CV ECHO MEAS - LV V1 MEAN: 57 CM/SEC
BH CV ECHO MEAS - LV V1 VTI: 17.3 CM
BH CV ECHO MEAS - LVIDD: 4.1 CM
BH CV ECHO MEAS - LVIDS: 2.9 CM
BH CV ECHO MEAS - LVLD AP2: 5.6 CM
BH CV ECHO MEAS - LVLD AP4: 5.9 CM
BH CV ECHO MEAS - LVLS AP2: 4.8 CM
BH CV ECHO MEAS - LVLS AP4: 5.2 CM
BH CV ECHO MEAS - LVOT AREA (M): 3.1 CM^2
BH CV ECHO MEAS - LVOT AREA: 3.1 CM^2
BH CV ECHO MEAS - LVOT DIAM: 2 CM
BH CV ECHO MEAS - LVPWD: 0.87 CM
BH CV ECHO MEAS - MED PEAK E' VEL: 8.1 CM/SEC
BH CV ECHO MEAS - MR MAX PG: 60.6 MMHG
BH CV ECHO MEAS - MR MAX VEL: 389.3 CM/SEC
BH CV ECHO MEAS - MV DEC SLOPE: 498.7 CM/SEC^2
BH CV ECHO MEAS - MV DEC TIME: 182 SEC
BH CV ECHO MEAS - MV E MAX VEL: 120.9 CM/SEC
BH CV ECHO MEAS - MV MAX PG: 5.2 MMHG
BH CV ECHO MEAS - MV MEAN PG: 2.5 MMHG
BH CV ECHO MEAS - MV P1/2T MAX VEL: 123.6 CM/SEC
BH CV ECHO MEAS - MV P1/2T: 72.6 MSEC
BH CV ECHO MEAS - MV V2 MAX: 113.6 CM/SEC
BH CV ECHO MEAS - MV V2 MEAN: 72.2 CM/SEC
BH CV ECHO MEAS - MV V2 VTI: 17.5 CM
BH CV ECHO MEAS - MVA P1/2T LCG: 1.8 CM^2
BH CV ECHO MEAS - MVA(P1/2T): 3 CM^2
BH CV ECHO MEAS - MVA(VTI): 3 CM^2
BH CV ECHO MEAS - PA ACC TIME: 0.11 SEC
BH CV ECHO MEAS - PA MAX PG (FULL): 0.06 MMHG
BH CV ECHO MEAS - PA MAX PG: 1.6 MMHG
BH CV ECHO MEAS - PA PR(ACCEL): 31.5 MMHG
BH CV ECHO MEAS - PA V2 MAX: 63.8 CM/SEC
BH CV ECHO MEAS - PVA(V,A): 4.8 CM^2
BH CV ECHO MEAS - PVA(V,D): 4.8 CM^2
BH CV ECHO MEAS - QP/QS: 1.1
BH CV ECHO MEAS - RAP SYSTOLE: 3 MMHG
BH CV ECHO MEAS - RV MAX PG: 1.6 MMHG
BH CV ECHO MEAS - RV MEAN PG: 0.84 MMHG
BH CV ECHO MEAS - RV V1 MAX: 62.6 CM/SEC
BH CV ECHO MEAS - RV V1 MEAN: 42.7 CM/SEC
BH CV ECHO MEAS - RV V1 VTI: 12.3 CM
BH CV ECHO MEAS - RVOT AREA: 4.9 CM^2
BH CV ECHO MEAS - RVOT DIAM: 2.5 CM
BH CV ECHO MEAS - RVSP: 38 MMHG
BH CV ECHO MEAS - SI(AO): 102.2 ML/M^2
BH CV ECHO MEAS - SI(CUBED): 24.3 ML/M^2
BH CV ECHO MEAS - SI(LVOT): 27.8 ML/M^2
BH CV ECHO MEAS - SI(MOD-SP2): 11.5 ML/M^2
BH CV ECHO MEAS - SI(MOD-SP4): 16.2 ML/M^2
BH CV ECHO MEAS - SI(TEICH): 22.8 ML/M^2
BH CV ECHO MEAS - SV(AO): 195.5 ML
BH CV ECHO MEAS - SV(CUBED): 46.5 ML
BH CV ECHO MEAS - SV(LVOT): 53.3 ML
BH CV ECHO MEAS - SV(MOD-SP2): 22 ML
BH CV ECHO MEAS - SV(MOD-SP4): 31 ML
BH CV ECHO MEAS - SV(RVOT): 60.5 ML
BH CV ECHO MEAS - SV(TEICH): 43.7 ML
BH CV ECHO MEAS - TAPSE (>1.6): 1.8 CM
BH CV ECHO MEAS - TR MAX PG: 35 MMHG
BH CV ECHO MEAS - TR MAX VEL: 294.6 CM/SEC
BH CV ECHO MEASUREMENTS AVERAGE E/E' RATIO: 11.85
BH CV XLRA - RV BASE: 3 CM
BH CV XLRA - RV LENGTH: 4.7 CM
BH CV XLRA - RV MID: 2.6 CM
BH CV XLRA - TDI S': 10.2 CM/SEC
LEFT ATRIUM VOLUME INDEX: 43.9 ML/M2
MAXIMAL PREDICTED HEART RATE: 143 BPM
SINUS: 3.1 CM
STJ: 2.7 CM
STRESS TARGET HR: 122 BPM

## 2021-07-14 PROCEDURE — 93306 TTE W/DOPPLER COMPLETE: CPT | Performed by: INTERNAL MEDICINE

## 2021-07-14 PROCEDURE — 93306 TTE W/DOPPLER COMPLETE: CPT

## 2021-07-22 ENCOUNTER — OFFICE VISIT (OUTPATIENT)
Dept: CARDIOLOGY | Facility: CLINIC | Age: 76
End: 2021-07-22

## 2021-07-22 VITALS
HEIGHT: 71 IN | BODY MASS INDEX: 22.12 KG/M2 | SYSTOLIC BLOOD PRESSURE: 122 MMHG | HEART RATE: 105 BPM | WEIGHT: 158 LBS | DIASTOLIC BLOOD PRESSURE: 80 MMHG

## 2021-07-22 DIAGNOSIS — Z95.0 PACEMAKER: ICD-10-CM

## 2021-07-22 DIAGNOSIS — R06.02 BREATH SHORTNESS: ICD-10-CM

## 2021-07-22 DIAGNOSIS — I48.20 CHRONIC ATRIAL FIBRILLATION (HCC): ICD-10-CM

## 2021-07-22 DIAGNOSIS — R00.1 BRADYCARDIA: Primary | ICD-10-CM

## 2021-07-22 DIAGNOSIS — Z79.01 ANTICOAGULATED: ICD-10-CM

## 2021-07-22 DIAGNOSIS — I10 BENIGN ESSENTIAL HTN: ICD-10-CM

## 2021-07-22 PROCEDURE — 93000 ELECTROCARDIOGRAM COMPLETE: CPT | Performed by: INTERNAL MEDICINE

## 2021-07-22 PROCEDURE — 99213 OFFICE O/P EST LOW 20 MIN: CPT | Performed by: INTERNAL MEDICINE

## 2021-07-22 NOTE — PROGRESS NOTES
1 MO FOLLOW UP WITH ECHO RESULTS   Subjective:        Raina Forrest is a 75 y.o. female who here for follow up    CC  Follow-up atrial fibrillation pacemaker  HPI  75-year-old female with chronic atrial fibrillation, benign essential arterial hypertension, bradycardia and the pacemaker here for the follow-up with no complaints of chest pains tightness heaviness or the pressure sensation     Problems Addressed this Visit        Cardiac and Vasculature    Chronic atrial fibrillation (CMS/HCC)    Benign essential HTN    Bradycardia - Primary    Pacemaker       Coag and Thromboembolic    Anticoagulated       Pulmonary and Pneumonias    Breath shortness      Diagnoses       Codes Comments    Bradycardia    -  Primary ICD-10-CM: R00.1  ICD-9-CM: 427.89     Benign essential HTN     ICD-10-CM: I10  ICD-9-CM: 401.1     Chronic atrial fibrillation (CMS/HCC)     ICD-10-CM: I48.20  ICD-9-CM: 427.31     Anticoagulated     ICD-10-CM: Z79.01  ICD-9-CM: V58.61     Breath shortness     ICD-10-CM: R06.02  ICD-9-CM: 786.05     Pacemaker     ICD-10-CM: Z95.0  ICD-9-CM: V45.01         .    The following portions of the patient's history were reviewed and updated as appropriate: allergies, current medications, past family history, past medical history, past social history, past surgical history and problem list.    Past Medical History:   Diagnosis Date   • Atrial fibrillation (CMS/HCC)    • Fung's esophagus    • Benign essential hypertension    • Blood clot in vein    • Cardiomyopathy (CMS/HCC)    • Chronic gastritis    • Congestive heart failure (CMS/HCC)    • Degenerative disc disease    • Dementia (CMS/HCC)     Pt  states she has slight dementia   • Depression with anxiety    • Disease of thyroid gland    • Diverticulitis of colon    • Dysphagia    • GERD (gastroesophageal reflux disease)    • History of chest pain    • History of migraine    • History of migraine headaches    • Left knee pain    • Osteoporosis    •  "Palpitations    • Thyroid disorder    • Thyroid nodule    • Ulcerative colitis (CMS/HCC)      reports that she has never smoked. She has never used smokeless tobacco. She reports that she does not drink alcohol and does not use drugs.   Family History   Problem Relation Age of Onset   • Lung cancer Other    • Ulcerative colitis Sister    • Heart failure Mother    • Heart disease Mother    • Heart failure Father    • Heart disease Father    • Malig Hyperthermia Neg Hx        Review of Systems  Constitutional: No wt loss, fever, fatigue  Gastrointestinal: No nausea, abdominal pain  Behavioral/Psych: No insomnia or anxiety   Cardiovascular no chest pains or tightness in the chest  Objective:       Physical Exam  /80   Pulse 105   Ht 179.1 cm (70.5\")   Wt 71.7 kg (158 lb)   BMI 22.35 kg/m²   General appearance: No acute changes   Neck: Trachea midline; NECK, supple, no thyromegaly or lymphadenopathy   Lungs: Normal size and shape, normal breath sounds, equal distribution of air, no rales and rhonchi   CV: S1-S2 regular, no murmurs, no rub, no gallop   Abdomen: Soft, non-tender; no masses , no abnormal abdominal sounds   Extremities: No deformity , normal color , no peripheral edema   Skin: Normal temperature, turgor and texture; no rash, ulcers            ECG 12 Lead    Date/Time: 7/22/2021 9:38 AM  Performed by: Robinson Salazar MD  Authorized by: Robinson Salazar MD   Comparison: compared with previous ECG   Similar to previous ECG  Rhythm: atrial fibrillation  ST Flattening: all            Interpretation Summary    · Aortic valve dimensionless index is 0.8 .  · Estimated right ventricular systolic pressure from tricuspid regurgitation is mildly elevated (35-45 mmHg).  · Calculated left ventricular EF = 58.8% Estimated left ventricular EF was in agreement with the calculated left ventricular EF.  · Left ventricular diastolic function was indeterminate.  · Left atrial volume is mildly " increased.  · The right atrial cavity is mildly dilated.            Echocardiogram:        Current Outpatient Medications:   •  aspirin  MG EC tablet, Take 1 tablet by mouth 2 (Two) Times a Day With Meals. (Patient taking differently: Take 325 mg by mouth Daily.), Disp: 60 tablet, Rfl: 0  •  bisoprolol-hydrochlorothiazide (ZIAC) 5-6.25 MG per tablet, TAKE 1 TABLET BY MOUTH EVERY DAY, Disp: 90 tablet, Rfl: 1  •  calcium-vitamin D (OSCAL 500/200 D-3) 500-200 MG-UNIT per tablet, Take 1 tablet by mouth Daily., Disp: , Rfl:   •  Carboxymethylcellulose Sodium (EYE DROPS OP), Apply  to eye(s) as directed by provider. Pt to bring  To surgery, Disp: , Rfl:   •  docusate sodium (COLACE) 250 MG capsule, Take 1 capsule by mouth 2 (Two) Times a Day As Needed for Constipation., Disp: 30 capsule, Rfl: 1  •  EPINEPHrine (EPIPEN) 0.3 MG/0.3ML solution auto-injector injection, Inject 0.3 mL into the appropriate muscle as directed by prescriber Daily As Needed., Disp: , Rfl:   •  fluticasone (FLONASE) 50 MCG/ACT nasal spray, 2 sprays into the nostril(s) as directed by provider Daily., Disp: , Rfl:   •  KLOR-CON 10 MEQ CR tablet, TAKE 1 TABLET BY MOUTH EVERY DAY, Disp: 90 tablet, Rfl: 0  •  levothyroxine (SYNTHROID, LEVOTHROID) 25 MCG tablet, Take 25 mcg by mouth Daily., Disp: , Rfl:   •  Multiple Vitamins-Minerals (MULTIVITAMIN ADULT PO), Take 1 tablet by mouth Every Morning., Disp: , Rfl:   •  pantoprazole (PROTONIX) 40 MG EC tablet, TAKE 1 TABLET BY MOUTH EVERY MORNING 30MIN BEFORE BREAKFAST FOR 90 DAYS, Disp: , Rfl:   •  sertraline (ZOLOFT) 100 MG tablet, Take 150 mg by mouth Every Morning., Disp: , Rfl:   •  SUMAtriptan (IMITREX) 50 MG tablet, Take 50 mg by mouth Every 2 (Two) Hours As Needed for Migraine., Disp: , Rfl: 4  •  torsemide (Demadex) 20 MG tablet, Take 1 tablet by mouth Daily., Disp: 30 tablet, Rfl: 6   Assessment:        Patient Active Problem List   Diagnosis   • Chronic atrial fibrillation (CMS/HCC)   • Benign  essential HTN   • Bradycardia   • Chest pain   • Can't get food down   • Accumulation of fluid in tissues   • Esophageal lump   • Adynamia   • Itch of skin   • Anorexia   • Chronic nausea   • Gastroesophageal reflux disease   • Breath shortness   • Emesis   • Decreased body weight   • Anxiety   • Narrowing of intervertebral disc space   • Diverticulosis of intestine   • Hypertension   • Migraine   • Osteoporosis   • Palpitations   • Pituitary adenoma (CMS/HCC)   • Sick sinus syndrome (CMS/HCC)   • Diarrhea   • Cardiomyopathy (CMS/HCC)   • Anticoagulated   • Atrial fibrillation (CMS/HCC)   • On amiodarone therapy   • Pacemaker   • Cardiac resynchronization therapy pacemaker (CRT-P) in place   • Infected pacemaker (CMS/HCC)   • Wound infection   • Primary osteoarthritis of left knee   • DJD (degenerative joint disease)               Plan:            ICD-10-CM ICD-9-CM   1. Bradycardia  R00.1 427.89   2. Benign essential HTN  I10 401.1   3. Chronic atrial fibrillation (CMS/HCC)  I48.20 427.31   4. Anticoagulated  Z79.01 V58.61   5. Breath shortness  R06.02 786.05     1. Bradycardia  Controlled    2. Benign essential HTN  Under control    3. Chronic atrial fibrillation (CMS/HCC)  Under control    4. Anticoagulated  Counseling done    5. Breath shortness  Multifactorial       Echo ok    See in 6 months  COUNSELING:    Raina Perkins was given to patient for the following topics: diagnostic results, risk factor reductions, impressions, risks and benefits of treatment options and importance of treatment compliance .       SMOKING COUNSELING:    [unfilled]    Dictated using Dragon dictation

## 2021-07-23 PROBLEM — Z95.0 CARDIAC RESYNCHRONIZATION THERAPY PACEMAKER (CRT-P) IN PLACE: Status: RESOLVED | Noted: 2017-03-28 | Resolved: 2021-07-23

## 2021-08-02 ENCOUNTER — TELEPHONE (OUTPATIENT)
Dept: CARDIOLOGY | Facility: CLINIC | Age: 76
End: 2021-08-02

## 2021-08-02 NOTE — TELEPHONE ENCOUNTER
----- Message from Safia Frey sent at 8/2/2021  2:20 PM EDT -----  Regarding: SWELLING  Contact: [phone]  BOTH FEET AN ANKLES ARE SWOLLEN, ADVISE

## 2021-08-03 RX ORDER — POTASSIUM CHLORIDE 750 MG/1
TABLET, EXTENDED RELEASE ORAL
Qty: 90 TABLET | Refills: 1 | Status: SHIPPED | OUTPATIENT
Start: 2021-08-03 | End: 2021-12-03

## 2021-09-13 RX ORDER — FUROSEMIDE 40 MG/1
TABLET ORAL
Qty: 135 TABLET | Refills: 1 | OUTPATIENT
Start: 2021-09-13

## 2021-09-13 NOTE — TELEPHONE ENCOUNTER
The original prescription was discontinued on 6/21/2021 by Robinson Salazar MD for the following reason: Alternate therapy. Renewing this prescription may not be appropriate.Lasik

## 2021-09-21 ENCOUNTER — OFFICE (OUTPATIENT)
Dept: URBAN - METROPOLITAN AREA CLINIC 75 | Facility: CLINIC | Age: 76
End: 2021-09-21

## 2021-09-21 VITALS
WEIGHT: 154.6 LBS | RESPIRATION RATE: 16 BRPM | HEIGHT: 69 IN | OXYGEN SATURATION: 96 % | DIASTOLIC BLOOD PRESSURE: 62 MMHG | SYSTOLIC BLOOD PRESSURE: 112 MMHG | HEART RATE: 74 BPM

## 2021-09-21 DIAGNOSIS — R11.2 NAUSEA WITH VOMITING, UNSPECIFIED: ICD-10-CM

## 2021-09-21 DIAGNOSIS — K64.4 RESIDUAL HEMORRHOIDAL SKIN TAGS: ICD-10-CM

## 2021-09-21 DIAGNOSIS — K92.1 MELENA: ICD-10-CM

## 2021-09-21 PROCEDURE — 99214 OFFICE O/P EST MOD 30 MIN: CPT | Performed by: NURSE PRACTITIONER

## 2021-09-24 RX ORDER — TORSEMIDE 20 MG/1
TABLET ORAL
Qty: 90 TABLET | Refills: 2 | Status: SHIPPED | OUTPATIENT
Start: 2021-09-24 | End: 2022-01-20 | Stop reason: SDUPTHER

## 2021-10-08 ENCOUNTER — TELEPHONE (OUTPATIENT)
Dept: CARDIOLOGY | Facility: CLINIC | Age: 76
End: 2021-10-08

## 2021-10-08 NOTE — TELEPHONE ENCOUNTER
Received fax from [facility] stating that the patient is to have a EGD done and needs to hold her Eliquis 1-2 days prior to procedure.     Per Dr. ALVARADO patient is told her Eliquis 3 days prior to procedure with a small risk and patient is cleared for surgery.     Sent letter

## 2021-10-22 ENCOUNTER — TELEPHONE (OUTPATIENT)
Dept: CARDIOLOGY | Facility: CLINIC | Age: 76
End: 2021-10-22

## 2021-10-22 NOTE — TELEPHONE ENCOUNTER
----- Message from Safia Frey sent at 10/22/2021  1:14 PM EDT -----  Regarding: [facility] FROM [facility]  Contact: [phone]  HAVING EGD DONE ON 10/29, NEEDS TO SEE ABOUT HER TAKING ASPRIN??    Per Carline CHILDSN: Patient is to hold her ASA for 5 days prior to surgery with a small risk.     Patient informed

## 2021-10-26 VITALS
RESPIRATION RATE: 18 BRPM | DIASTOLIC BLOOD PRESSURE: 51 MMHG | OXYGEN SATURATION: 86 % | OXYGEN SATURATION: 98 % | HEIGHT: 69 IN | RESPIRATION RATE: 11 BRPM | HEART RATE: 86 BPM | SYSTOLIC BLOOD PRESSURE: 99 MMHG | RESPIRATION RATE: 16 BRPM | DIASTOLIC BLOOD PRESSURE: 68 MMHG | SYSTOLIC BLOOD PRESSURE: 102 MMHG | RESPIRATION RATE: 15 BRPM | DIASTOLIC BLOOD PRESSURE: 47 MMHG | HEART RATE: 75 BPM | HEART RATE: 85 BPM | SYSTOLIC BLOOD PRESSURE: 105 MMHG | HEART RATE: 87 BPM | TEMPERATURE: 97.2 F | OXYGEN SATURATION: 96 % | DIASTOLIC BLOOD PRESSURE: 78 MMHG | SYSTOLIC BLOOD PRESSURE: 87 MMHG | WEIGHT: 155 LBS | HEART RATE: 78 BPM | OXYGEN SATURATION: 99 % | SYSTOLIC BLOOD PRESSURE: 141 MMHG | DIASTOLIC BLOOD PRESSURE: 56 MMHG | SYSTOLIC BLOOD PRESSURE: 118 MMHG | RESPIRATION RATE: 14 BRPM | SYSTOLIC BLOOD PRESSURE: 97 MMHG | SYSTOLIC BLOOD PRESSURE: 91 MMHG | HEART RATE: 102 BPM | HEART RATE: 84 BPM | DIASTOLIC BLOOD PRESSURE: 57 MMHG | DIASTOLIC BLOOD PRESSURE: 62 MMHG

## 2021-10-29 ENCOUNTER — AMBULATORY SURGICAL CENTER (OUTPATIENT)
Dept: URBAN - METROPOLITAN AREA SURGERY 17 | Facility: SURGERY | Age: 76
End: 2021-10-29

## 2021-10-29 ENCOUNTER — OFFICE (OUTPATIENT)
Dept: URBAN - METROPOLITAN AREA PATHOLOGY 4 | Facility: PATHOLOGY | Age: 76
End: 2021-10-29

## 2021-10-29 DIAGNOSIS — K31.89 OTHER DISEASES OF STOMACH AND DUODENUM: ICD-10-CM

## 2021-10-29 DIAGNOSIS — R11.0 NAUSEA: ICD-10-CM

## 2021-10-29 DIAGNOSIS — B96.81 HELICOBACTER PYLORI [H. PYLORI] AS THE CAUSE OF DISEASES CLA: ICD-10-CM

## 2021-10-29 DIAGNOSIS — K29.50 UNSPECIFIED CHRONIC GASTRITIS WITHOUT BLEEDING: ICD-10-CM

## 2021-10-29 DIAGNOSIS — K22.89 OTHER SPECIFIED DISEASE OF ESOPHAGUS: ICD-10-CM

## 2021-10-29 DIAGNOSIS — K92.1 MELENA: ICD-10-CM

## 2021-10-29 DIAGNOSIS — K29.70 GASTRITIS, UNSPECIFIED, WITHOUT BLEEDING: ICD-10-CM

## 2021-10-29 LAB
GI HISTOLOGY: A. SELECT: (no result)
GI HISTOLOGY: B. SELECT: (no result)
GI HISTOLOGY: C. SELECT: (no result)
GI HISTOLOGY: D. SELECT: (no result)
GI HISTOLOGY: PDF REPORT: (no result)

## 2021-10-29 PROCEDURE — 88342 IMHCHEM/IMCYTCHM 1ST ANTB: CPT | Performed by: INTERNAL MEDICINE

## 2021-10-29 PROCEDURE — 88305 TISSUE EXAM BY PATHOLOGIST: CPT | Performed by: INTERNAL MEDICINE

## 2021-10-29 PROCEDURE — 43239 EGD BIOPSY SINGLE/MULTIPLE: CPT | Performed by: INTERNAL MEDICINE

## 2021-10-29 PROCEDURE — 88341 IMHCHEM/IMCYTCHM EA ADD ANTB: CPT | Performed by: INTERNAL MEDICINE

## 2021-11-02 ENCOUNTER — CLINICAL SUPPORT NO REQUIREMENTS (OUTPATIENT)
Dept: CARDIOLOGY | Facility: CLINIC | Age: 76
End: 2021-11-02

## 2021-11-02 DIAGNOSIS — Z95.0 PACEMAKER: Primary | ICD-10-CM

## 2021-11-02 PROCEDURE — 93280 PM DEVICE PROGR EVAL DUAL: CPT | Performed by: INTERNAL MEDICINE

## 2021-11-30 RX ORDER — BISOPROLOL FUMARATE AND HYDROCHLOROTHIAZIDE 5; 6.25 MG/1; MG/1
TABLET ORAL
Qty: 90 TABLET | Refills: 0 | Status: SHIPPED | OUTPATIENT
Start: 2021-11-30 | End: 2022-03-14

## 2021-11-30 NOTE — TELEPHONE ENCOUNTER
Rx Refill Note  Requested Prescriptions     Pending Prescriptions Disp Refills   • bisoprolol-hydrochlorothiazide (ZIAC) 5-6.25 MG per tablet [Pharmacy Med Name: BISOPROLOL-HCTZ 5-6.25 MG TAB] 90 tablet 0     Sig: TAKE 1 TABLET BY MOUTH EVERY DAY      Last office visit with prescribing clinician: 7/22/2021      Next office visit with prescribing clinician: 1/13/2022            Carline Rosa, [facility]  11/30/21, 15:00 EST

## 2022-01-13 ENCOUNTER — OFFICE VISIT (OUTPATIENT)
Dept: CARDIOLOGY | Facility: CLINIC | Age: 77
End: 2022-01-13

## 2022-01-13 VITALS
WEIGHT: 158 LBS | SYSTOLIC BLOOD PRESSURE: 133 MMHG | HEIGHT: 71 IN | DIASTOLIC BLOOD PRESSURE: 85 MMHG | HEART RATE: 99 BPM | BODY MASS INDEX: 22.12 KG/M2

## 2022-01-13 DIAGNOSIS — I10 PRIMARY HYPERTENSION: Primary | ICD-10-CM

## 2022-01-13 DIAGNOSIS — I48.20 CHRONIC ATRIAL FIBRILLATION: ICD-10-CM

## 2022-01-13 DIAGNOSIS — I42.9 CARDIOMYOPATHY, UNSPECIFIED TYPE: ICD-10-CM

## 2022-01-13 DIAGNOSIS — Z79.01 CHRONIC ANTICOAGULATION: ICD-10-CM

## 2022-01-13 PROCEDURE — 99214 OFFICE O/P EST MOD 30 MIN: CPT | Performed by: INTERNAL MEDICINE

## 2022-01-13 PROCEDURE — 93000 ELECTROCARDIOGRAM COMPLETE: CPT | Performed by: INTERNAL MEDICINE

## 2022-01-13 NOTE — PROGRESS NOTES
6 month follow up   Feels tender around her pacemaker    Subjective:        Raina Forrest is a 76 y.o. female who here for follow up    CC  Pain at pacer site  HPI  76-year-old female with chronic atrial fibrillation, benign essential arterial hypertension as well as a cardiomyopathy here for the follow-up complaining of the pain at the pacemaker site     Problems Addressed this Visit        Cardiac and Vasculature    Chronic atrial fibrillation (HCC)    Relevant Orders    Adult Transthoracic Echo Complete W/ Cont if Necessary Per Protocol    Comprehensive Metabolic Panel    Lipid Panel    Hypertension - Primary    Relevant Orders    Lipid Panel    Cardiomyopathy (HCC)    Relevant Orders    Lipid Panel      Diagnoses       Codes Comments    Primary hypertension    -  Primary ICD-10-CM: I10  ICD-9-CM: 401.9     Chronic atrial fibrillation (HCC)     ICD-10-CM: I48.20  ICD-9-CM: 427.31     Cardiomyopathy, unspecified type (HCC)     ICD-10-CM: I42.9  ICD-9-CM: 425.4         .    The following portions of the patient's history were reviewed and updated as appropriate: allergies, current medications, past family history, past medical history, past social history, past surgical history and problem list.    Past Medical History:   Diagnosis Date   • Atrial fibrillation (HCC)    • Fung's esophagus    • Benign essential hypertension    • Blood clot in vein    • Cardiomyopathy (HCC)    • Chronic gastritis    • Congestive heart failure (HCC)    • Degenerative disc disease    • Dementia (HCC)     Pt  states she has slight dementia   • Depression with anxiety    • Disease of thyroid gland    • Diverticulitis of colon    • Dysphagia    • GERD (gastroesophageal reflux disease)    • History of chest pain    • History of migraine    • History of migraine headaches    • Left knee pain    • Osteoporosis    • Palpitations    • Thyroid disorder    • Thyroid nodule    • Ulcerative colitis (HCC)      reports that she has  "never smoked. She has never used smokeless tobacco. She reports that she does not drink alcohol and does not use drugs.   Family History   Problem Relation Age of Onset   • Lung cancer Other    • Ulcerative colitis Sister    • Heart failure Mother    • Heart disease Mother    • Heart failure Father    • Heart disease Father    • Malig Hyperthermia Neg Hx        Review of Systems  Constitutional: No wt loss, fever, fatigue  Gastrointestinal: No nausea, abdominal pain  Behavioral/Psych: No insomnia or anxiety   Cardiovascular chest pain  Objective:       Physical Exam  /85   Pulse 99   Ht 179.1 cm (70.5\")   Wt 71.7 kg (158 lb)   BMI 22.35 kg/m²   General appearance: No acute changes   Neck: Trachea midline; NECK, supple, no thyromegaly or lymphadenopathy   Lungs: Normal size and shape, normal breath sounds, equal distribution of air, no rales and rhonchi   CV: S1-S2 regular, no murmurs, no rub, no gallop   Abdomen: Soft, nontender; no masses , no abnormal abdominal sounds   Extremities: No deformity , normal color , no peripheral edema   Skin: Normal temperature, turgor and texture; no rash, ulcers            ECG 12 Lead    Date/Time: 1/13/2022 10:28 AM  Performed by: Robinson Salazar MD  Authorized by: Robinson Salazar MD   Comparison: compared with previous ECG   Similar to previous ECG  Rhythm: atrial fibrillation    Clinical impression: abnormal EKG              Echocardiogram:        Current Outpatient Medications:   •  aspirin  MG EC tablet, Take 1 tablet by mouth 2 (Two) Times a Day With Meals. (Patient taking differently: Take 325 mg by mouth Daily.), Disp: 60 tablet, Rfl: 0  •  bisoprolol-hydrochlorothiazide (ZIAC) 5-6.25 MG per tablet, TAKE 1 TABLET BY MOUTH EVERY DAY, Disp: 90 tablet, Rfl: 0  •  calcium-vitamin D (OSCAL 500/200 D-3) 500-200 MG-UNIT per tablet, Take 1 tablet by mouth Daily., Disp: , Rfl:   •  Carboxymethylcellulose Sodium (EYE DROPS OP), Apply  to eye(s) as " directed by provider. Pt to bring  To surgery, Disp: , Rfl:   •  docusate sodium (COLACE) 250 MG capsule, Take 1 capsule by mouth 2 (Two) Times a Day As Needed for Constipation., Disp: 30 capsule, Rfl: 1  •  EPINEPHrine (EPIPEN) 0.3 MG/0.3ML solution auto-injector injection, Inject 0.3 mL into the appropriate muscle as directed by prescriber Daily As Needed., Disp: , Rfl:   •  fluticasone (FLONASE) 50 MCG/ACT nasal spray, 2 sprays into the nostril(s) as directed by provider Daily., Disp: , Rfl:   •  KLOR-CON 10 MEQ CR tablet, TAKE 1 TABLET BY MOUTH EVERY DAY, Disp: 90 tablet, Rfl: 1  •  levothyroxine (SYNTHROID, LEVOTHROID) 25 MCG tablet, Take 25 mcg by mouth Daily., Disp: , Rfl:   •  Multiple Vitamins-Minerals (MULTIVITAMIN ADULT PO), Take 1 tablet by mouth Every Morning., Disp: , Rfl:   •  pantoprazole (PROTONIX) 40 MG EC tablet, TAKE 1 TABLET BY MOUTH EVERY MORNING 30MIN BEFORE BREAKFAST FOR 90 DAYS, Disp: , Rfl:   •  sertraline (ZOLOFT) 100 MG tablet, Take 150 mg by mouth Every Morning., Disp: , Rfl:   •  SUMAtriptan (IMITREX) 50 MG tablet, Take 50 mg by mouth Every 2 (Two) Hours As Needed for Migraine., Disp: , Rfl: 4  •  torsemide (DEMADEX) 20 MG tablet, TAKE 1 TABLET BY MOUTH EVERY DAY, Disp: 90 tablet, Rfl: 2   Assessment:        Patient Active Problem List   Diagnosis   • Chronic atrial fibrillation (HCC)   • Benign essential HTN   • Bradycardia   • Chest pain   • Can't get food down   • Accumulation of fluid in tissues   • Esophageal lump   • Adynamia   • Itch of skin   • Anorexia   • Chronic nausea   • Gastroesophageal reflux disease   • Breath shortness   • Emesis   • Decreased body weight   • Anxiety   • Narrowing of intervertebral disc space   • Diverticulosis of intestine   • Hypertension   • Migraine   • Osteoporosis   • Palpitations   • Pituitary adenoma (HCC)   • Sick sinus syndrome (HCC)   • Diarrhea   • Cardiomyopathy (HCC)   • Anticoagulated   • Atrial fibrillation (HCC)   • On amiodarone  therapy   • Pacemaker   • Infected pacemaker (HCC)   • Wound infection   • Primary osteoarthritis of left knee   • DJD (degenerative joint disease)               Plan:            ICD-10-CM ICD-9-CM   1. Primary hypertension  I10 401.9   2. Chronic atrial fibrillation (HCC)  I48.20 427.31   3. Cardiomyopathy, unspecified type (HCC)  I42.9 425.4   4. Chronic anticoagulation  Z79.01 V58.61     1. Chronic atrial fibrillation (HCC)  Considering patient's medical condition as well as the risk factors, patient will require echocardiogram for further evaluation for the LV function, four-chamber evaluation, including the pressures, valvular function and  pericardial disease and pericardial effusion    - Adult Transthoracic Echo Complete W/ Cont if Necessary Per Protocol  - Comprehensive Metabolic Panel; Future  - Lipid Panel; Future    2. Cardiomyopathy, unspecified type (HCC)    - Lipid Panel; Future    3. Primary hypertension  Blood pressure controlled  - Lipid Panel; Future    4. Chronic anticoagulation  Pros and cons as well as indication of the anticoagulation has been explained to the patient in detail    There are no obvious complications at this stage    Risk of  the bleedings has been explained    Need for the regular blood workup and adjust the dose has been explained    Need for proper follow-up on anticoagulation also has been explained       Pt now wants anticoag  Start xaraltto 15 mg po daily    Pros and cons as well as indication of the anticoagulation has been explained to the patient in detail    There are no obvious complications at this stage    Risk of  the bleedings has been explained    Need for the regular blood workup and adjust the dose has been explained    Need for proper follow-up on anticoagulation also has been explained      See in 3 months with echo and labs  COUNSELING:    Raina Perkins was given to patient for the following topics: diagnostic results, risk factor reductions,  impressions, risks and benefits of treatment options and importance of treatment compliance .       SMOKING COUNSELING:    [unfilled]    Dictated using Dragon dictation

## 2022-01-20 RX ORDER — TORSEMIDE 20 MG/1
20 TABLET ORAL DAILY
Qty: 90 TABLET | Refills: 2 | Status: SHIPPED | OUTPATIENT
Start: 2022-01-20 | End: 2022-06-28 | Stop reason: SDUPTHER

## 2022-01-28 ENCOUNTER — TELEPHONE (OUTPATIENT)
Dept: CARDIOLOGY | Facility: CLINIC | Age: 77
End: 2022-01-28

## 2022-01-28 NOTE — TELEPHONE ENCOUNTER
PT STATES THAT SHE HASN'T HAD A BLEEDING ISSUE FOR A MONTH.  I INSTRUCTED HER TO CALL US AND SEE HER PCP IF SHE HAD BLOOD IN HER URINE AGAIN.  SHE THANKED ME FOR THE CALL AND VERBALIZED UNDERSTANDING.

## 2022-01-28 NOTE — TELEPHONE ENCOUNTER
----- Message from Safia Frey sent at 1/26/2022  1:33 PM EST -----  Regarding: xarelto problems  Contact: [phone]  Pt was taken off asprin and put on xarelto.  She said she is having horrible brusing and IS bleeding from both ends she said.  She wants to be taken off xarelto

## 2022-01-31 ENCOUNTER — TELEPHONE (OUTPATIENT)
Dept: CARDIOLOGY | Facility: CLINIC | Age: 77
End: 2022-01-31

## 2022-01-31 NOTE — TELEPHONE ENCOUNTER
----- Message from Mayo Flores sent at 1/31/2022 12:07 PM EST -----  Regarding: xarelto causing bruising  Please see previous note from 1/26 or 1/28    Pt states that she now has blue spots all over her body and some of the other issues are still happening such as the bleeding    She is unsure why Dr. ALVARADO switched her but she is going to stop the xarelto and restart the baby aspirin today. Please call if she should not or with further instructions!    [phone]    Per Dr. ALVARADO it is up to the patient if she wants to stop.  Pt informed

## 2022-02-21 ENCOUNTER — OFFICE (OUTPATIENT)
Dept: URBAN - METROPOLITAN AREA CLINIC 75 | Facility: CLINIC | Age: 77
End: 2022-02-21

## 2022-02-21 VITALS
OXYGEN SATURATION: 97 % | HEIGHT: 69 IN | WEIGHT: 159 LBS | DIASTOLIC BLOOD PRESSURE: 68 MMHG | HEART RATE: 71 BPM | SYSTOLIC BLOOD PRESSURE: 112 MMHG

## 2022-02-21 DIAGNOSIS — B96.81 HELICOBACTER PYLORI [H. PYLORI] AS THE CAUSE OF DISEASES CLA: ICD-10-CM

## 2022-02-21 DIAGNOSIS — K92.1 MELENA: ICD-10-CM

## 2022-02-21 DIAGNOSIS — K64.4 RESIDUAL HEMORRHOIDAL SKIN TAGS: ICD-10-CM

## 2022-02-21 PROCEDURE — 99214 OFFICE O/P EST MOD 30 MIN: CPT | Performed by: NURSE PRACTITIONER

## 2022-02-24 ENCOUNTER — ON CAMPUS - OUTPATIENT (OUTPATIENT)
Dept: URBAN - METROPOLITAN AREA HOSPITAL 108 | Facility: HOSPITAL | Age: 77
End: 2022-02-24

## 2022-02-24 DIAGNOSIS — K29.70 GASTRITIS, UNSPECIFIED, WITHOUT BLEEDING: ICD-10-CM

## 2022-02-24 DIAGNOSIS — K92.1 MELENA: ICD-10-CM

## 2022-02-24 DIAGNOSIS — B96.81 HELICOBACTER PYLORI [H. PYLORI] AS THE CAUSE OF DISEASES CLA: ICD-10-CM

## 2022-02-24 DIAGNOSIS — K44.9 DIAPHRAGMATIC HERNIA WITHOUT OBSTRUCTION OR GANGRENE: ICD-10-CM

## 2022-02-24 PROCEDURE — 43239 EGD BIOPSY SINGLE/MULTIPLE: CPT | Performed by: INTERNAL MEDICINE

## 2022-03-14 RX ORDER — BISOPROLOL FUMARATE AND HYDROCHLOROTHIAZIDE 5; 6.25 MG/1; MG/1
TABLET ORAL
Qty: 90 TABLET | Refills: 1 | Status: SHIPPED | OUTPATIENT
Start: 2022-03-14 | End: 2022-11-02 | Stop reason: SDUPTHER

## 2022-04-19 ENCOUNTER — HOSPITAL ENCOUNTER (OUTPATIENT)
Dept: CARDIOLOGY | Facility: HOSPITAL | Age: 77
Discharge: HOME | End: 2022-04-19

## 2022-04-19 VITALS
WEIGHT: 158 LBS | HEART RATE: 70 BPM | SYSTOLIC BLOOD PRESSURE: 126 MMHG | BODY MASS INDEX: 22.62 KG/M2 | DIASTOLIC BLOOD PRESSURE: 85 MMHG | HEIGHT: 70 IN

## 2022-04-19 DIAGNOSIS — I10 PRIMARY HYPERTENSION: ICD-10-CM

## 2022-04-19 DIAGNOSIS — I42.9 CARDIOMYOPATHY, UNSPECIFIED TYPE: ICD-10-CM

## 2022-04-19 DIAGNOSIS — I48.20 CHRONIC ATRIAL FIBRILLATION: ICD-10-CM

## 2022-04-19 LAB
ALBUMIN SERPL-MCNC: 4.9 G/DL (ref 3.5–5.2)
ALBUMIN/GLOB SERPL: 2.1 G/DL
ALP SERPL-CCNC: 96 U/L (ref 39–117)
ALT SERPL W P-5'-P-CCNC: 18 U/L (ref 1–33)
ANION GAP SERPL CALCULATED.3IONS-SCNC: 14 MMOL/L (ref 5–15)
ASCENDING AORTA: 3 CM
AST SERPL-CCNC: 28 U/L (ref 1–32)
BH CV ECHO MEAS - ACS: 1.78 CM
BH CV ECHO MEAS - AO MAX PG: 5.3 MMHG
BH CV ECHO MEAS - AO MEAN PG: 2.7 MMHG
BH CV ECHO MEAS - AO ROOT DIAM: 2.26 CM
BH CV ECHO MEAS - AO V2 MAX: 114.6 CM/SEC
BH CV ECHO MEAS - AO V2 VTI: 23.1 CM
BH CV ECHO MEAS - AVA(I,D): 1.72 CM2
BH CV ECHO MEAS - EDV(CUBED): 67.8 ML
BH CV ECHO MEAS - EDV(MOD-SP2): 33 ML
BH CV ECHO MEAS - EDV(MOD-SP4): 41 ML
BH CV ECHO MEAS - EF(MOD-BP): 60 %
BH CV ECHO MEAS - EF(MOD-SP2): 54.5 %
BH CV ECHO MEAS - EF(MOD-SP4): 56.1 %
BH CV ECHO MEAS - ESV(CUBED): 23 ML
BH CV ECHO MEAS - ESV(MOD-SP2): 15 ML
BH CV ECHO MEAS - ESV(MOD-SP4): 18 ML
BH CV ECHO MEAS - FS: 30.3 %
BH CV ECHO MEAS - IVS/LVPW: 1.16 CM
BH CV ECHO MEAS - IVSD: 1.17 CM
BH CV ECHO MEAS - LA DIMENSION: 4.8 CM
BH CV ECHO MEAS - LV DIASTOLIC VOL/BSA (35-75): 21.7 CM2
BH CV ECHO MEAS - LV MASS(C)D: 146.8 GRAMS
BH CV ECHO MEAS - LV MAX PG: 1.87 MMHG
BH CV ECHO MEAS - LV MEAN PG: 0.99 MMHG
BH CV ECHO MEAS - LV SYSTOLIC VOL/BSA (12-30): 9.5 CM2
BH CV ECHO MEAS - LV V1 MAX: 68.4 CM/SEC
BH CV ECHO MEAS - LV V1 VTI: 14.6 CM
BH CV ECHO MEAS - LVIDD: 4.1 CM
BH CV ECHO MEAS - LVIDS: 2.8 CM
BH CV ECHO MEAS - LVOT AREA: 2.7 CM2
BH CV ECHO MEAS - LVOT DIAM: 1.86 CM
BH CV ECHO MEAS - LVPWD: 1 CM
BH CV ECHO MEAS - MR MAX PG: 91.3 MMHG
BH CV ECHO MEAS - MR MAX VEL: 477.8 CM/SEC
BH CV ECHO MEAS - MV A MAX VEL: 27.6 CM/SEC
BH CV ECHO MEAS - MV DEC SLOPE: 307.1 CM/SEC2
BH CV ECHO MEAS - MV DEC TIME: 193 MSEC
BH CV ECHO MEAS - MV E MAX VEL: 102.8 CM/SEC
BH CV ECHO MEAS - MV E/A: 3.7
BH CV ECHO MEAS - MV MAX PG: 5.8 MMHG
BH CV ECHO MEAS - MV MEAN PG: 2.8 MMHG
BH CV ECHO MEAS - MV V2 VTI: 28.2 CM
BH CV ECHO MEAS - MVA(VTI): 1.41 CM2
BH CV ECHO MEAS - PA ACC TIME: 0.11 SEC
BH CV ECHO MEAS - PA PR(ACCEL): 29.1 MMHG
BH CV ECHO MEAS - PA V2 MAX: 77.4 CM/SEC
BH CV ECHO MEAS - RAP SYSTOLE: 3 MMHG
BH CV ECHO MEAS - RV MAX PG: 1.47 MMHG
BH CV ECHO MEAS - RV V1 MAX: 60.6 CM/SEC
BH CV ECHO MEAS - RV V1 VTI: 11.4 CM
BH CV ECHO MEAS - RVDD: 4 CM
BH CV ECHO MEAS - RVOT DIAM: 3.6 CM
BH CV ECHO MEAS - RVSP: 51.6 MMHG
BH CV ECHO MEAS - SI(MOD-SP2): 9.5 ML/M2
BH CV ECHO MEAS - SI(MOD-SP4): 12.2 ML/M2
BH CV ECHO MEAS - SV(LVOT): 39.8 ML
BH CV ECHO MEAS - SV(MOD-SP2): 18 ML
BH CV ECHO MEAS - SV(MOD-SP4): 23 ML
BH CV ECHO MEAS - SV(RVOT): 118.8 ML
BH CV ECHO MEAS - TAPSE (>1.6): 2.03 CM
BH CV ECHO MEAS - TR MAX PG: 48.6 MMHG
BH CV ECHO MEAS - TR MAX VEL: 348.7 CM/SEC
BH CV XLRA - RV BASE: 4.2 CM
BH CV XLRA - RV LENGTH: 5.9 CM
BH CV XLRA - RV MID: 3.7 CM
BILIRUB SERPL-MCNC: 0.8 MG/DL (ref 0–1.2)
BUN SERPL-MCNC: 16 MG/DL (ref 8–23)
BUN/CREAT SERPL: 19.8 (ref 7–25)
CALCIUM SPEC-SCNC: 9.8 MG/DL (ref 8.6–10.5)
CHLORIDE SERPL-SCNC: 98 MMOL/L (ref 98–107)
CHOLEST SERPL-MCNC: 169 MG/DL (ref 0–200)
CO2 SERPL-SCNC: 27 MMOL/L (ref 22–29)
CREAT SERPL-MCNC: 0.81 MG/DL (ref 0.57–1)
EGFRCR SERPLBLD CKD-EPI 2021: 74.4 ML/MIN/1.73
GLOBULIN UR ELPH-MCNC: 2.3 GM/DL
GLUCOSE SERPL-MCNC: 124 MG/DL (ref 65–99)
HDLC SERPL-MCNC: 58 MG/DL (ref 40–60)
LDLC SERPL CALC-MCNC: 99 MG/DL (ref 0–100)
LDLC/HDLC SERPL: 1.71 {RATIO}
LEFT ATRIUM VOLUME INDEX: 29.6 ML/M2
MAXIMAL PREDICTED HEART RATE: 142 BPM
POTASSIUM SERPL-SCNC: 4.3 MMOL/L (ref 3.5–5.2)
PROT SERPL-MCNC: 7.2 G/DL (ref 6–8.5)
SINUS: 3 CM
SODIUM SERPL-SCNC: 139 MMOL/L (ref 136–145)
STJ: 3 CM
STRESS TARGET HR: 121 BPM
TRIGL SERPL-MCNC: 59 MG/DL (ref 0–150)
VLDLC SERPL-MCNC: 12 MG/DL (ref 5–40)

## 2022-04-19 PROCEDURE — 36415 COLL VENOUS BLD VENIPUNCTURE: CPT | Performed by: INTERNAL MEDICINE

## 2022-04-19 PROCEDURE — 80061 LIPID PANEL: CPT | Performed by: INTERNAL MEDICINE

## 2022-04-19 PROCEDURE — 80053 COMPREHEN METABOLIC PANEL: CPT | Performed by: INTERNAL MEDICINE

## 2022-04-19 PROCEDURE — 93306 TTE W/DOPPLER COMPLETE: CPT

## 2022-04-19 PROCEDURE — 93306 TTE W/DOPPLER COMPLETE: CPT | Performed by: INTERNAL MEDICINE

## 2022-05-31 ENCOUNTER — OFFICE (OUTPATIENT)
Dept: URBAN - METROPOLITAN AREA CLINIC 75 | Facility: CLINIC | Age: 77
End: 2022-05-31

## 2022-05-31 VITALS
HEIGHT: 69 IN | OXYGEN SATURATION: 98 % | SYSTOLIC BLOOD PRESSURE: 118 MMHG | WEIGHT: 147 LBS | DIASTOLIC BLOOD PRESSURE: 80 MMHG | HEART RATE: 68 BPM

## 2022-05-31 DIAGNOSIS — R10.32 LEFT LOWER QUADRANT PAIN: ICD-10-CM

## 2022-05-31 DIAGNOSIS — K59.00 CONSTIPATION, UNSPECIFIED: ICD-10-CM

## 2022-05-31 PROCEDURE — 99214 OFFICE O/P EST MOD 30 MIN: CPT | Performed by: NURSE PRACTITIONER

## 2022-05-31 RX ORDER — HYDROCORTISONE ACETATE AND PRAMOXINE HYDROCHLORIDE 25; 10 MG/G; MG/G
5 CREAM TOPICAL
Qty: 30 | Refills: 5 | Status: COMPLETED
Start: 2022-03-23 | End: 2022-12-01

## 2022-05-31 RX ORDER — PANTOPRAZOLE SODIUM 40 MG/1
40 TABLET, DELAYED RELEASE ORAL
Qty: 90 | Refills: 3 | Status: ACTIVE
Start: 2020-10-12

## 2022-06-07 ENCOUNTER — OFFICE VISIT (OUTPATIENT)
Dept: CARDIOLOGY | Facility: CLINIC | Age: 77
End: 2022-06-07

## 2022-06-07 ENCOUNTER — CLINICAL SUPPORT NO REQUIREMENTS (OUTPATIENT)
Dept: CARDIOLOGY | Facility: CLINIC | Age: 77
End: 2022-06-07

## 2022-06-07 VITALS
DIASTOLIC BLOOD PRESSURE: 67 MMHG | HEART RATE: 98 BPM | WEIGHT: 154 LBS | BODY MASS INDEX: 22.05 KG/M2 | HEIGHT: 70 IN | SYSTOLIC BLOOD PRESSURE: 117 MMHG

## 2022-06-07 DIAGNOSIS — I10 BENIGN ESSENTIAL HTN: ICD-10-CM

## 2022-06-07 DIAGNOSIS — Z95.0 PACEMAKER: ICD-10-CM

## 2022-06-07 DIAGNOSIS — Z95.0 PACEMAKER: Primary | ICD-10-CM

## 2022-06-07 DIAGNOSIS — Z79.01 ANTICOAGULATED: ICD-10-CM

## 2022-06-07 DIAGNOSIS — I48.20 CHRONIC ATRIAL FIBRILLATION: Primary | ICD-10-CM

## 2022-06-07 PROCEDURE — 93280 PM DEVICE PROGR EVAL DUAL: CPT | Performed by: INTERNAL MEDICINE

## 2022-06-07 PROCEDURE — 93000 ELECTROCARDIOGRAM COMPLETE: CPT

## 2022-06-07 PROCEDURE — 99213 OFFICE O/P EST LOW 20 MIN: CPT

## 2022-06-07 NOTE — PROGRESS NOTES
Subjective:        Raina Forrest is a 76 y.o. female who here for follow up    Chief Complaint   Patient presents with   • Follow-up     3 month with echo results        HPI  This is a 76 year old female with atrial fibrillation, hypertension, cardiomyopathy, CHF, thyroid disease, palpitations, GERD. She follows up in office today for echo results and management palpitations. She was seen in office on 1/13/2022.  Started on Xarelto 15 mg and ordered echo and labs.  Echo 4/19/2022 EF 60%, indeterminate LV diastolic function, RV C and RAC borderline dilated.  Lipid panel 4/19/2022 , HDL 58, LDL 99, trig 59, AST/ALT WNL, creatinine 0.81, GFR 74. Stress test 3/30/2021 myocardial fusion imaging indicates a normal study with no evidence of ischemia.  Cardiac catheterization 11/21/2016 normal coronary arteries, LV function at lower limit of normal.    The following portions of the patient's history were reviewed and updated as appropriate: allergies, current medications, past family history, past medical history, past social history, past surgical history and problem list.    Past Medical History:   Diagnosis Date   • Atrial fibrillation (HCC)    • Fung's esophagus    • Benign essential hypertension    • Blood clot in vein    • Cardiomyopathy (HCC)    • Chronic gastritis    • Congestive heart failure (HCC)    • Degenerative disc disease    • Dementia (HCC)     Pt  states she has slight dementia   • Depression with anxiety    • Disease of thyroid gland    • Diverticulitis of colon    • Dysphagia    • GERD (gastroesophageal reflux disease)    • History of chest pain    • History of migraine    • History of migraine headaches    • Left knee pain    • Osteoporosis    • Palpitations    • Thyroid disorder    • Thyroid nodule    • Ulcerative colitis (HCC)          reports that she has never smoked. She has never used smokeless tobacco. She reports that she does not drink alcohol and does not use drugs.      Family History   Problem Relation Age of Onset   • Lung cancer Other    • Ulcerative colitis Sister    • Heart failure Mother    • Heart disease Mother    • Heart failure Father    • Heart disease Father    • Malig Hyperthermia Neg Hx        ROS     Review of Systems  Constitutional: No wt loss, fever, fatigue  Gastrointestinal: No nausea, abdominal pain  Behavioral/Psych: No insomnia or anxiety  Cardiovascular NO CHEST PAIN OR SHORTNESS OF BREATH      Objective:           Vitals and nursing note reviewed.   Constitutional:       Appearance: Well-developed.   HENT:      Head: Normocephalic.      Right Ear: External ear normal.      Left Ear: External ear normal.   Neck:      Vascular: No JVD.   Pulmonary:      Effort: Pulmonary effort is normal. No respiratory distress.      Breath sounds: Normal breath sounds. No stridor. No rales.   Cardiovascular:      Normal rate. Regular rhythm.      No gallop.   Pulses:     Intact distal pulses.   Abdominal:      General: Bowel sounds are normal. There is no distension.      Palpations: Abdomen is soft.      Tenderness: There is no abdominal tenderness. There is no guarding.   Musculoskeletal: Normal range of motion.         General: No tenderness.      Cervical back: Normal range of motion. Skin:     General: Skin is warm.   Neurological:      Mental Status: Alert and oriented to person, place, and time.      Deep Tendon Reflexes: Reflexes are normal and symmetric.   Psychiatric:         Judgment: Judgment normal.           ECG 12 Lead    Date/Time: 6/7/2022 9:48 AM  Performed by: Carline Patton APRN  Authorized by: Carline Patton APRN   Comparison: compared with previous ECG from 1/13/2022  Similar to previous ECG  Rhythm: atrial fibrillation  Rate: normal  BPM: 76  ST Flattening: all    Clinical impression: abnormal EKG            Conclusion       · Successful left heart catheter  · Normal coronary arteries  · LV function at lower limit of normal     November 21,  2016     Interpretation Summary       · Findings consistent with an equivocal ECG stress test.  · Left ventricular ejection fraction is hyperdynamic (Calculated EF > 70%). .  · Myocardial perfusion imaging indicates a normal myocardial perfusion study with no evidence of ischemia.  · Impressions are consistent with a low risk study.    Interpretation Summary    · The right ventricular cavity is borderline dilated.  · The right atrial cavity is borderline dilated.  · Calculated left ventricular EF = 60% Estimated left ventricular EF was in agreement with the calculated left ventricular EF.  · Left ventricular diastolic function was indeterminate.  · There is no evidence of pericardial effusion. .           Current Outpatient Medications:   •  bisoprolol-hydrochlorothiazide (ZIAC) 5-6.25 MG per tablet, TAKE 1 TABLET BY MOUTH EVERY DAY, Disp: 90 tablet, Rfl: 1  •  calcium-vitamin D (OSCAL-500) 500-200 MG-UNIT per tablet, Take 1 tablet by mouth Daily., Disp: , Rfl:   •  Carboxymethylcellulose Sodium (EYE DROPS OP), Apply  to eye(s) as directed by provider. Pt to bring  To surgery, Disp: , Rfl:   •  docusate sodium (COLACE) 250 MG capsule, Take 1 capsule by mouth 2 (Two) Times a Day As Needed for Constipation., Disp: 30 capsule, Rfl: 1  •  EPINEPHrine (EPIPEN) 0.3 MG/0.3ML solution auto-injector injection, Inject 0.3 mL into the appropriate muscle as directed by prescriber Daily As Needed., Disp: , Rfl:   •  fluticasone (FLONASE) 50 MCG/ACT nasal spray, 2 sprays into the nostril(s) as directed by provider Daily., Disp: , Rfl:   •  KLOR-CON 10 MEQ CR tablet, TAKE 1 TABLET BY MOUTH EVERY DAY, Disp: 90 tablet, Rfl: 1  •  levothyroxine (SYNTHROID, LEVOTHROID) 25 MCG tablet, Take 25 mcg by mouth Daily., Disp: , Rfl:   •  Multiple Vitamins-Minerals (MULTIVITAMIN ADULT PO), Take 1 tablet by mouth Every Morning., Disp: , Rfl:   •  pantoprazole (PROTONIX) 40 MG EC tablet, TAKE 1 TABLET BY MOUTH EVERY MORNING 30MIN BEFORE  BREAKFAST FOR 90 DAYS, Disp: , Rfl:   •  rivaroxaban (Xarelto) 15 MG tablet, Take 1 tablet by mouth Daily., Disp: 30 tablet, Rfl: 6  •  sertraline (ZOLOFT) 100 MG tablet, Take 150 mg by mouth Every Morning., Disp: , Rfl:   •  SUMAtriptan (IMITREX) 50 MG tablet, Take 50 mg by mouth Every 2 (Two) Hours As Needed for Migraine., Disp: , Rfl: 4  •  torsemide (DEMADEX) 20 MG tablet, Take 1 tablet by mouth Daily., Disp: 90 tablet, Rfl: 2     Assessment:        Patient Active Problem List   Diagnosis   • Chronic atrial fibrillation (HCC)   • Benign essential HTN   • Bradycardia   • Chest pain   • Can't get food down   • Accumulation of fluid in tissues   • Esophageal lump   • Adynamia   • Itch of skin   • Anorexia   • Chronic nausea   • Gastroesophageal reflux disease   • Breath shortness   • Emesis   • Decreased body weight   • Anxiety   • Narrowing of intervertebral disc space   • Diverticulosis of intestine   • Hypertension   • Migraine   • Osteoporosis   • Palpitations   • Pituitary adenoma (HCC)   • Sick sinus syndrome (HCC)   • Diarrhea   • Cardiomyopathy (HCC)   • Chronic anticoagulation   • Atrial fibrillation (HCC)   • On amiodarone therapy   • Pacemaker   • Infected pacemaker (HCC)   • Wound infection   • Primary osteoarthritis of left knee   • DJD (degenerative joint disease)               Plan:   1. Paroxysmal atrial fibrillation anticoagulated on xarelto: no s/s bleeding. Controlled. Continue beta blockade, xarelto.    Pros and cons as well as indication of the anticoagulation has been explained to the patient in detail. There are no obvious complications at this stage. Risk of  the bleedings has been explained. Need for the regular blood workup and adjust the dose has been explained. Need for proper follow-up on anticoagulation also has been explained.     2. Hypertension: controlled on current regimen, continue same    Importance of controlling hypertension and blood pressure  checkup on the regular basis has  been explained. Hypertension as a silent killer has been discussed. Risk reduction of the weight and regular exercises to control the hypertension has been explained.    3. Pacemaker: normal functioning device                 No diagnosis found.    There are no diagnoses linked to this encounter.    COUNSELING: TATE Perkins was given to patient for the following topics: diagnostic results, risk factor reductions, impressions, risks and benefits of treatment options and importance of treatment compliance .       SMOKING COUNSELING:denies    Follow up in 6 months or sooner if needed      Sincerely,   JORY Fermin  [facility]  06/07/22  09:47 EDT    EMR Dragon/Transcription disclaimer:   Much of this encounter note is an electronic transcription/translation of spoken language to printed text. The electronic translation of spoken language may permit erroneous, or at times, nonsensical words or phrases to be inadvertently transcribed; Although I have reviewed the note for such errors, some may still exist.

## 2022-06-24 RX ORDER — POTASSIUM CHLORIDE 750 MG/1
TABLET, EXTENDED RELEASE ORAL
Qty: 90 TABLET | Refills: 1 | Status: SHIPPED | OUTPATIENT
Start: 2022-06-24

## 2022-06-28 RX ORDER — TORSEMIDE 20 MG/1
20 TABLET ORAL DAILY
Qty: 90 TABLET | Refills: 2 | Status: SHIPPED | OUTPATIENT
Start: 2022-06-28 | End: 2022-07-08 | Stop reason: SDUPTHER

## 2022-07-08 RX ORDER — TORSEMIDE 20 MG/1
20 TABLET ORAL DAILY
Qty: 90 TABLET | Refills: 2 | Status: SHIPPED | OUTPATIENT
Start: 2022-07-08 | End: 2023-01-25

## 2022-09-20 ENCOUNTER — OFFICE (OUTPATIENT)
Dept: URBAN - METROPOLITAN AREA CLINIC 75 | Facility: CLINIC | Age: 77
End: 2022-09-20

## 2022-09-20 VITALS
HEIGHT: 69 IN | HEART RATE: 80 BPM | OXYGEN SATURATION: 98 % | DIASTOLIC BLOOD PRESSURE: 70 MMHG | SYSTOLIC BLOOD PRESSURE: 130 MMHG | WEIGHT: 144 LBS

## 2022-09-20 DIAGNOSIS — B96.81 HELICOBACTER PYLORI [H. PYLORI] AS THE CAUSE OF DISEASES CLA: ICD-10-CM

## 2022-09-20 DIAGNOSIS — R11.0 NAUSEA: ICD-10-CM

## 2022-09-20 DIAGNOSIS — R10.10 UPPER ABDOMINAL PAIN, UNSPECIFIED: ICD-10-CM

## 2022-09-20 DIAGNOSIS — K92.1 MELENA: ICD-10-CM

## 2022-09-20 PROCEDURE — 99214 OFFICE O/P EST MOD 30 MIN: CPT | Performed by: NURSE PRACTITIONER

## 2022-11-02 RX ORDER — BISOPROLOL FUMARATE AND HYDROCHLOROTHIAZIDE 5; 6.25 MG/1; MG/1
1 TABLET ORAL DAILY
Qty: 90 TABLET | Refills: 1 | Status: SHIPPED | OUTPATIENT
Start: 2022-11-02 | End: 2023-02-12 | Stop reason: HOSPADM

## 2022-12-01 ENCOUNTER — OFFICE (OUTPATIENT)
Dept: URBAN - METROPOLITAN AREA CLINIC 76 | Facility: CLINIC | Age: 77
End: 2022-12-01

## 2022-12-01 VITALS
WEIGHT: 146 LBS | HEIGHT: 69 IN | SYSTOLIC BLOOD PRESSURE: 126 MMHG | DIASTOLIC BLOOD PRESSURE: 66 MMHG | OXYGEN SATURATION: 97 % | HEART RATE: 68 BPM

## 2022-12-01 DIAGNOSIS — B96.81 HELICOBACTER PYLORI [H. PYLORI] AS THE CAUSE OF DISEASES CLA: ICD-10-CM

## 2022-12-01 PROCEDURE — 99214 OFFICE O/P EST MOD 30 MIN: CPT | Performed by: INTERNAL MEDICINE

## 2022-12-06 ENCOUNTER — CLINICAL SUPPORT NO REQUIREMENTS (OUTPATIENT)
Dept: CARDIOLOGY | Facility: CLINIC | Age: 77
End: 2022-12-06
Payer: MEDICARE

## 2022-12-06 ENCOUNTER — OFFICE VISIT (OUTPATIENT)
Dept: CARDIOLOGY | Facility: CLINIC | Age: 77
End: 2022-12-06

## 2022-12-06 VITALS
DIASTOLIC BLOOD PRESSURE: 76 MMHG | WEIGHT: 143 LBS | BODY MASS INDEX: 20.47 KG/M2 | HEIGHT: 70 IN | HEART RATE: 65 BPM | SYSTOLIC BLOOD PRESSURE: 123 MMHG

## 2022-12-06 DIAGNOSIS — I10 PRIMARY HYPERTENSION: ICD-10-CM

## 2022-12-06 DIAGNOSIS — Z95.0 PACEMAKER: Primary | ICD-10-CM

## 2022-12-06 DIAGNOSIS — I48.21 PERMANENT ATRIAL FIBRILLATION: ICD-10-CM

## 2022-12-06 PROCEDURE — 99213 OFFICE O/P EST LOW 20 MIN: CPT

## 2022-12-06 PROCEDURE — 93000 ELECTROCARDIOGRAM COMPLETE: CPT

## 2022-12-06 PROCEDURE — 93280 PM DEVICE PROGR EVAL DUAL: CPT | Performed by: INTERNAL MEDICINE

## 2022-12-06 NOTE — PROGRESS NOTES
Subjective:        Raina Forrest is a 76 y.o. female who here for follow up    Chief Complaint   Patient presents with   • Follow-up     6 month        HPI     Raina Forrest is a 76-year-old female with hypertension, atrial fibrillation, CHF, pacemaker.  She follows up in office today for management of A. fib and pacemaker.    Still relevant: Echo 4/19/2022 EF 60%, indeterminate LV diastolic function, RV C and RAC borderline dilated.  Lipid panel 4/19/2022 , HDL 58, LDL 99, trig 59, AST/ALT WNL, creatinine 0.81, GFR 74. Stress test 3/30/2021 myocardial fusion imaging indicates a normal study with no evidence of ischemia.  Cardiac catheterization 11/21/2016 normal coronary arteries, LV function at lower limit of normal.    The following portions of the patient's history were reviewed and updated as appropriate: allergies, current medications, past family history, past medical history, past social history, past surgical history and problem list.    Past Medical History:   Diagnosis Date   • Atrial fibrillation (HCC)    • Fung's esophagus    • Benign essential hypertension    • Blood clot in vein    • Cardiomyopathy (HCC)    • Chronic gastritis    • Congestive heart failure (HCC)    • Degenerative disc disease    • Dementia (HCC)     Pt  states she has slight dementia   • Depression with anxiety    • Disease of thyroid gland    • Diverticulitis of colon    • Dysphagia    • GERD (gastroesophageal reflux disease)    • History of chest pain    • History of migraine    • History of migraine headaches    • Left knee pain    • Osteoporosis    • Palpitations    • Thyroid disorder    • Thyroid nodule    • Ulcerative colitis (HCC)          reports that she has never smoked. She has never used smokeless tobacco. She reports that she does not drink alcohol and does not use drugs.     Family History   Problem Relation Age of Onset   • Lung cancer Other    • Ulcerative colitis Sister    • Heart failure Mother     • Heart disease Mother    • Heart failure Father    • Heart disease Father    • Malig Hyperthermia Neg Hx        ROS     Review of Systems  Constitutional: no wt loss, fever, fatigue  Gastrointestinal: no nausea, abdominal pain  Behavioral/Psych: no insomnia or anxiety  Cardiovascular no chest pain or shortness of breath      Objective:           Vitals and nursing note reviewed.   Constitutional:       Appearance: Well-developed.   HENT:      Head: Normocephalic.      Right Ear: External ear normal.      Left Ear: External ear normal.   Neck:      Vascular: No JVD.   Pulmonary:      Effort: Pulmonary effort is normal. No respiratory distress.      Breath sounds: Normal breath sounds. No stridor. No rales.   Cardiovascular:      Normal rate. Regular rhythm.      No gallop.   Pulses:     Intact distal pulses.   Abdominal:      General: Bowel sounds are normal. There is no distension.      Palpations: Abdomen is soft.      Tenderness: There is no abdominal tenderness. There is no guarding.   Musculoskeletal: Normal range of motion.         General: No tenderness.      Cervical back: Normal range of motion. Skin:     General: Skin is warm.   Neurological:      Mental Status: Alert and oriented to person, place, and time.      Deep Tendon Reflexes: Reflexes are normal and symmetric.   Psychiatric:         Judgment: Judgment normal.           ECG 12 Lead    Date/Time: 12/6/2022 9:33 AM  Performed by: Carline Patton APRN  Authorized by: Carline Patton APRN   Comparison: compared with previous ECG from 6/7/2022  Similar to previous ECG  Rhythm: atrial fibrillation  Rate: normal  BPM: 91  T flattening: all    Clinical impression: abnormal EKG            Conclusion       · Successful left heart catheter  · Normal coronary arteries  · LV function at lower limit of normal     November 21, 2016     Interpretation Summary       · Findings consistent with an equivocal ECG stress test.  · Left ventricular ejection  fraction is hyperdynamic (Calculated EF > 70%). .  · Myocardial perfusion imaging indicates a normal myocardial perfusion study with no evidence of ischemia.  · Impressions are consistent with a low risk study.    Interpretation Summary    · The right ventricular cavity is borderline dilated.  · The right atrial cavity is borderline dilated.  · Calculated left ventricular EF = 60% Estimated left ventricular EF was in agreement with the calculated left ventricular EF.  · Left ventricular diastolic function was indeterminate.  · There is no evidence of pericardial effusion. .           Current Outpatient Medications:   •  bisoprolol-hydrochlorothiazide (ZIAC) 5-6.25 MG per tablet, Take 1 tablet by mouth Daily., Disp: 90 tablet, Rfl: 1  •  calcium-vitamin D (OSCAL-500) 500-200 MG-UNIT per tablet, Take 1 tablet by mouth Daily., Disp: , Rfl:   •  Carboxymethylcellulose Sodium (EYE DROPS OP), Apply  to eye(s) as directed by provider. Pt to bring  To surgery, Disp: , Rfl:   •  docusate sodium (COLACE) 250 MG capsule, Take 1 capsule by mouth 2 (Two) Times a Day As Needed for Constipation., Disp: 30 capsule, Rfl: 1  •  EPINEPHrine (EPIPEN) 0.3 MG/0.3ML solution auto-injector injection, Inject 0.3 mL into the appropriate muscle as directed by prescriber Daily As Needed., Disp: , Rfl:   •  fluticasone (FLONASE) 50 MCG/ACT nasal spray, 2 sprays into the nostril(s) as directed by provider Daily., Disp: , Rfl:   •  KLOR-CON 10 MEQ CR tablet, TAKE 1 TABLET BY MOUTH EVERY DAY, Disp: 90 tablet, Rfl: 1  •  levothyroxine (SYNTHROID, LEVOTHROID) 25 MCG tablet, Take 25 mcg by mouth Daily., Disp: , Rfl:   •  Multiple Vitamins-Minerals (MULTIVITAMIN ADULT PO), Take 1 tablet by mouth Every Morning., Disp: , Rfl:   •  pantoprazole (PROTONIX) 40 MG EC tablet, TAKE 1 TABLET BY MOUTH EVERY MORNING 30MIN BEFORE BREAKFAST FOR 90 DAYS, Disp: , Rfl:   •  rivaroxaban (Xarelto) 15 MG tablet, Take 1 tablet by mouth Daily., Disp: 30 tablet, Rfl: 6  •   sertraline (ZOLOFT) 100 MG tablet, Take 150 mg by mouth Every Morning., Disp: , Rfl:   •  SUMAtriptan (IMITREX) 50 MG tablet, Take 50 mg by mouth Every 2 (Two) Hours As Needed for Migraine., Disp: , Rfl: 4  •  torsemide (DEMADEX) 20 MG tablet, Take 1 tablet by mouth Daily., Disp: 90 tablet, Rfl: 2     Assessment:        Patient Active Problem List   Diagnosis   • Chronic atrial fibrillation (HCC)   • Benign essential HTN   • Bradycardia   • Chest pain   • Can't get food down   • Accumulation of fluid in tissues   • Esophageal lump   • Adynamia   • Itch of skin   • Anorexia   • Chronic nausea   • Gastroesophageal reflux disease   • Breath shortness   • Emesis   • Decreased body weight   • Anxiety   • Narrowing of intervertebral disc space   • Diverticulosis of intestine   • Hypertension   • Migraine   • Osteoporosis   • Palpitations   • Pituitary adenoma (HCC)   • Sick sinus syndrome (HCC)   • Diarrhea   • Cardiomyopathy (HCC)   • Chronic anticoagulation   • Atrial fibrillation (HCC)   • On amiodarone therapy   • Pacemaker   • Infected pacemaker (HCC)   • Wound infection   • Primary osteoarthritis of left knee   • DJD (degenerative joint disease)               Plan:   1. Chronic atrial fibrillation anticoagulated on xarelto: Controlled on beta-blockade.  No signs and symptoms of bleeding.  Continue Xarelto.    Pros and cons as well as indication of the anticoagulation has been explained to the patient in detail. There are no obvious complications at this stage. Risk of  the bleedings has been explained. Need for the regular blood workup and adjust the dose has been explained. Need for proper follow-up on anticoagulation also has been explained.     2. Hypertension: 123/76 today in office.  Controlled.    Importance of controlling hypertension and blood pressure  checkup on the regular basis has been explained. Hypertension as a silent killer has been discussed. Risk reduction of the weight and regular exercises to  control the hypertension has been explained.    3. Pacemaker: Normal functioning device.                 No diagnosis found.    There are no diagnoses linked to this encounter.    COUNSELING: jimi Perkins was given to patient for the following topics: diagnostic results, risk factor reductions, impressions, risks and benefits of treatment options and importance of treatment compliance .       SMOKING COUNSELING:denies    Follow up in 6 months or sooner if needed      Sincerely,   JORY Fermin  [facility]  12/06/22  09:31 EST    EMR Dragon/Transcription disclaimer:   Much of this encounter note is an electronic transcription/translation of spoken language to printed text. The electronic translation of spoken language may permit erroneous, or at times, nonsensical words or phrases to be inadvertently transcribed; Although I have reviewed the note for such errors, some may still exist.

## 2023-01-25 RX ORDER — TORSEMIDE 20 MG/1
TABLET ORAL
Qty: 90 TABLET | Refills: 2 | Status: SHIPPED | OUTPATIENT
Start: 2023-01-25

## 2023-02-07 ENCOUNTER — TELEPHONE (OUTPATIENT)
Dept: CARDIOLOGY | Facility: CLINIC | Age: 78
End: 2023-02-07
Payer: MEDICARE

## 2023-02-07 NOTE — TELEPHONE ENCOUNTER
Hub staff attempted to follow warm transfer process and was unsuccessful     Caller: Benito Forrest    Relationship to patient: Emergency Contact    Best call back number: [phone]    Patient is needing:  CALLED IN WANTED TO SPEAK TO BURKE CALLED AND SHE IS WITH PATIENTS UNTIL ALMOST 4PM.  HE ADVISED THAT HER LEFT LEG IS DOUBLE THE SIZE OF THE RIGHT AND THAT IT IS SWELLED OVER HER SHOES.  PLEASE CALL TO DISCUSS

## 2023-02-09 ENCOUNTER — OFFICE VISIT (OUTPATIENT)
Dept: CARDIOLOGY | Facility: CLINIC | Age: 78
End: 2023-02-09
Payer: MEDICARE

## 2023-02-09 ENCOUNTER — APPOINTMENT (OUTPATIENT)
Dept: GENERAL RADIOLOGY | Facility: HOSPITAL | Age: 78
End: 2023-02-09
Payer: MEDICARE

## 2023-02-09 ENCOUNTER — HOSPITAL ENCOUNTER (OUTPATIENT)
Facility: HOSPITAL | Age: 78
Setting detail: OBSERVATION
Discharge: HOME HEALTH | End: 2023-02-12
Attending: INTERNAL MEDICINE | Admitting: INTERNAL MEDICINE
Payer: MEDICARE

## 2023-02-09 VITALS
HEIGHT: 70 IN | WEIGHT: 138 LBS | HEART RATE: 102 BPM | BODY MASS INDEX: 19.76 KG/M2 | DIASTOLIC BLOOD PRESSURE: 87 MMHG | SYSTOLIC BLOOD PRESSURE: 147 MMHG

## 2023-02-09 DIAGNOSIS — I42.9 CARDIOMYOPATHY, UNSPECIFIED TYPE: ICD-10-CM

## 2023-02-09 DIAGNOSIS — Z79.01 CHRONIC ANTICOAGULATION: ICD-10-CM

## 2023-02-09 DIAGNOSIS — Z74.09 DECREASED MOBILITY AND ENDURANCE: Primary | ICD-10-CM

## 2023-02-09 DIAGNOSIS — I10 PRIMARY HYPERTENSION: ICD-10-CM

## 2023-02-09 DIAGNOSIS — Z95.0 PACEMAKER: Primary | ICD-10-CM

## 2023-02-09 DIAGNOSIS — I25.5 ISCHEMIC CARDIOMYOPATHY: ICD-10-CM

## 2023-02-09 DIAGNOSIS — I48.21 PERMANENT ATRIAL FIBRILLATION: ICD-10-CM

## 2023-02-09 DIAGNOSIS — M17.12 PRIMARY OSTEOARTHRITIS OF LEFT KNEE: ICD-10-CM

## 2023-02-09 LAB
ALBUMIN SERPL-MCNC: 4.4 G/DL (ref 3.5–5.2)
ALBUMIN/GLOB SERPL: 2.1 G/DL
ALP SERPL-CCNC: 91 U/L (ref 39–117)
ALT SERPL W P-5'-P-CCNC: 29 U/L (ref 1–33)
ANION GAP SERPL CALCULATED.3IONS-SCNC: 9.1 MMOL/L (ref 5–15)
AST SERPL-CCNC: 39 U/L (ref 1–32)
BASOPHILS # BLD AUTO: 0.08 10*3/MM3 (ref 0–0.2)
BASOPHILS NFR BLD AUTO: 0.7 % (ref 0–1.5)
BILIRUB SERPL-MCNC: 1.2 MG/DL (ref 0–1.2)
BUN SERPL-MCNC: 8 MG/DL (ref 8–23)
BUN/CREAT SERPL: 11.3 (ref 7–25)
CALCIUM SPEC-SCNC: 9.5 MG/DL (ref 8.6–10.5)
CHLORIDE SERPL-SCNC: 100 MMOL/L (ref 98–107)
CO2 SERPL-SCNC: 27.9 MMOL/L (ref 22–29)
CREAT SERPL-MCNC: 0.71 MG/DL (ref 0.57–1)
DEPRECATED RDW RBC AUTO: 44.8 FL (ref 37–54)
EGFRCR SERPLBLD CKD-EPI 2021: 86.6 ML/MIN/1.73
EOSINOPHIL # BLD AUTO: 0.11 10*3/MM3 (ref 0–0.4)
EOSINOPHIL NFR BLD AUTO: 1 % (ref 0.3–6.2)
ERYTHROCYTE [DISTWIDTH] IN BLOOD BY AUTOMATED COUNT: 14 % (ref 12.3–15.4)
GEN 5 2HR TROPONIN T REFLEX: 41 NG/L
GLOBULIN UR ELPH-MCNC: 2.1 GM/DL
GLUCOSE SERPL-MCNC: 96 MG/DL (ref 65–99)
HCT VFR BLD AUTO: 33.9 % (ref 34–46.6)
HGB BLD-MCNC: 11.4 G/DL (ref 12–15.9)
IMM GRANULOCYTES # BLD AUTO: 0.09 10*3/MM3 (ref 0–0.05)
IMM GRANULOCYTES NFR BLD AUTO: 0.8 % (ref 0–0.5)
LYMPHOCYTES # BLD AUTO: 1.08 10*3/MM3 (ref 0.7–3.1)
LYMPHOCYTES NFR BLD AUTO: 9.4 % (ref 19.6–45.3)
MAGNESIUM SERPL-MCNC: 2.1 MG/DL (ref 1.6–2.4)
MCH RBC QN AUTO: 29.4 PG (ref 26.6–33)
MCHC RBC AUTO-ENTMCNC: 33.6 G/DL (ref 31.5–35.7)
MCV RBC AUTO: 87.4 FL (ref 79–97)
MONOCYTES # BLD AUTO: 0.77 10*3/MM3 (ref 0.1–0.9)
MONOCYTES NFR BLD AUTO: 6.7 % (ref 5–12)
NEUTROPHILS NFR BLD AUTO: 81.4 % (ref 42.7–76)
NEUTROPHILS NFR BLD AUTO: 9.38 10*3/MM3 (ref 1.7–7)
NRBC BLD AUTO-RTO: 0 /100 WBC (ref 0–0.2)
NT-PROBNP SERPL-MCNC: 1817 PG/ML (ref 0–1800)
PLATELET # BLD AUTO: 241 10*3/MM3 (ref 140–450)
PMV BLD AUTO: 11.3 FL (ref 6–12)
POTASSIUM SERPL-SCNC: 4.2 MMOL/L (ref 3.5–5.2)
PROT SERPL-MCNC: 6.5 G/DL (ref 6–8.5)
RBC # BLD AUTO: 3.88 10*6/MM3 (ref 3.77–5.28)
SODIUM SERPL-SCNC: 137 MMOL/L (ref 136–145)
TROPONIN T DELTA: 4 NG/L
TROPONIN T SERPL HS-MCNC: 37 NG/L
TSH SERPL DL<=0.05 MIU/L-ACNC: 3.04 UIU/ML (ref 0.27–4.2)
WBC NRBC COR # BLD: 11.51 10*3/MM3 (ref 3.4–10.8)

## 2023-02-09 PROCEDURE — G0378 HOSPITAL OBSERVATION PER HR: HCPCS

## 2023-02-09 PROCEDURE — 93010 ELECTROCARDIOGRAM REPORT: CPT | Performed by: INTERNAL MEDICINE

## 2023-02-09 PROCEDURE — 83735 ASSAY OF MAGNESIUM: CPT | Performed by: INTERNAL MEDICINE

## 2023-02-09 PROCEDURE — 84443 ASSAY THYROID STIM HORMONE: CPT | Performed by: INTERNAL MEDICINE

## 2023-02-09 PROCEDURE — 96372 THER/PROPH/DIAG INJ SC/IM: CPT

## 2023-02-09 PROCEDURE — 71045 X-RAY EXAM CHEST 1 VIEW: CPT

## 2023-02-09 PROCEDURE — 96374 THER/PROPH/DIAG INJ IV PUSH: CPT

## 2023-02-09 PROCEDURE — 93005 ELECTROCARDIOGRAM TRACING: CPT | Performed by: INTERNAL MEDICINE

## 2023-02-09 PROCEDURE — 80053 COMPREHEN METABOLIC PANEL: CPT | Performed by: INTERNAL MEDICINE

## 2023-02-09 PROCEDURE — 83880 ASSAY OF NATRIURETIC PEPTIDE: CPT | Performed by: INTERNAL MEDICINE

## 2023-02-09 PROCEDURE — 99214 OFFICE O/P EST MOD 30 MIN: CPT | Performed by: INTERNAL MEDICINE

## 2023-02-09 PROCEDURE — 93000 ELECTROCARDIOGRAM COMPLETE: CPT | Performed by: INTERNAL MEDICINE

## 2023-02-09 PROCEDURE — 25010000002 FUROSEMIDE PER 20 MG: Performed by: NURSE PRACTITIONER

## 2023-02-09 PROCEDURE — 85025 COMPLETE CBC W/AUTO DIFF WBC: CPT | Performed by: INTERNAL MEDICINE

## 2023-02-09 PROCEDURE — 84484 ASSAY OF TROPONIN QUANT: CPT | Performed by: INTERNAL MEDICINE

## 2023-02-09 PROCEDURE — 25010000002 ENOXAPARIN PER 10 MG: Performed by: NURSE PRACTITIONER

## 2023-02-09 RX ORDER — POTASSIUM CHLORIDE 750 MG/1
10 TABLET, FILM COATED, EXTENDED RELEASE ORAL 2 TIMES DAILY WITH MEALS
Status: DISCONTINUED | OUTPATIENT
Start: 2023-02-10 | End: 2023-02-10

## 2023-02-09 RX ORDER — DOCUSATE SODIUM 100 MG/1
200 CAPSULE, LIQUID FILLED ORAL DAILY PRN
Status: DISCONTINUED | OUTPATIENT
Start: 2023-02-09 | End: 2023-02-12 | Stop reason: HOSPADM

## 2023-02-09 RX ORDER — BISOPROLOL FUMARATE 5 MG/1
5 TABLET, FILM COATED ORAL ONCE
Status: COMPLETED | OUTPATIENT
Start: 2023-02-09 | End: 2023-02-09

## 2023-02-09 RX ORDER — B-COMPLEX WITH VITAMIN C
1 TABLET ORAL DAILY
Status: DISCONTINUED | OUTPATIENT
Start: 2023-02-09 | End: 2023-02-09 | Stop reason: CLARIF

## 2023-02-09 RX ORDER — TORSEMIDE 20 MG/1
20 TABLET ORAL DAILY
Status: DISCONTINUED | OUTPATIENT
Start: 2023-02-09 | End: 2023-02-09

## 2023-02-09 RX ORDER — SUMATRIPTAN 50 MG/1
50 TABLET, FILM COATED ORAL
Status: DISCONTINUED | OUTPATIENT
Start: 2023-02-09 | End: 2023-02-09

## 2023-02-09 RX ORDER — HYDRALAZINE HYDROCHLORIDE 20 MG/ML
10 INJECTION INTRAMUSCULAR; INTRAVENOUS EVERY 6 HOURS PRN
Status: DISCONTINUED | OUTPATIENT
Start: 2023-02-09 | End: 2023-02-11

## 2023-02-09 RX ORDER — SODIUM CHLORIDE 0.9 % (FLUSH) 0.9 %
10 SYRINGE (ML) INJECTION AS NEEDED
Status: DISCONTINUED | OUTPATIENT
Start: 2023-02-09 | End: 2023-02-12 | Stop reason: HOSPADM

## 2023-02-09 RX ORDER — PANTOPRAZOLE SODIUM 40 MG/1
40 TABLET, DELAYED RELEASE ORAL
Status: DISCONTINUED | OUTPATIENT
Start: 2023-02-10 | End: 2023-02-12 | Stop reason: HOSPADM

## 2023-02-09 RX ORDER — DONEPEZIL HYDROCHLORIDE 10 MG/1
1 TABLET, FILM COATED ORAL DAILY
COMMUNITY
Start: 2023-02-06

## 2023-02-09 RX ORDER — FUROSEMIDE 10 MG/ML
40 INJECTION INTRAMUSCULAR; INTRAVENOUS EVERY 12 HOURS
Status: DISCONTINUED | OUTPATIENT
Start: 2023-02-09 | End: 2023-02-11

## 2023-02-09 RX ORDER — SODIUM CHLORIDE 9 MG/ML
40 INJECTION, SOLUTION INTRAVENOUS AS NEEDED
Status: DISCONTINUED | OUTPATIENT
Start: 2023-02-09 | End: 2023-02-12 | Stop reason: HOSPADM

## 2023-02-09 RX ORDER — DONEPEZIL HYDROCHLORIDE 10 MG/1
10 TABLET, FILM COATED ORAL DAILY
Status: DISCONTINUED | OUTPATIENT
Start: 2023-02-09 | End: 2023-02-12 | Stop reason: HOSPADM

## 2023-02-09 RX ORDER — BISOPROLOL FUMARATE 5 MG/1
10 TABLET, FILM COATED ORAL
Status: DISCONTINUED | OUTPATIENT
Start: 2023-02-10 | End: 2023-02-12 | Stop reason: HOSPADM

## 2023-02-09 RX ORDER — NITROGLYCERIN 0.4 MG/1
0.4 TABLET SUBLINGUAL
Status: DISCONTINUED | OUTPATIENT
Start: 2023-02-09 | End: 2023-02-11

## 2023-02-09 RX ORDER — POTASSIUM CHLORIDE 750 MG/1
10 TABLET, FILM COATED, EXTENDED RELEASE ORAL DAILY
Status: DISCONTINUED | OUTPATIENT
Start: 2023-02-09 | End: 2023-02-09

## 2023-02-09 RX ORDER — SODIUM CHLORIDE 0.9 % (FLUSH) 0.9 %
10 SYRINGE (ML) INJECTION EVERY 12 HOURS SCHEDULED
Status: DISCONTINUED | OUTPATIENT
Start: 2023-02-09 | End: 2023-02-09

## 2023-02-09 RX ORDER — ENOXAPARIN SODIUM 100 MG/ML
1 INJECTION SUBCUTANEOUS 2 TIMES DAILY
Status: DISCONTINUED | OUTPATIENT
Start: 2023-02-09 | End: 2023-02-12 | Stop reason: HOSPADM

## 2023-02-09 RX ORDER — LEVOTHYROXINE SODIUM 0.03 MG/1
25 TABLET ORAL
Status: DISCONTINUED | OUTPATIENT
Start: 2023-02-10 | End: 2023-02-12 | Stop reason: HOSPADM

## 2023-02-09 RX ORDER — HYDROCHLOROTHIAZIDE 25 MG/1
6.25 TABLET ORAL DAILY
Status: DISCONTINUED | OUTPATIENT
Start: 2023-02-10 | End: 2023-02-09

## 2023-02-09 RX ADMIN — BISOPROLOL FUMARATE 5 MG: 5 TABLET, FILM COATED ORAL at 19:47

## 2023-02-09 RX ADMIN — CALCIUM CARBONATE-VITAMIN D TAB 500 MG-200 UNIT 1 TABLET: 500-200 TAB at 17:00

## 2023-02-09 RX ADMIN — ENOXAPARIN SODIUM 60 MG: 100 INJECTION SUBCUTANEOUS at 17:00

## 2023-02-09 RX ADMIN — DONEPEZIL HYDROCHLORIDE 10 MG: 10 TABLET, FILM COATED ORAL at 17:01

## 2023-02-09 RX ADMIN — POTASSIUM CHLORIDE 10 MEQ: 750 TABLET, EXTENDED RELEASE ORAL at 18:17

## 2023-02-09 RX ADMIN — FUROSEMIDE 40 MG: 10 INJECTION, SOLUTION INTRAMUSCULAR; INTRAVENOUS at 18:17

## 2023-02-09 NOTE — CONSULTS
Internal medicine consult    Referring physician  Dr. ALVARADO    Chief complaint  Shortness of breath  Bilateral lower extremity swelling    Reason for consult  Follow medical problem    History of present illness  77-year-old white female with history of severe dementia who also have atrial fibrillation congestive heart failure hypothyroidism depression and gastroesophageal reflux disease directly admitted to cardiology service with bilateral lower extremity swelling and shortness of breath with exertion.  Patient has no fever chills cough chest pain palpitation abdominal pain nausea vomiting diarrhea.  Patient is poor historian and most of the history obtained from the  and the son.  I am asked to follow the patient for medical problem.    PAST MEDICAL HISTORY  • Abdominal pain     • Atrial fibrillation (CMS/HCC)     • Fung's esophagus     • Benign essential hypertension     • Blood in stool     • Chest pain     • Chronic gastritis     • Congestive heart failure (CMS/HCC)     • Degenerative disc disease     • Depression with anxiety     • Disease of thyroid gland     • Diverticulitis of colon     • Dysphagia     • GERD (gastroesophageal reflux disease)     • History of migraine headaches     • Osteoporosis     • Palpitations     • Thyroid disorder     • Ulcerative colitis (CMS/HCC)     • Vomiting     • Weight loss        PAST SURGICAL HISTORY              Procedure Laterality Date   • APPENDECTOMY       • BLADDER SURGERY       • CARDIAC CATHETERIZATION N/A 11/21/2016     Procedure: Left Heart Cath;  Surgeon: Robinson Salazar MD;  Location:  [facility];  Service:    • CARDIAC ELECTROPHYSIOLOGY PROCEDURE N/A 2/7/2017     Procedure: Pacemaker DC new   [employer];  Surgeon: Robinson Salazar MD;  Location:  [facility];  Service:    • CARDIOVERSION   01/18/2017   • CHOLECYSTECTOMY       • COLONOSCOPY       • COLONOSCOPY N/A 7/29/2016     normal   • ENDOSCOPY   05/06/2015      submucosal nodule in esophagus, erythematous mucosa in antrum,moderate acute gastritis, no h-pylori   • ENDOSCOPY N/A 9/2/2016     Erythematous mucosa in the antrum   • EYE SURGERY       • HYSTERECTOMY       • KNEE SURGERY       • THYROID SURGERY       • TONSILLECTOMY             FAMILY HISTORY           Problem Relation Age of Onset   • Lung cancer Other     • Ulcerative colitis Sister     • Heart failure Mother     • Heart disease Mother     • Heart failure Father     • Heart disease Father        SOCIAL HISTORY           Socioeconomic History   • Marital status:        Spouse name: Not on file   • Number of children: Not on file   • Years of education: Not on file   • Highest education level: Not on file   Tobacco Use   • Smoking status: Never Smoker   • Smokeless tobacco: Never Used   Substance and Sexual Activity   • Alcohol use: No   • Drug use: No   • Sexual activity: Defer       Birth control/protection: Post-menopausal         ALLERGIES  Amoxicillin-pot clavulanate; Bupivacaine hcl; Doxycycline; Nepafenac; Bactrim [sulfamethoxazole-trimethoprim]; Barium-containing compounds; Bextra [valdecoxib]; Bupivacaine; Clarithromycin; Contrast dye; Cymbalta [duloxetine hcl]; Doxycycline monohydrate; Iodinated diagnostic agents; Iodine; Keflex [cephalexin]; Levaquin [levofloxacin]; Medrol [methylprednisolone]; Mesalamine; Nsaids; Polymyxin b-trimethoprim; Rivaroxaban; Tetanus toxoids; Tetanus-diphtheria toxoids td; and Clindamycin  Home medications reviewed    REVIEW OF SYSTEMS  Constitutional: Positive for appetite change and negative fever.   HENT: Negative.  Negative for congestion, ear pain, rhinorrhea and sore throat.    Eyes: Negative.    Respiratory: Positive for shortness of breath. Negative for cough.    Cardiovascular: Negative.  Negative for chest pain, palpitations and positive leg swelling   Gastrointestinal: Negative for nausea vomiting constipation and diarrhea.   Endocrine: Negative.   "  Genitourinary: Negative.  Negative for decreased urine volume, difficulty urinating, dysuria, menstrual problem, pelvic pain, urgency and vaginal discharge.   Musculoskeletal: Negative.  Negative for back pain.   Skin: Negative.  Negative for rash.   Allergic/Immunologic: Negative.    Neurological: Negative.  Negative for dizziness, weakness, light-headedness, numbness and headaches.   Hematological: Negative.    Psychiatric/Behavioral: Negative.  The patient is not nervous/anxious.    All other systems reviewed and are negative.     PHYSICAL EXAM  Blood pressure 145/92, pulse 89, temperature 97.6 °F (36.4 °C), temperature source Oral, resp. rate 18, height 177.8 cm (70\"), weight 62.6 kg (138 lb 0.1 oz), SpO2 99 %.    Constitutional: Awake and alert and in no distress.   HEENT: Unremarkable  Neck: Normal range of motion.   Cardiovascular: Normal rate, regular rhythm and normal heart sounds.   Pulmonary/Chest: Effort normal and breath sounds normal. She has no wheezes.   Abdominal: Soft. Bowel sounds are normal. There is no tenderness.   Musculoskeletal: Normal range of motion. She exhibits no edema.   Neurological: She is alert.   Skin: Skin is warm and dry. No rash noted.   Psychiatric: Mood, memory, affect and judgment normal.     LAB RESULTS  Lab Results (last 24 hours)     Procedure Component Value Units Date/Time    High Sensitivity Troponin T [[id number]]  (Abnormal) Collected: 02/09/23 1622    Specimen: Blood Updated: 02/09/23 1800     HS Troponin T 37 ng/L     Narrative:      High Sensitive Troponin T Reference Range:  <10.0 ng/L- Negative Female for AMI  <15.0 ng/L- Negative Male for AMI  >=10 - Abnormal Female indicating possible myocardial injury.  >=15 - Abnormal Male indicating possible myocardial injury.   Clinicians would have to utilize clinical acumen, EKG, Troponin, and serial changes to determine if it is an Acute Myocardial Infarction or myocardial injury due to an underlying chronic " condition.         BNP [[id number]]  (Abnormal) Collected: 02/09/23 1622    Specimen: Blood Updated: 02/09/23 1753     proBNP 1,817.0 pg/mL     Narrative:      Among patients with dyspnea, NT-proBNP is highly sensitive for the detection of acute congestive heart failure. In addition NT-proBNP of <300 pg/ml effectively rules out acute congestive heart failure with 99% negative predictive value.    Results may be falsely decreased if patient taking Biotin.      TSH [[id number]]  (Normal) Collected: 02/09/23 1622    Specimen: Blood Updated: 02/09/23 1753     TSH 3.040 uIU/mL     Comprehensive Metabolic Panel [[id number]]  (Abnormal) Collected: 02/09/23 1622    Specimen: Blood Updated: 02/09/23 1713     Glucose 96 mg/dL      BUN 8 mg/dL      Creatinine 0.71 mg/dL      Sodium 137 mmol/L      Potassium 4.2 mmol/L      Chloride 100 mmol/L      CO2 27.9 mmol/L      Calcium 9.5 mg/dL      Total Protein 6.5 g/dL      Albumin 4.4 g/dL      ALT (SGPT) 29 U/L      AST (SGOT) 39 U/L      Alkaline Phosphatase 91 U/L      Total Bilirubin 1.2 mg/dL      Globulin 2.1 gm/dL      A/G Ratio 2.1 g/dL      BUN/Creatinine Ratio 11.3     Anion Gap 9.1 mmol/L      eGFR 86.6 mL/min/1.73     Narrative:      GFR Normal >60  Chronic Kidney Disease <60  Kidney Failure <15    The GFR formula is only valid for adults with stable renal function between ages 18 and 70.    Magnesium [[id number]]  (Normal) Collected: 02/09/23 1622    Specimen: Blood Updated: 02/09/23 1713     Magnesium 2.1 mg/dL     CBC Auto Differential [[id number]]  (Abnormal) Collected: 02/09/23 1622    Specimen: Blood Updated: 02/09/23 1658     WBC 11.51 10*3/mm3      RBC 3.88 10*6/mm3      Hemoglobin 11.4 g/dL      Hematocrit 33.9 %      MCV 87.4 fL      MCH 29.4 pg      MCHC 33.6 g/dL      RDW 14.0 %      RDW-SD 44.8 fl      MPV 11.3 fL      Platelets 241 10*3/mm3      Neutrophil % 81.4 %      Lymphocyte % 9.4 %      Monocyte % 6.7 %      Eosinophil % 1.0 %      Basophil % 0.7 %       Immature Grans % 0.8 %      Neutrophils, Absolute 9.38 10*3/mm3      Lymphocytes, Absolute 1.08 10*3/mm3      Monocytes, Absolute 0.77 10*3/mm3      Eosinophils, Absolute 0.11 10*3/mm3      Basophils, Absolute 0.08 10*3/mm3      Immature Grans, Absolute 0.09 10*3/mm3      nRBC 0.0 /100 WBC         Imaging Results (Last 24 Hours)     Procedure Component Value Units Date/Time    XR Chest 1 View [[id number]] Collected: 02/09/23 1621     Updated: 02/09/23 1625    Narrative:      XR CHEST 1 VW-     HISTORY: Female who is 77 years-old,  short of breath     TECHNIQUE: Frontal view of the chest     COMPARISON: 06/11/2020     FINDINGS: The heart is enlarged, appears increased from the prior exam.  Left-sided pacemaker, cardiac leads noted. Aorta is calcified. Pulmonary  vasculature is unremarkable. No focal pulmonary consolidation, pleural  effusion, or pneumothorax. No acute osseous process.       Impression:      No focal pulmonary consolidation. Increased cardiomegaly.     This report was finalized on 2/9/2023 4:22 PM by Dr. Saeid Lopez M.D.           ECG 12 Lead       Component  Ref Range & Units 15:40   QT Interval  ms 388 P    Resulting Agency  ECG             HEART RATE= 89  bpm  RR Interval= 674  ms  VA Interval=   ms  P Horizontal Axis=   deg  P Front Axis=   deg  QRSD Interval= 79  ms  QT Interval= 388  ms  QRS Axis= 23  deg  T Wave Axis= 21  deg  - ABNORMAL ECG -  Afib/flut and V-paced complexes  No further rhythm analysis attempted due to paced rhythm  Low voltage, extremity and precordial leads  Probable anteroseptal infarct, old               Current Facility-Administered Medications:   •  [START ON 2/10/2023] bisoprolol (ZEBeta) tablet 10 mg, 10 mg, Oral, Q24H, Georgiana Zacarias, JORY  •  bisoprolol (ZEBeta) tablet 5 mg, 5 mg, Oral, Once, Georgiana Zacarias, JORY  •  docusate sodium (COLACE) capsule 200 mg, 200 mg, Oral, Daily PRN, Robinson Salazar MD  •  donepezil (ARICEPT) tablet 10 mg, 10  mg, Oral, Daily, Robinson Salazar MD, 10 mg at 02/09/23 1701  •  Enoxaparin Sodium (LOVENOX) syringe 60 mg, 1 mg/kg, Subcutaneous, BID, Georgiana Zacarias APRN, 60 mg at 02/09/23 1700  •  furosemide (LASIX) injection 40 mg, 40 mg, Intravenous, Q12H, Georgiana Zacarias APRN, 40 mg at 02/09/23 1817  •  hydrALAZINE (APRESOLINE) injection 10 mg, 10 mg, Intravenous, Q6H PRN, Georgiana Zacarias APRN  •  [START ON 2/10/2023] levothyroxine (SYNTHROID, LEVOTHROID) tablet 25 mcg, 25 mcg, Oral, Q AM, Robinson Salazar MD  •  nitroglycerin (NITROSTAT) SL tablet 0.4 mg, 0.4 mg, Sublingual, Q5 Min PRN, Robinson Salazar MD  •  [START ON 2/10/2023] pantoprazole (PROTONIX) EC tablet 40 mg, 40 mg, Oral, Q AM, Robinson Salazar MD  •  Pharmacy to Dose enoxaparin (LOVENOX), , Does not apply, Continuous PRN, Robinson Salazar MD  •  [START ON 2/10/2023] potassium chloride (K-DUR,KLOR-CON) ER tablet 10 mEq, 10 mEq, Oral, BID With Meals, Ivanna Amador MD  •  [START ON 2/10/2023] sertraline (ZOLOFT) tablet 150 mg, 150 mg, Oral, QAM, Robinson Salazar MD  •  sodium chloride 0.9 % flush 10 mL, 10 mL, Intravenous, PRN, Robinson Salazar MD  •  sodium chloride 0.9 % infusion 40 mL, 40 mL, Intravenous, PRN, Robinson Salazar MD    ASSESSMENT  Acute on chronic diastolic CHF  Chronic atrial fibrillation  Hypertension  Hypothyroidism  Depression  Gastroesophageal reflux disease    PLAN  Agree with current care  IV diuresis  Lovenox  Serial cardiac enzymes and EKG  Check 2D echo  Stress Cardiolite in a.m.  Adjust home medications  Stress ulcer DVT prophylaxis  Supportive care  Patient is full code  Discussed with family nursing staff  Follow closely and further recommendation current hospital course    IVANNA AMADOR MD

## 2023-02-09 NOTE — PROGRESS NOTES
Edema in both legs    Subjective:        Raina Forrest is a 77 y.o. female who here for follow up    CC  AFIB  LEG SWELLING  WOKE UP WITH SWEATY AND CLAMY  CP  SOB  HPI  77-year-old female woke up in the middle of the night around 2:00 with a tightness and pressure sensation in the chest associated with significant shortness of breath     Problems Addressed this Visit        Cardiac and Vasculature    Primary hypertension    Cardiomyopathy (HCC)    Atrial fibrillation (HCC)    Pacemaker - Primary       Coag and Thromboembolic    Chronic anticoagulation   Diagnoses       Codes Comments    Pacemaker    -  Primary ICD-10-CM: Z95.0  ICD-9-CM: V45.01     Permanent atrial fibrillation (HCC)     ICD-10-CM: I48.21  ICD-9-CM: 427.31     Primary hypertension     ICD-10-CM: I10  ICD-9-CM: 401.9     Chronic anticoagulation     ICD-10-CM: Z79.01  ICD-9-CM: V58.61     Cardiomyopathy, unspecified type (HCC)     ICD-10-CM: I42.9  ICD-9-CM: 425.4         .    The following portions of the patient's history were reviewed and updated as appropriate: allergies, current medications, past family history, past medical history, past social history, past surgical history and problem list.    Past Medical History:   Diagnosis Date   • Atrial fibrillation (HCC)    • Fung's esophagus    • Benign essential hypertension    • Blood clot in vein    • Cardiomyopathy (HCC)    • Chronic gastritis    • Congestive heart failure (HCC)    • Degenerative disc disease    • Dementia (HCC)     Pt  states she has slight dementia   • Depression with anxiety    • Disease of thyroid gland    • Diverticulitis of colon    • Dysphagia    • GERD (gastroesophageal reflux disease)    • History of chest pain    • History of migraine    • History of migraine headaches    • Left knee pain    • Osteoporosis    • Palpitations    • Thyroid disorder    • Thyroid nodule    • Ulcerative colitis (HCC)      reports that she has never smoked. She has never used  "smokeless tobacco. She reports that she does not drink alcohol and does not use drugs.   Family History   Problem Relation Age of Onset   • Lung cancer Other    • Ulcerative colitis Sister    • Heart failure Mother    • Heart disease Mother    • Heart failure Father    • Heart disease Father    • Malig Hyperthermia Neg Hx        Review of Systems  Constitutional: No wt loss, fever, fatigue  Gastrointestinal: No nausea, abdominal pain  Behavioral/Psych: No insomnia or anxiety   Cardiovascular chest pain and shortness of breath  Objective:       Physical Exam  /87   Pulse 102   Ht 177.8 cm (70\")   Wt 62.6 kg (138 lb)   BMI 19.80 kg/m²   General appearance: No acute changes   Neck: Trachea midline; NECK, supple, no thyromegaly or lymphadenopathy   Lungs: Normal size and shape, normal breath sounds, equal distribution of air, no rales and rhonchi   CV: S1-S2 regular, no murmurs, no rub, no gallop   Abdomen: Soft, nontender; no masses , no abnormal abdominal sounds   Extremities: No deformity , normal color , 1+ peripheral edema   Skin: Normal temperature, turgor and texture; no rash, ulcers            ECG 12 Lead    Date/Time: 2/9/2023 1:46 PM  Performed by: Robinson Salazar MD  Authorized by: Robinson Salazar MD   Comparison: compared with previous ECG   Similar to previous ECG  Rhythm: atrial fibrillation    Clinical impression: abnormal EKG              Echocardiogram:      No current facility-administered medications for this visit.  No current outpatient medications on file.    Facility-Administered Medications Ordered in Other Visits:   •  bisoprolol (ZEBeta) tablet 10 mg, 10 mg, Oral, Q24H, Georgiana Zacarias APRN, 10 mg at 02/10/23 0915  •  docusate sodium (COLACE) capsule 200 mg, 200 mg, Oral, Daily PRN, Robinson Salazar MD  •  donepezil (ARICEPT) tablet 10 mg, 10 mg, Oral, Daily, Robinson Salazar MD, 10 mg at 02/10/23 0915  •  Enoxaparin Sodium (LOVENOX) syringe 60 mg, 1 " mg/kg, Subcutaneous, BID, Georgiana Zacarias APRN, 60 mg at 02/10/23 0617  •  furosemide (LASIX) injection 40 mg, 40 mg, Intravenous, Q12H, Georgiana Zacarias APRN, 40 mg at 02/10/23 0617  •  hydrALAZINE (APRESOLINE) injection 10 mg, 10 mg, Intravenous, Q6H PRN, Georgiana Zacarias APRN  •  levothyroxine (SYNTHROID, LEVOTHROID) tablet 25 mcg, 25 mcg, Oral, Q AM, Robinson Salazar MD, 25 mcg at 02/10/23 0616  •  nitroglycerin (NITROSTAT) SL tablet 0.4 mg, 0.4 mg, Sublingual, Q5 Min PRN, Robinson Salazar MD  •  pantoprazole (PROTONIX) EC tablet 40 mg, 40 mg, Oral, Q AM, Robinson Salazar MD, 40 mg at 02/10/23 0617  •  potassium chloride (K-DUR,KLOR-CON) ER tablet 10 mEq, 10 mEq, Oral, BID With Meals, Leodan Pagan MD, 10 mEq at 02/10/23 0917  •  sertraline (ZOLOFT) tablet 150 mg, 150 mg, Oral, [facility], Robinson Salazar MD, 150 mg at 02/10/23 0616  •  sodium chloride 0.9 % flush 10 mL, 10 mL, Intravenous, PRN, Robinson Salazar MD  •  sodium chloride 0.9 % infusion 40 mL, 40 mL, Intravenous, PRN, Robinson Salazar MD   Assessment:        Patient Active Problem List   Diagnosis   • Chronic atrial fibrillation (HCC)   • Benign essential HTN   • Bradycardia   • Chest pain   • Can't get food down   • Accumulation of fluid in tissues   • Esophageal lump   • Adynamia   • Itch of skin   • Anorexia   • Chronic nausea   • Gastroesophageal reflux disease   • Breath shortness   • Emesis   • Decreased body weight   • Anxiety   • Narrowing of intervertebral disc space   • Diverticulosis of intestine   • Primary hypertension   • Migraine   • Osteoporosis   • Palpitations   • Pituitary adenoma (HCC)   • Sick sinus syndrome (HCC)   • Diarrhea   • Cardiomyopathy (HCC)   • Chronic anticoagulation   • Atrial fibrillation (HCC)   • On amiodarone therapy   • Pacemaker   • Infected pacemaker (HCC)   • Wound infection   • Primary osteoarthritis of left knee   • DJD (degenerative joint disease)                Plan:            ICD-10-CM ICD-9-CM   1. Pacemaker  Z95.0 V45.01   2. Permanent atrial fibrillation (HCC)  I48.21 427.31   3. Primary hypertension  I10 401.9   4. Chronic anticoagulation  Z79.01 V58.61   5. Cardiomyopathy, unspecified type (HCC)  I42.9 425.4     1. Pacemaker  Pacemaker functioning normal    2. Permanent atrial fibrillation (HCC)  Under control    3. Primary hypertension  Blood pressure under control    4. Chronic anticoagulation  Continue current treatment    5. Cardiomyopathy, unspecified type (HCC)  We will need IV Lasix     ADMIT    R/O  MI,  ECHO, patient will need IV diuretics    DANIELLE        COUNSELING:    Raina Perkins was given to patient for the following topics: diagnostic results, risk factor reductions, impressions, risks and benefits of treatment options and importance of treatment compliance .       SMOKING COUNSELING:        Dictated using Dragon dictation

## 2023-02-09 NOTE — PLAN OF CARE
Goal Outcome Evaluation:  Plan of Care Reviewed With: patient, spouse        Progress: no change  Outcome Evaluation: Admission in progress

## 2023-02-09 NOTE — PROGRESS NOTES
"[facility]: Enoxaparin Consult    Raina Forrest has a pharmacy consult to dose full-dose enoxaparin per JORY Zacarias' request.     Indication: A Fib - requiring full anticoagulation  Home Anticoagulation: Xarelto 15 mg daily     Relevant clinical data and objective history reviewed:  77 y.o. female 177.8 cm (70\") 62.6 kg (138 lb 0.1 oz)   Body mass index is 19.8 kg/m². Results from last 7 days   Lab Units 02/10/23  0335   PLATELETS 10*3/mm3 222     Estimated Creatinine Clearance: 58.5 mL/min (by C-G formula based on SCr of 0.77 mg/dL).    Assessment/Plan    Will start patient on 60 mg (1mg/kg) subcutaneous every 12 hours, adjusted for renal function. Consult order will be discontinued but pharmacy will continue to follow.     Lexi Haas, Pharm.D., [facility]  Clinical Pharmacist  Phone Extension #[phone]    "

## 2023-02-10 ENCOUNTER — APPOINTMENT (OUTPATIENT)
Dept: NUCLEAR MEDICINE | Facility: HOSPITAL | Age: 78
End: 2023-02-10
Payer: MEDICARE

## 2023-02-10 ENCOUNTER — APPOINTMENT (OUTPATIENT)
Dept: CARDIOLOGY | Facility: HOSPITAL | Age: 78
End: 2023-02-10
Payer: MEDICARE

## 2023-02-10 PROBLEM — I25.5 ISCHEMIC CARDIOMYOPATHY: Status: ACTIVE | Noted: 2023-02-10

## 2023-02-10 LAB
ALBUMIN SERPL-MCNC: 3.9 G/DL (ref 3.5–5.2)
ALBUMIN/GLOB SERPL: 2.6 G/DL
ALP SERPL-CCNC: 76 U/L (ref 39–117)
ALT SERPL W P-5'-P-CCNC: 24 U/L (ref 1–33)
ANION GAP SERPL CALCULATED.3IONS-SCNC: 8 MMOL/L (ref 5–15)
AORTIC DIMENSIONLESS INDEX: 0.6 (DI)
AST SERPL-CCNC: 29 U/L (ref 1–32)
BASOPHILS # BLD AUTO: 0.1 10*3/MM3 (ref 0–0.2)
BASOPHILS NFR BLD AUTO: 1.1 % (ref 0–1.5)
BH CV ECHO MEAS - ACS: 1.91 CM
BH CV ECHO MEAS - AO MAX PG: 4.9 MMHG
BH CV ECHO MEAS - AO MEAN PG: 2.34 MMHG
BH CV ECHO MEAS - AO ROOT DIAM: 3.2 CM
BH CV ECHO MEAS - AO V2 MAX: 110.8 CM/SEC
BH CV ECHO MEAS - AO V2 VTI: 25.6 CM
BH CV ECHO MEAS - AVA(I,D): 1.66 CM2
BH CV ECHO MEAS - EDV(CUBED): 103.1 ML
BH CV ECHO MEAS - EDV(MOD-SP2): 49 ML
BH CV ECHO MEAS - EDV(MOD-SP4): 37 ML
BH CV ECHO MEAS - EF(MOD-BP): 45.9 %
BH CV ECHO MEAS - EF(MOD-SP2): 49 %
BH CV ECHO MEAS - EF(MOD-SP4): 43.2 %
BH CV ECHO MEAS - ESV(CUBED): 34.3 ML
BH CV ECHO MEAS - ESV(MOD-SP2): 25 ML
BH CV ECHO MEAS - ESV(MOD-SP4): 21 ML
BH CV ECHO MEAS - FS: 30.7 %
BH CV ECHO MEAS - IVS/LVPW: 1.07 CM
BH CV ECHO MEAS - IVSD: 0.83 CM
BH CV ECHO MEAS - LV MASS(C)D: 122.6 GRAMS
BH CV ECHO MEAS - LV MAX PG: 2.07 MMHG
BH CV ECHO MEAS - LV MEAN PG: 1.14 MMHG
BH CV ECHO MEAS - LV V1 MAX: 72 CM/SEC
BH CV ECHO MEAS - LV V1 VTI: 14.3 CM
BH CV ECHO MEAS - LVIDD: 4.7 CM
BH CV ECHO MEAS - LVIDS: 3.3 CM
BH CV ECHO MEAS - LVOT AREA: 3 CM2
BH CV ECHO MEAS - LVOT DIAM: 1.94 CM
BH CV ECHO MEAS - LVPWD: 0.78 CM
BH CV ECHO MEAS - MR MAX PG: 100.4 MMHG
BH CV ECHO MEAS - MR MAX VEL: 501 CM/SEC
BH CV ECHO MEAS - MR MEAN PG: 67.5 MMHG
BH CV ECHO MEAS - MR MEAN VEL: 386.5 CM/SEC
BH CV ECHO MEAS - MR VTI: 190.9 CM
BH CV ECHO MEAS - MV DEC SLOPE: 474.4 CM/SEC2
BH CV ECHO MEAS - MV MAX PG: 5.1 MMHG
BH CV ECHO MEAS - MV MEAN PG: 1.48 MMHG
BH CV ECHO MEAS - MV P1/2T: 75.6 MSEC
BH CV ECHO MEAS - MV V2 VTI: 27.1 CM
BH CV ECHO MEAS - MVA(P1/2T): 2.9 CM2
BH CV ECHO MEAS - MVA(VTI): 1.57 CM2
BH CV ECHO MEAS - PA ACC TIME: 0.12 SEC
BH CV ECHO MEAS - PA PR(ACCEL): 24.3 MMHG
BH CV ECHO MEAS - PA V2 MAX: 64.6 CM/SEC
BH CV ECHO MEAS - RAP SYSTOLE: 8 MMHG
BH CV ECHO MEAS - RV MAX PG: 0.27 MMHG
BH CV ECHO MEAS - RV V1 MAX: 26.1 CM/SEC
BH CV ECHO MEAS - RV V1 VTI: 4.2 CM
BH CV ECHO MEAS - RVSP: 42.4 MMHG
BH CV ECHO MEAS - SV(LVOT): 42.4 ML
BH CV ECHO MEAS - SV(MOD-SP2): 24 ML
BH CV ECHO MEAS - SV(MOD-SP4): 16 ML
BH CV ECHO MEAS - TAPSE (>1.6): 2.2 CM
BH CV ECHO MEAS - TR MAX PG: 34.4 MMHG
BH CV ECHO MEAS - TR MAX VEL: 293.2 CM/SEC
BH CV REST NUCLEAR ISOTOPE DOSE: 12 MCI
BH CV STRESS COMMENTS STAGE 1: NORMAL
BH CV STRESS DOSE REGADENOSON STAGE 1: 0.4
BH CV STRESS DURATION MIN STAGE 1: 0
BH CV STRESS DURATION SEC STAGE 1: 10
BH CV STRESS NUCLEAR ISOTOPE DOSE: 28 MCI
BH CV STRESS PROTOCOL 1: NORMAL
BH CV STRESS RECOVERY BP: NORMAL MMHG
BH CV STRESS RECOVERY HR: 84 BPM
BH CV STRESS STAGE 1: 1
BH CV VAS BP RIGHT ARM: NORMAL MMHG
BH CV XLRA - RV BASE: 3.6 CM
BH CV XLRA - RV LENGTH: 7 CM
BH CV XLRA - RV MID: 3 CM
BH CV XLRA - TDI S': 9.8 CM/SEC
BILIRUB SERPL-MCNC: 1 MG/DL (ref 0–1.2)
BUN SERPL-MCNC: 8 MG/DL (ref 8–23)
BUN/CREAT SERPL: 10.4 (ref 7–25)
CALCIUM SPEC-SCNC: 8.9 MG/DL (ref 8.6–10.5)
CHLORIDE SERPL-SCNC: 104 MMOL/L (ref 98–107)
CHOLEST SERPL-MCNC: 115 MG/DL (ref 0–200)
CO2 SERPL-SCNC: 29 MMOL/L (ref 22–29)
CREAT SERPL-MCNC: 0.77 MG/DL (ref 0.57–1)
DEPRECATED RDW RBC AUTO: 44 FL (ref 37–54)
EGFRCR SERPLBLD CKD-EPI 2021: 78.6 ML/MIN/1.73
EOSINOPHIL # BLD AUTO: 0.26 10*3/MM3 (ref 0–0.4)
EOSINOPHIL NFR BLD AUTO: 2.9 % (ref 0.3–6.2)
ERYTHROCYTE [DISTWIDTH] IN BLOOD BY AUTOMATED COUNT: 13.9 % (ref 12.3–15.4)
GLOBULIN UR ELPH-MCNC: 1.5 GM/DL
GLUCOSE SERPL-MCNC: 94 MG/DL (ref 65–99)
HBA1C MFR BLD: 5.4 % (ref 4.8–5.6)
HCT VFR BLD AUTO: 33 % (ref 34–46.6)
HDLC SERPL-MCNC: 41 MG/DL (ref 40–60)
HGB BLD-MCNC: 10.9 G/DL (ref 12–15.9)
IMM GRANULOCYTES # BLD AUTO: 0.06 10*3/MM3 (ref 0–0.05)
IMM GRANULOCYTES NFR BLD AUTO: 0.7 % (ref 0–0.5)
LDLC SERPL CALC-MCNC: 58 MG/DL (ref 0–100)
LDLC/HDLC SERPL: 1.41 {RATIO}
LEFT ATRIUM VOLUME INDEX: 52.5 ML/M2
LV EF NUC BP: 70 %
LYMPHOCYTES # BLD AUTO: 1.41 10*3/MM3 (ref 0.7–3.1)
LYMPHOCYTES NFR BLD AUTO: 15.9 % (ref 19.6–45.3)
MAGNESIUM SERPL-MCNC: 2 MG/DL (ref 1.6–2.4)
MAXIMAL PREDICTED HEART RATE: 141 BPM
MAXIMAL PREDICTED HEART RATE: 141 BPM
MCH RBC QN AUTO: 29 PG (ref 26.6–33)
MCHC RBC AUTO-ENTMCNC: 33 G/DL (ref 31.5–35.7)
MCV RBC AUTO: 87.8 FL (ref 79–97)
MONOCYTES # BLD AUTO: 0.87 10*3/MM3 (ref 0.1–0.9)
MONOCYTES NFR BLD AUTO: 9.8 % (ref 5–12)
MR PISA EROA: 0.12 CM2
NEUTROPHILS NFR BLD AUTO: 6.15 10*3/MM3 (ref 1.7–7)
NEUTROPHILS NFR BLD AUTO: 69.6 % (ref 42.7–76)
NRBC BLD AUTO-RTO: 0 /100 WBC (ref 0–0.2)
PERCENT MAX PREDICTED HR: 77.3 %
PISA ALIASING VEL: 5 M/S
PISA RADIUS: 0.4 CM
PLATELET # BLD AUTO: 222 10*3/MM3 (ref 140–450)
PMV BLD AUTO: 11.1 FL (ref 6–12)
POTASSIUM SERPL-SCNC: 3.6 MMOL/L (ref 3.5–5.2)
PROT SERPL-MCNC: 5.4 G/DL (ref 6–8.5)
RBC # BLD AUTO: 3.76 10*6/MM3 (ref 3.77–5.28)
SINUS: 3.4 CM
SODIUM SERPL-SCNC: 141 MMOL/L (ref 136–145)
STJ: 3.3 CM
STRESS BASELINE BP: NORMAL MMHG
STRESS BASELINE HR: 73 BPM
STRESS PERCENT HR: 91 %
STRESS POST PEAK BP: NORMAL MMHG
STRESS POST PEAK HR: 109 BPM
STRESS TARGET HR: 120 BPM
STRESS TARGET HR: 120 BPM
TRIGL SERPL-MCNC: 81 MG/DL (ref 0–150)
TROPONIN T SERPL HS-MCNC: 49 NG/L
TSH SERPL DL<=0.05 MIU/L-ACNC: 3.51 UIU/ML (ref 0.27–4.2)
VLDLC SERPL-MCNC: 16 MG/DL (ref 5–40)
WBC NRBC COR # BLD: 8.85 10*3/MM3 (ref 3.4–10.8)

## 2023-02-10 PROCEDURE — 83735 ASSAY OF MAGNESIUM: CPT | Performed by: INTERNAL MEDICINE

## 2023-02-10 PROCEDURE — G0378 HOSPITAL OBSERVATION PER HR: HCPCS

## 2023-02-10 PROCEDURE — 93017 CV STRESS TEST TRACING ONLY: CPT

## 2023-02-10 PROCEDURE — 85025 COMPLETE CBC W/AUTO DIFF WBC: CPT | Performed by: INTERNAL MEDICINE

## 2023-02-10 PROCEDURE — 93306 TTE W/DOPPLER COMPLETE: CPT

## 2023-02-10 PROCEDURE — 93306 TTE W/DOPPLER COMPLETE: CPT | Performed by: INTERNAL MEDICINE

## 2023-02-10 PROCEDURE — 78452 HT MUSCLE IMAGE SPECT MULT: CPT | Performed by: INTERNAL MEDICINE

## 2023-02-10 PROCEDURE — 80053 COMPREHEN METABOLIC PANEL: CPT | Performed by: HOSPITALIST

## 2023-02-10 PROCEDURE — 78452 HT MUSCLE IMAGE SPECT MULT: CPT

## 2023-02-10 PROCEDURE — 25010000002 REGADENOSON 0.4 MG/5ML SOLUTION: Performed by: INTERNAL MEDICINE

## 2023-02-10 PROCEDURE — A9500 TC99M SESTAMIBI: HCPCS | Performed by: INTERNAL MEDICINE

## 2023-02-10 PROCEDURE — 80061 LIPID PANEL: CPT | Performed by: INTERNAL MEDICINE

## 2023-02-10 PROCEDURE — 25010000002 ENOXAPARIN PER 10 MG: Performed by: NURSE PRACTITIONER

## 2023-02-10 PROCEDURE — 96372 THER/PROPH/DIAG INJ SC/IM: CPT

## 2023-02-10 PROCEDURE — 93010 ELECTROCARDIOGRAM REPORT: CPT | Performed by: INTERNAL MEDICINE

## 2023-02-10 PROCEDURE — 84443 ASSAY THYROID STIM HORMONE: CPT | Performed by: HOSPITALIST

## 2023-02-10 PROCEDURE — 93018 CV STRESS TEST I&R ONLY: CPT | Performed by: INTERNAL MEDICINE

## 2023-02-10 PROCEDURE — 83036 HEMOGLOBIN GLYCOSYLATED A1C: CPT | Performed by: HOSPITALIST

## 2023-02-10 PROCEDURE — 93005 ELECTROCARDIOGRAM TRACING: CPT | Performed by: INTERNAL MEDICINE

## 2023-02-10 PROCEDURE — 84484 ASSAY OF TROPONIN QUANT: CPT | Performed by: INTERNAL MEDICINE

## 2023-02-10 PROCEDURE — 0 TECHNETIUM SESTAMIBI: Performed by: INTERNAL MEDICINE

## 2023-02-10 PROCEDURE — 99232 SBSQ HOSP IP/OBS MODERATE 35: CPT | Performed by: INTERNAL MEDICINE

## 2023-02-10 PROCEDURE — 96376 TX/PRO/DX INJ SAME DRUG ADON: CPT

## 2023-02-10 PROCEDURE — 25010000002 FUROSEMIDE PER 20 MG: Performed by: NURSE PRACTITIONER

## 2023-02-10 RX ORDER — SPIRONOLACTONE 25 MG/1
25 TABLET ORAL DAILY
Status: DISCONTINUED | OUTPATIENT
Start: 2023-02-11 | End: 2023-02-12 | Stop reason: HOSPADM

## 2023-02-10 RX ADMIN — DONEPEZIL HYDROCHLORIDE 10 MG: 10 TABLET, FILM COATED ORAL at 09:15

## 2023-02-10 RX ADMIN — TECHNETIUM TC 99M SESTAMIBI 1 DOSE: 1 INJECTION INTRAVENOUS at 11:25

## 2023-02-10 RX ADMIN — ENOXAPARIN SODIUM 60 MG: 100 INJECTION SUBCUTANEOUS at 06:17

## 2023-02-10 RX ADMIN — BISOPROLOL FUMARATE 10 MG: 5 TABLET, FILM COATED ORAL at 09:15

## 2023-02-10 RX ADMIN — FUROSEMIDE 40 MG: 10 INJECTION, SOLUTION INTRAMUSCULAR; INTRAVENOUS at 06:17

## 2023-02-10 RX ADMIN — POTASSIUM CHLORIDE 10 MEQ: 750 TABLET, EXTENDED RELEASE ORAL at 09:17

## 2023-02-10 RX ADMIN — SERTRALINE 150 MG: 100 TABLET, FILM COATED ORAL at 06:16

## 2023-02-10 RX ADMIN — FUROSEMIDE 40 MG: 10 INJECTION, SOLUTION INTRAMUSCULAR; INTRAVENOUS at 17:43

## 2023-02-10 RX ADMIN — PANTOPRAZOLE SODIUM 40 MG: 40 TABLET, DELAYED RELEASE ORAL at 06:17

## 2023-02-10 RX ADMIN — LEVOTHYROXINE SODIUM 25 MCG: 0.03 TABLET ORAL at 06:16

## 2023-02-10 RX ADMIN — ENOXAPARIN SODIUM 60 MG: 100 INJECTION SUBCUTANEOUS at 17:43

## 2023-02-10 RX ADMIN — TECHNETIUM TC 99M SESTAMIBI 1 DOSE: 1 INJECTION INTRAVENOUS at 06:55

## 2023-02-10 RX ADMIN — REGADENOSON 0.4 MG: 0.08 INJECTION, SOLUTION INTRAVENOUS at 11:25

## 2023-02-10 NOTE — H&P
Edema in both legs    Subjective:        Raina Forrest is a 77 y.o. female who here for follow up    CC  AFIB  LEG SWELLING  WOKE UP WITH SWEATY AND CLAMY  CP  SOB  HPI  77-year-old female woke up in the middle of the night around 2:00 with a tightness and pressure sensation in the chest associated with significant shortness of breath     Problems Addressed this Visit    None  Diagnoses    None.       .    The following portions of the patient's history were reviewed and updated as appropriate: allergies, current medications, past family history, past medical history, past social history, past surgical history and problem list.    Past Medical History:   Diagnosis Date   • Atrial fibrillation (HCC)    • Fung's esophagus    • Benign essential hypertension    • Blood clot in vein    • Cardiomyopathy (HCC)    • Chronic gastritis    • Congestive heart failure (HCC)    • Degenerative disc disease    • Dementia (HCC)     Pt  states she has slight dementia   • Depression with anxiety    • Disease of thyroid gland    • Diverticulitis of colon    • Dysphagia    • GERD (gastroesophageal reflux disease)    • History of chest pain    • History of migraine    • History of migraine headaches    • Left knee pain    • Osteoporosis    • Palpitations    • Thyroid disorder    • Thyroid nodule    • Ulcerative colitis (HCC)      reports that she has never smoked. She has never used smokeless tobacco. She reports that she does not drink alcohol and does not use drugs.   Family History   Problem Relation Age of Onset   • Lung cancer Other    • Ulcerative colitis Sister    • Heart failure Mother    • Heart disease Mother    • Heart failure Father    • Heart disease Father    • Malig Hyperthermia Neg Hx        Review of Systems  Constitutional: No wt loss, fever, fatigue  Gastrointestinal: No nausea, abdominal pain  Behavioral/Psych: No insomnia or anxiety   Cardiovascular chest pain and shortness of breath  Objective:        "Physical Exam  /75   Pulse 73   Temp 97.3 °F (36.3 °C) (Oral)   Resp 18   Ht 177.8 cm (70\")   Wt 65.1 kg (143 lb 6.6 oz)   SpO2 98%   BMI 20.58 kg/m²   General appearance: No acute changes   Neck: Trachea midline; NECK, supple, no thyromegaly or lymphadenopathy   Lungs: Normal size and shape, normal breath sounds, equal distribution of air, no rales and rhonchi   CV: S1-S2 regular, no murmurs, no rub, no gallop   Abdomen: Soft, nontender; no masses , no abnormal abdominal sounds   Extremities: No deformity , normal color , 1+ peripheral edema   Skin: Normal temperature, turgor and texture; no rash, ulcers          Procedures      Echocardiogram:        Current Facility-Administered Medications:   •  bisoprolol (ZEBeta) tablet 10 mg, 10 mg, Oral, Q24H, Georgiana Zacarias APRN, 10 mg at 02/10/23 0915  •  docusate sodium (COLACE) capsule 200 mg, 200 mg, Oral, Daily PRN, Robinson Salazar MD  •  donepezil (ARICEPT) tablet 10 mg, 10 mg, Oral, Daily, Robinson Salazar MD, 10 mg at 02/10/23 0915  •  Enoxaparin Sodium (LOVENOX) syringe 60 mg, 1 mg/kg, Subcutaneous, BID, Georgiana Zacarias APRN, 60 mg at 02/10/23 0617  •  furosemide (LASIX) injection 40 mg, 40 mg, Intravenous, Q12H, Georgiana Zacarias APRN, 40 mg at 02/10/23 0617  •  hydrALAZINE (APRESOLINE) injection 10 mg, 10 mg, Intravenous, Q6H PRN, Georgiana Zacarias APRN  •  levothyroxine (SYNTHROID, LEVOTHROID) tablet 25 mcg, 25 mcg, Oral, Q AM, Robinson Salazar MD, 25 mcg at 02/10/23 0616  •  nitroglycerin (NITROSTAT) SL tablet 0.4 mg, 0.4 mg, Sublingual, Q5 Min PRN, Robinson Salazar MD  •  pantoprazole (PROTONIX) EC tablet 40 mg, 40 mg, Oral, Q AM, Robinson Salazar MD, 40 mg at 02/10/23 0617  •  potassium chloride (K-DUR,KLOR-CON) ER tablet 10 mEq, 10 mEq, Oral, BID With Meals, Leodan Pagan MD, 10 mEq at 02/10/23 0917  •  sertraline (ZOLOFT) tablet 150 mg, 150 mg, Oral, QAM, Robinson Salazar MD, 150 mg at " 02/10/23 0616  •  sodium chloride 0.9 % flush 10 mL, 10 mL, Intravenous, PRN, Robinson Salazar MD  •  sodium chloride 0.9 % infusion 40 mL, 40 mL, Intravenous, PRN, Robinson Salazar MD   Assessment:        Patient Active Problem List   Diagnosis   • Chronic atrial fibrillation (HCC)   • Benign essential HTN   • Bradycardia   • Chest pain   • Can't get food down   • Accumulation of fluid in tissues   • Esophageal lump   • Adynamia   • Itch of skin   • Anorexia   • Chronic nausea   • Gastroesophageal reflux disease   • Breath shortness   • Emesis   • Decreased body weight   • Anxiety   • Narrowing of intervertebral disc space   • Diverticulosis of intestine   • Primary hypertension   • Migraine   • Osteoporosis   • Palpitations   • Pituitary adenoma (HCC)   • Sick sinus syndrome (HCC)   • Diarrhea   • Cardiomyopathy (HCC)   • Chronic anticoagulation   • Atrial fibrillation (HCC)   • On amiodarone therapy   • Pacemaker   • Infected pacemaker (HCC)   • Wound infection   • Primary osteoarthritis of left knee   • DJD (degenerative joint disease)   • Ischemic cardiomyopathy               Plan:          No diagnosis found.  1. Pacemaker  Pacemaker functioning normal    2. Permanent atrial fibrillation (HCC)  Under control    3. Primary hypertension  Blood pressure under control    4. Chronic anticoagulation  Continue current treatment    5. Cardiomyopathy, unspecified type (HCC)  We will need IV Lasix     ADMIT    R/O  MI,  ECHO, patient will need IV diuretics    DANIELLE        COUNSELING:    Raina Perkins was given to patient for the following topics: diagnostic results, risk factor reductions, impressions, risks and benefits of treatment options and importance of treatment compliance .       SMOKING COUNSELING:        Dictated using Dragon dictation

## 2023-02-10 NOTE — PROGRESS NOTES
Daily progress note    Referring physician  Dr. ALVARADO    Chief complaint  Doing same with no new complaints.  Patient still have leg swelling and shortness of breath but denies any chest pain.  Patient family at bedside and going for 2D echo and stress Cardiolite    History of present illness  77-year-old white female with history of severe dementia who also have atrial fibrillation congestive heart failure hypothyroidism depression and gastroesophageal reflux disease directly admitted to cardiology service with bilateral lower extremity swelling and shortness of breath with exertion.  Patient has no fever chills cough chest pain palpitation abdominal pain nausea vomiting diarrhea.  Patient is poor historian and most of the history obtained from the  and the son.  I am asked to follow the patient for medical problem.    REVIEW OF SYSTEMS  Constitutional: Positive for appetite change and negative fever.   HENT: Negative.  Negative for congestion, ear pain, rhinorrhea and sore throat.    Eyes: Negative.    Respiratory: Positive for shortness of breath. Negative for cough.    Cardiovascular: Negative.  Negative for chest pain, palpitations and positive leg swelling   Gastrointestinal: Negative for nausea vomiting constipation and diarrhea.   Endocrine: Negative.    Genitourinary: Negative.  Negative for decreased urine volume, difficulty urinating, dysuria, menstrual problem, pelvic pain, urgency and vaginal discharge.   Musculoskeletal: Negative.  Negative for back pain.   Skin: Negative.  Negative for rash.   Allergic/Immunologic: Negative.    Neurological: Negative.  Negative for dizziness, weakness, light-headedness, numbness and headaches.   Hematological: Negative.    Psychiatric/Behavioral: Negative.  The patient is not nervous/anxious.    All other systems reviewed and are negative.     PHYSICAL EXAM  Blood pressure 117/75, pulse 73, temperature 97.3 °F (36.3 °C), temperature source Oral, resp. rate 18,  "height 177.8 cm (70\"), weight 65.1 kg (143 lb 6.6 oz), SpO2 98 %.    Constitutional: Awake and alert and in no distress.   HEENT: Unremarkable  Neck: Normal range of motion.   Cardiovascular: Normal rate, regular rhythm and normal heart sounds.   Pulmonary/Chest: Effort normal and breath sounds normal. She has no wheezes.   Abdominal: Soft. Bowel sounds are normal. There is no tenderness.   Musculoskeletal: Normal range of motion. She exhibits no edema.   Neurological: She is alert.   Skin: Skin is warm and dry. No rash noted.   Psychiatric: Mood, memory, affect and judgment normal.     LAB RESULTS  Lab Results (last 24 hours)     Procedure Component Value Units Date/Time    High Sensitivity Troponin T [[id number]]  (Abnormal) Collected: 02/10/23 0335    Specimen: Blood Updated: 02/10/23 0430     HS Troponin T 49 ng/L     Narrative:      High Sensitive Troponin T Reference Range:  <10.0 ng/L- Negative Female for AMI  <15.0 ng/L- Negative Male for AMI  >=10 - Abnormal Female indicating possible myocardial injury.  >=15 - Abnormal Male indicating possible myocardial injury.   Clinicians would have to utilize clinical acumen, EKG, Troponin, and serial changes to determine if it is an Acute Myocardial Infarction or myocardial injury due to an underlying chronic condition.         TSH [[id number]]  (Normal) Collected: 02/10/23 0335    Specimen: Blood Updated: 02/10/23 0430     TSH 3.510 uIU/mL     Lipid Panel [[id number]] Collected: 02/10/23 0335    Specimen: Blood Updated: 02/10/23 0424     Total Cholesterol 115 mg/dL      Triglycerides 81 mg/dL      HDL Cholesterol 41 mg/dL      LDL Cholesterol  58 mg/dL      VLDL Cholesterol 16 mg/dL      LDL/HDL Ratio 1.41    Narrative:      Cholesterol Reference Ranges  (U.[place] Department of Health and Human Services ATP III Classifications)    Desirable          <200 mg/dL  Borderline High    200-239 mg/dL  High Risk          >240 mg/dL      Triglyceride Reference Ranges  ([place]. " Department of Health and Human Services ATP III Classifications)    Normal           <150 mg/dL  Borderline High  150-199 mg/dL  High             200-499 mg/dL  Very High        >500 mg/dL    HDL Reference Ranges  ([place].S. Department of Health and Human Services ATP III Classifications)    Low     <40 mg/dl (major risk factor for CHD)  High    >60 mg/dl ('negative' risk factor for CHD)        LDL Reference Ranges  (U.S. Department of Health and Human Services ATP III Classifications)    Optimal          <100 mg/dL  Near Optimal     100-129 mg/dL  Borderline High  130-159 mg/dL  High             160-189 mg/dL  Very High        >189 mg/dL    Comprehensive Metabolic Panel [[id number]]  (Abnormal) Collected: 02/10/23 0335    Specimen: Blood Updated: 02/10/23 0424     Glucose 94 mg/dL      BUN 8 mg/dL      Creatinine 0.77 mg/dL      Sodium 141 mmol/L      Potassium 3.6 mmol/L      Chloride 104 mmol/L      CO2 29.0 mmol/L      Calcium 8.9 mg/dL      Total Protein 5.4 g/dL      Albumin 3.9 g/dL      ALT (SGPT) 24 U/L      AST (SGOT) 29 U/L      Alkaline Phosphatase 76 U/L      Total Bilirubin 1.0 mg/dL      Globulin 1.5 gm/dL      A/G Ratio 2.6 g/dL      BUN/Creatinine Ratio 10.4     Anion Gap 8.0 mmol/L      eGFR 78.6 mL/min/1.73     Narrative:      GFR Normal >60  Chronic Kidney Disease <60  Kidney Failure <15    The GFR formula is only valid for adults with stable renal function between ages 18 and 70.    Magnesium [[id number]]  (Normal) Collected: 02/10/23 0335    Specimen: Blood Updated: 02/10/23 0424     Magnesium 2.0 mg/dL     Hemoglobin A1c [[id number]]  (Normal) Collected: 02/10/23 0335    Specimen: Blood Updated: 02/10/23 0416     Hemoglobin A1C 5.40 %     Narrative:      Hemoglobin A1C Ranges:    Increased Risk for Diabetes  5.7% to 6.4%  Diabetes                     >= 6.5%  Diabetic Goal                < 7.0%    CBC & Differential [[id number]]  (Abnormal) Collected: 02/10/23 0335    Specimen: Blood Updated: 02/10/23  0404    Narrative:      The following orders were created for panel order CBC & Differential.  Procedure                               Abnormality         Status                     ---------                               -----------         ------                     CBC Auto Differential[[id number]]        Abnormal            Final result                 Please view results for these tests on the individual orders.    CBC Auto Differential [[id number]]  (Abnormal) Collected: 02/10/23 0335    Specimen: Blood Updated: 02/10/23 0404     WBC 8.85 10*3/mm3      RBC 3.76 10*6/mm3      Hemoglobin 10.9 g/dL      Hematocrit 33.0 %      MCV 87.8 fL      MCH 29.0 pg      MCHC 33.0 g/dL      RDW 13.9 %      RDW-SD 44.0 fl      MPV 11.1 fL      Platelets 222 10*3/mm3      Neutrophil % 69.6 %      Lymphocyte % 15.9 %      Monocyte % 9.8 %      Eosinophil % 2.9 %      Basophil % 1.1 %      Immature Grans % 0.7 %      Neutrophils, Absolute 6.15 10*3/mm3      Lymphocytes, Absolute 1.41 10*3/mm3      Monocytes, Absolute 0.87 10*3/mm3      Eosinophils, Absolute 0.26 10*3/mm3      Basophils, Absolute 0.10 10*3/mm3      Immature Grans, Absolute 0.06 10*3/mm3      nRBC 0.0 /100 WBC     High Sensitivity Troponin T 2Hr [[id number]]  (Abnormal) Collected: 02/09/23 2005    Specimen: Blood from Arm, Right Updated: 02/09/23 2034     HS Troponin T 41 ng/L      Troponin T Delta 4 ng/L     Narrative:      High Sensitive Troponin T Reference Range:  <10.0 ng/L- Negative Female for AMI  <15.0 ng/L- Negative Male for AMI  >=10 - Abnormal Female indicating possible myocardial injury.  >=15 - Abnormal Male indicating possible myocardial injury.   Clinicians would have to utilize clinical acumen, EKG, Troponin, and serial changes to determine if it is an Acute Myocardial Infarction or myocardial injury due to an underlying chronic condition.         High Sensitivity Troponin T [[id number]]  (Abnormal) Collected: 02/09/23 1622    Specimen: Blood Updated:  02/09/23 1800     HS Troponin T 37 ng/L     Narrative:      High Sensitive Troponin T Reference Range:  <10.0 ng/L- Negative Female for AMI  <15.0 ng/L- Negative Male for AMI  >=10 - Abnormal Female indicating possible myocardial injury.  >=15 - Abnormal Male indicating possible myocardial injury.   Clinicians would have to utilize clinical acumen, EKG, Troponin, and serial changes to determine if it is an Acute Myocardial Infarction or myocardial injury due to an underlying chronic condition.         BNP [[id number]]  (Abnormal) Collected: 02/09/23 1622    Specimen: Blood Updated: 02/09/23 1753     proBNP 1,817.0 pg/mL     Narrative:      Among patients with dyspnea, NT-proBNP is highly sensitive for the detection of acute congestive heart failure. In addition NT-proBNP of <300 pg/ml effectively rules out acute congestive heart failure with 99% negative predictive value.    Results may be falsely decreased if patient taking Biotin.      TSH [[id number]]  (Normal) Collected: 02/09/23 1622    Specimen: Blood Updated: 02/09/23 1753     TSH 3.040 uIU/mL     Comprehensive Metabolic Panel [[id number]]  (Abnormal) Collected: 02/09/23 1622    Specimen: Blood Updated: 02/09/23 1713     Glucose 96 mg/dL      BUN 8 mg/dL      Creatinine 0.71 mg/dL      Sodium 137 mmol/L      Potassium 4.2 mmol/L      Chloride 100 mmol/L      CO2 27.9 mmol/L      Calcium 9.5 mg/dL      Total Protein 6.5 g/dL      Albumin 4.4 g/dL      ALT (SGPT) 29 U/L      AST (SGOT) 39 U/L      Alkaline Phosphatase 91 U/L      Total Bilirubin 1.2 mg/dL      Globulin 2.1 gm/dL      A/G Ratio 2.1 g/dL      BUN/Creatinine Ratio 11.3     Anion Gap 9.1 mmol/L      eGFR 86.6 mL/min/1.73     Narrative:      GFR Normal >60  Chronic Kidney Disease <60  Kidney Failure <15    The GFR formula is only valid for adults with stable renal function between ages 18 and 70.    Magnesium [[id number]]  (Normal) Collected: 02/09/23 1622    Specimen: Blood Updated: 02/09/23 1713      Magnesium 2.1 mg/dL     CBC Auto Differential [[id number]]  (Abnormal) Collected: 02/09/23 1622    Specimen: Blood Updated: 02/09/23 1658     WBC 11.51 10*3/mm3      RBC 3.88 10*6/mm3      Hemoglobin 11.4 g/dL      Hematocrit 33.9 %      MCV 87.4 fL      MCH 29.4 pg      MCHC 33.6 g/dL      RDW 14.0 %      RDW-SD 44.8 fl      MPV 11.3 fL      Platelets 241 10*3/mm3      Neutrophil % 81.4 %      Lymphocyte % 9.4 %      Monocyte % 6.7 %      Eosinophil % 1.0 %      Basophil % 0.7 %      Immature Grans % 0.8 %      Neutrophils, Absolute 9.38 10*3/mm3      Lymphocytes, Absolute 1.08 10*3/mm3      Monocytes, Absolute 0.77 10*3/mm3      Eosinophils, Absolute 0.11 10*3/mm3      Basophils, Absolute 0.08 10*3/mm3      Immature Grans, Absolute 0.09 10*3/mm3      nRBC 0.0 /100 WBC         Imaging Results (Last 24 Hours)     Procedure Component Value Units Date/Time    XR Chest 1 View [[id number]] Collected: 02/09/23 1621     Updated: 02/09/23 1625    Narrative:      XR CHEST 1 VW-     HISTORY: Female who is 77 years-old,  short of breath     TECHNIQUE: Frontal view of the chest     COMPARISON: 06/11/2020     FINDINGS: The heart is enlarged, appears increased from the prior exam.  Left-sided pacemaker, cardiac leads noted. Aorta is calcified. Pulmonary  vasculature is unremarkable. No focal pulmonary consolidation, pleural  effusion, or pneumothorax. No acute osseous process.       Impression:      No focal pulmonary consolidation. Increased cardiomegaly.     This report was finalized on 2/9/2023 4:22 PM by Dr. Saeid Lopez M.D.           ECG 12 Lead       Component  Ref Range & Units 15:40   QT Interval  ms 388 P    Resulting Agency  ECG             HEART RATE= 89  bpm  RR Interval= 674  ms  IN Interval=   ms  P Horizontal Axis=   deg  P Front Axis=   deg  QRSD Interval= 79  ms  QT Interval= 388  ms  QRS Axis= 23  deg  T Wave Axis= 21  deg  - ABNORMAL ECG -  Afib/flut and V-paced complexes  No further rhythm analysis  attempted due to paced rhythm  Low voltage, extremity and precordial leads  Probable anteroseptal infarct, old               Current Facility-Administered Medications:   •  bisoprolol (ZEBeta) tablet 10 mg, 10 mg, Oral, Q24H, Georgiana Zacarias APRN, 10 mg at 02/10/23 0915  •  docusate sodium (COLACE) capsule 200 mg, 200 mg, Oral, Daily PRN, Robinson Salazar MD  •  donepezil (ARICEPT) tablet 10 mg, 10 mg, Oral, Daily, Robinson Salazar MD, 10 mg at 02/10/23 0915  •  Enoxaparin Sodium (LOVENOX) syringe 60 mg, 1 mg/kg, Subcutaneous, BID, Georgiana Zacarias APRN, 60 mg at 02/10/23 0617  •  furosemide (LASIX) injection 40 mg, 40 mg, Intravenous, Q12H, Georgiana Zacarias APRN, 40 mg at 02/10/23 0617  •  hydrALAZINE (APRESOLINE) injection 10 mg, 10 mg, Intravenous, Q6H PRN, Georgiana Zacarias APRN  •  levothyroxine (SYNTHROID, LEVOTHROID) tablet 25 mcg, 25 mcg, Oral, Q AM, Robinson Salazar MD, 25 mcg at 02/10/23 0616  •  nitroglycerin (NITROSTAT) SL tablet 0.4 mg, 0.4 mg, Sublingual, Q5 Min PRN, Robinson Salazar MD  •  pantoprazole (PROTONIX) EC tablet 40 mg, 40 mg, Oral, Q AM, Robinson Salazar MD, 40 mg at 02/10/23 0617  •  potassium chloride (K-DUR,KLOR-CON) ER tablet 10 mEq, 10 mEq, Oral, BID With Meals, Leodan Pagan MD, 10 mEq at 02/10/23 0917  •  sertraline (ZOLOFT) tablet 150 mg, 150 mg, Oral, QAM, Robinson Salazar MD, 150 mg at 02/10/23 0616  •  sodium chloride 0.9 % flush 10 mL, 10 mL, Intravenous, PRN, Robinson Salazar MD  •  sodium chloride 0.9 % infusion 40 mL, 40 mL, Intravenous, PRN, Robinson Salazar MD    ASSESSMENT  Acute on chronic diastolic CHF  Chronic atrial fibrillation  Hypertension  Hypothyroidism  Depression  Gastroesophageal reflux disease    PLAN  CPM  IV diuresis  Lovenox  Serial cardiac enzymes and EKG  2D echo pending  Stress Cardiolite today  Adjust home medications  Stress ulcer DVT prophylaxis  Supportive care  Discussed with family and  nursing staff  Follow closely and further recommendation current hospital course    IVANNA AMADOR MD    Copied text in this note has been reviewed and is accurate as of 02/10/23

## 2023-02-10 NOTE — PROGRESS NOTES
[facility]  Cardiology Progress Note    Patient Identification:  Name: Rania Forrest  Age: 77 y.o.  Sex: female  :  1945  MRN: [id number]                 Follow Up / Chief Complaint:  Follow up chf/cp    Interval History: Stress test today was a negative study.       Subjective:  Shortness of breath better    Objective:    Past Medical History:  Past Medical History:   Diagnosis Date    Atrial fibrillation (HCC)     Fung's esophagus     Benign essential hypertension     Blood clot in vein     Cardiomyopathy (HCC)     Chronic gastritis     Congestive heart failure (HCC)     Degenerative disc disease     Dementia (HCC)     Pt  states she has slight dementia    Depression with anxiety     Disease of thyroid gland     Diverticulitis of colon     Dysphagia     GERD (gastroesophageal reflux disease)     History of chest pain     History of migraine     History of migraine headaches     Left knee pain     Osteoporosis     Palpitations     Thyroid disorder     Thyroid nodule     Ulcerative colitis (HCC)      Past Surgical History:  Past Surgical History:   Procedure Laterality Date    APPENDECTOMY      BLADDER SURGERY      CARDIAC CATHETERIZATION N/A 2016    Procedure: Left Heart Cath;  Surgeon: Robinson Salazar MD;  Location:  [facility];  Service:     CARDIAC ELECTROPHYSIOLOGY PROCEDURE N/A 2017    Procedure: Pacemaker DC new   [employer];  Surgeon: Robinson Salazar MD;  Location:  [facility];  Service:     CARDIOVERSION  2017    CHOLECYSTECTOMY      COLONOSCOPY      COLONOSCOPY N/A 2016    normal    ENDOSCOPY  2015    submucosal nodule in esophagus, erythematous mucosa in antrum,moderate acute gastritis, no h-pylori    ENDOSCOPY N/A 2016    Erythematous mucosa in the antrum    EYE SURGERY Left     cataract    HYSTERECTOMY      KNEE MENISCAL REPAIR Left     THYROID SURGERY Left     TONSILLECTOMY      TOTAL  KNEE ARTHROPLASTY Left 6/16/2020    Procedure: LEFT TOTAL KNEE ARTHOPLASTY+;  Surgeon: Jamal Castañeda MD;  Location: [facility] MAIN OR;  Service: Orthopedics;  Laterality: Left;        Social History:   Social History     Tobacco Use    Smoking status: Never    Smokeless tobacco: Never   Substance Use Topics    Alcohol use: No      Family History:  Family History   Problem Relation Age of Onset    Lung cancer Other     Ulcerative colitis Sister     Heart failure Mother     Heart disease Mother     Heart failure Father     Heart disease Father     Malig Hyperthermia Neg Hx           Allergies:  Allergies   Allergen Reactions    Amoxicillin-Pot Clavulanate Shortness Of Breath     CAN take 500mg Amoxils without itching    Bupivacaine Hcl Anaphylaxis    Doxycycline Shortness Of Breath    Nepafenac Itching     Eye Drops    Bactrim [Sulfamethoxazole-Trimethoprim] Diarrhea    Barium-Containing Compounds Other (See Comments)     CONTRAST CAUSES DIARRHEA    Bextra [Valdecoxib] Itching    Clarithromycin Other (See Comments)     REACTION UNKNOWN    Contrast Dye (Echo Or Unknown Ct/Mr) Other (See Comments)     REACTION UNKNOWN    Cortisone Hives    Cymbalta [Duloxetine Hcl] Itching    Doxycycline Monohydrate Other (See Comments)     REACTION UNKNOWN    Iodinated Contrast Media Other (See Comments)     REACTION UNKNOWN    Iodine Other (See Comments)     REACTION UNKNOWN    Keflex [Cephalexin] Other (See Comments)     REACTION UNKNOWN    Levaquin [Levofloxacin] Other (See Comments)     REACTION UNKNOWN    Medrol [Methylprednisolone] Other (See Comments)     REACTION UNKNOWN    Mesalamine Other (See Comments)     REACTION UNKNOWN    Polymyxin B-Trimethoprim Other (See Comments)     REACTION UNKNOWN    Rivaroxaban Other (See Comments)     REACTION UNKNOWN    Tetanus Toxoids Swelling    Tetanus-Diphtheria Toxoids Td Swelling    Clindamycin Rash     Scheduled Meds:  bisoprolol, 10 mg, Q24H  donepezil, 10 mg, Daily  enoxaparin, 1  "mg/kg, BID  furosemide, 40 mg, Q12H  levothyroxine, 25 mcg, Q AM  pantoprazole, 40 mg, Q AM  potassium chloride, 10 mEq, BID With Meals  sertraline, 150 mg, QAM            INTAKE AND OUTPUT:    Intake/Output Summary (Last 24 hours) at 2/10/2023 1553  Last data filed at 2/10/2023 1255  Gross per 24 hour   Intake 430 ml   Output 2075 ml   Net -1645 ml       ROS  Constitutional: Awake and alert, no fever. No nosebleeds  Abdomen           no abdominal pain   Cardiac              no chest pain  Pulmonary          no shortness of breath      /62 (BP Location: Right arm, Patient Position: Lying)   Pulse 73   Temp 97.5 °F (36.4 °C) (Oral)   Resp 16   Ht 177.8 cm (70\")   Wt 63.5 kg (140 lb 1.6 oz)   SpO2 98%   BMI 20.10 kg/m²   General appearance: No acute changes   Neck: Trachea midline; NECK, supple, no thyromegaly or lymphadenopathy   Lungs: Normal size and shape, normal breath sounds, equal distribution of air, no rales or rhonchi   CV: S1-S2 regular, no murmurs, no rub, no gallop   Abdomen: Soft, nontender; no masses , no abnormal abdominal sounds   Extremities: No deformity , normal color , no peripheral edema   Skin: Normal temperature, turgor and texture; no rash, ulcers                Cardiographics       ECG:     Echocardiogram:   Interpretation Summary         Left ventricular ejection fraction appears to be 51 - 55%.    Left ventricular diastolic function was indeterminate.    Left atrial volume is moderately increased.    The right atrial cavity is moderately  dilated.    Moderate tricuspid valve regurgitation is present.    Estimated right ventricular systolic pressure from tricuspid regurgitation is mildly elevated (35-45 mmHg).    Mild pulmonary hypertension is present.      Interpretation Summary         Findings consistent with an equivocal ECG stress test.    Left ventricular ejection fraction is normal (Calculated EF = 70%).    Myocardial perfusion imaging indicates a normal myocardial " perfusion study with no evidence of ischemia.    Impressions are consistent with a low risk study.        2016    Impression:      Normal coronary arteries  LV function at lower limits of normal     Recommendations:      Medical management            I sincerely appreciate the opportunity to participate in your patient's care. Please feel free to contact me anytime if I can be of assistance in this or any other way.     Robinson Salazar MD  11/21/2016  12:08 PM        Conclusion       SUCESSFUL DUAL CHAMBER PACER IMPLANTATION         Lab Review   Results from last 7 days   Lab Units 02/10/23  0335 02/09/23  2005 02/09/23  1622   HSTROP T ng/L 49* 41* 37*     Results from last 7 days   Lab Units 02/10/23  0335   MAGNESIUM mg/dL 2.0     Results from last 7 days   Lab Units 02/10/23  0335   SODIUM mmol/L 141   POTASSIUM mmol/L 3.6   BUN mg/dL 8   CREATININE mg/dL 0.77   CALCIUM mg/dL 8.9     @[id number]@  Results from last 7 days   Lab Units 02/10/23  0335 02/09/23  1622   WBC 10*3/mm3 8.85 11.51*   HEMOGLOBIN g/dL 10.9* 11.4*   HEMATOCRIT % 33.0* 33.9*   PLATELETS 10*3/mm3 222 241           Intake/Output Summary (Last 24 hours) at 2/10/2023 1554  Last data filed at 2/10/2023 1255  Gross per 24 hour   Intake 430 ml   Output 2075 ml   Net -1645 ml     The following medical decision was discussed in detail with Dr. Salazar      Assessment:  Chest pain  Acute on chronic congestive heart failure  Hypertension  Permanent atrial fib   cardiomyopathy: EF 51%, echo as above        Plan:  Weights are probably not accurate, but she is diuresing well on IV Lasix.  Blood pressure and heart rate are stable.  Continue to monitor.  A-fib with rate controlled on the monitor.  Anticoagulated with Lovenox.  Stress test was a negative study.  Continue IV diuresing, beta-blockade, Lovenox.    Labs/tests ordered for am: CBC, BMP, add spironolactone, had Medtronic device checked      )2/10/2023  Georgiana Zacarias  "APRN    Patient personally interviewed and above subjective findings personally confirmed during a face to face contact with patient today  All findings of physical examination confirmed  All pertinent and performed labs, cardiac procedures ,  radiographs of the last 24 hours personally reviewed  Impression and plans discussed/elaborated and implemented jointly as described above     Robinson Salazar MD          EMR Dragon/Transcription:   \"Dictated utilizing Dragon dictation\".     "

## 2023-02-10 NOTE — DISCHARGE PLACEMENT REQUEST
"Shannan Forrest (77 y.o. Female)     Date of Birth   1945    Social Security Number       Address   [address] [facility] [address]    Home Phone   [phone]    MRN   [id number]       [facility]    Marital Status                               Admission Date   2/9/23    Admission Type   Urgent    Admitting Provider   Robinson Salazar MD    Attending Provider   Robinson Salazar MD    Department, Room/Bed   [address] [facility]I, 2214/1       Discharge Date       Discharge Disposition       Discharge Destination                               Attending Provider: Robinson Salazar MD    Allergies: Amoxicillin-pot Clavulanate, Bupivacaine Hcl, Doxycycline, Nepafenac, Bactrim [Sulfamethoxazole-trimethoprim], Barium-containing Compounds, Bextra [Valdecoxib], Clarithromycin, Contrast Dye (Echo Or Unknown Ct/mr), Cortisone, Cymbalta [Duloxetine Hcl], Doxycycline Monohydrate, Iodinated Contrast Media, Iodine, Keflex [Cephalexin], Levaquin [Levofloxacin], Medrol [Methylprednisolone], Mesalamine, Polymyxin B-trimethoprim, Rivaroxaban, Tetanus Toxoids, Tetanus-diphtheria Toxoids Td, Clindamycin    Isolation: None   Infection: None   Code Status: CPR    Ht: 177.8 cm (70\")   Wt: 65.1 kg (143 lb 6.6 oz)    Admission Cmt: HUMANA MEDICARE REPL   Principal Problem: Ischemic cardiomyopathy [I25.5]                 Active Insurance as of 2/9/2023     Primary Coverage     Payor Plan Insurance Group Employer/Plan Group    HUMANA MEDICARE REPLACEMENT HUMANA MEDICARE REPLACEMENT [id number]     Payor Plan Address Payor Plan Phone Number Payor Plan Fax Number Effective Dates    [address] [address] [phone]  1/1/2018 - None Entered    [facility] [address]       Subscriber Name Subscriber Birth Date Member ID       SHANNAN FORREST 1945 [id number]                 Emergency Contacts      (Rel.) Home Phone Work Phone Mobile Phone    Benito Forrest " (Spouse) [phone]    Maik Forrest (Son) [phone] -- [phone]

## 2023-02-10 NOTE — PLAN OF CARE
Goal Outcome Evaluation:  Plan of Care Reviewed With: patient            Pt admitted for sob/ swelling. Pt NPO after 12 for stress test. VSS. 1L NC. Afib on monitor. IV lasix . SB assist.

## 2023-02-10 NOTE — SIGNIFICANT NOTE
02/10/23 0846   OTHER   Discipline physical therapist   Rehab Time/Intention   Session Not Performed other (see comments)  (Pt currently off the unit for a stress test. Noted pts troponin elevated and increasing. PT will cont to follow pt and perform eval when medically appropriate.)   Recommendation   PT - Next Appointment 02/11/23

## 2023-02-10 NOTE — PROGRESS NOTES
"Nutrition Services    Patient Name:  Raina Forrest  YOB: 1945  MRN: [id number]  Admit Date:  2/9/2023    Assessment Date:  02/10/23    CLINICAL NUTRITION ASSESSMENT     Encounter Information         Reason for Encounter BMI -19.8     Admitting Diagnosis SOB, lower extremity swelling     Pertinent Medical History GERD, afib, dementia, CHF, depression, hypothyroidism    Current Issues Patient currently NPO and ADWOA for stress test. She was admitted for SOB and LE swelling. Patient has severe dementia- poor historian, afib and CHF. She is on IV lasix. Per wt hx, she has lost 20# (12.6%) x 10 months. She ate 100% of dinner last night per EMR. RD to monitor for diet to advance. Will follow up for interview on later date      Current Nutrition Orders & Evaluation of Intake       Oral Nutrition     Current PO Diet NPO Diet NPO Type: Strict NPO   Supplement    PO Evaluation     Trending % PO Intake 100% dinner    --  Anthropometrics          Height    Weight Height: 177.8 cm (70\")  Weight: 62.6 kg (138 lb 0.1 oz) (02/09/23 1642)    BMI kg/m2 Body mass index is 19.8 kg/m².    Weight Trend/Change Loss, Amount/Timeframe:  20# (12.6%) x 10 months    Weight History  Wt Readings from Last 15 Encounters:   02/09/23 1642 62.6 kg (138 lb 0.1 oz)   02/09/23 1316 62.6 kg (138 lb)   12/06/22 0900 64.9 kg (143 lb)   06/07/22 0930 69.9 kg (154 lb)   04/19/22 0935 71.7 kg (158 lb)   01/13/22 0950 71.7 kg (158 lb)   07/22/21 0907 71.7 kg (158 lb)   07/14/21 0852 73.9 kg (163 lb)   06/21/21 1439 73.9 kg (163 lb)   04/05/21 1317 73.5 kg (162 lb)   03/30/21 0854 77.1 kg (170 lb)   03/30/21 0825 77.1 kg (170 lb)   12/10/20 0934 77.1 kg (170 lb)   12/30/20 1432 77.1 kg (170 lb)   06/16/20 1254 74.4 kg (164 lb 2 oz)      --  Estimated/Assessed Needs       Energy Requirements    Height for Calculation  Height: 177.8 cm (70\")   Weight for Calculation 138# (62.6 kg)    Method for Estimation  25 kcal/kg, 30 kcal/kg   EST Needs " (kcal/day) 9593-0681       Protein Requirements    Weight for Calculation 138# (62.6 kg)    EST Protein Needs (g/kg) 1.0 - 1.2 gm/kg   EST Daily Needs (g/day) 63-75       Fluid Requirements     Method for Estimation 1 mL/kcal    Estimated Needs (mL/day) 3886-2849       Fluid Deficit    Current Na Level (mEq/L)    Desired Na Level (mEq/L)    Estimated Fluid Deficit (L)       Labs        Pertinent Labs Reviewed, listed below     Results from last 7 days   Lab Units 02/10/23  0335 02/09/23  1622   SODIUM mmol/L 141 137   POTASSIUM mmol/L 3.6 4.2   CHLORIDE mmol/L 104 100   CO2 mmol/L 29.0 27.9   BUN mg/dL 8 8   CREATININE mg/dL 0.77 0.71   CALCIUM mg/dL 8.9 9.5   BILIRUBIN mg/dL 1.0 1.2   ALK PHOS U/L 76 91   ALT (SGPT) U/L 24 29   AST (SGOT) U/L 29 39*   GLUCOSE mg/dL 94 96     Results from last 7 days   Lab Units 02/10/23  0335 02/09/23  1622   MAGNESIUM mg/dL 2.0 2.1   HEMOGLOBIN g/dL 10.9* 11.4*   HEMATOCRIT % 33.0* 33.9*   WBC 10*3/mm3 8.85 11.51*   TRIGLYCERIDES mg/dL 81  --    ALBUMIN g/dL 3.9 4.4     Results from last 7 days   Lab Units 02/10/23  0335 02/09/23  1622   PLATELETS 10*3/mm3 222 241     SARS-CoV-2 PCR   Date Value Ref Range Status   03/27/2021 Not Detected Not Detected Final     Comment:     Nucleic acid specific to SARS-CoV-2 (COVID-19) virus was not detected in  this sample by the TaqPath (TM) COVID-19 Combo Kit.          SARS-CoV-2 (COVID-19) nucleic acid testing performed using [employer] Aptima (R) SARS-CoV-2 Assay or [employer] TaqPath (TM)  COVID-19 Combo Kit as indicated above under Emergency Use Authorization (EUA) from the FDA. Aptima (R) and TaqPath (TM) test performance  characteristics verified by [employer] in accordance with the FDAs Guidance Document (Policy for Diagnostic Tests for Coronavirus Disease-2019  during the Public Health Emergency) issued on March 16, 2020. The laboratory is regulated under CLIA as qualified to perform high-complexity testing  Unless  otherwise noted, all testing was performed at [facility], CLIA No. [id number], [place] State Licensee No. [id number]     Lab Results   Component Value Date    HGBA1C 5.40 02/10/2023          Medications            Scheduled Medications bisoprolol, 10 mg, Oral, Q24H  donepezil, 10 mg, Oral, Daily  enoxaparin, 1 mg/kg, Subcutaneous, BID  furosemide, 40 mg, Intravenous, Q12H  levothyroxine, 25 mcg, Oral, Q AM  pantoprazole, 40 mg, Oral, Q AM  potassium chloride, 10 mEq, Oral, BID With Meals  sertraline, 150 mg, Oral, QAM        Infusions      PRN Medications •  docusate sodium  •  hydrALAZINE  •  nitroglycerin  •  sodium chloride  •  sodium chloride     Physical Findings         Skin, GI, Oral, LDA Alert, oriented, oxygen therapy, 3+ edema feet and ankles, last BM 2/9, skin intact     NFPE Pending, due to: patient ADWOA for stress test    --  PES STATEMENT / NUTRITION DIAGNOSIS      Nutrition Dx Problem  Problem: Unintentional Weight Loss  Etiology: Medical Diagnosis CHF   Signs/Symptoms: BMI and Unintended Weight Change 20# wt loss     Comment:      NUTRITION INTERVENTION / PLAN OF CARE  Intervention Goal        Intervention Goal(s) Reduce/improve symptoms, Meet estimated needs, Disease management/therapy, Advance diet and Maintain weight     Nutrition Intervention        RD Action Follow Tx Progress and Care plan reviewed     Nutrition Prescription          Diet      Supplement/Snack    EN/PN      Prescription Ordered No changes at this time     Monitor/Evaluation        Monitor Per protocol   Discharge Needs Pending clinical course   Education Will instruct as appropriate     RD to follow up per protocol.    Electronically signed by:  Marci Arce RD  02/10/23 09:33 EST

## 2023-02-11 LAB
ANION GAP SERPL CALCULATED.3IONS-SCNC: 9.6 MMOL/L (ref 5–15)
BASOPHILS # BLD AUTO: 0.13 10*3/MM3 (ref 0–0.2)
BASOPHILS NFR BLD AUTO: 1.4 % (ref 0–1.5)
BUN SERPL-MCNC: 9 MG/DL (ref 8–23)
BUN/CREAT SERPL: 12.3 (ref 7–25)
CALCIUM SPEC-SCNC: 8.7 MG/DL (ref 8.6–10.5)
CHLORIDE SERPL-SCNC: 102 MMOL/L (ref 98–107)
CO2 SERPL-SCNC: 26.4 MMOL/L (ref 22–29)
CREAT SERPL-MCNC: 0.73 MG/DL (ref 0.57–1)
DEPRECATED RDW RBC AUTO: 48.2 FL (ref 37–54)
EGFRCR SERPLBLD CKD-EPI 2021: 83.8 ML/MIN/1.73
EOSINOPHIL # BLD AUTO: 0.28 10*3/MM3 (ref 0–0.4)
EOSINOPHIL NFR BLD AUTO: 3 % (ref 0.3–6.2)
ERYTHROCYTE [DISTWIDTH] IN BLOOD BY AUTOMATED COUNT: 14.5 % (ref 12.3–15.4)
GEN 5 2HR TROPONIN T REFLEX: 39 NG/L
GLUCOSE SERPL-MCNC: 87 MG/DL (ref 65–99)
HCT VFR BLD AUTO: 34.1 % (ref 34–46.6)
HGB BLD-MCNC: 11 G/DL (ref 12–15.9)
IMM GRANULOCYTES # BLD AUTO: 0.07 10*3/MM3 (ref 0–0.05)
IMM GRANULOCYTES NFR BLD AUTO: 0.8 % (ref 0–0.5)
LYMPHOCYTES # BLD AUTO: 1.37 10*3/MM3 (ref 0.7–3.1)
LYMPHOCYTES NFR BLD AUTO: 14.7 % (ref 19.6–45.3)
MAGNESIUM SERPL-MCNC: 2.1 MG/DL (ref 1.6–2.4)
MCH RBC QN AUTO: 29.4 PG (ref 26.6–33)
MCHC RBC AUTO-ENTMCNC: 32.3 G/DL (ref 31.5–35.7)
MCV RBC AUTO: 91.2 FL (ref 79–97)
MONOCYTES # BLD AUTO: 0.87 10*3/MM3 (ref 0.1–0.9)
MONOCYTES NFR BLD AUTO: 9.3 % (ref 5–12)
NEUTROPHILS NFR BLD AUTO: 6.6 10*3/MM3 (ref 1.7–7)
NEUTROPHILS NFR BLD AUTO: 70.8 % (ref 42.7–76)
NRBC BLD AUTO-RTO: 0 /100 WBC (ref 0–0.2)
PLATELET # BLD AUTO: 209 10*3/MM3 (ref 140–450)
PMV BLD AUTO: 11.1 FL (ref 6–12)
POTASSIUM SERPL-SCNC: 3.2 MMOL/L (ref 3.5–5.2)
RBC # BLD AUTO: 3.74 10*6/MM3 (ref 3.77–5.28)
SODIUM SERPL-SCNC: 138 MMOL/L (ref 136–145)
TROPONIN T DELTA: -2 NG/L
TROPONIN T SERPL HS-MCNC: 41 NG/L
WBC NRBC COR # BLD: 9.32 10*3/MM3 (ref 3.4–10.8)

## 2023-02-11 PROCEDURE — 84484 ASSAY OF TROPONIN QUANT: CPT | Performed by: NURSE PRACTITIONER

## 2023-02-11 PROCEDURE — 97161 PT EVAL LOW COMPLEX 20 MIN: CPT

## 2023-02-11 PROCEDURE — G0378 HOSPITAL OBSERVATION PER HR: HCPCS

## 2023-02-11 PROCEDURE — 83735 ASSAY OF MAGNESIUM: CPT | Performed by: INTERNAL MEDICINE

## 2023-02-11 PROCEDURE — 97116 GAIT TRAINING THERAPY: CPT

## 2023-02-11 PROCEDURE — 96376 TX/PRO/DX INJ SAME DRUG ADON: CPT

## 2023-02-11 PROCEDURE — 25010000002 ENOXAPARIN PER 10 MG: Performed by: NURSE PRACTITIONER

## 2023-02-11 PROCEDURE — 93005 ELECTROCARDIOGRAM TRACING: CPT | Performed by: INTERNAL MEDICINE

## 2023-02-11 PROCEDURE — 96372 THER/PROPH/DIAG INJ SC/IM: CPT

## 2023-02-11 PROCEDURE — 25010000002 FUROSEMIDE PER 20 MG: Performed by: NURSE PRACTITIONER

## 2023-02-11 PROCEDURE — 97530 THERAPEUTIC ACTIVITIES: CPT

## 2023-02-11 PROCEDURE — 80048 BASIC METABOLIC PNL TOTAL CA: CPT | Performed by: HOSPITALIST

## 2023-02-11 PROCEDURE — 93010 ELECTROCARDIOGRAM REPORT: CPT | Performed by: INTERNAL MEDICINE

## 2023-02-11 PROCEDURE — 85025 COMPLETE CBC W/AUTO DIFF WBC: CPT | Performed by: HOSPITALIST

## 2023-02-11 PROCEDURE — 96375 TX/PRO/DX INJ NEW DRUG ADDON: CPT

## 2023-02-11 PROCEDURE — 99232 SBSQ HOSP IP/OBS MODERATE 35: CPT | Performed by: INTERNAL MEDICINE

## 2023-02-11 PROCEDURE — 25010000002 ONDANSETRON PER 1 MG: Performed by: HOSPITALIST

## 2023-02-11 RX ORDER — TORSEMIDE 20 MG/1
20 TABLET ORAL DAILY
Status: DISCONTINUED | OUTPATIENT
Start: 2023-02-12 | End: 2023-02-12 | Stop reason: HOSPADM

## 2023-02-11 RX ORDER — ALBUTEROL SULFATE 2.5 MG/3ML
2.5 SOLUTION RESPIRATORY (INHALATION) EVERY 6 HOURS PRN
Status: DISCONTINUED | OUTPATIENT
Start: 2023-02-11 | End: 2023-02-12 | Stop reason: HOSPADM

## 2023-02-11 RX ORDER — POTASSIUM CHLORIDE 1.5 G/1.77G
40 POWDER, FOR SOLUTION ORAL AS NEEDED
Status: DISCONTINUED | OUTPATIENT
Start: 2023-02-11 | End: 2023-02-12 | Stop reason: HOSPADM

## 2023-02-11 RX ORDER — ONDANSETRON 2 MG/ML
4 INJECTION INTRAMUSCULAR; INTRAVENOUS EVERY 6 HOURS PRN
Status: DISCONTINUED | OUTPATIENT
Start: 2023-02-11 | End: 2023-02-12 | Stop reason: HOSPADM

## 2023-02-11 RX ORDER — ALUMINA, MAGNESIA, AND SIMETHICONE 2400; 2400; 240 MG/30ML; MG/30ML; MG/30ML
15 SUSPENSION ORAL EVERY 6 HOURS PRN
Status: DISCONTINUED | OUTPATIENT
Start: 2023-02-11 | End: 2023-02-12 | Stop reason: HOSPADM

## 2023-02-11 RX ORDER — ONDANSETRON 2 MG/ML
INJECTION INTRAMUSCULAR; INTRAVENOUS
Status: DISCONTINUED
Start: 2023-02-11 | End: 2023-02-11 | Stop reason: WASHOUT

## 2023-02-11 RX ORDER — POTASSIUM CHLORIDE 750 MG/1
40 TABLET, FILM COATED, EXTENDED RELEASE ORAL AS NEEDED
Status: DISCONTINUED | OUTPATIENT
Start: 2023-02-11 | End: 2023-02-12 | Stop reason: HOSPADM

## 2023-02-11 RX ADMIN — ALUMINUM HYDROXIDE, MAGNESIUM HYDROXIDE, AND DIMETHICONE 15 ML: 400; 400; 40 SUSPENSION ORAL at 12:40

## 2023-02-11 RX ADMIN — DONEPEZIL HYDROCHLORIDE 10 MG: 10 TABLET, FILM COATED ORAL at 09:31

## 2023-02-11 RX ADMIN — SPIRONOLACTONE 25 MG: 25 TABLET ORAL at 09:31

## 2023-02-11 RX ADMIN — POTASSIUM CHLORIDE 40 MEQ: 750 TABLET, EXTENDED RELEASE ORAL at 06:30

## 2023-02-11 RX ADMIN — ENOXAPARIN SODIUM 60 MG: 100 INJECTION SUBCUTANEOUS at 17:44

## 2023-02-11 RX ADMIN — PANTOPRAZOLE SODIUM 40 MG: 40 TABLET, DELAYED RELEASE ORAL at 06:30

## 2023-02-11 RX ADMIN — SERTRALINE 150 MG: 100 TABLET, FILM COATED ORAL at 06:30

## 2023-02-11 RX ADMIN — POTASSIUM CHLORIDE 40 MEQ: 750 TABLET, EXTENDED RELEASE ORAL at 17:44

## 2023-02-11 RX ADMIN — FUROSEMIDE 40 MG: 10 INJECTION, SOLUTION INTRAMUSCULAR; INTRAVENOUS at 06:31

## 2023-02-11 RX ADMIN — BISOPROLOL FUMARATE 10 MG: 5 TABLET, FILM COATED ORAL at 09:30

## 2023-02-11 RX ADMIN — LEVOTHYROXINE SODIUM 25 MCG: 0.03 TABLET ORAL at 06:30

## 2023-02-11 RX ADMIN — ONDANSETRON 4 MG: 2 INJECTION INTRAMUSCULAR; INTRAVENOUS at 09:48

## 2023-02-11 RX ADMIN — ENOXAPARIN SODIUM 60 MG: 100 INJECTION SUBCUTANEOUS at 06:30

## 2023-02-11 NOTE — CASE MANAGEMENT/SOCIAL WORK
Discharge Planning Assessment  [facility]     Patient Name: Raina Forrest  MRN: [id number]  Today's Date: 2/10/2023    Admit Date: 2/9/2023    Plan: Home with [facility]  (referral pending);  MD, PLEASE ORDER  BEFORE PT DISCHARGES.   Discharge Needs Assessment     Row Name 02/10/23 1924       Living Environment    People in Home spouse    Name(s) of People in Home Benito Forrest, spouse    Current Living Arrangements home    Primary Care Provided by self    Provides Primary Care For no one, unable/limited ability to care for self    Family Caregiver if Needed spouse    Family Caregiver Names Spouse, Benito Forrest & Son, Maik Forrest    Quality of Family Relationships helpful;supportive;involved    Able to Return to Prior Arrangements yes       Resource/Environmental Concerns    Resource/Environmental Concerns none    Transportation Concerns none       Transition Planning    Patient/Family Anticipates Transition to home with family    Patient/Family Anticipated Services at Transition home health care    Transportation Anticipated family or friend will provide       Discharge Needs Assessment    Readmission Within the Last 30 Days no previous admission in last 30 days    Equipment Currently Used at Home scales;bp cuff;walker, rolling    Concerns to be Addressed discharge planning    Anticipated Changes Related to Illness none    Equipment Needed After Discharge none    Patient's Choice of Community Agency(s) Pt has used [facility] in past and son requesting Pt use again for medication management and P.T. at discharge.               Discharge Plan     Row Name 02/10/23 1932       Plan    Plan Home with [facility]  (referral pending);  MD, PLEASE ORDER  BEFORE PT DISCHARGES.    Plan Comments CCP spoke with Pt spouse, Benito Forrest and her son Maik Forrest at bedside to complete Pt discharge screening assessment.  CCP introduced self and role.  Pt information on face sheet confirmed.  Pt has dementia but  was able to answer many of CCP questions correctly (verified by family).  Pt lives with spouse Benito in a single-story home with a basement (handrail present).  Family confirmed Pt is able to ambulate stair steps independently.  Pt is IADL's, retired and no longer drives.  Pt has a rolling walker, BP cuff monitor and scales at home.  Pt PCP is Georgiana Cisneros.  Per son's request, CCP provided him with in-home caregiver list and [facility] brochure with contact information.  CCP explained to son what sub-acute rehab entails.  CCP provided son with CMS compare SNF list.  Pt is ambulating to and from the bathroom with stand-by assist of staff multiple times this shift.  Pt has [facility] advantage plan therefore would require pre-cert for sub-acute rehab and highly unlikely she would get approved for rehab walking SBA.  Pt has used [facility] in the past (June 2020) and son would like for them to follow again when Pt d/c home tomorrow.  Pt will need a home health order from MD.  CCP sent referral to [facility] (referral pending).  [facility] will continue to follow.......Bertha S/DEACON BROWNING              Continued Care and Services - Admitted Since 2/9/2023     Home Medical Care     Service Provider Request Status Selected Services Address Phone Fax Patient Preferred     [facility] Pending - Request Sent N/A [address] [address] [phone] --              Expected Discharge Date and Time     Expected Discharge Date Expected Discharge Time    Feb 12, 2023          Demographic Summary     Row Name 02/10/23 1920       General Information    Admission Type observation    Arrived From physician office - external    Required Notices Provided Observation Status Notice    Referral Source admission list;nursing    Reason for Consult discharge planning    Preferred Language English               Functional Status     Row Name 02/10/23 1921       Functional Status    Usual Activity  Tolerance good    Current Activity Tolerance good    Functional Status Comments Pt is active at home per spouse and son.  Pt required no assistane with her shower this morning.       Functional Status, IADL    Medications assistive person    Meal Preparation assistive person    Housekeeping independent    Laundry independent    Shopping assistive person       Mental Status    General Appearance WDL WDL       Mental Status Summary    Recent Changes in Mental Status/Cognitive Functioning memory (recent)       Employment/    Employment Status retired               Psychosocial    No documentation.                Abuse/Neglect    No documentation.                Legal    No documentation.                Substance Abuse    No documentation.                Patient Forms    No documentation.                   Bertha Flynn RN

## 2023-02-11 NOTE — THERAPY EVALUATION
Acute Care - Physical Therapy Initial Evaluation  [facility]     Patient Name: Raina Forrest  : 1945  MRN: [id number]  Today's Date: 2023   Onset of Illness/Injury or Date of Surgery: 23  Visit Dx:     ICD-10-CM ICD-9-CM   1. Decreased mobility and endurance  Z74.09 780.99     Patient Active Problem List   Diagnosis   • Chronic atrial fibrillation (HCC)   • Benign essential HTN   • Bradycardia   • Chest pain   • Can't get food down   • Accumulation of fluid in tissues   • Esophageal lump   • Adynamia   • Itch of skin   • Anorexia   • Chronic nausea   • Gastroesophageal reflux disease   • Breath shortness   • Emesis   • Decreased body weight   • Anxiety   • Narrowing of intervertebral disc space   • Diverticulosis of intestine   • Primary hypertension   • Migraine   • Osteoporosis   • Palpitations   • Pituitary adenoma (HCC)   • Sick sinus syndrome (HCC)   • Diarrhea   • Cardiomyopathy (HCC)   • Chronic anticoagulation   • Atrial fibrillation (HCC)   • On amiodarone therapy   • Pacemaker   • Infected pacemaker (HCC)   • Wound infection   • Primary osteoarthritis of left knee   • DJD (degenerative joint disease)   • Ischemic cardiomyopathy     Past Medical History:   Diagnosis Date   • Atrial fibrillation (HCC)    • Fung's esophagus    • Benign essential hypertension    • Blood clot in vein    • Cardiomyopathy (HCC)    • Chronic gastritis    • Congestive heart failure (HCC)    • Degenerative disc disease    • Dementia (HCC)     Pt  states she has slight dementia   • Depression with anxiety    • Disease of thyroid gland    • Diverticulitis of colon    • Dysphagia    • GERD (gastroesophageal reflux disease)    • History of chest pain    • History of migraine    • History of migraine headaches    • Left knee pain    • Osteoporosis    • Palpitations    • Thyroid disorder    • Thyroid nodule    • Ulcerative colitis (HCC)      Past Surgical History:   Procedure Laterality Date   •  APPENDECTOMY     • BLADDER SURGERY     • CARDIAC CATHETERIZATION N/A 11/21/2016    Procedure: Left Heart Cath;  Surgeon: Robinson Salazar MD;  Location:  [facility];  Service:    • CARDIAC ELECTROPHYSIOLOGY PROCEDURE N/A 2/7/2017    Procedure: Pacemaker DC new   [employer];  Surgeon: Robinson Salazar MD;  Location:  [facility];  Service:    • CARDIOVERSION  01/18/2017   • CHOLECYSTECTOMY     • COLONOSCOPY     • COLONOSCOPY N/A 7/29/2016    normal   • ENDOSCOPY  05/06/2015    submucosal nodule in esophagus, erythematous mucosa in antrum,moderate acute gastritis, no h-pylori   • ENDOSCOPY N/A 9/2/2016    Erythematous mucosa in the antrum   • EYE SURGERY Left     cataract   • HYSTERECTOMY     • KNEE MENISCAL REPAIR Left    • THYROID SURGERY Left    • TONSILLECTOMY     • TOTAL KNEE ARTHROPLASTY Left 6/16/2020    Procedure: LEFT TOTAL KNEE ARTHOPLASTY+;  Surgeon: Jamal Castañeda MD;  Location: [facility] OR;  Service: Orthopedics;  Laterality: Left;     PT Assessment (last 12 hours)     PT Evaluation and Treatment     Row Name 02/11/23 1057          Physical Therapy Time and Intention    Subjective Information complains of;nausea/vomiting  -     Document Type evaluation  -JR     Mode of Treatment individual therapy  -JR     Patient Effort good  -JR     Row Name 02/11/23 1057          General Information    Patient Profile Reviewed yes  -JR     Onset of Illness/Injury or Date of Surgery 02/09/23  -JR     Referring Physician Jero  -JR     Patient Observations alert;cooperative;agree to therapy  -JR     Patient/Family/Caregiver Comments/Observations Supine in bed in no acute distress.  -JR     General Observations of Patient supine in bed in no acute distress.  Son is present and is supportive.  -JR     Prior Level of Function min assist:;all household mobility;community mobility;gait;transfer;bed mobility;ADL's  -JR     Equipment Currently Used at Home walker,  rolling  Has walker but has not used it since her TKR a few years ago.  -JR     Pertinent History of Current Functional Problem Dementia, SOA  -JR     Limitations/Impairments safety/cognitive  -JR     Risks Reviewed patient and family:;LOB;nausea/vomiting;dizziness;increased discomfort;change in vital signs;increased drainage;lines disloged  -JR     Benefits Reviewed patient and family:;improve function;increase independence;increase balance;increase strength;decrease pain;decrease risk of DVT;improve skin integrity;increase knowledge  -JR     Barriers to Rehab cognitive status  -     Row Name 02/11/23 1057          Living Environment    Current Living Arrangements home  -JR     People in Home spouse  -JR     Primary Care Provided by self  -     Row Name 02/11/23 1057          Pain    Pretreatment Pain Rating 3/10  -JR     Posttreatment Pain Rating 3/10  -JR     Pre/Posttreatment Pain Comment reports of nausea.  Was provided meds for it.  -     Row Name 02/11/23 1057          Cognition    Orientation Status (Cognition) oriented to;person;situation  -JR     Follows Commands (Cognition) follows two-step commands;75-90% accuracy  -     Row Name 02/11/23 1057          Range of Motion Comprehensive    General Range of Motion no range of motion deficits identified  -     Row Name 02/11/23 1057          Strength Comprehensive (MMT)    General Manual Muscle Testing (MMT) Assessment upper extremity strength deficits identified;lower extremity strength deficits identified  4/5 strength throughout all extremities.  -     Row Name 02/11/23 1057          Bed Mobility    Bed Mobility bed mobility (all) activities  -     All Activities, [place] (Bed Mobility) contact guard  -     Row Name 02/11/23 1057          Transfers    Transfers sit-stand transfer;stand-sit transfer  -     Row Name 02/11/23 1057          Sit-Stand Transfer    Sit-Stand [place] (Transfers) contact guard  -[facility] Name 02/11/23  1057          Stand-Sit Transfer    Stand-Sit [place] (Transfers) contact guard  -JR     Row Name 02/11/23 1057          Gait/Stairs (Locomotion)    [place] Level (Gait) contact guard  -JR     Assistive Device (Gait) walker, front-wheeled  -JR     Distance in Feet (Gait) 150  -JR     Deviations/Abnormal Patterns (Gait) mio decreased;gait speed decreased  -JR     Comment, (Gait/Stairs) Patient was able to complete distance without loss of balance.  Does take shorter steps and was a bit fatigued.  -     Row Name 02/11/23 1057          Safety Issues, Functional Mobility    Impairments Affecting Function (Mobility) balance;cognition;endurance/activity tolerance;pain  -     Row Name 02/11/23 1057          Balance    Balance Interventions standing;sit to stand;supported;static;dynamic  -     Comment, Balance Able to stand and ambulate with walker.  She did require the rolling walker today due to her endurance.  -     Row Name 02/11/23 1057          Vital Signs    Post Systolic BP Rehab 123  -JR     Post Treatment Diastolic BP 84  -JR     O2 Delivery Pre Treatment room air  -JR     O2 Delivery Intra Treatment room air  -JR     O2 Delivery Post Treatment room air  -     Row Name 02/11/23 1057          Positioning and Restraints    Pre-Treatment Position in bed  -JR     Post Treatment Position bed  -JR     In Bed notified nsg;supine;call light within reach;encouraged to call for assist;with family/caregiver  -     Row Name 02/11/23 1057          Therapy Assessment/Plan (PT)    Patient/Family Therapy Goals Statement (PT) Go home  -JR     Functional Level at Time of Evaluation (PT) CGA  -JR     PT Diagnosis (PT) decreased mobility and endurance  -JR     Rehab Potential (PT) good, to achieve stated therapy goals  -JR     Criteria for Skilled Interventions Met (PT) yes;meets criteria  -JR     Therapy Frequency (PT) daily  -JR     Predicted Duration of Therapy Intervention (PT) 2-3 weeks  -JR     Problem  List (PT) problems related to;balance;cognition;mobility;strength  -     Row Name 02/11/23 1057          PT Evaluation Complexity    History, PT Evaluation Complexity 1-2 personal factors and/or comorbidities  -JR     Examination of Body Systems (PT Eval Complexity) 1-2 elements  -JR     Clinical Presentation (PT Evaluation Complexity) stable  -JR     Clinical Decision Making (PT Evaluation Complexity) low complexity  -JR     Overall Complexity (PT Evaluation Complexity) low complexity  -JR     Row Name 02/11/23 1057          Therapy Plan Review/Discharge Plan (PT)    Therapy Plan Review (PT) evaluation/treatment results reviewed;care plan/treatment goals reviewed;risks/benefits reviewed;current/potential barriers reviewed;participants included;participants voiced agreement with care plan;patient;son  -     Row Name 02/11/23 1057          Physical Therapy Goals    Bed Mobility Goal Selection (PT) bed mobility, PT goal 1  -     Transfer Goal Selection (PT) transfer, PT goal 1  -     Gait Training Goal Selection (PT) gait training, PT goal 1  -     Row Name 02/11/23 1057          Bed Mobility Goal 1 (PT)    Activity/Assistive Device (Bed Mobility Goal 1, PT) bed mobility activities, all  -JR     [place] Level/Cues Needed (Bed Mobility Goal 1, PT) supervision required  -JR     Time Frame (Bed Mobility Goal 1, PT) 1 week  -     Row Name 02/11/23 1057          Transfer Goal 1 (PT)    Activity/Assistive Device (Transfer Goal 1, PT) transfers, all  -JR     [place] Level/Cues Needed (Transfer Goal 1, PT) supervision required  -JR     Time Frame (Transfer Goal 1, PT) 1 week  -     Row Name 02/11/23 1057          Gait Training Goal 1 (PT)    Activity/Assistive Device (Gait Training Goal 1, PT) gait (walking locomotion);decrease fall risk;diminish gait deviation;forward stepping;improve balance and speed;increase endurance/gait distance  -JR     [place] Level (Gait Training Goal 1, PT) supervision  required  -JR     Distance (Gait Training Goal 1, PT) 300  -JR     Time Frame (Gait Training Goal 1, PT) 1 week  -JR           User Key  (r) = Recorded By, (t) = Taken By, (c) = Cosigned By    Initials Name Provider Type    Andi Hill, PT Physical Therapist                Physical Therapy Education     Title: PT OT SLP Therapies (Done)     Topic: Physical Therapy (Done)     Point: Mobility training (Done)     Learning Progress Summary           Patient Acceptance, E,D, VU,NR by  at 2/11/2023 1111   Family Acceptance, E,D, VU,NR by  at 2/11/2023 1111                   Point: Home exercise program (Done)     Learning Progress Summary           Patient Acceptance, E,D, VU,NR by  at 2/11/2023 1111   Family Acceptance, E,D, VU,NR by  at 2/11/2023 1111                   Point: Body mechanics (Done)     Learning Progress Summary           Patient Acceptance, E,D, VU,NR by  at 2/11/2023 1111   Family Acceptance, E,D, VU,NR by  at 2/11/2023 1111                   Point: Precautions (Done)     Learning Progress Summary           Patient Acceptance, E,D, VU,NR by  at 2/11/2023 1111   Family Acceptance, E,D, VU,NR by  at 2/11/2023 1111                               User Key     Initials Effective Dates Name Provider Type [facility] 06/16/21 -  Andi Zee, PT Physical Therapist PT              PT Recommendation and Plan  Anticipated Discharge Disposition (PT): home with home health  Planned Therapy Interventions (PT): balance training, bed mobility training, gait training, patient/family education, ROM (range of motion)  Therapy Frequency (PT): daily  Plan of Care Reviewed With: patient, son  Progress: improving  Outcome Evaluation: Patient will need PT to improve her strength and endurance.  She was able to ambulate around the unit with a rolling walker and CGA.  She does not use a rolling walker at home, but needed it today due to fatigue.  She is a good candidate for PT.   Outcome Measures      Row Name 02/11/23 1110             How much help from another person do you currently need...    Turning from your back to your side while in flat bed without using bedrails? 4  -JR      Moving from lying on back to sitting on the side of a flat bed without bedrails? 4  -JR      Moving to and from a bed to a chair (including a wheelchair)? 4  -JR      Standing up from a chair using your arms (e.g., wheelchair, bedside chair)? 3  -JR      Climbing 3-5 steps with a railing? 3  -JR      To walk in hospital room? 3  -JR      AM-PAC 6 Clicks Score (PT) 21  -JR            User Key  (r) = Recorded By, (t) = Taken By, (c) = Cosigned By    Initials Name Provider Type    Andi Hill, PT Physical Therapist                 Time Calculation:    PT Charges     Row Name 02/11/23 1113             Time Calculation    Start Time 1030  -JR      Stop Time 1050  -JR      Time Calculation (min) 20 min  -JR      PT Received On 02/11/23  -      PT - Next Appointment 02/12/23  -      PT Goal Re-Cert Due Date 02/18/23  -            User Key  (r) = Recorded By, (t) = Taken By, (c) = Cosigned By    Initials Name Provider Type    Andi Hill, PT Physical Therapist              Therapy Charges for Today     Code Description Service Date Service Provider Modifiers Qty    78388801750 HC PT EVAL LOW COMPLEXITY 1 2/11/2023 Andi Zee, PT GP 1    43185298530 HC PT THERAPEUTIC ACT EA 15 MIN 2/11/2023 Andi Zee, PT GP 1    14227278722 HC GAIT TRAINING EA 15 MIN 2/11/2023 Andi Zee, PT GP 1          PT G-Codes  AM-PAC 6 Clicks Score (PT): 21    Andi Zee PT  2/11/2023

## 2023-02-11 NOTE — PLAN OF CARE
Goal Outcome Evaluation:  Plan of Care Reviewed With: patient, family   Pt is A&Ox4 and on RA. All VSS this night. pt on IV lasix and voided good amount. Afib on the monitor.   Daily Care Plan Summary: Heart Failure    Diuretic in use (IV or PO):   Iv Lasix      Daily weight (up or down):    down    Output > Intake (yes/no): out put greater    O2 Requirements (current, any change?): RA      Symptoms noted with Activity (Respiratory Tolerance, functional state):  No SOA noted    Anticipated Discharge Plans:    TBD

## 2023-02-11 NOTE — PLAN OF CARE
Goal Outcome Evaluation:  Plan of Care Reviewed With: patient        Progress: improving     Daily Care Plan Summary: Heart Failure    Diuretic in use (IV or PO):  IV lasix         Daily weight (up or down):  up       Output > Intake (yes/no): yes       O2 Requirements (current, any change?): 1L to RA     Symptoms noted with Activity (Respiratory Tolerance, functional state):   Ambulated in the room on RA with no SOB.        Anticipated Discharge Plans: Home tomorrow with family.

## 2023-02-11 NOTE — PLAN OF CARE
Goal Outcome Evaluation:  Plan of Care Reviewed With: patient, son        Progress: improving  Outcome Evaluation: Patient will need PT to improve her strength and endurance.  She was able to ambulate around the unit with a rolling walker and CGA.  She does not use a rolling walker at home, but needed it today due to fatigue.  She is a good candidate for PT. Patient was intermittently wearing a face mask during this therapy encounter. Therapist used appropriate personal protective equipment including eye protection, mask, and gloves.  Mask used was standard procedure mask. Appropriate PPE was worn during the entire therapy session. Hand hygiene was completed before and after therapy session. Patient is not in enhanced droplet precautions.

## 2023-02-11 NOTE — PROGRESS NOTES
"Daily progress note    Referring physician  Dr. ALVARADO    Chief complaint  Doing same with no new complaints but remains short of breath at rest.    History of present illness  77-year-old white female with history of severe dementia who also have atrial fibrillation congestive heart failure hypothyroidism depression and gastroesophageal reflux disease directly admitted to cardiology service with bilateral lower extremity swelling and shortness of breath with exertion.  Patient has no fever chills cough chest pain palpitation abdominal pain nausea vomiting diarrhea.  Patient is poor historian and most of the history obtained from the  and the son.  I am asked to follow the patient for medical problem.    REVIEW OF SYSTEMS  Unremarkable except shortness of breath     PHYSICAL EXAM  Blood pressure 133/78, pulse 63, temperature 97.7 °F (36.5 °C), temperature source Oral, resp. rate 18, height 177.8 cm (70\"), weight 62.9 kg (138 lb 9.6 oz), SpO2 97 %.    Constitutional: Awake and alert and in no distress.   HEENT: Unremarkable  Neck: Normal range of motion.   Cardiovascular: Normal rate, regular rhythm and normal heart sounds.   Pulmonary/Chest: Effort normal and breath sounds normal. She has no wheezes.   Abdominal: Soft. Bowel sounds are normal. There is no tenderness.   Musculoskeletal: Normal range of motion. She exhibits no edema.   Neurological: She is alert.   Skin: Skin is warm and dry. No rash noted.   Psychiatric: Mood, memory, affect and judgment normal.     LAB RESULTS  Lab Results (last 24 hours)     Procedure Component Value Units Date/Time    High Sensitivity Troponin T 2Hr [[id number]]  (Abnormal) Collected: 02/11/23 1416    Specimen: Blood Updated: 02/11/23 1445     HS Troponin T 39 ng/L      Troponin T Delta -2 ng/L     Narrative:      High Sensitive Troponin T Reference Range:  <10.0 ng/L- Negative Female for AMI  <15.0 ng/L- Negative Male for AMI  >=10 - Abnormal Female indicating possible " myocardial injury.  >=15 - Abnormal Male indicating possible myocardial injury.   Clinicians would have to utilize clinical acumen, EKG, Troponin, and serial changes to determine if it is an Acute Myocardial Infarction or myocardial injury due to an underlying chronic condition.         High Sensitivity Troponin T [[id number]]  (Abnormal) Collected: 02/11/23 1248    Specimen: Blood from Hand, Right Updated: 02/11/23 1340     HS Troponin T 41 ng/L     Narrative:      High Sensitive Troponin T Reference Range:  <10.0 ng/L- Negative Female for AMI  <15.0 ng/L- Negative Male for AMI  >=10 - Abnormal Female indicating possible myocardial injury.  >=15 - Abnormal Male indicating possible myocardial injury.   Clinicians would have to utilize clinical acumen, EKG, Troponin, and serial changes to determine if it is an Acute Myocardial Infarction or myocardial injury due to an underlying chronic condition.         Basic Metabolic Panel [[id number]]  (Abnormal) Collected: 02/11/23 0406    Specimen: Blood Updated: 02/11/23 0448     Glucose 87 mg/dL      BUN 9 mg/dL      Creatinine 0.73 mg/dL      Sodium 138 mmol/L      Potassium 3.2 mmol/L      Chloride 102 mmol/L      CO2 26.4 mmol/L      Calcium 8.7 mg/dL      BUN/Creatinine Ratio 12.3     Anion Gap 9.6 mmol/L      eGFR 83.8 mL/min/1.73     Narrative:      GFR Normal >60  Chronic Kidney Disease <60  Kidney Failure <15    The GFR formula is only valid for adults with stable renal function between ages 18 and 70.    Magnesium [[id number]]  (Normal) Collected: 02/11/23 0406    Specimen: Blood Updated: 02/11/23 0448     Magnesium 2.1 mg/dL     CBC & Differential [[id number]]  (Abnormal) Collected: 02/11/23 0406    Specimen: Blood Updated: 02/11/23 0428    Narrative:      The following orders were created for panel order CBC & Differential.  Procedure                               Abnormality         Status                     ---------                               -----------          ------                     CBC Auto Differential[[id number]]        Abnormal            Final result                 Please view results for these tests on the individual orders.    CBC Auto Differential [[id number]]  (Abnormal) Collected: 02/11/23 0406    Specimen: Blood Updated: 02/11/23 0428     WBC 9.32 10*3/mm3      RBC 3.74 10*6/mm3      Hemoglobin 11.0 g/dL      Hematocrit 34.1 %      MCV 91.2 fL      MCH 29.4 pg      MCHC 32.3 g/dL      RDW 14.5 %      RDW-SD 48.2 fl      MPV 11.1 fL      Platelets 209 10*3/mm3      Neutrophil % 70.8 %      Lymphocyte % 14.7 %      Monocyte % 9.3 %      Eosinophil % 3.0 %      Basophil % 1.4 %      Immature Grans % 0.8 %      Neutrophils, Absolute 6.60 10*3/mm3      Lymphocytes, Absolute 1.37 10*3/mm3      Monocytes, Absolute 0.87 10*3/mm3      Eosinophils, Absolute 0.28 10*3/mm3      Basophils, Absolute 0.13 10*3/mm3      Immature Grans, Absolute 0.07 10*3/mm3      nRBC 0.0 /100 WBC         Imaging Results (Last 24 Hours)     ** No results found for the last 24 hours. **        ECG 12 Lead       Component  Ref Range & Units 15:40   QT Interval  ms 388 P    Resulting Agency  ECG             HEART RATE= 89  bpm  RR Interval= 674  ms  VT Interval=   ms  P Horizontal Axis=   deg  P Front Axis=   deg  QRSD Interval= 79  ms  QT Interval= 388  ms  QRS Axis= 23  deg  T Wave Axis= 21  deg  - ABNORMAL ECG -  Afib/flut and V-paced complexes  No further rhythm analysis attempted due to paced rhythm  Low voltage, extremity and precordial leads  Probable anteroseptal infarct, old               Current Facility-Administered Medications:   •  aluminum-magnesium hydroxide-simethicone (MAALOX MAX) 400-400-40 MG/5ML suspension 15 mL, 15 mL, Oral, Q6H PRN, Beatrice Drew, APRN, 15 mL at 02/11/23 1240  •  bisoprolol (ZEBeta) tablet 10 mg, 10 mg, Oral, Q24H, Georgiana Zacarias APRN, 10 mg at 02/11/23 0930  •  docusate sodium (COLACE) capsule 200 mg, 200 mg, Oral, Daily PRN, Marie,  MD Robinson  •  donepezil (ARICEPT) tablet 10 mg, 10 mg, Oral, Daily, Robinson Salazar MD, 10 mg at 02/11/23 0931  •  Enoxaparin Sodium (LOVENOX) syringe 60 mg, 1 mg/kg, Subcutaneous, BID, Georgiana Zacarias APRN, 60 mg at 02/11/23 0630  •  hydrALAZINE (APRESOLINE) injection 10 mg, 10 mg, Intravenous, Q6H PRN, Georgiana Zacarias APRN  •  levothyroxine (SYNTHROID, LEVOTHROID) tablet 25 mcg, 25 mcg, Oral, Q AM, Robinson Salazar MD, 25 mcg at 02/11/23 0630  •  nitroglycerin (NITROSTAT) SL tablet 0.4 mg, 0.4 mg, Sublingual, Q5 Min PRN, Robinson Salazar MD  •  ondansetron (ZOFRAN) injection 4 mg, 4 mg, Intravenous, Q6H PRN, Ivanna Pagan MD, 4 mg at 02/11/23 0948  •  pantoprazole (PROTONIX) EC tablet 40 mg, 40 mg, Oral, Q AM, Robinson Salazar MD, 40 mg at 02/11/23 0630  •  potassium chloride (K-DUR,KLOR-CON) ER tablet 40 mEq, 40 mEq, Oral, PRN, Coreen Lindsay, APRN, 40 mEq at 02/11/23 0630  •  potassium chloride (KLOR-CON) packet 40 mEq, 40 mEq, Oral, PRN, Coreen Lindsay, APRN  •  sertraline (ZOLOFT) tablet 150 mg, 150 mg, Oral, QAM, Robinson Salazar MD, 150 mg at 02/11/23 0630  •  sodium chloride 0.9 % flush 10 mL, 10 mL, Intravenous, PRN, Robinson Salazar MD  •  sodium chloride 0.9 % infusion 40 mL, 40 mL, Intravenous, PRN, Robinson Salazra MD  •  spironolactone (ALDACTONE) tablet 25 mg, 25 mg, Oral, Daily, Georgiana Zacarias APRN, 25 mg at 02/11/23 0931  •  [START ON 2/12/2023] torsemide (DEMADEX) tablet 20 mg, 20 mg, Oral, Daily, Price Cosme MD    ASSESSMENT  Acute on chronic diastolic CHF  Chronic atrial fibrillation  Hypertension  Hypothyroidism  Depression  Gastroesophageal reflux disease    PLAN  CPM  Diuresis  Anticoagulation  Adjust home medications  Stress ulcer DVT prophylaxis  Supportive care  Discussed with family and nursing staff  Discharge planning    IVANNA PAGAN MD    Copied text in this note has been reviewed and is accurate as of  02/11/23

## 2023-02-11 NOTE — PROGRESS NOTES
LOS: 0 days   Patient Care Team:  Georgiana Cisneros MD as PCP - General (Pediatrics)  Nathan Johnson MD as Resident (Gastroenterology)  Robinson Salazar MD as Consulting Physician (Cardiology)    Chief Complaint:   Sudden shortness of Breath    Interval History:   Patient admitted for sudden onset shortness of breath two days ago    She has severe dementia, so obtaining further history from her is very difficult.      Her son accompanies her and confirms this.      She is complaining of heartburn currently, which he feels is due to potassium that she just took    Objective   Vital Signs  Temp:  [97.5 °F (36.4 °C)-98.3 °F (36.8 °C)] 97.7 °F (36.5 °C)  Heart Rate:  [60-73] 63  Resp:  [18] 18  BP: (106-133)/(58-80) 133/78    Intake/Output Summary (Last 24 hours) at 2/11/2023 0851  Last data filed at 2/11/2023 0518  Gross per 24 hour   Intake 250 ml   Output 1600 ml   Net -1350 ml       Review of Systems   Unable to perform ROS: dementia       Physical Exam  Vitals and nursing note reviewed.   Constitutional:       Appearance: She is underweight.   HENT:      Head: Normocephalic.   Cardiovascular:      Rate and Rhythm: Normal rate. Rhythm irregular.   Pulmonary:      Effort: Pulmonary effort is normal.   Chest:      Comments: Pacemaker on left chest, with thin overlying skin  Musculoskeletal:      Right lower leg: No edema.      Left lower leg: No edema.   Neurological:      General: No focal deficit present.      Mental Status: She is alert and oriented to person, place, and time.   Psychiatric:         Attention and Perception: She is inattentive.         Speech: Speech is tangential.         Cognition and Memory: Cognition is impaired. Memory is impaired.       She is on room air, able to lie flat    Results Review:      Results from last 7 days   Lab Units 02/11/23  0406 02/10/23  0335 02/09/23  1622   SODIUM mmol/L 138 141 137   POTASSIUM mmol/L 3.2* 3.6 4.2   CHLORIDE mmol/L 102 104 100   CO2  mmol/L 26.4 29.0 27.9   BUN mg/dL 9 8 8   CREATININE mg/dL 0.73 0.77 0.71   GLUCOSE mg/dL 87 94 96   CALCIUM mg/dL 8.7 8.9 9.5     Results from last 7 days   Lab Units 02/10/23  0335 02/09/23  2005 02/09/23  1622   HSTROP T ng/L 49* 41* 37*     Results from last 7 days   Lab Units 02/11/23 0406 02/10/23  0335 02/09/23  1622   WBC 10*3/mm3 9.32 8.85 11.51*   HEMOGLOBIN g/dL 11.0* 10.9* 11.4*   HEMATOCRIT % 34.1 33.0* 33.9*   PLATELETS 10*3/mm3 209 222 241         Results from last 7 days   Lab Units 02/10/23  0335   CHOLESTEROL mg/dL 115     Results from last 7 days   Lab Units 02/11/23 0406   MAGNESIUM mg/dL 2.1     Results from last 7 days   Lab Units 02/10/23  0335   CHOLESTEROL mg/dL 115   TRIGLYCERIDES mg/dL 81   HDL CHOL mg/dL 41   LDL CHOL mg/dL 58       I reviewed the patient's new clinical results.  I personally viewed and interpreted the patient's EKG/Telemetry data        Medication Review:   bisoprolol, 10 mg, Oral, Q24H  donepezil, 10 mg, Oral, Daily  enoxaparin, 1 mg/kg, Subcutaneous, BID  levothyroxine, 25 mcg, Oral, Q AM  pantoprazole, 40 mg, Oral, Q AM  sertraline, 150 mg, Oral, QAM  spironolactone, 25 mg, Oral, Daily  [START ON 2/12/2023] torsemide, 20 mg, Oral, Daily             Assessment & Plan     Acute Diastolic Heart Failure  Persistent Atrial Fibrillation  S/p dual chamber pacemaker  Severe dementia  Elevated Troponin    This is my first time meeting the patient, so it is a little difficult to say, but she seems at baseline    She is able to lay flat, and I don't feel that she has any overt heart failure symptoms    I'm going to d/c lasix(see got a dose this am), and restart her home torsemide    At home she was on a very small dose of HCTZ(6.25).  I don't see a reason to resume at this time    Possibly home later today, pending PT and medicine    Price Cosme MD  02/11/23  08:51 EST

## 2023-02-11 NOTE — SIGNIFICANT NOTE
02/11/23 1421   OTHER   Discipline occupational therapist   Rehab Time/Intention   Session Not Performed patient/family declined treatment   Recommendation   OT - Next Appointment 02/12/23

## 2023-02-11 NOTE — DISCHARGE PLACEMENT REQUEST
"Shannan Forrest (77 y.o. Female)     Date of Birth   1945    Social Security Number       Address   [address] [facility] [address]    Home Phone   [phone]    MRN   [id number]       [facility]    Marital Status                               Admission Date   2/9/23    Admission Type   Urgent    Admitting Provider   Robinson Salazar MD    Attending Provider   Robinson Salazar MD    Department, Room/Bed   [address] [facility], 2214/1       Discharge Date       Discharge Disposition       Discharge Destination                               Attending Provider: Robinson Salazar MD    Allergies: Amoxicillin-pot Clavulanate, Bupivacaine Hcl, Doxycycline, Nepafenac, Bactrim [Sulfamethoxazole-trimethoprim], Barium-containing Compounds, Bextra [Valdecoxib], Clarithromycin, Contrast Dye (Echo Or Unknown Ct/mr), Cortisone, Cymbalta [Duloxetine Hcl], Doxycycline Monohydrate, Iodinated Contrast Media, Iodine, Keflex [Cephalexin], Levaquin [Levofloxacin], Medrol [Methylprednisolone], Mesalamine, Polymyxin B-trimethoprim, Rivaroxaban, Tetanus Toxoids, Tetanus-diphtheria Toxoids Td, Clindamycin    Isolation: None   Infection: None   Code Status: CPR    Ht: 177.8 cm (70\")   Wt: 62.9 kg (138 lb 9.6 oz)    Admission Cmt: HUMANA MEDICARE REPL   Principal Problem: Ischemic cardiomyopathy [I25.5]                 Active Insurance as of 2/9/2023     Primary Coverage     Payor Plan Insurance Group Employer/Plan Group    HUMANA MEDICARE REPLACEMENT HU[employer]NA MEDICARE REPLACEMENT [id number]     Payor Plan Address Payor Plan Phone Number Payor Plan Fax Number Effective Dates    [address] [address] [phone]  1/1/2018 - None Entered    [facility] [address]       Subscriber Name Subscriber Birth Date Member ID       SHANNAN FORREST 1945 [id number]                 Emergency Contacts      (Rel.) Home Phone Work Phone Mobile Phone    Benito Forrest " (Spouse) [phone]    Maik Forrest (Son) [phone] -- [phone]

## 2023-02-12 ENCOUNTER — HOME HEALTH ADMISSION (OUTPATIENT)
Dept: HOME HEALTH SERVICES | Facility: HOME HEALTHCARE | Age: 78
End: 2023-02-12
Payer: MEDICARE

## 2023-02-12 VITALS
HEIGHT: 70 IN | RESPIRATION RATE: 16 BRPM | BODY MASS INDEX: 20.06 KG/M2 | WEIGHT: 140.1 LBS | DIASTOLIC BLOOD PRESSURE: 62 MMHG | TEMPERATURE: 97.5 F | SYSTOLIC BLOOD PRESSURE: 107 MMHG | HEART RATE: 73 BPM | OXYGEN SATURATION: 98 %

## 2023-02-12 LAB
ANION GAP SERPL CALCULATED.3IONS-SCNC: 6.4 MMOL/L (ref 5–15)
BASOPHILS # BLD AUTO: 0.13 10*3/MM3 (ref 0–0.2)
BASOPHILS NFR BLD AUTO: 1.5 % (ref 0–1.5)
BUN SERPL-MCNC: 11 MG/DL (ref 8–23)
BUN/CREAT SERPL: 14.3 (ref 7–25)
CALCIUM SPEC-SCNC: 9.2 MG/DL (ref 8.6–10.5)
CHLORIDE SERPL-SCNC: 105 MMOL/L (ref 98–107)
CO2 SERPL-SCNC: 26.6 MMOL/L (ref 22–29)
CREAT SERPL-MCNC: 0.77 MG/DL (ref 0.57–1)
DEPRECATED RDW RBC AUTO: 45.5 FL (ref 37–54)
EGFRCR SERPLBLD CKD-EPI 2021: 78.6 ML/MIN/1.73
EOSINOPHIL # BLD AUTO: 0.25 10*3/MM3 (ref 0–0.4)
EOSINOPHIL NFR BLD AUTO: 2.8 % (ref 0.3–6.2)
ERYTHROCYTE [DISTWIDTH] IN BLOOD BY AUTOMATED COUNT: 14.1 % (ref 12.3–15.4)
GLUCOSE SERPL-MCNC: 83 MG/DL (ref 65–99)
HCT VFR BLD AUTO: 34.4 % (ref 34–46.6)
HGB BLD-MCNC: 11.4 G/DL (ref 12–15.9)
IMM GRANULOCYTES # BLD AUTO: 0.07 10*3/MM3 (ref 0–0.05)
IMM GRANULOCYTES NFR BLD AUTO: 0.8 % (ref 0–0.5)
LYMPHOCYTES # BLD AUTO: 1.59 10*3/MM3 (ref 0.7–3.1)
LYMPHOCYTES NFR BLD AUTO: 18.1 % (ref 19.6–45.3)
MCH RBC QN AUTO: 29.8 PG (ref 26.6–33)
MCHC RBC AUTO-ENTMCNC: 33.1 G/DL (ref 31.5–35.7)
MCV RBC AUTO: 90.1 FL (ref 79–97)
MONOCYTES # BLD AUTO: 0.9 10*3/MM3 (ref 0.1–0.9)
MONOCYTES NFR BLD AUTO: 10.3 % (ref 5–12)
NEUTROPHILS NFR BLD AUTO: 5.84 10*3/MM3 (ref 1.7–7)
NEUTROPHILS NFR BLD AUTO: 66.5 % (ref 42.7–76)
NRBC BLD AUTO-RTO: 0 /100 WBC (ref 0–0.2)
PLATELET # BLD AUTO: 203 10*3/MM3 (ref 140–450)
PMV BLD AUTO: 11.1 FL (ref 6–12)
POTASSIUM SERPL-SCNC: 4.6 MMOL/L (ref 3.5–5.2)
RBC # BLD AUTO: 3.82 10*6/MM3 (ref 3.77–5.28)
SODIUM SERPL-SCNC: 138 MMOL/L (ref 136–145)
WBC NRBC COR # BLD: 8.78 10*3/MM3 (ref 3.4–10.8)

## 2023-02-12 PROCEDURE — 85025 COMPLETE CBC W/AUTO DIFF WBC: CPT | Performed by: HOSPITALIST

## 2023-02-12 PROCEDURE — 96372 THER/PROPH/DIAG INJ SC/IM: CPT

## 2023-02-12 PROCEDURE — 25010000002 ENOXAPARIN PER 10 MG: Performed by: NURSE PRACTITIONER

## 2023-02-12 PROCEDURE — 97165 OT EVAL LOW COMPLEX 30 MIN: CPT | Performed by: OCCUPATIONAL THERAPIST

## 2023-02-12 PROCEDURE — 99238 HOSP IP/OBS DSCHRG MGMT 30/<: CPT | Performed by: NURSE PRACTITIONER

## 2023-02-12 PROCEDURE — G0378 HOSPITAL OBSERVATION PER HR: HCPCS

## 2023-02-12 PROCEDURE — 80048 BASIC METABOLIC PNL TOTAL CA: CPT | Performed by: HOSPITALIST

## 2023-02-12 RX ORDER — SPIRONOLACTONE 25 MG/1
25 TABLET ORAL DAILY
Qty: 30 TABLET | Refills: 11 | Status: SHIPPED | OUTPATIENT
Start: 2023-02-13

## 2023-02-12 RX ORDER — LEVOTHYROXINE SODIUM 0.03 MG/1
25 TABLET ORAL
Qty: 30 TABLET | Refills: 11 | Status: SHIPPED | OUTPATIENT
Start: 2023-02-13

## 2023-02-12 RX ORDER — BISOPROLOL FUMARATE 10 MG/1
10 TABLET, FILM COATED ORAL
Qty: 30 TABLET | Refills: 11 | Status: SHIPPED | OUTPATIENT
Start: 2023-02-13 | End: 2023-03-07

## 2023-02-12 RX ADMIN — BISOPROLOL FUMARATE 10 MG: 5 TABLET, FILM COATED ORAL at 08:32

## 2023-02-12 RX ADMIN — DONEPEZIL HYDROCHLORIDE 10 MG: 10 TABLET, FILM COATED ORAL at 08:32

## 2023-02-12 RX ADMIN — TORSEMIDE 20 MG: 20 TABLET ORAL at 08:32

## 2023-02-12 RX ADMIN — SPIRONOLACTONE 25 MG: 25 TABLET ORAL at 08:32

## 2023-02-12 RX ADMIN — LEVOTHYROXINE SODIUM 25 MCG: 0.03 TABLET ORAL at 06:35

## 2023-02-12 RX ADMIN — SERTRALINE 150 MG: 100 TABLET, FILM COATED ORAL at 08:32

## 2023-02-12 RX ADMIN — ENOXAPARIN SODIUM 60 MG: 100 INJECTION SUBCUTANEOUS at 06:35

## 2023-02-12 RX ADMIN — PANTOPRAZOLE SODIUM 40 MG: 40 TABLET, DELAYED RELEASE ORAL at 06:35

## 2023-02-12 NOTE — DISCHARGE SUMMARY
Raina Forrest  [id number]    Date of Admit: 2/9/2023  Date of Discharge:  2/12/2023    Discharge Diagnosis:  Active Hospital Problems    Diagnosis  POA   • **Ischemic cardiomyopathy [I25.5]  Yes      Resolved Hospital Problems   No resolved problems to display.       Hospital Course:   Ms. Forrest was admitted on February 9, 2023 from Dr. Salazar's office with bilateral lower extremity swelling and dyspnea with exertion.  Her proBNP was mildly elevated and she had an elevat high sensitivity troponin; chest x-ray was unremarkable. Echocardiogram showed EF 51 to 55%, indeterminate LV diastolic function, moderately increased left atrial volume and moderately dilated right atrial cavity, moderate tricuspid valve regurgitation, and mild pulmonary hypertension.  EF was mildly decreased compared to previous, biatrial enlargement had also increased, and tricuspid valve regurgitation had progressed.  Myocardial perfusion stress test showed no evidence of ischemia and EF 70%.  Her bisoprolol-HCTZ was stopped in favor of 10 mg bisoprolol daily.  She was treated with IV Lasix and then transition to 20 mg torsemide daily and 25 mg spironolactone daily.  Her vital signs and labs are stable.  She is able to be discharged home with home health for medical management and physical therapy.  We will arrange follow-up for her and Dr. Salazar's office    Procedures Performed  Myocardial Perfusion Stress Test 2/10/2023  Interpretation Summary       •  Findings consistent with an equivocal ECG stress test.  •  Left ventricular ejection fraction is normal (Calculated EF = 70%).  •  Myocardial perfusion imaging indicates a normal myocardial perfusion study with no evidence of ischemia.  •  Impressions are consistent with a low risk study.    Echocardiogram 2/10/2023    Interpretation Summary       •  Left ventricular ejection fraction appears to be 51 - 55%.  •  Left ventricular diastolic function was indeterminate.  •  Left  atrial volume is moderately increased.  •  The right atrial cavity is moderately  dilated.  •  Moderate tricuspid valve regurgitation is present.  •  Estimated right ventricular systolic pressure from tricuspid regurgitation is mildly elevated (35-45 mmHg).  •  Mild pulmonary hypertension is present.      Consults     Date and Time Order Name Status Description    2/9/2023  3:32 PM Inpatient Internal Medicine Consult Completed           Discharge Medications     Your medication list      START taking these medications      Instructions Last Dose Given Next Dose Due   bisoprolol 10 MG tablet  Commonly known as: ZEBeta  Start taking on: February 13, 2023      Take 1 tablet by mouth Daily.       spironolactone 25 MG tablet  Commonly known as: ALDACTONE  Start taking on: February 13, 2023      Take 1 tablet by mouth Daily.          CHANGE how you take these medications      Instructions Last Dose Given Next Dose Due   levothyroxine 25 MCG tablet  Commonly known as: SYNTHROID, LEVOTHROID  Start taking on: February 13, 2023  What changed: when to take this      Take 1 tablet by mouth Every Morning.          CONTINUE taking these medications      Instructions Last Dose Given Next Dose Due   docusate sodium 250 MG capsule  Commonly known as: COLACE      Take 1 capsule by mouth 2 (Two) Times a Day As Needed for Constipation.       donepezil 10 MG tablet  Commonly known as: ARICEPT      Take 1 tablet by mouth Daily.       EYE DROPS OP      Apply  to eye(s) as directed by provider. Pt to bring  To surgery       KLOR-CON 10 MEQ CR tablet  Generic drug: potassium chloride      TAKE 1 TABLET BY MOUTH EVERY DAY       pantoprazole 40 MG EC tablet  Commonly known as: PROTONIX      TAKE 1 TABLET BY MOUTH EVERY MORNING 30MIN BEFORE BREAKFAST FOR 90 DAYS       rivaroxaban 15 MG tablet  Commonly known as: Xarelto      Take 1 tablet by mouth Daily.       sertraline 100 MG tablet  Commonly known as: ZOLOFT      Take 150 mg by mouth Every  Morning.       torsemide 20 MG tablet  Commonly known as: DEMADEX      TAKE 1 TABLET BY MOUTH EVERY DAY          STOP taking these medications    bisoprolol-hydrochlorothiazide 5-6.25 MG per tablet  Commonly known as: ZIAC        calcium-vitamin D 500-200 MG-UNIT per tablet  Commonly known as: OSCAL-500        EPINEPHrine 0.3 MG/0.3ML solution auto-injector injection  Commonly known as: EPIPEN        fluticasone 50 MCG/ACT nasal spray  Commonly known as: FLONASE        multivitamin with minerals tablet tablet        SUMAtriptan 50 MG tablet  Commonly known as: IMITREX              Where to Get Your Medications      These medications were sent to [place] [id number]    Hours: 7:00 AM-6:00 PM Mon-Fri, 8:00 AM-4:30 PM Sat-Sun (Closed 12-12:30PM) Phone: [phone]   · bisoprolol 10 MG tablet  · levothyroxine 25 MCG tablet  · spironolactone 25 MG tablet         Discharge Diet: Regular    Activity at Discharge: Up with assistance. Home health PT and medical management has been ordered.    Discharge disposition: home    Condition on Discharge: stable    Follow-up Appointments  Future Appointments   Date Time Provider Department Center   6/22/2023 10:00 AM DARYA LEMUSRT [facility]   6/22/2023 10:15 AM Giacomo Salazar MD [facility]     Additional Instructions for the Follow-ups that You Need to Schedule     Ambulatory Referral to Home Health (Hospital)   As directed      Face to Face Visit Date: 2/12/2023    Follow-up provider for Plan of Care?: I treated the patient in an acute care facility and will not continue treatment after discharge.    Follow-up provider: GIACOMO SALAZAR [[id number]]    Reason/Clinical Findings: Leg weakness, dementia, difficulty performing ADL's    Describe mobility limitations that make leaving home difficult: Leg weakness, high risk of falls    Nursing/Therapeutic Services Requested: Physical Therapy     PT orders: Home safety assessment Strengthening    Frequency: 1 Week 1               Test Results Pending at Discharge     Dr. Salazar's office will contact you for hospital follow-up.      Maureen Shore, APRN  02/12/23  11:56 EST

## 2023-02-12 NOTE — THERAPY DISCHARGE NOTE
Acute Care - Occupational Therapy Discharge  [facility]    Patient Name: Raina Forrest  : 1945    MRN: [id number]                              Today's Date: 2023       Admit Date: 2023    Visit Dx:     ICD-10-CM ICD-9-CM   1. Decreased mobility and endurance  Z74.09 780.99   2. Ischemic cardiomyopathy  I25.5 414.8   3. Cardiomyopathy, unspecified type (HCC)  I42.9 425.4   4. Primary osteoarthritis of left knee  M17.12 715.16     Patient Active Problem List   Diagnosis   • Chronic atrial fibrillation (HCC)   • Benign essential HTN   • Bradycardia   • Chest pain   • Can't get food down   • Accumulation of fluid in tissues   • Esophageal lump   • Adynamia   • Itch of skin   • Anorexia   • Chronic nausea   • Gastroesophageal reflux disease   • Breath shortness   • Emesis   • Decreased body weight   • Anxiety   • Narrowing of intervertebral disc space   • Diverticulosis of intestine   • Primary hypertension   • Migraine   • Osteoporosis   • Palpitations   • Pituitary adenoma (HCC)   • Sick sinus syndrome (HCC)   • Diarrhea   • Cardiomyopathy (HCC)   • Chronic anticoagulation   • Atrial fibrillation (HCC)   • On amiodarone therapy   • Pacemaker   • Infected pacemaker (HCC)   • Wound infection   • Primary osteoarthritis of left knee   • DJD (degenerative joint disease)   • Ischemic cardiomyopathy     Past Medical History:   Diagnosis Date   • Atrial fibrillation (HCC)    • Fung's esophagus    • Benign essential hypertension    • Blood clot in vein    • Cardiomyopathy (HCC)    • Chronic gastritis    • Congestive heart failure (HCC)    • Degenerative disc disease    • Dementia (HCC)     Pt  states she has slight dementia   • Depression with anxiety    • Disease of thyroid gland    • Diverticulitis of colon    • Dysphagia    • GERD (gastroesophageal reflux disease)    • History of chest pain    • History of migraine    • History of migraine headaches    • Left knee pain    • Osteoporosis     • Palpitations    • Thyroid disorder    • Thyroid nodule    • Ulcerative colitis (HCC)      Past Surgical History:   Procedure Laterality Date   • APPENDECTOMY     • BLADDER SURGERY     • CARDIAC CATHETERIZATION N/A 11/21/2016    Procedure: Left Heart Cath;  Surgeon: Robinson Salazar MD;  Location:  [facility];  Service:    • CARDIAC ELECTROPHYSIOLOGY PROCEDURE N/A 2/7/2017    Procedure: Pacemaker DC new   [employer];  Surgeon: Robinson Salazar MD;  Location:  [facility];  Service:    • CARDIOVERSION  01/18/2017   • CHOLECYSTECTOMY     • COLONOSCOPY     • COLONOSCOPY N/A 7/29/2016    normal   • ENDOSCOPY  05/06/2015    submucosal nodule in esophagus, erythematous mucosa in antrum,moderate acute gastritis, no h-pylori   • ENDOSCOPY N/A 9/2/2016    Erythematous mucosa in the antrum   • EYE SURGERY Left     cataract   • HYSTERECTOMY     • KNEE MENISCAL REPAIR Left    • THYROID SURGERY Left    • TONSILLECTOMY     • TOTAL KNEE ARTHROPLASTY Left 6/16/2020    Procedure: LEFT TOTAL KNEE ARTHOPLASTY+;  Surgeon: Jamal Castañeda MD;  Location: [facility] MAIN OR;  Service: Orthopedics;  Laterality: Left;      General Information     Row Name 02/12/23 1201          OT Time and Intention    Document Type discharge evaluation/summary  -     Mode of Treatment individual therapy;occupational therapy  -     Row Name 02/12/23 1201          General Information    Patient Profile Reviewed yes  -     Prior Level of Function independent:;all household mobility;gait;transfer;ADL's;bed mobility  per pt report  -     Existing Precautions/Restrictions fall  -     Barriers to Rehab none identified  -     Row Name 02/12/23 1201          Living Environment    People in Home spouse  -     Row Name 02/12/23 1201          Cognition    Orientation Status (Cognition) oriented to;person;place;situation  -           User Key  (r) = Recorded By, (t) = Taken By, (c) = Cosigned By    Initials  Name Provider Type     Naa Salter, OTR Occupational Therapist               Mobility/ADL's     Row Name 02/12/23 1202          Bed Mobility    Bed Mobility bed mobility (all) activities  -     All Activities, [place] (Bed Mobility) modified [facility]     Assistive Device (Bed Mobility) head of bed elevated  -     Row Name 02/12/23 1202          Transfers    Transfers sit-stand transfer;stand-sit transfer;toilet transfer  -     Row Name 02/12/23 1202          Sit-Stand Transfer    Sit-Stand [place] (Transfers) standby assist;modified independence  -     Row Name 02/12/23 1202          Stand-Sit Transfer    Stand-Sit [place] (Transfers) standby assist;modified [facility] Name 02/12/23 1202          Toilet Transfer    Type (Toilet Transfer) stand-sit;sit-stand;stand pivot/stand step  -     [place] Level (Toilet Transfer) modified independence;standby assist  -     Row Name 02/12/23 1202          Functional Mobility    Functional Mobility- Ind. Level conditional independence;supervision required  -     Functional Mobility- Comment in room, to BR, to bed.  -     Row Name 02/12/23 1202          Activities of Daily Living    BADL Assessment/Intervention grooming;toileting;lower body dressing  -     Row Name 02/12/23 1202          Grooming Assessment/Training    [place] Level (Grooming) grooming skills;wash face, hands;standby assist;set up;modified [facility]     Position (Grooming) sink side  -     Row Name 02/12/23 1202          Toileting Assessment/Training    [place] Level (Toileting) toileting skills;adjust/manage clothing;perform perineal hygiene;modified [facility]     Position (Toileting) supported sitting;supported standing  -     Row Name 02/12/23 1202          Lower Body Dressing Assessment/Training    [place] Level (Lower Body Dressing) lower body dressing skills;don;doff;shoes/slippers;modified  independence  -KP     Position (Lower Body Dressing) edge of bed sitting  -KP           User Key  (r) = Recorded By, (t) = Taken By, (c) = Cosigned By    Initials Name Provider Type    Naa White OTR Occupational Therapist               Obj/Interventions     Row Name 02/12/23 1204          Sensory Assessment (Somatosensory)    Sensory Assessment (Somatosensory) UE sensation intact  -KP     Row Name 02/12/23 1204          Vision Assessment/Intervention    Visual Impairment/Limitations WFL  -KP     Row Name 02/12/23 1204          Range of Motion Comprehensive    General Range of Motion bilateral upper extremity ROM WNL  -KP     Row Name 02/12/23 1204          Strength Comprehensive (MMT)    General Manual Muscle Testing (MMT) Assessment upper extremity strength deficits identified  -KP     Comment, General Manual Muscle Testing (MMT) Assessment B UE 4/5  -KP     Row Name 02/12/23 1204          Motor Skills    Motor Skills coordination;functional endurance  -     Coordination WFL  -     Functional Endurance good  -KP     Row Name 02/12/23 1204          Balance    Balance Assessment sitting static balance;standing dynamic balance  -KP     Static Sitting Balance modified independence  -KP     Position, Sitting Balance sitting edge of bed  -KP     Dynamic Standing Balance modified independence  -KP     Position/Device Used, Standing Balance unsupported  -KP     Balance Interventions sitting;standing;sit to stand;supported;static;dynamic  -KP           User Key  (r) = Recorded By, (t) = Taken By, (c) = Cosigned By    Initials Name Provider Type    Naa White OTR Occupational Therapist               Goals/Plan     Row Name 02/12/23 1213          Problem Specific Goal 1 (OT)    Problem Specific Goal 1 (OT) pt ed on safety w ADL and tsf. pt demo tsf and ADLs mod I /SBA  -KP     Time Frame (Problem Specific Goal 1, OT) short term goal (STG);1 day  -KP     Progress/Outcome (Problem Specific  Goal 1, OT) goal met  -KP           User Key  (r) = Recorded By, (t) = Taken By, (c) = Cosigned By    Initials Name Provider Type    Naa White, MARGIE Occupational Therapist               Clinical Impression     Row Name 02/12/23 1211          Pain Assessment    Pretreatment Pain Rating 0/10 - no pain  -KP     Posttreatment Pain Rating 0/10 - no pain  -KP     Row Name 02/12/23 1211          Plan of Care Review    Plan of Care Reviewed With patient;spouse  -KP     Progress improving  -KP     Outcome Evaluation pt evaluated for OT. pt admitted from home where she lives w her spouse. pt asmitted w cardiomyopathy. pt this date was mod I w bed mobility, sit to stand and tsf to the toilet, as well as grooming and LBD slippers. pt has decent strength in UE and plans to return home w spouse. rec dc home w family and supervision as needed. pt w h/o dementia. noted dc orders in chart post OT eval.  -KP     Row Name 02/12/23 1211          Therapy Assessment/Plan (OT)    Therapy Frequency (OT) evaluation only  -KP     Row Name 02/12/23 1211          Therapy Plan Review/Discharge Plan (OT)    Anticipated Discharge Disposition (OT) home with assist  -KP     Row Name 02/12/23 1211          Positioning and Restraints    Pre-Treatment Position in bed  -KP     Post Treatment Position bed  -KP     In Bed fowlers;call light within reach;encouraged to call for assist;exit alarm on;with family/caregiver  -KP           User Key  (r) = Recorded By, (t) = Taken By, (c) = Cosigned By    Initials Name Provider Type    Naa White, MALISSAR Occupational Therapist               Outcome Measures     Row Name 02/12/23 1213          How much help from another is currently needed...    Putting on and taking off regular lower body clothing? 4  -KP     Bathing (including washing, rinsing, and drying) 4  -KP     Toileting (which includes using toilet bed pan or urinal) 4  -KP     Putting on and taking off regular upper body  clothing 4  -KP     Taking care of personal grooming (such as brushing teeth) 4  -KP     Eating meals 4  -KP     AM-PAC 6 Clicks Score (OT) 24  -     Row Name 02/12/23 0832          How much help from another person do you currently need...    Turning from your back to your side while in flat bed without using bedrails? 4  -TD     Moving from lying on back to sitting on the side of a flat bed without bedrails? 4  -TD     Moving to and from a bed to a chair (including a wheelchair)? 4  -TD     Standing up from a chair using your arms (e.g., wheelchair, bedside chair)? 4  -TD     Climbing 3-5 steps with a railing? 3  -TD     To walk in hospital room? 4  -TD     AM-PAC 6 Clicks Score (PT) 23  -TD     Highest level of mobility 7 --> Walked 25 feet or more  -TD     Row Name 02/12/23 1213          Functional Assessment    Outcome Measure Options AM-PAC 6 Clicks Daily Activity (OT)  -           User Key  (r) = Recorded By, (t) = Taken By, (c) = Cosigned By    Initials Name Provider Type     Naa Salter OTR Occupational Therapist    Leonarda Garcia, RN Registered Nurse              Occupational Therapy Education     Title: PT OT SLP Therapies (Resolved)     Topic: Occupational Therapy (Resolved)     Point: ADL training (Resolved)     Description:   Instruct learner(s) on proper safety adaptation and remediation techniques during self care or transfers.   Instruct in proper use of assistive devices.              Learning Progress Summary           Patient Acceptance, E,TB,D, VU,DU by  at 2/12/2023 1215    Comment: ed pt and spouse on role of OT. benefit of therapy POC w. OT. ed on safety w ADL and tsf. pt demo w SBA/mod I   Family Acceptance, E,TB,D, VU,DU by  at 2/12/2023 1215    Comment: ed pt and spouse on role of OT. benefit of therapy POC w. OT. ed on safety w ADL and tsf. pt demo w SBA/mod I                   Point: Precautions (Resolved)     Description:   Instruct learner(s) on prescribed  precautions during self-care and functional transfers.              Learning Progress Summary           Patient Acceptance, E,TB,D, VU,DU by  at 2/12/2023 1215    Comment: ed pt and spouse on role of OT. benefit of therapy POC w. OT. ed on safety w ADL and tsf. pt demo w SBA/mod I   Family Acceptance, E,TB,D, VU,DU by  at 2/12/2023 1215    Comment: ed pt and spouse on role of OT. benefit of therapy POC w. OT. ed on safety w ADL and tsf. pt demo w SBA/mod I                   Point: Body mechanics (Resolved)     Description:   Instruct learner(s) on proper positioning and spine alignment during self-care, functional mobility activities and/or exercises.              Learning Progress Summary           Patient Acceptance, E,TB,D, VU,DU by  at 2/12/2023 1215    Comment: ed pt and spouse on role of OT. benefit of therapy POC w. OT. ed on safety w ADL and tsf. pt demo w SBA/mod I   Family Acceptance, E,TB,D, VU,DU by  at 2/12/2023 1215    Comment: ed pt and spouse on role of OT. benefit of therapy POC w. OT. ed on safety w ADL and tsf. pt demo w SBA/mod I                               User Key     Initials Effective Dates Name Provider Type Discipline     06/16/21 -  Naa Salter, OTR Occupational Therapist OT              OT Recommendation and Plan  Therapy Frequency (OT): evaluation only  Plan of Care Review  Plan of Care Reviewed With: patient, spouse  Progress: improving  Outcome Evaluation: pt evaluated for OT. pt admitted from home where she lives w her spouse. pt asmitted w cardiomyopathy. pt this date was mod I w bed mobility, sit to stand and tsf to the toilet, as well as grooming and LBD slippers. pt has decent strength in UE and plans to return home w spouse. rec dc home w family and supervision as needed. pt w h/o dementia. noted dc orders in chart post OT eval.  Plan of Care Reviewed With: patient, spouse  Outcome Evaluation: pt evaluated for OT. pt admitted from home where she lives w her  spouse. pt asmitted w cardiomyopathy. pt this date was mod I w bed mobility, sit to stand and tsf to the toilet, as well as grooming and LBD slippers. pt has decent strength in UE and plans to return home w spouse. rec dc home w family and supervision as needed. pt w h/o dementia. noted dc orders in chart post OT eval.     Time Calculation:    Time Calculation- OT     Row Name 02/12/23 1216             Time Calculation- OT    OT Start Time 1050  -KP      OT Stop Time 1101  -KP      OT Time Calculation (min) 11 min  -KP      OT Received On 02/12/23  -         Untimed Charges    OT Eval/Re-eval Minutes 11  -KP         Total Minutes    Untimed Charges Total Minutes 11  -KP       Total Minutes 11  -KP            User Key  (r) = Recorded By, (t) = Taken By, (c) = Cosigned By    Initials Name Provider Type    Naa White OTR Occupational Therapist              Therapy Charges for Today     Code Description Service Date Service Provider Modifiers Qty    [id number]  OT EVAL LOW COMPLEXITY 2 2/12/2023 Naa Salter OTR GO 1             OT Discharge Summary  Anticipated Discharge Disposition (OT): home with assist  Reason for Discharge: Discharge from facility, All goals achieved  Discharge Destination: Home with assist    MARGIE Fitzgerald  2/12/2023

## 2023-02-12 NOTE — PLAN OF CARE
Goal Outcome Evaluation:  Plan of Care Reviewed With: patient, spouse        Progress: improving  Outcome Evaluation: pt evaluated for OT. pt admitted from home where she lives w her spouse. pt asmitted w cardiomyopathy. pt this date was mod I w bed mobility, sit to stand and tsf to the toilet, as well as grooming and LBD slippers. pt has decent strength in UE and plans to return home w spouse. rec dc home w family and supervision as needed. pt w h/o dementia. noted dc orders in chart post OT eval.

## 2023-02-12 NOTE — PROGRESS NOTES
Patient is discharging today . Spoke to son Maik , who manages care . Please call him for visit scheduling first - [phone]( he has a flight tomorrow , Monday) and if not reachable , please call spouse secondary wptzmk-[phone]. Address on face sheet is correct. Has no current home health . Orders in Epic , will need SN,PT and MSW. Thank you !

## 2023-02-12 NOTE — PLAN OF CARE
Goal Outcome Evaluation:  Plan of Care Reviewed With: patient, spouse    Pt is A&Ox4, confused at times and on RA. All VSS this night. Afib on the monitor.No Cp or any other issues this night.    Daily Care Plan Summary: Heart Failure    Diuretic in use (IV or PO):   Will continue to PO Torsemide, home meds        Daily weight (up or down):    Up by 1 lb      Output > Intake (yes/no): Output greater than intake      O2 Requirements (current, any change?): RA      Symptoms noted with Activity (Respiratory Tolerance, functional state):    No SOA noted with exertion    Anticipated Discharge Plans:   Home with family

## 2023-02-12 NOTE — PROGRESS NOTES
"Daily progress note    Referring physician  Dr. ALVARADO    Chief complaint  Doing well with no new complaints and wants to go home.  Patient denies any chest pain shortness of breath palpitation.  Patient family at bedside.    History of present illness  77-year-old white female with history of severe dementia who also have atrial fibrillation congestive heart failure hypothyroidism depression and gastroesophageal reflux disease directly admitted to cardiology service with bilateral lower extremity swelling and shortness of breath with exertion.  Patient has no fever chills cough chest pain palpitation abdominal pain nausea vomiting diarrhea.  Patient is poor historian and most of the history obtained from the  and the son.  I am asked to follow the patient for medical problem.    REVIEW OF SYSTEMS  Unremarkable      PHYSICAL EXAM  Blood pressure 107/62, pulse 73, temperature 97.5 °F (36.4 °C), temperature source Oral, resp. rate 16, height 177.8 cm (70\"), weight 63.5 kg (140 lb 1.6 oz), SpO2 98 %.    Constitutional: Awake and alert and in no distress.   HEENT: Unremarkable  Neck: Normal range of motion.   Cardiovascular: Normal rate, regular rhythm and normal heart sounds.   Pulmonary/Chest: Effort normal and breath sounds normal. She has no wheezes.   Abdominal: Soft. Bowel sounds are normal. There is no tenderness.   Musculoskeletal: Normal range of motion. She exhibits no edema.   Neurological: She is alert.   Skin: Skin is warm and dry. No rash noted.   Psychiatric: Mood, memory, affect and judgment normal.     LAB RESULTS  Lab Results (last 24 hours)     Procedure Component Value Units Date/Time    Basic Metabolic Panel [[id number]]  (Normal) Collected: 02/12/23 0350    Specimen: Blood Updated: 02/12/23 0538     Glucose 83 mg/dL      BUN 11 mg/dL      Creatinine 0.77 mg/dL      Sodium 138 mmol/L      Potassium 4.6 mmol/L      Chloride 105 mmol/L      CO2 26.6 mmol/L      Calcium 9.2 mg/dL      BUN/Creatinine " Ratio 14.3     Anion Gap 6.4 mmol/L      eGFR 78.6 mL/min/1.73     Narrative:      GFR Normal >60  Chronic Kidney Disease <60  Kidney Failure <15    The GFR formula is only valid for adults with stable renal function between ages 18 and 70.    CBC & Differential [[id number]]  (Abnormal) Collected: 02/12/23 0350    Specimen: Blood Updated: 02/12/23 0516    Narrative:      The following orders were created for panel order CBC & Differential.  Procedure                               Abnormality         Status                     ---------                               -----------         ------                     CBC Auto Differential[[id number]]        Abnormal            Final result                 Please view results for these tests on the individual orders.    CBC Auto Differential [[id number]]  (Abnormal) Collected: 02/12/23 0350    Specimen: Blood Updated: 02/12/23 0516     WBC 8.78 10*3/mm3      RBC 3.82 10*6/mm3      Hemoglobin 11.4 g/dL      Hematocrit 34.4 %      MCV 90.1 fL      MCH 29.8 pg      MCHC 33.1 g/dL      RDW 14.1 %      RDW-SD 45.5 fl      MPV 11.1 fL      Platelets 203 10*3/mm3      Neutrophil % 66.5 %      Lymphocyte % 18.1 %      Monocyte % 10.3 %      Eosinophil % 2.8 %      Basophil % 1.5 %      Immature Grans % 0.8 %      Neutrophils, Absolute 5.84 10*3/mm3      Lymphocytes, Absolute 1.59 10*3/mm3      Monocytes, Absolute 0.90 10*3/mm3      Eosinophils, Absolute 0.25 10*3/mm3      Basophils, Absolute 0.13 10*3/mm3      Immature Grans, Absolute 0.07 10*3/mm3      nRBC 0.0 /100 WBC     High Sensitivity Troponin T 2Hr [[id number]]  (Abnormal) Collected: 02/11/23 1416    Specimen: Blood Updated: 02/11/23 1445     HS Troponin T 39 ng/L      Troponin T Delta -2 ng/L     Narrative:      High Sensitive Troponin T Reference Range:  <10.0 ng/L- Negative Female for AMI  <15.0 ng/L- Negative Male for AMI  >=10 - Abnormal Female indicating possible myocardial injury.  >=15 - Abnormal Male indicating  possible myocardial injury.   Clinicians would have to utilize clinical acumen, EKG, Troponin, and serial changes to determine if it is an Acute Myocardial Infarction or myocardial injury due to an underlying chronic condition.             Imaging Results (Last 24 Hours)     ** No results found for the last 24 hours. **        ECG 12 Lead       Component  Ref Range & Units 15:40   QT Interval  ms 388 P    Resulting Agency  ECG             HEART RATE= 89  bpm  RR Interval= 674  ms  UT Interval=   ms  P Horizontal Axis=   deg  P Front Axis=   deg  QRSD Interval= 79  ms  QT Interval= 388  ms  QRS Axis= 23  deg  T Wave Axis= 21  deg  - ABNORMAL ECG -  Afib/flut and V-paced complexes  No further rhythm analysis attempted due to paced rhythm  Low voltage, extremity and precordial leads  Probable anteroseptal infarct, old               Current Facility-Administered Medications:   •  albuterol (PROVENTIL) nebulizer solution 0.083% 2.5 mg/3mL, 2.5 mg, Nebulization, Q6H PRN, Leodan Pagan MD  •  aluminum-magnesium hydroxide-simethicone (MAALOX MAX) 400-400-40 MG/5ML suspension 15 mL, 15 mL, Oral, Q6H PRN, Beatrice Drew APRN, 15 mL at 02/11/23 1240  •  bisoprolol (ZEBeta) tablet 10 mg, 10 mg, Oral, Q24H, Georgiana Zacarias APRN, 10 mg at 02/12/23 0832  •  docusate sodium (COLACE) capsule 200 mg, 200 mg, Oral, Daily PRN, Robinson Salazar MD  •  donepezil (ARICEPT) tablet 10 mg, 10 mg, Oral, Daily, Robinson Salazar MD, 10 mg at 02/12/23 0832  •  Enoxaparin Sodium (LOVENOX) syringe 60 mg, 1 mg/kg, Subcutaneous, BID, Georgiana Zacarias APRN, 60 mg at 02/12/23 0635  •  levothyroxine (SYNTHROID, LEVOTHROID) tablet 25 mcg, 25 mcg, Oral, Q AM, Robinson Salazar MD, 25 mcg at 02/12/23 0635  •  ondansetron (ZOFRAN) injection 4 mg, 4 mg, Intravenous, Q6H PRN, Leodan Pagan MD, 4 mg at 02/11/23 0948  •  pantoprazole (PROTONIX) EC tablet 40 mg, 40 mg, Oral, Q AM, Robinson Salazar MD, 40 mg at 02/12/23 0635  •   potassium chloride (K-DUR,KLOR-CON) ER tablet 40 mEq, 40 mEq, Oral, PRN, Coreen Lindsay, APRN, 40 mEq at 02/11/23 1744  •  potassium chloride (KLOR-CON) packet 40 mEq, 40 mEq, Oral, PRN, Coreen Lindsay, APRN  •  sertraline (ZOLOFT) tablet 150 mg, 150 mg, Oral, Marie SUMMERS Shanker, MD, 150 mg at 02/12/23 0832  •  sodium chloride 0.9 % flush 10 mL, 10 mL, Intravenous, PRN, Robinson Salazar MD  •  sodium chloride 0.9 % infusion 40 mL, 40 mL, Intravenous, PRN, Robinson Salazar MD  •  spironolactone (ALDACTONE) tablet 25 mg, 25 mg, Oral, Daily, Georgiana Zacarias APRN, 25 mg at 02/12/23 0832  •  torsemide (DEMADEX) tablet 20 mg, 20 mg, Oral, Daily, Price Cosme MD, 20 mg at 02/12/23 0832    ASSESSMENT  Acute on chronic diastolic CHF  Chronic atrial fibrillation  Hypertension  Hypothyroidism  Depression  Gastroesophageal reflux disease    PLAN  CPM  Diuresis  Anticoagulation  Adjust home medications  Stress ulcer DVT prophylaxis  Supportive care  Discussed with family and nursing staff  Okay to discharge    IVANNA AMADOR MD    Copied text in this note has been reviewed and is accurate as of 02/12/23

## 2023-02-13 NOTE — CASE MANAGEMENT/SOCIAL WORK
Case Management Discharge Note      Final Note: Pt discharged home with Woody QUINTANILLA /RN CM.         Selected Continued Care - Discharged on 2/12/2023 Admission date: 2/9/2023 - Discharge disposition: Home-Health Care c    Destination    No services have been selected for the patient.              Durable Medical Equipment    No services have been selected for the patient.              Dialysis/Infusion    No services have been selected for the patient.              Home Medical Care     Service Provider Selected Services Address Phone Fax Patient Preferred    [facility] [address] [address] [phone] --          Therapy    No services have been selected for the patient.              Community Resources    No services have been selected for the patient.              Community & DME    No services have been selected for the patient.                       Final Discharge Disposition Code: 06 - home with home health care

## 2023-02-14 ENCOUNTER — HOME CARE VISIT (OUTPATIENT)
Dept: HOME HEALTH SERVICES | Facility: HOME HEALTHCARE | Age: 78
End: 2023-02-14
Payer: MEDICARE

## 2023-02-14 PROCEDURE — G0299 HHS/HOSPICE OF RN EA 15 MIN: HCPCS

## 2023-02-15 ENCOUNTER — HOME CARE VISIT (OUTPATIENT)
Dept: HOME HEALTH SERVICES | Facility: HOME HEALTHCARE | Age: 78
End: 2023-02-15
Payer: MEDICARE

## 2023-02-15 VITALS
TEMPERATURE: 97.9 F | WEIGHT: 136.6 LBS | HEART RATE: 62 BPM | HEIGHT: 66 IN | DIASTOLIC BLOOD PRESSURE: 60 MMHG | OXYGEN SATURATION: 97 % | BODY MASS INDEX: 21.95 KG/M2 | RESPIRATION RATE: 16 BRPM | SYSTOLIC BLOOD PRESSURE: 110 MMHG

## 2023-02-15 PROCEDURE — G0155 HHCP-SVS OF CSW,EA 15 MIN: HCPCS

## 2023-02-15 NOTE — HOME HEALTH
NIKA toledo on this date with patient and spouse Benito and son Maik via phone during visit. Maik currently out of town but assists parents with care and errands as needed. Provided resources regarding RX set up and delivery via [facility]. Provided information on meal delivery via [facility] or other options via online services. Provided list of options for in home care for future needs. Communication with DEACON Saldaña.

## 2023-02-17 ENCOUNTER — HOME CARE VISIT (OUTPATIENT)
Dept: HOME HEALTH SERVICES | Facility: HOME HEALTHCARE | Age: 78
End: 2023-02-17
Payer: MEDICARE

## 2023-02-17 VITALS
WEIGHT: 136.6 LBS | HEART RATE: 64 BPM | TEMPERATURE: 97.3 F | RESPIRATION RATE: 16 BRPM | OXYGEN SATURATION: 98 % | SYSTOLIC BLOOD PRESSURE: 110 MMHG | DIASTOLIC BLOOD PRESSURE: 68 MMHG | BODY MASS INDEX: 22.05 KG/M2

## 2023-02-17 VITALS
SYSTOLIC BLOOD PRESSURE: 101 MMHG | OXYGEN SATURATION: 98 % | DIASTOLIC BLOOD PRESSURE: 56 MMHG | TEMPERATURE: 97.3 F | HEART RATE: 57 BPM

## 2023-02-17 PROCEDURE — G0151 HHCP-SERV OF PT,EA 15 MIN: HCPCS

## 2023-02-17 PROCEDURE — G0299 HHS/HOSPICE OF RN EA 15 MIN: HCPCS

## 2023-02-17 NOTE — HOME HEALTH
REASON FOR REFERRAL: decreased ability to ambulate in and out of the home following hospitalization due to ischemic cardiomyopathy.    MEDICAL HISTORY: Patient reports going to the doctor on the 9th because of shortness of air and was sent on to the hospital. Was diagnosed with ischemic cardiomyopathy and was discharged home on the 12th.    SKILLED PHYSICAL THERAPY IS MEDICALLY NECESSARY FOR: remediation of decreased strength, decreased ambulatory ability, increased risk of falls, decreased transfers    FOCUS OF CARE: therapeutic exercises to improve LE strength, gait training to improve ambulatory ability and endurance, transfer training improve ability to perform transfers, fall risk education to decrease risk of falls

## 2023-02-18 NOTE — HOME HEALTH
PT AMBULATING IN KITCHEN, NOT USING AN ASSISTIVE DEVICE. PT VERY PLEASANT AND TALKATIVE. VSS, PT DENIES PAIN AT THIS TIME. WEIGHT TODAY- 136.6. PT SON ON PHONE DURING VISIT. PT DRINKING SEVERAL SODA'S A DAY AND SN ENCOURAGED PT NOT TO DRINK A LOT OF THEM,  THEY ARE BAD ON KIDNEYS AND TEND TO INCREASE RISK OF UTI'S, TO STICK TO DIET - REG, LOW SALT. SN DISCUSSED S/SX OF CHF WITH PT/CG AND WHAT TO REPORT.               FOCUS OF CARE: ISCHEMIC CARDIOMYOPATHY, HEART FAILURE, TEACH ON DISEASE PROCESS, S/SX TO REPORT, DAILY WTS, DEMENTIA, HOME SAFETY, CG NEEDS AND MEDICATION EDUCATION

## 2023-02-20 ENCOUNTER — HOME CARE VISIT (OUTPATIENT)
Dept: HOME HEALTH SERVICES | Facility: HOME HEALTHCARE | Age: 78
End: 2023-02-20
Payer: MEDICARE

## 2023-02-20 VITALS
SYSTOLIC BLOOD PRESSURE: 106 MMHG | DIASTOLIC BLOOD PRESSURE: 61 MMHG | HEART RATE: 58 BPM | OXYGEN SATURATION: 98 % | TEMPERATURE: 97.3 F

## 2023-02-20 PROCEDURE — G0151 HHCP-SERV OF PT,EA 15 MIN: HCPCS

## 2023-02-20 NOTE — HOME HEALTH
"Subjective: \"I'm doing pretty good.\"    Falls- none    Medication changes- none    Assessment: Patient did well with standing exercises and ambulation in home without an assistive device.      Plan for next visit/Communication  gait training  therapeutic exercises  balance /safety  transfer training"

## 2023-02-21 ENCOUNTER — HOME CARE VISIT (OUTPATIENT)
Dept: HOME HEALTH SERVICES | Facility: HOME HEALTHCARE | Age: 78
End: 2023-02-21
Payer: MEDICARE

## 2023-02-21 VITALS
BODY MASS INDEX: 21.95 KG/M2 | TEMPERATURE: 97.9 F | WEIGHT: 136 LBS | RESPIRATION RATE: 16 BRPM | HEART RATE: 68 BPM | SYSTOLIC BLOOD PRESSURE: 122 MMHG | OXYGEN SATURATION: 96 % | DIASTOLIC BLOOD PRESSURE: 70 MMHG

## 2023-02-21 PROCEDURE — G0299 HHS/HOSPICE OF RN EA 15 MIN: HCPCS

## 2023-02-22 PROCEDURE — G0180 MD CERTIFICATION HHA PATIENT: HCPCS | Performed by: INTERNAL MEDICINE

## 2023-02-22 NOTE — HOME HEALTH
PT AMBULATED INTO KITCHEN FROM BATHROOM, NOT USING AN ASSISTIVE DEVICE, STATED SHE JUST FINISHED TAKING A SHOWER. PT VERY PLEASANT AND TALKATIVE. VSS, PT DENIES PAIN AT THIS TIME. WEIGHT TODAY- 136.LBS  SN DISCUSSED WITH PT TO CONTINUE DAILY WTS AND S/SX OF CHF AND WHAT TO REPORT. PT STATED THAT SPOUSE HAS A DR APPOINTMENT TODAY AND SHE WILL RIDE WITH HIM AND HOW MUCH SHE ENJOYS GETTING OUT AND BEING IN THE SUNSHINE. CG VERY ATTENTIVE TO PT NEEDS. NO NEEDS NOTED AT THIS TIME.               NEXT SN VISIT:SN TO INSTRUCT PT/CG ON  ISCHEMIC CARDIOMYOPATHY, HEART FAILURE, TEACH ON DISEASE PROCESS, S/SX TO REPORT, DAILY WTS, DEMENTIA, HOME SAFETY, CG NEEDS AND MEDICATION EDUCATION

## 2023-02-23 ENCOUNTER — HOME CARE VISIT (OUTPATIENT)
Dept: HOME HEALTH SERVICES | Facility: HOME HEALTHCARE | Age: 78
End: 2023-02-23
Payer: MEDICARE

## 2023-02-23 VITALS
HEART RATE: 65 BPM | DIASTOLIC BLOOD PRESSURE: 59 MMHG | SYSTOLIC BLOOD PRESSURE: 105 MMHG | OXYGEN SATURATION: 98 % | TEMPERATURE: 96.7 F

## 2023-02-23 PROCEDURE — G0151 HHCP-SERV OF PT,EA 15 MIN: HCPCS

## 2023-02-23 NOTE — HOME HEALTH
"Subjective: \"I'm doing well.\"    Falls- none    Medication changes- none    Assessment: Patient is progressing well with standing exercises and ambulation in home on level surfaces and steps.    Plan for next visit/Communication  gait training  therapeutic exercises  balance /safety"

## 2023-02-24 ENCOUNTER — OFFICE VISIT (OUTPATIENT)
Dept: CARDIOLOGY | Facility: CLINIC | Age: 78
End: 2023-02-24
Payer: MEDICARE

## 2023-02-24 VITALS
DIASTOLIC BLOOD PRESSURE: 64 MMHG | BODY MASS INDEX: 22.82 KG/M2 | WEIGHT: 142 LBS | HEART RATE: 67 BPM | HEIGHT: 66 IN | SYSTOLIC BLOOD PRESSURE: 116 MMHG

## 2023-02-24 DIAGNOSIS — I10 PRIMARY HYPERTENSION: ICD-10-CM

## 2023-02-24 DIAGNOSIS — Z95.0 PACEMAKER: ICD-10-CM

## 2023-02-24 DIAGNOSIS — I48.20 CHRONIC ATRIAL FIBRILLATION: Primary | ICD-10-CM

## 2023-02-24 DIAGNOSIS — I50.32 CHRONIC DIASTOLIC CONGESTIVE HEART FAILURE: ICD-10-CM

## 2023-02-24 PROCEDURE — 99214 OFFICE O/P EST MOD 30 MIN: CPT

## 2023-02-24 PROCEDURE — 93000 ELECTROCARDIOGRAM COMPLETE: CPT

## 2023-02-27 ENCOUNTER — HOME CARE VISIT (OUTPATIENT)
Dept: HOME HEALTH SERVICES | Facility: HOME HEALTHCARE | Age: 78
End: 2023-02-27
Payer: MEDICARE

## 2023-02-27 VITALS
SYSTOLIC BLOOD PRESSURE: 111 MMHG | DIASTOLIC BLOOD PRESSURE: 68 MMHG | HEART RATE: 62 BPM | OXYGEN SATURATION: 97 % | TEMPERATURE: 96.6 F

## 2023-02-27 PROCEDURE — G0151 HHCP-SERV OF PT,EA 15 MIN: HCPCS

## 2023-02-27 NOTE — HOME HEALTH
"Subjective: \"I'm doing pretty good.\"    Falls- none    Medication changes- none    Assessment: Patient continues to do well with standing exercises and balance activities. Patient is also progressing well with ambulation in home without an assistive device.    Plan for next visit/Communication  gait training  therapeutic exercises  balance /safety"

## 2023-03-01 ENCOUNTER — HOME CARE VISIT (OUTPATIENT)
Dept: HOME HEALTH SERVICES | Facility: HOME HEALTHCARE | Age: 78
End: 2023-03-01
Payer: MEDICARE

## 2023-03-01 VITALS
HEART RATE: 76 BPM | TEMPERATURE: 98.3 F | SYSTOLIC BLOOD PRESSURE: 108 MMHG | OXYGEN SATURATION: 98 % | WEIGHT: 145 LBS | BODY MASS INDEX: 23.4 KG/M2 | DIASTOLIC BLOOD PRESSURE: 62 MMHG | RESPIRATION RATE: 16 BRPM

## 2023-03-01 PROCEDURE — G0299 HHS/HOSPICE OF RN EA 15 MIN: HCPCS

## 2023-03-01 NOTE — HOME HEALTH
PT SITTING IN RECLINER UPON ARRIVAl. PT VERY PLEASANT AND TALKATIVE. VSS, PT DENIES PAIN AT THIS TIME. WEIGHT TODAY- 145.LBS  SN DISCUSSED WITH PT TO CONTINUE DAILY WTS AND S/SX OF CHF AND WHAT TO REPORT. SN WILL NOTIFY MD OFFICE OF WT GAIN. CG STATED PT APPETITE HAS PICKED UP. PT DOES HAVE SLIGHT EDEMA NOTED TO BILATERAL ANKLES. SN TALKED WITH PT SON AND PT NOW HAS PRE-PACKAGED MEDICATIONS FROM THE PHARMACY. SN LET SON KNOW THAT MED LIST AND CARE PLAN IS IN PT FOLDER. CG VERY ATTENTIVE TO PT NEEDS. NO NEEDS NOTED AT THIS TIME.                              NEXT SN VISIT:SN TO INSTRUCT PT/CG ON  ISCHEMIC CARDIOMYOPATHY, HEART FAILURE, TEACH ON DISEASE PROCESS, S/SX TO REPORT, DAILY WTS, DEMENTIA, HOME SAFETY, CG NEEDS AND MEDICATION EDUCATION                 Homebound Status

## 2023-03-02 ENCOUNTER — HOME CARE VISIT (OUTPATIENT)
Dept: HOME HEALTH SERVICES | Facility: HOME HEALTHCARE | Age: 78
End: 2023-03-02
Payer: MEDICARE

## 2023-03-02 VITALS — DIASTOLIC BLOOD PRESSURE: 71 MMHG | TEMPERATURE: 97.1 F | SYSTOLIC BLOOD PRESSURE: 113 MMHG

## 2023-03-02 PROCEDURE — G0151 HHCP-SERV OF PT,EA 15 MIN: HCPCS

## 2023-03-02 NOTE — HOME HEALTH
"Subjective: \"I'm doing okay.\"    Falls- none    Medication changes- none    Assessment: Patient is progressing well with standing exercises and balance activities. Patient is also doing well ambulating in home without an assistive device.    Plan for next visit/Communication  gait training  therapeutic exercises  balance /safety"

## 2023-03-07 ENCOUNTER — HOME CARE VISIT (OUTPATIENT)
Dept: HOME HEALTH SERVICES | Facility: HOME HEALTHCARE | Age: 78
End: 2023-03-07
Payer: MEDICARE

## 2023-03-07 VITALS
TEMPERATURE: 98.7 F | OXYGEN SATURATION: 100 % | HEART RATE: 72 BPM | RESPIRATION RATE: 16 BRPM | WEIGHT: 146.6 LBS | BODY MASS INDEX: 23.66 KG/M2 | DIASTOLIC BLOOD PRESSURE: 64 MMHG | SYSTOLIC BLOOD PRESSURE: 118 MMHG

## 2023-03-07 VITALS
OXYGEN SATURATION: 97 % | DIASTOLIC BLOOD PRESSURE: 70 MMHG | TEMPERATURE: 97.1 F | HEART RATE: 68 BPM | SYSTOLIC BLOOD PRESSURE: 110 MMHG

## 2023-03-07 PROCEDURE — G0299 HHS/HOSPICE OF RN EA 15 MIN: HCPCS

## 2023-03-07 PROCEDURE — G0151 HHCP-SERV OF PT,EA 15 MIN: HCPCS

## 2023-03-07 RX ORDER — BISOPROLOL FUMARATE 10 MG/1
TABLET, FILM COATED ORAL
Qty: 30 TABLET | Refills: 3 | Status: SHIPPED | OUTPATIENT
Start: 2023-03-07

## 2023-03-07 NOTE — HOME HEALTH
PT AMBULATED TO DOOR UPON ARRIVAL. PT VERY PLEASANT AND TALKATIVE. VSS, PT DENIES PAIN AT THIS TIME. WEIGHT TODAY- 146.6 LBS WT ON 2/21/23  LBS. LUNGS CTA, NO SOB NOTED. SN DISCUSSED WITH PT TO CONTINUE DAILY WTS AND S/SX OF CHF AND WHAT TO REPORT. SN PLACED A CALL TO MD OFFICE AND WAITING ON CALL BACK. PT STATED HER APPETITE HAS PICKED UP. PT DOES HAVE SLIGHT EDEMA NOTED TO BILATERAL ANKLES, 1+ TO BLE'S.  CG VERY ATTENTIVE TO PT NEEDS. NO NEEDS NOTED AT THIS TIME.           NEXT SN VISIT:SN TO INSTRUCT PT/CG ON  ISCHEMIC CARDIOMYOPATHY, HEART FAILURE, TEACH ON DISEASE PROCESS, S/SX TO REPORT, DAILY WTS, DEMENTIA, HOME SAFETY, CG NEEDS AND MEDICATION EDUCATION

## 2023-03-07 NOTE — HOME HEALTH
"Subjective: \"I'm doing pretty good today.\"    Falls- none    Medication changes- none    Assessment: Patient continues to do well with standing exercises and balance activities. Patient is able to increase ambulatory distance in home without an assistive device    Plan for next visit/Communication  gait training  therapeutic exercises  balance /safety"

## 2023-03-08 ENCOUNTER — HOME CARE VISIT (OUTPATIENT)
Dept: HOME HEALTH SERVICES | Facility: HOME HEALTHCARE | Age: 78
End: 2023-03-08
Payer: MEDICARE

## 2023-03-09 ENCOUNTER — TELEPHONE (OUTPATIENT)
Dept: CARDIOLOGY | Facility: CLINIC | Age: 78
End: 2023-03-09
Payer: MEDICARE

## 2023-03-09 ENCOUNTER — HOME CARE VISIT (OUTPATIENT)
Dept: HOME HEALTH SERVICES | Facility: HOME HEALTHCARE | Age: 78
End: 2023-03-09
Payer: MEDICARE

## 2023-03-09 VITALS — SYSTOLIC BLOOD PRESSURE: 117 MMHG | DIASTOLIC BLOOD PRESSURE: 71 MMHG | TEMPERATURE: 96.8 F

## 2023-03-09 PROCEDURE — G0151 HHCP-SERV OF PT,EA 15 MIN: HCPCS

## 2023-03-09 NOTE — TELEPHONE ENCOUNTER
PER DR. PARKER, PT TO TAKE AN EXTRA TORSEMIDE FOR 3 DAYS.  S/W SOPHY, SHE IS AWARE AND VERBALIZED UNDERSTANDING.

## 2023-03-09 NOTE — TELEPHONE ENCOUNTER
----- Message from Safia Frey sent at 3/7/2023  3:25 PM EST -----  Regarding: WEIGHT GAIN BY [facility]  Contact: [phone]  WEIGH GAIN OF   2/21  136LBS  3/7  146 LBS    1+ EDEMA TO ANKLES, NO SOB      SOPHY NURSE [facility]

## 2023-03-09 NOTE — HOME HEALTH
"Subjective: \"I'm doing good.\"    Falls- none    Medication changes- none    Assessment: Patient has progressed well with all goals met.    Communication: Case communication to Dr. Salazar; case communication to Dina Oakes, DEACON clinical manager, Piper Reis RN and Sophie Milan, "

## 2023-03-12 LAB — QT INTERVAL: 401 MS

## 2023-03-15 ENCOUNTER — HOME CARE VISIT (OUTPATIENT)
Dept: HOME HEALTH SERVICES | Facility: HOME HEALTHCARE | Age: 78
End: 2023-03-15
Payer: MEDICARE

## 2023-03-15 VITALS
RESPIRATION RATE: 16 BRPM | OXYGEN SATURATION: 100 % | WEIGHT: 141 LBS | HEART RATE: 60 BPM | SYSTOLIC BLOOD PRESSURE: 124 MMHG | DIASTOLIC BLOOD PRESSURE: 72 MMHG | BODY MASS INDEX: 22.76 KG/M2 | TEMPERATURE: 97.9 F

## 2023-03-15 LAB
QT INTERVAL: 388 MS
QT INTERVAL: 437 MS

## 2023-03-15 PROCEDURE — G0299 HHS/HOSPICE OF RN EA 15 MIN: HCPCS

## 2023-03-15 NOTE — HOME HEALTH
PT AMBULATED TO DOOR UPON ARRIVAL. PT VERY PLEASANT AND TALKATIVE. VSS, PT DENIES PAIN AT THIS TIME. WEIGHT TODAY- 141LBS. LAST WEEK WT WAS UP .6 AND MD OFFICE WAS NOTIFIED. PT TOOK 3 EXTRA DOSES OF TORSEMIDE 20MG DIALY. CG STATED HE HAS BEEN HAVING PT TO WEIGH EVERY MORNING AND WT IS NOW DOWN  LBS. NO EDEMA NOTED IN BLE'S. LUNGS CTA, NO SOB NOTED. SN DISCUSSED WITH PT TO CONTINUE DAILY WTS AND S/SX OF CHF AND WHAT TO REPORT. CG VERY ATTENTIVE TO PT NEEDS. NO NEEDS NOTED AT THIS TIME.  SN TALKED WITH PT SON-NICHOLAS AND HE IS CONCERNED ABOUT PT SWELLING AND WONDERED ABOUT PT TAKING TORSEMIDE 20MG. SN TO CALL MD OFFICE AND TALK WITH NURSE ABOUT PT SITUATION.   SN CALLED MD OFFICE AND LEFT A MESSAGE, THEY ARE AT ANOTHER CLINIC AND WILL RETURN CALL TOMORROW.          NEXT SNV: DISCHARGE

## 2023-03-16 ENCOUNTER — TELEPHONE (OUTPATIENT)
Dept: CARDIOLOGY | Facility: CLINIC | Age: 78
End: 2023-03-16
Payer: MEDICARE

## 2023-03-16 NOTE — TELEPHONE ENCOUNTER
Caller: SOPHY    Relationship: Home Health    Best call back number: [phone]    What is the best time to reach you:     HOME HEALTH NURSE IS WANTING TO DISCUSS PATIENT   - - -

## 2023-03-16 NOTE — TELEPHONE ENCOUNTER
----- Message from Safia Frey sent at 3/15/2023  3:15 PM EDT -----  Regarding: HOME HEALTH  Contact: [phone]  [facility]    WEIGHT - HAS LOST 5 LBS    NURSE IS CALLING ASKING ABOUT TAKING THE EXTRA LASIX SHE WAS TAKING.  IS ASKING IF THIS IS SOMETHING SHE IS SUPPOSE TO START DOING SINCE SHE IS DOWN 5 LBS FROM LAST WEEK WEIGHT GAIN

## 2023-03-16 NOTE — TELEPHONE ENCOUNTER
S/W SOPHY. PT IS TO CONTINUE NORMAL DOSE AND WEIGH DAILY. SHOULD SHE HAVE A SIGNIFICANT WEIGHT GAIN THEN THEY ARE TO CALL THE OFFICE FOR DIRECTIONS.

## 2023-03-21 ENCOUNTER — HOME CARE VISIT (OUTPATIENT)
Dept: HOME HEALTH SERVICES | Facility: HOME HEALTHCARE | Age: 78
End: 2023-03-21
Payer: MEDICARE

## 2023-03-21 VITALS
RESPIRATION RATE: 16 BRPM | DIASTOLIC BLOOD PRESSURE: 72 MMHG | HEART RATE: 64 BPM | WEIGHT: 141.6 LBS | TEMPERATURE: 97.3 F | OXYGEN SATURATION: 98 % | BODY MASS INDEX: 22.85 KG/M2 | SYSTOLIC BLOOD PRESSURE: 110 MMHG

## 2023-03-21 PROCEDURE — G0299 HHS/HOSPICE OF RN EA 15 MIN: HCPCS

## 2023-03-21 NOTE — HOME HEALTH
HH VISIT TODAY FOR [facility]ASIS DISCHARGE. PT AMBULATED TO THE LIVING ROOM FROM THE BATHROOM IN [employer]. PT STATED SPOUSE HAD A TEST AT HOSPITAL THIS MORNING AND THEN THEY WENT OUT TO EAT WITH SON. PT DENIES PAIN, VSS. SN INSTRUCTED PT TO CONTINUING TAKING ALL MEDICATIONS AS ORDERED. CG SHOWED NURSE THE PRE-PACKAGED MEDICATIONS THEY NOW GET FOR PT FROM THE PHARMACY. CG STATED IT IS MUCH EASIER TO GIVE PT HER MEDICATIONS. PT TO FOLLOW UP WITH PCP FOR ALL MEDICAL ISSUES AND CONCERNS. NO EDEMA NOTED, PT WT .6 THIS MORNING. CG CONTINUES KEEPING A RECORD OF PT DIALY WTS.

## 2023-05-30 RX ORDER — BISOPROLOL FUMARATE 10 MG/1
TABLET, FILM COATED ORAL
Qty: 30 TABLET | Refills: 3 | Status: SHIPPED | OUTPATIENT
Start: 2023-05-30

## 2023-06-02 ENCOUNTER — TELEPHONE (OUTPATIENT)
Dept: CARDIOLOGY | Facility: CLINIC | Age: 78
End: 2023-06-02

## 2023-06-02 NOTE — TELEPHONE ENCOUNTER
Caller: Maik Forrest    Relationship: Emergency Contact    Best call back number: [phone]    What is the best time to reach you: ANYTIME    Who are you requesting to speak with (clinical staff, provider,  specific staff member): CLINICAL    What was the call regarding: PT'S SON IS CALLING TO LET DR. PARKER KNOW ABOUT THE LEAKY VALVE THAT WAS FOUND IN A MARTIN COULD BE THE CAUSE OF HER FEET AND ANKLES SWELLING FOR THE LAST WEEK. SON WANTED TO SEE IF PT'S FLUID MEDICATIONS (TORSEMIDE AND ALDACTONE) CAN BE CHANGED BUT WOULD LIKE FOR PT TO BE SEEN BEFORE APPT ON 6.22.23.     Is it okay if the provider responds through MyChart: PT'S SON WOULD LIKE A CALL BACK

## 2023-06-08 ENCOUNTER — OFFICE VISIT (OUTPATIENT)
Dept: CARDIOLOGY | Facility: CLINIC | Age: 78
End: 2023-06-08
Payer: MEDICARE

## 2023-06-08 VITALS
WEIGHT: 150.4 LBS | SYSTOLIC BLOOD PRESSURE: 121 MMHG | DIASTOLIC BLOOD PRESSURE: 81 MMHG | HEART RATE: 78 BPM | BODY MASS INDEX: 24.17 KG/M2 | HEIGHT: 66 IN

## 2023-06-08 DIAGNOSIS — I10 BENIGN ESSENTIAL HTN: ICD-10-CM

## 2023-06-08 DIAGNOSIS — I48.20 CHRONIC ATRIAL FIBRILLATION: ICD-10-CM

## 2023-06-08 DIAGNOSIS — Z79.01 ANTICOAGULATED: ICD-10-CM

## 2023-06-08 DIAGNOSIS — Z95.0 PACEMAKER: ICD-10-CM

## 2023-06-08 DIAGNOSIS — M79.89 LEG SWELLING: ICD-10-CM

## 2023-06-08 DIAGNOSIS — Z95.818 PRESENCE OF WATCHMAN LEFT ATRIAL APPENDAGE CLOSURE DEVICE: Primary | ICD-10-CM

## 2023-06-08 RX ORDER — SPIRONOLACTONE 50 MG/1
25 TABLET, FILM COATED ORAL DAILY
Qty: 90 TABLET | Refills: 3 | Status: SHIPPED | OUTPATIENT
Start: 2023-06-08 | End: 2023-06-08 | Stop reason: SDUPTHER

## 2023-06-08 RX ORDER — SPIRONOLACTONE 50 MG/1
50 TABLET, FILM COATED ORAL DAILY
Qty: 90 TABLET | Refills: 3 | Status: SHIPPED | OUTPATIENT
Start: 2023-06-08

## 2023-06-08 NOTE — PROGRESS NOTES
SON WANTING TO DISCUSS MARTIN MED ADJUSTMENT RESULT  [facility]      Subjective:        Raina Forrest is a 77 y.o. female who here for follow up    CC  S/p Watchman  HPI  77-year-old female with a chronic atrial fibrillation hypertension pacemaker here for the follow-up following Watchman procedure denies any chest pains or tightness in the chest     Problems Addressed this Visit          Cardiac and Vasculature    Chronic atrial fibrillation    Relevant Orders    Adult Transthoracic Echo Complete W/ Cont if Necessary Per Protocol    Benign essential HTN    Relevant Medications    spironolactone (ALDACTONE) 50 MG tablet    Other Relevant Orders    Adult Transthoracic Echo Complete W/ Cont if Necessary Per Protocol    Pacemaker     Other Visit Diagnoses       Presence of Watchman left atrial appendage closure device    -  Primary    Relevant Orders    Adult Transthoracic Echo Complete W/ Cont if Necessary Per Protocol    Leg swelling        Relevant Orders    Adult Transthoracic Echo Complete W/ Cont if Necessary Per Protocol    Anticoagulated              Diagnoses         Codes Comments    Presence of Watchman left atrial appendage closure device    -  Primary ICD-10-CM: Z95.818  ICD-9-CM: V45.09     Leg swelling     ICD-10-CM: M79.89  ICD-9-CM: 729.81     Benign essential HTN     ICD-10-CM: I10  ICD-9-CM: 401.1     Chronic atrial fibrillation     ICD-10-CM: I48.20  ICD-9-CM: 427.31     Pacemaker     ICD-10-CM: Z95.0  ICD-9-CM: V45.01     Anticoagulated     ICD-10-CM: Z79.01  ICD-9-CM: V58.61           .    The following portions of the patient's history were reviewed and updated as appropriate: allergies, current medications, past family history, past medical history, past social history, past surgical history and problem list.    Past Medical History:   Diagnosis Date    Atrial fibrillation     Fung's esophagus     Benign essential hypertension     Blood clot in vein     Cardiomyopathy     Chronic gastritis   "   Congestive heart failure     Degenerative disc disease     Dementia     Pt  states she has slight dementia    Depression with anxiety     Disease of thyroid gland     Diverticulitis of colon     Dysphagia     GERD (gastroesophageal reflux disease)     History of chest pain     History of migraine     History of migraine headaches     Left knee pain     Osteoporosis     Palpitations     Thyroid disorder     Thyroid nodule     Ulcerative colitis      reports that she has never smoked. She has never used smokeless tobacco. She reports that she does not drink alcohol and does not use drugs.   Family History   Problem Relation Age of Onset    Lung cancer Other     Ulcerative colitis Sister     Heart failure Mother     Heart disease Mother     Heart failure Father     Heart disease Father     Malig Hyperthermia Neg Hx        Review of Systems  Constitutional: No wt loss, fever, fatigue  Gastrointestinal: No nausea, abdominal pain  Behavioral/Psych: No insomnia or anxiety   Cardiovascular no chest pain  Objective:       Physical Exam  /81 (BP Location: Left arm, Patient Position: Sitting, Cuff Size: Adult)   Pulse 78   Ht 167.6 cm (66\")   Wt 68.2 kg (150 lb 6.4 oz)   BMI 24.28 kg/m²   General appearance: No acute changes   Neck: Trachea midline; NECK, supple, no thyromegaly or lymphadenopathy   Lungs: Normal size and shape, normal breath sounds, equal distribution of air, no rales and rhonchi   CV: S1-S2 regular, no murmurs, no rub, no gallop   Abdomen: Soft, nontender; no masses , no abnormal abdominal sounds   Extremities: No deformity , normal color , no peripheral edema   Skin: Normal temperature, turgor and texture; no rash, ulcers          Procedures      Echocardiogram:        Current Outpatient Medications:     aspirin 81 MG chewable tablet, Chew 1 tablet Daily. Indications: Cancer of the Colon, Disease involving Lipid Deposits in the Arteries, Kawasaki Syndrome, Disp: , Rfl:     bisoprolol " (ZEBeta) 10 MG tablet, TAKE ONE TABLET BY MOUTH DAILY, Disp: 30 tablet, Rfl: 3    docusate sodium (COLACE) 250 MG capsule, Take 1 capsule by mouth 2 (Two) Times a Day As Needed for Constipation., Disp: 30 capsule, Rfl: 1    donepezil (ARICEPT) 10 MG tablet, Take 1 tablet by mouth Daily. Indications: Alzheimer's Disease, Disp: , Rfl:     levothyroxine (SYNTHROID, LEVOTHROID) 25 MCG tablet, Take 1 tablet by mouth Every Morning., Disp: 30 tablet, Rfl: 11    meloxicam (MOBIC) 15 MG tablet, Take 1 tablet by mouth Daily. TAKE WITH A MEAL  Indications: Joint Damage causing Pain and Loss of Function, Disp: , Rfl:     pantoprazole (PROTONIX) 40 MG EC tablet, TAKE 1 TABLET BY MOUTH EVERY MORNING 30MIN BEFORE BREAKFAST FOR 90 DAYS, Disp: , Rfl:     sertraline (ZOLOFT) 100 MG tablet, Take 1.5 tablets by mouth Every Morning. Indications: Generalized Anxiety Disorder, Disp: , Rfl:     spironolactone (ALDACTONE) 50 MG tablet, Take 1 tablet by mouth Daily. Indications: Edema, Disp: 90 tablet, Rfl: 3    sucralfate (CARAFATE) 1 g tablet, Take 1 tablet by mouth 4 (Four) Times a Day. TAKE 30 MINUTES TO 1 HOUR BEFORE MEALS  Indications: Gastroesophageal Reflux Disease, Disp: , Rfl:     SUMAtriptan (IMITREX) 100 MG tablet, Take 50 mg by mouth Every 2 (Two) Hours As Needed for Migraine. Indications: Migraine Headache, Disp: , Rfl:     torsemide (DEMADEX) 20 MG tablet, TAKE 1 TABLET BY MOUTH EVERY DAY, Disp: 90 tablet, Rfl: 2    KLOR-CON 10 MEQ CR tablet, TAKE 1 TABLET BY MOUTH EVERY DAY, Disp: 90 tablet, Rfl: 1   Assessment:        Patient Active Problem List   Diagnosis    Chronic atrial fibrillation    Benign essential HTN    Bradycardia    Chest pain    Can't get food down    Accumulation of fluid in tissues    Esophageal lump    Adynamia    Itch of skin    Anorexia    Chronic nausea    Gastroesophageal reflux disease    Breath shortness    Emesis    Decreased body weight    Anxiety    Narrowing of intervertebral disc space     Diverticulosis of intestine    Primary hypertension    Migraine    Osteoporosis    Palpitations    Pituitary adenoma    Sick sinus syndrome    Diarrhea    Cardiomyopathy    Chronic anticoagulation    Atrial fibrillation    On amiodarone therapy    Pacemaker    Infected pacemaker    Wound infection    Primary osteoarthritis of left knee    DJD (degenerative joint disease)    Ischemic cardiomyopathy               Plan:            ICD-10-CM ICD-9-CM   1. Presence of Watchman left atrial appendage closure device  Z95.818 V45.09   2. Leg swelling  M79.89 729.81   3. Benign essential HTN  I10 401.1   4. Chronic atrial fibrillation  I48.20 427.31   5. Pacemaker  Z95.0 V45.01   6. Anticoagulated  Z79.01 V58.61     1. Presence of Watchman left atrial appendage closure device  Considering patient's medical condition as well as the risk factors, patient will require echocardiogram for further evaluation for the LV function, four-chamber evaluation, including the pressures, valvular function and  pericardial disease and pericardial effusion    - Adult Transthoracic Echo Complete W/ Cont if Necessary Per Protocol    2. Leg swelling  Considering patient's medical condition as well as the risk factors, patient will require echocardiogram for further evaluation for the LV function, four-chamber evaluation, including the pressures, valvular function and  pericardial disease and pericardial effusion    - Adult Transthoracic Echo Complete W/ Cont if Necessary Per Protocol    3. Benign essential HTN  Blood pressure under control  - Adult Transthoracic Echo Complete W/ Cont if Necessary Per Protocol    4. Chronic atrial fibrillation  Continue current treatment  - Adult Transthoracic Echo Complete W/ Cont if Necessary Per Protocol    5. Pacemaker  Functioning normally    6. Anticoagulated  After anticoagulation    S/p WATCHMANN     OFF ANTICOAG    INCREASE SPIRONOLACTONE 50 MG    3 MONTHS WITH ECHO  COUNSELING:    Raina ALVAREZ  EppersonCounseling was given to patient for the following topics: diagnostic results, risk factor reductions, impressions, risks and benefits of treatment options and importance of treatment compliance .       SMOKING COUNSELING:        Dictated using Dragon dictation

## 2023-06-15 ENCOUNTER — TELEPHONE (OUTPATIENT)
Dept: CARDIOLOGY | Facility: CLINIC | Age: 78
End: 2023-06-15
Payer: MEDICARE

## 2023-06-15 PROBLEM — L03.115 CELLULITIS OF RIGHT LEG: Status: ACTIVE | Noted: 2023-06-15

## 2023-06-15 PROBLEM — I50.9 ACUTE ON CHRONIC CONGESTIVE HEART FAILURE, UNSPECIFIED HEART FAILURE TYPE: Status: ACTIVE | Noted: 2023-06-15

## 2023-06-15 NOTE — TELEPHONE ENCOUNTER
SPOKE WITH  -HE SAID HER LEGS ARE SWELLING UP TO HER KNEES AND HAS BLISTERS   SPOKE TO DR ALVARADO IF THEY LOOK BAD SH NEEDS TO GO TO ER OR TAKE AN EXTRA WATER PILL FOR 4 DAYS . HE UNDERSTOOD AND AGREED

## 2023-06-15 NOTE — TELEPHONE ENCOUNTER
Caller: MARY    Relationship:     Best call back number: [phone]    What is the best time to reach you: ANY    Who are you requesting to speak with (clinical staff, provider,  specific staff member): BURKE      What was the call regarding:  CALLED IN TO REPORT THAT HER LEGS HAVE BEEN SWELLING REALLY BADLY. POTENTIALLY DUE TO RECENT CHANGES IN HER MEDICATION.  WOULD LIKE A CALL BACK TO DISCUSS MORE IN DETAIL.

## 2023-06-17 PROBLEM — D64.9 ANEMIA: Status: ACTIVE | Noted: 2023-06-17

## 2023-06-19 ENCOUNTER — READMISSION MANAGEMENT (OUTPATIENT)
Dept: CALL CENTER | Facility: HOSPITAL | Age: 78
End: 2023-06-19
Payer: MEDICARE

## 2023-06-19 PROBLEM — Z95.818 PRESENCE OF WATCHMAN LEFT ATRIAL APPENDAGE CLOSURE DEVICE: Status: ACTIVE | Noted: 2023-06-19

## 2023-06-19 PROBLEM — L03.115 CELLULITIS OF RIGHT LEG: Status: RESOLVED | Noted: 2023-06-15 | Resolved: 2023-06-19

## 2023-06-20 NOTE — OUTREACH NOTE
Prep Survey      Flowsheet Row Responses   [facility] facility patient discharged from? [facility]   Is LACE score < 7 ? Yes   Eligibility Readm Mgmt   Discharge diagnosis cellulitis of right leg   Does the patient have one of the following disease processes/diagnoses(primary or secondary)? Other   Does the patient have Home health ordered? No   Is there a DME ordered? No   Prep survey completed? Yes            Teagan ALCOCER - Registered Nurse

## 2023-07-03 ENCOUNTER — OFFICE (OUTPATIENT)
Dept: URBAN - METROPOLITAN AREA CLINIC 76 | Facility: CLINIC | Age: 78
End: 2023-07-03

## 2023-07-03 VITALS
SYSTOLIC BLOOD PRESSURE: 136 MMHG | HEIGHT: 69 IN | DIASTOLIC BLOOD PRESSURE: 74 MMHG | HEART RATE: 80 BPM | WEIGHT: 141 LBS

## 2023-07-03 DIAGNOSIS — M62.9 DISORDER OF MUSCLE, UNSPECIFIED: ICD-10-CM

## 2023-07-03 DIAGNOSIS — R15.9 FULL INCONTINENCE OF FECES: ICD-10-CM

## 2023-07-03 DIAGNOSIS — K29.50 UNSPECIFIED CHRONIC GASTRITIS WITHOUT BLEEDING: ICD-10-CM

## 2023-07-03 PROBLEM — K22.9 DISEASE OF ESOPHAGUS, UNSPECIFIED: Status: ACTIVE | Noted: 2020-10-12

## 2023-07-03 PROCEDURE — 99214 OFFICE O/P EST MOD 30 MIN: CPT | Performed by: NURSE PRACTITIONER

## 2023-08-29 ENCOUNTER — TELEPHONE (OUTPATIENT)
Dept: CARDIOLOGY | Facility: CLINIC | Age: 78
End: 2023-08-29
Payer: MEDICARE

## 2023-08-29 NOTE — TELEPHONE ENCOUNTER
MARY LIMA - SON (ON HIPAA FORM)  PT IS BEING SCHEDULED FOR A LUMBAR PUNCTURE.     NEEDS CLEARANCE TO HOLD ASA 81 MG FOR 5 DAYS PRIOR.    # [phone]

## 2023-08-30 NOTE — TELEPHONE ENCOUNTER
[facility] ON 8/29/2023 FOR DR. WILSON'S OFFICE FOR GUIDANCE ON HOLDING ASA FOR A LUMBAR PUNCTURE POST WATCHMAN PROCEDURE.

## 2023-08-31 NOTE — TELEPHONE ENCOUNTER
S/W DR. RAFAELA LOPEZ HAS GIVEN APPROVAL TO HOLD ASA  5 DAYS PRIOR TO LUMBAR PUNCTURE.     MARY - PTS SON- IS AWARE AND VERBALIZED UNDERSTANDING.

## 2023-09-06 ENCOUNTER — TRANSCRIBE ORDERS (OUTPATIENT)
Dept: ADMINISTRATIVE | Facility: HOSPITAL | Age: 78
End: 2023-09-06
Payer: MEDICARE

## 2023-09-06 DIAGNOSIS — M51.36 DEGENERATION OF LUMBAR INTERVERTEBRAL DISC: Primary | ICD-10-CM

## 2023-10-24 ENCOUNTER — HOSPITAL ENCOUNTER (OUTPATIENT)
Dept: MRI IMAGING | Facility: HOSPITAL | Age: 78
Discharge: HOME | End: 2023-10-24
Admitting: NEUROLOGICAL SURGERY
Payer: MEDICARE

## 2023-10-24 VITALS
HEART RATE: 84 BPM | SYSTOLIC BLOOD PRESSURE: 109 MMHG | WEIGHT: 135 LBS | OXYGEN SATURATION: 96 % | RESPIRATION RATE: 16 BRPM | HEIGHT: 71 IN | DIASTOLIC BLOOD PRESSURE: 59 MMHG | TEMPERATURE: 98.7 F | BODY MASS INDEX: 18.9 KG/M2

## 2023-10-24 DIAGNOSIS — M51.36 DEGENERATION OF LUMBAR INTERVERTEBRAL DISC: ICD-10-CM

## 2023-10-24 PROCEDURE — 72148 MRI LUMBAR SPINE W/O DYE: CPT

## 2023-10-27 ENCOUNTER — TRANSCRIBE ORDERS (OUTPATIENT)
Dept: ADMINISTRATIVE | Facility: HOSPITAL | Age: 78
End: 2023-10-27
Payer: MEDICARE

## 2023-10-27 DIAGNOSIS — N28.89 RENAL MASS: Primary | ICD-10-CM

## 2023-11-03 RX ORDER — TORSEMIDE 20 MG/1
20 TABLET ORAL DAILY
Qty: 90 TABLET | Refills: 2 | Status: SHIPPED | OUTPATIENT
Start: 2023-11-03

## 2023-11-03 RX ORDER — BISOPROLOL FUMARATE 10 MG/1
TABLET, FILM COATED ORAL
Qty: 30 TABLET | Refills: 0 | Status: SHIPPED | OUTPATIENT
Start: 2023-11-03

## 2023-11-28 ENCOUNTER — HOSPITAL ENCOUNTER (OUTPATIENT)
Dept: MRI IMAGING | Facility: HOSPITAL | Age: 78
Discharge: HOME | End: 2023-11-28
Admitting: UROLOGY
Payer: MEDICARE

## 2023-11-28 ENCOUNTER — APPOINTMENT (OUTPATIENT)
Dept: OTHER | Facility: HOSPITAL | Age: 78
End: 2023-11-28
Payer: MEDICARE

## 2023-11-28 VITALS
BODY MASS INDEX: 19.76 KG/M2 | RESPIRATION RATE: 16 BRPM | DIASTOLIC BLOOD PRESSURE: 59 MMHG | OXYGEN SATURATION: 100 % | HEIGHT: 70 IN | TEMPERATURE: 97.5 F | WEIGHT: 138 LBS | SYSTOLIC BLOOD PRESSURE: 100 MMHG | HEART RATE: 75 BPM

## 2023-11-28 DIAGNOSIS — N28.89 RENAL MASS: ICD-10-CM

## 2023-11-28 LAB — CREAT BLDA-MCNC: 0.8 MG/DL (ref 0.6–1.3)

## 2023-11-28 PROCEDURE — 82565 ASSAY OF CREATININE: CPT

## 2023-11-28 PROCEDURE — 0 GADOBENATE DIMEGLUMINE 529 MG/ML SOLUTION: Performed by: UROLOGY

## 2023-11-28 PROCEDURE — 74183 MRI ABD W/O CNTR FLWD CNTR: CPT

## 2023-11-28 PROCEDURE — A9577 INJ MULTIHANCE: HCPCS | Performed by: UROLOGY

## 2023-11-28 RX ADMIN — GADOBENATE DIMEGLUMINE 12 ML: 529 INJECTION, SOLUTION INTRAVENOUS at 12:26

## 2023-11-28 NOTE — NURSING NOTE
Patient had MRI extravasation right AC of 12 cc gadolinium. No redness, no pain voiced, no swelling noted. Elevated up on 1 pillow and placed warm compress to area times one. Son at bedside with patient when Dr. Doshi came out to see patient. Discharge instructions given to patient and son with understanding voiced. Written copy given to son. Taken to son's car per wheelchair by CARSON Carlson. No distress noted.

## 2023-11-29 ENCOUNTER — TELEPHONE (OUTPATIENT)
Dept: INTERVENTIONAL RADIOLOGY/VASCULAR | Facility: HOSPITAL | Age: 78
End: 2023-11-29
Payer: MEDICARE

## 2023-12-01 RX ORDER — BISOPROLOL FUMARATE 10 MG/1
TABLET, FILM COATED ORAL
Qty: 30 TABLET | Refills: 5 | Status: SHIPPED | OUTPATIENT
Start: 2023-12-01

## 2023-12-01 RX ORDER — EMPAGLIFLOZIN 10 MG/1
10 TABLET, FILM COATED ORAL DAILY
Qty: 30 TABLET | Refills: 5 | Status: SHIPPED | OUTPATIENT
Start: 2023-12-01

## 2023-12-05 ENCOUNTER — CLINICAL SUPPORT NO REQUIREMENTS (OUTPATIENT)
Dept: CARDIOLOGY | Facility: CLINIC | Age: 78
End: 2023-12-05
Payer: MEDICARE

## 2023-12-05 DIAGNOSIS — Z95.0 PACEMAKER: Primary | ICD-10-CM

## 2023-12-05 PROCEDURE — 93280 PM DEVICE PROGR EVAL DUAL: CPT | Performed by: INTERNAL MEDICINE

## 2024-01-17 ENCOUNTER — TELEPHONE (OUTPATIENT)
Dept: CARDIOLOGY | Facility: CLINIC | Age: 79
End: 2024-01-17

## 2024-01-17 DIAGNOSIS — Z01.818 PREOP TESTING: ICD-10-CM

## 2024-01-17 DIAGNOSIS — I48.20 CHRONIC ATRIAL FIBRILLATION: Primary | ICD-10-CM

## 2024-01-17 DIAGNOSIS — R94.31 ABNORMAL ELECTROCARDIOGRAM (ECG) (EKG): ICD-10-CM

## 2024-01-18 ENCOUNTER — TELEPHONE (OUTPATIENT)
Dept: CARDIOLOGY | Facility: CLINIC | Age: 79
End: 2024-01-18
Payer: MEDICARE

## 2024-01-18 NOTE — TELEPHONE ENCOUNTER
SPOKE TO DR PARKER -OKAY TO STOP ASA 5 DAYS PRIOR -I HAVE SPOKE TO MARY HER SON AND HE UNDERSTAND AND AGREES

## 2024-01-18 NOTE — TELEPHONE ENCOUNTER
Caller: ALTAF    Relationship: Provider    Best call back number: [phone]    What is the best time to reach you: ANYTIME    Who are you requesting to speak with (clinical staff, provider,  specific staff member): BURKE    What was the call regarding: SURGERY COORDINATOR IS CALLING TO SPEAK WITH BURKE REGARDING THE ASPIRIN  THAT SHE NEEDS TO STOP TODAY IF THAT'S OK. SHE ASKED IF BURKE CAN CALL THE PT TO LET HER KNOW SHE CAN STOP TAKING ASPIRIN TODAY, ALTAF IS LEAVING THE OFFICE EARLY TODAY.    Is it okay if the provider responds through MyChart: NO

## 2024-01-19 ENCOUNTER — HOSPITAL ENCOUNTER (OUTPATIENT)
Dept: CARDIOLOGY | Facility: HOSPITAL | Age: 79
Discharge: HOME | End: 2024-01-19
Payer: MEDICARE

## 2024-01-19 PROCEDURE — A9500 TC99M SESTAMIBI: HCPCS | Performed by: INTERNAL MEDICINE

## 2024-01-19 PROCEDURE — 78452 HT MUSCLE IMAGE SPECT MULT: CPT

## 2024-01-19 PROCEDURE — 0 TECHNETIUM SESTAMIBI: Performed by: INTERNAL MEDICINE

## 2024-01-19 PROCEDURE — 93017 CV STRESS TEST TRACING ONLY: CPT

## 2024-01-19 PROCEDURE — 25010000002 REGADENOSON 0.4 MG/5ML SOLUTION: Performed by: INTERNAL MEDICINE

## 2024-01-19 RX ORDER — REGADENOSON 0.08 MG/ML
0.4 INJECTION, SOLUTION INTRAVENOUS
Status: COMPLETED | OUTPATIENT
Start: 2024-01-19 | End: 2024-01-19

## 2024-01-19 RX ADMIN — TECHNETIUM TC 99M SESTAMIBI 1 DOSE: 1 INJECTION INTRAVENOUS at 07:17

## 2024-01-19 RX ADMIN — REGADENOSON 0.4 MG: 0.08 INJECTION, SOLUTION INTRAVENOUS at 09:00

## 2024-01-19 RX ADMIN — TECHNETIUM TC 99M SESTAMIBI 1 DOSE: 1 INJECTION INTRAVENOUS at 09:00

## 2024-01-22 ENCOUNTER — HOSPITAL ENCOUNTER (OUTPATIENT)
Dept: CARDIOLOGY | Facility: HOSPITAL | Age: 79
Discharge: HOME | End: 2024-01-22
Admitting: INTERNAL MEDICINE
Payer: MEDICARE

## 2024-01-22 ENCOUNTER — OFFICE VISIT (OUTPATIENT)
Dept: CARDIOLOGY | Facility: CLINIC | Age: 79
End: 2024-01-22
Payer: MEDICARE

## 2024-01-22 VITALS
DIASTOLIC BLOOD PRESSURE: 60 MMHG | BODY MASS INDEX: 20.47 KG/M2 | HEIGHT: 70 IN | WEIGHT: 143 LBS | BODY MASS INDEX: 20.47 KG/M2 | HEIGHT: 70 IN | SYSTOLIC BLOOD PRESSURE: 123 MMHG | HEART RATE: 79 BPM | WEIGHT: 143 LBS

## 2024-01-22 DIAGNOSIS — I10 BENIGN ESSENTIAL HTN: ICD-10-CM

## 2024-01-22 DIAGNOSIS — I48.20 CHRONIC ATRIAL FIBRILLATION: ICD-10-CM

## 2024-01-22 DIAGNOSIS — Z95.0 PACEMAKER: ICD-10-CM

## 2024-01-22 DIAGNOSIS — Z79.01 ANTICOAGULATED: ICD-10-CM

## 2024-01-22 DIAGNOSIS — Z95.818 PRESENCE OF WATCHMAN LEFT ATRIAL APPENDAGE CLOSURE DEVICE: Primary | ICD-10-CM

## 2024-01-22 LAB
BH CV REST NUCLEAR ISOTOPE DOSE: 10.7 MCI
BH CV STRESS COMMENTS STAGE 1: NORMAL
BH CV STRESS DOSE REGADENOSON STAGE 1: 0.4
BH CV STRESS DURATION MIN STAGE 1: 0
BH CV STRESS DURATION SEC STAGE 1: 10
BH CV STRESS NUCLEAR ISOTOPE DOSE: 32.9 MCI
BH CV STRESS PROTOCOL 1: NORMAL
BH CV STRESS RECOVERY BP: NORMAL MMHG
BH CV STRESS RECOVERY HR: 69 BPM
BH CV STRESS STAGE 1: 1
LV EF NUC BP: 60 %
MAXIMAL PREDICTED HEART RATE: 140 BPM
PERCENT MAX PREDICTED HR: 75 %
STRESS BASELINE BP: NORMAL MMHG
STRESS BASELINE HR: 70 BPM
STRESS PERCENT HR: 88 %
STRESS POST PEAK BP: NORMAL MMHG
STRESS POST PEAK HR: 105 BPM
STRESS TARGET HR: 119 BPM

## 2024-01-22 PROCEDURE — 93306 TTE W/DOPPLER COMPLETE: CPT | Performed by: INTERNAL MEDICINE

## 2024-01-22 PROCEDURE — 93306 TTE W/DOPPLER COMPLETE: CPT

## 2024-01-22 RX ORDER — POTASSIUM CHLORIDE 750 MG/1
TABLET, FILM COATED, EXTENDED RELEASE ORAL
COMMUNITY

## 2024-01-22 NOTE — PROGRESS NOTES
Cardic clr for kidney removal    Subjective:        Raina Forrest is a 78 y.o. female who here for follow up    CC  Follow-up atrial fibrillation hypertension pacemaker  HPI  78-year-old female with chronic atrial fibrillation Watchman procedure hypertension pacemaker and anticoagulation here for the follow-up with no complaints of chest pains tightness heaviness or the pressure sensation     Problems Addressed this Visit          Cardiac and Vasculature    Chronic atrial fibrillation    Relevant Orders    ECG 12 Lead (Completed)    Benign essential HTN    Pacemaker    Presence of Watchman left atrial appendage closure device - Primary    Relevant Orders    ECG 12 Lead (Completed)       Coag and Thromboembolic    Anticoagulated     Diagnoses         Codes Comments    Presence of Watchman left atrial appendage closure device    -  Primary ICD-10-CM: Z95.818  ICD-9-CM: V45.09     Chronic atrial fibrillation     ICD-10-CM: I48.20  ICD-9-CM: 427.31     Benign essential HTN     ICD-10-CM: I10  ICD-9-CM: 401.1     Pacemaker     ICD-10-CM: Z95.0  ICD-9-CM: V45.01     Anticoagulated     ICD-10-CM: Z79.01  ICD-9-CM: V58.61           .    The following portions of the patient's history were reviewed and updated as appropriate: allergies, current medications, past family history, past medical history, past social history, past surgical history and problem list.    Past Medical History:   Diagnosis Date    Atrial fibrillation     Fung's esophagus     Benign essential hypertension     Blood clot in vein     Cardiomyopathy     Chronic gastritis     Congestive heart failure     Degenerative disc disease     Dementia     Pt  states she has slight dementia    Depression with anxiety     Disease of thyroid gland     Diverticulitis of colon     Dysphagia     GERD (gastroesophageal reflux disease)     History of chest pain     History of migraine     History of migraine headaches     Left knee pain     Osteoporosis      "Palpitations     Thyroid disorder     Thyroid nodule     Ulcerative colitis      reports that she has never smoked. She has never used smokeless tobacco. She reports that she does not drink alcohol and does not use drugs.   Family History   Problem Relation Age of Onset    Lung cancer Other     Ulcerative colitis Sister     Heart failure Mother     Heart disease Mother     Heart failure Father     Heart disease Father     Malig Hyperthermia Neg Hx        Review of Systems  Constitutional: No wt loss, fever, fatigue  Gastrointestinal: No nausea, abdominal pain  Behavioral/Psych: No insomnia or anxiety   Cardiovascular no chest pains or tightness in the chest  Objective:       Physical Exam           Physical Exam  /60   Pulse 79   Ht 177.8 cm (70\")   Wt 64.9 kg (143 lb)   BMI 20.52 kg/m²     General appearance: No acute changes   Eyes: Sclerae conjunctivae normal, pupils reactive   HENT: Atraumatic; oropharynx clear with moist mucous membranes and no mucosal ulcerations;  Neck: Trachea midline; NECK, supple, no thyromegaly or lymphadenopathy   Lungs: Normal size and shape, normal breath sounds, equal distribution of air, no rales and rhonchi   CV: S1-S2 regular, no murmurs, no rub, no gallop   Abdomen: Soft, nontender; no masses , no abnormal abdominal sounds   Extremities: No deformity , normal color , no peripheral edema   Skin: Normal temperature, turgor and texture; no rash, ulcers  Psych: Appropriate affect, alert and oriented to person, place and time             ECG 12 Lead    Date/Time: 1/22/2024 1:19 PM  Performed by: Robinson Salazar MD    Authorized by: Robinson Salazar MD  Comparison: compared with previous ECG   Similar to previous ECG  Rhythm: atrial fibrillation    Clinical impression: non-specific ECG            Echocardiogram:    Results for orders placed during the hospital encounter of 02/09/23    Adult Transthoracic Echo Complete W/ Cont if Necessary Per " Protocol    Interpretation Summary    Left ventricular ejection fraction appears to be 51 - 55%.    Left ventricular diastolic function was indeterminate.    Left atrial volume is moderately increased.    The right atrial cavity is moderately  dilated.    Moderate tricuspid valve regurgitation is present.    Estimated right ventricular systolic pressure from tricuspid regurgitation is mildly elevated (35-45 mmHg).    Mild pulmonary hypertension is present.          Current Outpatient Medications:     Acetylcysteine capsule capsule, TAKE 1 CAPSULE BY MOUTH DAILY FOR 7 DAYS., Disp: , Rfl:     aspirin 81 MG chewable tablet, Chew 1 tablet Daily. Indications: Cancer of the Colon, Disease involving Lipid Deposits in the Arteries, Kawasaki Syndrome, Disp: , Rfl:     bisoprolol (ZEBeta) 10 MG tablet, TAKE ONE TABLET BY MOUTH DAILY, Disp: 30 tablet, Rfl: 5    Calcium Carb-Cholecalciferol (Oyster Shell Calcium w/D) 500-5 MG-MCG tablet, Take 1 tablet by mouth Every Evening., Disp: , Rfl:     docusate sodium (COLACE) 250 MG capsule, Take 1 capsule by mouth 2 (Two) Times a Day As Needed for Constipation., Disp: 30 capsule, Rfl: 1    donepezil (ARICEPT) 10 MG tablet, Take 1 tablet by mouth Daily. Indications: Alzheimer's Disease, Disp: , Rfl:     ferrous sulfate 325 (65 FE) MG tablet, Take 1 tablet by mouth Daily With Breakfast., Disp: 30 tablet, Rfl: 0    Jardiance 10 MG tablet tablet, TAKE ONE TABLET BY MOUTH DAILY, Disp: 30 tablet, Rfl: 5    KLOR-CON 10 MEQ CR tablet, TAKE 1 TABLET BY MOUTH EVERY DAY, Disp: 90 tablet, Rfl: 1    levothyroxine (SYNTHROID, LEVOTHROID) 25 MCG tablet, Take 1 tablet by mouth Every Morning., Disp: 30 tablet, Rfl: 11    Multiple Vitamins-Minerals (Womens 50+ Multi Vitamin) tablet, Take 1 tablet by mouth Daily., Disp: , Rfl:     pantoprazole (PROTONIX) 40 MG EC tablet, TAKE 1 TABLET BY MOUTH EVERY MORNING 30MIN BEFORE BREAKFAST FOR 90 DAYS, Disp: , Rfl:     potassium chloride 10 MEQ CR tablet, TAKE ONE  TABLET BY MOUTH AT NOON, Disp: , Rfl:     sertraline (ZOLOFT) 100 MG tablet, Take 1.5 tablets by mouth Every Morning. Indications: Generalized Anxiety Disorder, Disp: , Rfl:     spironolactone (ALDACTONE) 50 MG tablet, Take 1 tablet by mouth Daily. Indications: Edema, Disp: 90 tablet, Rfl: 3    sucralfate (CARAFATE) 1 g tablet, Take 1 tablet by mouth Daily. TAKE 30 MINUTES TO 1 HOUR BEFORE MEALS  Indications: Gastroesophageal Reflux Disease, Disp: , Rfl:     SUMAtriptan (IMITREX) 100 MG tablet, Take 0.5 tablets by mouth Every 2 (Two) Hours As Needed for Migraine. Indications: Migraine Headache, Disp: , Rfl:     torsemide (DEMADEX) 20 MG tablet, TAKE ONE TABLET BY MOUTH DAILY, Disp: 90 tablet, Rfl: 2   Assessment:                Plan:          ICD-10-CM ICD-9-CM   1. Presence of Watchman left atrial appendage closure device  Z95.818 V45.09   2. Chronic atrial fibrillation  I48.20 427.31   3. Benign essential HTN  I10 401.1   4. Pacemaker  Z95.0 V45.01   5. Anticoagulated  Z79.01 V58.61     1. Presence of Watchman left atrial appendage closure device  Stable  - ECG 12 Lead    2. Chronic atrial fibrillation  Stable  - ECG 12 Lead    3. Benign essential HTN  Patient understands importance of blood pressure check at home which patient does regularly and the blood pressures are well under control to the level of less than 140/90      4. Pacemaker  Functioning normally    5. Anticoagulated  Pros and cons as well as indication of the anticoagulation has been explained to the patient in detail    There are no obvious complications at this stage    Risk of  the bleedings has been explained    Need for the regular blood workup and adjust the dose has been explained    Need for proper follow-up on anticoagulation also has been explained        aRina Forrest seen and examined with no clinical signs of angina or chf, pt is cleared for surgery with non modifiable risk factors.  Raina Forrest has been advised to take  cardiac meds with sip of water on the day of surgery.    Please use beta blocker for tachycardia perioperatively    Anticoagulation to be managed appropriately    Watch for chest pain, shortness of breath, palpitations, arrhythmias, and significant change in the blood pressure perioperatively,     Please check EKG preop and postop if any questions, notify us if any change in patient's cardiovascular conditions    COUNSELING:    Raina Perkins was given to patient for the following topics: diagnostic results, risk factor reductions, impressions, risks and benefits of treatment options and importance of treatment compliance .       SMOKING COUNSELING:        Dictated using Dragon dictation

## 2024-01-23 LAB
AORTIC DIMENSIONLESS INDEX: 0.7 (DI)
BH CV ECHO MEAS - ACS: 1.9 CM
BH CV ECHO MEAS - AO MAX PG: 5.2 MMHG
BH CV ECHO MEAS - AO MEAN PG: 3 MMHG
BH CV ECHO MEAS - AO ROOT DIAM: 2.8 CM
BH CV ECHO MEAS - AO V2 MAX: 114 CM/SEC
BH CV ECHO MEAS - AO V2 VTI: 25.3 CM
BH CV ECHO MEAS - AVA(I,D): 1.86 CM2
BH CV ECHO MEAS - EDV(CUBED): 85.2 ML
BH CV ECHO MEAS - EDV(MOD-SP2): 50 ML
BH CV ECHO MEAS - EDV(MOD-SP4): 68 ML
BH CV ECHO MEAS - EF(MOD-BP): 64.4 %
BH CV ECHO MEAS - EF(MOD-SP2): 62 %
BH CV ECHO MEAS - EF(MOD-SP4): 63.2 %
BH CV ECHO MEAS - ESV(CUBED): 35.9 ML
BH CV ECHO MEAS - ESV(MOD-SP2): 19 ML
BH CV ECHO MEAS - ESV(MOD-SP4): 25 ML
BH CV ECHO MEAS - FS: 25 %
BH CV ECHO MEAS - IVS/LVPW: 1 CM
BH CV ECHO MEAS - IVSD: 0.8 CM
BH CV ECHO MEAS - LV MASS(C)D: 109.4 GRAMS
BH CV ECHO MEAS - LV MAX PG: 2.18 MMHG
BH CV ECHO MEAS - LV MEAN PG: 1 MMHG
BH CV ECHO MEAS - LV V1 MAX: 73.9 CM/SEC
BH CV ECHO MEAS - LV V1 VTI: 16.6 CM
BH CV ECHO MEAS - LVIDD: 4.4 CM
BH CV ECHO MEAS - LVIDS: 3.3 CM
BH CV ECHO MEAS - LVOT AREA: 2.8 CM2
BH CV ECHO MEAS - LVOT DIAM: 1.9 CM
BH CV ECHO MEAS - LVPWD: 0.8 CM
BH CV ECHO MEAS - MV MAX PG: 4.1 MMHG
BH CV ECHO MEAS - MV MEAN PG: 2 MMHG
BH CV ECHO MEAS - MV V2 VTI: 20.1 CM
BH CV ECHO MEAS - MVA(VTI): 2.34 CM2
BH CV ECHO MEAS - PA ACC TIME: 0.12 SEC
BH CV ECHO MEAS - PA V2 MAX: 69.2 CM/SEC
BH CV ECHO MEAS - QP/QS: 0.94
BH CV ECHO MEAS - RAP SYSTOLE: 8 MMHG
BH CV ECHO MEAS - RV MAX PG: 1.5 MMHG
BH CV ECHO MEAS - RV V1 MAX: 61.3 CM/SEC
BH CV ECHO MEAS - RV V1 VTI: 12.8 CM
BH CV ECHO MEAS - RVOT DIAM: 2.1 CM
BH CV ECHO MEAS - RVSP: 33.2 MMHG
BH CV ECHO MEAS - SV(LVOT): 47.1 ML
BH CV ECHO MEAS - SV(MOD-SP2): 31 ML
BH CV ECHO MEAS - SV(MOD-SP4): 43 ML
BH CV ECHO MEAS - SV(RVOT): 44.3 ML
BH CV ECHO MEAS - TAPSE (>1.6): 2.04 CM
BH CV ECHO MEAS - TR MAX PG: 25.2 MMHG
BH CV ECHO MEAS - TR MAX VEL: 251 CM/SEC
BH CV VAS BP RIGHT ARM: NORMAL MMHG
BH CV XLRA - RV BASE: 4.7 CM
BH CV XLRA - RV LENGTH: 6.5 CM
BH CV XLRA - RV MID: 3.1 CM
BH CV XLRA - TDI S': 8.8 CM/SEC
LEFT ATRIUM VOLUME INDEX: 51.2 ML/M2
SINUS: 3.2 CM
STJ: 3.1 CM

## 2024-01-24 PROBLEM — Z79.01 ANTICOAGULATED: Status: ACTIVE | Noted: 2017-01-23

## 2024-02-26 NOTE — TELEPHONE ENCOUNTER
[id number]     PATIENT DOES NOT FEEL COMFORTABLE DRIVING IN THE STORM THAT IS SUPPOSE TO COME AROUND THE TIME OF HER APPOINTMENT. STATED SHE WILL RESCHEDULE AT NEXT APPOINTMENT ON 8/6/20.   
Wheelchair

## 2024-02-27 ENCOUNTER — HOSPITAL ENCOUNTER (INPATIENT)
Facility: HOSPITAL | Age: 79
LOS: 3 days | Discharge: HOME HEALTH | DRG: 193 | End: 2024-03-03
Attending: EMERGENCY MEDICINE | Admitting: INTERNAL MEDICINE
Payer: MEDICARE

## 2024-02-27 DIAGNOSIS — J10.1 INFLUENZA A: ICD-10-CM

## 2024-02-27 DIAGNOSIS — E86.0 DEHYDRATION: ICD-10-CM

## 2024-02-27 DIAGNOSIS — J18.9 PNEUMONIA OF BOTH LUNGS DUE TO INFECTIOUS ORGANISM, UNSPECIFIED PART OF LUNG: Primary | ICD-10-CM

## 2024-02-27 PROCEDURE — 99285 EMERGENCY DEPT VISIT HI MDM: CPT

## 2024-02-27 NOTE — Clinical Note
Level of Care: Med/Surg [1]   Diagnosis: Pneumonia [[id number]]   Admitting Physician: CINTHIA MULLEN [[id number]]   Attending Physician: CINTHIA MULLEN [[id number]]

## 2024-02-28 ENCOUNTER — APPOINTMENT (OUTPATIENT)
Dept: CT IMAGING | Facility: HOSPITAL | Age: 79
DRG: 193 | End: 2024-02-28
Payer: MEDICARE

## 2024-02-28 ENCOUNTER — APPOINTMENT (OUTPATIENT)
Dept: ULTRASOUND IMAGING | Facility: HOSPITAL | Age: 79
DRG: 193 | End: 2024-02-28
Payer: MEDICARE

## 2024-02-28 ENCOUNTER — APPOINTMENT (OUTPATIENT)
Dept: GENERAL RADIOLOGY | Facility: HOSPITAL | Age: 79
DRG: 193 | End: 2024-02-28
Payer: MEDICARE

## 2024-02-28 PROBLEM — J18.9 PNEUMONIA OF BOTH LUNGS DUE TO INFECTIOUS ORGANISM: Status: ACTIVE | Noted: 2024-02-28

## 2024-02-28 PROBLEM — N18.31 STAGE 3A CHRONIC KIDNEY DISEASE: Status: ACTIVE | Noted: 2024-02-28

## 2024-02-28 PROBLEM — N17.9 ACUTE KIDNEY FAILURE: Status: ACTIVE | Noted: 2024-02-28

## 2024-02-28 PROBLEM — Z90.5 H/O LEFT RADICAL NEPHRECTOMY: Status: ACTIVE | Noted: 2024-02-28

## 2024-02-28 PROBLEM — E87.6 HYPOKALEMIA: Status: ACTIVE | Noted: 2024-02-28

## 2024-02-28 PROBLEM — S30.1XXA ABDOMINAL WALL HEMATOMA: Status: ACTIVE | Noted: 2024-02-28

## 2024-02-28 PROBLEM — J10.1 INFLUENZA A: Status: ACTIVE | Noted: 2024-02-28

## 2024-02-28 PROBLEM — E43 SEVERE MALNUTRITION: Status: ACTIVE | Noted: 2024-02-28

## 2024-02-28 PROBLEM — E03.9 HYPOTHYROIDISM (ACQUIRED): Status: ACTIVE | Noted: 2024-02-28

## 2024-02-28 PROBLEM — K27.9 PEPTIC ULCER DISEASE: Status: ACTIVE | Noted: 2024-02-28

## 2024-02-28 PROBLEM — J18.9 PNEUMONIA: Status: ACTIVE | Noted: 2024-02-28

## 2024-02-28 LAB
ALBUMIN SERPL-MCNC: 3.9 G/DL (ref 3.5–5.2)
ALBUMIN/GLOB SERPL: 1.3 G/DL
ALP SERPL-CCNC: 90 U/L (ref 39–117)
ALT SERPL W P-5'-P-CCNC: 20 U/L (ref 1–33)
ANION GAP SERPL CALCULATED.3IONS-SCNC: 15 MMOL/L (ref 5–15)
ANION GAP SERPL CALCULATED.3IONS-SCNC: 17 MMOL/L (ref 5–15)
AST SERPL-CCNC: 27 U/L (ref 1–32)
B PARAPERT DNA SPEC QL NAA+PROBE: NOT DETECTED
B PERT DNA SPEC QL NAA+PROBE: NOT DETECTED
BASOPHILS # BLD AUTO: 0.11 10*3/MM3 (ref 0–0.2)
BASOPHILS NFR BLD AUTO: 0.7 % (ref 0–1.5)
BILIRUB SERPL-MCNC: 1.9 MG/DL (ref 0–1.2)
BUN SERPL-MCNC: 23 MG/DL (ref 8–23)
BUN SERPL-MCNC: 26 MG/DL (ref 8–23)
BUN/CREAT SERPL: 20 (ref 7–25)
BUN/CREAT SERPL: 20.6 (ref 7–25)
C PNEUM DNA NPH QL NAA+NON-PROBE: NOT DETECTED
CALCIUM SPEC-SCNC: 8.8 MG/DL (ref 8.6–10.5)
CALCIUM SPEC-SCNC: 9 MG/DL (ref 8.6–10.5)
CHLORIDE SERPL-SCNC: 92 MMOL/L (ref 98–107)
CHLORIDE SERPL-SCNC: 96 MMOL/L (ref 98–107)
CO2 SERPL-SCNC: 23 MMOL/L (ref 22–29)
CO2 SERPL-SCNC: 24 MMOL/L (ref 22–29)
CREAT SERPL-MCNC: 1.15 MG/DL (ref 0.57–1)
CREAT SERPL-MCNC: 1.26 MG/DL (ref 0.57–1)
D-LACTATE SERPL-SCNC: 1.3 MMOL/L (ref 0.5–2)
DEPRECATED RDW RBC AUTO: 48.7 FL (ref 37–54)
DEPRECATED RDW RBC AUTO: 49.3 FL (ref 37–54)
EGFRCR SERPLBLD CKD-EPI 2021: 43.2 ML/MIN/1.73
EGFRCR SERPLBLD CKD-EPI 2021: 48.3 ML/MIN/1.73
EOSINOPHIL # BLD AUTO: 0.1 10*3/MM3 (ref 0–0.4)
EOSINOPHIL NFR BLD AUTO: 0.6 % (ref 0.3–6.2)
ERYTHROCYTE [DISTWIDTH] IN BLOOD BY AUTOMATED COUNT: 14.4 % (ref 12.3–15.4)
ERYTHROCYTE [DISTWIDTH] IN BLOOD BY AUTOMATED COUNT: 14.4 % (ref 12.3–15.4)
FLUAV H3 RNA NPH QL NAA+PROBE: DETECTED
FLUBV RNA ISLT QL NAA+PROBE: NOT DETECTED
GLOBULIN UR ELPH-MCNC: 3 GM/DL
GLUCOSE SERPL-MCNC: 92 MG/DL (ref 65–99)
GLUCOSE SERPL-MCNC: 99 MG/DL (ref 65–99)
HADV DNA SPEC NAA+PROBE: NOT DETECTED
HCOV 229E RNA SPEC QL NAA+PROBE: NOT DETECTED
HCOV HKU1 RNA SPEC QL NAA+PROBE: NOT DETECTED
HCOV NL63 RNA SPEC QL NAA+PROBE: NOT DETECTED
HCOV OC43 RNA SPEC QL NAA+PROBE: NOT DETECTED
HCT VFR BLD AUTO: 32.3 % (ref 34–46.6)
HCT VFR BLD AUTO: 35.2 % (ref 34–46.6)
HGB BLD-MCNC: 11.3 G/DL (ref 12–15.9)
HGB BLD-MCNC: 12.5 G/DL (ref 12–15.9)
HMPV RNA NPH QL NAA+NON-PROBE: NOT DETECTED
HPIV1 RNA ISLT QL NAA+PROBE: NOT DETECTED
HPIV2 RNA SPEC QL NAA+PROBE: NOT DETECTED
HPIV3 RNA NPH QL NAA+PROBE: NOT DETECTED
HPIV4 P GENE NPH QL NAA+PROBE: NOT DETECTED
IMM GRANULOCYTES # BLD AUTO: 0.72 10*3/MM3 (ref 0–0.05)
IMM GRANULOCYTES NFR BLD AUTO: 4.5 % (ref 0–0.5)
LYMPHOCYTES # BLD AUTO: 1.28 10*3/MM3 (ref 0.7–3.1)
LYMPHOCYTES NFR BLD AUTO: 8 % (ref 19.6–45.3)
M PNEUMO IGG SER IA-ACNC: NOT DETECTED
MAGNESIUM SERPL-MCNC: 2.1 MG/DL (ref 1.6–2.4)
MCH RBC QN AUTO: 33.1 PG (ref 26.6–33)
MCH RBC QN AUTO: 34.2 PG (ref 26.6–33)
MCHC RBC AUTO-ENTMCNC: 35 G/DL (ref 31.5–35.7)
MCHC RBC AUTO-ENTMCNC: 35.5 G/DL (ref 31.5–35.7)
MCV RBC AUTO: 94.7 FL (ref 79–97)
MCV RBC AUTO: 96.2 FL (ref 79–97)
MONOCYTES # BLD AUTO: 1.15 10*3/MM3 (ref 0.1–0.9)
MONOCYTES NFR BLD AUTO: 7.1 % (ref 5–12)
NEUTROPHILS NFR BLD AUTO: 12.73 10*3/MM3 (ref 1.7–7)
NEUTROPHILS NFR BLD AUTO: 79.1 % (ref 42.7–76)
NRBC BLD AUTO-RTO: 0 /100 WBC (ref 0–0.2)
PLATELET # BLD AUTO: 295 10*3/MM3 (ref 140–450)
PLATELET # BLD AUTO: 330 10*3/MM3 (ref 140–450)
PMV BLD AUTO: 11.2 FL (ref 6–12)
PMV BLD AUTO: 11.5 FL (ref 6–12)
POTASSIUM SERPL-SCNC: 3 MMOL/L (ref 3.5–5.2)
POTASSIUM SERPL-SCNC: 3.2 MMOL/L (ref 3.5–5.2)
POTASSIUM SERPL-SCNC: 4.4 MMOL/L (ref 3.5–5.2)
PROCALCITONIN SERPL-MCNC: 0.24 NG/ML (ref 0–0.25)
PROT SERPL-MCNC: 6.9 G/DL (ref 6–8.5)
RBC # BLD AUTO: 3.41 10*6/MM3 (ref 3.77–5.28)
RBC # BLD AUTO: 3.66 10*6/MM3 (ref 3.77–5.28)
RHINOVIRUS RNA SPEC NAA+PROBE: NOT DETECTED
RSV RNA NPH QL NAA+NON-PROBE: NOT DETECTED
SARS-COV-2 RNA NPH QL NAA+NON-PROBE: NOT DETECTED
SODIUM SERPL-SCNC: 132 MMOL/L (ref 136–145)
SODIUM SERPL-SCNC: 135 MMOL/L (ref 136–145)
TSH SERPL DL<=0.05 MIU/L-ACNC: 1.41 UIU/ML (ref 0.27–4.2)
WBC NRBC COR # BLD AUTO: 13.47 10*3/MM3 (ref 3.4–10.8)
WBC NRBC COR # BLD AUTO: 16.09 10*3/MM3 (ref 3.4–10.8)

## 2024-02-28 PROCEDURE — 94640 AIRWAY INHALATION TREATMENT: CPT

## 2024-02-28 PROCEDURE — 94799 UNLISTED PULMONARY SVC/PX: CPT

## 2024-02-28 PROCEDURE — 97530 THERAPEUTIC ACTIVITIES: CPT

## 2024-02-28 PROCEDURE — G0378 HOSPITAL OBSERVATION PER HR: HCPCS

## 2024-02-28 PROCEDURE — 87449 NOS EACH ORGANISM AG IA: CPT | Performed by: INTERNAL MEDICINE

## 2024-02-28 PROCEDURE — 97161 PT EVAL LOW COMPLEX 20 MIN: CPT

## 2024-02-28 PROCEDURE — 36415 COLL VENOUS BLD VENIPUNCTURE: CPT | Performed by: NURSE PRACTITIONER

## 2024-02-28 PROCEDURE — 83735 ASSAY OF MAGNESIUM: CPT | Performed by: NURSE PRACTITIONER

## 2024-02-28 PROCEDURE — 76775 US EXAM ABDO BACK WALL LIM: CPT

## 2024-02-28 PROCEDURE — 80053 COMPREHEN METABOLIC PANEL: CPT | Performed by: EMERGENCY MEDICINE

## 2024-02-28 PROCEDURE — 85027 COMPLETE CBC AUTOMATED: CPT | Performed by: NURSE PRACTITIONER

## 2024-02-28 PROCEDURE — 84145 PROCALCITONIN (PCT): CPT | Performed by: EMERGENCY MEDICINE

## 2024-02-28 PROCEDURE — 0202U NFCT DS 22 TRGT SARS-COV-2: CPT | Performed by: EMERGENCY MEDICINE

## 2024-02-28 PROCEDURE — 25010000002 CEFTRIAXONE PER 250 MG: Performed by: EMERGENCY MEDICINE

## 2024-02-28 PROCEDURE — 25810000003 LACTATED RINGERS SOLUTION: Performed by: EMERGENCY MEDICINE

## 2024-02-28 PROCEDURE — 25810000003 SODIUM CHLORIDE 0.9 % SOLUTION: Performed by: NURSE PRACTITIONER

## 2024-02-28 PROCEDURE — 97165 OT EVAL LOW COMPLEX 30 MIN: CPT

## 2024-02-28 PROCEDURE — 84443 ASSAY THYROID STIM HORMONE: CPT | Performed by: INTERNAL MEDICINE

## 2024-02-28 PROCEDURE — 85025 COMPLETE CBC W/AUTO DIFF WBC: CPT | Performed by: EMERGENCY MEDICINE

## 2024-02-28 PROCEDURE — 84132 ASSAY OF SERUM POTASSIUM: CPT | Performed by: INTERNAL MEDICINE

## 2024-02-28 PROCEDURE — 87205 SMEAR GRAM STAIN: CPT | Performed by: INTERNAL MEDICINE

## 2024-02-28 PROCEDURE — 71045 X-RAY EXAM CHEST 1 VIEW: CPT

## 2024-02-28 PROCEDURE — 87040 BLOOD CULTURE FOR BACTERIA: CPT | Performed by: EMERGENCY MEDICINE

## 2024-02-28 PROCEDURE — 71250 CT THORAX DX C-: CPT

## 2024-02-28 PROCEDURE — 83605 ASSAY OF LACTIC ACID: CPT | Performed by: EMERGENCY MEDICINE

## 2024-02-28 RX ORDER — GUAIFENESIN 600 MG/1
1200 TABLET, EXTENDED RELEASE ORAL EVERY 12 HOURS SCHEDULED
Status: DISCONTINUED | OUTPATIENT
Start: 2024-02-28 | End: 2024-03-03 | Stop reason: HOSPADM

## 2024-02-28 RX ORDER — PANTOPRAZOLE SODIUM 40 MG/1
40 TABLET, DELAYED RELEASE ORAL DAILY
Status: DISCONTINUED | OUTPATIENT
Start: 2024-02-28 | End: 2024-03-03 | Stop reason: HOSPADM

## 2024-02-28 RX ORDER — ASPIRIN 81 MG/1
81 TABLET, CHEWABLE ORAL DAILY
Status: DISCONTINUED | OUTPATIENT
Start: 2024-02-28 | End: 2024-03-03 | Stop reason: HOSPADM

## 2024-02-28 RX ORDER — PANTOPRAZOLE SODIUM 20 MG/1
20 TABLET, DELAYED RELEASE ORAL DAILY
Status: DISCONTINUED | OUTPATIENT
Start: 2024-02-28 | End: 2024-02-28

## 2024-02-28 RX ORDER — IPRATROPIUM BROMIDE AND ALBUTEROL SULFATE 2.5; .5 MG/3ML; MG/3ML
3 SOLUTION RESPIRATORY (INHALATION)
Status: DISCONTINUED | OUTPATIENT
Start: 2024-02-28 | End: 2024-03-03 | Stop reason: HOSPADM

## 2024-02-28 RX ORDER — SODIUM CHLORIDE 0.9 % (FLUSH) 0.9 %
10 SYRINGE (ML) INJECTION EVERY 12 HOURS SCHEDULED
Status: DISCONTINUED | OUTPATIENT
Start: 2024-02-28 | End: 2024-03-03 | Stop reason: HOSPADM

## 2024-02-28 RX ORDER — ACETAMINOPHEN 160 MG/5ML
650 SOLUTION ORAL EVERY 4 HOURS PRN
Status: DISCONTINUED | OUTPATIENT
Start: 2024-02-28 | End: 2024-03-03 | Stop reason: HOSPADM

## 2024-02-28 RX ORDER — FERROUS SULFATE 325(65) MG
325 TABLET ORAL
Status: DISCONTINUED | OUTPATIENT
Start: 2024-02-28 | End: 2024-03-03 | Stop reason: HOSPADM

## 2024-02-28 RX ORDER — ONDANSETRON 2 MG/ML
4 INJECTION INTRAMUSCULAR; INTRAVENOUS EVERY 6 HOURS PRN
Status: DISCONTINUED | OUTPATIENT
Start: 2024-02-28 | End: 2024-03-03 | Stop reason: HOSPADM

## 2024-02-28 RX ORDER — SODIUM CHLORIDE 9 MG/ML
40 INJECTION, SOLUTION INTRAVENOUS AS NEEDED
Status: DISCONTINUED | OUTPATIENT
Start: 2024-02-28 | End: 2024-03-03 | Stop reason: HOSPADM

## 2024-02-28 RX ORDER — ACETAMINOPHEN 650 MG/1
650 SUPPOSITORY RECTAL EVERY 4 HOURS PRN
Status: DISCONTINUED | OUTPATIENT
Start: 2024-02-28 | End: 2024-03-03 | Stop reason: HOSPADM

## 2024-02-28 RX ORDER — BISACODYL 5 MG/1
5 TABLET, DELAYED RELEASE ORAL DAILY PRN
Status: DISCONTINUED | OUTPATIENT
Start: 2024-02-28 | End: 2024-03-03 | Stop reason: HOSPADM

## 2024-02-28 RX ORDER — BISACODYL 10 MG
10 SUPPOSITORY, RECTAL RECTAL DAILY PRN
Status: DISCONTINUED | OUTPATIENT
Start: 2024-02-28 | End: 2024-03-03 | Stop reason: HOSPADM

## 2024-02-28 RX ORDER — LEVOTHYROXINE SODIUM 0.03 MG/1
25 TABLET ORAL
Status: DISCONTINUED | OUTPATIENT
Start: 2024-02-28 | End: 2024-03-03 | Stop reason: HOSPADM

## 2024-02-28 RX ORDER — BISOPROLOL FUMARATE 5 MG/1
10 TABLET, FILM COATED ORAL DAILY
Status: DISCONTINUED | OUTPATIENT
Start: 2024-02-28 | End: 2024-03-03 | Stop reason: HOSPADM

## 2024-02-28 RX ORDER — CALCIUM CARBONATE 500 MG/1
2 TABLET, CHEWABLE ORAL 2 TIMES DAILY PRN
Status: DISCONTINUED | OUTPATIENT
Start: 2024-02-28 | End: 2024-03-03 | Stop reason: HOSPADM

## 2024-02-28 RX ORDER — POLYETHYLENE GLYCOL 3350 17 G/17G
17 POWDER, FOR SOLUTION ORAL DAILY PRN
Status: DISCONTINUED | OUTPATIENT
Start: 2024-02-28 | End: 2024-03-03 | Stop reason: HOSPADM

## 2024-02-28 RX ORDER — ENOXAPARIN SODIUM 100 MG/ML
40 INJECTION SUBCUTANEOUS EVERY 24 HOURS
Status: DISCONTINUED | OUTPATIENT
Start: 2024-02-28 | End: 2024-02-28

## 2024-02-28 RX ORDER — DONEPEZIL HYDROCHLORIDE 10 MG/1
10 TABLET, FILM COATED ORAL DAILY
Status: DISCONTINUED | OUTPATIENT
Start: 2024-02-28 | End: 2024-03-03 | Stop reason: HOSPADM

## 2024-02-28 RX ORDER — SODIUM CHLORIDE 0.9 % (FLUSH) 0.9 %
10 SYRINGE (ML) INJECTION AS NEEDED
Status: DISCONTINUED | OUTPATIENT
Start: 2024-02-28 | End: 2024-03-03 | Stop reason: HOSPADM

## 2024-02-28 RX ORDER — POTASSIUM CHLORIDE 750 MG/1
40 TABLET, FILM COATED, EXTENDED RELEASE ORAL EVERY 4 HOURS
Status: CANCELLED | OUTPATIENT
Start: 2024-02-28 | End: 2024-02-28

## 2024-02-28 RX ORDER — OSELTAMIVIR PHOSPHATE 30 MG/1
30 CAPSULE ORAL EVERY 12 HOURS SCHEDULED
Status: DISCONTINUED | OUTPATIENT
Start: 2024-02-28 | End: 2024-03-03 | Stop reason: HOSPADM

## 2024-02-28 RX ORDER — ONDANSETRON 4 MG/1
4 TABLET, ORALLY DISINTEGRATING ORAL EVERY 6 HOURS PRN
Status: DISCONTINUED | OUTPATIENT
Start: 2024-02-28 | End: 2024-03-03 | Stop reason: HOSPADM

## 2024-02-28 RX ORDER — SODIUM CHLORIDE 9 MG/ML
50 INJECTION, SOLUTION INTRAVENOUS CONTINUOUS
Status: DISCONTINUED | OUTPATIENT
Start: 2024-02-28 | End: 2024-02-29

## 2024-02-28 RX ORDER — POTASSIUM CHLORIDE 750 MG/1
40 TABLET, FILM COATED, EXTENDED RELEASE ORAL EVERY 4 HOURS
Status: COMPLETED | OUTPATIENT
Start: 2024-02-28 | End: 2024-02-28

## 2024-02-28 RX ORDER — AMOXICILLIN 250 MG
2 CAPSULE ORAL 2 TIMES DAILY PRN
Status: DISCONTINUED | OUTPATIENT
Start: 2024-02-28 | End: 2024-03-03 | Stop reason: HOSPADM

## 2024-02-28 RX ORDER — NITROGLYCERIN 0.4 MG/1
0.4 TABLET SUBLINGUAL
Status: DISCONTINUED | OUTPATIENT
Start: 2024-02-28 | End: 2024-03-03 | Stop reason: HOSPADM

## 2024-02-28 RX ORDER — ACETAMINOPHEN 325 MG/1
650 TABLET ORAL EVERY 4 HOURS PRN
Status: DISCONTINUED | OUTPATIENT
Start: 2024-02-28 | End: 2024-03-03 | Stop reason: HOSPADM

## 2024-02-28 RX ORDER — SODIUM CHLORIDE FOR INHALATION 7 %
4 VIAL, NEBULIZER (ML) INHALATION
Status: DISCONTINUED | OUTPATIENT
Start: 2024-02-28 | End: 2024-03-03 | Stop reason: HOSPADM

## 2024-02-28 RX ADMIN — FERROUS SULFATE TAB 325 MG (65 MG ELEMENTAL FE) 325 MG: 325 (65 FE) TAB at 11:04

## 2024-02-28 RX ADMIN — Medication 10 ML: at 21:00

## 2024-02-28 RX ADMIN — GUAIFENESIN 1200 MG: 600 TABLET, EXTENDED RELEASE ORAL at 20:59

## 2024-02-28 RX ADMIN — OSELTAMIVIR PHOSPHATE 30 MG: 30 CAPSULE ORAL at 20:59

## 2024-02-28 RX ADMIN — BISOPROLOL FUMARATE 10 MG: 5 TABLET, FILM COATED ORAL at 11:05

## 2024-02-28 RX ADMIN — SODIUM CHLORIDE 100 ML/HR: 9 INJECTION, SOLUTION INTRAVENOUS at 08:04

## 2024-02-28 RX ADMIN — POTASSIUM CHLORIDE 40 MEQ: 750 TABLET, EXTENDED RELEASE ORAL at 07:56

## 2024-02-28 RX ADMIN — SERTRALINE 150 MG: 100 TABLET, FILM COATED ORAL at 11:04

## 2024-02-28 RX ADMIN — DONEPEZIL HYDROCHLORIDE 10 MG: 10 TABLET, FILM COATED ORAL at 11:12

## 2024-02-28 RX ADMIN — POTASSIUM CHLORIDE 40 MEQ: 750 TABLET, EXTENDED RELEASE ORAL at 11:04

## 2024-02-28 RX ADMIN — CEFTRIAXONE SODIUM 1000 MG: 1 INJECTION, POWDER, FOR SOLUTION INTRAMUSCULAR; INTRAVENOUS at 02:35

## 2024-02-28 RX ADMIN — EMPAGLIFLOZIN 10 MG: 10 TABLET, FILM COATED ORAL at 11:06

## 2024-02-28 RX ADMIN — PANTOPRAZOLE SODIUM 40 MG: 40 TABLET, DELAYED RELEASE ORAL at 11:06

## 2024-02-28 RX ADMIN — Medication 4 ML: at 19:45

## 2024-02-28 RX ADMIN — ASPIRIN 81 MG: 81 TABLET, CHEWABLE ORAL at 11:04

## 2024-02-28 RX ADMIN — IPRATROPIUM BROMIDE AND ALBUTEROL SULFATE 3 ML: .5; 3 SOLUTION RESPIRATORY (INHALATION) at 19:41

## 2024-02-28 RX ADMIN — SODIUM CHLORIDE, POTASSIUM CHLORIDE, SODIUM LACTATE AND CALCIUM CHLORIDE 1000 ML: 600; 310; 30; 20 INJECTION, SOLUTION INTRAVENOUS at 00:17

## 2024-02-28 RX ADMIN — LEVOTHYROXINE SODIUM 25 MCG: 25 TABLET ORAL at 11:04

## 2024-02-28 NOTE — PLAN OF CARE
Problem: Malnutrition  Goal: Improved Nutritional Intake  Outcome: Ongoing, Progressing  Intervention: Promote and Optimize Oral Intake  Flowsheets (Taken 2/28/2024 1255)  Oral Nutrition Promotion:   nutritional therapy counseling provided   calorie-dense liquids provided   Goal Outcome Evaluation:   Encouraged PO intake   Adding Boost daily   RD to follow

## 2024-02-28 NOTE — ED PROVIDER NOTES
EMERGENCY DEPARTMENT ENCOUNTER    Room Number:  19/19  PCP: Georgiana Cisneros MD  Historian: Patient and son      HPI:  Chief Complaint: Shortness of breath cough  A complete HPI/ROS/PMH/PSH/SH/FH are unobtainable due to: Some dementia  Context: Raina Forrest is a 78 y.o. female who presents to the ED c/o shortness of breath.  Patient states symptoms started over a week ago.  Had cough and congestion.  Tested positive for influenza A.  Patient took Tamiflu.  Patient states shortness of breath is gotten worse but has had increasing cough and congestion.  Is not eating or drinking.  Patient is here with her  with similar symptoms.  Patient had recent kidney surgery for kidney cancer.            PAST MEDICAL HISTORY  Active Ambulatory Problems     Diagnosis Date Noted    Chronic atrial fibrillation 04/07/2016    Benign essential HTN 04/07/2016    Bradycardia 04/07/2016    Chest pain 04/07/2016    Can't get food down 04/07/2016    Accumulation of fluid in tissues 04/07/2016    Esophageal lump 04/07/2016    Adynamia 04/07/2016    Itch of skin 04/07/2016    Anorexia 04/07/2016    Chronic nausea 04/07/2016    Gastroesophageal reflux disease 04/07/2016    Breath shortness 04/07/2016    Emesis 04/07/2016    Decreased body weight 04/07/2016    Anxiety 04/21/2016    Narrowing of intervertebral disc space 04/21/2016    Diverticulosis of intestine 04/18/2013    Primary hypertension 04/21/2016    Migraine 04/21/2016    Osteoporosis 04/21/2016    Palpitations 10/02/2012    Pituitary adenoma 04/21/2016    Sick sinus syndrome 08/28/2013    Diarrhea 07/21/2016    Cardiomyopathy 12/01/2016    Chronic anticoagulation 12/01/2016    Atrial fibrillation 01/18/2017    Anticoagulated 01/23/2017    Pacemaker 02/22/2017    Infected pacemaker 04/06/2017    Wound infection 04/07/2017    Primary osteoarthritis of left knee 06/16/2020    DJD (degenerative joint disease) 06/16/2020    Ischemic cardiomyopathy 02/10/2023    Acute on  chronic congestive heart failure, unspecified heart failure type 06/15/2023    Anemia 06/17/2023    Presence of Watchman left atrial appendage closure device 06/19/2023     Resolved Ambulatory Problems     Diagnosis Date Noted    Paroxysmal atrial fibrillation 11/15/2016    Cardiac resynchronization therapy pacemaker (CRT-P) in place 03/28/2017    Cellulitis of right leg 06/15/2023     Past Medical History:   Diagnosis Date    Fung's esophagus     Benign essential hypertension     Blood clot in vein     Chronic gastritis     Congestive heart failure     Degenerative disc disease     Dementia     Depression with anxiety     Disease of thyroid gland     Diverticulitis of colon     Dysphagia     GERD (gastroesophageal reflux disease)     History of chest pain     History of migraine     History of migraine headaches     Left knee pain     Thyroid disorder     Thyroid nodule     Ulcerative colitis          PAST SURGICAL HISTORY  Past Surgical History:   Procedure Laterality Date    APPENDECTOMY      BLADDER SURGERY      CARDIAC CATHETERIZATION N/A 11/21/2016    Procedure: Left Heart Cath;  Surgeon: Robinson Salazar MD;  Location: [facility];  Service:     CARDIAC ELECTROPHYSIOLOGY PROCEDURE N/A 2/7/2017    Procedure: Pacemaker DC new   [employer];  Surgeon: Robinson Salazar MD;  Location: [facility] CATH [facility]VASIVE LOCATION;  Service:     CARDIOVERSION  01/18/2017    CHOLECYSTECTOMY      COLONOSCOPY      COLONOSCOPY N/A 7/29/2016    normal    ENDOSCOPY  05/06/2015    submucosal nodule in esophagus, erythematous mucosa in antrum,moderate acute gastritis, no h-pylori    ENDOSCOPY N/A 9/2/2016    Erythematous mucosa in the antrum    EYE SURGERY Left     cataract    HYSTERECTOMY      KNEE MENISCAL REPAIR Left     THYROID SURGERY Left     TONSILLECTOMY      TOTAL KNEE ARTHROPLASTY Left 6/16/2020    Procedure: LEFT TOTAL KNEE ARTHOPLASTY+;  Surgeon: Jamal Castañeda MD;  Location: [facility] OR;   Service: Orthopedics;  Laterality: Left;         FAMILY HISTORY  Family History   Problem Relation Age of Onset    Lung cancer Other     Ulcerative colitis Sister     Heart failure Mother     Heart disease Mother     Heart failure Father     Heart disease Father     Malig Hyperthermia Neg Hx          SOCIAL HISTORY  Social History     Socioeconomic History    Marital status:    Tobacco Use    Smoking status: Never    Smokeless tobacco: Never   Vaping Use    Vaping Use: Never used   Substance and Sexual Activity    Alcohol use: No    Drug use: No    Sexual activity: Defer     Birth control/protection: Surgical         ALLERGIES  Bupivacaine hcl, Nepafenac, Bactrim [sulfamethoxazole-trimethoprim], Barium-containing compounds, Bextra [valdecoxib], Clarithromycin, Cortisone, Cymbalta [duloxetine hcl], Iodinated contrast media, Medrol [methylprednisolone], Mesalamine, Polymyxin b-trimethoprim, Rivaroxaban, Tetanus toxoids, and Tetanus-diphtheria toxoids td        REVIEW OF SYSTEMS  Review of Systems   Shortness of breath cough congestion      PHYSICAL EXAM  ED Triage Vitals   Temp Heart Rate Resp BP SpO2   02/27/24 2244 02/27/24 2249 02/27/24 2244 02/27/24 2247 02/27/24 2250   97.4 °F (36.3 °C) 85 16 112/72 96 %      Temp src Heart Rate Source Patient Position BP Location FiO2 (%)   02/27/24 2244 02/27/24 2249 -- -- --   Tympanic Monitor          Physical Exam      GENERAL: no acute distress  HENT: nares patent  EYES: no scleral icterus  CV: regular rhythm, normal rate  RESPIRATORY: normal effort  ABDOMEN: soft  MUSCULOSKELETAL: no deformity  NEURO: alert, moves all extremities, follows commands  PSYCH:  calm, cooperative  SKIN: warm, dry    Vital signs and nursing notes reviewed.          LAB RESULTS  Recent Results (from the past 24 hour(s))   Comprehensive Metabolic Panel    Collection Time: 02/28/24 12:11 AM    Specimen: Blood   Result Value Ref Range    Glucose 99 65 - 99 mg/dL    BUN 26 (H) 8 - 23 mg/dL     Creatinine 1.26 (H) 0.57 - 1.00 mg/dL    Sodium 132 (L) 136 - 145 mmol/L    Potassium 3.2 (L) 3.5 - 5.2 mmol/L    Chloride 92 (L) 98 - 107 mmol/L    CO2 23.0 22.0 - 29.0 mmol/L    Calcium 9.0 8.6 - 10.5 mg/dL    Total Protein 6.9 6.0 - 8.5 g/dL    Albumin 3.9 3.5 - 5.2 g/dL    ALT (SGPT) 20 1 - 33 U/L    AST (SGOT) 27 1 - 32 U/L    Alkaline Phosphatase 90 39 - 117 U/L    Total Bilirubin 1.9 (H) 0.0 - 1.2 mg/dL    Globulin 3.0 gm/dL    A/G Ratio 1.3 g/dL    BUN/Creatinine Ratio 20.6 7.0 - 25.0    Anion Gap 17.0 (H) 5.0 - 15.0 mmol/L    eGFR 43.2 (L) >60.0 mL/min/1.73   Lactic Acid, Plasma    Collection Time: 02/28/24 12:11 AM    Specimen: Blood   Result Value Ref Range    Lactate 1.3 0.5 - 2.0 mmol/L   Procalcitonin    Collection Time: 02/28/24 12:11 AM    Specimen: Blood   Result Value Ref Range    Procalcitonin 0.24 0.00 - 0.25 ng/mL   CBC Auto Differential    Collection Time: 02/28/24 12:11 AM    Specimen: Blood   Result Value Ref Range    WBC 16.09 (H) 3.40 - 10.80 10*3/mm3    RBC 3.66 (L) 3.77 - 5.28 10*6/mm3    Hemoglobin 12.5 12.0 - 15.9 g/dL    Hematocrit 35.2 34.0 - 46.6 %    MCV 96.2 79.0 - 97.0 fL    MCH 34.2 (H) 26.6 - 33.0 pg    MCHC 35.5 31.5 - 35.7 g/dL    RDW 14.4 12.3 - 15.4 %    RDW-SD 49.3 37.0 - 54.0 fl    MPV 11.2 6.0 - 12.0 fL    Platelets 330 140 - 450 10*3/mm3    Neutrophil % 79.1 (H) 42.7 - 76.0 %    Lymphocyte % 8.0 (L) 19.6 - 45.3 %    Monocyte % 7.1 5.0 - 12.0 %    Eosinophil % 0.6 0.3 - 6.2 %    Basophil % 0.7 0.0 - 1.5 %    Immature Grans % 4.5 (H) 0.0 - 0.5 %    Neutrophils, Absolute 12.73 (H) 1.70 - 7.00 10*3/mm3    Lymphocytes, Absolute 1.28 0.70 - 3.10 10*3/mm3    Monocytes, Absolute 1.15 (H) 0.10 - 0.90 10*3/mm3    Eosinophils, Absolute 0.10 0.00 - 0.40 10*3/mm3    Basophils, Absolute 0.11 0.00 - 0.20 10*3/mm3    Immature Grans, Absolute 0.72 (H) 0.00 - 0.05 10*3/mm3    nRBC 0.0 0.0 - 0.2 /100 WBC   Respiratory Panel PCR w/COVID-19(SARS-CoV-2) ELIZABETH/MAXIMILIANO/VIJAY/PAD/COR/TIERRA In-House, NP  Swab in UTM/VTM, 2 HR TAT - Swab, Nasopharynx    Collection Time: 02/28/24 12:12 AM    Specimen: Nasopharynx; Swab   Result Value Ref Range    ADENOVIRUS, PCR Not Detected Not Detected    Coronavirus 229E Not Detected Not Detected    Coronavirus HKU1 Not Detected Not Detected    Coronavirus NL63 Not Detected Not Detected    Coronavirus OC43 Not Detected Not Detected    COVID19 Not Detected Not Detected - Ref. Range    Human Metapneumovirus Not Detected Not Detected    Human Rhinovirus/Enterovirus Not Detected Not Detected    Influenza A H3 Detected (A) Not Detected    Influenza B PCR Not Detected Not Detected    Parainfluenza Virus 1 Not Detected Not Detected    Parainfluenza Virus 2 Not Detected Not Detected    Parainfluenza Virus 3 Not Detected Not Detected    Parainfluenza Virus 4 Not Detected Not Detected    RSV, PCR Not Detected Not Detected    Bordetella pertussis pcr Not Detected Not Detected    Bordetella parapertussis PCR Not Detected Not Detected    Chlamydophila pneumoniae PCR Not Detected Not Detected    Mycoplasma pneumo by PCR Not Detected Not Detected       Ordered the above labs and reviewed the results.        RADIOLOGY  CT Chest Without Contrast Diagnostic    Result Date: 2/28/2024  Patient: SHANNAN LIMA  Time Out: 01:38 Exam(s): CT CHEST Without Contrast EXAM:   CT Chest Without Intravenous Contrast CLINICAL HISTORY:    Reason for exam: pneumonia. TECHNIQUE:   Axial computed tomography images of the chest without intravenous contrast.  CTDI is 6.35 mGy and DLP is 252.1 mGy-cm.  This CT exam was performed according to the principle of ALARA (As Low As Reasonably Achievable) by using one or more of the following dose reduction techniques: automated exposure control, adjustment of the mA and or kV according to patient size, and or use of iterative reconstruction technique. COMPARISON:   No relevant prior studies available. FINDINGS:   Lungs:  Innumerable bilateral centrilobular subcentimeter  nodules, many demonstrating tree-in-bud branching.  Findings compatible with endobronchial spread of infection and or tumor.  Areas of atelectasis in both lungs including prominent plate like atelectasis right lung base.  Bilateral mild bronchiectasis.   Pleural space:  Trace amount of pleural fluid bilaterally.  No pneumothorax.   Heart:  Moderate cardiac enlargement.  No significant pericardial effusion.  No significant coronary artery calcifications.   Mediastinum:  Small hiatal hernia.   Bones joints:  Unremarkable.  No acute fracture.  No dislocation.   Soft tissues:  Indeterminate soft tissue mass measuring 21 mm within the subcutaneous fat anterior abdominal wall near the level of the gallbladder fossa.   Vasculature:  Unremarkable.  No thoracic aortic aneurysm.   Lymph nodes:  Unremarkable.  No enlarged lymph nodes. IMPRESSION:       Bilateral centrilobular nodules, many with tree in bud branching compatible with endobronchial spread of tumor or infection.  Indeterminate soft tissue focus anterior abdominal wall on the right.     Electronically signed by Kash Infante DO, Diplomate American Board of Radiology on 02-28-24 at 0138    XR Chest 1 View    Result Date: 2/28/2024  Patient: SHANNAN LIMA  Time Out: 00:24 Exam(s): XR CXR 1 VIEW EXAM:   XR Chest, 1 View CLINICAL HISTORY:    Reason for exam: short of breath. TECHNIQUE:   Frontal view of the chest. COMPARISON:   6 15 23 FINDINGS:   Lungs:  Unremarkable.  No consolidation.   Pleural space:  Unremarkable.  No pneumothorax.   Heart:  Mild to moderate enlargement of the cardiac pericardial silhouette.   Mediastinum:  Unremarkable.  Normal mediastinal contour.   Bones joints:  Unremarkable.  No acute fracture.   Tubes, lines and devices:  No change in position of dual-chamber ICD with generator overlying the left hemithorax. IMPRESSION:       No acute radiographic abnormality     Electronically signed by Kash Infante DO, [employer]  Rad[employer] on 02-28-24 at 0024     Ordered the above noted radiological studies.  Chest x-ray independently interpreted by me and shows no evidence of pneumonia          PROCEDURES  Procedures              MEDICATIONS GIVEN IN ER  Medications   cefTRIAXone (ROCEPHIN) 1,000 mg in sodium chloride 0.9 % 100 mL IVPB-VTB (has no administration in time range)   lactated ringers bolus 1,000 mL (1,000 mL Intravenous New Bag 2/28/24 0017)                   MEDICAL DECISION MAKING, PROGRESS, and CONSULTS    All labs have been independently reviewed by me.  All radiology studies have been reviewed by me and I have also reviewed the radiology report.   EKG's independently viewed and interpreted by me.  Discussion below represents my analysis of pertinent findings related to patient's condition, differential diagnosis, treatment plan and final disposition.      Additional sources:  - Discussed/ obtained information from independent historians: None    - External (non-ED) record review: Epic reviewed patient seen in urgent care for cough dehydration and told she had pneumonia today    - Chronic or social conditions impacting care: None    - Shared decision making: None      Orders placed during this visit:  Orders Placed This Encounter   Procedures    Blood Culture - Blood,    Blood Culture - Blood,    Respiratory Panel PCR w/COVID-19(SARS-CoV-2) ELIZABETH/MAXIMILIANO/VIJAY/PAD/COR/TIERRA In-House, NP Swab in UTM/VTM, 2 HR TAT - Swab, Nasopharynx    XR Chest 1 View    CT Chest Without Contrast Diagnostic    Comprehensive Metabolic Panel    Lactic Acid, Plasma    Procalcitonin    CBC Auto Differential    [facility] (on-call MD unless specified) Details    Initiate Observation Status    CBC & Differential         Additional orders considered but not ordered:  None        Differential diagnosis includes but is not limited to:    Viral pneumonia versus bacterial pneumonia versus tumor      Independent interpretation of labs, radiology studies, and discussions  with consultants:                 DIAGNOSIS  Final diagnoses:   Pneumonia of both lungs due to infectious organism, unspecified part of lung   Influenza A   Dehydration         DISPOSITION  admit            Latest Documented Vital Signs:  As of 02:16 EST  BP- 99/65 HR- 81 Temp- 97.4 °F (36.3 °C) (Tympanic) O2 sat- 94%              --    Please note that portions of this were completed with a voice recognition program.       Note Disclaimer: At [facility], we believe that sharing information builds trust and better relationships. You are receiving this note because you are receiving care at [facility] or recently visited. It is possible you will see health information before a provider has talked with you about it. This kind of information can be easy to misunderstand. To help you fully understand what it means for your health, we urge you to discuss this note with your provider.            Palomo Soto MD  02/28/24 0217

## 2024-02-28 NOTE — H&P
Patient Name:  Raina Forrest  YOB: 1945  MRN:  [id number]  Admit Date:  2/27/2024  Patient Care Team:  Georgiana Cisneros MD as PCP - General (Pediatrics)  Nathan Johnson MD as Resident (Gastroenterology)  Robinson Salazar MD as Consulting Physician (Cardiology)      Subjective   History Present Illness     Chief Complaint   Patient presents with    Cough    Abnormal Chest X-ray       Ms. Forrest is a 78 y.o. non-smoker with a history of atrial fibrillation, hypertension, dementia, GERD, ulcerative colitis who presents to [facility] ER with chief complaint of cough and abnormal chest x-ray and admitted for pneumonia.    Son at bedside serves as primary historian.    Patient with increased shortness of breath and family concerned that she is not eating with recent influenza A diagnosed approximately 8 days ago and completed Tamiflu prior to hospital arrival.  Condition worsening despite completed treatment Tamiflu for influenza A; therefore, proceed to [facility] ER for further evaluation.    AVSS on room air, leukocytosis--WBC 16, CT concerning for endobronchial tumor versus infection.  IV ceftriaxone initiated in ER.    Recommendation pending hospital course.  Details below.    History of Present Illness    Review of Systems   Constitutional:  Positive for activity change and appetite change. Negative for chills and fever.   HENT:  Positive for congestion. Negative for rhinorrhea.    Respiratory:  Positive for cough and shortness of breath.    Cardiovascular:  Negative for chest pain and leg swelling.   Gastrointestinal:  Negative for abdominal pain, constipation, diarrhea, nausea and vomiting.   Endocrine: Negative for polydipsia, polyphagia and polyuria.   Genitourinary:  Negative for difficulty urinating and dysuria.   Musculoskeletal:  Positive for gait problem (Due to generalized weakness).   Skin:  Negative for rash and wound.   Neurological:  Negative for syncope and  light-headedness.   Psychiatric/Behavioral:  Negative for confusion and hallucinations.         Personal History     Past Medical History:   Diagnosis Date    Atrial fibrillation     Fung's esophagus     Benign essential hypertension     Blood clot in vein     Cardiomyopathy     Chronic gastritis     Congestive heart failure     Degenerative disc disease     Dementia     Pt  states she has slight dementia    Depression with anxiety     Disease of thyroid gland     Diverticulitis of colon     Dysphagia     GERD (gastroesophageal reflux disease)     History of chest pain     History of migraine     History of migraine headaches     Left knee pain     Osteoporosis     Palpitations     Thyroid disorder     Thyroid nodule     Ulcerative colitis      Past Surgical History:   Procedure Laterality Date    APPENDECTOMY      BLADDER SURGERY      CARDIAC CATHETERIZATION N/A 11/21/2016    Procedure: Left Heart Cath;  Surgeon: Robinson Salazar MD;  Location:  [facility];  Service:     CARDIAC ELECTROPHYSIOLOGY PROCEDURE N/A 2/7/2017    Procedure: Pacemaker DC new   [employer];  Surgeon: Robinson Salazar MD;  Location:  [facility];  Service:     CARDIOVERSION  01/18/2017    CHOLECYSTECTOMY      COLONOSCOPY      COLONOSCOPY N/A 7/29/2016    normal    ENDOSCOPY  05/06/2015    submucosal nodule in esophagus, erythematous mucosa in antrum,moderate acute gastritis, no h-pylori    ENDOSCOPY N/A 9/2/2016    Erythematous mucosa in the antrum    EYE SURGERY Left     cataract    HYSTERECTOMY      KNEE MENISCAL REPAIR Left     THYROID SURGERY Left     TONSILLECTOMY      TOTAL KNEE ARTHROPLASTY Left 6/16/2020    Procedure: LEFT TOTAL KNEE ARTHOPLASTY+;  Surgeon: Jamal Castañeda MD;  Location: [facility] MAIN OR;  Service: Orthopedics;  Laterality: Left;     Family History   Problem Relation Age of Onset    Lung cancer Other     Ulcerative colitis Sister     Heart failure Mother     Heart  disease Mother     Heart failure Father     Heart disease Father     Malig Hyperthermia Neg Hx      Social History     Tobacco Use    Smoking status: Never    Smokeless tobacco: Never   Vaping Use    Vaping Use: Never used   Substance Use Topics    Alcohol use: No    Drug use: No     No current facility-administered medications on file prior to encounter.     Current Outpatient Medications on File Prior to Encounter   Medication Sig Dispense Refill    aspirin 81 MG chewable tablet Chew 1 tablet Daily. Indications: Cancer of the Colon, Disease involving Lipid Deposits in the Arteries, Kawasaki Syndrome      bisoprolol (ZEBeta) 10 MG tablet TAKE ONE TABLET BY MOUTH DAILY 30 tablet 5    Calcium Carb-Cholecalciferol (Oyster Shell Calcium w/D) 500-5 MG-MCG tablet Take 1 tablet by mouth Every Evening.      donepezil (ARICEPT) 10 MG tablet Take 1 tablet by mouth Daily. Indications: Alzheimer's Disease      Jardiance 10 MG tablet tablet TAKE ONE TABLET BY MOUTH DAILY 30 tablet 5    KLOR-CON 10 MEQ CR tablet TAKE 1 TABLET BY MOUTH EVERY DAY 90 tablet 1    levothyroxine (SYNTHROID, LEVOTHROID) 25 MCG tablet Take 1 tablet by mouth Every Morning. 30 tablet 11    Multiple Vitamins-Minerals (Womens 50+ Multi Vitamin) tablet Take 1 tablet by mouth Daily.      pantoprazole (PROTONIX) 40 MG EC tablet Take 20 mg by mouth Daily. Indications: Gastroesophageal Reflux Disease, Heartburn      sertraline (ZOLOFT) 100 MG tablet Take 1.5 tablets by mouth Every Morning. Indications: Generalized Anxiety Disorder      spironolactone (ALDACTONE) 50 MG tablet Take 1 tablet by mouth Daily. Indications: Edema 90 tablet 3    sucralfate (CARAFATE) 1 g tablet Take 1 tablet by mouth Daily. TAKE 30 MINUTES TO 1 HOUR BEFORE MEALS  Indications: Gastroesophageal Reflux Disease      torsemide (DEMADEX) 20 MG tablet TAKE ONE TABLET BY MOUTH DAILY 90 tablet 2    Acetylcysteine capsule capsule TAKE 1 CAPSULE BY MOUTH DAILY FOR 7 DAYS. (Patient not taking:  Reported on 2/28/2024)      docusate sodium (COLACE) 250 MG capsule Take 1 capsule by mouth 2 (Two) Times a Day As Needed for Constipation. (Patient not taking: Reported on 2/28/2024) 30 capsule 1    ferrous sulfate 325 (65 FE) MG tablet Take 1 tablet by mouth Daily With Breakfast. (Patient not taking: Reported on 2/28/2024) 30 tablet 0    potassium chloride 10 MEQ CR tablet TAKE ONE TABLET BY MOUTH AT NOON (Patient not taking: Reported on 2/28/2024)      SUMAtriptan (IMITREX) 100 MG tablet Take 0.5 tablets by mouth Every 2 (Two) Hours As Needed for Migraine. Indications: Migraine Headache (Patient not taking: Reported on 2/28/2024)       Allergies   Allergen Reactions    Bupivacaine Hcl Anaphylaxis    Nepafenac Itching     Eye Drops    Bactrim [Sulfamethoxazole-Trimethoprim] Diarrhea    Barium-Containing Compounds Other (See Comments)     CONTRAST CAUSES DIARRHEA    Bextra [Valdecoxib] Itching    Cortisone Hives    Cymbalta [Duloxetine Hcl] Itching    Iodinated Contrast Media Unknown - Low Severity     Must be premedicated before use.  See [facility] notes in care evrywhere    Medrol [Methylprednisolone] Unknown - Low Severity    Mesalamine Unknown - Low Severity    Polymyxin B-Trimethoprim Unknown - Low Severity    Rivaroxaban Unknown - Low Severity     REACTION UNKNOWN    Tetanus Toxoids Swelling    Tetanus-Diphtheria Toxoids Td Swelling       Objective    Objective     Vital Signs  Temp:  [97.4 °F (36.3 °C)-99.5 °F (37.5 °C)] 99.5 °F (37.5 °C)  Heart Rate:  [72-85] 79  Resp:  [16] 16  BP: ()/(59-72) 121/69  SpO2:  [94 %-96 %] 96 %  on   ;      Body mass index is 18.72 kg/m².    Physical Exam  Constitutional:       General: She is not in acute distress.     Appearance: She is not toxic-appearing.      Comments: Generally weak, elderly, frail   HENT:      Head: Normocephalic and atraumatic.   Eyes:      Extraocular Movements: Extraocular movements intact.      Conjunctiva/sclera: Conjunctivae normal.    Cardiovascular:      Rate and Rhythm: Normal rate. Rhythm irregular.   Pulmonary:      Effort: Pulmonary effort is normal.      Breath sounds: Wheezing and rhonchi (Scattered throughout all lung fields) present.   Abdominal:      General: There is no distension.      Palpations: Abdomen is soft.      Tenderness: There is no abdominal tenderness. There is no guarding.   Musculoskeletal:      Cervical back: Normal range of motion and neck supple.      Right lower leg: No edema.      Left lower leg: No edema.   Skin:     General: Skin is warm and dry.      Findings: Bruising (Right abdomen) present.   Neurological:      General: No focal deficit present.      Mental Status: She is alert. Mental status is at baseline.      Comments: Baseline disorientation to year   Psychiatric:         Mood and Affect: Mood normal.         Behavior: Behavior normal.         Results Review:  I reviewed the patient's new clinical results.  I reviewed the patient's new imaging results and agree with the interpretation.  I reviewed the patient's other test results and agree with the interpretation  I personally viewed and interpreted the patient's EKG/Telemetry data  Discussed with ED provider.    Lab Results (last 24 hours)       Procedure Component Value Units Date/Time    CBC & Differential [[id number]]  (Abnormal) Collected: 02/28/24 0011    Specimen: Blood Updated: 02/28/24 0033    Narrative:      The following orders were created for panel order CBC & Differential.  Procedure                               Abnormality         Status                     ---------                               -----------         ------                     CBC Auto Differential[[id number]]        Abnormal            Final result                 Please view results for these tests on the individual orders.    Comprehensive Metabolic Panel [[id number]]  (Abnormal) Collected: 02/28/24 0011    Specimen: Blood Updated: 02/28/24 0053     Glucose 99 mg/dL       "BUN 26 mg/dL      Creatinine 1.26 mg/dL      Sodium 132 mmol/L      Potassium 3.2 mmol/L      Chloride 92 mmol/L      CO2 23.0 mmol/L      Calcium 9.0 mg/dL      Total Protein 6.9 g/dL      Albumin 3.9 g/dL      ALT (SGPT) 20 U/L      AST (SGOT) 27 U/L      Alkaline Phosphatase 90 U/L      Total Bilirubin 1.9 mg/dL      Globulin 3.0 gm/dL      A/G Ratio 1.3 g/dL      BUN/Creatinine Ratio 20.6     Anion Gap 17.0 mmol/L      eGFR 43.2 mL/min/1.73     Narrative:      GFR Normal >60  Chronic Kidney Disease <60  Kidney Failure <15    The GFR formula is only valid for adults with stable renal function between ages 18 and 70.    Blood Culture - Blood, Arm, Left [[id number]] Collected: 02/28/24 0011    Specimen: Blood from Arm, Left Updated: 02/28/24 0027    Blood Culture - Blood, Arm, Right [[id number]] Collected: 02/28/24 0011    Specimen: Blood from Arm, Right Updated: 02/28/24 0027    Lactic Acid, Plasma [[id number]]  (Normal) Collected: 02/28/24 0011    Specimen: Blood Updated: 02/28/24 0047     Lactate 1.3 mmol/L     Procalcitonin [[id number]]  (Normal) Collected: 02/28/24 0011    Specimen: Blood Updated: 02/28/24 0058     Procalcitonin 0.24 ng/mL     Narrative:      As a Marker for Sepsis (Non-Neonates):    1. <0.5 ng/mL represents a low risk of severe sepsis and/or septic shock.  2. >2 ng/mL represents a high risk of severe sepsis and/or septic shock.    As a Marker for Lower Respiratory Tract Infections that require antibiotic therapy:    PCT on Admission    Antibiotic Therapy       6-12 Hrs later    >0.5                Strongly Recommended  >0.25 - <0.5        Recommended   0.1 - 0.25          Discouraged              Remeasure/reassess PCT  <0.1                Strongly Discouraged     Remeasure/reassess PCT    As 28 day mortality risk marker: \"Change in Procalcitonin Result\" (>80% or <=80%) if Day 0 (or Day 1) and Day 4 values are available. Refer to http://www.[facility]-pct-calculator.com    Change in PCT <=80%  A " decrease of PCT levels below or equal to 80% defines a positive change in PCT test result representing a higher risk for 28-day all-cause mortality of patients diagnosed with severe sepsis for septic shock.    Change in PCT >80%  A decrease of PCT levels of more than 80% defines a negative change in PCT result representing a lower risk for 28-day all-cause mortality of patients diagnosed with severe sepsis or septic shock.       CBC Auto Differential [[id number]]  (Abnormal) Collected: 02/28/24 0011    Specimen: Blood Updated: 02/28/24 0033     WBC 16.09 10*3/mm3      RBC 3.66 10*6/mm3      Hemoglobin 12.5 g/dL      Hematocrit 35.2 %      MCV 96.2 fL      MCH 34.2 pg      MCHC 35.5 g/dL      RDW 14.4 %      RDW-SD 49.3 fl      MPV 11.2 fL      Platelets 330 10*3/mm3      Neutrophil % 79.1 %      Lymphocyte % 8.0 %      Monocyte % 7.1 %      Eosinophil % 0.6 %      Basophil % 0.7 %      Immature Grans % 4.5 %      Neutrophils, Absolute 12.73 10*3/mm3      Lymphocytes, Absolute 1.28 10*3/mm3      Monocytes, Absolute 1.15 10*3/mm3      Eosinophils, Absolute 0.10 10*3/mm3      Basophils, Absolute 0.11 10*3/mm3      Immature Grans, Absolute 0.72 10*3/mm3      nRBC 0.0 /100 WBC     Respiratory Panel PCR w/COVID-19(SARS-CoV-2) [facility]-House, NP Swab in UTM/VTM, 2 HR TAT - Swab, Nasopharynx [[id number]]  (Abnormal) Collected: 02/28/24 0012    Specimen: Swab from Nasopharynx Updated: 02/28/24 0144     ADENOVIRUS, PCR Not Detected     Coronavirus 229E Not Detected     Coronavirus HKU1 Not Detected     Coronavirus NL63 Not Detected     Coronavirus OC43 Not Detected     COVID19 Not Detected     Human Metapneumovirus Not Detected     Human Rhinovirus/Enterovirus Not Detected     Influenza A H3 Detected     Influenza B PCR Not Detected     Parainfluenza Virus 1 Not Detected     Parainfluenza Virus 2 Not Detected     Parainfluenza Virus 3 Not Detected     Parainfluenza Virus 4 Not Detected     RSV, PCR Not  Detected     Bordetella pertussis pcr Not Detected     Bordetella parapertussis PCR Not Detected     Chlamydophila pneumoniae PCR Not Detected     Mycoplasma pneumo by PCR Not Detected    Narrative:      In the setting of a positive respiratory panel with a viral infection PLUS a negative procalcitonin without other underlying concern for bacterial infection, consider observing off antibiotics or discontinuation of antibiotics and continue supportive care. If the respiratory panel is positive for atypical bacterial infection (Bordetella pertussis, Chlamydophila pneumoniae, or Mycoplasma pneumoniae), consider antibiotic de-escalation to target atypical bacterial infection.    Basic Metabolic Panel [[id number]]  (Abnormal) Collected: 02/28/24 0608    Specimen: Blood Updated: 02/28/24 0656     Glucose 92 mg/dL      BUN 23 mg/dL      Creatinine 1.15 mg/dL      Sodium 135 mmol/L      Potassium 3.0 mmol/L      Chloride 96 mmol/L      CO2 24.0 mmol/L      Calcium 8.8 mg/dL      BUN/Creatinine Ratio 20.0     Anion Gap 15.0 mmol/L      eGFR 48.3 mL/min/1.73     Narrative:      GFR Normal >60  Chronic Kidney Disease <60  Kidney Failure <15    The GFR formula is only valid for adults with stable renal function between ages 18 and 70.    CBC (No Diff) [[id number]]  (Abnormal) Collected: 02/28/24 0608    Specimen: Blood Updated: 02/28/24 0636     WBC 13.47 10*3/mm3      RBC 3.41 10*6/mm3      Hemoglobin 11.3 g/dL      Hematocrit 32.3 %      MCV 94.7 fL      MCH 33.1 pg      MCHC 35.0 g/dL      RDW 14.4 %      RDW-SD 48.7 fl      MPV 11.5 fL      Platelets 295 10*3/mm3     Magnesium [[id number]]  (Normal) Collected: 02/28/24 0608    Specimen: Blood Updated: 02/28/24 0656     Magnesium 2.1 mg/dL             Imaging Results (Last 24 Hours)       Procedure Component Value Units Date/Time    CT Chest Without Contrast Diagnostic [[id number]] Collected: 02/28/24 0138     Updated: 02/28/24 0138    Narrative:        Patient: SHANNAN LIMA   Time Out: 01:38  Exam(s): CT CHEST Without Contrast     EXAM:    CT Chest Without Intravenous Contrast    CLINICAL HISTORY:     Reason for exam: pneumonia.    TECHNIQUE:    Axial computed tomography images of the chest without intravenous   contrast.  CTDI is 6.35 mGy and DLP is 252.1 mGy-cm.  This CT exam was   performed according to the principle of ALARA (As Low As Reasonably   Achievable) by using one or more of the following dose reduction   techniques: automated exposure control, adjustment of the mA and or kV   according to patient size, and or use of iterative reconstruction   technique.    COMPARISON:    No relevant prior studies available.    FINDINGS:    Lungs:  Innumerable bilateral centrilobular subcentimeter nodules, many   demonstrating tree-in-bud branching.  Findings compatible with   endobronchial spread of infection and or tumor.  Areas of atelectasis in   both lungs including prominent plate like atelectasis right lung base.    Bilateral mild bronchiectasis.    Pleural space:  Trace amount of pleural fluid bilaterally.  No   pneumothorax.    Heart:  Moderate cardiac enlargement.  No significant pericardial   effusion.  No significant coronary artery calcifications.    Mediastinum:  Small hiatal hernia.    Bones joints:  Unremarkable.  No acute fracture.  No dislocation.    Soft tissues:  Indeterminate soft tissue mass measuring 21 mm within   the subcutaneous fat anterior abdominal wall near the level of the   gallbladder fossa.    Vasculature:  Unremarkable.  No thoracic aortic aneurysm.    Lymph nodes:  Unremarkable.  No enlarged lymph nodes.    IMPRESSION:         Bilateral centrilobular nodules, many with tree in bud branching   compatible with endobronchial spread of tumor or infection.    Indeterminate soft tissue focus anterior abdominal wall on the right.      Impression:          Electronically signed by Kash Infante DO, Diplomate [employer] Board of   Radiology on 02-28-24 at 0138     XR Chest 1 View [[id number]] Collected: 02/28/24 0024     Updated: 02/28/24 0024    Narrative:        Patient: SHANNAN LIMA  Time Out: 00:24  Exam(s): XR CXR 1 VIEW     EXAM:    XR Chest, 1 View    CLINICAL HISTORY:     Reason for exam: short of breath.    TECHNIQUE:    Frontal view of the chest.    COMPARISON:    6 15 23    FINDINGS:    Lungs:  Unremarkable.  No consolidation.    Pleural space:  Unremarkable.  No pneumothorax.    Heart:  Mild to moderate enlargement of the cardiac pericardial   silhouette.    Mediastinum:  Unremarkable.  Normal mediastinal contour.    Bones joints:  Unremarkable.  No acute fracture.    Tubes, lines and devices:  No change in position of dual-chamber ICD   with generator overlying the left hemithorax.    IMPRESSION:         No acute radiographic abnormality      Impression:          Electronically signed by Kash Infante DO Diplomate [employer] Board of   Radiology on 02-28-24 at 0024            Results for orders placed in visit on 06/08/23    Adult Transthoracic Echo Complete W/ Cont if Necessary Per Protocol    Interpretation Summary    Left ventricular ejection fraction appears to be 56 - 60%.    Left ventricular diastolic function was indeterminate.    The left atrial cavity is moderately dilated.    Left atrial volume is moderately increased.    The right atrial cavity is borderline dilated.    Estimated right ventricular systolic pressure from tricuspid regurgitation is normal (<35 mmHg).    Mild pulmonary hypertension is present.      SCANNED - TELEMETRY     Final Result      SCANNED - TELEMETRY     Final Result           Assessment/Plan     Active Hospital Problems    Diagnosis  POA    **Pneumonia [J18.9]  Yes    Pneumonia of both lungs due to infectious organism [J18.9]  Yes    Hypokalemia [E87.6]  Unknown    Stage 3a chronic kidney disease [N18.31]  Unknown    Influenza A [J10.1]  Unknown    Chronic atrial fibrillation [I48.20]  Yes    Benign essential HTN [I10]  Yes       Resolved Hospital Problems   No resolved problems to display.       Ms. Forrest is a 78 y.o. non-smoker with a history of atrial fibrillation, hypertension, dementia, GERD, ulcerative colitis, reported untreated H. pylori (followed by infectious disease providers at [facility])  who presents to [facility] ER with chief complaint of cough and abnormal chest x-ray and admitted for pneumonia.      Pneumonia: Most likely due to complications of influenza A.  See plan below.  Plan to continue IV ceftriaxone for now pending blood cultures x 2.  Leukocytosis improving with trend as well.      Abnormal CT chest concerning for endobronchial tumor versus infection versus other: Unremarkable procalcitonin.  Consult pulmonology to advise further given history of renal cell carcinoma status post nephrectomy with Dr. Stratton at [facility] January 2024.      Chronic atrial fibrillation: Beta-blocker resumed per home dose regimen.  Hold parameters in place.  Monitor blood pressure and heart rate trends.      Benign essential HTN: See plan above.      Hypokalemia:  Replete in place.  Repeat labs in a.m.      Stage 3a chronic kidney disease: Serum creatinine similar compared to priors.  Avoid nephrotoxins.  Monitor labs.  Gentle IV fluids continued for now.  Monitor for fluid volume overload.      Influenza A: Confirmed on PCR.  S/p Tamiflu PTA.    I discussed the patient's findings and my recommendations with patient and nursing staff, family, & Dr. Hernandez.    VTE Prophylaxis - SCDs.  Code Status - Full code.       JORY Worrell  [facility]  02/28/24  12:32 EST

## 2024-02-28 NOTE — PROGRESS NOTES
Nutrition Services    Patient Name:  Raina Forrest  YOB: 1945  MRN: [id number]  Admit Date:  2/27/2024    Assessment Date:  02/28/24    Summary: Nutrition assessment triggered by MST score of 3 per nurse admission screen    78 y.o. female admitted with PNA, + flu A.  Decreased PO intake.  Hypokalemia.    Spoke with patient and son at bedside.  Lives at home with her , the 2 of them typically eat well at home 2 good size meals/day + 1 smaller meal/snack.  Poor PO intake over the past couple of weeks.  Weight loss of 9 lb (6.5%) since beginning of February.    Patient reports fair/pretty good appetite.  Son reports she is eating better today than she has been.  Took patient's dinner order so that she may get what she wants.  Encouraged PO intake.    Patient meets ASPEN/AND criteria for nutrition diagnosis of severe malnutrition of acute illness based on: weight loss, poor PO intake, nutrition focused physical exam.    Plans/Recommendations:  Adding Boost Plus daily to help promote kcal and protein intake  Encouraged PO intake    RD to continue to follow closely.    CLINICAL NUTRITION ASSESSMENT      Reason for Assessment MST score 2+     Diagnosis/Problem   PNA, flu A   Medical/Surgical History Past Medical History:   Diagnosis Date    Atrial fibrillation     Fung's esophagus     Benign essential hypertension     Blood clot in vein     Cardiomyopathy     Chronic gastritis     Congestive heart failure     Degenerative disc disease     Dementia     Pt  states she has slight dementia    Depression with anxiety     Disease of thyroid gland     Diverticulitis of colon     Dysphagia     GERD (gastroesophageal reflux disease)     History of chest pain     History of migraine     History of migraine headaches     Left knee pain     Osteoporosis     Palpitations     Thyroid disorder     Thyroid nodule     Ulcerative colitis        Past Surgical History:   Procedure Laterality Date     "APPENDECTOMY      BLADDER SURGERY      CARDIAC CATHETERIZATION N/A 11/21/2016    Procedure: Left Heart Cath;  Surgeon: Robinson Salazar MD;  Location:  [facility];  Service:     CARDIAC ELECTROPHYSIOLOGY PROCEDURE N/A 2/7/2017    Procedure: Pacemaker DC new   MEDTRONIC;  Surgeon: Robinson Salazar MD;  Location:  [facility];  Service:     CARDIOVERSION  01/18/2017    CHOLECYSTECTOMY      COLONOSCOPY      COLONOSCOPY N/A 7/29/2016    normal    ENDOSCOPY  05/06/2015    submucosal nodule in esophagus, erythematous mucosa in antrum,moderate acute gastritis, no h-pylori    ENDOSCOPY N/A 9/2/2016    Erythematous mucosa in the antrum    EYE SURGERY Left     cataract    HYSTERECTOMY      KNEE MENISCAL REPAIR Left     THYROID SURGERY Left     TONSILLECTOMY      TOTAL KNEE ARTHROPLASTY Left 6/16/2020    Procedure: LEFT TOTAL KNEE ARTHOPLASTY+;  Surgeon: Jamal Castañeda MD;  Location: [facility] MAIN OR;  Service: Orthopedics;  Laterality: Left;        Anthropometrics        Current Height  Current Weight  BMI kg/m2 Height: 177.8 cm (70\")  Weight: 59.2 kg (130 lb 8 oz) (02/28/24 0550)  Body mass index is 18.72 kg/m².   Adjusted BMI (if applicable)    BMI Category Normal/Healthy (18.4 - 24.9)   Ideal Body Weight (IBW) 150 lb (68.2 kg)   Usual Body Weight (UBW) 130-152 lb   Weight Trend Loss, Amount/Timeframe: 9 lb (6.5%) x 1 month   Weight History Wt Readings from Last 30 Encounters:   02/28/24 0550 59.2 kg (130 lb 8 oz)   02/27/24 2244 63.5 kg (140 lb)   01/22/24 1254 64.9 kg (143 lb)   01/22/24 1251 64.9 kg (143 lb)   11/28/23 1037 62.6 kg (138 lb)   10/24/23 1027 61.2 kg (135 lb)   07/10/23 1046 63.1 kg (139 lb 3.2 oz)   06/19/23 0455 67.2 kg (148 lb 3.2 oz)   06/18/23 0547 68.9 kg (152 lb)   06/17/23 0429 68 kg (150 lb)   06/16/23 0002 65.6 kg (144 lb 9.6 oz)   06/15/23 1954 59 kg (130 lb)   06/08/23 1107 68.2 kg (150 lb 6.4 oz)   03/21/23 1308 64.2 kg (141 lb 9.6 oz)   03/15/23 " 1410 64 kg (141 lb)   03/07/23 1241 66.5 kg (146 lb 9.6 oz)   03/01/23 1424 65.8 kg (145 lb)   02/24/23 1031 64.4 kg (142 lb)   02/21/23 1239 61.7 kg (136 lb)   02/17/23 1313 62 kg (136 lb 9.6 oz)   02/14/23 1404 62 kg (136 lb 9.6 oz)   02/12/23 0356 63.5 kg (140 lb 1.6 oz)   02/11/23 0518 62.9 kg (138 lb 9.6 oz)   02/10/23 1003 65.1 kg (143 lb 6.6 oz)   02/10/23 0941 62.6 kg (138 lb)   02/09/23 1642 62.6 kg (138 lb 0.1 oz)   02/09/23 1316 62.6 kg (138 lb)   12/06/22 0900 64.9 kg (143 lb)   06/07/22 0930 69.9 kg (154 lb)   04/19/22 0935 71.7 kg (158 lb)   01/13/22 0950 71.7 kg (158 lb)   07/22/21 0907 71.7 kg (158 lb)   07/14/21 0852 73.9 kg (163 lb)   06/21/21 1439 73.9 kg (163 lb)   04/05/21 1317 73.5 kg (162 lb)   03/30/21 0854 77.1 kg (170 lb)   03/30/21 0825 77.1 kg (170 lb)   12/10/20 0934 77.1 kg (170 lb)   12/30/20 1432 77.1 kg (170 lb)      --  Estimated/Assessed Needs        Current Weight  Weight: 59.2 kg (130 lb 8 oz) (02/28/24 0550)       Energy Requirements    Weight for Calculation 130 lb (59.2 kg)   Method for Estimation  30-35 kcal/kg   EST Needs (kcal/day) 3168-7597       Protein Requirements    Weight for Calculation 130 lb (59.2 kg)   EST Protein Needs (g/kg) 1.2 - 1.5 gm/kg   EST Daily Needs (g/day) 71-89       Fluid Requirements     Method for Estimation 1 mL/kcal    EST Needs (mL/day)      Labs       Pertinent Labs    Results from last 7 days   Lab Units 02/28/24  0608 02/28/24  0011   SODIUM mmol/L 135* 132*   POTASSIUM mmol/L 3.0* 3.2*   CHLORIDE mmol/L 96* 92*   CO2 mmol/L 24.0 23.0   BUN mg/dL 23 26*   CREATININE mg/dL 1.15* 1.26*   CALCIUM mg/dL 8.8 9.0   BILIRUBIN mg/dL  --  1.9*   ALK PHOS U/L  --  90   ALT (SGPT) U/L  --  20   AST (SGOT) U/L  --  27   GLUCOSE mg/dL 92 99     Results from last 7 days   Lab Units 02/28/24  0608 02/28/24  0011   MAGNESIUM mg/dL 2.1  --    HEMOGLOBIN g/dL 11.3* 12.5   HEMATOCRIT % 32.3* 35.2   WBC 10*3/mm3 13.47* 16.09*   ALBUMIN g/dL  --  3.9     Results  from last 7 days   Lab Units 02/28/24  0608 02/28/24  0011   PLATELETS 10*3/mm3 295 330     COVID19   Date Value Ref Range Status   02/28/2024 Not Detected Not Detected - Ref. Range Final     Lab Results   Component Value Date    HGBA1C 5.40 02/10/2023          Medications           Scheduled Medications aspirin, 81 mg, Oral, Daily  bisoprolol, 10 mg, Oral, Daily  [START ON 2/29/2024] cefTRIAXone, 1,000 mg, Intravenous, Q24H  donepezil, 10 mg, Oral, Daily  empagliflozin, 10 mg, Oral, Daily  ferrous sulfate, 325 mg, Oral, Daily With Breakfast  levothyroxine, 25 mcg, Oral, Q AM  pantoprazole, 40 mg, Oral, Daily  sertraline, 150 mg, Oral, QAM  sodium chloride, 10 mL, Intravenous, Q12H       Infusions sodium chloride, 50 mL/hr, Last Rate: 50 mL/hr (02/28/24 1134)       PRN Medications   acetaminophen **OR** acetaminophen **OR** acetaminophen    senna-docusate sodium **AND** polyethylene glycol **AND** bisacodyl **AND** bisacodyl    calcium carbonate    Calcium Replacement - Follow Nurse / BPA Driven Protocol    Magnesium Standard Dose Replacement - Follow Nurse / BPA Driven Protocol    nitroglycerin    ondansetron ODT **OR** ondansetron    Phosphorus Replacement - Follow Nurse / BPA Driven Protocol    Potassium Replacement - Follow Nurse / BPA Driven Protocol    Potassium Replacement - Follow Nurse / BPA Driven Protocol    sodium chloride    sodium chloride     Physical Findings          General Findings alert, disoriented, loss of muscle mass, loss of subcutaneous fat   Oral/Mouth Cavity dental appliance   Edema  no edema   Gastrointestinal normoactive   Skin  bruising, other: incision, mass   Tubes/Drains/Lines none   NFPE See Malnutrition Severity Assessment, Date Completed: 2/28   --  Malnutrition Severity Assessment      Patient meets criteria for : Severe Malnutrition  Malnutrition Type (last 8 hours)       Malnutrition Severity Assessment       Row Name 02/28/24 1239       Malnutrition Severity Assessment     Malnutrition Type Acute Disease or Injury - Related Malnutrition      Row Name 02/28/24 1239       Insufficient Energy Intake     Insufficient Energy Intake Findings Severe    Insufficient Energy Intake  < or equal to 50% of est. energy requirement for > or equal to 5d)      Row Name 02/28/24 1239       Unintentional Weight Loss     Unintentional Weight Loss Findings Severe    Unintentional Weight Loss  Weight loss greater than 5% in one month      Row Name 02/28/24 1239       Muscle Loss    Loss of Muscle Mass Findings Severe    Gnosticist Region Severe - deep hollowing/scooping, lack of muscle to touch, facial bones well defined    Clavicle Bone Region Severe - protruding prominent bone    Dorsal Hand Region Severe - prominent depression    Patellar Region Moderate - patella more prominent, less muscle definition around patella      Row Name 02/28/24 1239       Fat Loss    Subcutaneous Fat Loss Findings Severe    Orbital Region  Severe - pronounced hollowness/depression, dark circles, loose saggy skin    Upper Arm Region Moderate - some fat tissue, not ample      Row Name 02/28/24 1239       Criteria Met (Must meet criteria for severity in at least 2 of these categories: M Wasting, Fat Loss, Fluid, Secondary Signs, Wt. Status, Intake)    Patient meets criteria for  Severe Malnutrition                     Current Nutrition Orders & Evaluation of Intake       Oral Nutrition     Food Allergies NKFA   Current PO Diet Diet: Regular/House Diet; Texture: Regular Texture (IDDSI 7); Fluid Consistency: Thin (IDDSI 0)   Supplement n/a   PO Evaluation     % PO Intake 100% x 1 meal, eating better today per son report    Factors Affecting Intake: decreased appetite   --  PES STATEMENT / NUTRITION DIAGNOSIS      Nutrition Dx Problem  Problem: Malnutrition (severe)  Etiology: Medical Diagnosis - flu A, PNA and Factors Affecting Nutrition - decreased appetite/intake    Signs/Symptoms: Report of Minimal PO Intake, NFPE Results,  Unintended Weight Change, and Report/Observation     NUTRITION INTERVENTION / PLAN OF CARE      Intervention Goal(s) Maintain nutrition status, Reduce/improve symptoms, Meet estimated needs, Disease management/therapy, Tolerate PO , Maintain intake, and Maintain weight         RD Intervention/Action Encourage intake, Continue to monitor, Care plan reviewed, and Recommend/order: ONS   --      Prescription/Orders:       PO Diet       Supplements Boost Plus daily      Enteral Nutrition       Parenteral Nutrition    New Prescription Ordered? Yes   --      Monitor/Evaluation Per protocol, PO intake, Supplement intake, Pertinent labs, Weight, Skin status, Symptoms   Discharge Plan/Needs Pending clinical course   --    RD to follow per protocol.      Electronically signed by:  Jael Ruffin RD  02/28/24 12:34 EST

## 2024-02-28 NOTE — PLAN OF CARE
Goal Outcome Evaluation:           Progress: improving  Outcome Evaluation: VSS; Oriented to all except situation. Pt received up from the ER this am. Pt remains paced with Afib underlying. Pt with lapsites from kidney surgery and knot under skin at lapsite. Pt w/o c/o pain. Pt expresses no additional needs at this time. Plan of care is ongoing.

## 2024-02-28 NOTE — CONSULTS
Referring Provider:   Reason for Consultation: Dr. Rowe    Patient Care Team:  Georgiana Cisneros MD as PCP - General (Pediatrics)  Nathan Johnson MD as Resident (Gastroenterology)  Robinson Salazar MD as Consulting Physician (Cardiology)    Chief complaint:   Cough and abnormal CXR    History of present illness:    Subjective   This is a 78-year-old female patient, lifelong non-smoker with no prior history of lung disease.  She has a history of UC and Alzheimer dementia.  Recent diagnosis of left RCC stage I s/p resection January 2024.    She started feeling sick on 2/17 with URI symptoms.  She tested positive for influenza A on 2/18 when she visited the urgent care clinic.  She was prescribed Tamiflu which she finished.  However her symptoms continued to get worse.  Over the last few days, she progressed into not eating much and not drinking and had significant weakness and deep cough with rattling sound and dark phlegm.  No more fever.  Her  has similar symptoms and tested positive for influenza A.    CTA chest showed no PE but tree-in-bud pulmonary infiltrates throughout, peribronchial cuffing and bandlike atelectasis in the RLL.    In ED, Tmax 99.5.  Max HR 85.  Labs: RVP positive for influenza A; WBC 16 K; neutrophils 79%. Procal: 0.24.     Review of Systems  Limited from the patient due to dementia.  Most of the information were obtained from her son at bedside.  ROS negative except the above.    History  Past Medical History:   Diagnosis Date    Atrial fibrillation     Fung's esophagus     Benign essential hypertension     Blood clot in vein     Cardiomyopathy     Chronic gastritis     Congestive heart failure     Degenerative disc disease     Dementia     Pt  states she has slight dementia    Depression with anxiety     Disease of thyroid gland     Diverticulitis of colon     Dysphagia     GERD (gastroesophageal reflux disease)     History of chest pain      History of migraine     History of migraine headaches     Left knee pain     Osteoporosis     Palpitations     Thyroid disorder     Thyroid nodule     Ulcerative colitis    ,   Past Surgical History:   Procedure Laterality Date    APPENDECTOMY      BLADDER SURGERY      CARDIAC CATHETERIZATION N/A 11/21/2016    Procedure: Left Heart Cath;  Surgeon: Robinson Salazar MD;  Location: [facility] CATH INVASIVE LOCATION;  Service:     CARDIAC ELECTROPHYSIOLOGY PROCEDURE N/A 2/7/2017    Procedure: Pacemaker DC new   MEDTRONIC;  Surgeon: Robinson Salazar MD;  Location: [facility] CATH INVASIVE LOCATION;  Service:     CARDIOVERSION  01/18/2017    CHOLECYSTECTOMY      COLONOSCOPY      COLONOSCOPY N/A 7/29/2016    normal    ENDOSCOPY  05/06/2015    submucosal nodule in esophagus, erythematous mucosa in antrum,moderate acute gastritis, no h-pylori    ENDOSCOPY N/A 9/2/2016    Erythematous mucosa in the antrum    EYE SURGERY Left     cataract    HYSTERECTOMY      KNEE MENISCAL REPAIR Left     THYROID SURGERY Left     TONSILLECTOMY      TOTAL KNEE ARTHROPLASTY Left 6/16/2020    Procedure: LEFT TOTAL KNEE ARTHOPLASTY+;  Surgeon: Jamal Castañeda MD;  Location: [facility] MAIN OR;  Service: Orthopedics;  Laterality: Left;   ,   Family History   Problem Relation Age of Onset    Lung cancer Other     Ulcerative colitis Sister     Heart failure Mother     Heart disease Mother     Heart failure Father     Heart disease Father     Malig Hyperthermia Neg Hx    ,   Social History     Socioeconomic History    Marital status:    Tobacco Use    Smoking status: Never    Smokeless tobacco: Never   Vaping Use    Vaping Use: Never used   Substance and Sexual Activity    Alcohol use: No    Drug use: No    Sexual activity: Defer     Birth control/protection: Surgical     E-cigarette/Vaping    E-cigarette/Vaping Use Never User     Passive Exposure No     Counseling Given No      E-cigarette/Vaping Substances    Nicotine No     THC No     CBD  No     Flavoring No      E-cigarette/Vaping Devices    Disposable No     Pre-filled or Refillable Cartridge No     Refillable Tank No     Pre-filled Pod No          ,   Medications Prior to Admission   Medication Sig Dispense Refill Last Dose    aspirin 81 MG chewable tablet Chew 1 tablet Daily. Indications: Cancer of the Colon, Disease involving Lipid Deposits in the Arteries, Kawasaki Syndrome   2/27/2024 at 0930    bisoprolol (ZEBeta) 10 MG tablet TAKE ONE TABLET BY MOUTH DAILY 30 tablet 5 2/27/2024 at 0930    Calcium Carb-Cholecalciferol (Oyster Shell Calcium w/D) 500-5 MG-MCG tablet Take 1 tablet by mouth Every Evening.   2/26/2024 at 0930    donepezil (ARICEPT) 10 MG tablet Take 1 tablet by mouth Daily. Indications: Alzheimer's Disease   2/27/2024 at 0930    Jardiance 10 MG tablet tablet TAKE ONE TABLET BY MOUTH DAILY 30 tablet 5 2/27/2024 at 0930    KLOR-CON 10 MEQ CR tablet TAKE 1 TABLET BY MOUTH EVERY DAY 90 tablet 1 2/26/2024 at 0930    levothyroxine (SYNTHROID, LEVOTHROID) 25 MCG tablet Take 1 tablet by mouth Every Morning. 30 tablet 11 2/27/2024 at 0930    Multiple Vitamins-Minerals (Womens 50+ Multi Vitamin) tablet Take 1 tablet by mouth Daily.   2/26/2024 at 0930    pantoprazole (PROTONIX) 40 MG EC tablet Take 20 mg by mouth Daily. Indications: Gastroesophageal Reflux Disease, Heartburn   2/27/2024 at 0930    sertraline (ZOLOFT) 100 MG tablet Take 1.5 tablets by mouth Every Morning. Indications: Generalized Anxiety Disorder   2/27/2024 at 0930    spironolactone (ALDACTONE) 50 MG tablet Take 1 tablet by mouth Daily. Indications: Edema 90 tablet 3 2/27/2024 at 0930    sucralfate (CARAFATE) 1 g tablet Take 1 tablet by mouth Daily. TAKE 30 MINUTES TO 1 HOUR BEFORE MEALS  Indications: Gastroesophageal Reflux Disease   Past Week    torsemide (DEMADEX) 20 MG tablet TAKE ONE TABLET BY MOUTH DAILY 90 tablet 2 2/27/2024 at 0930    Acetylcysteine capsule capsule TAKE 1 CAPSULE BY MOUTH DAILY FOR 7 DAYS. (Patient  not taking: Reported on 2/28/2024)   Not Taking    docusate sodium (COLACE) 250 MG capsule Take 1 capsule by mouth 2 (Two) Times a Day As Needed for Constipation. (Patient not taking: Reported on 2/28/2024) 30 capsule 1 Not Taking    ferrous sulfate 325 (65 FE) MG tablet Take 1 tablet by mouth Daily With Breakfast. (Patient not taking: Reported on 2/28/2024) 30 tablet 0 Not Taking    potassium chloride 10 MEQ CR tablet TAKE ONE TABLET BY MOUTH AT NOON (Patient not taking: Reported on 2/28/2024)   Not Taking    SUMAtriptan (IMITREX) 100 MG tablet Take 0.5 tablets by mouth Every 2 (Two) Hours As Needed for Migraine. Indications: Migraine Headache (Patient not taking: Reported on 2/28/2024)   Not Taking   , Scheduled Meds:  aspirin, 81 mg, Oral, Daily  bisoprolol, 10 mg, Oral, Daily  [START ON 2/29/2024] cefTRIAXone, 1,000 mg, Intravenous, Q24H  donepezil, 10 mg, Oral, Daily  empagliflozin, 10 mg, Oral, Daily  ferrous sulfate, 325 mg, Oral, Daily With Breakfast  levothyroxine, 25 mcg, Oral, Q AM  pantoprazole, 40 mg, Oral, Daily  sertraline, 150 mg, Oral, QAM  sodium chloride, 10 mL, Intravenous, Q12H   , Continuous Infusions:  sodium chloride, 50 mL/hr, Last Rate: 50 mL/hr (02/28/24 1134)   , PRN Meds:    acetaminophen **OR** acetaminophen **OR** acetaminophen    senna-docusate sodium **AND** polyethylene glycol **AND** bisacodyl **AND** bisacodyl    calcium carbonate    Calcium Replacement - Follow Nurse / BPA Driven Protocol    Magnesium Standard Dose Replacement - Follow Nurse / BPA Driven Protocol    nitroglycerin    ondansetron ODT **OR** ondansetron    Phosphorus Replacement - Follow Nurse / BPA Driven Protocol    Potassium Replacement - Follow Nurse / BPA Driven Protocol    Potassium Replacement - Follow Nurse / BPA Driven Protocol    sodium chloride    sodium chloride, and Allergies:  Bupivacaine hcl, Nepafenac, Bactrim [sulfamethoxazole-trimethoprim], Barium-containing compounds, Bextra [valdecoxib],  Cortisone, Cymbalta [duloxetine hcl], Iodinated contrast media, Medrol [methylprednisolone], Mesalamine, Polymyxin b-trimethoprim, Rivaroxaban, Tetanus toxoids, and Tetanus-diphtheria toxoids td    Objective     Vital Signs   Temp:  [97.4 °F (36.3 °C)-99.5 °F (37.5 °C)] 99.5 °F (37.5 °C)  Heart Rate:  [72-85] 79  Resp:  [16] 16  BP: ()/(59-72) 121/69    PPE used per hospital policy    Physical Exam:  Constitutional: Not in acute distress.  Eyes: Injected conjunctivae, EOMI. pupils equal reactive to light.  ENMT: Hankins 3.  Moist tongue.  Neck:  Trachea midline. No thyromegaly  Heart: RRR, no murmur  Lungs: Equal but diminished air entry throughout.  Very coarse breath sounds with inspiratory squeaks and bilateral rhonchi.  Abdomen: Obese.  Palpable subcutaneous nodule in the left lower quadrant.  Soft. No tenderness or dullness. No HSM.  Extremities: No cyanosis, clubbing or pitting edema.  Warm extremities and well-perfused.  Neuro: Conscious, alert, oriented x3.  Strength 5/5 in arms.  Psych: Appropriate mood and affect.    Integumentary: No rash.  Normal skin turgor  Lymphatic: No palpable cervical or supraclavicular lymph nodes.      Diagnostic imaging:  I personally and independently reviewed the following images:   CTA chest 2/28/2024: Parabronchial cuffing,.  Tree-in-bud pulmonary infiltrates.  Bandlike atelectasis is in the RLL.    Laboratory workup:    Results from last 7 days   Lab Units 02/28/24  0608 02/28/24  0011   SODIUM mmol/L 135* 132*   POTASSIUM mmol/L 3.0* 3.2*   CHLORIDE mmol/L 96* 92*   CO2 mmol/L 24.0 23.0   BUN mg/dL 23 26*   CREATININE mg/dL 1.15* 1.26*   GLUCOSE mg/dL 92 99   CALCIUM mg/dL 8.8 9.0         Results from last 7 days   Lab Units 02/28/24  0608 02/28/24  0011   WBC 10*3/mm3 13.47* 16.09*   HEMOGLOBIN g/dL 11.3* 12.5   HEMATOCRIT % 32.3* 35.2   PLATELETS 10*3/mm3 295 330               Assessment   Bilateral pneumonia, viral.  Bacterial pneumonia is felt to be less  likely.  Right lower lobe atelectasis  Influenza A infection    Recommendations:    Symptomatic treatment for the viral illness.  DuoNeb 4 times a day, flutter 4 times a day and incentive spirometer.  Check urine strep antigen and sputum culture.  Doubt bacterial pneumonia but no strong objection to continue short course of antibiotic therapy perhaps for 3-5 days.        Nilsa Lundy MD  02/28/24  15:16 EST

## 2024-02-28 NOTE — THERAPY EVALUATION
Patient Name: Raina Forrest  : 1945    MRN: [id number]                              Today's Date: 2024       Admit Date: 2024    Visit Dx:     ICD-10-CM ICD-9-CM   1. Pneumonia of both lungs due to infectious organism, unspecified part of lung  J18.9 483.8   2. Influenza A  J10.1 487.1   3. Dehydration  E86.0 276.51     Patient Active Problem List   Diagnosis    Chronic atrial fibrillation    Benign essential HTN    Bradycardia    Chest pain    Can't get food down    Accumulation of fluid in tissues    Esophageal lump    Adynamia    Itch of skin    Anorexia    Chronic nausea    Gastroesophageal reflux disease    Breath shortness    Emesis    Decreased body weight    Anxiety    Narrowing of intervertebral disc space    Diverticulosis of intestine    Primary hypertension    Migraine    Osteoporosis    Palpitations    Pituitary adenoma    Sick sinus syndrome    Diarrhea    Cardiomyopathy    Chronic anticoagulation    Atrial fibrillation    Anticoagulated    Pacemaker    Infected pacemaker    Wound infection    Primary osteoarthritis of left knee    DJD (degenerative joint disease)    Ischemic cardiomyopathy    Acute on chronic congestive heart failure, unspecified heart failure type    Anemia    Presence of Watchman left atrial appendage closure device    Pneumonia    Pneumonia of both lungs due to infectious organism    Hypokalemia    Stage 3a chronic kidney disease    Influenza A     Past Medical History:   Diagnosis Date    Atrial fibrillation     Fung's esophagus     Benign essential hypertension     Blood clot in vein     Cardiomyopathy     Chronic gastritis     Congestive heart failure     Degenerative disc disease     Dementia     Pt  states she has slight dementia    Depression with anxiety     Disease of thyroid gland     Diverticulitis of colon     Dysphagia     GERD (gastroesophageal reflux disease)     History of chest pain     History of migraine     History of migraine  headaches     Left knee pain     Osteoporosis     Palpitations     Thyroid disorder     Thyroid nodule     Ulcerative colitis      Past Surgical History:   Procedure Laterality Date    APPENDECTOMY      BLADDER SURGERY      CARDIAC CATHETERIZATION N/A 11/21/2016    Procedure: Left Heart Cath;  Surgeon: Robinson Salazar MD;  Location:  [facility];  Service:     CARDIAC ELECTROPHYSIOLOGY PROCEDURE N/A 2/7/2017    Procedure: Pacemaker DC new   [employer];  Surgeon: Robinson Salazar MD;  Location:  [facility];  Service:     CARDIOVERSION  01/18/2017    CHOLECYSTECTOMY      COLONOSCOPY      COLONOSCOPY N/A 7/29/2016    normal    ENDOSCOPY  05/06/2015    submucosal nodule in esophagus, erythematous mucosa in antrum,moderate acute gastritis, no h-pylori    ENDOSCOPY N/A 9/2/2016    Erythematous mucosa in the antrum    EYE SURGERY Left     cataract    HYSTERECTOMY      KNEE MENISCAL REPAIR Left     THYROID SURGERY Left     TONSILLECTOMY      TOTAL KNEE ARTHROPLASTY Left 6/16/2020    Procedure: LEFT TOTAL KNEE ARTHOPLASTY+;  Surgeon: Jamal Castañeda MD;  Location: [facility];  Service: Orthopedics;  Laterality: Left;      General Information       Row Name 02/28/24 1324          OT Time and Intention    Document Type discharge evaluation/summary  -SM     Mode of Treatment occupational therapy;individual therapy  -       Row Name 02/28/24 1324          General Information    Patient Profile Reviewed yes  -SM     Prior Level of Function independent:;ADL's;community mobility  -     Existing Precautions/Restrictions no known precautions/restrictions  -       Row Name 02/28/24 1324          Occupational Profile    Environmental Supports and Barriers (Occupational Profile) Home DME includes grab bars, rwx to use if needed  -       Row Name 02/28/24 1324          Living Environment    People in Home spouse  -       Row Name 02/28/24 1324          Cognition    Orientation  Status (Cognition) oriented to;person;place  hx of dementia per son  -               User Key  (r) = Recorded By, (t) = Taken By, (c) = Cosigned By      Initials Name Provider Type    Bri Hu OT Occupational Therapist                     Mobility/ADL's       Row Name 02/28/24 1325          Bed Mobility    Bed Mobility supine-sit  -     Supine-Sit [place] (Bed Mobility) independent  -       Row Name 02/28/24 1325          Transfers    Transfers sit-stand transfer  -       Row Name 02/28/24 1325          Sit-Stand Transfer    Sit-Stand [place] (Transfers) supervision  -[facility] Name 02/28/24 1325          Functional Mobility    Functional Mobility- Ind. Level supervision required  -     Functional Mobility- Comment around room, to bathroom without difficulty  -       Row Name 02/28/24 1325          Activities of Daily Living    BADL Assessment/Intervention lower body dressing  declines other ADLs due to not interested today  -[facility] Name 02/28/24 1325          Lower Body Dressing Assessment/Training    [place] Level (Lower Body Dressing) don;socks;supervision  -     Position (Lower Body Dressing) edge of bed sitting  -               User Key  (r) = Recorded By, (t) = Taken By, (c) = Cosigned By      Initials Name Provider Type    Bri Hu OT Occupational Therapist                   Obj/Interventions       Row Name 02/28/24 1327          Range of Motion Comprehensive    General Range of Motion bilateral upper extremity ROM WFL  -[facility] Name 02/28/24 1327          Strength Comprehensive (MMT)    Comment, General Manual Muscle Testing (MMT) Assessment BUE MMt 4-/5  -[facility] Name 02/28/24 1327          Motor Skills    Motor Skills functional endurance  -     Functional Endurance WFL for ADLs  -[facility] Name 02/28/24 1327          Balance    Balance Assessment sitting static balance;standing static balance;standing dynamic balance  -      Static Sitting Balance independent  -     Position, Sitting Balance sitting edge of bed  -     Static Standing Balance supervision  -     Dynamic Standing Balance standby assist  -SM     Position/Device Used, Standing Balance unsupported  -               User Key  (r) = Recorded By, (t) = Taken By, (c) = Cosigned By      Initials Name Provider Type     Bri Sosa OT Occupational Therapist                   Goals/Plan       Row Name 02/28/24 1442          Problem Specific Goal 1 (OT)    Problem Specific Goal 1 (OT) Pt to demonstrate safe technique for ADLs, transfers while maintaining SPO2 during activity.  -SM     Time Frame (Problem Specific Goal 1, OT) short term goal (STG);by discharge  -     Progress/Outcome (Problem Specific Goal 1, OT) goal met  -               User Key  (r) = Recorded By, (t) = Taken By, (c) = Cosigned By      Initials Name Provider Type     Bri Sosa OT Occupational Therapist                   Clinical Impression       Row Name 02/28/24 8469          Pain Assessment    Pretreatment Pain Rating 0/10 - no pain  -     Posttreatment Pain Rating 0/10 - no pain  -       Row Name 02/28/24 1326          Plan of Care Review    Plan of Care Reviewed With patient;son  -     Outcome Evaluation Pt is a 78 y.o female admitted with Influzena and PNA. She lives with her souse who her son reports is also currently being admitted with same symptoms. Pt has hx of Alzheimer's dementia, also recent sugery for kidney carcinoma. Oriented X2 but appropriate and follows all commands. Pt is able to complete dressing, mobility to bathroom and around room without any difficulty or balance deficits. Pt encouraged to sit up in chair for lunch for OOB activity as tolerated, up to bathroom, mobilizing with nsg staff in [place].  -       Row Name 02/28/24 5413          Therapy Assessment/Plan (OT)    Therapy Frequency (OT) evaluation only  -       Row Name 02/28/24 5640           Therapy Plan Review/Discharge Plan (OT)    Anticipated Discharge Disposition (OT) home with assist  -       Row Name 02/28/24 1327          Vital Signs    Pre SpO2 (%) 94  -SM     O2 Delivery Pre Treatment room air  -SM     Post SpO2 (%) 96  -SM     O2 Delivery Post Treatment room air  -       Row Name 02/28/24 1327          Positioning and Restraints    Pre-Treatment Position in bed  -SM     Post Treatment Position chair  -SM     In Chair reclined;call light within reach;encouraged to call for assist;exit alarm on;notified ns  -               User Key  (r) = Recorded By, (t) = Taken By, (c) = Cosigned By      Initials Name Provider Type    Bri Hu, MALISSA Occupational Therapist                   Outcome Measures       Row Name 02/28/24 1443          How much help from another is currently needed...    Putting on and taking off regular lower body clothing? 3  -SM     Bathing (including washing, rinsing, and drying) 3  -SM     Toileting (which includes using toilet bed pan or urinal) 3  -SM     Putting on and taking off regular upper body clothing 4  -SM     Taking care of personal grooming (such as brushing teeth) 4  -SM     Eating meals 4  -SM     AM-PAC 6 Clicks Score (OT) 21  -SM       Row Name 02/28/24 0800 02/28/24 0648       How much help from another person do you currently need...    Turning from your back to your side while in flat bed without using bedrails? 3  -TB 3  -HC    Moving from lying on back to sitting on the side of a flat bed without bedrails? 3  -TB 3  -HC    Moving to and from a bed to a chair (including a wheelchair)? 3  -TB 3  -HC    Standing up from a chair using your arms (e.g., wheelchair, bedside chair)? 2  -TB 2  -HC    Climbing 3-5 steps with a railing? 2  -TB 1  -HC    To walk in hospital room? 3  -TB 2  -HC    AM-PAC 6 Clicks Score (PT) 16  -TB 14  -HC    Highest Level of Mobility Goal 5 --> Static standing  -TB 4 --> Transfer to chair/commode  -HC      Row Name  02/28/24 0630          How much help from another person do you currently need...    Turning from your back to your side while in flat bed without using bedrails? 3  -HC     Moving from lying on back to sitting on the side of a flat bed without bedrails? 3  -HC     Moving to and from a bed to a chair (including a wheelchair)? 3  -HC     Standing up from a chair using your arms (e.g., wheelchair, bedside chair)? 2  -HC     Climbing 3-5 steps with a railing? 1  -HC     To walk in hospital room? 2  -HC     AM-PAC 6 Clicks Score (PT) 14  -HC     Highest Level of Mobility Goal 4 --> Transfer to chair/commode  -HC       Row Name 02/28/24 1443          Functional Assessment    Outcome Measure Options AM-PAC 6 Clicks Daily Activity (OT)  -               User Key  (r) = Recorded By, (t) = Taken By, (c) = Cosigned By      Initials Name Provider Type    TB Ambar Griffith, RN Registered Nurse    Bri Hu OT Occupational Therapist     Deena Reyes, RN Registered Nurse                    Occupational Therapy Education       Title: PT OT SLP Therapies (In Progress)       Topic: Occupational Therapy (In Progress)       Point: ADL training (Not Started)       Description:   Instruct learner(s) on proper safety adaptation and remediation techniques during self care or transfers.   Instruct in proper use of assistive devices.                  Learner Progress:  Not documented in this visit.              Point: Home exercise program (Not Started)       Description:   Instruct learner(s) on appropriate technique for monitoring, assisting and/or progressing therapeutic exercises/activities.                  Learner Progress:  Not documented in this visit.              Point: Precautions (Done)       Description:   Instruct learner(s) on prescribed precautions during self-care and functional transfers.                  Learning Progress Summary             Patient Acceptance, E, VU by  at 2/28/2024 6863    Comment:  mobility recommendations, safety with dc home/ADLs                         Point: Body mechanics (Not Started)       Description:   Instruct learner(s) on proper positioning and spine alignment during self-care, functional mobility activities and/or exercises.                  Learner Progress:  Not documented in this visit.                              User Key       Initials Effective Dates Name Provider Type Discipline     04/02/20 -  Bri Sosa OT Occupational Therapist OT                  OT Recommendation and Plan  Therapy Frequency (OT): evaluation only  Plan of Care Review  Plan of Care Reviewed With: patient, son  Outcome Evaluation: Pt is a 78 y.o female admitted with Influzena and PNA. She lives with her souse who her son reports is also currently being admitted with same symptoms. Pt has hx of Alzheimer's dementia, also recent sugery for kidney carcinoma. Oriented X2 but appropriate and follows all commands. Pt is able to complete dressing, mobility to bathroom and around room without any difficulty or balance deficits. Pt encouraged to sit up in chair for lunch for OOB activity as tolerated, up to bathroom, mobilizing with nsg staff in [place].     Time Calculation:         Time Calculation- OT       Row Name 02/28/24 1446             Time Calculation- OT    OT Start Time 1148  -SM      OT Stop Time 1200  -SM      OT Time Calculation (min) 12 min  -SM      OT Received On 02/28/24  -         Untimed Charges    OT Eval/Re-eval Minutes 12  -SM         Total Minutes    Untimed Charges Total Minutes 12  -SM       Total Minutes 12  -SM                User Key  (r) = Recorded By, (t) = Taken By, (c) = Cosigned By      Initials Name Provider Type     Bri Sosa OT Occupational Therapist                  Therapy Charges for Today       Code Description Service Date Service Provider Modifiers Qty    [id number] HC OT EVAL LOW COMPLEXITY 3 2/28/2024 Bri Sosa OT GO 1                  Bri Sosa, OT  2/28/2024

## 2024-02-28 NOTE — THERAPY EVALUATION
Patient Name: Raina Forrest  : 1945    MRN: [id number]                              Today's Date: 2024       Admit Date: 2024    Visit Dx:     ICD-10-CM ICD-9-CM   1. Pneumonia of both lungs due to infectious organism, unspecified part of lung  J18.9 483.8   2. Influenza A  J10.1 487.1   3. Dehydration  E86.0 276.51     Patient Active Problem List   Diagnosis    Chronic atrial fibrillation    Benign essential HTN    Bradycardia    Chest pain    Can't get food down    Accumulation of fluid in tissues    Esophageal lump    Adynamia    Itch of skin    Anorexia    Chronic nausea    Gastroesophageal reflux disease    Breath shortness    Emesis    Decreased body weight    Anxiety    Narrowing of intervertebral disc space    Diverticulosis of intestine    Primary hypertension    Migraine    Osteoporosis    Palpitations    Pituitary adenoma    Sick sinus syndrome    Diarrhea    Cardiomyopathy    Chronic anticoagulation    Atrial fibrillation    Anticoagulated    Pacemaker    Infected pacemaker    Wound infection    Primary osteoarthritis of left knee    DJD (degenerative joint disease)    Ischemic cardiomyopathy    Acute on chronic congestive heart failure, unspecified heart failure type    Anemia    Presence of Watchman left atrial appendage closure device    Pneumonia    Pneumonia of both lungs due to infectious organism    Hypokalemia    Stage 3a chronic kidney disease    Influenza A     Past Medical History:   Diagnosis Date    Atrial fibrillation     Fung's esophagus     Benign essential hypertension     Blood clot in vein     Cardiomyopathy     Chronic gastritis     Congestive heart failure     Degenerative disc disease     Dementia     Pt  states she has slight dementia    Depression with anxiety     Disease of thyroid gland     Diverticulitis of colon     Dysphagia     GERD (gastroesophageal reflux disease)     History of chest pain     History of migraine     History of migraine  headaches     Left knee pain     Osteoporosis     Palpitations     Thyroid disorder     Thyroid nodule     Ulcerative colitis      Past Surgical History:   Procedure Laterality Date    APPENDECTOMY      BLADDER SURGERY      CARDIAC CATHETERIZATION N/A 11/21/2016    Procedure: Left Heart Cath;  Surgeon: Robinson Salazar MD;  Location:  [facility];  Service:     CARDIAC ELECTROPHYSIOLOGY PROCEDURE N/A 2/7/2017    Procedure: Pacemaker DC new   [employer];  Surgeon: Robinson Salazar MD;  Location:  [facility];  Service:     CARDIOVERSION  01/18/2017    CHOLECYSTECTOMY      COLONOSCOPY      COLONOSCOPY N/A 7/29/2016    normal    ENDOSCOPY  05/06/2015    submucosal nodule in esophagus, erythematous mucosa in antrum,moderate acute gastritis, no h-pylori    ENDOSCOPY N/A 9/2/2016    Erythematous mucosa in the antrum    EYE SURGERY Left     cataract    HYSTERECTOMY      KNEE MENISCAL REPAIR Left     THYROID SURGERY Left     TONSILLECTOMY      TOTAL KNEE ARTHROPLASTY Left 6/16/2020    Procedure: LEFT TOTAL KNEE ARTHOPLASTY+;  Surgeon: Jamal Castañeda MD;  Location: [facility] MA[facility] OR;  Service: Orthopedics;  Laterality: Left;      General Information       Row Name 02/28/24 1601          Physical Therapy Time and Intention    Document Type evaluation  -EE     Mode of Treatment individual therapy;physical therapy  -EE       Row Name 02/28/24 1601          General Information    Prior Level of Function independent:;all household mobility;community mobility  has rwx but does not use at baseline  -EE     Existing Precautions/Restrictions fall  droplet isolation for flu  -EE     Barriers to Rehab none identified  -EE       Row Name 02/28/24 1601          Living Environment    People in Home spouse  -EE       Row Name 02/28/24 1601          Home Main Entrance    Number of Stairs, Main Entrance one  -EE       Row Name 02/28/24 1601          Cognition    Orientation Status (Cognition)  oriented to;person;place  -EE       Row Name 02/28/24 1601          Safety Issues, Functional Mobility    Impairments Affecting Function (Mobility) balance;endurance/activity tolerance;strength  -EE               User Key  (r) = Recorded By, (t) = Taken By, (c) = Cosigned By      Initials Name Provider Type    EE Melinda Umanzor, [facility] Physical Therapist                   Mobility       Row Name 02/28/24 1602          Bed Mobility    Bed Mobility sit-supine  -EE     Supine-Sit [place] (Bed Mobility) supervision  -EE     Sit-Supine [place] (Bed Mobility) supervision;verbal cues  -EE     Assistive Device (Bed Mobility) head of bed elevated  -EE       Row Name 02/28/24 1602          Sit-Stand Transfer    Sit-Stand [place] (Transfers) contact guard  -EE       Row Name 02/28/24 1602          Gait/Stairs (Locomotion)    [place] Level (Gait) contact guard;minimum assist (75% patient effort)  -EE     Distance in Feet (Gait) 25  -EE     Deviations/Abnormal Patterns (Gait) mio decreased;base of support, narrow;stride length decreased  -EE     Bilateral Gait Deviations heel strike decreased  -EE     Comment, (Gait/Stairs) slightly unsteady, but no overt LOB  -EE               User Key  (r) = Recorded By, (t) = Taken By, (c) = Cosigned By      Initials Name Provider Type    EE Melinda Umanzor, [facility] Physical Therapist                   Obj/Interventions       Row Name 02/28/24 1604          Range of Motion Comprehensive    General Range of Motion bilateral lower extremity ROM WNL  -EE       Row Name 02/28/24 1604          Strength Comprehensive (MMT)    General Manual Muscle Testing (MMT) Assessment lower extremity strength deficits identified  -EE     Comment, General Manual Muscle Testing (MMT) Assessment gen weakness; BLEs grossly 4-/5  -EE       Row Name 02/28/24 1604          Balance    Balance Assessment sitting static balance;standing static balance;standing dynamic balance  -EE     Static Sitting Balance  supervision  -EE     Position, Sitting Balance unsupported  -EE     Static Standing Balance contact guard  -EE     Dynamic Standing Balance minimal assist;contact guard  -EE     Position/Device Used, Standing Balance supported  -EE       Row Name 02/28/24 1604          Sensory Assessment (Somatosensory)    Sensory Assessment (Somatosensory) LE sensation intact  -EE               User Key  (r) = Recorded By, (t) = Taken By, (c) = Cosigned By      Initials Name Provider Type    EE Melinda Umanzor, PT Physical Therapist                   Goals/Plan       Row Name 02/28/24 1607          Bed Mobility Goal 1 (PT)    Activity/Assistive Device (Bed Mobility Goal 1, PT) sit to supine/supine to sit  -EE     [place] Level/Cues Needed (Bed Mobility Goal 1, PT) independent  -EE     Time Frame (Bed Mobility Goal 1, PT) 1 week  -EE     Progress/Outcomes (Bed Mobility Goal 1, PT) goal ongoing  -EE       Row Name 02/28/24 1607          Transfer Goal 1 (PT)    Activity/Assistive Device (Transfer Goal 1, PT) sit-to-stand/stand-to-sit;bed-to-chair/chair-to-bed  -EE     [place] Level/Cues Needed (Transfer Goal 1, PT) independent  -EE     Time Frame (Transfer Goal 1, PT) 1 week  -EE     Progress/Outcome (Transfer Goal 1, PT) goal ongoing  -EE       Row Name 02/28/24 1607          Gait Training Goal 1 (PT)    Activity/Assistive Device (Gait Training Goal 1, PT) gait (walking locomotion)  -EE     [place] Level (Gait Training Goal 1, PT) supervision required  -EE     Distance (Gait Training Goal 1, PT) 100'  -EE     Time Frame (Gait Training Goal 1, PT) 1 week  -EE     Progress/Outcome (Gait Training Goal 1, PT) goal ongoing  -EE       Row Name 02/28/24 1607          Therapy Assessment/Plan (PT)    Planned Therapy Interventions (PT) balance training;bed mobility training;gait training;home exercise program;patient/family education;strengthening;stair training;ROM (range of motion);transfer training;postural re-education  -EE                User Key  (r) = Recorded By, (t) = Taken By, (c) = Cosigned By      Initials Name Provider Type    EE Melinda Umanzor PT Physical Therapist                   Clinical Impression       Row Name 02/28/24 1604          Pain    Pretreatment Pain Rating 0/10 - no pain  -EE     Posttreatment Pain Rating 0/10 - no pain  -EE       Row Name 02/28/24 1604          Plan of Care Review    Plan of Care Reviewed With patient;son  -EE     Outcome Evaluation Pt is an 79 yo female who presents from home with influenza and PNA. Prior to admission, pt was living at home with spouse and independent with all mobility. Pt does not use assistive device at baseline. Son present at baseline and reports she is normally very mobile and has no issues with ambulation; however, has been weaker since acute illness. Upon exam, pt demonstrates impaired balance, generalized weakness, and decreased endurance limiting mobility. Pt performed bed mobility with superivsion, stood with CGA, and ambulated 25' with CGA/min A. Pt will continue to benefit from PT to address impairments and increase indpendence with mobility. Rec DC home with family and possibly  PT pending progress.  -EE       Row Name 02/28/24 1604          Therapy Assessment/Plan (PT)    Rehab Potential (PT) good, to achieve stated therapy goals  -EE     Criteria for Skilled Interventions Met (PT) yes;meets criteria;skilled treatment is necessary  -EE     Therapy Frequency (PT) 5 times/wk  -EE       Row Name 02/28/24 1604          Positioning and Restraints    Pre-Treatment Position in bed  -EE     Post Treatment Position bed  -EE     In Bed fowlers;call light within reach;encouraged to call for assist;exit alarm on;with family/caregiver  -EE               User Key  (r) = Recorded By, (t) = Taken By, (c) = Cosigned By      Initials Name Provider Type    Melinda Eckert, PT Physical Therapist                   Outcome Measures       Row Name 02/28/24 1607 02/28/24 0800       How  much help from another person do you currently need...    Turning from your back to your side while in flat bed without using bedrails? 4  -EE 3  -TB    Moving from lying on back to sitting on the side of a flat bed without bedrails? 3  -EE 3  -TB    Moving to and from a bed to a chair (including a wheelchair)? 3  -EE 3  -TB    Standing up from a chair using your arms (e.g., wheelchair, bedside chair)? 3  -EE 2  -TB    Climbing 3-5 steps with a railing? 2  -EE 2  -TB    To walk in hospital room? 3  -EE 3  -TB    AM-PAC 6 Clicks Score (PT) 18  -EE 16  -TB    Highest Level of Mobility Goal 6 --> Walk 10 steps or more  -EE 5 --> Static standing  -TB      Row Name 02/28/24 0648 02/28/24 0630       How much help from another person do you currently need...    Turning from your back to your side while in flat bed without using bedrails? 3  -HC 3  -HC    Moving from lying on back to sitting on the side of a flat bed without bedrails? 3  -HC 3  -HC    Moving to and from a bed to a chair (including a wheelchair)? 3  -HC 3  -HC    Standing up from a chair using your arms (e.g., wheelchair, bedside chair)? 2  -HC 2  -HC    Climbing 3-5 steps with a railing? 1  -HC 1  -HC    To walk in hospital room? 2  -HC 2  -HC    AM-PAC 6 Clicks Score (PT) 14  -HC 14  -HC    Highest Level of Mobility Goal 4 --> Transfer to chair/commode  -HC 4 --> Transfer to chair/commode  -HC      Row Name 02/28/24 1443          Functional Assessment    Outcome Measure Options AM-PAC 6 Clicks Daily Activity (OT)  -               User Key  (r) = Recorded By, (t) = Taken By, (c) = Cosigned By      Initials Name Provider Type    Melinda Eckert, PT Physical Therapist    TB Ambar Griffith, RN Registered Nurse    Bri Hu, OT Occupational Therapist    HC Deena Reyes, RN Registered Nurse                                 Physical Therapy Education       Title: PT OT SLP Therapies (In Progress)       Topic: Physical Therapy (In Progress)        Point: Mobility training (Done)       Learning Progress Summary             Patient Acceptance, E, VU,NR by EE at 2/28/2024 6576                         Point: Home exercise program (Not Started)       Learner Progress:  Not documented in this visit.              Point: Body mechanics (Not Started)       Learner Progress:  Not documented in this visit.              Point: Precautions (Not Started)       Learner Progress:  Not documented in this visit.                              User Key       Initials Effective Dates Name Provider Type Discipline    EE 06/16/21 -  Melinda Umanzor, PT Physical Therapist PT                  PT Recommendation and Plan  Planned Therapy Interventions (PT): balance training, bed mobility training, gait training, home exercise program, patient/family education, strengthening, stair training, ROM (range of motion), transfer training, postural re-education  Plan of Care Reviewed With: patient, son  Outcome Evaluation: Pt is an 79 yo female who presents from home with influenza and PNA. Prior to admission, pt was living at home with spouse and independent with all mobility. Pt does not use assistive device at baseline. Son present at baseline and reports she is normally very mobile and has no issues with ambulation; however, has been weaker since acute illness. Upon exam, pt demonstrates impaired balance, generalized weakness, and decreased endurance limiting mobility. Pt performed bed mobility with superivsion, stood with CGA, and ambulated 25' with CGA/min A. Pt will continue to benefit from PT to address impairments and increase indpendence with mobility. Rec DC home with family and possibly HH PT pending progress.     Time Calculation:         PT Charges       Row Name 02/28/24 3171             Time Calculation    Start Time 1540  -EE      Stop Time 1555  -EE      Time Calculation (min) 15 min  -EE      PT Received On 02/28/24  -EE      PT - Next Appointment 02/29/24  -EE      PT Goal  Re-Cert Due Date 03/06/24  -EE         Time Calculation- PT    Total Timed Code Minutes- PT 8 minute(s)  -EE         Timed Charges    55136 - PT Therapeutic Activity Minutes 8  -EE         Total Minutes    Timed Charges Total Minutes 8  -EE       Total Minutes 8  -EE                User Key  (r) = Recorded By, (t) = Taken By, (c) = Cosigned By      Initials Name Provider Type    EE Melinda Umanzor, PT Physical Therapist                  Therapy Charges for Today       Code Description Service Date Service Provider Modifiers Qty    41544856305 HC PT THERAPEUTIC ACT EA 15 MIN 2/28/2024 Melinda Umanzor, PT GP 1    09863236200 HC PT EVAL LOW COMPLEXITY 2 2/28/2024 Melinda Umanzor, PT GP 1            PT G-Codes  Outcome Measure Options: AM-PAC 6 Clicks Daily Activity (OT)  AM-PAC 6 Clicks Score (PT): 18  AM-PAC 6 Clicks Score (OT): 21  PT Discharge Summary  Anticipated Discharge Disposition (PT): home with assist, home with home health    Melinda Umanzor PT  2/28/2024

## 2024-02-28 NOTE — ED NOTES
Nursing report ED to floor  Raina Forrest  78 y.o.  female    HPI :  HPI (Adult)  Stated Reason for Visit: sent over from the [facility] for abnormal chest xray - [facility] stated upper and lower pnu  History Obtained From: patient, family    Chief Complaint  Chief Complaint   Patient presents with    Cough    Abnormal Chest X-ray       Admitting doctor:   Jamal Macdonald MD    Admitting diagnosis:   The primary encounter diagnosis was Pneumonia of both lungs due to infectious organism, unspecified part of lung. Diagnoses of Influenza A and Dehydration were also pertinent to this visit.    Code status:   Current Code Status       Date Active Code Status Order ID Comments User Context       2/28/2024 0217 CPR (Attempt to Resuscitate) [id number]  Hayley Rai APRN ED        Question Answer    Code Status (Patient has no pulse and is not breathing) CPR (Attempt to Resuscitate)    Medical Interventions (Patient has pulse or is breathing) Full Support                    Allergies:   Bupivacaine hcl, Nepafenac, Bactrim [sulfamethoxazole-trimethoprim], Barium-containing compounds, Bextra [valdecoxib], Clarithromycin, Cortisone, Cymbalta [duloxetine hcl], Iodinated contrast media, Medrol [methylprednisolone], Mesalamine, Polymyxin b-trimethoprim, Rivaroxaban, Tetanus toxoids, and Tetanus-diphtheria toxoids td    Isolation:   No active isolations    Intake and Output  No intake or output data in the 24 hours ending 02/28/24 0222    Weight:       02/27/24  2244   Weight: 63.5 kg (140 lb)       Most recent vitals:   Vitals:    02/27/24 2250 02/27/24 2355 02/28/24 0013 02/28/24 0124   BP:   99/65    Pulse:  73 84 81   Resp:       Temp:       TempSrc:       SpO2: 96% 96% 94%    Weight:       Height:           Active LDAs/IV Access:   Lines, Drains & Airways       Active LDAs       Name Placement date Placement time Site Days    Peripheral IV 02/28/24 0017 Anterior;Distal;Right Forearm 02/28/24  0017  Forearm  less than  1                    Labs (abnormal labs have a star):   Labs Reviewed   RESPIRATORY PANEL PCR W/ COVID-19 (SARS-COV-2), NP SWAB IN UTM/VTP, 2 HR TAT - Abnormal; Notable for the following components:       Result Value    Influenza A H3 Detected (*)     All other components within normal limits    Narrative:     In the setting of a positive respiratory panel with a viral infection PLUS a negative procalcitonin without other underlying concern for bacterial infection, consider observing off antibiotics or discontinuation of antibiotics and continue supportive care. If the respiratory panel is positive for atypical bacterial infection (Bordetella pertussis, Chlamydophila pneumoniae, or Mycoplasma pneumoniae), consider antibiotic de-escalation to target atypical bacterial infection.   COMPREHENSIVE METABOLIC PANEL - Abnormal; Notable for the following components:    BUN 26 (*)     Creatinine 1.26 (*)     Sodium 132 (*)     Potassium 3.2 (*)     Chloride 92 (*)     Total Bilirubin 1.9 (*)     Anion Gap 17.0 (*)     eGFR 43.2 (*)     All other components within normal limits    Narrative:     GFR Normal >60  Chronic Kidney Disease <60  Kidney Failure <15    The GFR formula is only valid for adults with stable renal function between ages 18 and 70.   CBC WITH AUTO DIFFERENTIAL - Abnormal; Notable for the following components:    WBC 16.09 (*)     RBC 3.66 (*)     MCH 34.2 (*)     Neutrophil % 79.1 (*)     Lymphocyte % 8.0 (*)     Immature Grans % 4.5 (*)     Neutrophils, Absolute 12.73 (*)     Monocytes, Absolute 1.15 (*)     Immature Grans, Absolute 0.72 (*)     All other components within normal limits   LACTIC ACID, PLASMA - Normal   PROCALCITONIN - Normal    Narrative:     As a Marker for Sepsis (Non-Neonates):    1. <0.5 ng/mL represents a low risk of severe sepsis and/or septic shock.  2. >2 ng/mL represents a high risk of severe sepsis and/or septic shock.    As a Marker for Lower Respiratory Tract Infections that  "require antibiotic therapy:    PCT on Admission    Antibiotic Therapy       6-12 Hrs later    >0.5                Strongly Recommended  >0.25 - <0.5        Recommended   0.1 - 0.25          Discouraged              Remeasure/reassess PCT  <0.1                Strongly Discouraged     Remeasure/reassess PCT    As 28 day mortality risk marker: \"Change in Procalcitonin Result\" (>80% or <=80%) if Day 0 (or Day 1) and Day 4 values are available. Refer to http://[email or web address]-pct-calculator.com    Change in PCT <=80%  A decrease of PCT levels below or equal to 80% defines a positive change in PCT test result representing a higher risk for 28-day all-cause mortality of patients diagnosed with severe sepsis for septic shock.    Change in PCT >80%  A decrease of PCT levels of more than 80% defines a negative change in PCT result representing a lower risk for 28-day all-cause mortality of patients diagnosed with severe sepsis or septic shock.      BLOOD CULTURE   BLOOD CULTURE   BASIC METABOLIC PANEL   CBC (NO DIFF)   MAGNESIUM   CBC AND DIFFERENTIAL    Narrative:     The following orders were created for panel order CBC & Differential.  Procedure                               Abnormality         Status                     ---------                               -----------         ------                     CBC Auto Differential[[id number]]        Abnormal            Final result                 Please view results for these tests on the individual orders.       EKG:   No orders to display       Meds given in ED:   Medications   cefTRIAXone (ROCEPHIN) 1,000 mg in sodium chloride 0.9 % 100 mL IVPB-VTB (has no administration in time range)   sodium chloride 0.9 % flush 10 mL (has no administration in time range)   sodium chloride 0.9 % flush 10 mL (has no administration in time range)   sodium chloride 0.9 % infusion 40 mL (has no administration in time range)   nitroglycerin (NITROSTAT) SL tablet 0.4 mg (has no administration in " time range)   acetaminophen (TYLENOL) tablet 650 mg (has no administration in time range)     Or   acetaminophen (TYLENOL) 160 MG/5ML oral solution 650 mg (has no administration in time range)     Or   acetaminophen (TYLENOL) suppository 650 mg (has no administration in time range)   sennosides-docusate (PERICOLACE) 8.6-50 MG per tablet 2 tablet (has no administration in time range)     And   polyethylene glycol (MIRALAX) packet 17 g (has no administration in time range)     And   bisacodyl (DULCOLAX) EC tablet 5 mg (has no administration in time range)     And   bisacodyl (DULCOLAX) suppository 10 mg (has no administration in time range)   ondansetron ODT (ZOFRAN-ODT) disintegrating tablet 4 mg (has no administration in time range)     Or   ondansetron (ZOFRAN) injection 4 mg (has no administration in time range)   calcium carbonate (TUMS) chewable tablet 500 mg (200 mg elemental) (has no administration in time range)   sodium chloride 0.9 % infusion (has no administration in time range)   cefTRIAXone (ROCEPHIN) 1,000 mg in sodium chloride 0.9 % 100 mL IVPB-VTB (has no administration in time range)   Potassium Replacement - Follow Nurse / BPA Driven Protocol (has no administration in time range)   Magnesium Standard Dose Replacement - Follow Nurse / BPA Driven Protocol (has no administration in time range)   Phosphorus Replacement - Follow Nurse / BPA Driven Protocol (has no administration in time range)   Calcium Replacement - Follow Nurse / BPA Driven Protocol (has no administration in time range)   Potassium Replacement - Follow Nurse / BPA Driven Protocol (has no administration in time range)   lactated ringers bolus 1,000 mL (0 mL Intravenous Stopped 2/28/24 0221)       Imaging results:  CT Chest Without Contrast Diagnostic    Result Date: 2/28/2024  Electronically signed by Kash Infante DO, Diplomate American Board of Radiology on 02-28-24 at 0138    XR Chest 1 View    Result Date:  2/28/2024  Electronically signed by Kash Infante DO Diplomate [employer] on 02-28-24 at 0024     Ambulatory status:   - ASSIST X1    Social issues:   Social History     Socioeconomic History    Marital status:    Tobacco Use    Smoking status: Never    Smokeless tobacco: Never   Vaping Use    Vaping Use: Never used   Substance and Sexual Activity    Alcohol use: No    Drug use: No    Sexual activity: Defer     Birth control/protection: Surgical       Peripheral Neurovascular  Peripheral Neurovascular (Adult)  Peripheral Neurovascular WDL: WDL    Neuro Cognitive  Neuro Cognitive (Adult)  Cognitive/Neuro/Behavioral WDL: .WDL except, mood/behavior, orientation  Orientation: disoriented to, time, place  Mood/Behavior: calm, cooperative  Additional Documentation: [place] Coma Scale (Group)  [place] Coma Scale  Best Eye Response: 4-->(E4) spontaneous  Best Motor Response: 6-->(M6) obeys commands  Best Verbal Response: 4-->(V4) confused  Wil Coma Scale Score: 14    Learning  Learning Assessment (Adult)  Learning Readiness and Ability: cognitive limitation noted  Education Provided  Person Taught: patient, family member/friend  Teaching Method: verbal instruction  Education Outcome Evaluation: eager to learn, needs reinforcement    Respiratory  Respiratory (Adult)  Airway WDL: WDL  Respiratory WDL  Respiratory WDL: .WDL except, all  Rhythm/Pattern, Respiratory: shortness of breath    Abdominal Pain       Pain Assessments  Pain (Adult)  (0-10) Pain Rating: Rest: 0  (0-10) Pain Rating: Activity: 0    NIH Stroke Scale       Nasir Douglass RN  02/28/24 02:22 EST

## 2024-02-28 NOTE — PLAN OF CARE
Goal Outcome Evaluation:  Plan of Care Reviewed With: patient, son           Outcome Evaluation: Pt is a 78 y.o female admitted with Influzena and PNA. She lives with her souse who her son reports is also currently being admitted with same symptoms. Pt has hx of Alzheimer's dementia, also recent sugery for kidney carcinoma. Oriented X2 but appropriate and follows all commands. Pt is able to complete dressing, mobility to bathroom and around room without any difficulty or balance deficits. Pt encouraged to sit up in chair for lunch for OOB activity as tolerated, up to bathroom, mobilizing with nsg staff in [place].      Anticipated Discharge Disposition (OT): home with assist

## 2024-02-28 NOTE — PLAN OF CARE
Goal Outcome Evaluation:  Plan of Care Reviewed With: patient, son           Outcome Evaluation: Pt is an 79 yo female who presents from home with influenza and PNA. Prior to admission, pt was living at home with spouse and independent with all mobility. Pt does not use assistive device at baseline. Son present at baseline and reports she is normally very mobile and has no issues with ambulation; however, has been weaker since acute illness. Upon exam, pt demonstrates impaired balance, generalized weakness, and decreased endurance limiting mobility. Pt performed bed mobility with superivsion, stood with CGA, and ambulated 25' with CGA/min A. Pt will continue to benefit from PT to address impairments and increase indpendence with mobility. Rec DC home with family and possibly HH PT pending progress.      Anticipated Discharge Disposition (PT): home with assist, home with home health

## 2024-02-29 LAB
ALBUMIN SERPL-MCNC: 3.1 G/DL (ref 3.5–5.2)
ALBUMIN/GLOB SERPL: 1 G/DL
ALP SERPL-CCNC: 85 U/L (ref 39–117)
ALT SERPL W P-5'-P-CCNC: 12 U/L (ref 1–33)
ANION GAP SERPL CALCULATED.3IONS-SCNC: 13.6 MMOL/L (ref 5–15)
AST SERPL-CCNC: 20 U/L (ref 1–32)
BACTERIA SPEC RESP CULT: NORMAL
BILIRUB SERPL-MCNC: 1.1 MG/DL (ref 0–1.2)
BUN SERPL-MCNC: 14 MG/DL (ref 8–23)
BUN/CREAT SERPL: 14.6 (ref 7–25)
CALCIUM SPEC-SCNC: 8.8 MG/DL (ref 8.6–10.5)
CHLORIDE SERPL-SCNC: 100 MMOL/L (ref 98–107)
CO2 SERPL-SCNC: 20.4 MMOL/L (ref 22–29)
CREAT SERPL-MCNC: 0.96 MG/DL (ref 0.57–1)
DEPRECATED RDW RBC AUTO: 52.5 FL (ref 37–54)
EGFRCR SERPLBLD CKD-EPI 2021: 59.9 ML/MIN/1.73
ERYTHROCYTE [DISTWIDTH] IN BLOOD BY AUTOMATED COUNT: 14.7 % (ref 12.3–15.4)
GLOBULIN UR ELPH-MCNC: 3.2 GM/DL
GLUCOSE SERPL-MCNC: 104 MG/DL (ref 65–99)
GRAM STN SPEC: NORMAL
HCT VFR BLD AUTO: 33.1 % (ref 34–46.6)
HGB BLD-MCNC: 11.6 G/DL (ref 12–15.9)
L PNEUMO1 AG UR QL IA: NEGATIVE
LYMPHOCYTES # BLD MANUAL: 0.75 10*3/MM3 (ref 0.7–3.1)
LYMPHOCYTES NFR BLD MANUAL: 7 % (ref 5–12)
MCH RBC QN AUTO: 34.5 PG (ref 26.6–33)
MCHC RBC AUTO-ENTMCNC: 35 G/DL (ref 31.5–35.7)
MCV RBC AUTO: 98.5 FL (ref 79–97)
METAMYELOCYTES NFR BLD MANUAL: 2 % (ref 0–0)
MONOCYTES # BLD: 1.31 10*3/MM3 (ref 0.1–0.9)
MYELOCYTES NFR BLD MANUAL: 2 % (ref 0–0)
NEUTROPHILS # BLD AUTO: 15.94 10*3/MM3 (ref 1.7–7)
NEUTROPHILS NFR BLD MANUAL: 85 % (ref 42.7–76)
NRBC BLD AUTO-RTO: 0 /100 WBC (ref 0–0.2)
PLAT MORPH BLD: NORMAL
PLATELET # BLD AUTO: 287 10*3/MM3 (ref 140–450)
PMV BLD AUTO: 11.6 FL (ref 6–12)
POTASSIUM SERPL-SCNC: 4.1 MMOL/L (ref 3.5–5.2)
PROT SERPL-MCNC: 6.3 G/DL (ref 6–8.5)
QT INTERVAL: 406 MS
QTC INTERVAL: 463 MS
RBC # BLD AUTO: 3.36 10*6/MM3 (ref 3.77–5.28)
RBC MORPH BLD: NORMAL
S PNEUM AG SPEC QL LA: NEGATIVE
SODIUM SERPL-SCNC: 134 MMOL/L (ref 136–145)
VARIANT LYMPHS NFR BLD MANUAL: 4 % (ref 19.6–45.3)
WBC MORPH BLD: NORMAL
WBC NRBC COR # BLD AUTO: 18.75 10*3/MM3 (ref 3.4–10.8)

## 2024-02-29 PROCEDURE — 94799 UNLISTED PULMONARY SVC/PX: CPT

## 2024-02-29 PROCEDURE — 93005 ELECTROCARDIOGRAM TRACING: CPT | Performed by: INTERNAL MEDICINE

## 2024-02-29 PROCEDURE — 85007 BL SMEAR W/DIFF WBC COUNT: CPT | Performed by: INTERNAL MEDICINE

## 2024-02-29 PROCEDURE — 85025 COMPLETE CBC W/AUTO DIFF WBC: CPT | Performed by: INTERNAL MEDICINE

## 2024-02-29 PROCEDURE — 25010000002 CEFTRIAXONE PER 250 MG: Performed by: INTERNAL MEDICINE

## 2024-02-29 PROCEDURE — 97110 THERAPEUTIC EXERCISES: CPT

## 2024-02-29 PROCEDURE — 63710000001 ONDANSETRON ODT 4 MG TABLET DISPERSIBLE: Performed by: INTERNAL MEDICINE

## 2024-02-29 PROCEDURE — 80053 COMPREHEN METABOLIC PANEL: CPT | Performed by: INTERNAL MEDICINE

## 2024-02-29 PROCEDURE — 93010 ELECTROCARDIOGRAM REPORT: CPT | Performed by: INTERNAL MEDICINE

## 2024-02-29 RX ADMIN — PANTOPRAZOLE SODIUM 40 MG: 40 TABLET, DELAYED RELEASE ORAL at 09:00

## 2024-02-29 RX ADMIN — ONDANSETRON 4 MG: 4 TABLET, ORALLY DISINTEGRATING ORAL at 09:00

## 2024-02-29 RX ADMIN — IPRATROPIUM BROMIDE AND ALBUTEROL SULFATE 3 ML: .5; 3 SOLUTION RESPIRATORY (INHALATION) at 14:26

## 2024-02-29 RX ADMIN — OSELTAMIVIR PHOSPHATE 30 MG: 30 CAPSULE ORAL at 22:09

## 2024-02-29 RX ADMIN — OSELTAMIVIR PHOSPHATE 30 MG: 30 CAPSULE ORAL at 09:00

## 2024-02-29 RX ADMIN — DONEPEZIL HYDROCHLORIDE 10 MG: 10 TABLET, FILM COATED ORAL at 09:00

## 2024-02-29 RX ADMIN — BISOPROLOL FUMARATE 10 MG: 5 TABLET, FILM COATED ORAL at 09:00

## 2024-02-29 RX ADMIN — IPRATROPIUM BROMIDE AND ALBUTEROL SULFATE 3 ML: .5; 3 SOLUTION RESPIRATORY (INHALATION) at 19:36

## 2024-02-29 RX ADMIN — LEVOTHYROXINE SODIUM 25 MCG: 25 TABLET ORAL at 06:02

## 2024-02-29 RX ADMIN — IPRATROPIUM BROMIDE AND ALBUTEROL SULFATE 3 ML: .5; 3 SOLUTION RESPIRATORY (INHALATION) at 10:56

## 2024-02-29 RX ADMIN — SERTRALINE 150 MG: 100 TABLET, FILM COATED ORAL at 06:02

## 2024-02-29 RX ADMIN — Medication 10 ML: at 22:08

## 2024-02-29 RX ADMIN — ASPIRIN 81 MG: 81 TABLET, CHEWABLE ORAL at 09:00

## 2024-02-29 RX ADMIN — GUAIFENESIN 1200 MG: 600 TABLET, EXTENDED RELEASE ORAL at 09:00

## 2024-02-29 RX ADMIN — Medication 10 ML: at 09:00

## 2024-02-29 RX ADMIN — CEFTRIAXONE SODIUM 1000 MG: 1 INJECTION, POWDER, FOR SOLUTION INTRAMUSCULAR; INTRAVENOUS at 03:33

## 2024-02-29 RX ADMIN — IPRATROPIUM BROMIDE AND ALBUTEROL SULFATE 3 ML: .5; 3 SOLUTION RESPIRATORY (INHALATION) at 07:37

## 2024-02-29 RX ADMIN — GUAIFENESIN 1200 MG: 600 TABLET, EXTENDED RELEASE ORAL at 22:10

## 2024-02-29 RX ADMIN — FERROUS SULFATE TAB 325 MG (65 MG ELEMENTAL FE) 325 MG: 325 (65 FE) TAB at 09:00

## 2024-02-29 NOTE — PROGRESS NOTES
"                                              LOS: 0 days   Patient Care Team:  Georgiana Cisneros MD as PCP - General (Pediatrics)  Nathan Johnson MD as Resident (Gastroenterology)  Robinson Salazar MD as Consulting Physician (Cardiology)    Chief Complaint:  F/up influenza infection and shortness of breath.    Subjective   Interval History    Remains on RA.  No fever.  Continues to have cough and sounds congested according to her son.  However she appears to be doing better than yesterday and able to produce phlegm.    REVIEW OF SYSTEMS:   CARDIOVASCULAR: No chest pain, chest pressure or chest discomfort. No palpitations or edema.   GASTROINTESTINAL: No anorexia, nausea, vomiting or diarrhea. No abdominal pain.  CONSTITUTIONAL: No fever or chills.     Ventilator/Non-Invasive Ventilation Settings (From admission, onward)      None                  Physical Exam:     Vital Signs  Temp:  [97.7 °F (36.5 °C)-99.7 °F (37.6 °C)] 98 °F (36.7 °C)  Heart Rate:  [79-89] 89  Resp:  [16-18] 17  BP: ()/(56-75) 102/70    Intake/Output Summary (Last 24 hours) at 2/29/2024 1418  Last data filed at 2/29/2024 0000  Gross per 24 hour   Intake 240 ml   Output 450 ml   Net -210 ml     Flowsheet Rows      Flowsheet Row First Filed Value   Admission Height 177.8 cm (70\") Documented at 02/27/2024 2244   Admission Weight 63.5 kg (140 lb) Documented at 02/27/2024 2244            PPE used per hospital policy    General Appearance:   Alert, cooperative, in no acute distress   ENMT:  Mallampati score 2, moist mucous membrane   Eyes:  Pupils equal and reactive to light. EOMI   Neck:    Trachea midline. No thyromegaly.   Lungs:   Bilateral coarse breath sounds with expiratory squeaks and minimal expiratory wheezing.  Improved since yesterday.    Heart:   Regular rhythm and normal rate, normal S1 and S2, no         murmur   Skin:   No rash or ecchymosis   Abdomen:     Soft. No tenderness. No HSM.   Neuro/psych:  Conscious, " alert, oriented x3. Strength 5/5 in upper and lower  ext.  Appropriate mood and affect   Extremities:  No cyanosis, clubbing or edema.  Warm extremities and well-perfused          Results Review:        Results from last 7 days   Lab Units 02/29/24  0303 02/28/24  1519 02/28/24  0608 02/28/24  0011   SODIUM mmol/L 134*  --  135* 132*   POTASSIUM mmol/L 4.1 4.4 3.0* 3.2*   CHLORIDE mmol/L 100  --  96* 92*   CO2 mmol/L 20.4*  --  24.0 23.0   BUN mg/dL 14  --  23 26*   CREATININE mg/dL 0.96  --  1.15* 1.26*   GLUCOSE mg/dL 104*  --  92 99   CALCIUM mg/dL 8.8  --  8.8 9.0         Results from last 7 days   Lab Units 02/29/24  0303 02/28/24  0608 02/28/24  0011   WBC 10*3/mm3 18.75* 13.47* 16.09*   HEMOGLOBIN g/dL 11.6* 11.3* 12.5   HEMATOCRIT % 33.1* 32.3* 35.2   PLATELETS 10*3/mm3 287 295 330             I reviewed the patient's new clinical results.        Medication Review:   aspirin, 81 mg, Oral, Daily  bisoprolol, 10 mg, Oral, Daily  cefTRIAXone, 1,000 mg, Intravenous, Q24H  donepezil, 10 mg, Oral, Daily  ferrous sulfate, 325 mg, Oral, Daily With Breakfast  guaiFENesin, 1,200 mg, Oral, Q12H  ipratropium-albuterol, 3 mL, Nebulization, 4x Daily - RT  levothyroxine, 25 mcg, Oral, Q AM  oseltamivir, 30 mg, Oral, Q12H  pantoprazole, 40 mg, Oral, Daily  sertraline, 150 mg, Oral, QAM  sodium chloride, 10 mL, Intravenous, Q12H  sodium chloride, 4 mL, Nebulization, BID - RT             Diagnostic imaging:  I personally and independently reviewed the following images:  EKG 2/29/2024: A-fib.  No ischemic changes.    Assessment     Bilateral pneumonia, viral.  Bacterial pneumonia is felt to be less likely.  Right lower lobe atelectasis  Influenza A infection  A-fib s/p Watchman          Plan     DuoNeb 4 times a day  Mucinex 1200 mg twice daily  Hypertonic saline neb twice a day.  Flutter 4 times a day.  Incentive spirometry  Up to chair as tolerated  Monitor for further arrhythmia.  Had a short run of nonsustained V.  rosanna Lundy MD  02/29/24  14:18 EST        This note was dictated utilizing Dragon dictation

## 2024-02-29 NOTE — PROGRESS NOTES
Name: Raina Forrest ADMIT: 2024   : 1945  PCP: Georgiana Cisneros MD    MRN: [id number] LOS: 0 days   AGE/SEX: 78 y.o. female  ROOM: [facility]     Subjective   Subjective   Patient feels better.  Decreased shortness of breath.  Resolution of the wheeze.  Continues with cough productive of yellowish sputum.  No chest pain.  No palpitation.  No hemoptysis.  No fever or chills.  No PND orthopnea.  No ankle edema.    Review of Systems  GI.  No abdominal pain or nausea or vomiting.  Poor p.o. intake.  .  Decreased urine output with no dysuria or hematuria.     Objective   Objective   Vital Signs  Temp:  [97.7 °F (36.5 °C)-99.7 °F (37.6 °C)] 97.8 °F (36.6 °C)  Heart Rate:  [79-89] 85  Resp:  [16-18] 18  BP: ()/(56-75) 112/73  SpO2:  [92 %-100 %] 100 %  on   ;   Device (Oxygen Therapy): room air    Intake/Output Summary (Last 24 hours) at 2024 0938  Last data filed at 2024 0000  Gross per 24 hour   Intake 720 ml   Output 1000 ml   Net -280 ml     Body mass index is 18.72 kg/m².      24  2244 24  0550   Weight: 63.5 kg (140 lb) 59.2 kg (130 lb 8 oz)     Physical Exam  General.  Elderly female.  She is alert.  Oriented x 2 -3 out of 4.  No apparent pain/distress/diaphoresis.  Underweight.  Eyes.  Status post left cataract surgery.  Small right cataract.  Pupils equal round and reactive with intact extraocular musculature.  No pallor or jaundice.  Oral cavity.  Moist mucous membrane with no lesions.  Neck.  Supple.  No JVD.  No lymphadenopathy or thyromegaly.  Cardiovascular.  Controlled rate atrial fibrillation and grade 2 systolic murmur  Chest.  Poor bilateral air entry with bilateral rhonchi and bilateral expiratory wheeze (improved from yesterday)..  Abdomen.  Soft lax.  No tenderness.  No organomegaly.  No guarding or rebound.  Left-sided 8 x 3 cm mildly tender hematoma with surrounding bruising of the lower extremity.  Extremities.  No clubbing/cyanosis/edema  CNS.  No  "acute focal neurological deficits    Results Review:      Results from last 7 days   Lab Units 02/29/24  0303 02/28/24  1519 02/28/24  0608 02/28/24  0011   SODIUM mmol/L 134*  --  135* 132*   POTASSIUM mmol/L 4.1 4.4 3.0* 3.2*   CHLORIDE mmol/L 100  --  96* 92*   CO2 mmol/L 20.4*  --  24.0 23.0   BUN mg/dL 14  --  23 26*   CREATININE mg/dL 0.96  --  1.15* 1.26*   GLUCOSE mg/dL 104*  --  92 99   CALCIUM mg/dL 8.8  --  8.8 9.0   AST (SGOT) U/L 20  --   --  27   ALT (SGPT) U/L 12  --   --  20     Estimated Creatinine Clearance: 43.7 mL/min (by C-G formula based on SCr of 0.96 mg/dL).                  Results from last 7 days   Lab Units 02/28/24  1519   TSH uIU/mL 1.410     Results from last 7 days   Lab Units 02/28/24  0608   MAGNESIUM mg/dL 2.1           Invalid input(s): \"LDLCALC\"  Results from last 7 days   Lab Units 02/29/24  0303 02/28/24  0608 02/28/24  0011   WBC 10*3/mm3 18.75* 13.47* 16.09*   HEMOGLOBIN g/dL 11.6* 11.3* 12.5   HEMATOCRIT % 33.1* 32.3* 35.2   PLATELETS 10*3/mm3 287 295 330   MCV fL 98.5* 94.7 96.2   MCH pg 34.5* 33.1* 34.2*   MCHC g/dL 35.0 35.0 35.5   RDW % 14.7 14.4 14.4   RDW-SD fl 52.5 48.7 49.3   MPV fL 11.6 11.5 11.2   NEUTROPHIL % %  --   --  79.1*   LYMPHOCYTE % %  --   --  8.0*   MONOCYTES % %  --   --  7.1   EOSINOPHIL % %  --   --  0.6   BASOPHIL % %  --   --  0.7   IMM GRAN % %  --   --  4.5*   NEUTROS ABS 10*3/mm3 15.94*  --  12.73*   LYMPHS ABS 10*3/mm3  --   --  1.28   MONOS ABS 10*3/mm3  --   --  1.15*   EOS ABS 10*3/mm3  --   --  0.10   BASOS ABS 10*3/mm3  --   --  0.11   IMMATURE GRANS (ABS) 10*3/mm3  --   --  0.72*   NRBC /100 WBC 0.0  --  0.0             Results from last 7 days   Lab Units 02/28/24  0011   PROCALCITONIN ng/mL 0.24   LACTATE mmol/L 1.3             Results from last 7 days   Lab Units 02/28/24  0011   BLOODCX  No growth at 24 hours  No growth at 24 hours     Results from last 7 days   Lab Units 02/28/24 0012   ADENOVIRUS DETECTION BY PCR  Not Detected "   CORONAVIRUS 229E  Not Detected   CORONAVIRUS HKU1  Not Detected   CORONAVIRUS NL63  Not Detected   CORONAVIRUS OC43  Not Detected   HUMAN METAPNEUMOVIRUS  Not Detected   HUMAN RHINOVIRUS/ENTEROVIRUS  Not Detected   INFLUENZA B PCR  Not Detected   PARAINFLUENZA 1  Not Detected   PARAINFLUENZA VIRUS 2  Not Detected   PARAINFLUENZA VIRUS 3  Not Detected   PARAINFLUENZA VIRUS 4  Not Detected   BORDETELLA PERTUSSIS PCR  Not Detected   CHLAMYDOPHILA PNEUMONIAE PCR  Not Detected   MYCOPLAMA PNEUMO PCR  Not Detected   INFLUENZA A H3  Detected*   RSV, PCR  Not Detected               Imaging:  Imaging Results (Last 24 Hours)       Procedure Component Value Units Date/Time    US Renal Limited [[id number]] Collected: 02/29/24 0030     Updated: 02/29/24 0030    Narrative:        Patient: SHANNAN LIMA  Time Out: 00:30  Exam(s): US RENAL     EXAM:    US Retroperitoneal Limited, Renal    CLINICAL HISTORY:      Acute kidney failure s p left nephrectomy.    TECHNIQUE:    Real-time limited ultrasound of the retroperitoneum with image   documentation.    COMPARISON:    MRI abdomen 11 28 2023    FINDINGS:    Right kidney:  The right kidney measures 9.3 x 3.8 x 4.3 cm.  No stones.    No hydronephrosis.    Left kidney:  There has been interval left nephrectomy when compared to   the previous MRI examination.    Bladder:  The bladder is moderately distended without wall   abnormalities or calcifications.  A right ureteral jet is noted.    IMPRESSION:       1.  Negative sonographic evaluation of the right kidney.  No   hydronephrosis.  2.  Left nephrectomy.    Impression:          Electronically signed by Torsten Mariano MD on 02-29-24 at 0030               I reviewed the patient's new clinical results / labs / tests / procedures      Assessment/Plan     Active Hospital Problems    Diagnosis  POA    **Pneumonia [J18.9]  Yes    Pneumonia of both lungs due to infectious organism [J18.9]  Yes    Hypokalemia [E87.6]  Yes    Stage 3a  chronic kidney disease [N18.31]  Yes    Influenza A [J10.1]  Yes    Severe malnutrition [E43]  Yes    Acute kidney failure [N17.9]  Yes    H/O left radical nephrectomy [Z90.5]  Not Applicable    Abdominal wall hematoma [S30.1XXA]  Yes    Peptic ulcer disease [K27.9]  Yes    Hypothyroidism (acquired) [E03.9]  Yes    Chronic atrial fibrillation [I48.20]  Yes    Benign essential HTN [I10]  Yes      Resolved Hospital Problems   No resolved problems to display.         1.  Bilateral atypical pneumonia associated with bronchospasms with persistent influenza A infection.  Leukocytosis indicate that there might be a bacterial infection even though lactic acid and procalcitonin are normal.  Respiratory PCR panel is positive for influenza A.  Negative Legionella and Streptococcus antigen.  Awaiting the sputum culture.  Negative blood cultures till now.  Slowly improving on DuoNebs. / Mucinex/ IV Rocephin/incentive spirometry.  Noted and appreciated pulmonary consult.  2.  Acute on top of stage IIIa chronic renal failure/hypokalemia/left renal carcinoma status post left nephrectomy.  Acute kidney failure most likely secondary to prerenal azotemia because of decreased p.o. intake in a patient with renal mass loss.  Resolved acute kidney failure with slow IV fluids.  Renal function is normal.  DC IV fluid.  Renal ultrasound is negative.  Continue holding Jardiance.   Baseline creatinine is with between 0.8 and 1.1.  Resolved hypokalemia with substitution.    3.  Mild hyperbilirubinemia.  This is mostly secondary to abdominal wall hematoma.  Resolved today.    4.  History of atrial fibrillation and hypertension status post Watchman procedure.  Good blood pressure control with good rate control on Zebeta.  Most recent 2D echo on January 2024 revealing a normal ejection fraction with left atrial enlargement.  There is no clinical evidence of angina or congestive heart failure.  No need for anticoagulation because of Watchman  procedure no clinical evidence of angina or congestive heart failure  5.  History of GERD/ulcerative colitis/peptic ulcer disease/diverticular disease.  Benign GI examination.  Continue Protonix.  Being evaluated for treatment of H. pylori.  On no treatment for ulcerative colitis.  6.  Hypothyroidism.  Continue current replacement.  TSH is normal.  7.  Dementia.  Continue Aricept.  Negative CNS examination for focal deficits.  8.  Malnutrition.  Nutritional consult.  9.  VTE prophylaxis.  Sequential compression devices.     Discussed my findings and plan of treatment with the patient/son and daughter-in-law.  Disposition.  To be determined based on clinical course.  Dissipate discharge home and 2 days        Kristina Hernandez MD  [facility]  02/29/24  09:38 EST

## 2024-02-29 NOTE — THERAPY TREATMENT NOTE
Patient Name: Raina Forrest  : 1945    MRN: [id number]                              Today's Date: 2024       Admit Date: 2024    Visit Dx:     ICD-10-CM ICD-9-CM   1. Pneumonia of both lungs due to infectious organism, unspecified part of lung  J18.9 483.8   2. Influenza A  J10.1 487.1   3. Dehydration  E86.0 276.51     Patient Active Problem List   Diagnosis    Chronic atrial fibrillation    Benign essential HTN    Bradycardia    Chest pain    Can't get food down    Accumulation of fluid in tissues    Esophageal lump    Adynamia    Itch of skin    Anorexia    Chronic nausea    Gastroesophageal reflux disease    Breath shortness    Emesis    Decreased body weight    Anxiety    Narrowing of intervertebral disc space    Diverticulosis of intestine    Primary hypertension    Migraine    Osteoporosis    Palpitations    Pituitary adenoma    Sick sinus syndrome    Diarrhea    Cardiomyopathy    Chronic anticoagulation    Atrial fibrillation    Anticoagulated    Pacemaker    Infected pacemaker    Wound infection    Primary osteoarthritis of left knee    DJD (degenerative joint disease)    Ischemic cardiomyopathy    Acute on chronic congestive heart failure, unspecified heart failure type    Anemia    Presence of Watchman left atrial appendage closure device    Pneumonia    Pneumonia of both lungs due to infectious organism    Hypokalemia    Stage 3a chronic kidney disease    Influenza A    Severe malnutrition    Acute kidney failure    H/O left radical nephrectomy    Abdominal wall hematoma    Peptic ulcer disease    Hypothyroidism (acquired)     Past Medical History:   Diagnosis Date    Atrial fibrillation     Fung's esophagus     Benign essential hypertension     Blood clot in vein     Cardiomyopathy     Chronic gastritis     Congestive heart failure     Degenerative disc disease     Dementia     Pt  states she has slight dementia    Depression with anxiety     Disease of thyroid gland      Diverticulitis of colon     Dysphagia     GERD (gastroesophageal reflux disease)     History of chest pain     History of migraine     History of migraine headaches     Left knee pain     Osteoporosis     Palpitations     Thyroid disorder     Thyroid nodule     Ulcerative colitis      Past Surgical History:   Procedure Laterality Date    APPENDECTOMY      BLADDER SURGERY      CARDIAC CATHETERIZATION N/A 11/21/2016    Procedure: Left Heart Cath;  Surgeon: Robinson Salazar MD;  Location:  [facility];  Service:     CARDIAC ELECTROPHYSIOLOGY PROCEDURE N/A 2/7/2017    Procedure: Pacemaker DC new   [employer];  Surgeon: Robinson Salazar MD;  Location:  [facility];  Service:     CARDIOVERSION  01/18/2017    CHOLECYSTECTOMY      COLONOSCOPY      COLONOSCOPY N/A 7/29/2016    normal    ENDOSCOPY  05/06/2015    submucosal nodule in esophagus, erythematous mucosa in antrum,moderate acute gastritis, no h-pylori    ENDOSCOPY N/A 9/2/2016    Erythematous mucosa in the antrum    EYE SURGERY Left     cataract    HYSTERECTOMY      KNEE MENISCAL REPAIR Left     THYROID SURGERY Left     TONSILLECTOMY      TOTAL KNEE ARTHROPLASTY Left 6/16/2020    Procedure: LEFT TOTAL KNEE ARTHOPLASTY+;  Surgeon: Jamal Castañeda MD;  Location: [facility] OR;  Service: Orthopedics;  Laterality: Left;      General Information       Row Name 02/29/24 1302          Physical Therapy Time and Intention    Document Type therapy note (daily note)  -PC     Mode of Treatment physical therapy  -PC       Row Name 02/29/24 1302          General Information    Existing Precautions/Restrictions fall  -PC       Row Name 02/29/24 1302          Cognition    Orientation Status (Cognition) oriented to;person;place  -PC               User Key  (r) = Recorded By, (t) = Taken By, (c) = Cosigned By      Initials Name Provider Type    PC Apolonia Piedra PT Physical Therapist                   Mobility       Row Name 02/29/24  1302          Bed Mobility    Supine-Sit Clyo (Bed Mobility) supervision  -PC       Row Name 02/29/24 1302          Sit-Stand Transfer    Sit-Stand Clyo (Transfers) standby assist  -PC       Row Name 02/29/24 1302          Gait/Stairs (Locomotion)    Clyo Level (Gait) contact guard  -PC     Assistive Device (Gait) --  HHA  -PC     Distance in Feet (Gait) 200 ft  -PC     Deviations/Abnormal Patterns (Gait) mio decreased;ataxic  -PC     Comment, (Gait/Stairs) occasional unsteadiness  -PC               User Key  (r) = Recorded By, (t) = Taken By, (c) = Cosigned By      Initials Name Provider Type    PC Apolonia Piedra, PT Physical Therapist                   Obj/Interventions    No documentation.                  Goals/Plan    No documentation.                  Clinical Impression       Row Name 02/29/24 1303          Pain    Pretreatment Pain Rating 0/10 - no pain  -PC       Row Name 02/29/24 1303          Plan of Care Review    Plan of Care Reviewed With patient;son  -PC     Progress improving  -PC     Outcome Evaluation Pt able to walk a little farther today, cont to have some mild unsteadiness, pt amb 200 ft with HHA, pt pleasant and cooperative, forgetful, PT will cont to follow to maximize safety for discharge home  -PC       Row Name 02/29/24 1303          Positioning and Restraints    Pre-Treatment Position in bed  -PC     Post Treatment Position chair  -PC     In Chair reclined;call light within reach;encouraged to call for assist;exit alarm on;with family/caregiver  -PC               User Key  (r) = Recorded By, (t) = Taken By, (c) = Cosigned By      Initials Name Provider Type    PC Apolonia Piedra, PT Physical Therapist                   Outcome Measures       Row Name 02/29/24 1306 02/29/24 0900       How much help from another person do you currently need...    Turning from your back to your side while in flat bed without using bedrails? 4  -PC 4  -KM    Moving from lying on  back to sitting on the side of a flat bed without bedrails? 4  -PC 3  -KM    Moving to and from a bed to a chair (including a wheelchair)? 4  -PC 3  -KM    Standing up from a chair using your arms (e.g., wheelchair, bedside chair)? 3  -PC 3  -KM    Climbing 3-5 steps with a railing? 3  -PC 2  -KM    To walk in hospital room? 3  -PC 3  -KM    AM-PAC 6 Clicks Score (PT) 21  -PC 18  -KM    Highest Level of Mobility Goal 6 --> Walk 10 steps or more  -PC 6 --> Walk 10 steps or more  -KM              User Key  (r) = Recorded By, (t) = Taken By, (c) = Cosigned By      Initials Name Provider Type    PC Apolonia Piedra, PT Physical Therapist     Nerissa Chen, RN Registered Nurse                                 Physical Therapy Education       Title: PT OT SLP Therapies (In Progress)       Topic: Physical Therapy (In Progress)       Point: Mobility training (Done)       Learning Progress Summary             Patient Acceptance, E,D, DU by  at 2/29/2024 1306    Acceptance, E, VU,NR by  at 2/28/2024 1607                         Point: Home exercise program (Not Started)       Learner Progress:  Not documented in this visit.              Point: Body mechanics (Done)       Learning Progress Summary             Patient Acceptance, E,D, DU by  at 2/29/2024 1306                         Point: Precautions (Done)       Learning Progress Summary             Patient Acceptance, E,D, DU by  at 2/29/2024 1306                                         User Key       Initials Effective Dates Name Provider Type Discipline    PC 06/16/21 -  Apolonia Piedra, PT Physical Therapist PT    EE 06/16/21 -  Melinda Umanzor, PT Physical Therapist PT                  PT Recommendation and Plan     Plan of Care Reviewed With: patient, son  Progress: improving  Outcome Evaluation: Pt able to walk a little farther today, cont to have some mild unsteadiness, pt amb 200 ft with HHA, pt pleasant and cooperative, forgetful, PT will cont to follow to  maximize safety for discharge home     Time Calculation:         PT Charges       Row Name 02/29/24 1307             Time Calculation    Start Time 1101  -PC      Stop Time 1115  -PC      Time Calculation (min) 14 min  -PC      PT Received On 02/29/24  -PC      PT - Next Appointment 03/01/24  -PC                User Key  (r) = Recorded By, (t) = Taken By, (c) = Cosigned By      Initials Name Provider Type    PC Apolonia Piedra, PT Physical Therapist                  Therapy Charges for Today       Code Description Service Date Service Provider Modifiers Qty    69646578849 HC PT THER PROC EA 15 MIN 2/29/2024 Apolonia Piedra, PT GP 1            PT G-Codes  Outcome Measure Options: AM-PAC 6 Clicks Daily Activity (OT)  AM-PAC 6 Clicks Score (PT): 21  AM-PAC 6 Clicks Score (OT): 21  PT Discharge Summary  Anticipated Discharge Disposition (PT): home with assist    Apolonia Piedra, PT  2/29/2024

## 2024-03-01 LAB
ANION GAP SERPL CALCULATED.3IONS-SCNC: 12.8 MMOL/L (ref 5–15)
ANISOCYTOSIS BLD QL: ABNORMAL
BASOPHILS # BLD MANUAL: 0.16 10*3/MM3 (ref 0–0.2)
BASOPHILS NFR BLD MANUAL: 1 % (ref 0–1.5)
BUN SERPL-MCNC: 12 MG/DL (ref 8–23)
BUN/CREAT SERPL: 12.9 (ref 7–25)
CALCIUM SPEC-SCNC: 8.8 MG/DL (ref 8.6–10.5)
CHLORIDE SERPL-SCNC: 102 MMOL/L (ref 98–107)
CO2 SERPL-SCNC: 22.2 MMOL/L (ref 22–29)
CREAT SERPL-MCNC: 0.93 MG/DL (ref 0.57–1)
DEPRECATED RDW RBC AUTO: 54.2 FL (ref 37–54)
EGFRCR SERPLBLD CKD-EPI 2021: 62.3 ML/MIN/1.73
ELLIPTOCYTES BLD QL SMEAR: ABNORMAL
EOSINOPHIL # BLD MANUAL: 0.16 10*3/MM3 (ref 0–0.4)
EOSINOPHIL NFR BLD MANUAL: 1 % (ref 0.3–6.2)
ERYTHROCYTE [DISTWIDTH] IN BLOOD BY AUTOMATED COUNT: 14.7 % (ref 12.3–15.4)
FOLATE SERPL-MCNC: >20 NG/ML (ref 4.78–24.2)
GLUCOSE SERPL-MCNC: 97 MG/DL (ref 65–99)
HCT VFR BLD AUTO: 34.6 % (ref 34–46.6)
HGB BLD-MCNC: 11.7 G/DL (ref 12–15.9)
LYMPHOCYTES # BLD MANUAL: 1.11 10*3/MM3 (ref 0.7–3.1)
LYMPHOCYTES NFR BLD MANUAL: 3 % (ref 5–12)
MCH RBC QN AUTO: 34.2 PG (ref 26.6–33)
MCHC RBC AUTO-ENTMCNC: 33.8 G/DL (ref 31.5–35.7)
MCV RBC AUTO: 101.2 FL (ref 79–97)
METAMYELOCYTES NFR BLD MANUAL: 4 % (ref 0–0)
MONOCYTES # BLD: 0.48 10*3/MM3 (ref 0.1–0.9)
MYELOCYTES NFR BLD MANUAL: 3 % (ref 0–0)
NEUTROPHILS # BLD AUTO: 12.83 10*3/MM3 (ref 1.7–7)
NEUTROPHILS NFR BLD MANUAL: 81 % (ref 42.7–76)
NRBC BLD AUTO-RTO: 0 /100 WBC (ref 0–0.2)
OVALOCYTES BLD QL SMEAR: ABNORMAL
PLAT MORPH BLD: NORMAL
PLATELET # BLD AUTO: 299 10*3/MM3 (ref 140–450)
PMV BLD AUTO: 11.6 FL (ref 6–12)
POIKILOCYTOSIS BLD QL SMEAR: ABNORMAL
POLYCHROMASIA BLD QL SMEAR: ABNORMAL
POTASSIUM SERPL-SCNC: 4.1 MMOL/L (ref 3.5–5.2)
RBC # BLD AUTO: 3.42 10*6/MM3 (ref 3.77–5.28)
SODIUM SERPL-SCNC: 137 MMOL/L (ref 136–145)
VARIANT LYMPHS NFR BLD MANUAL: 7 % (ref 19.6–45.3)
VIT B12 BLD-MCNC: >2000 PG/ML (ref 211–946)
WBC MORPH BLD: NORMAL
WBC NRBC COR # BLD AUTO: 15.84 10*3/MM3 (ref 3.4–10.8)

## 2024-03-01 PROCEDURE — 25010000002 CEFTRIAXONE PER 250 MG: Performed by: INTERNAL MEDICINE

## 2024-03-01 PROCEDURE — 85007 BL SMEAR W/DIFF WBC COUNT: CPT | Performed by: INTERNAL MEDICINE

## 2024-03-01 PROCEDURE — 97530 THERAPEUTIC ACTIVITIES: CPT

## 2024-03-01 PROCEDURE — 94799 UNLISTED PULMONARY SVC/PX: CPT

## 2024-03-01 PROCEDURE — 94664 DEMO&/EVAL PT USE INHALER: CPT

## 2024-03-01 PROCEDURE — 94760 N-INVAS EAR/PLS OXIMETRY 1: CPT

## 2024-03-01 PROCEDURE — 80048 BASIC METABOLIC PNL TOTAL CA: CPT | Performed by: INTERNAL MEDICINE

## 2024-03-01 PROCEDURE — 85025 COMPLETE CBC W/AUTO DIFF WBC: CPT | Performed by: INTERNAL MEDICINE

## 2024-03-01 PROCEDURE — 94761 N-INVAS EAR/PLS OXIMETRY MLT: CPT

## 2024-03-01 PROCEDURE — 82746 ASSAY OF FOLIC ACID SERUM: CPT | Performed by: INTERNAL MEDICINE

## 2024-03-01 PROCEDURE — 82607 VITAMIN B-12: CPT | Performed by: INTERNAL MEDICINE

## 2024-03-01 RX ADMIN — LEVOTHYROXINE SODIUM 25 MCG: 25 TABLET ORAL at 04:48

## 2024-03-01 RX ADMIN — OSELTAMIVIR PHOSPHATE 30 MG: 30 CAPSULE ORAL at 20:57

## 2024-03-01 RX ADMIN — BISOPROLOL FUMARATE 10 MG: 5 TABLET, FILM COATED ORAL at 10:00

## 2024-03-01 RX ADMIN — OSELTAMIVIR PHOSPHATE 30 MG: 30 CAPSULE ORAL at 10:01

## 2024-03-01 RX ADMIN — SERTRALINE 150 MG: 100 TABLET, FILM COATED ORAL at 04:47

## 2024-03-01 RX ADMIN — GUAIFENESIN 1200 MG: 600 TABLET, EXTENDED RELEASE ORAL at 20:57

## 2024-03-01 RX ADMIN — ASPIRIN 81 MG: 81 TABLET, CHEWABLE ORAL at 10:00

## 2024-03-01 RX ADMIN — IPRATROPIUM BROMIDE AND ALBUTEROL SULFATE 3 ML: .5; 3 SOLUTION RESPIRATORY (INHALATION) at 10:41

## 2024-03-01 RX ADMIN — Medication 10 ML: at 21:17

## 2024-03-01 RX ADMIN — GUAIFENESIN 1200 MG: 600 TABLET, EXTENDED RELEASE ORAL at 10:01

## 2024-03-01 RX ADMIN — DONEPEZIL HYDROCHLORIDE 10 MG: 10 TABLET, FILM COATED ORAL at 10:00

## 2024-03-01 RX ADMIN — CEFTRIAXONE SODIUM 1000 MG: 1 INJECTION, POWDER, FOR SOLUTION INTRAMUSCULAR; INTRAVENOUS at 04:46

## 2024-03-01 RX ADMIN — PANTOPRAZOLE SODIUM 40 MG: 40 TABLET, DELAYED RELEASE ORAL at 10:00

## 2024-03-01 RX ADMIN — FERROUS SULFATE TAB 325 MG (65 MG ELEMENTAL FE) 325 MG: 325 (65 FE) TAB at 10:00

## 2024-03-01 RX ADMIN — IPRATROPIUM BROMIDE AND ALBUTEROL SULFATE 3 ML: .5; 3 SOLUTION RESPIRATORY (INHALATION) at 15:10

## 2024-03-01 RX ADMIN — IPRATROPIUM BROMIDE AND ALBUTEROL SULFATE 3 ML: .5; 3 SOLUTION RESPIRATORY (INHALATION) at 21:35

## 2024-03-01 NOTE — PLAN OF CARE
Goal Outcome Evaluation:  Plan of Care Reviewed With: patient, son        Progress: improving  Outcome Evaluation: Pt received in bed and agreeable to PT. Pt performed supine to sit, STS transfer, and ambulated 200' c no AD requiring SBA/CGA. Pt demo's as slow pace. Pt was mildly unsteady but no overt LOB. Pt returned to room and completed B UE/LE strengthening exercises x 10. Pt completed all activity on RA with O2 sats remaining in the 90's. Pt will continue to benefit from skilled PT to address strength and endurance.      Anticipated Discharge Disposition (PT): home with assist, home with home health

## 2024-03-01 NOTE — DISCHARGE PLACEMENT REQUEST
"Shannan Forrest (78 y.o. Female)       Date of Birth   1945    Social Security Number       [place]   [address] [place] [address]    Home Phone   [phone]    MRN   [id number]       [facility]    Marital Status                               Admission Date   2/27/24    Admission Type   Emergency    Admitting Provider   Kristina Hernandez MD    Attending Provider   Kristina Hernandez MD    Department, Room/Bed   [facility] CARDIOVASC UNIT, 2211/1       Discharge Date       Discharge Disposition       Discharge Destination                                 Attending Provider: Kristina Hernandez MD    Allergies: Bupivacaine Hcl, Nepafenac, Bactrim [Sulfamethoxazole-trimethoprim], Barium-containing Compounds, Bextra [Valdecoxib], Cortisone, Cymbalta [Duloxetine Hcl], Iodinated Contrast Media, Medrol [Methylprednisolone], Mesalamine, Polymyxin B-trimethoprim, Rivaroxaban, Tetanus Toxoids, Tetanus-diphtheria Toxoids Td    Isolation: Droplet   Infection: Influenza (02/28/24)   Code Status: CPR    Ht: 177.8 cm (70\")   Wt: 59.2 kg (130 lb 8 oz)    Admission Cmt: None   Principal Problem: Pneumonia [J18.9]                   Active Insurance as of 2/27/2024       Primary Coverage       Payor Plan Insurance Group Employer/Plan Group    MEDICARE MEDICARE A & B        Payor Plan Address Payor Plan Phone Number Payor Plan Fax Number Effective Dates    [address] [phone]  12/1/2008 - None Entered    [facility] [id number]         Subscriber Name Subscriber Birth Date Member ID       SHANNAN FORREST 1945 [id number]               Secondary Coverage       Payor Plan Insurance Group Employer/Plan Group    ANTHEM BLUE CROSS ANTHEM BLUE CROSS BLUE SHIELD PPO [id number]       Payor Plan Address Payor Plan Phone Number Payor Plan Fax Number Effective Dates    [address] [phone]  1/1/2024 - None Entered    [facility] [id number]         Subscriber Name Subscriber Birth " Date Member ID       MARY LIMA SR. 5/7/1941 [id number]                     Emergency Contacts        (Rel.) Home Phone Work Phone Mobile Phone    Mary Lima SR (Spouse) [phone]    MARY Lima JR (Son) [phone] -- [phone]

## 2024-03-01 NOTE — PROGRESS NOTES
Name: Raina Forrest ADMIT: 2024   : 1945  PCP: Georgiana Cisneros MD    MRN: [id number] LOS: 1 days   AGE/SEX: 78 y.o. female  ROOM: [facility]     Subjective   Subjective   Patient continues to improve.  No shortness of breath.  No wheezing.  Improved activity.  Continues with cough productive of yellowish sputum.  No chest pain.  No palpitation.  No hemoptysis.  No fever or chills.  No PND orthopnea.  No ankle edema.    Review of Systems  GI.  No abdominal pain or nausea or vomiting.  Improving p.o. intake.    .  Decreased urine output with no dysuria or hematuria.     Objective   Objective   Vital Signs  Temp:  [97.5 °F (36.4 °C)-98.4 °F (36.9 °C)] 97.5 °F (36.4 °C)  Heart Rate:  [76-95] 81  Resp:  [17-19] 19  BP: ()/(61-73) 102/61  SpO2:  [94 %-97 %] 94 %  on   ;   Device (Oxygen Therapy): room air    Intake/Output Summary (Last 24 hours) at 3/1/2024 1441  Last data filed at 3/1/2024 0900  Gross per 24 hour   Intake 210 ml   Output 1200 ml   Net -990 ml     Body mass index is 18.72 kg/m².      24  2244 24  0550   Weight: 63.5 kg (140 lb) 59.2 kg (130 lb 8 oz)     Physical Exam  General.  Elderly female.  She is alert.  Oriented x 2 -3 out of 4.  No apparent pain/distress/diaphoresis.  Underweight.  Eyes.  Status post left cataract surgery.  Small right cataract.  Pupils equal round and reactive with intact extraocular musculature.  No pallor or jaundice.  Oral cavity.  Moist mucous membrane with no lesions.  Neck.  Supple.  No JVD.  No lymphadenopathy or thyromegaly.  Cardiovascular.  Controlled rate atrial fibrillation and grade 2 systolic murmur  Chest.  Improved bilateral air entry with scattered bilateral rhonchi and resolution of the wheeze.   Abdomen.  Soft lax.  No tenderness.  No organomegaly.  No guarding or rebound.  Left-sided 8 x 3 cm mildly tender hematoma with surrounding bruising of the lower extremity.  Extremities.  No clubbing/cyanosis/edema  CNS.  No  "acute focal neurological deficits    Results Review:      Results from last 7 days   Lab Units 03/01/24  0237 02/29/24  0303 02/28/24  1519 02/28/24  0608 02/28/24  0011   SODIUM mmol/L 137 134*  --  135* 132*   POTASSIUM mmol/L 4.1 4.1 4.4 3.0* 3.2*   CHLORIDE mmol/L 102 100  --  96* 92*   CO2 mmol/L 22.2 20.4*  --  24.0 23.0   BUN mg/dL 12 14  --  23 26*   CREATININE mg/dL 0.93 0.96  --  1.15* 1.26*   GLUCOSE mg/dL 97 104*  --  92 99   CALCIUM mg/dL 8.8 8.8  --  8.8 9.0   AST (SGOT) U/L  --  20  --   --  27   ALT (SGPT) U/L  --  12  --   --  20     Estimated Creatinine Clearance: 45.1 mL/min (by C-G formula based on SCr of 0.93 mg/dL).                  Results from last 7 days   Lab Units 02/28/24  1519   TSH uIU/mL 1.410     Results from last 7 days   Lab Units 02/28/24  0608   MAGNESIUM mg/dL 2.1           Invalid input(s): \"LDLCALC\"  Results from last 7 days   Lab Units 03/01/24 0237 02/29/24  0303 02/28/24  0608 02/28/24  0011 02/28/24  0011   WBC 10*3/mm3 15.84* 18.75* 13.47*  --  16.09*   HEMOGLOBIN g/dL 11.7* 11.6* 11.3*  --  12.5   HEMATOCRIT % 34.6 33.1* 32.3*  --  35.2   PLATELETS 10*3/mm3 299 287 295  --  330   MCV fL 101.2* 98.5* 94.7  --  96.2   MCH pg 34.2* 34.5* 33.1*  --  34.2*   MCHC g/dL 33.8 35.0 35.0  --  35.5   RDW % 14.7 14.7 14.4  --  14.4   RDW-SD fl 54.2* 52.5 48.7  --  49.3   MPV fL 11.6 11.6 11.5  --  11.2   NEUTROPHIL % %  --   --   --   --  79.1*   LYMPHOCYTE % %  --   --   --   --  8.0*   MONOCYTES % %  --   --   --   --  7.1   EOSINOPHIL % %  --   --   --   --  0.6   BASOPHIL % %  --   --   --   --  0.7   IMM GRAN % %  --   --   --   --  4.5*   NEUTROS ABS 10*3/mm3 12.83* 15.94*  --    < > 12.73*   LYMPHS ABS 10*3/mm3  --   --   --   --  1.28   MONOS ABS 10*3/mm3  --   --   --   --  1.15*   EOS ABS 10*3/mm3 0.16  --   --   --  0.10   BASOS ABS 10*3/mm3 0.16  --   --   --  0.11   IMMATURE GRANS (ABS) 10*3/mm3  --   --   --   --  0.72*   NRBC /100 WBC 0.0 0.0  --   --  0.0    < > = " values in this interval not displayed.             Results from last 7 days   Lab Units 02/28/24  0011   PROCALCITONIN ng/mL 0.24   LACTATE mmol/L 1.3             Results from last 7 days   Lab Units 02/28/24  2342 02/28/24  0011   BLOODCX   --  No growth at 2 days  No growth at 2 days   RESPCX  Rejected  --      Results from last 7 days   Lab Units 02/28/24  0012   ADENOVIRUS DETECTION BY PCR  Not Detected   CORONAVIRUS 229E  Not Detected   CORONAVIRUS HKU1  Not Detected   CORONAVIRUS NL63  Not Detected   CORONAVIRUS OC43  Not Detected   HUMAN METAPNEUMOVIRUS  Not Detected   HUMAN RHINOVIRUS/ENTEROVIRUS  Not Detected   INFLUENZA B PCR  Not Detected   PARAINFLUENZA 1  Not Detected   PARAINFLUENZA VIRUS 2  Not Detected   PARAINFLUENZA VIRUS 3  Not Detected   PARAINFLUENZA VIRUS 4  Not Detected   BORDETELLA PERTUSSIS PCR  Not Detected   CHLAMYDOPHILA PNEUMONIAE PCR  Not Detected   MYCOPLAMA PNEUMO PCR  Not Detected   INFLUENZA A H3  Detected*   RSV, PCR  Not Detected               Imaging:  Imaging Results (Last 24 Hours)       ** No results found for the last 24 hours. **               I reviewed the patient's new clinical results / labs / tests / procedures      Assessment/Plan     Active Hospital Problems    Diagnosis  POA    **Pneumonia [J18.9]  Yes    Pneumonia of both lungs due to infectious organism [J18.9]  Yes    Hypokalemia [E87.6]  Yes    Stage 3a chronic kidney disease [N18.31]  Yes    Influenza A [J10.1]  Yes    Severe malnutrition [E43]  Yes    Acute kidney failure [N17.9]  Yes    H/O left radical nephrectomy [Z90.5]  Not Applicable    Abdominal wall hematoma [S30.1XXA]  Yes    Peptic ulcer disease [K27.9]  Yes    Hypothyroidism (acquired) [E03.9]  Yes    Chronic atrial fibrillation [I48.20]  Yes    Benign essential HTN [I10]  Yes      Resolved Hospital Problems   No resolved problems to display.         1.  Bilateral atypical pneumonia associated with bronchospasms with persistent influenza A  infection.  Leukocytosis indicate that there might be a bacterial infection even though lactic acid and procalcitonin are normal.  Leukocytosis improving.  Respiratory PCR panel is positive for influenza A.  Negative Legionella and Streptococcus antigen.  Sputum culture rejected.  Negative blood cultures till now.  Clinically improving on DuoNebs. / Mucinex/ IV Rocephin/incentive spirometry.  Noted and appreciated pulmonary consult.  2.  Acute on top of stage IIIa chronic renal failure/hypokalemia/left renal carcinoma status post left nephrectomy.  Acute kidney failure most likely secondary to prerenal azotemia because of decreased p.o. intake in a patient with renal mass loss.  Resolved acute kidney failure with slow IV fluids.  Renal function is normal.  DC IV fluid.  Renal ultrasound is negative.  Continue holding Jardiance.   Baseline creatinine is with between 0.8 and 1.1.  Resolved hypokalemia with substitution.    3.  Mild hyperbilirubinemia.  This is mostly secondary to abdominal wall hematoma.  Resolved today.    4.  History of atrial fibrillation and hypertension status post Watchman procedure.  Good blood pressure control with good rate control on Zebeta.  Most recent 2D echo on January 2024 revealing a normal ejection fraction with left atrial enlargement.  There is no clinical evidence of angina or congestive heart failure.  No need for anticoagulation because of Watchman procedure no clinical evidence of angina or congestive heart failure  5.  History of GERD/ulcerative colitis/peptic ulcer disease/diverticular disease.  Benign GI examination.  Continue Protonix.  Being evaluated for treatment of H. pylori.  On no treatment for ulcerative colitis.  6.  Hypothyroidism.  Continue current replacement.  TSH is normal.  7.  Dementia.  Continue Aricept.  Negative CNS examination for focal deficits.  8.  Malnutrition.  Nutritional consult.  9.  VTE prophylaxis.  Sequential compression devices.     Discussed  my findings and plan of treatment with the patient/niece and son over the phone.    Disposition.  Anticipate discharge in 1 to 2 days with home health.      Kristina Hernandez MD  [facility]  03/01/24  14:41 EST

## 2024-03-01 NOTE — PLAN OF CARE
Goal Outcome Evaluation:  Plan of Care Reviewed With: patient, family    Progress: improving  Outcome Evaluation: Ms. Forrest admitted on 2/27 for pneumonia. Transferred from [facility] to [facility] today. No c/o pain. SOA with exertion - on room air. Droplet precautions implemented for influenza+. Tamiflu continued. Oriented x4. Assist x1. Niece at bedside. Patient stable and needs met at this time.

## 2024-03-01 NOTE — PROGRESS NOTES
"                                              LOS: 1 day   Patient Care Team:  Georgiana Cisneros MD as PCP - General (Pediatrics)  Nathan Johnson MD as Resident (Gastroenterology)  Robinson Salazar MD as Consulting Physician (Cardiology)    Chief Complaint:  F/up influenza infection and shortness of breath.    Subjective   Interval History    Remains on RA.  Cough improving.  She is using the incentive spirometer and getting around 1500 mL.  She is using the flutter as well.  Her son is helping her a lot.  Appetite improved significantly.    REVIEW OF SYSTEMS:     GASTROINTESTINAL: Improved appetite.  No nausea or vomiting.  CONSTITUTIONAL: No fever or chills.     Ventilator/Non-Invasive Ventilation Settings (From admission, onward)      None                  Physical Exam:     Vital Signs  Temp:  [98 °F (36.7 °C)-98.4 °F (36.9 °C)] 98.1 °F (36.7 °C)  Heart Rate:  [76-96] 76  Resp:  [17-18] 17  BP: ()/(61-73) 95/61    Intake/Output Summary (Last 24 hours) at 3/1/2024 0941  Last data filed at 3/1/2024 0900  Gross per 24 hour   Intake 210 ml   Output 1200 ml   Net -990 ml     Flowsheet Rows      Flowsheet Row First Filed Value   Admission Height 177.8 cm (70\") Documented at 02/27/2024 2244   Admission Weight 63.5 kg (140 lb) Documented at 02/27/2024 2244            PPE used per hospital policy    General Appearance:   Alert, cooperative, in no acute distress   ENMT:  Mallampati score 2, moist mucous membrane   Eyes:  Pupils equal and reactive to light. EOMI   Neck:    Trachea midline. No thyromegaly.   Lungs:   Bilateral coarse breath sounds with crackles and rhonchi.  Improved since yesterday    Heart:   Regular rhythm and normal rate, normal S1 and S2, no         murmur   Skin:   No rash or ecchymosis   Abdomen:     Soft. No tenderness. No HSM.   Neuro/psych:  Conscious, alert, oriented x3. Strength 5/5 in upper and lower  ext.  Appropriate mood and affect   Extremities:  No cyanosis, clubbing " or edema.  Warm extremities and well-perfused          Results Review:        Results from last 7 days   Lab Units 03/01/24  0237 02/29/24  0303 02/28/24  1519 02/28/24  0608   SODIUM mmol/L 137 134*  --  135*   POTASSIUM mmol/L 4.1 4.1 4.4 3.0*   CHLORIDE mmol/L 102 100  --  96*   CO2 mmol/L 22.2 20.4*  --  24.0   BUN mg/dL 12 14  --  23   CREATININE mg/dL 0.93 0.96  --  1.15*   GLUCOSE mg/dL 97 104*  --  92   CALCIUM mg/dL 8.8 8.8  --  8.8         Results from last 7 days   Lab Units 03/01/24  0237 02/29/24  0303 02/28/24  0608   WBC 10*3/mm3 15.84* 18.75* 13.47*   HEMOGLOBIN g/dL 11.7* 11.6* 11.3*   HEMATOCRIT % 34.6 33.1* 32.3*   PLATELETS 10*3/mm3 299 287 295             I reviewed the patient's new clinical results.        Medication Review:   aspirin, 81 mg, Oral, Daily  bisoprolol, 10 mg, Oral, Daily  cefTRIAXone, 1,000 mg, Intravenous, Q24H  donepezil, 10 mg, Oral, Daily  ferrous sulfate, 325 mg, Oral, Daily With Breakfast  guaiFENesin, 1,200 mg, Oral, Q12H  ipratropium-albuterol, 3 mL, Nebulization, 4x Daily - RT  levothyroxine, 25 mcg, Oral, Q AM  oseltamivir, 30 mg, Oral, Q12H  pantoprazole, 40 mg, Oral, Daily  sertraline, 150 mg, Oral, QAM  sodium chloride, 10 mL, Intravenous, Q12H  sodium chloride, 4 mL, Nebulization, BID - RT          Assessment     Bilateral pneumonia, viral.  Bacterial pneumonia is felt to be less likely.  Right lower lobe atelectasis  Influenza A infection  A-fib s/p Watchman  Leukocytosis, secondary to infection/stress.  Improving          Plan     DuoNeb 4 times a day  Mucinex 1200 mg twice daily  Hypertonic saline neb twice a day.  Flutter 4 times a day.  Incentive spirometry-encouraged to use  Pantoprazole 40 mg daily  Up to chair as tolerated    Dispo: Seems to be improving and can probably be discharged home as soon as tomorrow.      Nilsa Lundy MD  03/01/24  09:41 EST        This note was dictated utilizing Dragon dictation

## 2024-03-01 NOTE — THERAPY TREATMENT NOTE
Patient Name: Raina Forrest  : 1945    MRN: [id number]                              Today's Date: 3/1/2024       Admit Date: 2024    Visit Dx:     ICD-10-CM ICD-9-CM   1. Pneumonia of both lungs due to infectious organism, unspecified part of lung  J18.9 483.8   2. Influenza A  J10.1 487.1   3. Dehydration  E86.0 276.51     Patient Active Problem List   Diagnosis    Chronic atrial fibrillation    Benign essential HTN    Bradycardia    Chest pain    Can't get food down    Accumulation of fluid in tissues    Esophageal lump    Adynamia    Itch of skin    Anorexia    Chronic nausea    Gastroesophageal reflux disease    Breath shortness    Emesis    Decreased body weight    Anxiety    Narrowing of intervertebral disc space    Diverticulosis of intestine    Primary hypertension    Migraine    Osteoporosis    Palpitations    Pituitary adenoma    Sick sinus syndrome    Diarrhea    Cardiomyopathy    Chronic anticoagulation    Atrial fibrillation    Anticoagulated    Pacemaker    Infected pacemaker    Wound infection    Primary osteoarthritis of left knee    DJD (degenerative joint disease)    Ischemic cardiomyopathy    Acute on chronic congestive heart failure, unspecified heart failure type    Anemia    Presence of Watchman left atrial appendage closure device    Pneumonia    Pneumonia of both lungs due to infectious organism    Hypokalemia    Stage 3a chronic kidney disease    Influenza A    Severe malnutrition    Acute kidney failure    H/O left radical nephrectomy    Abdominal wall hematoma    Peptic ulcer disease    Hypothyroidism (acquired)     Past Medical History:   Diagnosis Date    Atrial fibrillation     Fung's esophagus     Benign essential hypertension     Blood clot in vein     Cardiomyopathy     Chronic gastritis     Congestive heart failure     Degenerative disc disease     Dementia     Pt  states she has slight dementia    Depression with anxiety     Disease of thyroid gland      Diverticulitis of colon     Dysphagia     GERD (gastroesophageal reflux disease)     History of chest pain     History of migraine     History of migraine headaches     Left knee pain     Osteoporosis     Palpitations     Thyroid disorder     Thyroid nodule     Ulcerative colitis      Past Surgical History:   Procedure Laterality Date    APPENDECTOMY      BLADDER SURGERY      CARDIAC CATHETERIZATION N/A 11/21/2016    Procedure: Left Heart Cath;  Surgeon: Robinson Salazar MD;  Location: [facility] CATH INVASIVE LOCATION;  Service:     CARDIAC ELECTROPHYSIOLOGY PROCEDURE N/A 2/7/2017    Procedure: Pacemaker DC new   [employer];  Surgeon: Robinson Salazar MD;  [facility];  Service:     CARDIOVERSION  01/18/2017    CHOLECYSTECTOMY      COLONOSCOPY      COLONOSCOPY N/A 7/29/2016    normal    ENDOSCOPY  05/06/2015    submucosal nodule in esophagus, erythematous mucosa in antrum,moderate acute gastritis, no h-pylori    ENDOSCOPY N/A 9/2/2016    Erythematous mucosa in the antrum    EYE SURGERY Left     cataract    HYSTERECTOMY      KNEE MENISCAL REPAIR Left     THYROID SURGERY Left     TONSILLECTOMY      TOTAL KNEE ARTHROPLASTY Left 6/16/2020    Procedure: LEFT TOTAL KNEE ARTHOPLASTY+;  Surgeon: Jamal Castañeda MD;  Location: [facility] OR;  Service: Orthopedics;  Laterality: Left;      General Information       Row Name 03/01/24 1129          Physical Therapy Time and Intention    Document Type therapy note (daily note)  -CS     Mode of Treatment individual therapy;physical therapy  -CS       Row Name 03/01/24 1129          General Information    Patient Profile Reviewed yes  -CS     Existing Precautions/Restrictions fall  -CS       Row Name 03/01/24 1129          Cognition    Orientation Status (Cognition) oriented to;person;place  -CS       Row Name 03/01/24 1129          Safety Issues, Functional Mobility    Impairments Affecting Function (Mobility) balance;endurance/activity  tolerance;strength  -CS               User Key  (r) = Recorded By, (t) = Taken By, (c) = Cosigned By      Initials Name Provider Type    CS Kenyetta Asher, PT Physical Therapist                   Mobility       Row Name 03/01/24 1132          Bed Mobility    Bed Mobility supine-sit  -CS     Supine-Sit [place] (Bed Mobility) supervision  -CS     Assistive Device (Bed Mobility) head of bed elevated  -CS     Comment, (Bed Mobility) UIC at end of session  -CS       Row Name 03/01/24 1132          Sit-Stand Transfer    Sit-Stand [place] (Transfers) standby assist  -CS     Assistive Device (Sit-Stand Transfers) other (see comments)  no AD  -CS       Row Name 03/01/24 1132          Gait/Stairs (Locomotion)    [place] Level (Gait) contact guard  -CS     Assistive Device (Gait) other (see comments)  no AD  -CS     Distance in Feet (Gait) 200'  -CS     Deviations/Abnormal Patterns (Gait) mio decreased;gait speed decreased  -CS     Comment, (Gait/Stairs) slow pace, mildly unsteady but no overt LOB  -CS               User Key  (r) = Recorded By, (t) = Taken By, (c) = Cosigned By      Initials Name Provider Type    CS Kenyetta Asher, PT Physical Therapist                   Obj/Interventions       Row Name 03/01/24 1135          Motor Skills    Therapeutic Exercise other (see comments)  10 reps B UE OH raise & bicep curls + 10 reps B LE AP, LAQ, & MIP  -CS       Row Name 03/01/24 1135          Balance    Balance Assessment sitting static balance;sitting dynamic balance;standing static balance;standing dynamic balance  -CS     Static Sitting Balance supervision  -CS     Dynamic Sitting Balance standby assist  -CS     Position, Sitting Balance unsupported;sitting edge of bed  -CS     Static Standing Balance standby assist  -CS     Dynamic Standing Balance contact guard  -CS     Position/Device Used, Standing Balance unsupported  -CS               User Key  (r) = Recorded By, (t) = Taken By, (c) = Cosigned By       Initials Name Provider Type    CS Kenyetta Asher, PT Physical Therapist                   Goals/Plan    No documentation.                  Clinical Impression       Row Name 03/01/24 1136          Pain    Pretreatment Pain Rating 0/10 - no pain  -CS     Posttreatment Pain Rating 0/10 - no pain  -CS       Row Name 03/01/24 1136          Plan of Care Review    Plan of Care Reviewed With patient;son  -     Progress improving  -CS     Outcome Evaluation Pt received in bed and agreeable to PT. Pt performed supine to sit, STS transfer, and ambulated 200' c no AD requiring SBA/CGA. Pt demo's as slow pace. Pt was mildly unsteady but no overt LOB. Pt returned to room and completed B UE/LE strengthening exercises x 10. Pt completed all activity on RA with O2 sats remaining in the 90's. Pt will continue to benefit from skilled PT to address strength and endurance.  -       Row Name 03/01/24 1136          Therapy Assessment/Plan (PT)    Criteria for Skilled Interventions Met (PT) yes;meets criteria  -CS     Therapy Frequency (PT) 5 times/wk  -       Row Name 03/01/24 1136          Vital Signs    Pre SpO2 (%) 96  -CS     O2 Delivery Pre Treatment room air  -CS     Intra SpO2 (%) 95  -CS     O2 Delivery Intra Treatment room air  -CS     Post SpO2 (%) 100  -CS     O2 Delivery Post Treatment room air  -CS       Row Name 03/01/24 1136          Positioning and Restraints    Pre-Treatment Position in bed  -CS     Post Treatment Position chair  -CS     In Chair reclined;call light within reach;encouraged to call for assist;exit alarm on;with family/caregiver  -CS               User Key  (r) = Recorded By, (t) = Taken By, (c) = Cosigned By      Initials Name Provider Type    CS Kenyetta Asher, PT Physical Therapist                   Outcome Measures       Row Name 03/01/24 1138          How much help from another person do you currently need...    Turning from your back to your side while in flat bed without using bedrails?  4  -CS     Moving from lying on back to sitting on the side of a flat bed without bedrails? 4  -CS     Moving to and from a bed to a chair (including a wheelchair)? 4  -CS     Standing up from a chair using your arms (e.g., wheelchair, bedside chair)? 4  -CS     Climbing 3-5 steps with a railing? 3  -CS     To walk in hospital room? 3  -CS     AM-PAC 6 Clicks Score (PT) 22  -CS     Highest Level of Mobility Goal 7 --> Walk 25 feet or more  -CS       Row Name 03/01/24 1138          Functional Assessment    Outcome Measure Options AM-PAC 6 Clicks Basic Mobility (PT)  -               User Key  (r) = Recorded By, (t) = Taken By, (c) = Cosigned By      Initials Name Provider Type    CS Kenyetta Asher, PT Physical Therapist                                 Physical Therapy Education       Title: PT OT SLP Therapies (In Progress)       Topic: Physical Therapy (Done)       Point: Mobility training (Done)       Learning Progress Summary             Patient Acceptance, E,TB, VU,DU,NR by  at 3/1/2024 1138    Acceptance, E,D, DU by  at 2/29/2024 1306    Acceptance, E, VU,NR by  at 2/28/2024 1607                         Point: Home exercise program (Done)       Learning Progress Summary             Patient Acceptance, E,TB, VU,DU,NR by  at 3/1/2024 1138                         Point: Body mechanics (Done)       Learning Progress Summary             Patient Acceptance, E,TB, VU,DU,NR by  at 3/1/2024 1138    Acceptance, E,D, DU by  at 2/29/2024 1306                         Point: Precautions (Done)       Learning Progress Summary             Patient Acceptance, E,TB, VU,DU,NR by  at 3/1/2024 1138    Acceptance, E,D, DU by  at 2/29/2024 1306                                         User Key       Initials Effective Dates Name Provider Type Discipline    PC 06/16/21 -  Apolonia Piedra, PT Physical Therapist PT    EE 06/16/21 -  Melinda Umanzor, PT Physical Therapist PT    CS 09/22/22 -  Kenyetta Asher, MERCEDES Physical  Therapist PT                  PT Recommendation and Plan     Plan of Care Reviewed With: patient, son  Progress: improving  Outcome Evaluation: Pt received in bed and agreeable to PT. Pt performed supine to sit, STS transfer, and ambulated 200' c no AD requiring SBA/CGA. Pt demo's as slow pace. Pt was mildly unsteady but no overt LOB. Pt returned to room and completed B UE/LE strengthening exercises x 10. Pt completed all activity on RA with O2 sats remaining in the 90's. Pt will continue to benefit from skilled PT to address strength and endurance.     Time Calculation:         PT Charges       Row Name 03/01/24 1138             Time Calculation    Start Time 1041  -CS      Stop Time 1058  -CS      Time Calculation (min) 17 min  -CS      PT Received On 03/01/24  -CS      PT - Next Appointment 03/04/24  -CS         Time Calculation- PT    Total Timed Code Minutes- PT 15 minute(s)  -CS         Timed Charges    93485 - PT Therapeutic Activity Minutes 15  -CS         Total Minutes    Timed Charges Total Minutes 15  -CS       Total Minutes 15  -CS                User Key  (r) = Recorded By, (t) = Taken By, (c) = Cosigned By      Initials Name Provider Type    CS Kenyetta Asher, PT Physical Therapist                  Therapy Charges for Today       Code Description Service Date Service Provider Modifiers Qty    [id number]  PT THERAPEUTIC ACT EA 15 MIN 3/1/2024 Kenyetta Asher PT GP 1            PT G-Codes  Outcome Measure Options: AM-PAC 6 Clicks Basic Mobility (PT)  AM-PAC 6 Clicks Score (PT): 22  AM-PAC 6 Clicks Score (OT): 21  PT Discharge Summary  Anticipated Discharge Disposition (PT): home with assist, home with home health    Kenyetta Asher PT  3/1/2024

## 2024-03-01 NOTE — CASE MANAGEMENT/SOCIAL WORK
Discharge Planning Assessment  [facility]     Patient Name: Raina Forrest  MRN: [id number]  Today's Date: 3/1/2024    Admit Date: 2/27/2024    Plan: home with family, son agreeable to [facility] if dr feels home health is needed   Discharge Needs Assessment       Row Name 03/01/24 1227       Living Environment    People in Home spouse    Name(s) of People in Home : Benito Forrest Sr    Current Living Arrangements home    Potentially Unsafe Housing Conditions none    Primary Care Provided by self    Provides Primary Care For no one    Family Caregiver if Needed child(justin), adult;spouse    Family Caregiver Names : Benito Forrest Sr and son: Benito Forrest Jr    Quality of Family Relationships supportive    Able to Return to Prior Arrangements yes       Resource/Environmental Concerns    Resource/Environmental Concerns none    Transportation Concerns none       Transition Planning    Patient/Family Anticipates Transition to home with family    Patient/Family Anticipated Services at Transition home health care    Transportation Anticipated family or friend will provide       Discharge Needs Assessment    Readmission Within the Last 30 Days no previous admission in last 30 days    Equipment Currently Used at Home scales;cane, quad;walker, rolling;grab bar    Concerns to be Addressed adjustment to diagnosis/illness;basic needs;discharge planning    Anticipated Changes Related to Illness none    Equipment Needed After Discharge none                   Discharge Plan       Row Name 03/01/24 1225       Plan    Method of Delivery In person    Plan Comments I met with pt and her son Benito Forrest Jr, he said his mother has Alzheimer's and he assist his father with any care needed.  He said his parents live in a multi level home with the laundry in the basement, he said his mother is not allowed to go to the basement. He said his mother is IADL, does some cleaning and folds the laundry, he  "said his father does the laundry, he said his mother had [facility] in the past and would like them again if home health needed (Suzette notified), he said he or his father provides transportation, pt has can and walker but mostly walks w/o device, she also has grab bars & scales. Pt said the correct pharmacy I [facility] in [place] but he wants a paper Rx and will get them filled at [facility] or [facility] then have then transferred to [facility].  Mr Lon Staples confirmed the plan is for him mother to return home at discharge \"likely this weekend\" he said either he or his father will provide transportation.   Monica Dent RN      Row Name 03/01/24 1212       Plan    Plan home with family, son agreeable to [facility] if dr feels home health is needed    Patient/Family in Agreement with Plan yes  pt's son Benito Staples said his mother has Alzheimer's, she was having hearing difficulty and said I could speak with her son    Provided Post Acute Provider List? Yes    Post Acute Provider List Home Health;Nursing Home    Delivered To Support Person    Support Person son: Benito Forrest Jr                  Continued Care and Services - Admitted Since 2/27/2024       Home Medical Care       Service Provider Request Status Selected Services Address Phone Fax Patient Preferred     [facility]ed N[address]A [address] [address] [phone] --                  Expected Discharge Date and Time       Expected Discharge Date Expected Discharge Time    Mar 3, 2024            Demographic Summary       Row Name 03/01/24 1226       General Information    Admission Type inpatient    Arrived From home    Required Notices Provided Important Message from Medicare    Referral Source admission list    Reason for Consult discharge planning    Preferred Language English                   Functional Status       Row Name 03/01/24 1226       Functional Status, IADL    Medications " assistive person    Meal Preparation assistive person    Housekeeping independent    Laundry assistive person    Shopping assistive person  does not drive                 Monica Dent RN

## 2024-03-01 NOTE — PROGRESS NOTES
"Nutrition Services    Patient Name:  Raina Forrest  YOB: 1945  MRN: [id number]  Admit Date:  2/27/2024    Assessment Date:  03/01/24    Summary: MSA Follow up:  Pt with improved appetite and now tolerating regular diet well with % po intake of all meals. No BM recorded since admit. Possible d/c home tomorrow per MD notes.   Recommend:  Bowel regimen to stimulate BM.  Continue Boost daily.    RD to continue to follow closely.    CLINICAL NUTRITION ASSESSMENT      Reason for Assessment Follow-up Protocol     Diagnosis/Problem   PNA, flu A         Anthropometrics        Current Height  Current Weight  BMI kg/m2 Height: 177.8 cm (70\")  Weight: 59.2 kg (130 lb 8 oz) (02/28/24 0550)  Body mass index is 18.72 kg/m².   Adjusted BMI (if applicable)    BMI Category Normal/Healthy (18.4 - 24.9)   Ideal Body Weight (IBW) 150 lb (68.2 kg)   Usual Body Weight (UBW) 130-152 lb   Weight Trend Loss, Amount/Timeframe: 9 lb (6.5%) x 1 month       --  Estimated/Assessed Needs        Current Weight  Weight: 59.2 kg (130 lb 8 oz) (02/28/24 0550)       Energy Requirements    Weight for Calculation 130 lb (59.2 kg)   Method for Estimation  30-35 kcal/kg   EST Needs (kcal/day) 6857-8742       Protein Requirements    Weight for Calculation 130 lb (59.2 kg)   EST Protein Needs (g/kg) 1.2 - 1.5 gm/kg   EST Daily Needs (g/day) 71-89       Fluid Requirements     Method for Estimation 1 mL/kcal    EST Needs (mL/day)      Labs       Pertinent Labs    Results from last 7 days   Lab Units 03/01/24  0237 02/29/24  0303 02/28/24  1519 02/28/24  0608 02/28/24  0011   SODIUM mmol/L 137 134*  --  135* 132*   POTASSIUM mmol/L 4.1 4.1 4.4 3.0* 3.2*   CHLORIDE mmol/L 102 100  --  96* 92*   CO2 mmol/L 22.2 20.4*  --  24.0 23.0   BUN mg/dL 12 14  --  23 26*   CREATININE mg/dL 0.93 0.96  --  1.15* 1.26*   CALCIUM mg/dL 8.8 8.8  --  8.8 9.0   BILIRUBIN mg/dL  --  1.1  --   --  1.9*   ALK PHOS U/L  --  85  --   --  90   ALT (SGPT) U/L  " --  12  --   --  20   AST (SGOT) U/L  --  20  --   --  27   GLUCOSE mg/dL 97 104*  --  92 99     Results from last 7 days   Lab Units 03/01/24  0237 02/29/24  0303 02/28/24  0608   MAGNESIUM mg/dL  --   --  2.1   HEMOGLOBIN g/dL 11.7* 11.6* 11.3*   HEMATOCRIT % 34.6 33.1* 32.3*   WBC 10*3/mm3 15.84* 18.75* 13.47*   ALBUMIN g/dL  --  3.1*  --      Results from last 7 days   Lab Units 03/01/24  0237 02/29/24  0303 02/28/24  0608 02/28/24  0011   PLATELETS 10*3/mm3 299 287 295 330     COVID19   Date Value Ref Range Status   02/28/2024 Not Detected Not Detected - Ref. Range Final     Lab Results   Component Value Date    HGBA1C 5.40 02/10/2023          Medications           Scheduled Medications aspirin, 81 mg, Oral, Daily  bisoprolol, 10 mg, Oral, Daily  cefTRIAXone, 1,000 mg, Intravenous, Q24H  donepezil, 10 mg, Oral, Daily  ferrous sulfate, 325 mg, Oral, Daily With Breakfast  guaiFENesin, 1,200 mg, Oral, Q12H  ipratropium-albuterol, 3 mL, Nebulization, 4x Daily - RT  levothyroxine, 25 mcg, Oral, Q AM  oseltamivir, 30 mg, Oral, Q12H  pantoprazole, 40 mg, Oral, Daily  sertraline, 150 mg, Oral, QAM  sodium chloride, 10 mL, Intravenous, Q12H  sodium chloride, 4 mL, Nebulization, BID - RT       Infusions       PRN Medications   acetaminophen **OR** acetaminophen **OR** acetaminophen    senna-docusate sodium **AND** polyethylene glycol **AND** bisacodyl **AND** bisacodyl    calcium carbonate    Calcium Replacement - Follow Nurse / BPA Driven Protocol    Magnesium Standard Dose Replacement - Follow Nurse / BPA Driven Protocol    nitroglycerin    ondansetron ODT **OR** ondansetron    Phosphorus Replacement - Follow Nurse / BPA Driven Protocol    Potassium Replacement - Follow Nurse / BPA Driven Protocol    Potassium Replacement - Follow Nurse / BPA Driven Protocol    sodium chloride    sodium chloride     Physical Findings          General Findings alert, disoriented, loss of muscle mass, loss of subcutaneous fat    Oral/Mouth Cavity dental appliance   Edema  no edema   Gastrointestinal normoactive no BM since admit   Skin  bruising, other: incision, mass   Tubes/Drains/Lines none   NFPE See Malnutrition Severity Assessment, Date Completed: 2/28   --  Malnutrition Severity Assessment      Patient meets criteria for : Severe Malnutrition         Current Nutrition Orders & Evaluation of Intake       Oral Nutrition     Food Allergies NKFA   Current PO Diet Diet: Regular/House Diet; Texture: Regular Texture (IDDSI 7); Fluid Consistency: Thin (IDDSI 0)   Supplement Boost Plus, Daily   PO Evaluation     % PO Intake %     Factors Affecting Intake: No factors at this time   --  PES STATEMENT / NUTRITION DIAGNOSIS      Nutrition Dx Problem  Problem: Malnutrition (severe)  Etiology: Medical Diagnosis - flu A, PNA and Factors Affecting Nutrition - decreased appetite/intake    Signs/Symptoms: Report of Minimal PO Intake, NFPE Results, Unintended Weight Change, and Report/Observation     NUTRITION INTERVENTION / PLAN OF CARE      Intervention Goal(s) Maintain nutrition status, Reduce/improve symptoms, Meet estimated needs, Disease management/therapy, Tolerate PO , Maintain intake, and Maintain weight         RD Intervention/Action Encourage intake, Continue to monitor, Care plan reviewed, and Recommend/order: ONS   --      Prescription/Orders:       PO Diet       Supplements Boost Plus daily      Enteral Nutrition       Parenteral Nutrition    New Prescription Ordered? Continue same per protocol   --      Monitor/Evaluation Per protocol, PO intake, Supplement intake, Pertinent labs, Weight, Skin status, Symptoms   Discharge Plan/Needs Pending clinical course   --    RD to follow per protocol.      Electronically signed by:  Geneva Garay RD  03/01/24 14:46 EST

## 2024-03-01 NOTE — PLAN OF CARE
Goal Outcome Evaluation:              Outcome Evaluation: Pt A&Ox4, VSS, no c/o pain today. Droplet precautions maintained. Will update POC as needed.

## 2024-03-02 LAB
ANION GAP SERPL CALCULATED.3IONS-SCNC: 12.6 MMOL/L (ref 5–15)
BUN SERPL-MCNC: 10 MG/DL (ref 8–23)
BUN/CREAT SERPL: 11.4 (ref 7–25)
CALCIUM SPEC-SCNC: 8.5 MG/DL (ref 8.6–10.5)
CHLORIDE SERPL-SCNC: 105 MMOL/L (ref 98–107)
CO2 SERPL-SCNC: 21.4 MMOL/L (ref 22–29)
CREAT SERPL-MCNC: 0.88 MG/DL (ref 0.57–1)
DEPRECATED RDW RBC AUTO: 51.2 FL (ref 37–54)
EGFRCR SERPLBLD CKD-EPI 2021: 66.5 ML/MIN/1.73
EOSINOPHIL # BLD MANUAL: 0.14 10*3/MM3 (ref 0–0.4)
EOSINOPHIL NFR BLD MANUAL: 1 % (ref 0.3–6.2)
ERYTHROCYTE [DISTWIDTH] IN BLOOD BY AUTOMATED COUNT: 14.3 % (ref 12.3–15.4)
GLUCOSE SERPL-MCNC: 91 MG/DL (ref 65–99)
HCT VFR BLD AUTO: 34.2 % (ref 34–46.6)
HGB BLD-MCNC: 11.4 G/DL (ref 12–15.9)
LYMPHOCYTES # BLD MANUAL: 1.87 10*3/MM3 (ref 0.7–3.1)
LYMPHOCYTES NFR BLD MANUAL: 5.1 % (ref 5–12)
MCH RBC QN AUTO: 32.9 PG (ref 26.6–33)
MCHC RBC AUTO-ENTMCNC: 33.3 G/DL (ref 31.5–35.7)
MCV RBC AUTO: 98.6 FL (ref 79–97)
MONOCYTES # BLD: 0.73 10*3/MM3 (ref 0.1–0.9)
NEUTROPHILS # BLD AUTO: 11.52 10*3/MM3 (ref 1.7–7)
NEUTROPHILS NFR BLD MANUAL: 80.8 % (ref 42.7–76)
NRBC BLD AUTO-RTO: 0 /100 WBC (ref 0–0.2)
PLAT MORPH BLD: NORMAL
PLATELET # BLD AUTO: 311 10*3/MM3 (ref 140–450)
PMV BLD AUTO: 11.4 FL (ref 6–12)
POTASSIUM SERPL-SCNC: 3.8 MMOL/L (ref 3.5–5.2)
RBC # BLD AUTO: 3.47 10*6/MM3 (ref 3.77–5.28)
RBC MORPH BLD: NORMAL
SODIUM SERPL-SCNC: 139 MMOL/L (ref 136–145)
VARIANT LYMPHS NFR BLD MANUAL: 13.1 % (ref 19.6–45.3)
WBC MORPH BLD: NORMAL
WBC NRBC COR # BLD AUTO: 14.26 10*3/MM3 (ref 3.4–10.8)

## 2024-03-02 PROCEDURE — 85007 BL SMEAR W/DIFF WBC COUNT: CPT | Performed by: INTERNAL MEDICINE

## 2024-03-02 PROCEDURE — 25010000002 CEFTRIAXONE PER 250 MG: Performed by: INTERNAL MEDICINE

## 2024-03-02 PROCEDURE — 80048 BASIC METABOLIC PNL TOTAL CA: CPT | Performed by: INTERNAL MEDICINE

## 2024-03-02 PROCEDURE — 94799 UNLISTED PULMONARY SVC/PX: CPT

## 2024-03-02 PROCEDURE — 94664 DEMO&/EVAL PT USE INHALER: CPT

## 2024-03-02 PROCEDURE — 94761 N-INVAS EAR/PLS OXIMETRY MLT: CPT

## 2024-03-02 PROCEDURE — 85025 COMPLETE CBC W/AUTO DIFF WBC: CPT | Performed by: INTERNAL MEDICINE

## 2024-03-02 RX ADMIN — LEVOTHYROXINE SODIUM 25 MCG: 25 TABLET ORAL at 06:31

## 2024-03-02 RX ADMIN — PANTOPRAZOLE SODIUM 40 MG: 40 TABLET, DELAYED RELEASE ORAL at 09:01

## 2024-03-02 RX ADMIN — IPRATROPIUM BROMIDE AND ALBUTEROL SULFATE 3 ML: .5; 3 SOLUTION RESPIRATORY (INHALATION) at 21:02

## 2024-03-02 RX ADMIN — IPRATROPIUM BROMIDE AND ALBUTEROL SULFATE 3 ML: .5; 3 SOLUTION RESPIRATORY (INHALATION) at 11:33

## 2024-03-02 RX ADMIN — GUAIFENESIN 1200 MG: 600 TABLET, EXTENDED RELEASE ORAL at 08:59

## 2024-03-02 RX ADMIN — OSELTAMIVIR PHOSPHATE 30 MG: 30 CAPSULE ORAL at 20:25

## 2024-03-02 RX ADMIN — IPRATROPIUM BROMIDE AND ALBUTEROL SULFATE 3 ML: .5; 3 SOLUTION RESPIRATORY (INHALATION) at 16:11

## 2024-03-02 RX ADMIN — BISOPROLOL FUMARATE 10 MG: 5 TABLET, FILM COATED ORAL at 09:00

## 2024-03-02 RX ADMIN — CEFTRIAXONE SODIUM 1000 MG: 1 INJECTION, POWDER, FOR SOLUTION INTRAMUSCULAR; INTRAVENOUS at 03:04

## 2024-03-02 RX ADMIN — GUAIFENESIN 1200 MG: 600 TABLET, EXTENDED RELEASE ORAL at 20:25

## 2024-03-02 RX ADMIN — ASPIRIN 81 MG: 81 TABLET, CHEWABLE ORAL at 08:59

## 2024-03-02 RX ADMIN — IPRATROPIUM BROMIDE AND ALBUTEROL SULFATE 3 ML: .5; 3 SOLUTION RESPIRATORY (INHALATION) at 08:25

## 2024-03-02 RX ADMIN — OSELTAMIVIR PHOSPHATE 30 MG: 30 CAPSULE ORAL at 09:01

## 2024-03-02 RX ADMIN — DONEPEZIL HYDROCHLORIDE 10 MG: 10 TABLET, FILM COATED ORAL at 08:59

## 2024-03-02 RX ADMIN — SERTRALINE 150 MG: 100 TABLET, FILM COATED ORAL at 08:59

## 2024-03-02 RX ADMIN — Medication 10 ML: at 09:07

## 2024-03-02 RX ADMIN — Medication 10 ML: at 20:37

## 2024-03-02 RX ADMIN — FERROUS SULFATE TAB 325 MG (65 MG ELEMENTAL FE) 325 MG: 325 (65 FE) TAB at 09:01

## 2024-03-02 NOTE — PROGRESS NOTES
Consult Daily Progress Note  [address]  9/23/2023    Patient Name:  Raina Forrest  MRN:  [id number]   YOB: 1945  Age: 78 y.o.  Sex: female  LOS: 2    Reason for Consult:  Influenza, shortness of breath    Hospital Course:   78-year-old female who presented with cough and shortness of breath and found to have influenza A infection.    Interval History:  No acute events overnight  Improving leukocytosis  Infectious workup no growth to date  Stable on room air  States that she feels well on evaluation  Wants to go home  Denies any chest pain or palpitations  Denies any nausea or vomiting    Physical Exam:  Vitals:    03/02/24 0859   BP: 111/63   Pulse: 100   Resp:    Temp:    SpO2:        Intake/Output    Intake/Output Summary (Last 24 hours) at 3/2/2024 1123  Last data filed at 3/2/2024 1000  Gross per 24 hour   Intake 720 ml   Output --   Net 720 ml       General: Alert, nontoxic, NAD  HEENT: NC/AT, EOMI, MMM  Neck: Supple, trachea midline  Cardiac: RRR, no murmur, gallops, rubs  Pulmonary: Clear to auscultation bilaterally, no adventitious breath sounds, normal respiratory effort  GI: Soft, non-tender, non-distended, normal bowel sounds  Extremities: Warm, well perfused, no LE edema  Skin: no visible rash  Neuro: CN II - XII grossly intact  Psychiatry: Normal mood and affect      Data Review:  Results from last 7 days   Lab Units 03/02/24  0403 03/01/24  0237 02/29/24  0303 02/28/24  0608 02/28/24  0011   WBC 10*3/mm3 14.26* 15.84* 18.75* 13.47* 16.09*   HEMOGLOBIN g/dL 11.4* 11.7* 11.6* 11.3* 12.5   PLATELETS 10*3/mm3 311 299 287 295 330     Results from last 7 days   Lab Units 03/02/24  0403 03/01/24  0237 02/29/24  0303 02/28/24  1519 02/28/24  0608 02/28/24  0011   SODIUM mmol/L 139 137 134*  --  135* 132*   POTASSIUM mmol/L 3.8 4.1 4.1 4.4 3.0* 3.2*   CHLORIDE mmol/L 105 102 100  --  96* 92*   CO2 mmol/L 21.4* 22.2 20.4*  --  24.0 23.0   BUN mg/dL 10 12 14  --  23  26*   CREATININE mg/dL 0.88 0.93 0.96  --  1.15* 1.26*   GLUCOSE mg/dL 91 97 104*  --  92 99   CALCIUM mg/dL 8.5* 8.8 8.8  --  8.8 9.0   MAGNESIUM mg/dL  --   --   --   --  2.1  --    Estimated Creatinine Clearance: 47.7 mL/min (by C-G formula based on SCr of 0.88 mg/dL).    Results from last 7 days   Lab Units 03/02/24  0403 03/01/24  0237 02/29/24  0303 02/28/24  0608 02/28/24  0011   AST (SGOT) U/L  --   --  20  --  27   ALT (SGPT) U/L  --   --  12  --  20   PROCALCITONIN ng/mL  --   --   --   --  0.24   LACTATE mmol/L  --   --   --   --  1.3   PLATELETS 10*3/mm3 311 299 287   < > 330    < > = values in this interval not displayed.               Imaging:  Reviewed chest images personally from past 3 days    ASSESSMENT  /  PLAN:    Bilateral pneumonia, viral.  Bacterial pneumonia is felt to be less likely.  Right lower lobe atelectasis  Influenza A infection  A-fib s/p Watchman  Leukocytosis, secondary to infection/stress.  Improving    -Complete 5-day course of Tamiflu for influenza A infection  -Would complete course of antibiotics for total of 5 days.  Ceftriaxone while in the hospital, can transition to oral upon discharge.  -Continue bronchodilator and airway clearance therapy with DuoNebs, Mucinex, hypertonic saline  -Incentive spirometer  -Continue pantoprazole  -Up as tolerated, PT/OT.  -Stable for discharge from pulmonary standpoint.    All issues new to me today.  Prior hospital course, labs and imaging reviewed.    Thank you for allowing us to participate in this patients care. Pulmonary will continue to follow.     Enrike High MD  [facility]  Pulmonary and Critical Care Medicine, Interventional Pulmonology    Parts of this note may be an electronic transcription/translation of spoken language to printed text using the Dragon dictation system.

## 2024-03-02 NOTE — PLAN OF CARE
Problem: Adult Inpatient Plan of Care  Goal: Plan of Care Review  Outcome: Ongoing, Not Progressing  Flowsheets (Taken 3/2/2024 1524)  Progress: improving  Plan of Care Reviewed With: patient  Outcome Evaluation: Vital signs stable. Abx continued. IS used. Up in chair today. Son at bedside. Plan for possible d/c tomorrow.

## 2024-03-02 NOTE — PROGRESS NOTES
Name: Raina Forrest ADMIT: 2024   : 1945  PCP: Georgiana Cisneros MD    MRN: [id number] LOS: 2 days   AGE/SEX: 78 y.o. female  ROOM: [facility]     Subjective   Subjective   No overnight events.  No new complaints patient continues to improve.  No shortness of breath.  No wheezing.  Improved activity.  Continues with cough productive of yellowish sputum.  No chest pain.  No palpitation.  No hemoptysis.  No fever or chills.  No PND orthopnea.  No ankle edema.    Review of Systems  GI.  No abdominal pain or nausea or vomiting.  Improving p.o. intake.    .  No dysuria..  No hematuria.     Objective   Objective   Vital Signs  Temp:  [97.5 °F (36.4 °C)-98.2 °F (36.8 °C)] 97.7 °F (36.5 °C)  Heart Rate:  [] 63  Resp:  [18-19] 18  BP: ()/(60-70) 111/63  SpO2:  [94 %-100 %] 100 %  on   ;   Device (Oxygen Therapy): room air    Intake/Output Summary (Last 24 hours) at 3/2/2024 1246  Last data filed at 3/2/2024 1000  Gross per 24 hour   Intake 720 ml   Output --   Net 720 ml     Body mass index is 18.72 kg/m².      24  2244 24  0550   Weight: 63.5 kg (140 lb) 59.2 kg (130 lb 8 oz)     Physical Exam  General.  Elderly female.  She is alert.  Oriented x 2  out of 4.  No apparent pain/distress/diaphoresis.  Underweight.  Eyes.  Status post left cataract surgery.  Small right cataract.  Pupils equal round and reactive with intact extraocular musculature.  No pallor or jaundice.  Oral cavity.  Moist mucous membrane with no lesions.  Neck.  Supple.  No JVD.  No lymphadenopathy or thyromegaly.  Cardiovascular.  Controlled rate atrial fibrillation and grade 2 systolic murmur  Chest.  Poor but clear to auscultation with no added sounds   abdomen.  Soft lax.  No tenderness.  No organomegaly.  No guarding or rebound.  Left-sided 8 x 3 cm mildly tender hematoma with surrounding bruising of the lower extremity.  Extremities.  No clubbing/cyanosis/edema  CNS.  No acute focal neurological  "deficits    Results Review:      Results from last 7 days   Lab Units 03/02/24  0403 03/01/24  0237 02/29/24  0303 02/28/24  1519 02/28/24  0608 02/28/24  0011   SODIUM mmol/L 139 137 134*  --  135* 132*   POTASSIUM mmol/L 3.8 4.1 4.1 4.4 3.0* 3.2*   CHLORIDE mmol/L 105 102 100  --  96* 92*   CO2 mmol/L 21.4* 22.2 20.4*  --  24.0 23.0   BUN mg/dL 10 12 14  --  23 26*   CREATININE mg/dL 0.88 0.93 0.96  --  1.15* 1.26*   GLUCOSE mg/dL 91 97 104*  --  92 99   CALCIUM mg/dL 8.5* 8.8 8.8  --  8.8 9.0   AST (SGOT) U/L  --   --  20  --   --  27   ALT (SGPT) U/L  --   --  12  --   --  20     Estimated Creatinine Clearance: 47.7 mL/min (by C-G formula based on SCr of 0.88 mg/dL).                  Results from last 7 days   Lab Units 02/28/24  1519   TSH uIU/mL 1.410     Results from last 7 days   Lab Units 02/28/24  0608   MAGNESIUM mg/dL 2.1           Invalid input(s): \"LDLCALC\"  Results from last 7 days   Lab Units 03/02/24  0403 03/01/24  0237 02/29/24  0303 02/29/24  0303 02/28/24  0608 02/28/24  0011 02/28/24  0011   WBC 10*3/mm3 14.26* 15.84*  --  18.75* 13.47*  --  16.09*   HEMOGLOBIN g/dL 11.4* 11.7*  --  11.6* 11.3*  --  12.5   HEMATOCRIT % 34.2 34.6  --  33.1* 32.3*  --  35.2   PLATELETS 10*3/mm3 311 299  --  287 295  --  330   MCV fL 98.6* 101.2*  --  98.5* 94.7  --  96.2   MCH pg 32.9 34.2*  --  34.5* 33.1*  --  34.2*   MCHC g/dL 33.3 33.8  --  35.0 35.0  --  35.5   RDW % 14.3 14.7  --  14.7 14.4  --  14.4   RDW-SD fl 51.2 54.2*  --  52.5 48.7  --  49.3   MPV fL 11.4 11.6  --  11.6 11.5  --  11.2   NEUTROPHIL % %  --   --   --   --   --   --  79.1*   LYMPHOCYTE % %  --   --   --   --   --   --  8.0*   MONOCYTES % %  --   --   --   --   --   --  7.1   EOSINOPHIL % %  --   --   --   --   --   --  0.6   BASOPHIL % %  --   --   --   --   --   --  0.7   IMM GRAN % %  --   --   --   --   --   --  4.5*   NEUTROS ABS 10*3/mm3 11.52* 12.83*  --  15.94*  --    < > 12.73*   LYMPHS ABS 10*3/mm3  --   --   --   --   --   --  " 1.28   MONOS ABS 10*3/mm3  --   --   --   --   --   --  1.15*   EOS ABS 10*3/mm3 0.14 0.16   < >  --   --   --  0.10   BASOS ABS 10*3/mm3  --  0.16  --   --   --   --  0.11   IMMATURE GRANS (ABS) 10*3/mm3  --   --   --   --   --   --  0.72*   NRBC /100 WBC 0.0 0.0  --  0.0  --   --  0.0    < > = values in this interval not displayed.             Results from last 7 days   Lab Units 02/28/24  0011   PROCALCITONIN ng/mL 0.24   LACTATE mmol/L 1.3             Results from last 7 days   Lab Units 02/28/24  2342 02/28/24  0011   BLOODCX   --  No growth at 3 days  No growth at 3 days   RESPCX  Rejected  --      Results from last 7 days   Lab Units 02/28/24  0012   ADENOVIRUS DETECTION BY PCR  Not Detected   CORONAVIRUS 229E  Not Detected   CORONAVIRUS HKU1  Not Detected   CORONAVIRUS NL63  Not Detected   CORONAVIRUS OC43  Not Detected   HUMAN METAPNEUMOVIRUS  Not Detected   HUMAN RHINOVIRUS/ENTEROVIRUS  Not Detected   INFLUENZA B PCR  Not Detected   PARAINFLUENZA 1  Not Detected   PARAINFLUENZA VIRUS 2  Not Detected   PARAINFLUENZA VIRUS 3  Not Detected   PARAINFLUENZA VIRUS 4  Not Detected   BORDETELLA PERTUSSIS PCR  Not Detected   CHLAMYDOPHILA PNEUMONIAE PCR  Not Detected   MYCOPLAMA PNEUMO PCR  Not Detected   INFLUENZA A H3  Detected*   RSV, PCR  Not Detected               Imaging:  Imaging Results (Last 24 Hours)       ** No results found for the last 24 hours. **               I reviewed the patient's new clinical results / labs / tests / procedures      Assessment/Plan     Active Hospital Problems    Diagnosis  POA    **Pneumonia [J18.9]  Yes    Pneumonia of both lungs due to infectious organism [J18.9]  Yes    Hypokalemia [E87.6]  Yes    Stage 3a chronic kidney disease [N18.31]  Yes    Influenza A [J10.1]  Yes    Severe malnutrition [E43]  Yes    Acute kidney failure [N17.9]  Yes    H/O left radical nephrectomy [Z90.5]  Not Applicable    Abdominal wall hematoma [S30.1XXA]  Yes    Peptic ulcer disease [K27.9]  Yes     Hypothyroidism (acquired) [E03.9]  Yes    Chronic atrial fibrillation [I48.20]  Yes    Benign essential HTN [I10]  Yes      Resolved Hospital Problems   No resolved problems to display.         1.  Bilateral atypical pneumonia associated with bronchospasms with persistent influenza A infection.  Leukocytosis indicate that there might be a bacterial infection even though lactic acid and procalcitonin are normal.  Leukocytosis improving.  Respiratory PCR panel is positive for influenza A.  Negative Legionella and Streptococcus antigen.  Sputum culture rejected.  Negative blood cultures till now.  Clinically improving on DuoNebs./Tamiflu/ Mucinex/ IV Rocephin/incentive spirometry.  Noted and appreciated pulmonary consult.  2.  Acute on top of stage IIIa chronic renal failure/hypokalemia/left renal carcinoma status post left nephrectomy.  Acute kidney failure most likely secondary to prerenal azotemia because of decreased p.o. intake in a patient with renal mass loss.  Resolved acute kidney failure with slow IV fluids.  Renal function is normal.  DC IV fluid.  Renal ultrasound is negative.  Continue holding Jardiance.   Baseline creatinine is with between 0.8 and 1.1.  Resolved hypokalemia with substitution.    3.  Mild hyperbilirubinemia.  This is mostly secondary to abdominal wall hematoma.  Resolved today.    4.  History of atrial fibrillation and hypertension status post Watchman procedure.  Good blood pressure control with good rate control on Zebeta.  Most recent 2D echo on January 2024 revealing a normal ejection fraction with left atrial enlargement.  There is no clinical evidence of angina or congestive heart failure.  No need for anticoagulation because of Watchman procedure no clinical evidence of angina or congestive heart failure  5.  History of GERD/ulcerative colitis/peptic ulcer disease/diverticular disease.  Benign GI examination.  Continue Protonix.  Being evaluated for treatment of H. pylori.  On  no treatment for ulcerative colitis.  6.  Hypothyroidism.  Continue current replacement.  TSH is normal.  7.  Dementia.  Continue Aricept.  Negative CNS examination for focal deficits.  8.  Malnutrition.  Nutritional consult.  9.  VTE prophylaxis.  Sequential compression devices.     Discussed my findings and plan of treatment with the patient/niece and son over the phone.    Disposition.  Anticipate discharge home tomorrow with home health    Kristina Hernandez MD  [facility]  03/02/24  12:46 EST

## 2024-03-03 ENCOUNTER — TRANSCRIBE ORDERS (OUTPATIENT)
Dept: HOME HEALTH SERVICES | Facility: HOME HEALTHCARE | Age: 79
End: 2024-03-03
Payer: MEDICARE

## 2024-03-03 ENCOUNTER — DOCUMENTATION (OUTPATIENT)
Dept: HOME HEALTH SERVICES | Facility: HOME HEALTHCARE | Age: 79
End: 2024-03-03
Payer: MEDICARE

## 2024-03-03 ENCOUNTER — HOME HEALTH ADMISSION (OUTPATIENT)
Dept: HOME HEALTH SERVICES | Facility: HOME HEALTHCARE | Age: 79
End: 2024-03-03
Payer: MEDICARE

## 2024-03-03 ENCOUNTER — READMISSION MANAGEMENT (OUTPATIENT)
Dept: CALL CENTER | Facility: HOSPITAL | Age: 79
End: 2024-03-03
Payer: MEDICARE

## 2024-03-03 VITALS
RESPIRATION RATE: 18 BRPM | BODY MASS INDEX: 18.68 KG/M2 | OXYGEN SATURATION: 100 % | TEMPERATURE: 98 F | HEART RATE: 74 BPM | SYSTOLIC BLOOD PRESSURE: 119 MMHG | WEIGHT: 130.5 LBS | HEIGHT: 70 IN | DIASTOLIC BLOOD PRESSURE: 69 MMHG

## 2024-03-03 DIAGNOSIS — J98.01 BRONCHOSPASM: ICD-10-CM

## 2024-03-03 DIAGNOSIS — J10.1 INFLUENZA A: ICD-10-CM

## 2024-03-03 DIAGNOSIS — R53.1 WEAKNESS GENERALIZED: ICD-10-CM

## 2024-03-03 DIAGNOSIS — J18.9 PNEUMONIA OF BOTH LUNGS DUE TO INFECTIOUS ORGANISM, UNSPECIFIED PART OF LUNG: Primary | ICD-10-CM

## 2024-03-03 PROBLEM — N17.9 ACUTE KIDNEY FAILURE: Status: RESOLVED | Noted: 2024-02-28 | Resolved: 2024-03-03

## 2024-03-03 PROBLEM — E87.6 HYPOKALEMIA: Status: RESOLVED | Noted: 2024-02-28 | Resolved: 2024-03-03

## 2024-03-03 LAB
ANION GAP SERPL CALCULATED.3IONS-SCNC: 10 MMOL/L (ref 5–15)
BUN SERPL-MCNC: 12 MG/DL (ref 8–23)
BUN/CREAT SERPL: 13 (ref 7–25)
CALCIUM SPEC-SCNC: 8.9 MG/DL (ref 8.6–10.5)
CHLORIDE SERPL-SCNC: 108 MMOL/L (ref 98–107)
CO2 SERPL-SCNC: 22 MMOL/L (ref 22–29)
CREAT SERPL-MCNC: 0.92 MG/DL (ref 0.57–1)
DEPRECATED RDW RBC AUTO: 52.5 FL (ref 37–54)
EGFRCR SERPLBLD CKD-EPI 2021: 63.1 ML/MIN/1.73
EOSINOPHIL # BLD MANUAL: 0.14 10*3/MM3 (ref 0–0.4)
EOSINOPHIL NFR BLD MANUAL: 1 % (ref 0.3–6.2)
ERYTHROCYTE [DISTWIDTH] IN BLOOD BY AUTOMATED COUNT: 14.5 % (ref 12.3–15.4)
GLUCOSE SERPL-MCNC: 97 MG/DL (ref 65–99)
HCT VFR BLD AUTO: 33.2 % (ref 34–46.6)
HGB BLD-MCNC: 11.5 G/DL (ref 12–15.9)
LYMPHOCYTES # BLD MANUAL: 1.11 10*3/MM3 (ref 0.7–3.1)
LYMPHOCYTES NFR BLD MANUAL: 10.1 % (ref 5–12)
MCH RBC QN AUTO: 34.7 PG (ref 26.6–33)
MCHC RBC AUTO-ENTMCNC: 34.6 G/DL (ref 31.5–35.7)
MCV RBC AUTO: 100.3 FL (ref 79–97)
METAMYELOCYTES NFR BLD MANUAL: 2 % (ref 0–0)
MONOCYTES # BLD: 1.4 10*3/MM3 (ref 0.1–0.9)
MYELOCYTES NFR BLD MANUAL: 2 % (ref 0–0)
NEUTROPHILS # BLD AUTO: 10.68 10*3/MM3 (ref 1.7–7)
NEUTROPHILS NFR BLD MANUAL: 76.8 % (ref 42.7–76)
NRBC BLD AUTO-RTO: 0 /100 WBC (ref 0–0.2)
PLAT MORPH BLD: NORMAL
PLATELET # BLD AUTO: 314 10*3/MM3 (ref 140–450)
PMV BLD AUTO: 11.2 FL (ref 6–12)
POLYCHROMASIA BLD QL SMEAR: ABNORMAL
POTASSIUM SERPL-SCNC: 4.2 MMOL/L (ref 3.5–5.2)
RBC # BLD AUTO: 3.31 10*6/MM3 (ref 3.77–5.28)
SODIUM SERPL-SCNC: 140 MMOL/L (ref 136–145)
VARIANT LYMPHS NFR BLD MANUAL: 4 % (ref 0–5)
VARIANT LYMPHS NFR BLD MANUAL: 4 % (ref 19.6–45.3)
WBC MORPH BLD: NORMAL
WBC NRBC COR # BLD AUTO: 13.91 10*3/MM3 (ref 3.4–10.8)

## 2024-03-03 PROCEDURE — 85007 BL SMEAR W/DIFF WBC COUNT: CPT | Performed by: INTERNAL MEDICINE

## 2024-03-03 PROCEDURE — 85025 COMPLETE CBC W/AUTO DIFF WBC: CPT | Performed by: INTERNAL MEDICINE

## 2024-03-03 PROCEDURE — 94761 N-INVAS EAR/PLS OXIMETRY MLT: CPT

## 2024-03-03 PROCEDURE — 25010000002 CEFTRIAXONE PER 250 MG: Performed by: INTERNAL MEDICINE

## 2024-03-03 PROCEDURE — 94799 UNLISTED PULMONARY SVC/PX: CPT

## 2024-03-03 PROCEDURE — 80048 BASIC METABOLIC PNL TOTAL CA: CPT | Performed by: INTERNAL MEDICINE

## 2024-03-03 PROCEDURE — 94664 DEMO&/EVAL PT USE INHALER: CPT

## 2024-03-03 RX ORDER — ALBUTEROL SULFATE 90 UG/1
2 AEROSOL, METERED RESPIRATORY (INHALATION) EVERY 4 HOURS PRN
Qty: 6.7 G | Refills: 0 | Status: SHIPPED | OUTPATIENT
Start: 2024-03-03

## 2024-03-03 RX ORDER — AMOXICILLIN AND CLAVULANATE POTASSIUM 500; 125 MG/1; MG/1
1 TABLET, FILM COATED ORAL 3 TIMES DAILY
Qty: 6 TABLET | Refills: 0 | Status: SHIPPED | OUTPATIENT
Start: 2024-03-03 | End: 2024-03-07

## 2024-03-03 RX ADMIN — SERTRALINE 150 MG: 100 TABLET, FILM COATED ORAL at 08:47

## 2024-03-03 RX ADMIN — PANTOPRAZOLE SODIUM 40 MG: 40 TABLET, DELAYED RELEASE ORAL at 08:47

## 2024-03-03 RX ADMIN — ASPIRIN 81 MG: 81 TABLET, CHEWABLE ORAL at 08:48

## 2024-03-03 RX ADMIN — Medication 10 ML: at 08:48

## 2024-03-03 RX ADMIN — IPRATROPIUM BROMIDE AND ALBUTEROL SULFATE 3 ML: .5; 3 SOLUTION RESPIRATORY (INHALATION) at 08:16

## 2024-03-03 RX ADMIN — IPRATROPIUM BROMIDE AND ALBUTEROL SULFATE 3 ML: .5; 3 SOLUTION RESPIRATORY (INHALATION) at 11:26

## 2024-03-03 RX ADMIN — FERROUS SULFATE TAB 325 MG (65 MG ELEMENTAL FE) 325 MG: 325 (65 FE) TAB at 08:46

## 2024-03-03 RX ADMIN — BISOPROLOL FUMARATE 10 MG: 5 TABLET, FILM COATED ORAL at 08:48

## 2024-03-03 RX ADMIN — LEVOTHYROXINE SODIUM 25 MCG: 25 TABLET ORAL at 05:41

## 2024-03-03 RX ADMIN — GUAIFENESIN 1200 MG: 600 TABLET, EXTENDED RELEASE ORAL at 08:47

## 2024-03-03 RX ADMIN — CEFTRIAXONE SODIUM 1000 MG: 1 INJECTION, POWDER, FOR SOLUTION INTRAMUSCULAR; INTRAVENOUS at 02:43

## 2024-03-03 RX ADMIN — DONEPEZIL HYDROCHLORIDE 10 MG: 10 TABLET, FILM COATED ORAL at 08:47

## 2024-03-03 RX ADMIN — OSELTAMIVIR PHOSPHATE 30 MG: 30 CAPSULE ORAL at 08:46

## 2024-03-03 NOTE — CASE MANAGEMENT/SOCIAL WORK
Case Management Discharge Note      Final Note: [place] home with Located within [facility]    Provided Post Acute Provider List?: Yes  Post Acute Provider List: Home Health, Nursing Home  Delivered To: Support Person  Support Person: son: Benito Forrest Jr  Method of Delivery: In person    Selected Continued Care - Admitted Since 2/27/2024       Destination    No services have been selected for the patient.                Durable Medical Equipment    No services have been selected for the patient.                Dialysis/Infusion    No services have been selected for the patient.                Home Medical Care Coordination complete.      Service Provider Selected Services Address Phone Fax Patient Preferred    Replaced by [facility] [address] [address] [phone] --              Therapy    No services have been selected for the patient.                Community Resources    No services have been selected for the patient.                Community & DME    No services have been selected for the patient.                         Final Discharge Disposition Code: 06 - home with home health care

## 2024-03-03 NOTE — PLAN OF CARE
Goal Outcome Evaluation:  Plan of Care Reviewed With: patient, family        Progress: improving  Outcome Evaluation: A&O X 3-4. RA. Afib, rate controlled. IV abx per order. Family at bedside. Up with standby to BR. Poss d/c today.

## 2024-03-03 NOTE — DISCHARGE SUMMARY
Patient Name: Raina Forrest  : 1945  MRN: [id number]    Date of Admission: 2024  Date of Discharge:  3/3/2024  Primary Care Physician: Georgiana Cisneros MD      Discharge Diagnoses     Active Hospital Problems    Diagnosis  POA    **Pneumonia [J18.9]  Yes    Pneumonia of both lungs due to infectious organism [J18.9]  Yes    Stage 3a chronic kidney disease [N18.31]  Yes    Influenza A [J10.1]  Yes    Severe malnutrition [E43]  Yes    H/O left radical nephrectomy [Z90.5]  Not Applicable    Abdominal wall hematoma [S30.1XXA]  Yes    Peptic ulcer disease [K27.9]  Yes    Hypothyroidism (acquired) [E03.9]  Yes    Chronic atrial fibrillation [I48.20]  Yes    Benign essential HTN [I10]  Yes      Resolved Hospital Problems    Diagnosis Date Resolved POA    Hypokalemia [E87.6] 2024 Yes    Acute kidney failure [N17.9] 2024 Yes        Hospital Course     Brief admission history and physical.  Please refer to the H&P for full details.  A very pleasant 78 years old female who has a past history of hypertension/A-fib s/p Watchman/dementia/GERD/ulcerative colitis/Fung's/peptic ulcer disease/diverticulosis/hypothyroidism/left renal cancer s/p recent left nephrectomy complicated by abdominal wall hematoma who was diagnosed with influenza on 2024 and received Tamiflu however she continued with progressive shortness of breath cough productive of green sputum weakness and wheezing associated with fever and chills.  Positive nausea.  Her physical examination admission remarkable for an afebrile patient with stable vital signs.  Other important physical findings include orientation times 2 out of 4/underweight/status post left cataract surgery/small right cataract/bilateral rhonchi and expiratory wheeze with poor bilateral air entry in the chest/controlled rate A-fib with grade 2 systolic murmur/left iliac fossa 8 x 3 cm mildly tender hematoma with surrounding bruising.  Hospital course initial  evaluation in the emergency department included a CBC that was normal except a white count of 16.09, procalcitonin and lactic acid were normal.  Blood cultures x 2 obtained.  CMP normal except a total bilirubin of 1.9, BUN 26, creatinine 1.26, sodium 132, potassium 3.2, chloride 92, GFR 43.2, anion gap 17.  Respiratory PCR panel positive for influenza A.  Magnesium normal.  Chest x-ray with no acute disease.  CT scan of the chest without contrast revealed bilateral central lobar nodules and endotracheal nodular infiltrate.  Patient was admitted with bilateral atypical pneumonia associated with bronchospasm and persistent influenza A infection.  Leukocytosis indicated that the patient might have a secondary bacterial infection even though the lactic acid and procalcitonin were normal.  She was started on Rocephin.  Respiratory PCR was negative except for influenza A.  The urine Legionella and Streptococcus antigen were negative.  Sputum culture was obtained and blood cultures obtained and they were both negative during this admission.  She was started on Tamiflu/DuoNebs/Mucinex/IV Rocephin/incentive spirometry.  Respiratory status improved.  By the time of discharge she was asymptomatic except for minimal dry cough.  She has finished Tamiflu x 5 days and she was switched to Augmentin to complete a total of 7 days of antibiotics.  As needed albuterol was given as needed at the time of discharge.  She was noted to have an acute on top of stage III chronic renal failure and hypokalemia and has a history recently of left renal carcinoma status post left nephrectomy.  Acute kidney failure was secondary to prerenal azotemia because of decreased p.o. intake and renal mass loss.  Slow IV fluid was administered and her diuretics were held.  Her renal function has returned back to baseline.  Her potassium was substituted and has normalized by the time of discharge.  She was noted to have mild hyperbilirubinemia that was  attributed to the abdominal wall hematoma that remained stable.  She has a history of atrial fibrillation and hypertension status post Watchman procedure.  She remained with good blood pressure control on Zebeta her most recent echo was on January 2024 revealing a normal ejection fraction with left atrial enlargement.  There was no evidence of angina or congestive heart failure.  No anticoagulation employed because of Watchman procedure.  There was no decompensation in her cardiac status.  Diuretics were held as mentioned above.  As for her history of GERD/ulcerative colitis/peptic ulcer disease/diverticular disease this remained stable on Protonix with benign GI examination except for the abdominal wall hematoma in the left iliac..  She is being evaluated and treated for H. pylori.  She has a history of hypothyroidism.  Her TSH was normal and we maintained her current replacement.  She has dementia and we continued Aricept and her CNS examination remained stable.  Malnutrition was addressed by a nutritional consult.  At the time of discharge patient was hemodynamically stable    Consultants     Consult Orders (all) (From admission, onward)       Start     Ordered    02/28/24 1907  Inpatient Pulmonology Consult  Once,   Status:  Canceled        Specialty:  Pulmonary Disease  Provider:  (Not yet assigned)    02/28/24 1908    02/28/24 1139  Inpatient Pulmonology Consult  Once        Specialty:  Pulmonary Disease  Provider:  Jean Garcia MD    02/28/24 1140    02/28/24 0652  Inpatient Consult to Advance Care Planning  Once,   Status:  Canceled        Provider:  (Not yet assigned)    02/28/24 0651    02/28/24 0144  [facility] (on-call MD unless specified) Details  Once        Specialty:  Hospitalist  Provider:  (Not yet assigned)    02/28/24 0143                  Procedures     Imaging Results (All)       Procedure Component Value Units Date/Time    US Renal Limited [[id number]] Collected: 02/29/24 0030     Updated:  02/29/24 0030    Narrative:        Patient: SHANNAN LIMA  Time Out: 00:30  Exam(s): US RENAL     EXAM:    US Retroperitoneal Limited, Renal    CLINICAL HISTORY:      Acute kidney failure s p left nephrectomy.    TECHNIQUE:    Real-time limited ultrasound of the retroperitoneum with image   documentation.    COMPARISON:    MRI abdomen 11 28 2023    FINDINGS:    Right kidney:  The right kidney measures 9.3 x 3.8 x 4.3 cm.  No stones.    No hydronephrosis.    Left kidney:  There has been interval left nephrectomy when compared to   the previous MRI examination.    Bladder:  The bladder is moderately distended without wall   abnormalities or calcifications.  A right ureteral jet is noted.    IMPRESSION:       1.  Negative sonographic evaluation of the right kidney.  No   hydronephrosis.  2.  Left nephrectomy.    Impression:          Electronically signed by Torsten Mariano MD on 02-29-24 at 0030    CT Chest Without Contrast Diagnostic [[id number]] Collected: 02/28/24 0138     Updated: 02/28/24 0138    Narrative:        Patient: SHANNAN LIMA  Time Out: 01:38  Exam(s): CT CHEST Without Contrast     EXAM:    CT Chest Without Intravenous Contrast    CLINICAL HISTORY:     Reason for exam: pneumonia.    TECHNIQUE:    Axial computed tomography images of the chest without intravenous   contrast.  CTDI is 6.35 mGy and DLP is 252.1 mGy-cm.  This CT exam was   performed according to the principle of ALARA (As Low As Reasonably   Achievable) by using one or more of the following dose reduction   techniques: automated exposure control, adjustment of the mA and or kV   according to patient size, and or use of iterative reconstruction   technique.    COMPARISON:    No relevant prior studies available.    FINDINGS:    Lungs:  Innumerable bilateral centrilobular subcentimeter nodules, many   demonstrating tree-in-bud branching.  Findings compatible with   endobronchial spread of infection and or tumor.  Areas of atelectasis in    both lungs including prominent plate like atelectasis right lung base.    Bilateral mild bronchiectasis.    Pleural space:  Trace amount of pleural fluid bilaterally.  No   pneumothorax.    Heart:  Moderate cardiac enlargement.  No significant pericardial   effusion.  No significant coronary artery calcifications.    Mediastinum:  Small hiatal hernia.    Bones joints:  Unremarkable.  No acute fracture.  No dislocation.    Soft tissues:  Indeterminate soft tissue mass measuring 21 mm within   the subcutaneous fat anterior abdominal wall near the level of the   gallbladder fossa.    Vasculature:  Unremarkable.  No thoracic aortic aneurysm.    Lymph nodes:  Unremarkable.  No enlarged lymph nodes.    IMPRESSION:         Bilateral centrilobular nodules, many with tree in bud branching   compatible with endobronchial spread of tumor or infection.    Indeterminate soft tissue focus anterior abdominal wall on the right.      Impression:          Electronically signed by Veda Keane DO [employer]   [employer] on 02-28-24 at 0138    XR Chest 1 View [[id number]] Collected: 02/28/24 0024     Updated: 02/28/24 0024    Narrative:        Patient: SHANNAN LIMA  Time Out: 00:24  Exam(s): XR CXR 1 VIEW     EXAM:    XR Chest, 1 View    CLINICAL HISTORY:     Reason for exam: short of breath.    TECHNIQUE:    Frontal view of the chest.    COMPARISON:    6 15 23    FINDINGS:    Lungs:  Unremarkable.  No consolidation.    Pleural space:  Unremarkable.  No pneumothorax.    Heart:  Mild to moderate enlargement of the cardiac pericardial   silhouette.    Mediastinum:  Unremarkable.  Normal mediastinal contour.    Bones joints:  Unremarkable.  No acute fracture.    Tubes, lines and devices:  No change in position of dual-chamber ICD   with generator overlying the left hemithorax.    IMPRESSION:         No acute radiographic abnormality      Impression:          Electronically signed by Veda Keane DO  "American Board of   Radiology on 02-28-24 at 0024            Pertinent Labs     Results from last 7 days   Lab Units 03/03/24  0356 03/02/24  0403 03/01/24  0237 02/29/24  0303   WBC 10*3/mm3 13.91* 14.26* 15.84* 18.75*   HEMOGLOBIN g/dL 11.5* 11.4* 11.7* 11.6*   PLATELETS 10*3/mm3 314 311 299 287     Results from last 7 days   Lab Units 03/03/24  0356 03/02/24  0403 03/01/24  0237 02/29/24  0303   SODIUM mmol/L 140 139 137 134*   POTASSIUM mmol/L 4.2 3.8 4.1 4.1   CHLORIDE mmol/L 108* 105 102 100   CO2 mmol/L 22.0 21.4* 22.2 20.4*   BUN mg/dL 12 10 12 14   CREATININE mg/dL 0.92 0.88 0.93 0.96   GLUCOSE mg/dL 97 91 97 104*   Estimated Creatinine Clearance: 45.6 mL/min (by C-G formula based on SCr of 0.92 mg/dL).  Results from last 7 days   Lab Units 02/29/24  0303 02/28/24  0011   ALBUMIN g/dL 3.1* 3.9   BILIRUBIN mg/dL 1.1 1.9*   ALK PHOS U/L 85 90   AST (SGOT) U/L 20 27   ALT (SGPT) U/L 12 20     Results from last 7 days   Lab Units 03/03/24  0356 03/02/24  0403 03/01/24  0237 02/29/24  0303 02/28/24  0608 02/28/24  0011   CALCIUM mg/dL 8.9 8.5* 8.8 8.8 8.8 9.0   ALBUMIN g/dL  --   --   --  3.1*  --  3.9   MAGNESIUM mg/dL  --   --   --   --  2.1  --                Invalid input(s): \"LDLCALC\"  Results from last 7 days   Lab Units 02/28/24  2342 02/28/24  0011   BLOODCX   --  No growth at 4 days  No growth at 4 days   RESPCX  Rejected  --      Imaging Results (Last 24 Hours)       ** No results found for the last 24 hours. **            Test Results Pending at Discharge     Pending Labs       Order Current Status    Blood Culture - Blood, Arm, Left Preliminary result    Blood Culture - Blood, Arm, Right Preliminary result              Discharge Exam   Physical Exam  Vitals.  Temperature 98 a pulse of 74 respirate rate of 18 blood pressure 119/69 and O2 sats of 100% on rooGeneral.  Elderly female.  She is alert.  Oriented x 2  out of 4.  No apparent pain/distress/diaphoresis.  Underweight.  Eyes.  Status post left " cataract surgery.  Small right cataract.  Pupils equal round and reactive with intact extraocular musculature.  No pallor or jaundice.  Oral cavity.  Moist mucous membrane with no lesions.  Neck.  Supple.  No JVD.  No lymphadenopathy or thyromegaly.  Cardiovascular.  Controlled rate atrial fibrillation and grade 2 systolic murmur  Chest.  Poor but clear to auscultation with no added sounds   abdomen.  Soft lax.  No tenderness.  No organomegaly.  No guarding or rebound.  Left-sided 8 x 3 cm mildly tender hematoma with surrounding bruising of the lower extremity.  Extremities.  No clubbing/cyanosis/edema  CNS.  No acute focal neurological deficitsm    Discharge Details        Discharge Medications        New Medications        Instructions Start Date   albuterol sulfate  (90 Base) MCG/ACT inhaler  Commonly known as: PROVENTIL HFA;VENTOLIN HFA;PROAIR HFA   2 puffs, Inhalation, Every 4 Hours PRN      amoxicillin-clavulanate 500-125 MG per tablet  Commonly known as: Augmentin   500 mg, Oral, 3 Times Daily      FeroSul 325 (65 Fe) MG tablet  Generic drug: ferrous sulfate   325 mg, Oral, Daily With Breakfast             Continue These Medications        Instructions Start Date   aspirin 81 MG chewable tablet   81 mg, Oral, Daily      bisoprolol 10 MG tablet  Commonly known as: ZEBeta   TAKE ONE TABLET BY MOUTH DAILY      donepezil 10 MG tablet  Commonly known as: ARICEPT   1 tablet, Oral, Daily      Jardiance 10 MG tablet tablet  Generic drug: empagliflozin   10 mg, Oral, Daily      levothyroxine 25 MCG tablet  Commonly known as: SYNTHROID, LEVOTHROID   25 mcg, Oral, Every Early Morning      Oyster Shell Calcium w/D 500-5 MG-MCG tablet   1 tablet, Oral, Every Evening      pantoprazole 40 MG EC tablet  Commonly known as: PROTONIX   Take 20 mg by mouth Daily. Indications: Gastroesophageal Reflux Disease, Heartburn      sertraline 100 MG tablet  Commonly known as: ZOLOFT   150 mg, Oral, Every Morning      sucralfate 1  g tablet  Commonly known as: CARAFATE   1 g, Oral, Daily, TAKE 30 MINUTES TO 1 HOUR BEFORE MEALS      Womens 50+ Multi Vitamin tablet   1 tablet, Oral, Daily             Stop These Medications      Acetylcysteine capsule capsule     docusate sodium 250 MG capsule  Commonly known as: COLACE     KLOR-CON 10 MEQ CR tablet  Generic drug: potassium chloride     potassium chloride 10 MEQ CR tablet     spironolactone 50 MG tablet  Commonly known as: ALDACTONE     SUMAtriptan 100 MG tablet  Commonly known as: IMITREX     torsemide 20 MG tablet  Commonly known as: DEMADEX              Allergies   Allergen Reactions    Bupivacaine Hcl Anaphylaxis    Nepafenac Itching     Eye Drops    Bactrim [Sulfamethoxazole-Trimethoprim] Diarrhea    Barium-Containing Compounds Other (See Comments)     CONTRAST CAUSES DIARRHEA    Bextra [Valdecoxib] Itching    Cortisone Hives    Cymbalta [Duloxetine Hcl] Itching    Iodinated Contrast Media Unknown - Low Severity     Must be premedicated before use.  See [facility] notes in care evrywhere    Medrol [Methylprednisolone] Unknown - Low Severity    Mesalamine Unknown - Low Severity    Polymyxin B-Trimethoprim Unknown - Low Severity    Rivaroxaban Unknown - Low Severity     REACTION UNKNOWN    Tetanus Toxoids Swelling    Tetanus-Diphtheria Toxoids Td Swelling         Discharge Disposition:  Condition: Stable    Diet:   Diet Order   Procedures    Diet: Regular/House Diet; Texture: Regular Texture (IDDSI 7); Fluid Consistency: Thin (IDDSI 0)       Activity:   Activity Instructions       Activity as Tolerated      Other Activity Instructions      Activity Instructions: Home health PT to evaluate and treat    Up WIth Assist              Counseling : No nonsteroidal anti-inflammatory medication    CODE STATUS:    Code Status and Medical Interventions:   Ordered at: 02/28/24 0217     Code Status (Patient has no pulse and is not breathing):    CPR (Attempt to Resuscitate)     Medical Interventions  (Patient has pulse or is breathing):    Full Support       Future Appointments   Date Time Provider Department Center   6/4/2024 11:15 AM [facility] DEVICE CHECK [facility]   7/15/2024 11:15 AM Robinson Salazar MD [facility]     Additional Instructions for the Follow-ups that You Need to Schedule       Ambulatory Referral to Home Health   As directed      Face to Face Visit Date: 3/3/2024   Follow-up provider for Plan of Care?: I treated the patient in an acute care facility and will not continue treatment after discharge.   Follow-up provider: GEORGIANA CISNEROS [[id number]]   Reason/Clinical Findings: Pneumonia/influenza A/bronchospasm cancer/weakness   Describe mobility limitations that make leaving home difficult: As above   Nursing/Therapeutic Services Requested: Physical Therapy   PT orders: Therapeutic exercise Gait Training Transfer training Strengthening   Weight Bearing Status: As Tolerated   Frequency: 1 Week 1        Call MD With Problems / Concerns   As directed      Instructions: Call MD or return to ER if shortness of breath/chest pain/wheezing/fever or chills/nausea or vomiting/abdominal pain    Order Comments: Instructions: Call MD or return to ER if shortness of breath/chest pain/wheezing/fever or chills/nausea or vomiting/abdominal pain         Discharge Follow-up with PCP   As directed       Currently Documented PCP:    Georgiana Cisneros MD    PCP Phone Number:    [phone]     Follow Up Details: Primary MD.  1 week.  Influenza a/atypical pneumonia/bronchospasm/acute kidney failure/CRF3A/left renal cancer/hyperbilirubinemia/abdominal wall hematoma/A-fib/hypertension/GERD/UC/PUD/hypothyroidism/dementia        Discharge Follow-up with Specified Provider: Cardiology.  As scheduled.   As directed      To: Cardiology.  As scheduled.   Follow Up Details: A-fib/hypertension        Discharge Follow-up with Specified Provider: Urology.  As scheduled.   As directed      To:  Urology.  As scheduled.   Follow Up Details: Left renal carcinoma s/p left nephrectomy               Contact information for follow-up providers       Georgiana Cisneros MD .    Specialty: Pediatrics  Why: Primary MD.  1 week.  Influenza a/atypical pneumonia/bronchospasm/acute kidney failure/CRF3A/left renal cancer/hyperbilirubinemia/abdominal wall hematoma/A-fib/hypertension/GERD/UC/PUD/hypothyroidism/dementia  Contact information:  [address]  [place] [address]  [phone]                       Contact information for after-discharge care       Home Medical Care       [facility] .    Service: Home Health Services  Contact information:  [address]  [facility] [address] [phone]                                     Time Spent on Discharge:  Greater than 30 minutes      Kristina Hernandez MD  [facility]  03/03/24  08:21 EST

## 2024-03-03 NOTE — PROGRESS NOTES
[facility] will follow post hospital as requested. Patient/son agreeable to services. Contact information and PCP confirmed. Verbal order received from Jimena with PCP, Dr. Cisneros, for home health care.

## 2024-03-03 NOTE — PLAN OF CARE
Problem: Adult Inpatient Plan of Care  Goal: Plan of Care Review  Outcome: Met  Flowsheets (Taken 3/3/2024 6793)  Progress: improving  Plan of Care Reviewed With: patient  Outcome Evaluation: Vital signs stable. Home health following pt at home. Pt to be d/c'd home with son today.

## 2024-03-03 NOTE — PROGRESS NOTES
Consult Daily Progress Note  [address]  9/23/2023    Patient Name:  Raina Forrest  MRN:  [id number]   YOB: 1945  Age: 78 y.o.  Sex: female  LOS: 3    Reason for Consult:  Influenza, shortness of breath    Hospital Course:   78-year-old female who presented with cough and shortness of breath and found to have influenza A infection.    Interval History:  No acute events overnight  Leukocytosis improving  Breathing is slowly improving, does continue have a cough with some sputum production  No chest pain or palpitations  No nausea vomiting  Wants to go home today    Physical Exam:  Vitals:    03/03/24 0746   BP: 119/69   Pulse: 83   Resp: 18   Temp: 98 °F (36.7 °C)   SpO2:        Intake/Output    Intake/Output Summary (Last 24 hours) at 3/3/2024 0749  Last data filed at 3/3/2024 0500  Gross per 24 hour   Intake 240 ml   Output --   Net 240 ml       General: Alert, nontoxic, NAD  HEENT: NC/AT, EOMI, MMM  Neck: Supple, trachea midline  Cardiac: RRR, no murmur, gallops, rubs  Pulmonary: Clear to auscultation bilaterally, no adventitious breath sounds, normal respiratory effort  GI: Soft, non-tender, non-distended, normal bowel sounds  Extremities: Warm, well perfused, no LE edema  Skin: no visible rash  Neuro: CN II - XII grossly intact  Psychiatry: Normal mood and affect      Data Review:  Results from last 7 days   Lab Units 03/03/24  0356 03/02/24  0403 03/01/24  0237 02/29/24  0303 02/28/24  0608 02/28/24  0011   WBC 10*3/mm3 13.91* 14.26* 15.84* 18.75* 13.47* 16.09*   HEMOGLOBIN g/dL 11.5* 11.4* 11.7* 11.6* 11.3* 12.5   PLATELETS 10*3/mm3 314 311 299 287 295 330     Results from last 7 days   Lab Units 03/03/24  0356 03/02/24  0403 03/01/24  0237 02/29/24  0303 02/28/24  1519 02/28/24  0608 02/28/24  0011   SODIUM mmol/L 140 139 137 134*  --  135* 132*   POTASSIUM mmol/L 4.2 3.8 4.1 4.1 4.4 3.0* 3.2*   CHLORIDE mmol/L 108* 105 102 100  --  96* 92*   CO2 mmol/L 22.0 21.4*  22.2 20.4*  --  24.0 23.0   BUN mg/dL 12 10 12 14  --  23 26*   CREATININE mg/dL 0.92 0.88 0.93 0.96  --  1.15* 1.26*   GLUCOSE mg/dL 97 91 97 104*  --  92 99   CALCIUM mg/dL 8.9 8.5* 8.8 8.8  --  8.8 9.0   MAGNESIUM mg/dL  --   --   --   --   --  2.1  --    Estimated Creatinine Clearance: 45.6 mL/min (by C-G formula based on SCr of 0.92 mg/dL).    Results from last 7 days   Lab Units 03/03/24  0356 03/02/24  0403 03/01/24  0237 02/29/24  0303 02/28/24  0608 02/28/24  0011   AST (SGOT) U/L  --   --   --  20  --  27   ALT (SGPT) U/L  --   --   --  12  --  20   PROCALCITONIN ng/mL  --   --   --   --   --  0.24   LACTATE mmol/L  --   --   --   --   --  1.3   PLATELETS 10*3/mm3 314 311 299 287   < > 330    < > = values in this interval not displayed.               Imaging:  Reviewed chest images personally from past 3 days    ASSESSMENT  /  PLAN:    Bilateral pneumonia, viral.  Bacterial pneumonia is felt to be less likely.  Right lower lobe atelectasis  Influenza A infection  A-fib s/p Watchman  Leukocytosis, secondary to infection/stress.  Improving    -Complete 5-day course of Tamiflu for influenza A infection  -Would complete course of antibiotics for total of 5 days.  Ceftriaxone while in the hospital, can transition to oral upon discharge.  -Continue bronchodilator and airway clearance therapy with DuoNebs, Mucinex, hypertonic saline  -Incentive spirometer  -Continue pantoprazole  -Up as tolerated, PT/OT.  -Stable for discharge from pulmonary standpoint.  Plan is for discharge today.    Thank you for allowing us to participate in this patients care. Pulmonary will sign off at this time.  Please contact us if further questions or concerns arise.    Enrike High MD  [facility]  Pulmonary and Critical Care Medicine, Interventional Pulmonology    Parts of this note may be an electronic transcription/translation of spoken language to printed text using the Dragon dictation system.

## 2024-03-03 NOTE — OUTREACH NOTE
Prep Survey      Flowsheet Row Responses   [facility] facility patient discharged from? [place]   Is LACE score < 7 ? No   Eligibility Readm Mgmt   Discharge diagnosis Pneumonia   Does the patient have one of the following disease processes/diagnoses(primary or secondary)? Pneumonia   Does the patient have Home health ordered? Yes   What is the Home health agency?  [facility]   Is there a DME ordered? No   Prep survey completed? Yes            GUIDO LARSON - Registered Nurse

## 2024-03-04 ENCOUNTER — HOME CARE VISIT (OUTPATIENT)
Dept: HOME HEALTH SERVICES | Facility: HOME HEALTHCARE | Age: 79
End: 2024-03-04
Payer: MEDICARE

## 2024-03-04 VITALS — RESPIRATION RATE: 18 BRPM | OXYGEN SATURATION: 98 %

## 2024-03-04 LAB
BACTERIA SPEC AEROBE CULT: NORMAL
BACTERIA SPEC AEROBE CULT: NORMAL

## 2024-03-04 PROCEDURE — G0299 HHS/HOSPICE OF RN EA 15 MIN: HCPCS

## 2024-03-05 NOTE — HOME HEALTH
"SOC Note: Patient with dementia, stating that she is breathing fine and does not feel SOA,  who is PCG stating that patient had been C/O SOA with productive cough. Clear frothy sputum. Reviewed hospital DC instructions with patient. Instructed on med changes and compliance with times and doses of med reg with theraputic effect. Instructed to ambulate with walker and assist x 1. Instructed to continue to use incentive spirometer, cough and deep breathing exercises 4 times daily for 5-7 days. Patient able to voice back understanding on instructions provided.    Home Health ordered for: SN 2w3, 1w3, 2PRN, PT services    Reason for Hosp/Primary Dx/Co-morbidities: Admission to [facility]: 2/27/2024 - 3/3/2024 Discharge Diagnoses: Pneumonia, Pneumonia of both lungs due to infectious organism, Stage 3a chronic kidney disease, Influenza A, Severe malnutrition, H/O left radical nephrectomy, Abdominal wall hematoma, Peptic ulcer disease, Hypothyroidism (acquired), Chronic atrial fibrillation, Benign essential HTN.     Focus of Care: PNA, Medication education, Cardiopulmonary assessments, Monitor CHF with hx and stopping diuretics at D/C from hospital.    Patient's goal(s): \"To get stronger\"    Current Functional status/mobility/DME: Amb with cane/ assist x 1    HB status/Living Arrangements: Lives with  who is PCG    Skin Integrity/wound status: Skin intact    Code Status: DNR    Fall Risk/Safety concerns: High    Educated on Emergency Plan, steps to take prior to going to the ER and when to Call Home Health First:  Yes     Medication issues/Concerns:   Added: albuterol sulfate  (90 Base) MCG/ACT inhaler Inhale 2 puffs Every 4 (Four) Hours As Needed for Wheezing. Added: amoxicillin-clavulanate (Augmentin) 500-125 MG per tablet Take 1 tablet by mouth 3 (Three) Times a Day. Added: ferrous sulfate 325 (65 FE) MG tablet Take 1 tablet by mouth Daily With Breakfast.    Discontinued: Acetylcysteine capsule; docusate " sodium (COLACE) 250 MG capsule, Potassium chloride 10 MEQ CR tablet: KLOR-CON 10 MEQ CR tablet: spironolactone (ALDACTONE) 50 MG tablet: SUMAtriptan (IMITREX) 100 MG tablet: torsemide (DEMADEX) 20 MG tablet     Additional Problems/Concerns:  None    SDOH Barriers (i.e. caregiver concerns, social isolation, transportation, food insecurity, environment, income etc.)/Need for MSW: None    Plan for next visit: Medication education, Cardiopulmonary assessments with PNA, Monitor CHF with hx and stopping diuretics at D/C from hospital.  Goal based teaching

## 2024-03-06 ENCOUNTER — HOME CARE VISIT (OUTPATIENT)
Dept: HOME HEALTH SERVICES | Facility: HOME HEALTHCARE | Age: 79
End: 2024-03-06
Payer: MEDICARE

## 2024-03-06 VITALS
HEART RATE: 87 BPM | OXYGEN SATURATION: 95 % | TEMPERATURE: 95.9 F | SYSTOLIC BLOOD PRESSURE: 96 MMHG | DIASTOLIC BLOOD PRESSURE: 66 MMHG

## 2024-03-06 PROCEDURE — G0151 HHCP-SERV OF PT,EA 15 MIN: HCPCS

## 2024-03-06 NOTE — Clinical Note
Please forward to provider = Dr. Georgiana Cisneros    [facility] physical therapy evaluation completed on 3/6/2024. No skill found for physical therapy at this time. Patient is independent with ambulation without an assistive device and independent with transfers.

## 2024-03-06 NOTE — Clinical Note
Physical therapy evaluation completed on 3/6/2024. 1wk1   No skill found for physical therapy at this time.

## 2024-03-06 NOTE — HOME HEALTH
REASON FOR REFERRAL: decreased ability to enter and exit home s/p hospitalization secondary to pneumonia.    MEDICAL HISTORY: Patient's  states that they both had the flu and she couldn't stop coughing. States they both went to the urgent care on 2/28/2024 and were x-rayed. States she was diagnosed with pneumonia and was sent to the hospital. Patient was discharged home on 3/3/2024.    SKILLED PHYSICAL THERAPY IS MEDICALLY NECESS OSCAR FOR: n/a. No skill found for physical therapy    FOCUS OF CARE: n/a    COMMUNICATION: case communication to Dr. Cisneros; case communication to Dina Oakes, RN clinical manager and Nataliya Wilkerson, RN case manager

## 2024-03-07 ENCOUNTER — OFFICE VISIT (OUTPATIENT)
Dept: CARDIOLOGY | Facility: CLINIC | Age: 79
End: 2024-03-07
Payer: MEDICARE

## 2024-03-07 VITALS
BODY MASS INDEX: 20.3 KG/M2 | SYSTOLIC BLOOD PRESSURE: 105 MMHG | WEIGHT: 141.8 LBS | DIASTOLIC BLOOD PRESSURE: 69 MMHG | HEART RATE: 78 BPM | HEIGHT: 70 IN

## 2024-03-07 DIAGNOSIS — I10 BENIGN ESSENTIAL HTN: ICD-10-CM

## 2024-03-07 DIAGNOSIS — I48.20 CHRONIC ATRIAL FIBRILLATION: ICD-10-CM

## 2024-03-07 DIAGNOSIS — Z79.01 ANTICOAGULATED: ICD-10-CM

## 2024-03-07 DIAGNOSIS — Z95.818 PRESENCE OF WATCHMAN LEFT ATRIAL APPENDAGE CLOSURE DEVICE: Primary | ICD-10-CM

## 2024-03-07 DIAGNOSIS — Z95.0 PACEMAKER: ICD-10-CM

## 2024-03-07 RX ORDER — SPIRONOLACTONE 50 MG/1
50 TABLET, FILM COATED ORAL DAILY
COMMUNITY

## 2024-03-07 RX ORDER — POTASSIUM CHLORIDE 750 MG/1
10 CAPSULE, EXTENDED RELEASE ORAL DAILY
COMMUNITY

## 2024-03-07 RX ORDER — TORSEMIDE 20 MG/1
10 TABLET ORAL DAILY
COMMUNITY
Start: 2024-03-15

## 2024-03-07 NOTE — PROGRESS NOTES
Subjective:        Raina Forrest is a 78 y.o. female who here for follow up    CC  Follow-up atrial fibrillation hypertension  HPI  78-year-old female with chronic atrial fibrillation hypertension here for the follow-up with no complaints of chest pains tightness heaviness or the pressure sensation     Problems Addressed this Visit          Cardiac and Vasculature    Chronic atrial fibrillation    Benign essential HTN    Relevant Medications    spironolactone (ALDACTONE) 50 MG tablet    torsemide (DEMADEX) 20 MG tablet    Pacemaker    Presence of Watchman left atrial appendage closure device - Primary       Coag and Thromboembolic    Anticoagulated     Diagnoses         Codes Comments    Presence of Watchman left atrial appendage closure device    -  Primary ICD-10-CM: Z95.818  ICD-9-CM: V45.09     Chronic atrial fibrillation     ICD-10-CM: I48.20  ICD-9-CM: 427.31     Benign essential HTN     ICD-10-CM: I10  ICD-9-CM: 401.1     Pacemaker     ICD-10-CM: Z95.0  ICD-9-CM: V45.01     Anticoagulated     ICD-10-CM: Z79.01  ICD-9-CM: V58.61           .    The following portions of the patient's history were reviewed and updated as appropriate: allergies, current medications, past family history, past medical history, past social history, past surgical history and problem list.    Past Medical History:   Diagnosis Date    Atrial fibrillation     Fung's esophagus     Benign essential hypertension     Blood clot in vein     Cardiomyopathy     Chronic gastritis     Congestive heart failure     Degenerative disc disease     Dementia     Pt  states she has slight dementia    Depression with anxiety     Disease of thyroid gland     Diverticulitis of colon     Dysphagia     GERD (gastroesophageal reflux disease)     History of chest pain     History of migraine     History of migraine headaches     Left knee pain     Osteoporosis     Palpitations     Thyroid disorder     Thyroid nodule     Ulcerative colitis       "reports that she has never smoked. She has never used smokeless tobacco. She reports that she does not drink alcohol and does not use drugs.   Family History   Problem Relation Age of Onset    Lung cancer Other     Ulcerative colitis Sister     Heart failure Mother     Heart disease Mother     Heart failure Father     Heart disease Father     Malig Hyperthermia Neg Hx        Review of Systems  Constitutional: No wt loss, fever, fatigue  Gastrointestinal: No nausea, abdominal pain  Behavioral/Psych: No insomnia or anxiety   Cardiovascular no chest pains or tightness in the chest  Objective:       Physical Exam           Physical Exam  /69 (BP Location: Left arm)   Pulse 78   Ht 177.8 cm (70\")   Wt 64.3 kg (141 lb 12.8 oz)   BMI 20.35 kg/m²     General appearance: No acute changes   Eyes: Sclerae conjunctivae normal, pupils reactive   HENT: Atraumatic; oropharynx clear with moist mucous membranes and no mucosal ulcerations;  Neck: Trachea midline; NECK, supple, no thyromegaly or lymphadenopathy   Lungs: Normal size and shape, normal breath sounds, equal distribution of air, no rales and rhonchi   CV: S1-S2 regular, no murmurs, no rub, no gallop   Abdomen: Soft, nontender; no masses , no abnormal abdominal sounds   Extremities: No deformity , normal color , no peripheral edema   Skin: Normal temperature, turgor and texture; no rash, ulcers  Psych: Appropriate affect, alert and oriented to person, place and time             ECG 12 Lead    Date/Time: 3/14/2024 3:21 PM  Performed by: Robinson Salazar MD    Authorized by: Robinson Salazar MD  Comparison: compared with previous ECG   Similar to previous ECG  Rhythm: atrial fibrillation    Clinical impression: abnormal EKG            Echocardiogram:    Results for orders placed in visit on 06/08/23    Adult Transthoracic Echo Complete W/ Cont if Necessary Per Protocol    Interpretation Summary    Left ventricular ejection fraction appears to be 56 - " 60%.    Left ventricular diastolic function was indeterminate.    The left atrial cavity is moderately dilated.    Left atrial volume is moderately increased.    The right atrial cavity is borderline dilated.    Estimated right ventricular systolic pressure from tricuspid regurgitation is normal (<35 mmHg).    Mild pulmonary hypertension is present.          Current Outpatient Medications:     albuterol sulfate  (90 Base) MCG/ACT inhaler, Inhale 2 puffs Every 4 (Four) Hours As Needed for Wheezing., Disp: 6.7 g, Rfl: 0    aspirin 81 MG chewable tablet, Chew 1 tablet Daily. Indications: Cancer of the Colon, Disease involving Lipid Deposits in the Arteries, Kawasaki Syndrome, Disp: , Rfl:     bisoprolol (ZEBeta) 10 MG tablet, TAKE ONE TABLET BY MOUTH DAILY, Disp: 30 tablet, Rfl: 5    Calcium Carb-Cholecalciferol (Oyster Shell Calcium w/D) 500-5 MG-MCG tablet, Take 1 tablet by mouth Every Evening. Indications: Low Amount of Calcium in the Blood, Disp: , Rfl:     donepezil (ARICEPT) 10 MG tablet, Take 1 tablet by mouth Daily. Indications: Alzheimer's Disease, Disp: , Rfl:     ferrous sulfate 325 (65 FE) MG tablet, Take 1 tablet by mouth Daily With Breakfast., Disp: 30 tablet, Rfl: 0    Jardiance 10 MG tablet tablet, TAKE ONE TABLET BY MOUTH DAILY, Disp: 30 tablet, Rfl: 5    levothyroxine (SYNTHROID, LEVOTHROID) 25 MCG tablet, Take 1 tablet by mouth Every Morning., Disp: 30 tablet, Rfl: 11    Multiple Vitamins-Minerals (Womens 50+ Multi Vitamin) tablet, Take 1 tablet by mouth Daily. Indications: Vitamin and/or Mineral Deficiency, Disp: , Rfl:     pantoprazole (PROTONIX) 40 MG EC tablet, Take 20 mg by mouth Daily. Indications: Gastroesophageal Reflux Disease, Heartburn, Disp: , Rfl:     potassium chloride (MICRO-K) 10 MEQ CR capsule, Take 1 capsule by mouth Daily., Disp: , Rfl:     sertraline (ZOLOFT) 100 MG tablet, Take 1.5 tablets by mouth Every Morning. Indications: Generalized Anxiety Disorder, Disp: , Rfl:      spironolactone (ALDACTONE) 50 MG tablet, Take 1 tablet by mouth Daily., Disp: , Rfl:     torsemide (DEMADEX) 20 MG tablet, Take 10 mg by mouth Daily. Indications: Cardiac Failure, Edema, Disp: , Rfl:    Assessment:                Plan:          ICD-10-CM ICD-9-CM   1. Presence of Watchman left atrial appendage closure device  Z95.818 V45.09   2. Chronic atrial fibrillation  I48.20 427.31   3. Benign essential HTN  I10 401.1   4. Pacemaker  Z95.0 V45.01   5. Anticoagulated  Z79.01 V58.61     1. Presence of Watchman left atrial appendage closure device  Functioning well    2. Chronic atrial fibrillation  Controlled    3. Benign essential HTN  Patient understands importance of blood pressure check at home which patient does regularly and the blood pressures are well under control to the level of less than 140/90      4. Pacemaker  Functioning normal    5. Anticoagulated  Continue same      6 MONTHS     COUNSELING:    Raina Perkins was given to patient for the following topics: diagnostic results, risk factor reductions, impressions, risks and benefits of treatment options and importance of treatment compliance .       SMOKING COUNSELING:        Dictated using Dragon dictation

## 2024-03-08 ENCOUNTER — TELEPHONE (OUTPATIENT)
Dept: CARDIOLOGY | Facility: CLINIC | Age: 79
End: 2024-03-08

## 2024-03-08 ENCOUNTER — HOME CARE VISIT (OUTPATIENT)
Dept: HOME HEALTH SERVICES | Facility: HOME HEALTHCARE | Age: 79
End: 2024-03-08
Payer: MEDICARE

## 2024-03-08 ENCOUNTER — READMISSION MANAGEMENT (OUTPATIENT)
Dept: CALL CENTER | Facility: HOSPITAL | Age: 79
End: 2024-03-08
Payer: MEDICARE

## 2024-03-08 PROCEDURE — G0299 HHS/HOSPICE OF RN EA 15 MIN: HCPCS

## 2024-03-08 NOTE — TELEPHONE ENCOUNTER
"Caller: MARY Forrest JR    Relationship: Emergency Contact    Best call back number: [phone]    Which medication are you concerned about: JARDIANCE 10 MG    Who prescribed you this medication: DR. PARKER    What are your concerns: PT'S SON IS CALLING TO GET MED LIST UPDATED BECAUSE DR. PARKER TOOK HER OFF OF JARDIANCE 10 MG. HE ALSO SAID THE HOSPITALIST TOOK HER  SPIRONOLACTONE 50 MG AND TORSEMIDE. HE SAID THAT SOME INFO IN THE AFTER SUMMARY VISIT IS INCORRECT, HE SAID THE PT HAD KIDNEY SURGERY BECAUSE OF CANCER AND NEVER HAD A TRANSPLANTED KIDNEY.     HE ALSO WANTED TO SEE IF DR. PARKER REFERRED THE PT TO NEPHROLOGY BECAUSE OF \"TRANSPLANT KIDNEY\" OR FOR ANOTHER REASON.    How long have you had these concerns: TODAY  "

## 2024-03-08 NOTE — OUTREACH NOTE
COPD/PN Week 1 Survey      Flowsheet Row Responses   [facility] patient discharged from? [place]   Does the patient have one of the following disease processes/diagnoses(primary or secondary)? Pneumonia   Week 1 attempt successful? Yes   Call start time 1313   Call end time 1318   Discharge diagnosis Pneumonia   Meds reviewed with patient/caregiver? Yes   Is the patient having any side effects they believe may be caused by any medication additions or changes? No   Does the patient have all medications ordered at discharge? Yes   Is the patient taking all medications as directed (includes completed medication regime)? Yes   Does the patient have a primary care provider?  Yes   Does the patient have an appointment with their PCP or specialist within 7 days of discharge? Yes   Has the patient kept scheduled appointments due by today? N/A   What is the Home health agency?  [facility]   Has home health visited the patient within 72 hours of discharge? Yes   Psychosocial issues? No   Did the patient receive a copy of their discharge instructions? Yes   Nursing interventions Reviewed instructions with patient   What is the patient's perception of their health status since discharge? Improving   Nursing Interventions Nurse provided patient education   Is the patient/caregiver able to teach back the hierarchy of who to call/visit for symptoms/problems? PCP, Specialist, Home health nurse, Urgent Care, ED, 911 Yes   Is the patient/caregiver able to teach back signs and symptoms of worsening condition: Shortness of breath, Fever/chills   Week 1 call completed? Yes   Graduated Yes   Graduated/Revoked comments Son has a great understanding of Pts meds and fluid levels. Pt will be seeing PCP cardio nephro . Pt getting wts daily and  is coming in.   Call end time 1318            TOSHIA WALLS - Registered Nurse

## 2024-03-10 VITALS
HEART RATE: 66 BPM | TEMPERATURE: 97.9 F | RESPIRATION RATE: 18 BRPM | OXYGEN SATURATION: 96 % | DIASTOLIC BLOOD PRESSURE: 68 MMHG | SYSTOLIC BLOOD PRESSURE: 112 MMHG

## 2024-03-11 NOTE — HOME HEALTH
Routine Visit Note: Patient/Caregiver stating that she has done well this wekk.    Skill/education provided: Reviewed all meds with patient. Patient denies any issues at this time. Instructed on meds dosage, scheduling and side effects. Patient voiced back understanding.    Patient/caregiver response: Patient and caregiver need reinforcement on instructions given    Plan for next visit: Medication education, Cardiopulmonary assessments, Neurovascular assessments, Goal based teaching

## 2024-03-12 ENCOUNTER — HOME CARE VISIT (OUTPATIENT)
Dept: HOME HEALTH SERVICES | Facility: HOME HEALTHCARE | Age: 79
End: 2024-03-12
Payer: MEDICARE

## 2024-03-12 PROCEDURE — G0299 HHS/HOSPICE OF RN EA 15 MIN: HCPCS

## 2024-03-13 VITALS
DIASTOLIC BLOOD PRESSURE: 62 MMHG | OXYGEN SATURATION: 97 % | TEMPERATURE: 96.9 F | WEIGHT: 143 LBS | BODY MASS INDEX: 20.52 KG/M2 | SYSTOLIC BLOOD PRESSURE: 110 MMHG | RESPIRATION RATE: 18 BRPM | HEART RATE: 68 BPM

## 2024-03-14 NOTE — HOME HEALTH
Routine Visit Note: Patient/Caregiver stating that kamini has gained wt this past week and is asymptomatic.    Skill/education provided: Instructed on importance of daily weight checks, monitor weight every morning with the same amount of cloth, before eating/drinking, maintain a log, report any weight gain to MD as indicated above and signs of fluid retention such as edema, sudden cough and SOA. Patient able to teach back.    Patient/caregiver response: Patient and caregiver need reinforcement on instructions given    Plan for next visit: Medication education, Cardiopulmonary assessments, Neurovascular assessments, Goal based teaching

## 2024-03-15 ENCOUNTER — HOME CARE VISIT (OUTPATIENT)
Dept: HOME HEALTH SERVICES | Facility: HOME HEALTHCARE | Age: 79
End: 2024-03-15
Payer: MEDICARE

## 2024-03-15 PROCEDURE — G0299 HHS/HOSPICE OF RN EA 15 MIN: HCPCS

## 2024-03-16 VITALS
SYSTOLIC BLOOD PRESSURE: 108 MMHG | RESPIRATION RATE: 18 BRPM | OXYGEN SATURATION: 98 % | BODY MASS INDEX: 20.09 KG/M2 | HEART RATE: 76 BPM | WEIGHT: 140 LBS | TEMPERATURE: 97.1 F | DIASTOLIC BLOOD PRESSURE: 64 MMHG

## 2024-03-16 NOTE — HOME HEALTH
Routine Visit Note: Discussed with Patient/Caregiver on trace edema to BLE. Patinet started back on torsemide 20mg    Skill/education provided: Reviewed all meds with patient. Patient denies any issues at this time. Instructed on meds dosage, scheduling and side effects. Patient voiced back understanding.    Patient/caregiver response: Patient and caregiver need reinforcement on instructions given    Plan for next visit: Medication education, Cardiopulmonary assessments, Neurovascular assessments, Goal based teaching

## 2024-03-19 ENCOUNTER — HOME CARE VISIT (OUTPATIENT)
Dept: HOME HEALTH SERVICES | Facility: HOME HEALTHCARE | Age: 79
End: 2024-03-19
Payer: MEDICARE

## 2024-03-19 PROCEDURE — G0299 HHS/HOSPICE OF RN EA 15 MIN: HCPCS

## 2024-03-20 VITALS
HEART RATE: 76 BPM | TEMPERATURE: 96.9 F | SYSTOLIC BLOOD PRESSURE: 112 MMHG | DIASTOLIC BLOOD PRESSURE: 62 MMHG | RESPIRATION RATE: 18 BRPM | OXYGEN SATURATION: 99 % | WEIGHT: 137.25 LBS | BODY MASS INDEX: 19.69 KG/M2

## 2024-03-21 NOTE — HOME HEALTH
Routine Visit Note: Discussed with Patient/Caregiver on wt down with needing to watch for dehydration with diuresis    Skill/education provided: Reviewed all meds with patient. Patient denies any issues at this time. Instructed on meds dosage, scheduling and side effects. Patient voiced back understanding.    Patient/caregiver response: Patient and caregiver need reinforcement on instructions given    Plan for next visit: Medication education, Cardiopulmonary assessments, Neurovascular assessments, Goal based teaching

## 2024-03-22 ENCOUNTER — HOME CARE VISIT (OUTPATIENT)
Dept: HOME HEALTH SERVICES | Facility: HOME HEALTHCARE | Age: 79
End: 2024-03-22
Payer: MEDICARE

## 2024-03-22 VITALS
OXYGEN SATURATION: 97 % | BODY MASS INDEX: 19.8 KG/M2 | SYSTOLIC BLOOD PRESSURE: 114 MMHG | WEIGHT: 138 LBS | DIASTOLIC BLOOD PRESSURE: 64 MMHG | HEART RATE: 74 BPM | TEMPERATURE: 96.6 F | RESPIRATION RATE: 18 BRPM

## 2024-03-22 PROCEDURE — G0299 HHS/HOSPICE OF RN EA 15 MIN: HCPCS

## 2024-03-23 NOTE — HOME HEALTH
Routine Visit Note: Discussed with Patient/Caregiver on med reg with report that patient now has protocol to take extra 10mg Torsemide if wt gain, in which patient has only taken 1 x this week on Mon.    Skill/education provided: Reviewed all meds with patient. Patient denies any issues at this time. Instructed on meds dosage, scheduling and side effects. Patient voiced back understanding.    Patient/caregiver response: Patient and caregiver need reinforcement on instructions given    Plan for next visit: Medication education, Cardiopulmonary assessments, Neurovascular assessments, Goal based teaching

## 2024-03-25 ENCOUNTER — HOME CARE VISIT (OUTPATIENT)
Dept: HOME HEALTH SERVICES | Facility: HOME HEALTHCARE | Age: 79
End: 2024-03-25
Payer: MEDICARE

## 2024-03-25 PROCEDURE — G0299 HHS/HOSPICE OF RN EA 15 MIN: HCPCS

## 2024-03-26 VITALS
RESPIRATION RATE: 18 BRPM | SYSTOLIC BLOOD PRESSURE: 124 MMHG | OXYGEN SATURATION: 96 % | HEART RATE: 74 BPM | WEIGHT: 138 LBS | TEMPERATURE: 96.5 F | BODY MASS INDEX: 19.8 KG/M2 | DIASTOLIC BLOOD PRESSURE: 78 MMHG

## 2024-03-26 NOTE — HOME HEALTH
Routine Visit Note: Discussed with Patient/Caregiver on patinet status with patient not needing PRN Torsemide this week with WT maintaining.    Skill/education provided: Reviewed all meds with patient. Patient denies any issues at this time. Instructed on meds dosage, scheduling and side effects. Patient voiced back understanding.    Patient/caregiver response: Patient and caregiver need reinforcement on instructions given    Plan for next visit: Medication education, Cardiopulmonary assessments, Neurovascular assessments, Goal based teaching

## 2024-03-27 ENCOUNTER — OFFICE (OUTPATIENT)
Dept: URBAN - METROPOLITAN AREA CLINIC 76 | Facility: CLINIC | Age: 79
End: 2024-03-27
Payer: MEDICARE

## 2024-03-27 VITALS — HEIGHT: 69 IN | WEIGHT: 139 LBS

## 2024-03-27 DIAGNOSIS — Z86.010 PERSONAL HISTORY OF COLONIC POLYPS: ICD-10-CM

## 2024-03-27 DIAGNOSIS — K29.50 UNSPECIFIED CHRONIC GASTRITIS WITHOUT BLEEDING: ICD-10-CM

## 2024-03-27 DIAGNOSIS — B96.81 HELICOBACTER PYLORI [H. PYLORI] AS THE CAUSE OF DISEASES CLA: ICD-10-CM

## 2024-03-27 PROCEDURE — 99214 OFFICE O/P EST MOD 30 MIN: CPT | Performed by: INTERNAL MEDICINE

## 2024-04-03 ENCOUNTER — HOME CARE VISIT (OUTPATIENT)
Dept: HOME HEALTH SERVICES | Facility: HOME HEALTHCARE | Age: 79
End: 2024-04-03
Payer: MEDICARE

## 2024-04-03 VITALS
RESPIRATION RATE: 18 BRPM | DIASTOLIC BLOOD PRESSURE: 62 MMHG | WEIGHT: 136 LBS | HEART RATE: 76 BPM | OXYGEN SATURATION: 98 % | SYSTOLIC BLOOD PRESSURE: 114 MMHG | TEMPERATURE: 96.4 F | BODY MASS INDEX: 19.51 KG/M2

## 2024-04-03 PROCEDURE — G0299 HHS/HOSPICE OF RN EA 15 MIN: HCPCS

## 2024-04-09 ENCOUNTER — HOME CARE VISIT (OUTPATIENT)
Dept: HOME HEALTH SERVICES | Facility: HOME HEALTHCARE | Age: 79
End: 2024-04-09
Payer: MEDICARE

## 2024-04-09 PROCEDURE — G0299 HHS/HOSPICE OF RN EA 15 MIN: HCPCS

## 2024-04-10 VITALS
RESPIRATION RATE: 18 BRPM | WEIGHT: 136 LBS | HEART RATE: 66 BPM | OXYGEN SATURATION: 98 % | TEMPERATURE: 97.5 F | SYSTOLIC BLOOD PRESSURE: 116 MMHG | BODY MASS INDEX: 19.51 KG/M2 | DIASTOLIC BLOOD PRESSURE: 64 MMHG

## 2024-04-11 NOTE — HOME HEALTH
Routine Visit Note: Discussed with Patient/Caregiver on patinet status with patient not needing PRN Torsemide this week with WT maintaining.    Skill/education provided: Reviewed all meds with patient. Patient denies any issues at this time. Instructed on meds dosage, scheduling and side effects. Patient voiced back understanding.    Patient/caregiver response: Patient and caregiver state understanding instructions given

## 2024-05-24 RX ORDER — BISOPROLOL FUMARATE 10 MG/1
TABLET, FILM COATED ORAL
Qty: 30 TABLET | Refills: 5 | Status: SHIPPED | OUTPATIENT
Start: 2024-05-24

## 2024-06-04 ENCOUNTER — CLINICAL SUPPORT NO REQUIREMENTS (OUTPATIENT)
Dept: CARDIOLOGY | Facility: CLINIC | Age: 79
End: 2024-06-04
Payer: MEDICARE

## 2024-06-04 DIAGNOSIS — Z95.0 PACEMAKER: Primary | ICD-10-CM

## 2024-06-04 PROCEDURE — 93280 PM DEVICE PROGR EVAL DUAL: CPT | Performed by: INTERNAL MEDICINE

## 2024-07-10 ENCOUNTER — OFFICE (OUTPATIENT)
Dept: URBAN - METROPOLITAN AREA CLINIC 76 | Facility: CLINIC | Age: 79
End: 2024-07-10
Payer: MEDICARE

## 2024-07-10 VITALS
OXYGEN SATURATION: 98 % | HEIGHT: 69 IN | SYSTOLIC BLOOD PRESSURE: 120 MMHG | DIASTOLIC BLOOD PRESSURE: 76 MMHG | WEIGHT: 154 LBS | HEART RATE: 72 BPM

## 2024-07-10 DIAGNOSIS — K59.04 CHRONIC IDIOPATHIC CONSTIPATION: ICD-10-CM

## 2024-07-10 DIAGNOSIS — Z86.010 PERSONAL HISTORY OF COLONIC POLYPS: ICD-10-CM

## 2024-07-10 DIAGNOSIS — K25.9 GASTRIC ULCER, UNSPECIFIED AS ACUTE OR CHRONIC, WITHOUT HEMO: ICD-10-CM

## 2024-07-10 DIAGNOSIS — K29.50 UNSPECIFIED CHRONIC GASTRITIS WITHOUT BLEEDING: ICD-10-CM

## 2024-07-10 PROCEDURE — 99214 OFFICE O/P EST MOD 30 MIN: CPT | Performed by: INTERNAL MEDICINE

## 2024-07-18 ENCOUNTER — OFFICE VISIT (OUTPATIENT)
Dept: CARDIOLOGY | Facility: CLINIC | Age: 79
End: 2024-07-18
Payer: MEDICARE

## 2024-07-18 VITALS
SYSTOLIC BLOOD PRESSURE: 109 MMHG | HEART RATE: 57 BPM | HEIGHT: 68 IN | WEIGHT: 151 LBS | BODY MASS INDEX: 22.88 KG/M2 | DIASTOLIC BLOOD PRESSURE: 59 MMHG

## 2024-07-18 DIAGNOSIS — Z95.0 PACEMAKER: ICD-10-CM

## 2024-07-18 DIAGNOSIS — Z79.01 ANTICOAGULATED: ICD-10-CM

## 2024-07-18 DIAGNOSIS — I48.21 PERMANENT ATRIAL FIBRILLATION: Primary | ICD-10-CM

## 2024-07-18 DIAGNOSIS — I42.9 CARDIOMYOPATHY, UNSPECIFIED TYPE: ICD-10-CM

## 2024-07-18 RX ORDER — SUCRALFATE 1 G/1
1 TABLET ORAL 2 TIMES DAILY
COMMUNITY

## 2024-07-18 RX ORDER — OMEPRAZOLE MAGNESIUM, AMOXICILLIN AND RIFABUTIN 10; 250; 12.5 MG/1; MG/1; MG/1
4 CAPSULE, DELAYED RELEASE ORAL 3 TIMES DAILY
COMMUNITY

## 2024-07-18 RX ORDER — MEMANTINE HYDROCHLORIDE 10 MG/1
10 TABLET ORAL 2 TIMES DAILY
COMMUNITY

## 2024-07-18 NOTE — PROGRESS NOTES
Subjective:        Raina Forrest is a 78 y.o. female who here for follow up    CC  Follow-up of atrial fibrillation pacemaker anticoagulation  HPI  78-year-old female with cardiomyopathy atrial fibrillation pacemaker and anticoagulation here for the follow-up denies any chest pains tightness heaviness or the pressure sensation     Problems Addressed this Visit          Cardiac and Vasculature    Cardiomyopathy    Relevant Orders    Adult Transthoracic Echo Complete W/ Cont if Necessary Per Protocol    Atrial fibrillation - Primary    Relevant Orders    Adult Transthoracic Echo Complete W/ Cont if Necessary Per Protocol    Pacemaker       Coag and Thromboembolic    Anticoagulated     Diagnoses         Codes Comments    Permanent atrial fibrillation    -  Primary ICD-10-CM: I48.21  ICD-9-CM: 427.31     Cardiomyopathy, unspecified type     ICD-10-CM: I42.9  ICD-9-CM: 425.4     Pacemaker     ICD-10-CM: Z95.0  ICD-9-CM: V45.01     Anticoagulated     ICD-10-CM: Z79.01  ICD-9-CM: V58.61           .    The following portions of the patient's history were reviewed and updated as appropriate: allergies, current medications, past family history, past medical history, past social history, past surgical history and problem list.    Past Medical History:   Diagnosis Date    Atrial fibrillation     Fung's esophagus     Benign essential hypertension     Blood clot in vein     Cardiomyopathy     Chronic gastritis     Congestive heart failure     Degenerative disc disease     Dementia     Pt  states she has slight dementia    Depression with anxiety     Disease of thyroid gland     Diverticulitis of colon     Dysphagia     GERD (gastroesophageal reflux disease)     History of chest pain     History of migraine     History of migraine headaches     Left knee pain     Osteoporosis     Palpitations     Thyroid disorder     Thyroid nodule     Ulcerative colitis      reports that she has never smoked. She has never used  "smokeless tobacco. She reports that she does not drink alcohol and does not use drugs.   Family History   Problem Relation Age of Onset    Lung cancer Other     Ulcerative colitis Sister     Heart failure Mother     Heart disease Mother     Heart failure Father     Heart disease Father     Malig Hyperthermia Neg Hx        Review of Systems  Constitutional: No wt loss, fever, fatigue  Gastrointestinal: No nausea, abdominal pain  Behavioral/Psych: No insomnia or anxiety   Cardiovascular no chest pains or tightness in the chest  Objective:       Physical Exam  /59   Pulse 57   Ht 172.7 cm (68\")   Wt 68.5 kg (151 lb)   BMI 22.96 kg/m²   General appearance: No acute changes   Neck: Trachea midline; NECK, supple, no thyromegaly or lymphadenopathy   Lungs: Normal size and shape, normal breath sounds, equal distribution of air, no rales and rhonchi   CV: S1-S2 regular, no murmurs, no rub, no gallop   Abdomen: Soft, nontender; no masses , no abnormal abdominal sounds   Extremities: No deformity , normal color , no peripheral edema   Skin: Normal temperature, turgor and texture; no rash, ulcers            ECG 12 Lead    Date/Time: 7/18/2024 1:20 PM  Performed by: Robinson Salazar MD    Authorized by: Robinson Salazar MD  Rhythm: atrial fibrillation    Clinical impression: abnormal EKG            Echocardiogram:    Results for orders placed in visit on 06/08/23    Adult Transthoracic Echo Complete W/ Cont if Necessary Per Protocol    Interpretation Summary    Left ventricular ejection fraction appears to be 56 - 60%.    Left ventricular diastolic function was indeterminate.    The left atrial cavity is moderately dilated.    Left atrial volume is moderately increased.    The right atrial cavity is borderline dilated.    Estimated right ventricular systolic pressure from tricuspid regurgitation is normal (<35 mmHg).    Mild pulmonary hypertension is present.          Current Outpatient Medications:     " albuterol sulfate  (90 Base) MCG/ACT inhaler, Inhale 2 puffs Every 4 (Four) Hours As Needed for Wheezing., Disp: 6.7 g, Rfl: 0    Amoxicill-Rifabutin-Omeprazole (Talicia) 250-12.5-10 MG capsule delayed-release, Take 4 capsules by mouth 3 times a day., Disp: , Rfl:     aspirin 81 MG chewable tablet, Chew 1 tablet Daily. Indications: Cancer of the Colon, Disease involving Lipid Deposits in the Arteries, Kawasaki Syndrome, Disp: , Rfl:     bisoprolol (ZEBeta) 10 MG tablet, TAKE ONE TABLET BY MOUTH DAILY, Disp: 30 tablet, Rfl: 5    Calcium Carb-Cholecalciferol (Oyster Shell Calcium w/D) 500-5 MG-MCG tablet, Take 1 tablet by mouth Every Evening. Indications: Low Amount of Calcium in the Blood, Disp: , Rfl:     donepezil (ARICEPT) 10 MG tablet, Take 1 tablet by mouth Daily. Indications: Alzheimer's Disease, Disp: , Rfl:     levothyroxine (SYNTHROID, LEVOTHROID) 25 MCG tablet, Take 1 tablet by mouth Every Morning., Disp: 30 tablet, Rfl: 11    memantine (NAMENDA) 10 MG tablet, Take 1 tablet by mouth 2 (Two) Times a Day., Disp: , Rfl:     Multiple Vitamins-Minerals (Womens 50+ Multi Vitamin) tablet, Take 1 tablet by mouth Daily. Indications: Vitamin and/or Mineral Deficiency, Disp: , Rfl:     pantoprazole (PROTONIX) 40 MG EC tablet, Take 20 mg by mouth Daily. Indications: Gastroesophageal Reflux Disease, Heartburn, Disp: , Rfl:     sertraline (ZOLOFT) 100 MG tablet, Take 1.5 tablets by mouth Every Morning. Indications: Generalized Anxiety Disorder, Disp: , Rfl:     sucralfate (CARAFATE) 1 g tablet, Take 1 tablet by mouth 2 (Two) Times a Day., Disp: , Rfl:     torsemide (DEMADEX) 20 MG tablet, Take 0.5 tablets by mouth Daily. Indications: Cardiac Failure, Edema, Disp: , Rfl:    Assessment:                Plan:          ICD-10-CM ICD-9-CM   1. Permanent atrial fibrillation  I48.21 427.31   2. Cardiomyopathy, unspecified type  I42.9 425.4   3. Pacemaker  Z95.0 V45.01   4. Anticoagulated  Z79.01 V58.61     1.  Cardiomyopathy, unspecified type  Considering patient's medical condition as well as the risk factors, patient will require echocardiogram for further evaluation for the LV function, four-chamber evaluation, including the pressures, valvular function and  pericardial disease and pericardial effusion    - Adult Transthoracic Echo Complete W/ Cont if Necessary Per Protocol; Future    2. Permanent atrial fibrillation  Considering patient's medical condition as well as the risk factors, patient will require echocardiogram for further evaluation for the LV function, four-chamber evaluation, including the pressures, valvular function and  pericardial disease and pericardial effusion    - Adult Transthoracic Echo Complete W/ Cont if Necessary Per Protocol; Future    3. Pacemaker  Pacemaker functioning normally    4. Anticoagulated  Pros and cons as well as indication of the anticoagulation has been explained to the patient in detail    There are no obvious complications at this stage    Risk of  the bleedings has been explained    Need for the regular blood workup and adjust the dose has been explained    Need for proper follow-up on anticoagulation also has been explained        1 YR WITH ECHO  COUNSELING:    Raina Perkins was given to patient for the following topics: diagnostic results, risk factor reductions, impressions, risks and benefits of treatment options and importance of treatment compliance .       SMOKING COUNSELING:        Dictated using Dragon dictation

## 2024-11-07 RX ORDER — BISOPROLOL FUMARATE 10 MG/1
TABLET, FILM COATED ORAL
Qty: 30 TABLET | Refills: 8 | Status: SHIPPED | OUTPATIENT
Start: 2024-11-07

## 2024-12-03 ENCOUNTER — CLINICAL SUPPORT NO REQUIREMENTS (OUTPATIENT)
Dept: CARDIOLOGY | Facility: CLINIC | Age: 79
End: 2024-12-03
Payer: MEDICARE

## 2024-12-03 DIAGNOSIS — Z95.0 PACEMAKER: Primary | ICD-10-CM

## 2025-02-08 ENCOUNTER — APPOINTMENT (OUTPATIENT)
Dept: GENERAL RADIOLOGY | Facility: HOSPITAL | Age: 80
DRG: 291 | End: 2025-02-08
Payer: MEDICARE

## 2025-02-08 ENCOUNTER — HOSPITAL ENCOUNTER (INPATIENT)
Facility: HOSPITAL | Age: 80
LOS: 4 days | Discharge: HOME HEALTH | DRG: 291 | End: 2025-02-13
Attending: EMERGENCY MEDICINE | Admitting: INTERNAL MEDICINE
Payer: MEDICARE

## 2025-02-08 DIAGNOSIS — I50.9 ACUTE ON CHRONIC CONGESTIVE HEART FAILURE, UNSPECIFIED HEART FAILURE TYPE: ICD-10-CM

## 2025-02-08 DIAGNOSIS — N17.9 ACUTE RENAL FAILURE SUPERIMPOSED ON CHRONIC KIDNEY DISEASE, UNSPECIFIED ACUTE RENAL FAILURE TYPE, UNSPECIFIED CKD STAGE: ICD-10-CM

## 2025-02-08 DIAGNOSIS — R06.00 ACUTE DYSPNEA: Primary | ICD-10-CM

## 2025-02-08 DIAGNOSIS — U07.1 COVID-19 VIRUS INFECTION: ICD-10-CM

## 2025-02-08 DIAGNOSIS — N18.9 ACUTE RENAL FAILURE SUPERIMPOSED ON CHRONIC KIDNEY DISEASE, UNSPECIFIED ACUTE RENAL FAILURE TYPE, UNSPECIFIED CKD STAGE: ICD-10-CM

## 2025-02-08 LAB
ALBUMIN SERPL-MCNC: 4.3 G/DL (ref 3.5–5.2)
ALBUMIN/GLOB SERPL: 1.6 G/DL
ALP SERPL-CCNC: 83 U/L (ref 39–117)
ALT SERPL W P-5'-P-CCNC: 16 U/L (ref 1–33)
ANION GAP SERPL CALCULATED.3IONS-SCNC: 11 MMOL/L (ref 5–15)
AST SERPL-CCNC: 35 U/L (ref 1–32)
BASOPHILS # BLD AUTO: 0.07 10*3/MM3 (ref 0–0.2)
BASOPHILS NFR BLD AUTO: 0.6 % (ref 0–1.5)
BILIRUB SERPL-MCNC: 0.8 MG/DL (ref 0–1.2)
BUN SERPL-MCNC: 15 MG/DL (ref 8–23)
BUN/CREAT SERPL: 11.8 (ref 7–25)
CALCIUM SPEC-SCNC: 9.4 MG/DL (ref 8.6–10.5)
CHLORIDE SERPL-SCNC: 101 MMOL/L (ref 98–107)
CO2 SERPL-SCNC: 24 MMOL/L (ref 22–29)
CREAT SERPL-MCNC: 1.27 MG/DL (ref 0.57–1)
D-LACTATE SERPL-SCNC: 2 MMOL/L (ref 0.5–2)
DEPRECATED RDW RBC AUTO: 52.1 FL (ref 37–54)
EGFRCR SERPLBLD CKD-EPI 2021: 42.6 ML/MIN/1.73
EOSINOPHIL # BLD AUTO: 0.22 10*3/MM3 (ref 0–0.4)
EOSINOPHIL NFR BLD AUTO: 1.9 % (ref 0.3–6.2)
ERYTHROCYTE [DISTWIDTH] IN BLOOD BY AUTOMATED COUNT: 13.9 % (ref 12.3–15.4)
GEN 5 1HR TROPONIN T REFLEX: 40 NG/L
GLOBULIN UR ELPH-MCNC: 2.7 GM/DL
GLUCOSE SERPL-MCNC: 107 MG/DL (ref 65–99)
HCT VFR BLD AUTO: 38 % (ref 34–46.6)
HGB BLD-MCNC: 13 G/DL (ref 12–15.9)
HOLD SPECIMEN: NORMAL
HOLD SPECIMEN: NORMAL
IMM GRANULOCYTES # BLD AUTO: 0.1 10*3/MM3 (ref 0–0.05)
IMM GRANULOCYTES NFR BLD AUTO: 0.9 % (ref 0–0.5)
LYMPHOCYTES # BLD AUTO: 0.61 10*3/MM3 (ref 0.7–3.1)
LYMPHOCYTES NFR BLD AUTO: 5.3 % (ref 19.6–45.3)
MCH RBC QN AUTO: 34.8 PG (ref 26.6–33)
MCHC RBC AUTO-ENTMCNC: 34.2 G/DL (ref 31.5–35.7)
MCV RBC AUTO: 101.6 FL (ref 79–97)
MONOCYTES # BLD AUTO: 1.39 10*3/MM3 (ref 0.1–0.9)
MONOCYTES NFR BLD AUTO: 12.1 % (ref 5–12)
NEUTROPHILS NFR BLD AUTO: 79.2 % (ref 42.7–76)
NEUTROPHILS NFR BLD AUTO: 9.14 10*3/MM3 (ref 1.7–7)
NRBC BLD AUTO-RTO: 0 /100 WBC (ref 0–0.2)
NT-PROBNP SERPL-MCNC: 4547 PG/ML (ref 0–1800)
PLATELET # BLD AUTO: 177 10*3/MM3 (ref 140–450)
PMV BLD AUTO: 11.6 FL (ref 6–12)
POTASSIUM SERPL-SCNC: 4.4 MMOL/L (ref 3.5–5.2)
PROCALCITONIN SERPL-MCNC: 0.09 NG/ML (ref 0–0.25)
PROT SERPL-MCNC: 7 G/DL (ref 6–8.5)
QT INTERVAL: 375 MS
QTC INTERVAL: 446 MS
RBC # BLD AUTO: 3.74 10*6/MM3 (ref 3.77–5.28)
SODIUM SERPL-SCNC: 136 MMOL/L (ref 136–145)
TROPONIN T % DELTA: -11
TROPONIN T NUMERIC DELTA: -5 NG/L
TROPONIN T SERPL HS-MCNC: 45 NG/L
WBC NRBC COR # BLD AUTO: 11.53 10*3/MM3 (ref 3.4–10.8)
WHOLE BLOOD HOLD COAG: NORMAL
WHOLE BLOOD HOLD SPECIMEN: NORMAL

## 2025-02-08 PROCEDURE — 80053 COMPREHEN METABOLIC PANEL: CPT | Performed by: EMERGENCY MEDICINE

## 2025-02-08 PROCEDURE — 99285 EMERGENCY DEPT VISIT HI MDM: CPT

## 2025-02-08 PROCEDURE — 84145 PROCALCITONIN (PCT): CPT | Performed by: EMERGENCY MEDICINE

## 2025-02-08 PROCEDURE — 25010000002 FUROSEMIDE PER 20 MG: Performed by: EMERGENCY MEDICINE

## 2025-02-08 PROCEDURE — 93005 ELECTROCARDIOGRAM TRACING: CPT | Performed by: EMERGENCY MEDICINE

## 2025-02-08 PROCEDURE — 36415 COLL VENOUS BLD VENIPUNCTURE: CPT

## 2025-02-08 PROCEDURE — 25010000002 REMDESIVIR 100 MG/20ML SOLUTION 1 EACH VIAL: Performed by: INTERNAL MEDICINE

## 2025-02-08 PROCEDURE — 87040 BLOOD CULTURE FOR BACTERIA: CPT | Performed by: EMERGENCY MEDICINE

## 2025-02-08 PROCEDURE — 93010 ELECTROCARDIOGRAM REPORT: CPT | Performed by: INTERNAL MEDICINE

## 2025-02-08 PROCEDURE — 71045 X-RAY EXAM CHEST 1 VIEW: CPT

## 2025-02-08 PROCEDURE — 25810000003 SODIUM CHLORIDE 0.9 % SOLUTION 250 ML FLEX CONT: Performed by: INTERNAL MEDICINE

## 2025-02-08 PROCEDURE — 83605 ASSAY OF LACTIC ACID: CPT | Performed by: EMERGENCY MEDICINE

## 2025-02-08 PROCEDURE — G0378 HOSPITAL OBSERVATION PER HR: HCPCS

## 2025-02-08 PROCEDURE — 84484 ASSAY OF TROPONIN QUANT: CPT | Performed by: EMERGENCY MEDICINE

## 2025-02-08 PROCEDURE — 85025 COMPLETE CBC W/AUTO DIFF WBC: CPT | Performed by: EMERGENCY MEDICINE

## 2025-02-08 PROCEDURE — 83880 ASSAY OF NATRIURETIC PEPTIDE: CPT | Performed by: EMERGENCY MEDICINE

## 2025-02-08 RX ORDER — SODIUM CHLORIDE 0.9 % (FLUSH) 0.9 %
10 SYRINGE (ML) INJECTION AS NEEDED
Status: DISCONTINUED | OUTPATIENT
Start: 2025-02-08 | End: 2025-02-13 | Stop reason: HOSPADM

## 2025-02-08 RX ORDER — FUROSEMIDE 10 MG/ML
80 INJECTION INTRAMUSCULAR; INTRAVENOUS ONCE
Status: COMPLETED | OUTPATIENT
Start: 2025-02-08 | End: 2025-02-08

## 2025-02-08 RX ORDER — BISACODYL 10 MG
10 SUPPOSITORY, RECTAL RECTAL DAILY PRN
Status: DISCONTINUED | OUTPATIENT
Start: 2025-02-08 | End: 2025-02-13 | Stop reason: HOSPADM

## 2025-02-08 RX ORDER — ACETAMINOPHEN 160 MG/5ML
650 SOLUTION ORAL EVERY 4 HOURS PRN
Status: DISCONTINUED | OUTPATIENT
Start: 2025-02-08 | End: 2025-02-13 | Stop reason: HOSPADM

## 2025-02-08 RX ORDER — ACETAMINOPHEN 650 MG/1
650 SUPPOSITORY RECTAL EVERY 4 HOURS PRN
Status: DISCONTINUED | OUTPATIENT
Start: 2025-02-08 | End: 2025-02-13 | Stop reason: HOSPADM

## 2025-02-08 RX ORDER — ACETAMINOPHEN 325 MG/1
650 TABLET ORAL EVERY 4 HOURS PRN
Status: DISCONTINUED | OUTPATIENT
Start: 2025-02-08 | End: 2025-02-13 | Stop reason: HOSPADM

## 2025-02-08 RX ORDER — ONDANSETRON 4 MG/1
4 TABLET, ORALLY DISINTEGRATING ORAL EVERY 6 HOURS PRN
Status: DISCONTINUED | OUTPATIENT
Start: 2025-02-08 | End: 2025-02-13 | Stop reason: HOSPADM

## 2025-02-08 RX ORDER — AMOXICILLIN 250 MG
2 CAPSULE ORAL 2 TIMES DAILY PRN
Status: DISCONTINUED | OUTPATIENT
Start: 2025-02-08 | End: 2025-02-13 | Stop reason: HOSPADM

## 2025-02-08 RX ORDER — ACETAMINOPHEN 500 MG
1000 TABLET ORAL ONCE
Status: COMPLETED | OUTPATIENT
Start: 2025-02-08 | End: 2025-02-08

## 2025-02-08 RX ORDER — BISACODYL 5 MG/1
5 TABLET, DELAYED RELEASE ORAL DAILY PRN
Status: DISCONTINUED | OUTPATIENT
Start: 2025-02-08 | End: 2025-02-13 | Stop reason: HOSPADM

## 2025-02-08 RX ORDER — ONDANSETRON 2 MG/ML
4 INJECTION INTRAMUSCULAR; INTRAVENOUS EVERY 6 HOURS PRN
Status: DISCONTINUED | OUTPATIENT
Start: 2025-02-08 | End: 2025-02-13 | Stop reason: HOSPADM

## 2025-02-08 RX ORDER — POLYETHYLENE GLYCOL 3350 17 G/17G
17 POWDER, FOR SOLUTION ORAL DAILY PRN
Status: DISCONTINUED | OUTPATIENT
Start: 2025-02-08 | End: 2025-02-13 | Stop reason: HOSPADM

## 2025-02-08 RX ADMIN — FUROSEMIDE 80 MG: 10 INJECTION, SOLUTION INTRAMUSCULAR; INTRAVENOUS at 16:23

## 2025-02-08 RX ADMIN — REMDESIVIR 200 MG: 100 INJECTION, POWDER, LYOPHILIZED, FOR SOLUTION INTRAVENOUS at 22:24

## 2025-02-08 RX ADMIN — ACETAMINOPHEN 1000 MG: 500 TABLET, FILM COATED ORAL at 16:23

## 2025-02-08 NOTE — ED NOTES
Nursing report ED to floor  Raina Forrest  79 y.o.  female    HPI :  HPI  Stated Reason for Visit: shortness of breath  History Obtained From: family    Chief Complaint  Chief Complaint   Patient presents with    Shortness of Breath       Admitting doctor:   Nilam Bullock MD    Admitting diagnosis:   The primary encounter diagnosis was Acute dyspnea. Diagnoses of COVID-19 virus infection, Acute on chronic congestive heart failure, unspecified heart failure type, and Acute renal failure superimposed on chronic kidney disease, unspecified acute renal failure type, unspecified CKD stage were also pertinent to this visit.    Code status:   Current Code Status       Date Active Code Status Order ID Comments User Context       Prior            Allergies:   Bupivacaine hcl, Nepafenac, Barium-containing compounds, Duloxetine, Onion, Bextra [valdecoxib], Cortisone, Cymbalta [duloxetine hcl], Iodinated contrast media, Medrol [methylprednisolone], Mesalamine, Polymyxin b-trimethoprim, Rivaroxaban, Tetanus toxoids, and Tetanus-diphtheria toxoids td    Isolation:   Enhanced Droplet/Contact     Intake and Output  No intake or output data in the 24 hours ending 02/08/25 1636    Weight:   There were no vitals filed for this visit.    Most recent vitals:   Vitals:    02/08/25 1531 02/08/25 1547 02/08/25 1609 02/08/25 1623   BP: 110/74   132/81   Pulse: 88 69  85   Resp:       Temp:   100.1 °F (37.8 °C)    TempSrc:   Oral    SpO2: 95% 94%         Active LDAs/IV Access:   Lines, Drains & Airways       Active LDAs       Name Placement date Placement time Site Days    Peripheral IV 02/08/25 1415 Right Antecubital 02/08/25  1415  Antecubital  less than 1                    Labs (abnormal labs have a star):   Labs Reviewed   COMPREHENSIVE METABOLIC PANEL - Abnormal; Notable for the following components:       Result Value    Glucose 107 (*)     Creatinine 1.27 (*)     AST (SGOT) 35 (*)     eGFR 42.6 (*)     All other  components within normal limits    Narrative:     GFR Categories in Chronic Kidney Disease (CKD)      GFR Category          GFR (mL/min/1.73)    Interpretation  G1                     90 or greater         Normal or high (1)  G2                      60-89                Mild decrease (1)  G3a                   45-59                Mild to moderate decrease  G3b                   30-44                Moderate to severe decrease  G4                    15-29                Severe decrease  G5                    14 or less           Kidney failure          (1)In the absence of evidence of kidney disease, neither GFR category G1 or G2 fulfill the criteria for CKD.    eGFR calculation 2021 CKD-EPI creatinine equation, which does not include race as a factor   BNP (IN-HOUSE) - Abnormal; Notable for the following components:    proBNP 4,547.0 (*)     All other components within normal limits    Narrative:     This assay is used as an aid in the diagnosis of individuals suspected of having heart failure. It can be used as an aid in the diagnosis of acute decompensated heart failure (ADHF) in patients presenting with signs and symptoms of ADHF to the emergency department (ED). In addition, NT-proBNP of <300 pg/mL indicates ADHF is not likely.    Age Range Result Interpretation  NT-proBNP Concentration (pg/mL:      <50             Positive            >450                   Gray                 300-450                    Negative             <300    50-75           Positive            >900                  Gray                300-900                  Negative            <300      >75             Positive            >1800                  Gray                300-1800                  Negative            <300   TROPONIN - Abnormal; Notable for the following components:    HS Troponin T 45 (*)     All other components within normal limits    Narrative:     High Sensitive Troponin T Reference Range:  <14.0 ng/L- Negative Female for  "AMI  <22.0 ng/L- Negative Male for AMI  >=14 - Abnormal Female indicating possible myocardial injury.  >=22 - Abnormal Male indicating possible myocardial injury.   Clinicians would have to utilize clinical acumen, EKG, Troponin, and serial changes to determine if it is an Acute Myocardial Infarction or myocardial injury due to an underlying chronic condition.        CBC WITH AUTO DIFFERENTIAL - Abnormal; Notable for the following components:    WBC 11.53 (*)     RBC 3.74 (*)     .6 (*)     MCH 34.8 (*)     Neutrophil % 79.2 (*)     Lymphocyte % 5.3 (*)     Monocyte % 12.1 (*)     Immature Grans % 0.9 (*)     Neutrophils, Absolute 9.14 (*)     Lymphocytes, Absolute 0.61 (*)     Monocytes, Absolute 1.39 (*)     Immature Grans, Absolute 0.10 (*)     All other components within normal limits   LACTIC ACID, PLASMA - Normal   PROCALCITONIN - Normal    Narrative:     As a Marker for Sepsis (Non-Neonates):    1. <0.5 ng/mL represents a low risk of severe sepsis and/or septic shock.  2. >2 ng/mL represents a high risk of severe sepsis and/or septic shock.    As a Marker for Lower Respiratory Tract Infections that require antibiotic therapy:    PCT on Admission    Antibiotic Therapy       6-12 Hrs later    >0.5                Strongly Recommended  >0.25 - <0.5        Recommended   0.1 - 0.25          Discouraged              Remeasure/reassess PCT  <0.1                Strongly Discouraged     Remeasure/reassess PCT    As 28 day mortality risk marker: \"Change in Procalcitonin Result\" (>80% or <=80%) if Day 0 (or Day 1) and Day 4 values are available. Refer to http://www.[email or web address]-pct-calculator.[email or web address]    Change in PCT <=80%  A decrease of PCT levels below or equal to 80% defines a positive change in PCT test result representing a higher risk for 28-day all-cause mortality of patients diagnosed with severe sepsis for septic shock.    Change in PCT >80%  A decrease of PCT levels of more than 80% defines a negative change in " PCT result representing a lower risk for 28-day all-cause mortality of patients diagnosed with severe sepsis or septic shock.      BLOOD CULTURE   BLOOD CULTURE   RAINBOW DRAW    Narrative:     The following orders were created for panel order [place] Draw.  Procedure                               Abnormality         Status                     ---------                               -----------         ------                     Green Top (Gel)[[id number]]                                  Final result               Lavender Top[[id number]]                                     Final result               Gold Top - SST[[id number]]                                   Final result               Light Blue Top[[id number]]                                   Final result                 Please view results for these tests on the individual orders.   HIGH SENSITIVITIY TROPONIN T 1HR   CBC AND DIFFERENTIAL    Narrative:     The following orders were created for panel order CBC & Differential.  Procedure                               Abnormality         Status                     ---------                               -----------         ------                     CBC Auto Differential[[id number]]        Abnormal            Final result                 Please view results for these tests on the individual orders.   GREEN TOP   LAVENDER TOP   GOLD TOP - SST   LIGHT BLUE TOP       EKG:   ECG 12 Lead ED Triage Standing Order; SOA   Final Result   HEART RATE=85  bpm   RR Syiemxhr=327  ms   DE Interval=  ms   P Horizontal Axis=  deg   P Front Axis=  deg   QRSD Interval=85  ms   QT Dyswhmkk=131  ms   DClI=557  ms   QRS Axis=6  deg   T Wave Axis=-14  deg   - ABNORMAL ECG -   Atrial fibrillation   Probable  anterior infarct, age indeterminate   When compared with ECG of 29-Feb-2024 10:47:11,   No significant change   Electronically Signed By: Ilia Paul ([facility]) 2025-02-08 16:15:18   Date and Time of Study:2025-02-08 13:56:58          Meds  given in ED:   Medications   sodium chloride 0.9 % flush 10 mL (has no administration in time range)   furosemide (LASIX) injection 80 mg (80 mg Intravenous Given 2/8/25 1623)   acetaminophen (TYLENOL) tablet 1,000 mg (1,000 mg Oral Given 2/8/25 1623)       Imaging results:  XR Chest 1 View    Result Date: 2/8/2025  Increased apparent size of the cardiac shadow, as described. Slight pulmonary vascular congestion. No focal pulmonary consolidation. Follow-up/further evaluation can be obtained as indicated.  This report was finalized on 2/8/2025 2:10 PM by Dr. Saeid Lopez M.D on Workstation: [email or web address]       Ambulatory status:   - assist Xs 1    Social issues:   Social History     Socioeconomic History    Marital status:    Tobacco Use    Smoking status: Never    Smokeless tobacco: Never   Vaping Use    Vaping status: Never Used   Substance and Sexual Activity    Alcohol use: No    Drug use: No    Sexual activity: Defer     Birth control/protection: Surgical       Peripheral Neurovascular  Peripheral Neurovascular (Adult)  Peripheral Neurovascular WDL: .WDL except, neurovascular assessment lower, pulse assessment  Pulse Assessment: dorsalis pedis  LLE Neurovascular Assessment  Temperature LLE: warm  Color LLE: dulce (old bruise/swelling to left knee from fall in December)  Sensation LLE: no tenderness  RLE Neurovascular Assessment  Temperature RLE: warm  Color RLE: no discoloration  Sensation RLE: no tenderness    Neuro Cognitive  Neuro Cognitive (Adult)  Cognitive/Neuro/Behavioral WDL: .WDL except, level of consciousness, orientation  Level of Consciousness: Alert  Orientation: disoriented to, time, situation    Learning  Learning Assessment  Learning Readiness and Ability: cognitive limitation noted  Education Provided  Person Taught: patient, family member/friend  Teaching Method: verbal instruction  Teaching Focus: symptom/problem overview, diagnostic test, medical device/equipment use  Education  Outcome Evaluation: acceptance expressed    Respiratory  Respiratory WDL  Respiratory WDL: .WDL except, cough, all  Expansion/Accessory Muscles/Retractions: accessory muscle use  Cough Frequency: frequent  Cough Type: loose  Breath Sounds  All Lung Fields Breath Sounds: All Fields  All Lung Fields Breath Sounds: Anterior:, wheezes, expiratory    Abdominal Pain       Pain Assessments  Pain (Adult)  (0-10) Pain Rating: Rest: 0    NIH Stroke Scale       Katia Means RN  02/08/25 16:36 EST

## 2025-02-08 NOTE — ED PROVIDER NOTES
EMERGENCY DEPARTMENT ENCOUNTER  Room Number:  14/14  PCP: Georgiana Cisneros MD  Independent Historians: Patient      HPI:  Chief Complaint: COVID-positive with dyspnea and cough    A complete HPI/ROS/PMH/PSH/SH/FH are unobtainable due to: Dementia    Chronic or social conditions impacting patient care (Social Determinants of Health): None      Context: Raina Forrest is a 79 y.o. female with a medical history of chronic A-fib who is not anticoagulated as she has a Watchman device, hypertension, dementia, pacemaker, CKD and  cardiomyopathy who presents to the ED c/o acute cough and wheezing.  Patient began with symptoms 2 days ago and was seen in urgent care where she was diagnosed with COVID-19.  Because of her renal status, it was not recommended that she take but her son did obtain a prescription for this though he has not started it.  When she had increased coughing and her son noted some audible wheezing, he decided bring her to the emergency department for further evaluation and treatment.  He is concerned because of her past history where she had flu last year, developed pneumonia, and then was admitted for some time for antibiotics.  Patient has no significant plaints for me at this time other than some very mild nausea.  She denies headache, dyspnea at rest, abdominal pain.      Review of prior external notes (non-ED) -and- Review of prior external test results outside of this encounter:  Hospital discharge summary 3/3/2024.  Patient diagnosed with bilateral pneumonia on top of her stage III kidney disease.      PAST MEDICAL HISTORY  Active Ambulatory Problems     Diagnosis Date Noted    Chronic atrial fibrillation 04/07/2016    Benign essential HTN 04/07/2016    Bradycardia 04/07/2016    Chest pain 04/07/2016    Can't get food down 04/07/2016    Accumulation of fluid in tissues 04/07/2016    Esophageal lump 04/07/2016    Adynamia 04/07/2016    Itch of skin 04/07/2016    Anorexia 04/07/2016    Chronic  nausea 04/07/2016    Gastroesophageal reflux disease 04/07/2016    Breath shortness 04/07/2016    Emesis 04/07/2016    Decreased body weight 04/07/2016    Anxiety 04/21/2016    Narrowing of intervertebral disc space 04/21/2016    Diverticulosis of intestine 04/18/2013    Primary hypertension 04/21/2016    Migraine 04/21/2016    Osteoporosis 04/21/2016    Palpitations 10/02/2012    Pituitary adenoma 04/21/2016    Sick sinus syndrome 08/28/2013    Diarrhea 07/21/2016    Cardiomyopathy 12/01/2016    Chronic anticoagulation 12/01/2016    Atrial fibrillation 01/18/2017    Anticoagulated 01/23/2017    Pacemaker 02/22/2017    Infected pacemaker 04/06/2017    Wound infection 04/07/2017    Primary osteoarthritis of left knee 06/16/2020    DJD (degenerative joint disease) 06/16/2020    Ischemic cardiomyopathy 02/10/2023    Acute on chronic congestive heart failure, unspecified heart failure type 06/15/2023    Anemia 06/17/2023    Presence of Watchman left atrial appendage closure device 06/19/2023    Pneumonia 02/28/2024    Pneumonia of both lungs due to infectious organism 02/28/2024    Stage 3a chronic kidney disease 02/28/2024    Influenza A 02/28/2024    Severe malnutrition 02/28/2024    H/O left radical nephrectomy 02/28/2024    Abdominal wall hematoma 02/28/2024    Peptic ulcer disease 02/28/2024    Hypothyroidism (acquired) 02/28/2024     Resolved Ambulatory Problems     Diagnosis Date Noted    Paroxysmal atrial fibrillation 11/15/2016    Cardiac resynchronization therapy pacemaker (CRT-P) in place 03/28/2017    Cellulitis of right leg 06/15/2023    Hypokalemia 02/28/2024    Acute kidney failure 02/28/2024     Past Medical History:   Diagnosis Date    Fung's esophagus     Benign essential hypertension     Blood clot in vein     Chronic gastritis     Congestive heart failure     Degenerative disc disease     Dementia     Depression with anxiety     Disease of thyroid gland     Diverticulitis of colon     Dysphagia      GERD (gastroesophageal reflux disease)     History of chest pain     History of migraine     History of migraine headaches     Left knee pain     Thyroid disorder     Thyroid nodule     Ulcerative colitis          PAST SURGICAL HISTORY  Past Surgical History:   Procedure Laterality Date    APPENDECTOMY      BLADDER SURGERY      CARDIAC CATHETERIZATION N/A 11/21/2016    Procedure: Left Heart Cath;  Surgeon: Robinson Salazar MD;  Location:  [facility];  Service:     CARDIAC ELECTROPHYSIOLOGY PROCEDURE N/A 2/7/2017    Procedure: Pacemaker DC new   [employer];  Surgeon: Robinson Salazar MD;  Location:  [facility];  Service:     CARDIOVERSION  01/18/2017    CHOLECYSTECTOMY      COLONOSCOPY      COLONOSCOPY N/A 7/29/2016    normal    ENDOSCOPY  05/06/2015    submucosal nodule in esophagus, erythematous mucosa in antrum,moderate acute gastritis, no h-pylori    ENDOSCOPY N/A 9/2/2016    Erythematous mucosa in the antrum    EYE SURGERY Left     cataract    HYSTERECTOMY      KNEE MENISCAL REPAIR Left     THYROID SURGERY Left     TONSILLECTOMY      TOTAL KNEE ARTHROPLASTY Left 6/16/2020    Procedure: LEFT TOTAL KNEE ARTHOPLASTY+;  Surgeon: Jamal Castañeda MD;  Location: [facility];  Service: Orthopedics;  Laterality: Left;         FAMILY HISTORY  Family History   Problem Relation Age of Onset    Lung cancer Other     Ulcerative colitis Sister     Heart failure Mother     Heart disease Mother     Heart failure Father     Heart disease Father     Malig Hyperthermia Neg Hx          SOCIAL HISTORY  Social History     Socioeconomic History    Marital status:    Tobacco Use    Smoking status: Never    Smokeless tobacco: Never   Vaping Use    Vaping status: Never Used   Substance and Sexual Activity    Alcohol use: No    Drug use: No    Sexual activity: Defer     Birth control/protection: Surgical         ALLERGIES  Bupivacaine hcl, Nepafenac, Barium-containing compounds,  Duloxetine, Onion, Bextra [valdecoxib], Cortisone, Cymbalta [duloxetine hcl], Iodinated contrast media, Medrol [methylprednisolone], Mesalamine, Polymyxin b-trimethoprim, Rivaroxaban, Tetanus toxoids, and Tetanus-diphtheria toxoids td      REVIEW OF SYSTEMS  Review of Systems  Included in HPI  All systems reviewed and negative except for those discussed in HPI.      PHYSICAL EXAM    I have reviewed the triage vital signs and nursing notes.    ED Triage Vitals   Temp Pulse Resp BP SpO2   02/08/25 1329 -- 02/08/25 1332 02/08/25 1332 --   97.7 °F (36.5 °C)  20 135/87       Temp src Heart Rate Source Patient Position BP Location FiO2 (%)   02/08/25 1329 -- -- -- --   Tympanic           Physical Exam    Physical Exam   Constitutional: No distress.  Nontoxic pleasant and talkative.  Speaks in full sentences.  HENT:  Head: Normocephalic and atraumatic.   Oropharynx: Mucous membranes are moist.   Eyes: . No scleral icterus. No conjunctival pallor.  Neck: Normal range of motion. Neck supple.   Cardiovascular: Pink warm and well perfused throughout.  Irregularly irregular but rate controlled  Pulmonary/Chest: No respiratory distress.  No tachypnea or increased work of breathing appreciated.  Slightly diminished bilaterally with no rhonchi noted.  Abdominal: Soft. There is no tenderness. There is no rebound and no guarding. Benign   musculoskeletal: Moves all extremities equally.  No calf tenderness or swelling  Neurological: Alert and oriented.  No acute focal deficit appreciated.  Skin: Skin is pink, warm, and dry.   Psychiatric: Mood and affect normal.   Nursing note and vitals reviewed.             LAB RESULTS  Recent Results (from the past 24 hours)   ECG 12 Lead ED Triage Standing Order; SOA    Collection Time: 02/08/25  1:56 PM   Result Value Ref Range    QT Interval 375 ms    QTC Interval 446 ms   Comprehensive Metabolic Panel    Collection Time: 02/08/25  2:15 PM    Specimen: Blood   Result Value Ref Range    Glucose  107 (H) 65 - 99 mg/dL    BUN 15 8 - 23 mg/dL    Creatinine 1.27 (H) 0.57 - 1.00 mg/dL    Sodium 136 136 - 145 mmol/L    Potassium 4.4 3.5 - 5.2 mmol/L    Chloride 101 98 - 107 mmol/L    CO2 24.0 22.0 - 29.0 mmol/L    Calcium 9.4 8.6 - 10.5 mg/dL    Total Protein 7.0 6.0 - 8.5 g/dL    Albumin 4.3 3.5 - 5.2 g/dL    ALT (SGPT) 16 1 - 33 U/L    AST (SGOT) 35 (H) 1 - 32 U/L    Alkaline Phosphatase 83 39 - 117 U/L    Total Bilirubin 0.8 0.0 - 1.2 mg/dL    Globulin 2.7 gm/dL    A/G Ratio 1.6 g/dL    BUN/Creatinine Ratio 11.8 7.0 - 25.0    Anion Gap 11.0 5.0 - 15.0 mmol/L    eGFR 42.6 (L) >60.0 mL/min/1.73   BNP    Collection Time: 02/08/25  2:15 PM    Specimen: Blood   Result Value Ref Range    proBNP 4,547.0 (H) 0.0 - 1,800.0 pg/mL   High Sensitivity Troponin T    Collection Time: 02/08/25  2:15 PM    Specimen: Blood   Result Value Ref Range    HS Troponin T 45 (H) <14 ng/L   Green Top (Gel)    Collection Time: 02/08/25  2:15 PM   Result Value Ref Range    Extra Tube Hold for add-ons.    Lavender Top    Collection Time: 02/08/25  2:15 PM   Result Value Ref Range    Extra Tube hold for add-on    Gold Top - SST    Collection Time: 02/08/25  2:15 PM   Result Value Ref Range    Extra Tube Hold for add-ons.    Light Blue Top    Collection Time: 02/08/25  2:15 PM   Result Value Ref Range    Extra Tube Hold for add-ons.    CBC Auto Differential    Collection Time: 02/08/25  2:15 PM    Specimen: Blood   Result Value Ref Range    WBC 11.53 (H) 3.40 - 10.80 10*3/mm3    RBC 3.74 (L) 3.77 - 5.28 10*6/mm3    Hemoglobin 13.0 12.0 - 15.9 g/dL    Hematocrit 38.0 34.0 - 46.6 %    .6 (H) 79.0 - 97.0 fL    MCH 34.8 (H) 26.6 - 33.0 pg    MCHC 34.2 31.5 - 35.7 g/dL    RDW 13.9 12.3 - 15.4 %    RDW-SD 52.1 37.0 - 54.0 fl    MPV 11.6 6.0 - 12.0 fL    Platelets 177 140 - 450 10*3/mm3    Neutrophil % 79.2 (H) 42.7 - 76.0 %    Lymphocyte % 5.3 (L) 19.6 - 45.3 %    Monocyte % 12.1 (H) 5.0 - 12.0 %    Eosinophil % 1.9 0.3 - 6.2 %    Basophil  % 0.6 0.0 - 1.5 %    Immature Grans % 0.9 (H) 0.0 - 0.5 %    Neutrophils, Absolute 9.14 (H) 1.70 - 7.00 10*3/mm3    Lymphocytes, Absolute 0.61 (L) 0.70 - 3.10 10*3/mm3    Monocytes, Absolute 1.39 (H) 0.10 - 0.90 10*3/mm3    Eosinophils, Absolute 0.22 0.00 - 0.40 10*3/mm3    Basophils, Absolute 0.07 0.00 - 0.20 10*3/mm3    Immature Grans, Absolute 0.10 (H) 0.00 - 0.05 10*3/mm3    nRBC 0.0 0.0 - 0.2 /100 WBC   Lactic Acid, Plasma    Collection Time: 02/08/25  2:15 PM    Specimen: Blood   Result Value Ref Range    Lactate 2.0 0.5 - 2.0 mmol/L   Procalcitonin    Collection Time: 02/08/25  2:15 PM    Specimen: Blood   Result Value Ref Range    Procalcitonin 0.09 0.00 - 0.25 ng/mL         RADIOLOGY  XR Chest 1 View    Result Date: 2/8/2025  XR CHEST 1 VW-  HISTORY: Female who is 79 years-old, short of breath  TECHNIQUE: Frontal view of the chest  COMPARISON: 2/28/2024  FINDINGS: The heart is enlarged, and appears increased from the prior exam, that could reflect increased cardiomegaly and/or pericardial effusion. Left-sided pacemaker and cardiac leads are seen. Pulmonary vasculature appears slightly congested. No focal pulmonary consolidation, pleural effusion, or pneumothorax. No acute osseous process.      Increased apparent size of the cardiac shadow, as described. Slight pulmonary vascular congestion. No focal pulmonary consolidation. Follow-up/further evaluation can be obtained as indicated.  This report was finalized on 2/8/2025 2:10 PM by Dr. Saeid Lopez M.D on Workstation: [id number]         MEDICATIONS GIVEN IN ER  Medications   sodium chloride 0.9 % flush 10 mL (has no administration in time range)   furosemide (LASIX) injection 80 mg (80 mg Intravenous Given 2/8/25 1623)   acetaminophen (TYLENOL) tablet 1,000 mg (1,000 mg Oral Given 2/8/25 1623)         ORDERS PLACED DURING THIS VISIT:  Orders Placed This Encounter   Procedures    Blood Culture - Blood,    Blood Culture - Blood,    XR Chest 1 View     [place] Draw    Comprehensive Metabolic Panel    BNP    High Sensitivity Troponin T    CBC Auto Differential    Lactic Acid, Plasma    Procalcitonin    High Sensitivity Troponin T 1Hr    NPO Diet NPO Type: Strict NPO    Undress & Gown    Continuous Pulse Oximetry    Vital Signs    LHA (on-call MD unless specified) Details    Cardiology (on-call MD unless specified)    Nephrology (on -call MD unless specified)    Oxygen Therapy- Nasal Cannula; Titrate 1-6 LPM Per SpO2; 90 - 95%    ECG 12 Lead ED Triage Standing Order; SOA    Insert Peripheral IV    Initiate Observation Status    CBC & Differential    Green Top (Gel)    Lavender Top    Gold Top - SST    Light Blue Top         OUTPATIENT MEDICATION MANAGEMENT:  Current Facility-Administered Medications Ordered in Epic   Medication Dose Route Frequency Provider Last Rate Last Admin    sodium chloride 0.9 % flush 10 mL  10 mL Intravenous PRN Logan Fox MD         Current Outpatient Medications Ordered in Epic   Medication Sig Dispense Refill    albuterol sulfate  (90 Base) MCG/ACT inhaler Inhale 2 puffs Every 4 (Four) Hours As Needed for Wheezing. 6.7 g 0    Amoxicill-Rifabutin-Omeprazole (Talicia) 250-12.5-10 MG capsule delayed-release Take 4 capsules by mouth 3 times a day.      aspirin 81 MG chewable tablet Chew 1 tablet Daily. Indications: Cancer of the Colon, Disease involving Lipid Deposits in the Arteries, Kawasaki Syndrome      bisoprolol (ZEBeta) 10 MG tablet TAKE ONE TABLET BY MOUTH DAILY 30 tablet 8    Calcium Carb-Cholecalciferol (Oyster Shell Calcium w/D) 500-5 MG-MCG tablet Take 1 tablet by mouth Every Evening. Indications: Low Amount of Calcium in the Blood      donepezil (ARICEPT) 10 MG tablet Take 1 tablet by mouth Daily. Indications: Alzheimer's Disease      levothyroxine (SYNTHROID, LEVOTHROID) 25 MCG tablet Take 1 tablet by mouth Every Morning. 30 tablet 11    memantine (NAMENDA) 10 MG tablet Take 1 tablet by mouth 2 (Two) Times a Day.       Multiple Vitamins-Minerals (Womens 50+ Multi Vitamin) tablet Take 1 tablet by mouth Daily. Indications: Vitamin and/or Mineral Deficiency      pantoprazole (PROTONIX) 40 MG EC tablet Take 20 mg by mouth Daily. Indications: Gastroesophageal Reflux Disease, Heartburn      sertraline (ZOLOFT) 100 MG tablet Take 1.5 tablets by mouth Every Morning. Indications: Generalized Anxiety Disorder      sucralfate (CARAFATE) 1 g tablet Take 1 tablet by mouth 2 (Two) Times a Day.      torsemide (DEMADEX) 20 MG tablet Take 0.5 tablets by mouth Daily. Indications: Cardiac Failure, Edema           PROCEDURES  Procedures            PROGRESS, DATA ANALYSIS, CONSULTS, AND MEDICAL DECISION MAKING  All labs have been independently interpreted by me.  All radiology studies have been reviewed by me. All EKGs have been independently viewed and interpreted by me.  Discussion below represents my analysis of pertinent findings related to patient's condition, differential diagnosis, treatment plan and final disposition.    Differential diagnosis:   My differential diagnosis for cough includes but is not limited to:  Upper respiratory infection, bronchitis, pneumonia, COPD exacerbation, cough variant asthma, cardiac asthma, coronary artery disease, congestive heart failure, bacterial, viral or lung infections, lung cancer, aspiration pneumonitis, aspiration of foreign body and Covid -19        Clinical Scores:                  ED Course as of 02/08/25 1633   Sat Feb 08, 2025   1408 EKG           EKG time: 1356  Rhythm/Rate: A-fib, 85  P waves and IA: N/A  QRS, axis: Narrow complex  ST and T waves: No STEMI    Interpreted Contemporaneously by me, independently viewed  Comparison: 2/29/2024-previously   [RS]   1416 RADIOLOGY      Study: Single view chest  Findings: Cardiomegaly without focal infiltrate or significant pulmonary edema  I independently viewed and interpreted these images contemporaneously with treatment.    [RS]   1500 WBC(!): 11.53  [RS]   1500 Hemoglobin: 13.0 [RS]   1500 Platelets: 177 [RS]   1500 Immature Grans, Absolute(!): 0.10 [RS]   1554 proBNP(!): 4,547.0  Trending up.  Patient did have a recent decrease in her torsemide where they cut it from 20 down to 10 due to renal function. [RS]   1554 HS Troponin T(!): 45 [RS]   1554 Lactate: 2.0 [RS]   1554 Procalcitonin: 0.09 [RS]   1554 Glucose(!): 107 [RS]   1554 BUN: 15 [RS]   1554 Creatinine(!): 1.27  Slightly increased. [RS]   1554 Sodium: 136 [RS]   1554 Potassium: 4.4 [RS]   1612 COVID infection the patient appears to have little bit of AMBROCIO and some congestive heart failure.  She takes torsemide at home though this dose has recently been decreased.  Will give her a dose of IV furosemide here 80 mg x 1 with plan for admission overnight tonight for close observation and evaluation and treatment by internal medicine, cardiology and nephrology.  Patient and son both agreeable with this plan. [RS]   1613 HS Troponin T(!): 45  Appears chronic and stable [RS]   1624 CONSULT        Provider: Nephrology    Discussion: Reviewed patient history, ED presentation evaluation.  Agreeable with plan.  Agreeable to consult.    Agreeable c treatment and planned disposition.         [RS]   1633 CONSULT        Provider: [facility]    Discussion: Reviewed outpatient history, ED presentation evaluation as well as consult with nephrology and pending consult with cardiology.  Agreeable to accept patient for observation admission with telemetry for further evaluation and treatment but not felt to be a good 5 S. patient.    Agreeable c treatment and planned disposition.         [RS]      ED Course User Index  [RS] Logan Fox MD         Prescription drug monitoring program review:     AS OF 16:33 EST VITALS:    BP - 132/81  HR - 85  TEMP - 100.1 °F (37.8 °C) (Oral)  O2 SATS - 94%    COMPLEXITY OF CARE  The patient requires admission.      DIAGNOSIS  Final diagnoses:   Acute dyspnea   COVID-19 virus  infection   Acute on chronic congestive heart failure, unspecified heart failure type   Acute renal failure superimposed on chronic kidney disease, unspecified acute renal failure type, unspecified CKD stage         DISPOSITION  ED Disposition       ED Disposition   Decision to Admit    Condition   --    Comment   Level of Care: Telemetry [5]   Diagnosis: Acute exacerbation of CHF (congestive heart failure) [436399]   Admitting Physician: MARLEY DAVALOS [[id number]]   Is patient appropriate for Inpatient Observation Unit?: No [0]                    ADMISSION    Discussed treatment plan and reason for admission with pt/family and admitting physician.  Pt/family voiced understanding of the plan for admission for further testing/treatment as needed.       Please note that portions of this document were completed with a voice recognition program.    Note Disclaimer: At [facility], we believe that sharing information builds trust and better relationships. You are receiving this note because you recently visited [facility]. It is possible you will see health information before a provider has talked with you about it. This kind of information can be easy to misunderstand. To help you fully understand what it means for your health, we urge you to discuss this note with your provider.         Logan Fox MD  02/08/25 [id number]

## 2025-02-08 NOTE — ED TRIAGE NOTES
Pt to ED with son with c/o shortness of breath. Recent dx of COVID and was prescribed paxlovid but son reluctant to have pt start medication due to medical history.

## 2025-02-09 ENCOUNTER — APPOINTMENT (OUTPATIENT)
Dept: GENERAL RADIOLOGY | Facility: HOSPITAL | Age: 80
DRG: 291 | End: 2025-02-09
Payer: MEDICARE

## 2025-02-09 ENCOUNTER — APPOINTMENT (OUTPATIENT)
Dept: CARDIOLOGY | Facility: HOSPITAL | Age: 80
DRG: 291 | End: 2025-02-09
Payer: MEDICARE

## 2025-02-09 LAB
ALBUMIN SERPL-MCNC: 4 G/DL (ref 3.5–5.2)
ALBUMIN/GLOB SERPL: 1.4 G/DL
ALP SERPL-CCNC: 83 U/L (ref 39–117)
ALT SERPL W P-5'-P-CCNC: 14 U/L (ref 1–33)
ANION GAP SERPL CALCULATED.3IONS-SCNC: 13 MMOL/L (ref 5–15)
AST SERPL-CCNC: 31 U/L (ref 1–32)
BILIRUB SERPL-MCNC: 0.6 MG/DL (ref 0–1.2)
BUN SERPL-MCNC: 17 MG/DL (ref 8–23)
BUN/CREAT SERPL: 14.7 (ref 7–25)
CALCIUM SPEC-SCNC: 9.3 MG/DL (ref 8.6–10.5)
CHLORIDE SERPL-SCNC: 99 MMOL/L (ref 98–107)
CO2 SERPL-SCNC: 24 MMOL/L (ref 22–29)
CREAT SERPL-MCNC: 1.16 MG/DL (ref 0.57–1)
DEPRECATED RDW RBC AUTO: 49.5 FL (ref 37–54)
EGFRCR SERPLBLD CKD-EPI 2021: 47.5 ML/MIN/1.73
ERYTHROCYTE [DISTWIDTH] IN BLOOD BY AUTOMATED COUNT: 13.8 % (ref 12.3–15.4)
GLOBULIN UR ELPH-MCNC: 2.8 GM/DL
GLUCOSE SERPL-MCNC: 111 MG/DL (ref 65–99)
HCT VFR BLD AUTO: 37 % (ref 34–46.6)
HGB BLD-MCNC: 12.9 G/DL (ref 12–15.9)
MCH RBC QN AUTO: 34.2 PG (ref 26.6–33)
MCHC RBC AUTO-ENTMCNC: 34.9 G/DL (ref 31.5–35.7)
MCV RBC AUTO: 98.1 FL (ref 79–97)
PLATELET # BLD AUTO: 171 10*3/MM3 (ref 140–450)
PMV BLD AUTO: 11.6 FL (ref 6–12)
POTASSIUM SERPL-SCNC: 4.1 MMOL/L (ref 3.5–5.2)
PROT SERPL-MCNC: 6.8 G/DL (ref 6–8.5)
RBC # BLD AUTO: 3.77 10*6/MM3 (ref 3.77–5.28)
SODIUM SERPL-SCNC: 136 MMOL/L (ref 136–145)
T3FREE SERPL-MCNC: 2.79 PG/ML (ref 2–4.4)
T4 FREE SERPL-MCNC: 1.25 NG/DL (ref 0.92–1.68)
TSH SERPL DL<=0.05 MIU/L-ACNC: 4.85 UIU/ML (ref 0.27–4.2)
WBC NRBC COR # BLD AUTO: 11.83 10*3/MM3 (ref 3.4–10.8)

## 2025-02-09 PROCEDURE — 85027 COMPLETE CBC AUTOMATED: CPT | Performed by: INTERNAL MEDICINE

## 2025-02-09 PROCEDURE — 84439 ASSAY OF FREE THYROXINE: CPT | Performed by: STUDENT IN AN ORGANIZED HEALTH CARE EDUCATION/TRAINING PROGRAM

## 2025-02-09 PROCEDURE — 25010000002 REMDESIVIR 100 MG/20ML SOLUTION 1 EACH VIAL: Performed by: INTERNAL MEDICINE

## 2025-02-09 PROCEDURE — 94799 UNLISTED PULMONARY SVC/PX: CPT

## 2025-02-09 PROCEDURE — 99214 OFFICE O/P EST MOD 30 MIN: CPT

## 2025-02-09 PROCEDURE — 84443 ASSAY THYROID STIM HORMONE: CPT | Performed by: STUDENT IN AN ORGANIZED HEALTH CARE EDUCATION/TRAINING PROGRAM

## 2025-02-09 PROCEDURE — 25010000002 FUROSEMIDE PER 20 MG: Performed by: STUDENT IN AN ORGANIZED HEALTH CARE EDUCATION/TRAINING PROGRAM

## 2025-02-09 PROCEDURE — 36415 COLL VENOUS BLD VENIPUNCTURE: CPT | Performed by: INTERNAL MEDICINE

## 2025-02-09 PROCEDURE — 93306 TTE W/DOPPLER COMPLETE: CPT | Performed by: INTERNAL MEDICINE

## 2025-02-09 PROCEDURE — 73562 X-RAY EXAM OF KNEE 3: CPT

## 2025-02-09 PROCEDURE — 25510000001 PERFLUTREN 6.52 MG/ML SUSPENSION 2 ML VIAL: Performed by: STUDENT IN AN ORGANIZED HEALTH CARE EDUCATION/TRAINING PROGRAM

## 2025-02-09 PROCEDURE — 93306 TTE W/DOPPLER COMPLETE: CPT

## 2025-02-09 PROCEDURE — 80053 COMPREHEN METABOLIC PANEL: CPT | Performed by: INTERNAL MEDICINE

## 2025-02-09 PROCEDURE — 25810000003 SODIUM CHLORIDE 0.9 % SOLUTION 250 ML FLEX CONT: Performed by: INTERNAL MEDICINE

## 2025-02-09 PROCEDURE — XW033E5 INTRODUCTION OF REMDESIVIR ANTI-INFECTIVE INTO PERIPHERAL VEIN, PERCUTANEOUS APPROACH, NEW TECHNOLOGY GROUP 5: ICD-10-PCS | Performed by: STUDENT IN AN ORGANIZED HEALTH CARE EDUCATION/TRAINING PROGRAM

## 2025-02-09 PROCEDURE — 84481 FREE ASSAY (FT-3): CPT | Performed by: STUDENT IN AN ORGANIZED HEALTH CARE EDUCATION/TRAINING PROGRAM

## 2025-02-09 PROCEDURE — 97162 PT EVAL MOD COMPLEX 30 MIN: CPT

## 2025-02-09 PROCEDURE — 97530 THERAPEUTIC ACTIVITIES: CPT

## 2025-02-09 PROCEDURE — 94640 AIRWAY INHALATION TREATMENT: CPT

## 2025-02-09 RX ORDER — ALBUTEROL SULFATE 0.83 MG/ML
2.5 SOLUTION RESPIRATORY (INHALATION) EVERY 6 HOURS PRN
Status: DISCONTINUED | OUTPATIENT
Start: 2025-02-09 | End: 2025-02-11

## 2025-02-09 RX ORDER — PANTOPRAZOLE SODIUM 40 MG/1
40 TABLET, DELAYED RELEASE ORAL
Status: DISCONTINUED | OUTPATIENT
Start: 2025-02-09 | End: 2025-02-13 | Stop reason: HOSPADM

## 2025-02-09 RX ORDER — MEMANTINE HYDROCHLORIDE 10 MG/1
10 TABLET ORAL EVERY 12 HOURS SCHEDULED
Status: DISCONTINUED | OUTPATIENT
Start: 2025-02-09 | End: 2025-02-13 | Stop reason: HOSPADM

## 2025-02-09 RX ORDER — FUROSEMIDE 10 MG/ML
20 INJECTION INTRAMUSCULAR; INTRAVENOUS EVERY 12 HOURS
Status: DISCONTINUED | OUTPATIENT
Start: 2025-02-09 | End: 2025-02-10

## 2025-02-09 RX ORDER — LEVOTHYROXINE SODIUM 50 UG/1
25 TABLET ORAL
Status: DISCONTINUED | OUTPATIENT
Start: 2025-02-09 | End: 2025-02-13 | Stop reason: HOSPADM

## 2025-02-09 RX ORDER — ASPIRIN 81 MG/1
81 TABLET, CHEWABLE ORAL DAILY
Status: DISCONTINUED | OUTPATIENT
Start: 2025-02-09 | End: 2025-02-13 | Stop reason: HOSPADM

## 2025-02-09 RX ORDER — SUCRALFATE 1 G/1
1 TABLET ORAL 2 TIMES DAILY
Status: DISCONTINUED | OUTPATIENT
Start: 2025-02-09 | End: 2025-02-13 | Stop reason: HOSPADM

## 2025-02-09 RX ORDER — DONEPEZIL HYDROCHLORIDE 10 MG/1
10 TABLET, FILM COATED ORAL DAILY
Status: DISCONTINUED | OUTPATIENT
Start: 2025-02-09 | End: 2025-02-13 | Stop reason: HOSPADM

## 2025-02-09 RX ORDER — BISOPROLOL FUMARATE 5 MG/1
10 TABLET, FILM COATED ORAL DAILY
Status: DISCONTINUED | OUTPATIENT
Start: 2025-02-09 | End: 2025-02-13 | Stop reason: HOSPADM

## 2025-02-09 RX ADMIN — SENNOSIDES AND DOCUSATE SODIUM 2 TABLET: 50; 8.6 TABLET ORAL at 09:51

## 2025-02-09 RX ADMIN — PERFLUTREN 2 ML: 6.52 INJECTION, SUSPENSION INTRAVENOUS at 14:25

## 2025-02-09 RX ADMIN — PANTOPRAZOLE SODIUM 40 MG: 40 TABLET, DELAYED RELEASE ORAL at 06:51

## 2025-02-09 RX ADMIN — DONEPEZIL HYDROCHLORIDE 10 MG: 10 TABLET, FILM COATED ORAL at 09:51

## 2025-02-09 RX ADMIN — ALBUTEROL SULFATE 2.5 MG: 2.5 SOLUTION RESPIRATORY (INHALATION) at 16:58

## 2025-02-09 RX ADMIN — BISOPROLOL FUMARATE 10 MG: 5 TABLET ORAL at 12:26

## 2025-02-09 RX ADMIN — FUROSEMIDE 20 MG: 10 INJECTION, SOLUTION INTRAMUSCULAR; INTRAVENOUS at 19:04

## 2025-02-09 RX ADMIN — LEVOTHYROXINE SODIUM 25 MCG: 50 TABLET ORAL at 06:51

## 2025-02-09 RX ADMIN — REMDESIVIR 100 MG: 100 INJECTION, POWDER, LYOPHILIZED, FOR SOLUTION INTRAVENOUS at 19:04

## 2025-02-09 RX ADMIN — MEMANTINE 10 MG: 10 TABLET ORAL at 20:09

## 2025-02-09 RX ADMIN — ASPIRIN 81 MG CHEWABLE TABLET 81 MG: 81 TABLET CHEWABLE at 09:50

## 2025-02-09 RX ADMIN — MEMANTINE 10 MG: 10 TABLET ORAL at 09:51

## 2025-02-09 RX ADMIN — SERTRALINE HYDROCHLORIDE 150 MG: 50 TABLET, FILM COATED ORAL at 06:51

## 2025-02-09 RX ADMIN — FUROSEMIDE 20 MG: 10 INJECTION, SOLUTION INTRAMUSCULAR; INTRAVENOUS at 06:51

## 2025-02-09 NOTE — PLAN OF CARE
Goal Outcome Evaluation:  Plan of Care Reviewed With: patient      Progress: improving    Outcome Evaluation: Pt disoriented to time and situation with son at bedside overnight. Pt in isolation for Covid. Remdesivir given per order.  Afib on the monitor with HR in the 70s. VSS on room air. No complaints of chest pain or SOA. Will continue to monitor and update as needed.

## 2025-02-09 NOTE — PLAN OF CARE
Goal Outcome Evaluation:  Plan of Care Reviewed With: patient      Patient is a 79 y.o. female admitted to [facility] on 2/8/2025 for COVID-19. PMHx includes a fib, dementia, CHF. Patient is (I) at baseline and lives with her ; son close by and assists with IADL's. Pt uses a RW occasionally in the house for mobility. Today, patient performed bed mobility with SBA, required Lacie for transfers, and ambulated 15' with RW and Lacie. Strength, balance, and endurance deficits noted. Patient may benefit from skilled PT services acutely to address functional deficits as well as improve level of independence prior to discharge. Anticipate home with assist upon DC.        Anticipated Discharge Disposition (PT): home with 24/7 care, home with home health

## 2025-02-09 NOTE — PROGRESS NOTES
[facility]  Clinical Pharmacy Department     Remdesivir Review Note  Does patient qualify for nirmatrelvir/ritonavir (Paxlovid)?: yes but family declining therapy, accepting of remdes    If no, please check one of the following rationale(s):   [] Patient is outside 5 days from symptom onset window   [] Patient has a drug-drug interaction that cannot be managed while admitted (I.e. can't dose adjust or hold during Paxlovid therapy)  [] Patient qualifies for a 5-day treatment course of Remdesivir (requires supplemental oxygen or SpO2 <= 94% on room air)    Raina Forrest is a 79 y.o. female with confirmed COVID-19 infection on day 1 of hospitalization.      Consulting Provider:  STingl   Date of Confirmed SARS-CoV-2:   Date of Symptom Onset: 2/5  Other Antimicrobials: no      Allergies  Allergies as of 02/08/2025 - Reviewed 02/08/2025   Allergen Reaction Noted    Bupivacaine hcl Anaphylaxis 04/07/2016    Nepafenac Itching 07/29/2016    Barium-containing compounds Other (See Comments) 06/18/2012    Duloxetine Other (See Comments) 07/18/2024    Onion Other (See Comments) 07/18/2024    Bextra [valdecoxib] Itching 02/29/2016    Cortisone Hives 06/11/2020    Cymbalta [duloxetine hcl] Itching 04/09/2019    Iodinated contrast media Unknown - Low Severity 07/29/2016    Medrol [methylprednisolone] Unknown - Low Severity 02/29/2016    Mesalamine Unknown - Low Severity 11/15/2016    Polymyxin b-trimethoprim Unknown - Low Severity 12/01/2016    Rivaroxaban Unknown - Low Severity 02/29/2016    Tetanus toxoids Swelling 04/07/2016    Tetanus-diphtheria toxoids td Swelling 02/29/2016       Vitals/Labs/I&O  Visit Vitals  /59   Pulse 78   Temp 98.3 °F (36.8 °C) (Oral)   Resp 20   SpO2 94%     Results from last 7 days   Lab Units 02/08/25  1415   WBC 10*3/mm3 11.53*   PROCALCITONIN ng/mL 0.09   AST (SGOT) U/L 35*   ALT (SGPT) U/L 16   BUN mg/dL 15   CREATININE mg/dL 1.27*     CrCl cannot be calculated (Unknown ideal  "weight.).    Assessment/Plan:    Patient meets the following inclusion/exclusion criteria:  Inclusion: Must have both \"A\" and \"B\" or both \"A\" and \"C\" below  to be considered for treatment.  A. Confirmed COVID-19 infection (with accompanying symptoms).  B. Requiring supplemental oxygen OR an increase in baseline oxygen requirements OR SpO2 < or = 94% on room air all secondary to COVID-19 infection AND within 10 days of symptom onset.  C. Symptomatic (but not requiring supplemental oxygen or increase in baseline secondary to COVID-19) with at least one of the high risk criteria (see COVID-19 Treatment Guideline) putting the patient at high risk for progression to severe illness AND within 7 days of symptom onset.  High-Risk criteria:  Age >= 65 years (risk increases with each decade after age 50) AND > 6 months since last COVID-19 vaccine    OR   ? Moderate to severe immunocompromising conditions or receipt of immunosuppressive medications (see included list below)   Are receiving active treatment for solid tumor and hematologic malignancies.   Have hematologic malignancies (e.g., chronic lymphocytic lymphoma, non-Hodgkin lymphoma, multiple myeloma, acute leukemia) and are known to have poor responses to COVID-19 vaccines, regardless of the treatment status for the hematologic malignancy.   Received a solid-organ or islet transplant and are receiving immunosuppressive therapy.   Received chimeric antigen receptor T cell (CAR T-cell) therapy or a hematopoietic cell transplant (HCT) and are within 2 years of transplantation or are receiving immunosuppressive therapy.   Have a moderate or severe primary immunodeficiency (e.g., severe combined immunodeficiency, DiGeorge syndrome, Wiskott-Carmen syndrome, common variable immunodeficiency disease).   Have advanced or untreated HIV infection (defined as people with HIV and CD4 T lymphocyte cell counts <200 cells/mm3, a history of an AIDS-defining illness without immune " reconstitution, or clinical manifestations of symptomatic HIV).   Are receiving active treatment with high-dose corticosteroids (i.e., >=20 mg prednisone or equivalent per day for >=2 weeks), alkylating agents, antimetabolites, transplant-related immunosuppressive drugs, cancer chemotherapeutic agents classified as severely immunosuppressive, or immunosuppressive or immunomodulatory biologic agents (e.g., B cell-depleting agents).      Exclusions:  A. Consider discontinuation if ALT > 10 times ULN; discontinuation should occur with ALT elevation plus signs/symptoms of liver inflammation.  B. Concomitant administration of hydroxychloroquine or chloroquine due to antagonistic effect; discontinue prior to remdesivir use.  C. Symptom onset > 10 days: will be excluded (only considered on a rare case by case basis); if patient is > 6 days but <= 10 days, will also be considered on a per patient basis.  D. Requiring or progression to invasive mechanical ventilation or ECMO.    Remdesivir 200 mg IV x 1 dose, followed by 100 mg IV q24h for 2 days or until hospital discharge (whichever comes first) has been ordered.    Thank you for involving pharmacy in this patient's care. Please contact pharmacy with any questions or concerns.                           Tara Brink [facility]  Clinical Pharmacist

## 2025-02-09 NOTE — H&P
HISTORY AND PHYSICAL   [facility]        Date of Admission: 2025  Patient Identification:  Name: Raina Forrest  Age: 79 y.o.  Sex: female  :  1945  MRN: [id number]                     Primary Care Physician: Georgiana Cisneros MD    Chief Complaint:  79 year old female presented to the emergency room with cough, wheezing and shortness of breath; she was seen by urgent care and tested positive for covid; no antiviral was given due to ckd; the patient is not able to give a good history due to dementia; a son is present with her    History of Present Illness:   As above    Past Medical History:  Past Medical History:   Diagnosis Date    Atrial fibrillation     Fung's esophagus     Benign essential hypertension     Blood clot in vein     Cardiomyopathy     Chronic gastritis     Congestive heart failure     Degenerative disc disease     Dementia     Pt  states she has slight dementia    Depression with anxiety     Disease of thyroid gland     Diverticulitis of colon     Dysphagia     GERD (gastroesophageal reflux disease)     History of chest pain     History of migraine     History of migraine headaches     Left knee pain     Osteoporosis     Palpitations     Thyroid disorder     Thyroid nodule     Ulcerative colitis      Past Surgical History:  Past Surgical History:   Procedure Laterality Date    APPENDECTOMY      BLADDER SURGERY      CARDIAC CATHETERIZATION N/A 2016    Procedure: Left Heart Cath;  Surgeon: Robinson Salazar MD;  Location:  [facility];  Service:     CARDIAC ELECTROPHYSIOLOGY PROCEDURE N/A 2017    Procedure: Pacemaker DC new   [employer];  Surgeon: Robinson Salazar MD;  Location:  [facility];  Service:     CARDIOVERSION  2017    CHOLECYSTECTOMY      COLONOSCOPY      COLONOSCOPY N/A 2016    normal    ENDOSCOPY  2015    submucosal nodule in esophagus, erythematous mucosa in antrum,moderate  acute gastritis, no h-pylori    ENDOSCOPY N/A 9/2/2016    Erythematous mucosa in the antrum    EYE SURGERY Left     cataract    HYSTERECTOMY      KNEE MENISCAL REPAIR Left     THYROID SURGERY Left     TONSILLECTOMY      TOTAL KNEE ARTHROPLASTY Left 6/16/2020    Procedure: LEFT TOTAL KNEE ARTHOPLASTY+;  Surgeon: Jamal Castañeda MD;  Location: [facility] MAIN OR;  Service: Orthopedics;  Laterality: Left;      Home Meds:  (Not in a hospital admission)      Allergies:  Allergies   Allergen Reactions    Bupivacaine Hcl Anaphylaxis    Nepafenac Itching     Eye Drops    Barium-Containing Compounds Other (See Comments)     CONTRAST CAUSES DIARRHEA    Duloxetine Other (See Comments)    Onion Other (See Comments)    Bextra [Valdecoxib] Itching    Cortisone Hives    Cymbalta [Duloxetine Hcl] Itching    Iodinated Contrast Media Unknown - Low Severity     Must be premedicated before use.  See [place] notes in care evrywhere    Medrol [Methylprednisolone] Unknown - Low Severity    Mesalamine Unknown - Low Severity    Polymyxin B-Trimethoprim Unknown - Low Severity    Rivaroxaban Unknown - Low Severity     REACTION UNKNOWN    Tetanus Toxoids Swelling    Tetanus-Diphtheria Toxoids Td Swelling     Immunizations:  Immunization History   Administered Date(s) Administered    COVID-19 (MODERNA) 1st,2nd,3rd Dose Monovalent 01/20/2021, 02/20/2021    COVID-19 (MODERNA) Monovalent Original Booster 11/13/2021    COVID-19 (PFIZER) BIVALENT 12+YRS 10/04/2022    Flu Vaccine Quad PF >36MO 01/21/2017    Fluzone High-Dose 65+YRS 12/01/2015    Pneumococcal Polysaccharide (PPSV23) 04/01/2010     Social History:   Social History     Social History Narrative    Not on file     Social History     Socioeconomic History    Marital status:    Tobacco Use    Smoking status: Never    Smokeless tobacco: Never   Vaping Use    Vaping status: Never Used   Substance and Sexual Activity    Alcohol use: No    Drug use: No    Sexual activity: Defer      Birth control/protection: Surgical       Family History:  Family History   Problem Relation Age of Onset    Lung cancer Other     Ulcerative colitis Sister     Heart failure Mother     Heart disease Mother     Heart failure Father     Heart disease Father     Malig Hyperthermia Neg Hx         Review of Systems  Not obtainable from the patient    Objective:  T Max 24 hrs: Temp (24hrs), Av.7 °F (37.1 °C), Min:97.7 °F (36.5 °C), Max:100.1 °F (37.8 °C)    Vitals Ranges:   Temp:  [97.7 °F (36.5 °C)-100.1 °F (37.8 °C)] 98.3 °F (36.8 °C)  Heart Rate:  [69-92] 78  Resp:  [20] 20  BP: (103-135)/(59-87) 103/59      Exam:  /59   Pulse 78   Temp 98.3 °F (36.8 °C) (Oral)   Resp 20   SpO2 94%     General Appearance:    Alert, cooperative, no distress, appears stated age   Head:    Normocephalic, without obvious abnormality, atraumatic   Eyes:    PERRL, conjunctivae/corneas clear, EOM's intact, both eyes   Ears:    Normal external ear canals, both ears   Nose:   Nares normal, septum midline, mucosa normal, no drainage    or sinus tenderness   Throat:   Lips, mucosa, and tongue normal   Neck:   Supple, symmetrical, trachea midline, no adenopathy;     thyroid:  no enlargement/tenderness/nodules; no carotid    bruit or JVD   Back:     Symmetric, no curvature, ROM normal, no CVA tenderness   Lungs:     Decreased breath sounds bilaterally, respirations unlabored   Chest Wall:    No tenderness or deformity    Heart:    Regular rate and rhythm, S1 and S2 normal, no murmur, rub   or gallop   Abdomen:     Soft, nontender, bowel sounds active all four quadrants,     no masses, no hepatomegaly, no splenomegaly   Extremities:   Extremities normal, atraumatic, no cyanosis or edema                       .    Data Review:  Labs in chart were reviewed.  WBC   Date Value Ref Range Status   2025 11.53 (H) 3.40 - 10.80 10*3/mm3 Final     Hemoglobin   Date Value Ref Range Status   2025 13.0 12.0 - 15.9 g/dL Final      Hematocrit   Date Value Ref Range Status   02/08/2025 38.0 34.0 - 46.6 % Final     Platelets   Date Value Ref Range Status   02/08/2025 177 140 - 450 10*3/mm3 Final     Sodium   Date Value Ref Range Status   02/08/2025 136 136 - 145 mmol/L Final     Potassium   Date Value Ref Range Status   02/08/2025 4.4 3.5 - 5.2 mmol/L Final     Comment:     Slight hemolysis detected by analyzer. Result may be falsely elevated.     Chloride   Date Value Ref Range Status   02/08/2025 101 98 - 107 mmol/L Final     CO2   Date Value Ref Range Status   02/08/2025 24.0 22.0 - 29.0 mmol/L Final     BUN   Date Value Ref Range Status   02/08/2025 15 8 - 23 mg/dL Final     Creatinine   Date Value Ref Range Status   02/08/2025 1.27 (H) 0.57 - 1.00 mg/dL Final     Glucose   Date Value Ref Range Status   02/08/2025 107 (H) 65 - 99 mg/dL Final     Calcium   Date Value Ref Range Status   02/08/2025 9.4 8.6 - 10.5 mg/dL Final     AST (SGOT)   Date Value Ref Range Status   02/08/2025 35 (H) 1 - 32 U/L Final     ALT (SGPT)   Date Value Ref Range Status   02/08/2025 16 1 - 33 U/L Final     Alkaline Phosphatase   Date Value Ref Range Status   02/08/2025 83 39 - 117 U/L Final                Imaging Results (All)       Procedure Component Value Units Date/Time    XR Chest 1 View [[id number]] Collected: 02/08/25 1409     Updated: 02/08/25 1414    Narrative:      XR CHEST 1 VW-     HISTORY: Female who is 79 years-old, short of breath     TECHNIQUE: Frontal view of the chest     COMPARISON: 2/28/2024     FINDINGS: The heart is enlarged, and appears increased from the prior  exam, that could reflect increased cardiomegaly and/or pericardial  effusion. Left-sided pacemaker and cardiac leads are seen. Pulmonary  vasculature appears slightly congested. No focal pulmonary  consolidation, pleural effusion, or pneumothorax. No acute osseous  process.       Impression:      Increased apparent size of the cardiac shadow, as described.  Slight pulmonary  vascular congestion. No focal pulmonary consolidation.  Follow-up/further evaluation can be obtained as indicated.     This report was finalized on 2/8/2025 2:10 PM by Dr. Saeid Lopez M.D on Workstation: [id number]                 Assessment:  Active Hospital Problems    Diagnosis  POA    **Acute exacerbation of CHF (congestive heart failure) [I50.9]  Yes      Resolved Hospital Problems   No resolved problems to display.   Covid  Ckd3  Hyperglycemia  A fib  dementia    Plan:  Remdesivir  Monitor on telemetry  Trend labs  She is followed by dr merida for ckd and has one kidney  Dw patient, son and ed provider  Patient is full code and son is sinan Demarco Joaquín Bullock MD  2/8/2025  19:06 EST

## 2025-02-09 NOTE — PROGRESS NOTES
Name: Raina Forrest ADMIT: 2025   : 1945  PCP: Georgiana Cisneros MD    MRN: [id number] LOS: 0 days   AGE/SEX: 79 y.o. female  ROOM:      Subjective   Subjective   2025  Case discussed with son at bedside as patient has Alzheimer's dementia.  She is afebrile, on room air, and without distress.       Objective   Objective   Vital Signs  Temp:  [97.6 °F (36.4 °C)-100.1 °F (37.8 °C)] 98.1 °F (36.7 °C)  Heart Rate:  [69-92] 74  Resp:  [16-20] 16  BP: (103-135)/(55-87) 111/55  SpO2:  [93 %-100 %] 93 %  on   ;   Device (Oxygen Therapy): room air  Body mass index is 24.7 kg/m².  Physical Exam  Constitutional:       General: She is not in acute distress.     Appearance: She is not toxic-appearing.      Comments: Patient with Alzheimer's dementia   HENT:      Head: Normocephalic and atraumatic.      Nose: No congestion.      Mouth/Throat:      Pharynx: Oropharynx is clear. No oropharyngeal exudate.   Eyes:      General: No scleral icterus.  Cardiovascular:      Rate and Rhythm: Normal rate and regular rhythm.      Heart sounds: No murmur heard.     No friction rub. No gallop.   Pulmonary:      Effort: No respiratory distress.      Breath sounds: No wheezing or rales.   Abdominal:      General: There is no distension.      Tenderness: There is no abdominal tenderness. There is no guarding.   Musculoskeletal:         General: No swelling, deformity or signs of injury.      Cervical back: Normal range of motion. No rigidity.   Skin:     Coloration: Skin is not jaundiced.      Findings: No lesion.      Comments: Bruising and swelling around left knee   Neurological:      Mental Status: Mental status is at baseline.      Motor: Weakness present.       Results Review     I reviewed the patient's new clinical results.  Results from last 7 days   Lab Units 25  0428 25  1415   WBC 10*3/mm3 11.83* 11.53*   HEMOGLOBIN g/dL 12.9 13.0   PLATELETS 10*3/mm3 171 177     Results from last 7 days  "  Lab Units 02/09/25  0428 02/08/25  1415   SODIUM mmol/L 136 136   POTASSIUM mmol/L 4.1 4.4   CHLORIDE mmol/L 99 101   CO2 mmol/L 24.0 24.0   BUN mg/dL 17 15   CREATININE mg/dL 1.16* 1.27*   GLUCOSE mg/dL 111* 107*   EGFR mL/min/1.73 47.5* 42.6*     Results from last 7 days   Lab Units 02/09/25  0428 02/08/25  1415   ALBUMIN g/dL 4.0 4.3   BILIRUBIN mg/dL 0.6 0.8   ALK PHOS U/L 83 83   AST (SGOT) U/L 31 35*   ALT (SGPT) U/L 14 16     Results from last 7 days   Lab Units 02/09/25  0428 02/08/25  1415   CALCIUM mg/dL 9.3 9.4   ALBUMIN g/dL 4.0 4.3     Results from last 7 days   Lab Units 02/08/25  1415   PROCALCITONIN ng/mL 0.09   LACTATE mmol/L 2.0     No results found for: \"HGBA1C\", \"POCGLU\"    XR Chest 1 View    Result Date: 2/8/2025  Increased apparent size of the cardiac shadow, as described. Slight pulmonary vascular congestion. No focal pulmonary consolidation. Follow-up/further evaluation can be obtained as indicated.  This report was finalized on 2/8/2025 2:10 PM by Dr. Saeid Lopez M.D on Workstation: [id number]       I have personally reviewed all medications:  Scheduled Medications  aspirin, 81 mg, Oral, Daily  bisoprolol, 10 mg, Oral, Daily  donepezil, 10 mg, Oral, Daily  furosemide, 20 mg, Intravenous, Q12H  levothyroxine, 25 mcg, Oral, Q AM  memantine, 10 mg, Oral, Q12H  pantoprazole, 40 mg, Oral, Q AM  remdesivir, 100 mg, Intravenous, Q24H  sertraline, 150 mg, Oral, QAM  sucralfate, 1 g, Oral, BID    Infusions   Diet  Diet: Cardiac; Healthy Heart (2-3 Na+); Fluid Consistency: Thin (IDDSI 0)    I have personally reviewed:  [x]  Laboratory   [x]  Microbiology   [x]  Radiology   [x]  EKG/Telemetry  [x]  Cardiology/Vascular   []  Pathology    []  Records       Assessment/Plan     Active Hospital Problems    Diagnosis  POA    **Acute exacerbation of CHF (congestive heart failure) [I50.9]  Yes      Resolved Hospital Problems   No resolved problems to display.       79 y.o. female admitted with Acute " exacerbation of CHF (congestive heart failure).    #Acute on chronic HFpEF  #CAD  #A-fib, permanent    -Echo dated 1/23/2024 shows EF 56 to 60%    - HS troponin 40 > 40, delta -5, doubt acs    - proBNP 4547    - Lasix 20mg IV q12h    -Chest x-ray shows slight pulmonary vascular congestion    -Repeat echo    -Intake and output, weigh daily    -Cardiology consulted    -Resume home aspirin and bisoprolol    -EKG shows rate controlled A-fib    -Check TSH    -Defer anticoagulation to cardiology    #COVID    -Tested positive at urgent care for COVID    -Remdesivir    -Hold off on steroids is on room air    #Status post nephrectomy    -Patient follows with nephrology, will consult    #Dementia    -Resume home Aricept and Namenda    -Sitter may be needed    #h/o fall  #Left knee injury    -Admits to fall in the past with left knee injury, was worked up by Ortho and no intervention necessary as per son    -Son states knee is still swollen and bruised, will check left knee x-ray    -PT    #Hypothyroid    -Resume home Synthroid    #Anxiety    -Resume home Zoloft    #GERD    -Resume home Carafate    SCDs for DVT prophylaxis.  Full code.  Discussed with patient.  Anticipate discharge home with HH vs SNU facility in 2-3 days.  Expected discharge date/ time has not been documented.      Lolita Osborne DO  [facility]  02/09/25  10:48 EST

## 2025-02-09 NOTE — CONSULTS
Patient Name: Raina Forrest  : 1945  MRN: [id number]  Primary Care Physician: Georgiana Cisneros MD  Date of admission: 2025    Patient Care Team:  Georgiana Cisneros MD as PCP - General (Pediatrics)  Nathan Johnson MD as Resident (Gastroenterology)  Robinson Salazar MD as Consulting Physician (Cardiology)        Reason for Consult:       AMBROCIO on CKD     Subjective   History of Present Illness:   Chief Complaint:   Chief Complaint   Patient presents with    Shortness of Breath     HISTORY:  Raina Forrest is a 79 y.o. female with past medical history of hypertension, A-fib, status post Watchman device insertion, ulcerative colitis, hypothyroidism, osteoarthritis, dementia. She follows with cardiology, Dr. Fields and . Her latest echocardiogram was in 2024, showed EF of 56 to 60%, diastolic function was indeterminate, mild pulmonary hypertension. In , she had incidental finding of left renal mass during MRI of spine. She was evaluated by first urology, Dr. Stratton, underwent left total nephrectomy in 2023.   Baseline creatinine prior to her nephrectomy appears to be around 0.6 mg/dL. Following her nephrectomy, baseline likely closer to 0.8 to 1 mg/dL. Past urine studies have all been relatively bland, no significant proteinuria or hematuria.   She was recently admitted to [facility] from 2024 to 3/3/2024 for flu A, pneumonia. She was noted to have AMBROCIO on admission with creatinine of 1.2. Her Jardiance, spironolactone, and toresemide were all held. Renal function improved prior to discharge with creatinine of 0.9. Kidney ultrasound was reviewed from 2024: Right kidney is 9.3 cm, left kidney is surgically absent. No stones or right hydronephrosis. She was seen last in clinic in 10/2024, creatinine at that time 1.3.    She presented to [facility] on  after testing postive for COVID at urgent care. Chest x-ray shows slight  pulmonary vascular congestion. She was started on Remdesivir. Labs today with sCr 1.16.     Renal services requested for evaluation and management of CKD.      Review of systems:  Constitutional: weakness.  HEENT:  No headache, otalgia, itchy eyes, nasal discharge or sore throat.  Cardiac:  No chest pain, dyspnea, orthopnea or PND.  Chest:              No cough, phlegm or wheezing.  Abdomen:  No abdominal pain, nausea or vomiting.  Neuro:  No focal weakness, abnormal movements or seizure-like activity.  :   No hematuria, no pyuria, no dysuria, no flank pain.  ROS was otherwise negative except as mentioned in the [place].       Personal History:     Past Medical History:   Past Medical History:   Diagnosis Date    Atrial fibrillation     Fung's esophagus     Benign essential hypertension     Blood clot in vein     Cardiomyopathy     Chronic gastritis     Congestive heart failure     Degenerative disc disease     Dementia     Pt  states she has slight dementia    Depression with anxiety     Disease of thyroid gland     Diverticulitis of colon     Dysphagia     GERD (gastroesophageal reflux disease)     History of chest pain     History of migraine     History of migraine headaches     Left knee pain     Osteoporosis     Palpitations     Thyroid disorder     Thyroid nodule     Ulcerative colitis        Surgical History:      Past Surgical History:   Procedure Laterality Date    APPENDECTOMY      BLADDER SURGERY      CARDIAC CATHETERIZATION N/A 11/21/2016    Procedure: Left Heart Cath;  Surgeon: Robinson Salazar MD;  Location:  [facility] C[facility]VASIVE [facility];  Service:     CARDIAC ELECTROPHYSIOLOGY PROCEDURE N/A 2/7/2017    Procedure: Pacemaker DC new   MEDTRONIC;  Surgeon: Robinson Salazar MD;  Location:  [facility] CATH INVASIVE [facility]ATION;  Service:     CARDIOVERSION  01/18/2017    CHOLECYSTECTOMY      COLONOSCOPY      COLONOSCOPY N/A 7/29/2016    normal    ENDOSCOPY  05/06/2015    submucosal nodule in  esophagus, erythematous mucosa in antrum,moderate acute gastritis, no h-pylori    ENDOSCOPY N/A 9/2/2016    Erythematous mucosa in the antrum    EYE SURGERY Left     cataract    HYSTERECTOMY      KNEE MENISCAL REPAIR Left     THYROID SURGERY Left     TONSILLECTOMY      TOTAL KNEE ARTHROPLASTY Left 6/16/2020    Procedure: LEFT TOTAL KNEE ARTHOPLASTY+;  Surgeon: Jamal Castañeda MD;  Location: [facility];  Service: Orthopedics;  Laterality: Left;       Family History: family history includes Heart disease in her father and mother; Heart failure in her father and mother; Lung cancer in an other family member; Ulcerative colitis in her sister. Otherwise pertinent FHx was reviewed and unremarkable.     Social History:  reports that she has never smoked. She has never used smokeless tobacco. She reports that she does not drink alcohol and does not use drugs.    Medications:  Prior to Admission medications    Medication Sig Start Date End Date Taking? Authorizing Provider   albuterol sulfate  (90 Base) MCG/ACT inhaler Inhale 2 puffs Every 4 (Four) Hours As Needed for Wheezing. 3/3/24  Yes Kristina Hernandez MD   aspirin 81 MG chewable tablet Chew 1 tablet Daily. Indications: Cancer of the Colon, Disease involving Lipid Deposits in the Arteries, Kawasaki Syndrome 2/14/23  Yes ProviderCadence MD   bisoprolol (ZEBeta) 10 MG tablet TAKE ONE TABLET BY MOUTH DAILY 11/7/24  Yes Robinson Salazar MD   donepezil (ARICEPT) 10 MG tablet Take 1 tablet by mouth Daily. Indications: Alzheimer's Disease 2/6/23  Yes Cadence Aguilar MD   levothyroxine (SYNTHROID, LEVOTHROID) 25 MCG tablet Take 1 tablet by mouth Every Morning. 2/13/23  Yes Maureen Shore APRN   memantine (NAMENDA) 10 MG tablet Take 1 tablet by mouth 2 (Two) Times a Day.   Yes Cadence Aguilar MD   Multiple Vitamins-Minerals (Womens 50+ Multi Vitamin) tablet Take 1 tablet by mouth Daily. Indications: Vitamin and/or Mineral  Deficiency 3/7/23  Yes Cadence Aguilar MD   pantoprazole (PROTONIX) 40 MG EC tablet Take 20 mg by mouth Daily. Indications: Gastroesophageal Reflux Disease, Heartburn 10/12/20  Yes Cadence Aguilar MD   sertraline (ZOLOFT) 100 MG tablet Take 1.5 tablets by mouth Every Morning. Indications: Generalized Anxiety Disorder 10/22/12  Yes Cadence Aguilar MD   torsemide (DEMADEX) 20 MG tablet Take 0.5 tablets by mouth Daily. Indications: Cardiac Failure, Edema 3/15/24  Yes Cadence Aguilar MD   Amoxicill-Rifabutin-Omeprazole (Talicia) 250-12.5-10 MG capsule delayed-release Take 4 capsules by mouth 3 times a day.    Cadence Aguilar MD   Calcium Carb-Cholecalciferol (Oyster Shell Calcium w/D) 500-5 MG-MCG tablet Take 1 tablet by mouth Every Evening. Indications: Low Amount of Calcium in the Blood 6/20/23   Cadence Aguilar MD   sucralfate (CARAFATE) 1 g tablet Take 1 tablet by mouth 2 (Two) Times a Day.    Cadence Aguilar MD     Scheduled Meds:aspirin, 81 mg, Oral, Daily  bisoprolol, 10 mg, Oral, Daily  donepezil, 10 mg, Oral, Daily  furosemide, 20 mg, Intravenous, Q12H  levothyroxine, 25 mcg, Oral, Q AM  memantine, 10 mg, Oral, Q12H  pantoprazole, 40 mg, Oral, Q AM  remdesivir, 100 mg, Intravenous, Q24H  sertraline, 150 mg, Oral, QAM  sucralfate, 1 g, Oral, BID      Continuous Infusions:   PRN Meds:  acetaminophen **OR** acetaminophen **OR** acetaminophen    albuterol    senna-docusate sodium **AND** polyethylene glycol **AND** bisacodyl **AND** bisacodyl    melatonin    ondansetron ODT **OR** ondansetron    sodium chloride  Allergies:    Allergies   Allergen Reactions    Bupivacaine Hcl Anaphylaxis    Nepafenac Itching     Eye Drops    Barium-Containing Compounds Other (See Comments)     CONTRAST CAUSES DIARRHEA    Duloxetine Other (See Comments)    Onion Other (See Comments)    Bextra [Valdecoxib] Itching    Cortisone Hives    Cymbalta [Duloxetine Hcl] Itching    Iodinated Contrast  Media Unknown - Low Severity     Must be premedicated before use.  See [place] notes in care evrywhere    Medrol [Methylprednisolone] Unknown - Low Severity    Mesalamine Unknown - Low Severity    Polymyxin B-Trimethoprim Unknown - Low Severity    Rivaroxaban Unknown - Low Severity     REACTION UNKNOWN    Tetanus Toxoids Swelling    Tetanus-Diphtheria Toxoids Td Swelling       Objective   Exam:     Vital Signs  Temp:  [97.6 °F (36.4 °C)-100.1 °F (37.8 °C)] 98 °F (36.7 °C)  Heart Rate:  [69-92] 78  Resp:  [16-18] 18  BP: ()/(55-81) 120/77  SpO2:  [93 %-100 %] 94 %  on   ;   Device (Oxygen Therapy): room air  Body mass index is 24.68 kg/m².  EXAM  General:  ill appearing  female in no acute distress.    Head:      Normocephalic and atraumatic.    Neck: No obvious JVD  Lungs: Diminished at lung bases but otherwise clear, no crackles  Heart:      RRR  Abd:        Soft, nontenderExtremities: No edema, warm and well-perfused   neuro alert and oriented      Results Review:  I have personally reviewed most recent Data :  BMP @LABRCNT(creatinine:10)  CBC    Results from last 7 days   Lab Units 02/09/25  0428 02/08/25  1415   WBC 10*3/mm3 11.83* 11.53*   HEMOGLOBIN g/dL 12.9 13.0   PLATELETS 10*3/mm3 171 177     CMP   Results from last 7 days   Lab Units 02/09/25  0428 02/08/25  1415   SODIUM mmol/L 136 136   POTASSIUM mmol/L 4.1 4.4   CHLORIDE mmol/L 99 101   CO2 mmol/L 24.0 24.0   BUN mg/dL 17 15   CREATININE mg/dL 1.16* 1.27*   GLUCOSE mg/dL 111* 107*   ALBUMIN g/dL 4.0 4.3   BILIRUBIN mg/dL 0.6 0.8   ALK PHOS U/L 83 83   AST (SGOT) U/L 31 35*   ALT (SGPT) U/L 14 16     ABG      XR Chest 1 View    Result Date: 2/8/2025  Increased apparent size of the cardiac shadow, as described. Slight pulmonary vascular congestion. No focal pulmonary consolidation. Follow-up/further evaluation can be obtained as indicated.  This report was finalized on 2/8/2025 2:10 PM by Dr. Saeid Lopez M.D on Workstation: [id number]        Results for orders placed in visit on 06/08/23    Adult Transthoracic Echo Complete W/ Cont if Necessary Per Protocol    Interpretation Summary    Left ventricular ejection fraction appears to be 56 - 60%.    Left ventricular diastolic function was indeterminate.    The left atrial cavity is moderately dilated.    Left atrial volume is moderately increased.    The right atrial cavity is borderline dilated.    Estimated right ventricular systolic pressure from tricuspid regurgitation is normal (<35 mmHg).    Mild pulmonary hypertension is present.        Assessment & Plan   Assessment and Plan:         Acute exacerbation of CHF (congestive heart failure)    ASSESSMENT:  CKD, single kidney s/p left nephrectomy 2/2 RCC in 12/2023, baseline creatinine ~1.1-1.3, follows with Dr Marga Dill. Past urine studies have all been relatively bland, no significant proteinuria or hematuria.   HFpEF  COVID  Hypertension  leukocytosis  A-fib, status post Watchman device insertion  ulcerative colitis  Hypothyroidism  Osteoarthritis  dementia     echocardiogram was in January 2024, showed EF of 56 to 60%, diastolic function was indeterminate, mild pulmonary hypertension.    PLAN :     Renal function stable at baseline.   Continue gentle IV diuretics today.  Probably can switch back to home dose torsemide tomorrow.  Continue treatment of SARS-CoV-2 infection per primary.  Follow up repeat echo.  Hemoglobin at goal.    Continue strict input and output.    Thanks for consultation.  Discussed with family.      JORY Aviles  [facility]  2/9/2025  15:42 EST    The note was transcribed by  JORY.  I personally have examined the patient and interviewed the patient and modified the history, exam. I have reviewed the history, data, problems, assessment and plan .and I concur . Exam as described in the Progress note, with comments additions, revisions as noted by me.         Bernarda Salmeron MD  [facility]  17:02 EST

## 2025-02-09 NOTE — CONSULTS
Date of Consultation: 25    Referral Provider: Logan Fox MD     Reason for Consultation: CHF, atrial fibrillation     Encounter Provider: JORY De Leon    Group of Service: [facility]     Patient Name: Raina Forrest    :1945    Chief complaint:  Shortness of breath     History of Present Illness:  Raina Forrest is an 79 year old who is followed by Dr. Salazar. She has a past medical history that is significant for atrial fibrillation (status post Watchman device), CHF, dementia, GERD, permanent pacemaker, and hypothyroidism.     She presented to the ED on 25 with complaints of cough, wheezing, and shortness of breath. She was seen two days prior at urgent care where she was diagnosed with COVID 19. She reports continued cough and worsening wheezing and shortness of breath which prompted her presentation to the ED. She denies chest pain or discomfort, palpitations, fever, chills, nausea, vomiting, or diarrhea.     Workup has included: HS troponin 45 then 40. proBNP 4547. Creatinine 1.27. TSH 4.85. EKG showed atrial fibrillation with rate of 85 bpm.     On exam she was undergoing bedside echocardiogram. She was resting in bed. She denies chest pain or discomfort, palpitations, or shortness of breath.     Echocardiogram 24    Left ventricular ejection fraction appears to be 56 - 60%.    Left ventricular diastolic function was indeterminate.    The left atrial cavity is moderately dilated.    Left atrial volume is moderately increased.    The right atrial cavity is borderline dilated.    Estimated right ventricular systolic pressure from tricuspid regurgitation is normal (<35 mmHg).    Mild pulmonary hypertension is present.    Stress Test with Myocardial Perfusion 24    Myocardial perfusion imaging indicates a normal myocardial perfusion study with no evidence of ischemia. Impressions are consistent with a low risk study.    Left ventricular ejection  fraction is normal (Calculated EF = 60%).    Findings consistent with an equivocal ECG stress test.    Past Medical History:   Diagnosis Date    Atrial fibrillation     Fung's esophagus     Benign essential hypertension     Blood clot in vein     Cardiomyopathy     Chronic gastritis     Congestive heart failure     Degenerative disc disease     Dementia     Pt  states she has slight dementia    Depression with anxiety     Disease of thyroid gland     Diverticulitis of colon     Dysphagia     GERD (gastroesophageal reflux disease)     History of chest pain     History of migraine     History of migraine headaches     Left knee pain     Osteoporosis     Palpitations     Thyroid disorder     Thyroid nodule     Ulcerative colitis          Past Surgical History:   Procedure Laterality Date    APPENDECTOMY      BLADDER SURGERY      CARDIAC CATHETERIZATION N/A 11/21/2016    Procedure: Left Heart Cath;  Surgeon: Robinson Salazar MD;  Location: [facility] CATH INVASIVE LOCATION;  Service:     CARDIAC ELECTROPHYSIOLOGY PROCEDURE N/A 2/7/2017    Procedure: Pacemaker DC new   MEDTRONIC;  Surgeon: Robinson Salazar MD;  [facility];  Service:     CARDIOVERSION  01/18/2017    CHOLECYSTECTOMY      COLONOSCOPY      COLONOSCOPY N/A 7/29/2016    normal    ENDOSCOPY  05/06/2015    submucosal nodule in esophagus, erythematous mucosa in antrum,moderate acute gastritis, no h-pylori    ENDOSCOPY N/A 9/2/2016    Erythematous mucosa in the antrum    EYE SURGERY Left     cataract    HYSTERECTOMY      KNEE MENISCAL REPAIR Left     THYROID SURGERY Left     TONSILLECTOMY      TOTAL KNEE ARTHROPLASTY Left 6/16/2020    Procedure: LEFT TOTAL KNEE ARTHOPLASTY+;  Surgeon: Jamal Castañeda MD;  Location: [facility] MAIN OR;  Service: Orthopedics;  Laterality: Left;         Allergies   Allergen Reactions    Bupivacaine Hcl Anaphylaxis    Nepafenac Itching     Eye Drops    Barium-Containing Compounds Other (See  Comments)     CONTRAST CAUSES DIARRHEA    Duloxetine Other (See Comments)    Onion Other (See Comments)    Bextra [Valdecoxib] Itching    Cortisone Hives    Cymbalta [Duloxetine Hcl] Itching    Iodinated Contrast Media Unknown - Low Severity     Must be premedicated before use.  See [place] notes in care evrywhere    Medrol [Methylprednisolone] Unknown - Low Severity    Mesalamine Unknown - Low Severity    Polymyxin B-Trimethoprim Unknown - Low Severity    Rivaroxaban Unknown - Low Severity     REACTION UNKNOWN    Tetanus Toxoids Swelling    Tetanus-Diphtheria Toxoids Td Swelling         No current facility-administered medications on file prior to encounter.     Current Outpatient Medications on File Prior to Encounter   Medication Sig Dispense Refill    albuterol sulfate  (90 Base) MCG/ACT inhaler Inhale 2 puffs Every 4 (Four) Hours As Needed for Wheezing. 6.7 g 0    aspirin 81 MG chewable tablet Chew 1 tablet Daily. Indications: Cancer of the Colon, Disease involving Lipid Deposits in the Arteries, Kawasaki Syndrome      bisoprolol (ZEBeta) 10 MG tablet TAKE ONE TABLET BY MOUTH DAILY 30 tablet 8    donepezil (ARICEPT) 10 MG tablet Take 1 tablet by mouth Daily. Indications: Alzheimer's Disease      levothyroxine (SYNTHROID, LEVOTHROID) 25 MCG tablet Take 1 tablet by mouth Every Morning. 30 tablet 11    memantine (NAMENDA) 10 MG tablet Take 1 tablet by mouth 2 (Two) Times a Day.      Multiple Vitamins-Minerals (Womens 50+ Multi Vitamin) tablet Take 1 tablet by mouth Daily. Indications: Vitamin and/or Mineral Deficiency      pantoprazole (PROTONIX) 40 MG EC tablet Take 20 mg by mouth Daily. Indications: Gastroesophageal Reflux Disease, Heartburn      sertraline (ZOLOFT) 100 MG tablet Take 1.5 tablets by mouth Every Morning. Indications: Generalized Anxiety Disorder      torsemide (DEMADEX) 20 MG tablet Take 0.5 tablets by mouth Daily. Indications: Cardiac Failure, Edema      Amoxicill-Rifabutin-Omeprazole  "(Talicia) 250-12.5-10 MG capsule delayed-release Take 4 capsules by mouth 3 times a day.      Calcium Carb-Cholecalciferol (Oyster Shell Calcium w/D) 500-5 MG-MCG tablet Take 1 tablet by mouth Every Evening. Indications: Low Amount of Calcium in the Blood      sucralfate (CARAFATE) 1 g tablet Take 1 tablet by mouth 2 (Two) Times a Day.           Social History     Socioeconomic History    Marital status:    Tobacco Use    Smoking status: Never    Smokeless tobacco: Never   Vaping Use    Vaping status: Never Used   Substance and Sexual Activity    Alcohol use: No    Drug use: No    Sexual activity: Defer     Birth control/protection: Surgical         Family History   Problem Relation Age of Onset    Lung cancer Other     Ulcerative colitis Sister     Heart failure Mother     Heart disease Mother     Heart failure Father     Heart disease Father     [place] Hyperthermia Neg Hx        REVIEW OF SYSTEMS:   12 point ROS was performed and is negative except as outlined in HPI       Objective:     Vitals:    02/09/25 1130 02/09/25 1226 02/09/25 1424 02/09/25 1658   BP: 92/76 120/77 120/77    BP Location: Right arm      Patient Position: Lying      Pulse: 81  78 78   Resp: 18   18   Temp: 98 °F (36.7 °C)      TempSrc: Oral      SpO2: 94%   95%   Weight:   73 kg (160 lb 15 oz)    Height:   172 cm (67.72\")      Body mass index is 24.68 kg/m².  Flowsheet Rows      Flowsheet Row First Filed Value   Admission Height 172 cm (67.72\") Documented at 02/09/2025 1424   Admission Weight 73.7 kg (162 lb 7.7 oz) Documented at 02/09/2025 0500              Physical Exam  Vitals reviewed.   Constitutional:       General: She is not in acute distress.  HENT:      Head: Normocephalic.   Eyes:      Extraocular Movements: Extraocular movements intact.      Pupils: Pupils are equal, round, and reactive to light.   Cardiovascular:      Rate and Rhythm: Rhythm irregularly irregular.      Pulses: Normal pulses.      Heart sounds: Normal heart " sounds, S1 normal and S2 normal. Heart sounds not distant. No murmur heard.     No friction rub. No gallop. No S3 or S4 sounds.   Pulmonary:      Effort: Pulmonary effort is normal.      Breath sounds: Normal breath sounds.   Abdominal:      General: Abdomen is flat. Bowel sounds are normal.      Palpations: Abdomen is soft.      Tenderness: There is no abdominal tenderness.   Skin:     General: Skin is warm and dry.   Neurological:      General: No focal deficit present.      Mental Status: She is alert and oriented to person, place, and time.   Psychiatric:         Mood and Affect: Mood normal.         Behavior: Behavior normal.         Lab Review:                Results from last 7 days   Lab Units 02/09/25  0428   SODIUM mmol/L 136   POTASSIUM mmol/L 4.1   CHLORIDE mmol/L 99   CO2 mmol/L 24.0   BUN mg/dL 17   CREATININE mg/dL 1.16*   GLUCOSE mg/dL 111*   CALCIUM mg/dL 9.3     Results from last 7 days   Lab Units 02/08/25  1610 02/08/25  1415   HSTROP T ng/L 40* 45*     Results from last 7 days   Lab Units 02/09/25  0428   WBC 10*3/mm3 11.83*   HEMOGLOBIN g/dL 12.9   HEMATOCRIT % 37.0   PLATELETS 10*3/mm3 171                       EKG:            Assessment/Plan:   Acute on chronic HFpEF   Echocardiogram pending  She has been receiving low dose IV furosemide, re-evaluate volume status tomorrow.   Chest xray with mild pulmonary vascular congestion   Continue strict I/O and daily weight  Permanent atrial fibrillation   Rate well controlled, continue bisoprolol  She is status post Watchman, not on anticoagulation    COVID 19   Dementia  Hypothyroidism   Anxiety   GERD     Echocardiogram pending. Cardiology will follow, thank you for this consult.     Karlie Srivastava, APRN   02/09/25

## 2025-02-09 NOTE — PLAN OF CARE
Goal Outcome Evaluation:  Plan of Care Reviewed With: patient, family           Outcome Evaluation: Pt A&Ox3-4 has dementia, room air, Afib w HR 70's-80's. IV diuresing with lasix. No c/o pain today. Will continue with current POC and update as needed.

## 2025-02-09 NOTE — THERAPY EVALUATION
Patient Name: Raina Forrest  : 1945    MRN: [id number]                              Today's Date: 2025       Admit Date: 2025    Visit Dx:     ICD-10-CM ICD-9-CM   1. Acute dyspnea  R06.00 786.09   2. COVID-19 virus infection  U07.1 079.89   3. Acute on chronic congestive heart failure, unspecified heart failure type  I50.9 428.0   4. Acute renal failure superimposed on chronic kidney disease, unspecified acute renal failure type, unspecified CKD stage  N17.9 584.9    N18.9 585.9     Patient Active Problem List   Diagnosis    Chronic atrial fibrillation    Benign essential HTN    Bradycardia    Chest pain    Can't get food down    Accumulation of fluid in tissues    Esophageal lump    Adynamia    Itch of skin    Anorexia    Chronic nausea    Gastroesophageal reflux disease    Breath shortness    Emesis    Decreased body weight    Anxiety    Narrowing of intervertebral disc space    Diverticulosis of intestine    Primary hypertension    Migraine    Osteoporosis    Palpitations    Pituitary adenoma    Sick sinus syndrome    Diarrhea    Cardiomyopathy    Chronic anticoagulation    Atrial fibrillation    Anticoagulated    Pacemaker    Infected pacemaker    Wound infection    Primary osteoarthritis of left knee    DJD (degenerative joint disease)    Ischemic cardiomyopathy    Acute on chronic congestive heart failure, unspecified heart failure type    Anemia    Presence of Watchman left atrial appendage closure device    Pneumonia    Pneumonia of both lungs due to infectious organism    Stage 3a chronic kidney disease    Influenza A    Severe malnutrition    H/O left radical nephrectomy    Abdominal wall hematoma    Peptic ulcer disease    Hypothyroidism (acquired)    Acute exacerbation of CHF (congestive heart failure)     Past Medical History:   Diagnosis Date    Atrial fibrillation     Fnug's esophagus     Benign essential hypertension     Blood clot in vein     Cardiomyopathy     Chronic  gastritis     Congestive heart failure     Degenerative disc disease     Dementia     Pt  states she has slight dementia    Depression with anxiety     Disease of thyroid gland     Diverticulitis of colon     Dysphagia     GERD (gastroesophageal reflux disease)     History of chest pain     History of migraine     History of migraine headaches     Left knee pain     Osteoporosis     Palpitations     Thyroid disorder     Thyroid nodule     Ulcerative colitis      Past Surgical History:   Procedure Laterality Date    APPENDECTOMY      BLADDER SURGERY      CARDIAC CATHETERIZATION N/A 11/21/2016    Procedure: Left Heart Cath;  Surgeon: Robinson Salazar MD;  Location:  [facility];  Service:     CARDIAC ELECTROPHYSIOLOGY PROCEDURE N/A 2/7/2017    Procedure: Pacemaker DC new   [employer];  Surgeon: Robinson Salazar MD;  Location:  [facility];  Service:     CARDIOVERSION  01/18/2017    CHOLECYSTECTOMY      COLONOSCOPY      COLONOSCOPY N/A 7/29/2016    normal    ENDOSCOPY  05/06/2015    submucosal nodule in esophagus, erythematous mucosa in antrum,moderate acute gastritis, no h-pylori    ENDOSCOPY N/A 9/2/2016    Erythematous mucosa in the antrum    EYE SURGERY Left     cataract    HYSTERECTOMY      KNEE MENISCAL REPAIR Left     THYROID SURGERY Left     TONSILLECTOMY      TOTAL KNEE ARTHROPLASTY Left 6/16/2020    Procedure: LEFT TOTAL KNEE ARTHOPLASTY+;  Surgeon: Jamal Castañeda MD;  Location: [facility];  Service: Orthopedics;  Laterality: Left;      General Information       Row Name 02/09/25 1438          Physical Therapy Time and Intention    Document Type evaluation  -DB     Mode of Treatment individual therapy;physical therapy  -DB       Row Name 02/09/25 1438          General Information    Patient Profile Reviewed yes  -DB     Prior Level of Function min assist:;ADL's  -DB     Existing Precautions/Restrictions fall  -DB     Barriers to Rehab none identified   -DB       Row Name 02/09/25 1438          Living Environment    People in Home spouse  son helps  -DB       Row Name 02/09/25 1438          Home Main Entrance    Number of Stairs, Main Entrance one  -DB       Row Name 02/09/25 1438          Safety Issues/Impairments Affecting Functional Mobility    Impairments Affecting Function (Mobility) balance;endurance/activity tolerance;strength  -DB               User Key  (r) = Recorded By, (t) = Taken By, (c) = Cosigned By      Initials Name Provider Type    DB Sylvia Lares PT Physical Therapist                   Mobility       Row Name 02/09/25 1440          Bed Mobility    Bed Mobility supine-sit;sit-supine  -DB     Supine-Sit Daniels (Bed Mobility) standby assist  -DB     Sit-Supine Daniels (Bed Mobility) standby assist  -DB     Assistive Device (Bed Mobility) bed rails;head of bed elevated  -DB       Row Name 02/09/25 1440          Sit-Stand Transfer    Sit-Stand Daniels (Transfers) minimum assist (75% patient effort)  -DB     Assistive Device (Sit-Stand Transfers) walker, front-wheeled  -DB       Row Name 02/09/25 1440          Gait/Stairs (Locomotion)    Daniels Level (Gait) minimum assist (75% patient effort);verbal cues;nonverbal cues (demo/gesture)  -DB     Assistive Device (Gait) walker, front-wheeled  -DB     Distance in Feet (Gait) 15  x2 with seated rest break  -DB     Deviations/Abnormal Patterns (Gait) gait speed decreased;mio decreased;base of support, narrow  -DB     Bilateral Gait Deviations forward flexed posture;heel strike decreased  -DB               User Key  (r) = Recorded By, (t) = Taken By, (c) = Cosigned By      Initials Name Provider Type    DB Sylvia Lares PT Physical Therapist                   Obj/Interventions       Row Name 02/09/25 1446          Range of Motion Comprehensive    General Range of Motion no range of motion deficits identified  -DB       Row Name 02/09/25 1446          Strength Comprehensive  (MMT)    Comment, General Manual Muscle Testing (MMT) Assessment generalized weakness  -DB       Row Name 02/09/25 1446          Balance    Balance Assessment sitting static balance;sitting dynamic balance;standing static balance;standing dynamic balance  -DB     Static Sitting Balance standby assist  -DB     Dynamic Sitting Balance standby assist  -DB     Position, Sitting Balance unsupported;sitting edge of bed  -DB     Static Standing Balance contact guard;minimal assist  -DB     Dynamic Standing Balance minimal assist  -DB     Position/Device Used, Standing Balance supported;walker, front-wheeled  -DB     Balance Interventions sitting;standing;sit to stand  -DB               User Key  (r) = Recorded By, (t) = Taken By, (c) = Cosigned By      Initials Name Provider Type    DB Sylvia Lares, PT Physical Therapist                   Goals/Plan       Row Name 02/09/25 1448          Bed Mobility Goal 1 (PT)    Activity/Assistive Device (Bed Mobility Goal 1, PT) bed mobility activities, all  -DB     [place] Level/Cues Needed (Bed Mobility Goal 1, PT) supervision required  -DB     Time Frame (Bed Mobility Goal 1, PT) 1 week  -DB       Row Name 02/09/25 1441          Transfer Goal 1 (PT)    Activity/Assistive Device (Transfer Goal 1, PT) transfers, all  -DB     [place] Level/Cues Needed (Transfer Goal 1, PT) supervision required  -DB     Time Frame (Transfer Goal 1, PT) 1 week  -DB       Row Name 02/09/25 1441          Gait Training Goal 1 (PT)    Activity/Assistive Device (Gait Training Goal 1, PT) gait (walking locomotion)  -DB     [place] Level (Gait Training Goal 1, PT) supervision required  -DB     Time Frame (Gait Training Goal 1, PT) 1 week  -DB       Row Name 02/09/25 1444          Therapy Assessment/Plan (PT)    Planned Therapy Interventions (PT) balance training;bed mobility training;gait training;home exercise program;neuromuscular re-education;patient/family education;strengthening;ROM  (range of motion);stair training;postural re-education;transfer training  -DB               User Key  (r) = Recorded By, (t) = Taken By, (c) = Cosigned By      Initials Name Provider Type    Sylvia Valdes PT Physical Therapist                   Clinical Impression       Row Name 02/09/25 1447          Plan of Care Review    Plan of Care Reviewed With patient  -DB     Outcome Evaluation Patient is a 79 y.o. female admitted to [facility] on 2/8/2025 for COVID-19. PMHx includes a fib, dementia, CHF. Patient is (I) at baseline and lives with her ; son close by and assists with IADL's. Pt uses a RW occasionally in the house for mobility. Today, patient performed bed mobility with SBA, required Lacie for transfers, and ambulated 15' with RW and Lacie. Strength, balance, and endurance deficits noted. Patient may benefit from skilled PT services acutely to address functional deficits as well as improve level of independence prior to discharge. Anticipate home with assist upon DC.  -DB       Row Name 02/09/25 1447          Therapy Assessment/Plan (PT)    Rehab Potential (PT) good  -DB     Criteria for Skilled Interventions Met (PT) yes  -DB     Therapy Frequency (PT) 5 times/wk  -DB       Row Name 02/09/25 1447          Vital Signs    Pre SpO2 (%) 94  -DB     O2 Delivery Pre Treatment room air  -DB     O2 Delivery Intra Treatment room air  -DB     Post SpO2 (%) 93  -DB     O2 Delivery Post Treatment room air  -DB     Pre Patient Position Supine  -DB     Intra Patient Position Standing  -DB     Post Patient Position Supine  -DB       Row Name 02/09/25 1447          Positioning and Restraints    Pre-Treatment Position in bed  -DB     Post Treatment Position bed  -DB     In Bed supine;call light within reach;encouraged to call for assist;exit alarm on  -DB               User Key  (r) = Recorded By, (t) = Taken By, (c) = Cosigned By      Initials Name Provider Type    Sylvia Valdes PT Physical Therapist                    Outcome Measures       Row Name 02/09/25 1449 02/09/25 0935       How much help from another person do you currently need...    Turning from your back to your side while in flat bed without using bedrails? 4  -DB 4  -KM    Moving from lying on back to sitting on the side of a flat bed without bedrails? 3  -DB 3  -KM    Moving to and from a bed to a chair (including a wheelchair)? 3  -DB 3  -KM    Standing up from a chair using your arms (e.g., wheelchair, bedside chair)? 3  -DB 3  -KM    Climbing 3-5 steps with a railing? 3  -DB 3  -KM    To walk in hospital room? 3  -DB 3  -KM    AM-PAC 6 Clicks Score (PT) 19  -DB 19  -KM    Highest Level of Mobility Goal 6 --> Walk 10 steps or more  -DB 6 --> Walk 10 steps or more  -KM      Row Name 02/09/25 1449          Functional Assessment    Outcome Measure Options AM-PAC 6 Clicks Basic Mobility (PT)  -DB               User Key  (r) = Recorded By, (t) = Taken By, (c) = Cosigned By      Initials Name Provider Type    DB Sylvia Lares, PT Physical Therapist    KM Nerissa Chen, RN Registered Nurse                                 Physical Therapy Education       Title: PT OT SLP Therapies (In Progress)       Topic: Physical Therapy (In Progress)       Point: Mobility training (In Progress)       Learning Progress Summary            Patient Acceptance, E, NR by DB at 2/9/2025 1449                      Point: Home exercise program (In Progress)       Learning Progress Summary            Patient Acceptance, E, NR by DB at 2/9/2025 1449                      Point: Body mechanics (In Progress)       Learning Progress Summary            Patient Acceptance, E, NR by DB at 2/9/2025 1449                      Point: Precautions (In Progress)       Learning Progress Summary            Patient Acceptance, E, NR by DB at 2/9/2025 1449                                      User Key       Initials Effective Dates Name Provider Type Discipline     06/16/21 -  Sylvia Lares, [facility]  Physical Therapist PT                  PT Recommendation and Plan  Planned Therapy Interventions (PT): balance training, bed mobility training, gait training, home exercise program, neuromuscular re-education, patient/family education, strengthening, ROM (range of motion), stair training, postural re-education, transfer training  Outcome Evaluation: Patient is a 79 y.o. female admitted to [facility] on [facility]/8/2025 for COVID-19. PMHx includes a fib, dementia, CHF. Patient is (I) at baseline and lives with her ; son close by and assists with IADL's. Pt uses a RW occasionally in the house for mobility. Today, patient performed bed mobility with SBA, required Lacie for transfers, and ambulated 15' with RW and Lacie. Strength, balance, and endurance deficits noted. Patient may benefit from skilled PT services acutely to address functional deficits as well as improve level of independence prior to discharge. Anticipate home with assist upon DC.     Time Calculation:         PT Charges       Row Name 02/09/25 1456             Time Calculation    Start Time 1405  -DB      Stop Time 1425  -DB      Time Calculation (min) 20 min  -DB      PT Received On 02/09/25  -DB      PT - Next Appointment 02/10/25  -DB      PT Goal Re-Cert Due Date 02/16/25  -DB         Time Calculation- PT    Total Timed Code Minutes- PT 8 minute(s)  -DB                User Key  (r) = Recorded By, (t) = Taken By, (c) = Cosigned By      Initials Name Provider Type    DB Sylvia Lares, PT Physical Therapist                  Therapy Charges for Today       Code Description Service Date Service Provider Modifiers Qty    86961960900  PT EVAL MOD COMPLEXITY 2 2/9/2025 Sylvia Lares, PT GP 1    [id number]  PT THERAPEUTIC ACT EA 15 MIN 2/9/2025 Sylvia Lares, PT GP 1            PT G-Codes  Outcome Measure Options: AM-PAC 6 Clicks Basic Mobility (PT)  AM-PAC 6 Clicks Score (PT): 19  PT Discharge Summary  Anticipated Discharge Disposition (PT): home with  24/7 care, home with home health    Sylvia Lares, PT  2/9/2025

## 2025-02-10 ENCOUNTER — APPOINTMENT (OUTPATIENT)
Dept: CT IMAGING | Facility: HOSPITAL | Age: 80
DRG: 291 | End: 2025-02-10
Payer: MEDICARE

## 2025-02-10 LAB
ALBUMIN SERPL-MCNC: 3.6 G/DL (ref 3.5–5.2)
ALBUMIN/GLOB SERPL: 1.1 G/DL
ALP SERPL-CCNC: 87 U/L (ref 39–117)
ALT SERPL W P-5'-P-CCNC: 15 U/L (ref 1–33)
ANION GAP SERPL CALCULATED.3IONS-SCNC: 11.3 MMOL/L (ref 5–15)
AORTIC DIMENSIONLESS INDEX: 0.74 (DI)
ASCENDING AORTA: 2.5 CM
AST SERPL-CCNC: 27 U/L (ref 1–32)
AV MEAN PRESS GRAD SYS DOP V1V2: 4.2 MMHG
AV VMAX SYS DOP: 148.3 CM/SEC
BH CV ECHO MEAS - ACS: 1.61 CM
BH CV ECHO MEAS - AO MAX PG: 8.8 MMHG
BH CV ECHO MEAS - AO ROOT DIAM: 2.8 CM
BH CV ECHO MEAS - AO V2 VTI: 26.7 CM
BH CV ECHO MEAS - AVA(I,D): 2.31 CM2
BH CV ECHO MEAS - EDV(CUBED): 82.6 ML
BH CV ECHO MEAS - EDV(MOD-SP2): 80 ML
BH CV ECHO MEAS - EDV(MOD-SP4): 102 ML
BH CV ECHO MEAS - EF(MOD-SP2): 68.8 %
BH CV ECHO MEAS - EF(MOD-SP4): 66.7 %
BH CV ECHO MEAS - ESV(CUBED): 23.8 ML
BH CV ECHO MEAS - ESV(MOD-SP2): 25 ML
BH CV ECHO MEAS - ESV(MOD-SP4): 34 ML
BH CV ECHO MEAS - FS: 33.9 %
BH CV ECHO MEAS - IVS/LVPW: 0.86 CM
BH CV ECHO MEAS - IVSD: 0.89 CM
BH CV ECHO MEAS - LAT PEAK E' VEL: 13.7 CM/SEC
BH CV ECHO MEAS - LV MASS(C)D: 137.3 GRAMS
BH CV ECHO MEAS - LV MAX PG: 4.3 MMHG
BH CV ECHO MEAS - LV MEAN PG: 2.37 MMHG
BH CV ECHO MEAS - LV V1 MAX: 103.2 CM/SEC
BH CV ECHO MEAS - LV V1 VTI: 19.7 CM
BH CV ECHO MEAS - LVIDD: 4.4 CM
BH CV ECHO MEAS - LVIDS: 2.9 CM
BH CV ECHO MEAS - LVOT AREA: 3.1 CM2
BH CV ECHO MEAS - LVOT DIAM: 2 CM
BH CV ECHO MEAS - LVPWD: 1.03 CM
BH CV ECHO MEAS - MED PEAK E' VEL: 11.5 CM/SEC
BH CV ECHO MEAS - MR MAX PG: 48.8 MMHG
BH CV ECHO MEAS - MR MAX VEL: 349.3 CM/SEC
BH CV ECHO MEAS - MV DEC SLOPE: 513.9 CM/SEC2
BH CV ECHO MEAS - MV DEC TIME: 0.25 SEC
BH CV ECHO MEAS - MV E MAX VEL: 104 CM/SEC
BH CV ECHO MEAS - MV MAX PG: 5.6 MMHG
BH CV ECHO MEAS - MV MEAN PG: 2.8 MMHG
BH CV ECHO MEAS - MV P1/2T: 66.6 MSEC
BH CV ECHO MEAS - MV V2 VTI: 17.8 CM
BH CV ECHO MEAS - MVA(P1/2T): 3.3 CM2
BH CV ECHO MEAS - MVA(VTI): 3.5 CM2
BH CV ECHO MEAS - PA ACC TIME: 0.13 SEC
BH CV ECHO MEAS - PA V2 MAX: 74.9 CM/SEC
BH CV ECHO MEAS - PULM DIAS VEL: 40.4 CM/SEC
BH CV ECHO MEAS - PULM S/D: 0.82
BH CV ECHO MEAS - PULM SYS VEL: 33 CM/SEC
BH CV ECHO MEAS - QP/QS: 0.92
BH CV ECHO MEAS - RAP SYSTOLE: 15 MMHG
BH CV ECHO MEAS - RV MAX PG: 1.56 MMHG
BH CV ECHO MEAS - RV V1 MAX: 62.4 CM/SEC
BH CV ECHO MEAS - RV V1 VTI: 14 CM
BH CV ECHO MEAS - RVOT DIAM: 2.27 CM
BH CV ECHO MEAS - RVSP: 46 MMHG
BH CV ECHO MEAS - SV(LVOT): 61.7 ML
BH CV ECHO MEAS - SV(MOD-SP2): 55 ML
BH CV ECHO MEAS - SV(MOD-SP4): 68 ML
BH CV ECHO MEAS - SV(RVOT): 56.8 ML
BH CV ECHO MEAS - TR MAX PG: 26.4 MMHG
BH CV ECHO MEAS - TR MAX VEL: 256.7 CM/SEC
BH CV ECHO MEASUREMENTS AVERAGE E/E' RATIO: 8.25
BH CV XLRA - RV BASE: 3.9 CM
BH CV XLRA - RV LENGTH: 8.2 CM
BH CV XLRA - RV MID: 3.1 CM
BH CV XLRA - TDI S': 11.1 CM/SEC
BILIRUB SERPL-MCNC: 0.6 MG/DL (ref 0–1.2)
BUN SERPL-MCNC: 21 MG/DL (ref 8–23)
BUN/CREAT SERPL: 16.3 (ref 7–25)
CALCIUM SPEC-SCNC: 9 MG/DL (ref 8.6–10.5)
CHLORIDE SERPL-SCNC: 99 MMOL/L (ref 98–107)
CO2 SERPL-SCNC: 25.7 MMOL/L (ref 22–29)
CREAT SERPL-MCNC: 1.29 MG/DL (ref 0.57–1)
DEPRECATED RDW RBC AUTO: 52.2 FL (ref 37–54)
EGFRCR SERPLBLD CKD-EPI 2021: 41.8 ML/MIN/1.73
ERYTHROCYTE [DISTWIDTH] IN BLOOD BY AUTOMATED COUNT: 14.2 % (ref 12.3–15.4)
GLOBULIN UR ELPH-MCNC: 3.2 GM/DL
GLUCOSE SERPL-MCNC: 99 MG/DL (ref 65–99)
HCT VFR BLD AUTO: 39.9 % (ref 34–46.6)
HGB BLD-MCNC: 13.4 G/DL (ref 12–15.9)
LEFT ATRIUM VOLUME INDEX: 36 ML/M2
LV EF BIPLANE MOD: 68.8 %
MCH RBC QN AUTO: 33.8 PG (ref 26.6–33)
MCHC RBC AUTO-ENTMCNC: 33.6 G/DL (ref 31.5–35.7)
MCV RBC AUTO: 100.8 FL (ref 79–97)
NT-PROBNP SERPL-MCNC: 2072 PG/ML (ref 0–1800)
PLATELET # BLD AUTO: 168 10*3/MM3 (ref 140–450)
PMV BLD AUTO: 12 FL (ref 6–12)
POTASSIUM SERPL-SCNC: 3.7 MMOL/L (ref 3.5–5.2)
PROT SERPL-MCNC: 6.8 G/DL (ref 6–8.5)
RBC # BLD AUTO: 3.96 10*6/MM3 (ref 3.77–5.28)
SINUS: 2.8 CM
SODIUM SERPL-SCNC: 136 MMOL/L (ref 136–145)
STJ: 2.15 CM
WBC NRBC COR # BLD AUTO: 8.83 10*3/MM3 (ref 3.4–10.8)

## 2025-02-10 PROCEDURE — 36415 COLL VENOUS BLD VENIPUNCTURE: CPT | Performed by: INTERNAL MEDICINE

## 2025-02-10 PROCEDURE — 25010000002 REMDESIVIR 100 MG/20ML SOLUTION 1 EACH VIAL: Performed by: STUDENT IN AN ORGANIZED HEALTH CARE EDUCATION/TRAINING PROGRAM

## 2025-02-10 PROCEDURE — 25010000002 FUROSEMIDE PER 20 MG: Performed by: STUDENT IN AN ORGANIZED HEALTH CARE EDUCATION/TRAINING PROGRAM

## 2025-02-10 PROCEDURE — 97110 THERAPEUTIC EXERCISES: CPT

## 2025-02-10 PROCEDURE — 99232 SBSQ HOSP IP/OBS MODERATE 35: CPT | Performed by: INTERNAL MEDICINE

## 2025-02-10 PROCEDURE — 85027 COMPLETE CBC AUTOMATED: CPT | Performed by: STUDENT IN AN ORGANIZED HEALTH CARE EDUCATION/TRAINING PROGRAM

## 2025-02-10 PROCEDURE — 3E0333Z INTRODUCTION OF ANTI-INFLAMMATORY INTO PERIPHERAL VEIN, PERCUTANEOUS APPROACH: ICD-10-PCS | Performed by: STUDENT IN AN ORGANIZED HEALTH CARE EDUCATION/TRAINING PROGRAM

## 2025-02-10 PROCEDURE — 80053 COMPREHEN METABOLIC PANEL: CPT | Performed by: INTERNAL MEDICINE

## 2025-02-10 PROCEDURE — 25810000003 SODIUM CHLORIDE 0.9 % SOLUTION 250 ML FLEX CONT: Performed by: STUDENT IN AN ORGANIZED HEALTH CARE EDUCATION/TRAINING PROGRAM

## 2025-02-10 PROCEDURE — 25010000002 DEXAMETHASONE PER 1 MG: Performed by: STUDENT IN AN ORGANIZED HEALTH CARE EDUCATION/TRAINING PROGRAM

## 2025-02-10 PROCEDURE — 83880 ASSAY OF NATRIURETIC PEPTIDE: CPT

## 2025-02-10 PROCEDURE — 71250 CT THORAX DX C-: CPT

## 2025-02-10 PROCEDURE — 97530 THERAPEUTIC ACTIVITIES: CPT

## 2025-02-10 RX ORDER — FUROSEMIDE 10 MG/ML
20 INJECTION INTRAMUSCULAR; INTRAVENOUS DAILY
Status: DISCONTINUED | OUTPATIENT
Start: 2025-02-10 | End: 2025-02-11

## 2025-02-10 RX ORDER — DEXAMETHASONE SODIUM PHOSPHATE 10 MG/ML
6 INJECTION INTRAMUSCULAR; INTRAVENOUS DAILY
Status: DISCONTINUED | OUTPATIENT
Start: 2025-02-10 | End: 2025-02-13 | Stop reason: HOSPADM

## 2025-02-10 RX ADMIN — LEVOTHYROXINE SODIUM 25 MCG: 50 TABLET ORAL at 06:54

## 2025-02-10 RX ADMIN — MEMANTINE 10 MG: 10 TABLET ORAL at 08:36

## 2025-02-10 RX ADMIN — DONEPEZIL HYDROCHLORIDE 10 MG: 10 TABLET, FILM COATED ORAL at 08:37

## 2025-02-10 RX ADMIN — REMDESIVIR 100 MG: 100 INJECTION, POWDER, LYOPHILIZED, FOR SOLUTION INTRAVENOUS at 21:16

## 2025-02-10 RX ADMIN — DEXAMETHASONE SODIUM PHOSPHATE 6 MG: 10 INJECTION INTRAMUSCULAR; INTRAVENOUS at 13:53

## 2025-02-10 RX ADMIN — BISOPROLOL FUMARATE 10 MG: 5 TABLET ORAL at 08:36

## 2025-02-10 RX ADMIN — ASPIRIN 81 MG CHEWABLE TABLET 81 MG: 81 TABLET CHEWABLE at 08:36

## 2025-02-10 RX ADMIN — SERTRALINE HYDROCHLORIDE 150 MG: 50 TABLET, FILM COATED ORAL at 06:55

## 2025-02-10 RX ADMIN — PANTOPRAZOLE SODIUM 40 MG: 40 TABLET, DELAYED RELEASE ORAL at 06:51

## 2025-02-10 RX ADMIN — MEMANTINE 10 MG: 10 TABLET ORAL at 21:16

## 2025-02-10 RX ADMIN — FUROSEMIDE 20 MG: 10 INJECTION, SOLUTION INTRAMUSCULAR; INTRAVENOUS at 08:35

## 2025-02-10 NOTE — PROGRESS NOTES
Patient Name: Raina Forrest  : 1945  MRN: [id number]  Primary Care Physician: Georgiana Cisneros MD  Date of admission: 2025    Patient Care Team:  Georgiana Cisneros MD as PCP - General (Pediatrics)  Nathan Johnson MD as Resident (Gastroenterology)  Robinson Salazar MD as Consulting Physician (Cardiology)        Subjective   Subjective:     Patient is feeling better than yesterday urine output is adequate  Review of systems:  All other review of system unremarkable      Allergies:    Allergies   Allergen Reactions    Bupivacaine Hcl Anaphylaxis    Nepafenac Itching     Eye Drops    Barium-Containing Compounds Other (See Comments)     CONTRAST CAUSES DIARRHEA    Duloxetine Other (See Comments)    Onion Other (See Comments)    Bextra [Valdecoxib] Itching    Cortisone Hives    Cymbalta [Duloxetine Hcl] Itching    Iodinated Contrast Media Unknown - Low Severity     Must be premedicated before use.  See [place] notes in care evrywhere    Medrol [Methylprednisolone] Unknown - Low Severity    Mesalamine Unknown - Low Severity    Polymyxin B-Trimethoprim Unknown - Low Severity    Rivaroxaban Unknown - Low Severity     REACTION UNKNOWN    Tetanus Toxoids Swelling    Tetanus-Diphtheria Toxoids Td Swelling       Objective   Exam:     Vital Signs  Temp:  [96 °F (35.6 °C)-97.5 °F (36.4 °C)] 97.2 °F (36.2 °C)  Heart Rate:  [69-87] 69  Resp:  [18] 18  BP: (101-122)/(46-67) 122/63  SpO2:  [94 %-95 %] 94 %  on   ;   Device (Oxygen Therapy): room air  Body mass index is 22.95 kg/m².    General: Elderly white female in no acute distress.    Head:      Normocephalic and atraumatic.    Eyes:      PERRL/EOM intact, conjunctivae and sclerae clear without nystagmus.    Neck:      No masses, thyromegaly,  trachea central with normal respiratory effort   Lungs:    Bilateral rhonchi.    Heart:      Regular rate and rhythm, no murmur no gallop  Abd:        Soft, nontender, not distended, bowel  sounds positive, no shifting dullness   Pulses:   Pulses palpable  Extr:        No cyanosis or clubbing--no significant edema.    Neuro:    No focal deficits.   alert oriented x3  Skin:       Intact without lesions or rashes.    Psych:    Alert and cooperative; normal mood and affect; .      Results Review:  I have personally reviewed most recent Data :  CBC    Results from last 7 days   Lab Units 02/10/25  0422 02/09/25  0428 02/08/25  1415   WBC 10*3/mm3 8.83 11.83* 11.53*   HEMOGLOBIN g/dL 13.4 12.9 13.0   PLATELETS 10*3/mm3 168 171 177     CMP   Results from last 7 days   Lab Units 02/10/25  0422 02/09/25  0428 02/08/25  1415   SODIUM mmol/L 136 136 136   POTASSIUM mmol/L 3.7 4.1 4.4   CHLORIDE mmol/L 99 99 101   CO2 mmol/L 25.7 24.0 24.0   BUN mg/dL 21 17 15   CREATININE mg/dL 1.29* 1.16* 1.27*   GLUCOSE mg/dL 99 111* 107*   ALBUMIN g/dL 3.6 4.0 4.3   BILIRUBIN mg/dL 0.6 0.6 0.8   ALK PHOS U/L 87 83 83   AST (SGOT) U/L 27 31 35*   ALT (SGPT) U/L 15 14 16     ABG      No radiology results for the last day    Results for orders placed during the hospital encounter of 02/08/25    Adult Transthoracic Echo Complete W/ Cont if Necessary Per Protocol    Interpretation Summary    Left ventricular ejection fraction appears to be 66 - 70%.    Left ventricular diastolic function was indeterminate.    The left atrial cavity is mildly dilated.    The right atrial cavity is moderately  dilated.    Estimated right ventricular systolic pressure from tricuspid regurgitation is moderately elevated (45-55 mmHg).    Scheduled Meds:aspirin, 81 mg, Oral, Daily  bisoprolol, 10 mg, Oral, Daily  dexAMETHasone, 6 mg, Intravenous, Daily  donepezil, 10 mg, Oral, Daily  furosemide, 20 mg, Intravenous, Daily  levothyroxine, 25 mcg, Oral, Q AM  memantine, 10 mg, Oral, Q12H  pantoprazole, 40 mg, Oral, Q AM  remdesivir, 100 mg, Intravenous, Q24H  sertraline, 150 mg, Oral, QAM  sucralfate, 1 g, Oral, BID      Continuous Infusions:   PRN Meds:   acetaminophen **OR** acetaminophen **OR** acetaminophen    albuterol    senna-docusate sodium **AND** polyethylene glycol **AND** bisacodyl **AND** bisacodyl    melatonin    ondansetron ODT **OR** ondansetron    sodium chloride    Assessment & Plan   Assessment and Plan:         Acute exacerbation of CHF (congestive heart failure)    ASSESSMENT:  CKD, single kidney s/p left nephrectomy 2/2 RCC in 12/2023, baseline creatinine ~1.1-1.3, follows with Dr Marga Dill. Past urine studies have all been relatively bland, no significant proteinuria or hematuria.   HFpEF  COVID  Hypertension  leukocytosis  A-fib, status post Watchman device insertion  ulcerative colitis  Hypothyroidism  Osteoarthritis  dementia      echocardiogram was in January 2024, showed EF of 56 to 60%, diastolic function was indeterminate, mild pulmonary hypertension.     PLAN :      Creatinine slightly higher than yesterday but in same range.   As hemodynamics are improving discontinue IV fluid  Will encourage p.o. intake.  Will start home dose of torsemide by tomorrow  Check BNP level  Continue treatment of SARS-CoV-2 infection per primary.  Follow up repeat echo.  Hemoglobin at goal.    Continue strict input and output.          Electronically signed by Jah Bowden MD,   [facility] kidney consultant  [phone]  2/10/2025  15:05 EST

## 2025-02-10 NOTE — THERAPY TREATMENT NOTE
Patient Name: Raina Forrest  : 1945    MRN: [id number]                              Today's Date: 2/10/2025       Admit Date: 2025    Visit Dx:     ICD-10-CM ICD-9-CM   1. Acute dyspnea  R06.00 786.09   2. COVID-19 virus infection  U07.1 079.89   3. Acute on chronic congestive heart failure, unspecified heart failure type  I50.9 428.0   4. Acute renal failure superimposed on chronic kidney disease, unspecified acute renal failure type, unspecified CKD stage  N17.9 584.9    N18.9 585.9     Patient Active Problem List   Diagnosis    Chronic atrial fibrillation    Benign essential HTN    Bradycardia    Chest pain    Can't get food down    Accumulation of fluid in tissues    Esophageal lump    Adynamia    Itch of skin    Anorexia    Chronic nausea    Gastroesophageal reflux disease    Breath shortness    Emesis    Decreased body weight    Anxiety    Narrowing of intervertebral disc space    Diverticulosis of intestine    Primary hypertension    Migraine    Osteoporosis    Palpitations    Pituitary adenoma    Sick sinus syndrome    Diarrhea    Cardiomyopathy    Chronic anticoagulation    Atrial fibrillation    Anticoagulated    Pacemaker    Infected pacemaker    Wound infection    Primary osteoarthritis of left knee    DJD (degenerative joint disease)    Ischemic cardiomyopathy    Acute on chronic congestive heart failure, unspecified heart failure type    Anemia    Presence of Watchman left atrial appendage closure device    Pneumonia    Pneumonia of both lungs due to infectious organism    Stage 3a chronic kidney disease    Influenza A    Severe malnutrition    H/O left radical nephrectomy    Abdominal wall hematoma    Peptic ulcer disease    Hypothyroidism (acquired)    Acute exacerbation of CHF (congestive heart failure)     Past Medical History:   Diagnosis Date    Atrial fibrillation     Fung's esophagus     Benign essential hypertension     Blood clot in vein     Cardiomyopathy     Chronic  gastritis     Congestive heart failure     Degenerative disc disease     Dementia     Pt  states she has slight dementia    Depression with anxiety     Disease of thyroid gland     Diverticulitis of colon     Dysphagia     GERD (gastroesophageal reflux disease)     History of chest pain     History of migraine     History of migraine headaches     Left knee pain     Osteoporosis     Palpitations     Thyroid disorder     Thyroid nodule     Ulcerative colitis      Past Surgical History:   Procedure Laterality Date    APPENDECTOMY      BLADDER SURGERY      CARDIAC CATHETERIZATION N/A 11/21/2016    Procedure: Left Heart Cath;  Surgeon: Robinson Salazar MD;  Location:  [facility];  Service:     CARDIAC ELECTROPHYSIOLOGY PROCEDURE N/A 2/7/2017    Procedure: Pacemaker DC new   [employer];  Surgeon: Robinson Salazar MD;  Location:  [facility];  Service:     CARDIOVERSION  01/18/2017    CHOLECYSTECTOMY      COLONOSCOPY      COLONOSCOPY N/A 7/29/2016    normal    ENDOSCOPY  05/06/2015    submucosal nodule in esophagus, erythematous mucosa in antrum,moderate acute gastritis, no h-pylori    ENDOSCOPY N/A 9/2/2016    Erythematous mucosa in the antrum    EYE SURGERY Left     cataract    HYSTERECTOMY      KNEE MENISCAL REPAIR Left     THYROID SURGERY Left     TONSILLECTOMY      TOTAL KNEE ARTHROPLASTY Left 6/16/2020    Procedure: LEFT TOTAL KNEE ARTHOPLASTY+;  Surgeon: Jamal Castañeda MD;  Location: [facility];  Service: Orthopedics;  Laterality: Left;      General Information       Row Name 02/10/25 0829          Physical Therapy Time and Intention    Document Type therapy note (daily note)  -PH     Mode of Treatment physical therapy  -PH       Row Name 02/10/25 0829          General Information    Existing Precautions/Restrictions fall  -PH       Row Name 02/10/25 0829          Safety Issues/Impairments Affecting Functional Mobility    Impairments Affecting Function  (Mobility) balance;endurance/activity tolerance;strength  -PH     Comment, Safety Issues/Impairments (Mobility) gt belt and non skid socks donned  -PH               User Key  (r) = Recorded By, (t) = Taken By, (c) = Cosigned By      Initials Name Provider Type    PH Pippa De Los Santos PTA Physical Therapist Assistant                   Mobility       Row Name 02/10/25 0831          Bed Mobility    Bed Mobility supine-sit;sit-supine  -PH     Supine-Sit Johnson (Bed Mobility) standby assist  -PH     Assistive Device (Bed Mobility) bed rails;head of bed elevated  -PH       Row Name 02/10/25 0831          Sit-Stand Transfer    Sit-Stand Johnson (Transfers) contact guard;minimum assist (75% patient effort);verbal cues;nonverbal cues (demo/gesture)  -PH     Assistive Device (Sit-Stand Transfers) walker, front-wheeled  -PH     Comment, (Sit-Stand Transfer) sit to stand 2x - from EOB w/ min A and from toilet w/ CGA; cues for B hand placement  -PH       Row Name 02/10/25 0831          Gait/Stairs (Locomotion)    Johnson Level (Gait) contact guard;verbal cues;nonverbal cues (demo/gesture)  -PH     Assistive Device (Gait) walker, front-wheeled  -PH     Distance in Feet (Gait) 32  15' to toilet then approx 18' to chair  -PH     Deviations/Abnormal Patterns (Gait) base of support, narrow;mio decreased;gait speed decreased  -PH     Bilateral Gait Deviations forward flexed posture;heel strike decreased  -PH     Johnson Level (Stairs) unable to assess  -PH     Comment, (Gait/Stairs) cues to remain w/ fww w/ pt attempting to let go when entering BR and when nearing chair. slow w/ no overt LOB  -PH               User Key  (r) = Recorded By, (t) = Taken By, (c) = Cosigned By      Initials Name Provider Type    Pippa Azul PTA Physical Therapist Assistant                   Obj/Interventions       Row Name 02/10/25 0833          Motor Skills    Therapeutic Exercise other (see comments)  BAP,  LAQ, seated march  -PH       Row Name 02/10/25 0833          Balance    Balance Assessment sitting dynamic balance;sitting static balance;standing static balance  -PH     Static Sitting Balance standby assist  -PH     Dynamic Sitting Balance standby assist  -PH     Static Standing Balance contact guard  -PH     Position/Device Used, Standing Balance walker, front-wheeled  -PH               User Key  (r) = Recorded By, (t) = Taken By, (c) = Cosigned By      Initials Name Provider Type    PH Pippa De Los Santos PTA Physical Therapist Assistant                   Goals/Plan    No documentation.                  Clinical Impression       Row Name 02/10/25 0834          Pain    Pain Management Interventions exercise or physical activity utilized  -PH     Response to Pain Interventions activity and movement patterns unchanged  -PH     Pre/Posttreatment Pain Comment reports abd discomfort although son states pt has been reporting pain in chest 2/2 congestion  -PH       Row Name 02/10/25 0834          Plan of Care Review    Plan of Care Reviewed With patient;child  -PH     Progress no change  -PH     Outcome Evaluation Pt was seen by PT this AM. Pt was in bed w/ son in room and very attentive to pt's needs. Pt sat up to EOB w/ SBA. Pt stood w/ min A and use of fww. Pt amb to BR toilet, followed by to sink then to chair req CGA and use of fww. Pt was slow w/ no overt LOB. Pt was directed to keep w/ fww 2x as pt attempted to let go when near BR and near chair. Pt performed B LE ther ex x 10 reps and was UIC w/ breakfast at end of session.  -PH       Row Name 02/10/25 0834          Vital Signs    O2 Delivery Pre Treatment room air  -PH     O2 Delivery Intra Treatment room air  -PH     O2 Delivery Post Treatment room air  -PH       Row Name 02/10/25 0834          Positioning and Restraints    Pre-Treatment Position in bed  -PH     Post Treatment Position chair  -PH     In Chair notified nsg;reclined;legs elevated;call light  within reach;encouraged to call for assist;exit alarm on;with family/caregiver  -               User Key  (r) = Recorded By, (t) = Taken By, (c) = Cosigned By      Initials Name Provider Type     Pippa De Los Santos PTA Physical Therapist Assistant                   Outcome Measures       Row Name 02/10/25 0837 02/10/25 0231       How much help from another person do you currently need...    Turning from your back to your side while in flat bed without using bedrails? 4  -PH 4  -KS    Moving from lying on back to sitting on the side of a flat bed without bedrails? 3  -PH 4  -KS    Moving to and from a bed to a chair (including a wheelchair)? 3  -PH 3  -KS    Standing up from a chair using your arms (e.g., wheelchair, bedside chair)? 3  -PH 3  -KS    Climbing 3-5 steps with a railing? 3  -PH 3  -KS    To walk in hospital room? 3  -PH 3  -KS    AM-PAC 6 Clicks Score (PT) 19  -PH 20  -KS    Highest Level of Mobility Goal 6 --> Walk 10 steps or more  -PH 6 --> Walk 10 steps or more  -KS      Row Name 02/10/25 0837          Functional Assessment    Outcome Measure Options AM-PAC 6 Clicks Basic Mobility (PT)  -               User Key  (r) = Recorded By, (t) = Taken By, (c) = Cosigned By      Initials Name Provider Type     Pippa De Los Santos PTA Physical Therapist Assistant    Allen Tineo RN Registered Nurse                                 Physical Therapy Education       Title: PT OT SLP Therapies (Done)       Topic: Physical Therapy (Done)       Point: Mobility training (Done)       Learning Progress Summary            Patient Acceptance, E,TB,D, VU,NR by  at 2/10/2025 0837    Acceptance, E, NR by DB at 2/9/2025 1449                      Point: Home exercise program (Done)       Learning Progress Summary            Patient Acceptance, E,TB,D, VU,NR by  at 2/10/2025 0837    Acceptance, E, NR by DB at 2/9/2025 1449                      Point: Body mechanics (Done)       Learning Progress Summary             Patient Acceptance, E,TB,D, VU,NR by  at 2/10/2025 0837    Acceptance, E, NR by DB at 2/9/2025 1449                      Point: Precautions (Done)       Learning Progress Summary            Patient Acceptance, E,TB,D, VU,NR by  at 2/10/2025 0837    Acceptance, E, NR by DB at 2/9/2025 1449                                      User Key       Initials Effective Dates Name Provider Type Discipline    DB 06/16/21 -  Sylvia Lares, MERCEDES Physical Therapist PT     06/16/21 -  Pippa De Los Santos PTA Physical Therapist Assistant PT                  PT Recommendation and Plan     Progress: no change  Outcome Evaluation: Pt was seen by PT this AM. Pt was in bed w/ son in room and very attentive to pt's needs. Pt sat up to EOB w/ SBA. Pt stood w/ min A and use of fww. Pt amb to BR toilet, followed by to sink then to chair req CGA and use of fww. Pt was slow w/ no overt LOB. Pt was directed to keep w/ fww 2x as pt attempted to let go when near BR and near chair. Pt performed B LE ther ex x 10 reps and was UIC w/ breakfast at end of session.     Time Calculation:         PT Charges       Row Name 02/10/25 0838             Time Calculation    Start Time 0803  -PH      Stop Time 0826  -PH      Time Calculation (min) 23 min  -PH      PT Received On 02/10/25  -PH      PT - Next Appointment 02/11/25  -PH         Timed Charges    58042 - PT Therapeutic Exercise Minutes 3  -PH      79448 - PT Therapeutic Activity Minutes 20  -PH         Total Minutes    Timed Charges Total Minutes 23  -PH       Total Minutes 23  -PH                User Key  (r) = Recorded By, (t) = Taken By, (c) = Cosigned By      Initials Name Provider Type     Pippa De Los Santos PTA Physical Therapist Assistant                  Therapy Charges for Today       Code Description Service Date Service Provider Modifiers Qty    28761556045 HC PT THERAPEUTIC ACT EA 15 MIN 2/10/2025 Pippa De Los Santos PTA GP 1    79331879470 HC PT THER PROC EA  15 MIN 2/10/2025 Pippa De Los Santos, PTA GP 1            PT G-Codes  Outcome Measure Options: AM-PAC 6 Clicks Basic Mobility (PT)  AM-PAC 6 Clicks Score (PT): 19  PT Discharge Summary  Anticipated Discharge Disposition (PT): home with 24/7 care, home with home health    Pippa De Los Santos PTA  2/10/2025

## 2025-02-10 NOTE — PROGRESS NOTES
Name: Raina Forrest ADMIT: 2025   : 1945  PCP: Georgiana Cisneros MD    MRN: [id number] LOS: 1 days   AGE/SEX: 79 y.o. female  ROOM: 1/     Subjective   Subjective   2025  Case discussed with son at bedside as patient has Alzheimer's dementia.  She is afebrile, on room air, and without distress.    2/10/2025  She is afebrile and on room air but does complain of increased cough with wheezing.  They are agreeable to steroids in addition to remdesivir.  Case discussed with son we will obtain a CT chest.       Objective   Objective   Vital Signs  Temp:  [96 °F (35.6 °C)-98 °F (36.7 °C)] 97.2 °F (36.2 °C)  Heart Rate:  [69-87] 69  Resp:  [18] 18  BP: ()/(46-77) 122/63  SpO2:  [94 %-95 %] 94 %  on   ;   Device (Oxygen Therapy): room air  Body mass index is 22.95 kg/m².  Physical Exam  Constitutional:       General: She is not in acute distress.     Appearance: She is not toxic-appearing.      Comments: Patient with Alzheimer's dementia   HENT:      Head: Normocephalic and atraumatic.      Nose: No congestion.      Mouth/Throat:      Pharynx: Oropharynx is clear. No oropharyngeal exudate.   Eyes:      General: No scleral icterus.  Cardiovascular:      Rate and Rhythm: Normal rate and regular rhythm.      Heart sounds: No murmur heard.     No friction rub. No gallop.   Pulmonary:      Effort: No respiratory distress.      Breath sounds: No wheezing or rales.   Abdominal:      General: There is no distension.      Tenderness: There is no abdominal tenderness. There is no guarding.   Musculoskeletal:         General: No swelling, deformity or signs of injury.      Cervical back: Normal range of motion. No rigidity.   Skin:     Coloration: Skin is not jaundiced.      Findings: No lesion.      Comments: Bruising and swelling around left knee   Neurological:      Mental Status: Mental status is at baseline.      Motor: Weakness present.       Results Review     I reviewed the patient's new  "clinical results.  Results from last 7 days   Lab Units 02/10/25  0422 02/09/25  0428 02/08/25  1415   WBC 10*3/mm3 8.83 11.83* 11.53*   HEMOGLOBIN g/dL 13.4 12.9 13.0   PLATELETS 10*3/mm3 168 171 177     Results from last 7 days   Lab Units 02/10/25  0422 02/09/25  0428 02/08/25  1415   SODIUM mmol/L 136 136 136   POTASSIUM mmol/L 3.7 4.1 4.4   CHLORIDE mmol/L 99 99 101   CO2 mmol/L 25.7 24.0 24.0   BUN mg/dL 21 17 15   CREATININE mg/dL 1.29* 1.16* 1.27*   GLUCOSE mg/dL 99 111* 107*   EGFR mL/min/1.73 41.8* 47.5* 42.6*     Results from last 7 days   Lab Units 02/10/25  0422 02/09/25  0428 02/08/25  1415   ALBUMIN g/dL 3.6 4.0 4.3   BILIRUBIN mg/dL 0.6 0.6 0.8   ALK PHOS U/L 87 83 83   AST (SGOT) U/L 27 31 35*   ALT (SGPT) U/L 15 14 16     Results from last 7 days   Lab Units 02/10/25  0422 02/09/25  0428 02/08/25  1415   CALCIUM mg/dL 9.0 9.3 9.4   ALBUMIN g/dL 3.6 4.0 4.3     Results from last 7 days   Lab Units 02/08/25  1415   PROCALCITONIN ng/mL 0.09   LACTATE mmol/L 2.0     No results found for: \"HGBA1C\", \"POCGLU\"    XR Chest 1 View    Result Date: 2/8/2025  Increased apparent size of the cardiac shadow, as described. Slight pulmonary vascular congestion. No focal pulmonary consolidation. Follow-up/further evaluation can be obtained as indicated.  This report was finalized on 2/8/2025 2:10 PM by Dr. Saeid Lopez M.D on Workstation: [id number]       I have personally reviewed all medications:  Scheduled Medications  aspirin, 81 mg, Oral, Daily  bisoprolol, 10 mg, Oral, Daily  dexAMETHasone, 6 mg, Intravenous, Daily  donepezil, 10 mg, Oral, Daily  furosemide, 20 mg, Intravenous, Daily  levothyroxine, 25 mcg, Oral, Q AM  memantine, 10 mg, Oral, Q12H  pantoprazole, 40 mg, Oral, Q AM  remdesivir, 100 mg, Intravenous, Q24H  sertraline, 150 mg, Oral, QAM  sucralfate, 1 g, Oral, BID    Infusions   Diet  Diet: Cardiac; Healthy Heart (2-3 Na+); Fluid Consistency: Thin (IDDSI 0)    I have personally reviewed:  [x]  " Laboratory   [x]  Microbiology   [x]  Radiology   [x]  EKG/Telemetry  [x]  Cardiology/Vascular   []  Pathology    []  Records       Assessment/Plan     Active Hospital Problems    Diagnosis  POA    **Acute exacerbation of CHF (congestive heart failure) [I50.9]  Yes      Resolved Hospital Problems   No resolved problems to display.       79 y.o. female admitted with Acute exacerbation of CHF (congestive heart failure).    #Acute on chronic HFpEF  #CAD  #A-fib, permanent    -Echo dated 1/23/2024 shows EF 56 to 60%    - HS troponin 40 > 40, delta -5, doubt acs    - proBNP 4547    - Lasix de-escalated to 20 mg IV daily    -Chest x-ray shows slight pulmonary vascular congestion    -Repeat echo    -Intake and output, weigh daily    -Cardiology consulted    -Resume home aspirin and bisoprolol    -EKG shows rate controlled A-fib    -TSH elevated but free T4 and free T3 within normal limits    -Status post Watchman device    #COVID    -Tested positive at urgent care for COVID    -Remdesivir, patient to complete 5-day course or until improved    -Will start Decadron 6 mg IV daily, discussed with son and he is in agreement with plan     -CT chest    -Patient is afebrile, on room air, and without leukocytosis, do not see any indication for antibiotics at this time    #Status post nephrectomy    -Patient follows with nephrology, will consult    -Nephrology following    #Dementia    -Resume home Aricept and Namenda    -Sitter may be needed    #h/o fall  #Left knee injury    -Admits to fall in the past with left knee injury, was worked up by Ortho and no intervention necessary as per son    -Son states knee is still swollen and bruised, will check left knee x-ray    -Left knee x-ray without acute fracture or dislocation    -PT    #Hypothyroid    -Resume home Synthroid    #Anxiety    -Resume home Zoloft    #GERD    -Resume home Carafate    SCDs for DVT prophylaxis.  Full code.  Discussed with patient.  Anticipate discharge home  with HH vs SNU facility in 2-3 days.  Expected Discharge Date: 2/11/2025; Expected Discharge Time:       DO [facility]  02/10/25  11:04 EST

## 2025-02-10 NOTE — DISCHARGE PLACEMENT REQUEST
"Shannan Forrest (79 y.o. Female)       Date of Birth   1945    Social Security Number       [place]   [address] [place] [address]    Home Phone   [phone]    MRN   [id number]       [facility]    Marital Status                               Admission Date   2/8/25    Admission Type   Emergency    Admitting Provider   Nilam Bullock MD    Attending Provider   Lolita Osborne DO    Department, Room/Bed   [facility] CARDIOVASC UNIT, 2201/1       Discharge Date       Discharge Disposition       Discharge Destination                                 Attending Provider: Lolita Osborne DO    Allergies: Bupivacaine Hcl, Nepafenac, Barium-containing Compounds, Duloxetine, Onion, Bextra [Valdecoxib], Cortisone, Cymbalta [Duloxetine Hcl], Iodinated Contrast Media, Medrol [Methylprednisolone], Mesalamine, Polymyxin B-trimethoprim, Rivaroxaban, Tetanus Toxoids, Tetanus-diphtheria Toxoids Td    Isolation: Enh Drop/Con   Infection: COVID (confirmed) (02/10/25)   Code Status: CPR    Ht: 172 cm (67.72\")   Wt: 67.9 kg (149 lb 11.2 oz)    Admission Cmt: None   Principal Problem: Acute exacerbation of CHF (congestive heart failure) [I50.9]                   Active Insurance as of 2/8/2025       Primary Coverage       Payor Plan Insurance Group Employer/Plan Group    MEDICARE MEDICARE A & B        Payor Plan Address Payor Plan Phone Number Payor Plan Fax Number Effective Dates    [address] [phone]  12/1/2008 - None Entered    [place] [id number]         Subscriber Name Subscriber Birth Date Member ID       SAHNNAN FORREST 1945 [id number]               Secondary Coverage       Payor Plan Insurance Group Employer/Plan Group    [employer]HE[employer] BLUE CROSS [employer] BLUE CROSS BLUE SHIELD PPO [id number]       Payor Plan Address Payor Plan Phone Number Payor Plan Fax Number Effective Dates    [address] [phone]  1/1/2024 - None Entered    [place] [id number]   "       Subscriber Name Subscriber Birth Date Member ID       MARY LIMA SR. 5/7/1941 [id number]                     Emergency Contacts        (Rel.) Home Phone Work Phone Mobile Phone    Mary Lima SR (Spouse) [phone]    MARY Lima JR (Son) [phone] -- [phone]

## 2025-02-10 NOTE — PLAN OF CARE
Goal Outcome Evaluation:     Progress: improving    Outcome Evaluation: Pt with hx of dementia a/o x2-3 with son at bedside overnight. VSS on room air. Afib on the tele monitor with HR 70-80. Remdesivir given per order. Pt up with staff assist to bathroom with good UOP overnight. No complaints of pain. Will update as needed.

## 2025-02-10 NOTE — PLAN OF CARE
Goal Outcome Evaluation:  Plan of Care Reviewed With: patient, child        Progress: no change  Outcome Evaluation: VSS. A-fib on monitor. Up to bathroom multiple times unable to measure urine, voids over hat. Had to changebed and clean patient this afternoon d/t sweating. No increased temperature noted, but patient did state she was hot. Decadron added today. Son at bedside.

## 2025-02-10 NOTE — CASE MANAGEMENT/SOCIAL WORK
"Continued Stay Note  [facility]     Patient Name: Raina Forrest  MRN: [id number]  Today's Date: 2/10/2025    Admit Date: 2/8/2025    Plan: Home w/ spouse & son assist; [facility] ACCEPTED.   Discharge Plan       Row Name 02/10/25 1719       Plan    Plan Home w/ spouse & son assist; [facility] ACCEPTED.      Row Name 02/10/25 1432       Plan    Plan Home w/ spouse; HH referral pending.    Plan Comments Patient with Alzheimer's dementia.  Patient in COVID isolation.  CCP spoke with patient son/Benito Forrest Jr in [facility] and introduced self and role.  Discharge planning discussed.  Information on face sheet verified.  Patient lives with spouse/Benito Forrest SR in a single-story home with a basement and two entrance stair steps.  Pt does not ambulate downstairs to basement.  Benito Bernal lives 20minutes away and visits patient often and if he is not available, his parents neighbor/Aiden is, \"always willing to help if needed.\" Patients PCP confirmed as, Georgiana Cisneros and home pharmacy is [facility].  Patient is IADL's, she cleans, vacuums and still does laundry.  Patient has the following DME- straight cane, rolling walker, grab bars, BP cuff, scale and BSC.  Patient has used [facility] in the past.  Son advised she has never been to a sub-acute rehab.  Physical therapy has recommended home health for Pt at d/c.  Son wants [facility] since she has used them before.  Son ok with [facility]  if for some reason [facility] unable to accept.  Referral for Home health sent to [facility] and pending.  Son will transport Pt home at d/c.  CCP will continue to follow….…Bertha QUINTANILLA /JENNIFER.                   Discharge Codes    No documentation.                 Expected Discharge Date and Time       Expected Discharge Date Expected Discharge Time    Feb 12, 2025               Bertha Flynn RN    "

## 2025-02-10 NOTE — PROGRESS NOTES
[facility]  Cardiology Progress Note    Patient Identification:  Name: Raina Forrest  Age: 79 y.o.  Sex: female  :  1945  MRN: [id number]                 Follow Up / Chief Complaint: Follow up for CHF    Interval History: Resting in bed, no chest pain, son feels like coughing more today, and she reports feeling short of breath today a little better than admission. Slept better last night.        Objective:    Past Medical History:  Past Medical History:   Diagnosis Date    Atrial fibrillation     Fung's esophagus     Benign essential hypertension     Blood clot in vein     Cardiomyopathy     Chronic gastritis     Congestive heart failure     Degenerative disc disease     Dementia     Pt  states she has slight dementia    Depression with anxiety     Disease of thyroid gland     Diverticulitis of colon     Dysphagia     GERD (gastroesophageal reflux disease)     History of chest pain     History of migraine     History of migraine headaches     Left knee pain     Osteoporosis     Palpitations     Thyroid disorder     Thyroid nodule     Ulcerative colitis      Past Surgical History:  Past Surgical History:   Procedure Laterality Date    APPENDECTOMY      BLADDER SURGERY      CARDIAC CATHETERIZATION N/A 2016    Procedure: Left Heart Cath;  Surgeon: Robinson Salazar MD;  Location:  [facility];  Service:     CARDIAC ELECTROPHYSIOLOGY PROCEDURE N/A 2017    Procedure: Pacemaker DC new   [employer];  Surgeon: Robinson Salazar MD;  Location:  [facility];  Service:     CARDIOVERSION  2017    CHOLECYSTECTOMY      COLONOSCOPY      COLONOSCOPY N/A 2016    normal    ENDOSCOPY  2015    submucosal nodule in esophagus, erythematous mucosa in antrum,moderate acute gastritis, no h-pylori    ENDOSCOPY N/A 2016    Erythematous mucosa in the antrum    EYE SURGERY Left     cataract    HYSTERECTOMY      KNEE MENISCAL REPAIR  Left     THYROID SURGERY Left     TONSILLECTOMY      TOTAL KNEE ARTHROPLASTY Left 6/16/2020    Procedure: LEFT TOTAL KNEE ARTHOPLASTY+;  Surgeon: Jamal Castañeda MD;  Location: [facility];  Service: Orthopedics;  Laterality: Left;        Social History:   Social History     Tobacco Use    Smoking status: Never    Smokeless tobacco: Never   Substance Use Topics    Alcohol use: No      Family History:  Family History   Problem Relation Age of Onset    Lung cancer Other     Ulcerative colitis Sister     Heart failure Mother     Heart disease Mother     Heart failure Father     Heart disease Father     Malig Hyperthermia Neg Hx           Allergies:  Allergies   Allergen Reactions    Bupivacaine Hcl Anaphylaxis    Nepafenac Itching     Eye Drops    Barium-Containing Compounds Other (See Comments)     CONTRAST CAUSES DIARRHEA    Duloxetine Other (See Comments)    Onion Other (See Comments)    Bextra [Valdecoxib] Itching    Cortisone Hives    Cymbalta [Duloxetine Hcl] Itching    Iodinated Contrast Media Unknown - Low Severity     Must be premedicated before use.  See [place] notes in care evrywhere    Medrol [Methylprednisolone] Unknown - Low Severity    Mesalamine Unknown - Low Severity    Polymyxin B-Trimethoprim Unknown - Low Severity    Rivaroxaban Unknown - Low Severity     REACTION UNKNOWN    Tetanus Toxoids Swelling    Tetanus-Diphtheria Toxoids Td Swelling     Scheduled Meds:  aspirin, 81 mg, Daily  bisoprolol, 10 mg, Daily  donepezil, 10 mg, Daily  furosemide, 20 mg, Daily  levothyroxine, 25 mcg, Q AM  memantine, 10 mg, Q12H  pantoprazole, 40 mg, Q AM  remdesivir, 100 mg, Q24H  sertraline, 150 mg, QAM  sucralfate, 1 g, BID            INTAKE AND OUTPUT:    Intake/Output Summary (Last 24 hours) at 2/10/2025 0964  Last data filed at 2/10/2025 0441  Gross per 24 hour   Intake 240 ml   Output 1050 ml   Net -810 ml       ROS  Constitutional: Awake and alert, no fever. No nosebleeds  Abdomen           no  "abdominal pain   Cardiac              no chest pain  Pulmonary          no shortness of breath      /63 (BP Location: Right arm, Patient Position: Lying)   Pulse 69   Temp 97.2 °F (36.2 °C) (Oral)   Resp 18   Ht 172 cm (67.72\")   Wt 67.9 kg (149 lb 11.2 oz)   SpO2 94%   BMI 22.95 kg/m²   General appearance: No acute changes   Neck: Trachea midline; NECK, supple, no thyromegaly or lymphadenopathy   Lungs: Normal size and shape, normal breath sounds, equal distribution of air, no rales or rhonchi   CV: S1-S2 regular, no murmurs, no rub, no gallop   Abdomen: Soft, nontender; no masses , no abnormal abdominal sounds   Extremities: No deformity , normal color , no peripheral edema   Skin: Normal temperature, turgor and texture; no rash, ulcers              Cardiographics  ECG:     Echocardiogram:     Interpretation Summary         Left ventricular ejection fraction appears to be 56 - 60%.    Left ventricular diastolic function was indeterminate.    The left atrial cavity is moderately dilated.    Left atrial volume is moderately increased.    The right atrial cavity is borderline dilated.    Estimated right ventricular systolic pressure from tricuspid regurgitation is normal (<35 mmHg).    Mild pulmonary hypertension is present.     Lab Review   Results from last 7 days   Lab Units 02/08/25  1610 02/08/25  1415   HSTROP T ng/L 40* 45*         Results from last 7 days   Lab Units 02/10/25  0422   SODIUM mmol/L 136   POTASSIUM mmol/L 3.7   BUN mg/dL 21   CREATININE mg/dL 1.29*   CALCIUM mg/dL 9.0     @[id number]@  Results from last 7 days   Lab Units 02/10/25  0422 02/09/25  0428 02/08/25  1415   WBC 10*3/mm3 8.83 11.83* 11.53*   HEMOGLOBIN g/dL 13.4 12.9 13.0   HEMATOCRIT % 39.9 37.0 38.0   PLATELETS 10*3/mm3 168 171 177             Assessment:    Acute on chronic heart failure with a preserved ejection fraction  Permanent atrial fibrillation  Covid  Volume " "overload  Anxiety  Anticoagulation    Plan:    Patient has responded well to the diuretics rate is well-controlled    Vascular congestion much better    )2/10/2025  Robinson Salazar MD      EMR Dragon/Transcription:   \"Dictated utilizing Dragon dictation\".     "

## 2025-02-10 NOTE — CASE MANAGEMENT/SOCIAL WORK
Discharge Planning Assessment  [facility]     Patient Name: Raina Forrest  MRN: [id number]  Today's Date: 2/10/2025    Admit Date: 2/8/2025    Plan: Home w/ spouse;  referral pending.   Discharge Needs Assessment       Row Name 02/10/25 1423       Living Environment    People in Home spouse    Name(s) of People in Home Benito Sr.    Unique Family Situation Son/Asotin Jr assist often and neighbor/Aiden very helpful if needed.    Current Living Arrangements home    Potentially Unsafe Housing Conditions none    Primary Care Provided by spouse/significant other    Provides Primary Care For no one, unable/limited ability to care for self    Family Caregiver if Needed child(justin), adult;spouse    Quality of Family Relationships helpful;involved;supportive    Able to Return to Prior Arrangements yes       Resource/Environmental Concerns    Resource/Environmental Concerns none    Transportation Concerns none       Transportation Needs    In the past 12 months, has lack of transportation kept you from medical appointments or from getting medications? no       Food Insecurity    Within the past 12 months, you worried that your food would run out before you got the money to buy more. Never true       Transition Planning    Patient/Family Anticipates Transition to home with family    Patient/Family Anticipated Services at Transition none    Transportation Anticipated family or friend will provide       Discharge Needs Assessment    Readmission Within the Last 30 Days no previous admission in last 30 days    Equipment Currently Used at Home cane, straight;walker, rolling;grab bar;scales;bp cuff;commode    Concerns to be Addressed no discharge needs identified;denies needs/concerns at this time    Anticipated Changes Related to Illness none    Equipment Needed After Discharge none    Provided Post Acute Provider Quality & Resource List? Refused    Refused Quality and Resource List Comment Son declined need for list       "             Discharge Plan       Row Name 02/10/25 1430       Plan    Plan Home w/ spouse; HH referral pending.    Plan Comments Patient with Alzheimer's dementia.  Patient in COVID isolation.  CCP spoke with patient son/Benito Forrest Jr in [facility] and introduced self and role.  Discharge planning discussed.  Information on face sheet verified.  Patient lives with spouse/Benito Forrest SR in a single-story home with a basement and two entrance stair steps.  Pt does not ambulate downstairs to basement.  Benito Bernal lives [place]minutes away and visits patient often and if he is not available, his parents neighbor/Aiden is, \"always willing to help if needed.\" Patients PCP confirmed as, Georgiana Cisneros and home pharmacy is [facility].  Patient is IADL's, she cleans, vacuums and still does laundry.  Patient has the following DME- straight cane, rolling walker, grab bars, BP cuff, scale and BSC.  Patient has used [facility] in the past.  Son advised she has never been to a sub-acute rehab.  Physical therapy has recommended home health for Pt at d/c.  Son wants [facility] since she has used them before.  Son ok with [facility]  if for some reason [facility] unable to accept.  Referral for Home health sent to [facility] and pending.  Son will transport Pt home at d/c.  [facility] will continue to follow….…Bertha QUINTANILLA /JENNIFER.                  Continued Care and Services - Admitted Since 2/8/2025       Home Medical Care       Service Provider Request Status Services Address Phone Fax Patient Preferred    [facility] Pending - Request Sent -- [address] [place] [address] [address] [address] [phone] --                  Expected Discharge Date and Time       Expected Discharge Date Expected Discharge Time    Feb 12, 2025            Demographic Summary       Row Name 02/10/25 1419       General Information    Admission Type inpatient    Arrived From home    Required Notices Provided Important Message " from Medicare    Referral Source admission list    Reason for Consult discharge planning    Preferred Language English       Contact Information    Permission Granted to Share Info With                    Functional Status       Row Name 02/10/25 1421       Functional Status    Usual Activity Tolerance moderate    Current Activity Tolerance moderate       Assessment of Health Literacy    How often do you have someone help you read hospital materials? Often    Health Literacy Good       Functional Status, IADL    Medications assistive person    Meal Preparation assistive person    Housekeeping assistive person    Laundry assistive person    Shopping assistive person       Mental Status Summary    Mental Status Comments S/W son in [place] as Pt is in COVID isolation and has alzheimers.       Employment/    Employment Status retired                   Psychosocial    No documentation.                  Abuse/Neglect    No documentation.                  Legal    No documentation.                  Substance Abuse    No documentation.                  Patient Forms    No documentation.                     Bertha Flynn RN

## 2025-02-10 NOTE — PLAN OF CARE
Goal Outcome Evaluation:  Plan of Care Reviewed With: patient, child        Progress: no change  Outcome Evaluation: Pt was seen by PT this AM. Pt was in bed w/ son in room and very attentive to pt's needs. Pt sat up to EOB w/ SBA. Pt stood w/ min A and use of fww. Pt amb to BR toilet, followed by to sink then to chair req CGA and use of fww. Pt was slow w/ no overt LOB. Pt was directed to keep w/ fww 2x as pt attempted to let go when near BR and near chair. Pt performed B LE ther ex x 10 reps and was UIC w/ breakfast at end of session.    Anticipated Discharge Disposition (PT): home with 24/7 care, home with home health

## 2025-02-11 LAB
ALBUMIN SERPL-MCNC: 4 G/DL (ref 3.5–5.2)
ALBUMIN/GLOB SERPL: 1.2 G/DL
ALP SERPL-CCNC: 96 U/L (ref 39–117)
ALT SERPL W P-5'-P-CCNC: 13 U/L (ref 1–33)
ANION GAP SERPL CALCULATED.3IONS-SCNC: 12.6 MMOL/L (ref 5–15)
AST SERPL-CCNC: 25 U/L (ref 1–32)
BILIRUB SERPL-MCNC: 0.5 MG/DL (ref 0–1.2)
BUN SERPL-MCNC: 22 MG/DL (ref 8–23)
BUN/CREAT SERPL: 18.5 (ref 7–25)
CALCIUM SPEC-SCNC: 9.5 MG/DL (ref 8.6–10.5)
CHLORIDE SERPL-SCNC: 101 MMOL/L (ref 98–107)
CO2 SERPL-SCNC: 24.4 MMOL/L (ref 22–29)
CREAT SERPL-MCNC: 1.19 MG/DL (ref 0.57–1)
DEPRECATED RDW RBC AUTO: 51.4 FL (ref 37–54)
EGFRCR SERPLBLD CKD-EPI 2021: 46 ML/MIN/1.73
ERYTHROCYTE [DISTWIDTH] IN BLOOD BY AUTOMATED COUNT: 14 % (ref 12.3–15.4)
GLOBULIN UR ELPH-MCNC: 3.3 GM/DL
GLUCOSE SERPL-MCNC: 112 MG/DL (ref 65–99)
HCT VFR BLD AUTO: 40.6 % (ref 34–46.6)
HGB BLD-MCNC: 13.5 G/DL (ref 12–15.9)
MCH RBC QN AUTO: 33.2 PG (ref 26.6–33)
MCHC RBC AUTO-ENTMCNC: 33.3 G/DL (ref 31.5–35.7)
MCV RBC AUTO: 99.8 FL (ref 79–97)
PLATELET # BLD AUTO: 196 10*3/MM3 (ref 140–450)
PMV BLD AUTO: 11.9 FL (ref 6–12)
POTASSIUM SERPL-SCNC: 4.3 MMOL/L (ref 3.5–5.2)
PROT SERPL-MCNC: 7.3 G/DL (ref 6–8.5)
RBC # BLD AUTO: 4.07 10*6/MM3 (ref 3.77–5.28)
SODIUM SERPL-SCNC: 138 MMOL/L (ref 136–145)
WBC NRBC COR # BLD AUTO: 9.82 10*3/MM3 (ref 3.4–10.8)

## 2025-02-11 PROCEDURE — 99232 SBSQ HOSP IP/OBS MODERATE 35: CPT | Performed by: NURSE PRACTITIONER

## 2025-02-11 PROCEDURE — 94664 DEMO&/EVAL PT USE INHALER: CPT

## 2025-02-11 PROCEDURE — 97110 THERAPEUTIC EXERCISES: CPT

## 2025-02-11 PROCEDURE — 94799 UNLISTED PULMONARY SVC/PX: CPT

## 2025-02-11 PROCEDURE — 80053 COMPREHEN METABOLIC PANEL: CPT | Performed by: INTERNAL MEDICINE

## 2025-02-11 PROCEDURE — 25010000002 REMDESIVIR 100 MG/20ML SOLUTION 1 EACH VIAL: Performed by: STUDENT IN AN ORGANIZED HEALTH CARE EDUCATION/TRAINING PROGRAM

## 2025-02-11 PROCEDURE — 94761 N-INVAS EAR/PLS OXIMETRY MLT: CPT

## 2025-02-11 PROCEDURE — 25010000002 FUROSEMIDE PER 20 MG

## 2025-02-11 PROCEDURE — 25010000002 DEXAMETHASONE PER 1 MG: Performed by: STUDENT IN AN ORGANIZED HEALTH CARE EDUCATION/TRAINING PROGRAM

## 2025-02-11 PROCEDURE — 25810000003 SODIUM CHLORIDE 0.9 % SOLUTION 250 ML FLEX CONT: Performed by: STUDENT IN AN ORGANIZED HEALTH CARE EDUCATION/TRAINING PROGRAM

## 2025-02-11 PROCEDURE — 85027 COMPLETE CBC AUTOMATED: CPT | Performed by: STUDENT IN AN ORGANIZED HEALTH CARE EDUCATION/TRAINING PROGRAM

## 2025-02-11 RX ORDER — BUDESONIDE 0.5 MG/2ML
0.5 INHALANT ORAL
Status: DISCONTINUED | OUTPATIENT
Start: 2025-02-11 | End: 2025-02-13 | Stop reason: HOSPADM

## 2025-02-11 RX ORDER — TORSEMIDE 20 MG/1
10 TABLET ORAL DAILY
Status: DISCONTINUED | OUTPATIENT
Start: 2025-02-11 | End: 2025-02-13 | Stop reason: HOSPADM

## 2025-02-11 RX ORDER — IPRATROPIUM BROMIDE AND ALBUTEROL SULFATE 2.5; .5 MG/3ML; MG/3ML
3 SOLUTION RESPIRATORY (INHALATION)
Status: DISCONTINUED | OUTPATIENT
Start: 2025-02-11 | End: 2025-02-13 | Stop reason: HOSPADM

## 2025-02-11 RX ADMIN — Medication 2.5 MG: at 20:58

## 2025-02-11 RX ADMIN — SERTRALINE HYDROCHLORIDE 150 MG: 50 TABLET, FILM COATED ORAL at 07:35

## 2025-02-11 RX ADMIN — ASPIRIN 81 MG CHEWABLE TABLET 81 MG: 81 TABLET CHEWABLE at 09:51

## 2025-02-11 RX ADMIN — PANTOPRAZOLE SODIUM 40 MG: 40 TABLET, DELAYED RELEASE ORAL at 07:34

## 2025-02-11 RX ADMIN — BUDESONIDE 0.5 MG: 0.5 INHALANT RESPIRATORY (INHALATION) at 11:15

## 2025-02-11 RX ADMIN — IPRATROPIUM BROMIDE AND ALBUTEROL SULFATE 3 ML: .5; 3 SOLUTION RESPIRATORY (INHALATION) at 11:15

## 2025-02-11 RX ADMIN — BISOPROLOL FUMARATE 10 MG: 5 TABLET ORAL at 09:50

## 2025-02-11 RX ADMIN — FUROSEMIDE 20 MG: 10 INJECTION, SOLUTION INTRAMUSCULAR; INTRAVENOUS at 09:50

## 2025-02-11 RX ADMIN — BUDESONIDE 0.5 MG: 0.5 INHALANT RESPIRATORY (INHALATION) at 20:10

## 2025-02-11 RX ADMIN — MEMANTINE 10 MG: 10 TABLET ORAL at 20:58

## 2025-02-11 RX ADMIN — DONEPEZIL HYDROCHLORIDE 10 MG: 10 TABLET, FILM COATED ORAL at 09:49

## 2025-02-11 RX ADMIN — DEXAMETHASONE SODIUM PHOSPHATE 6 MG: 10 INJECTION INTRAMUSCULAR; INTRAVENOUS at 09:50

## 2025-02-11 RX ADMIN — MEMANTINE 10 MG: 10 TABLET ORAL at 09:50

## 2025-02-11 RX ADMIN — IPRATROPIUM BROMIDE AND ALBUTEROL SULFATE 3 ML: .5; 3 SOLUTION RESPIRATORY (INHALATION) at 20:10

## 2025-02-11 RX ADMIN — IPRATROPIUM BROMIDE AND ALBUTEROL SULFATE 3 ML: .5; 3 SOLUTION RESPIRATORY (INHALATION) at 14:42

## 2025-02-11 RX ADMIN — LEVOTHYROXINE SODIUM 25 MCG: 50 TABLET ORAL at 07:34

## 2025-02-11 RX ADMIN — REMDESIVIR 100 MG: 100 INJECTION, POWDER, LYOPHILIZED, FOR SOLUTION INTRAVENOUS at 20:58

## 2025-02-11 NOTE — THERAPY TREATMENT NOTE
Patient Name: Raina Forrest  : 1945    MRN: [id number]                              Today's Date: 2025       Admit Date: 2025    Visit Dx:     ICD-10-CM ICD-9-CM   1. Acute dyspnea  R06.00 786.09   2. COVID-19 virus infection  U07.1 079.89   3. Acute on chronic congestive heart failure, unspecified heart failure type  I50.9 428.0   4. Acute renal failure superimposed on chronic kidney disease, unspecified acute renal failure type, unspecified CKD stage  N17.9 584.9    N18.9 585.9     Patient Active Problem List   Diagnosis    Chronic atrial fibrillation    Benign essential HTN    Bradycardia    Chest pain    Can't get food down    Accumulation of fluid in tissues    Esophageal lump    Adynamia    Itch of skin    Anorexia    Chronic nausea    Gastroesophageal reflux disease    Breath shortness    Emesis    Decreased body weight    Anxiety    Narrowing of intervertebral disc space    Diverticulosis of intestine    Primary hypertension    Migraine    Osteoporosis    Palpitations    Pituitary adenoma    Sick sinus syndrome    Diarrhea    Cardiomyopathy    Chronic anticoagulation    Atrial fibrillation    Anticoagulated    Pacemaker    Infected pacemaker    Wound infection    Primary osteoarthritis of left knee    DJD (degenerative joint disease)    Ischemic cardiomyopathy    Acute on chronic congestive heart failure, unspecified heart failure type    Anemia    Presence of Watchman left atrial appendage closure device    Pneumonia    Pneumonia of both lungs due to infectious organism    Stage 3a chronic kidney disease    Influenza A    Severe malnutrition    H/O left radical nephrectomy    Abdominal wall hematoma    Peptic ulcer disease    Hypothyroidism (acquired)    Acute exacerbation of CHF (congestive heart failure)     Past Medical History:   Diagnosis Date    Atrial fibrillation     Fung's esophagus     Benign essential hypertension     Blood clot in vein     Cardiomyopathy     Chronic  gastritis     Congestive heart failure     Degenerative disc disease     Dementia     Pt  states she has slight dementia    Depression with anxiety     Disease of thyroid gland     Diverticulitis of colon     Dysphagia     GERD (gastroesophageal reflux disease)     History of chest pain     History of migraine     History of migraine headaches     Left knee pain     Osteoporosis     Palpitations     Thyroid disorder     Thyroid nodule     Ulcerative colitis      Past Surgical History:   Procedure Laterality Date    APPENDECTOMY      BLADDER SURGERY      CARDIAC CATHETERIZATION N/A 11/21/2016    Procedure: Left Heart Cath;  Surgeon: Robinson Salazar MD;  Location:  [facility];  Service:     CARDIAC ELECTROPHYSIOLOGY PROCEDURE N/A 2/7/2017    Procedure: Pacemaker DC new   [employer];  Surgeon: Robinson Salazar MD;  Location:  [facility];  Service:     CARDIOVERSION  01/18/2017    CHOLECYSTECTOMY      COLONOSCOPY      COLONOSCOPY N/A 7/29/2016    normal    ENDOSCOPY  05/06/2015    submucosal nodule in esophagus, erythematous mucosa in antrum,moderate acute gastritis, no h-pylori    ENDOSCOPY N/A 9/2/2016    Erythematous mucosa in the antrum    EYE SURGERY Left     cataract    HYSTERECTOMY      KNEE MENISCAL REPAIR Left     THYROID SURGERY Left     TONSILLECTOMY      TOTAL KNEE ARTHROPLASTY Left 6/16/2020    Procedure: LEFT TOTAL KNEE ARTHOPLASTY+;  Surgeon: Jamal Castañeda MD;  [facility];  Service: Orthopedics;  Laterality: Left;      General Information       Row Name 02/11/25 1524          Physical Therapy Time and Intention    Document Type therapy note (daily note)  -AR     Mode of Treatment physical therapy  -AR       Row Name 02/11/25 1524          General Information    Patient Profile Reviewed yes  -AR     Existing Precautions/Restrictions fall  -AR       Row Name 02/11/25 1524          Cognition    Orientation Status (Cognition) oriented  to;person  -AR               User Key  (r) = Recorded By, (t) = Taken By, (c) = Cosigned By      Initials Name Provider Type    Raysa Nielson PT Physical Therapist                   Mobility       Row Name 02/11/25 1525          Bed Mobility    Supine-Sit [place] (Bed Mobility) standby assist  -AR     Sit-Supine [place] (Bed Mobility) not tested  -AR     Assistive Device (Bed Mobility) head of bed elevated  -AR       Row Name 02/11/25 1525          Sit-Stand Transfer    Sit-Stand [place] (Transfers) contact guard  -AR     Assistive Device (Sit-Stand Transfers) walker, front-wheeled  -AR       Row Name 02/11/25 1525          Gait/Stairs (Locomotion)    [place] Level (Gait) contact guard;minimum assist (75% patient effort)  -AR     Patient was able to Ambulate yes  -AR     Distance in Feet (Gait) 200  back and forth in room, several laps  -AR     Deviations/Abnormal Patterns (Gait) mio decreased;festinating/shuffling  -AR     Bilateral Gait Deviations forward flexed posture;heel strike decreased  -AR     Right Sided Gait Deviations leans right  occasional lean towards R  -AR     Comment, (Gait/Stairs) CGA w/ RW, min A improving to CGA w/o RW  -AR               User Key  (r) = Recorded By, (t) = Taken By, (c) = Cosigned By      Initials Name Provider Type    Raysa Nielson, PT Physical Therapist                   Obj/Interventions       Row Name 02/11/25 1526          Balance    Balance Assessment standing dynamic balance  -AR     Dynamic Standing Balance contact guard  -AR               User Key  (r) = Recorded By, (t) = Taken By, (c) = Cosigned By      Initials Name Provider Type    Raysa Nielson, PT Physical Therapist                   Goals/Plan    No documentation.                  Clinical Impression       Row Name 02/11/25 1526          Pain    Pretreatment Pain Rating 0/10 - no pain  -AR     Posttreatment Pain Rating 0/10 - no pain  -AR       Row Name 02/11/25 1526           Plan of Care Review    Outcome Evaluation Improved activity  tolerance during PT today.  Able to ambulate 200' w/ CGA using RW, then improved from min A w/o assistive device to CGA, back and forth in room several laps.  vitals stable on room air.   Son at bedside, discussed ambulating in room w/ pt, RN agreeable.  Recommend DC to home with assist.  -AR       Row Name 02/11/25 1526          Therapy Assessment/Plan (PT)    Rehab Potential (PT) good  -AR     Therapy Frequency (PT) 3 times/wk  -AR       Row Name 02/11/25 1526          Vital Signs    O2 Delivery Pre Treatment room air  -AR     Recovery Time Sp202 maintained >94%  -AR       Row Name 02/11/25 1526          Positioning and Restraints    Pre-Treatment Position in bed  no alarm  -AR     Post Treatment Position chair  -AR     In Chair notified nsg;sitting;call light within reach;with family/caregiver  no alarm, RN okay as long as son present  -AR               User Key  (r) = Recorded By, (t) = Taken By, (c) = Cosigned By      Initials Name Provider Type    Raysa Nielson, PT Physical Therapist                   Outcome Measures       Row Name 02/11/25 1528 02/11/25 0948       How much help from another person do you currently need...    Turning from your back to your side while in flat bed without using bedrails? 4  -AR 4  -AC    Moving from lying on back to sitting on the side of a flat bed without bedrails? 4  -AR 3  -AC    Moving to and from a bed to a chair (including a wheelchair)? 3  -AR 3  -AC    Standing up from a chair using your arms (e.g., wheelchair, bedside chair)? 4  -AR 3  -AC    Climbing 3-5 steps with a railing? 3  -AR 3  -AC    To walk in hospital room? 3  -AR 3  -AC    AM-PAC 6 Clicks Score (PT) 21  -AR 19  -AC    Highest Level of Mobility Goal 6 --> Walk 10 steps or more  -AR 6 --> Walk 10 steps or more  -AC      Row Name 02/11/25 1528          Functional Assessment    Outcome Measure Options AM-PAC 6 Clicks Basic Mobility (PT)   -AR               User Key  (r) = Recorded By, (t) = Taken By, (c) = Cosigned By      Initials Name Provider Type    Kimberlyn Bustillo, RN Registered Nurse    Raysa Nielson, PT Physical Therapist                                 Physical Therapy Education       Title: PT OT SLP Therapies (In Progress)       Topic: Physical Therapy (In Progress)       Point: Mobility training (In Progress)       Learning Progress Summary            Patient Acceptance, E, NR by AR at 2/11/2025 1529    Acceptance, E,TB,D, VU,NR by PH at 2/10/2025 0837    Acceptance, E, NR by DB at 2/9/2025 1449   Family Acceptance, E, NR by AR at 2/11/2025 1529                      Point: Home exercise program (In Progress)       Learning Progress Summary            Patient Acceptance, E, NR by AR at 2/11/2025 1529    Acceptance, E,TB,D, VU,NR by PH at 2/10/2025 0837    Acceptance, E, NR by DB at 2/9/2025 1449   Family Acceptance, E, NR by AR at 2/11/2025 1529                      Point: Body mechanics (In Progress)       Learning Progress Summary            Patient Acceptance, E, NR by AR at 2/11/2025 1529    Acceptance, E,TB,D, VU,NR by PH at 2/10/2025 0837    Acceptance, E, NR by DB at 2/9/2025 1449   Family Acceptance, E, NR by AR at 2/11/2025 1529                      Point: Precautions (In Progress)       Learning Progress Summary            Patient Acceptance, E, NR by AR at 2/11/2025 1529    Acceptance, E,TB,D, VU,NR by PH at 2/10/2025 0837    Acceptance, E, NR by DB at 2/9/2025 1449   Family Acceptance, E, NR by AR at 2/11/2025 1529                                      User Key       Initials Effective Dates Name Provider Type Discipline    AR 06/16/21 -  Raysa Valiente, PT Physical Therapist PT    DB 06/16/21 -  Sylvia Lares, PT Physical Therapist PT    PH 06/16/21 -  Pippa De Los Santos, PTA Physical Therapist Assistant PT                  PT Recommendation and Plan     Outcome Evaluation: Improved activity  tolerance during  PT today.  Able to ambulate 200' w/ CGA using RW, then improved from min A w/o assistive device to CGA, back and forth in room several laps.  vitals stable on room air.   Son at bedside, discussed ambulating in room w/ pt, RN agreeable.  Recommend DC to home with assist.     Time Calculation:         PT Charges       Row Name 02/11/25 1524             Time Calculation    Start Time 1445  -AR      Stop Time 1518  -AR      Time Calculation (min) 33 min  -AR      PT Received On 02/11/25  -AR      PT - Next Appointment 02/13/25  -AR                User Key  (r) = Recorded By, (t) = Taken By, (c) = Cosigned By      Initials Name Provider Type    AR Raysa Valiente, PT Physical Therapist                  Therapy Charges for Today       Code Description Service Date Service Provider Modifiers Qty    13819975920 HC PT THER PROC EA 15 MIN 2/11/2025 Raysa Valiente, PT GP 2            PT G-Codes  Outcome Measure Options: AM-PAC 6 Clicks Basic Mobility (PT)  AM-PAC 6 Clicks Score (PT): 21  PT Discharge Summary  Anticipated Discharge Disposition (PT): home with 24/7 care, home with home health    Raysa Valiente PT  2/11/2025

## 2025-02-11 NOTE — PLAN OF CARE
Goal Outcome Evaluation:  Plan of Care Reviewed With: patient, family        Progress: no change  Outcome Evaluation: Pt remains in A. fib, rate controlled 60-70s. Ambulating to bathroom with staff assistance, output documented in flowsheet. VSS, received dose of Remdesivir, confusion  at night, pulling off monitor, redirecting provided, bed alarm for safety, lines covered, family remains at bedside. No acute concerns at this time. Will continue to monitor

## 2025-02-11 NOTE — PROGRESS NOTES
Name: Raina Forrest ADMIT: 2025   : 1945  PCP: Georgiana Cisneros MD    MRN: [id number] LOS: 2 days   AGE/SEX: 79 y.o. female  ROOM:      Subjective   Subjective   2025  Case discussed with son at bedside as patient has Alzheimer's dementia.  She is afebrile, on room air, and without distress.    2/10/2025  She is afebrile and on room air but does complain of increased cough with wheezing.  They are agreeable to steroids in addition to remdesivir.  Case discussed with son we will obtain a CT chest.    25  Patient seen and examined at bedside with son present.  She remains on room air, but son does note some coughing and slight wheezing.  Will continue IV steroids and remdesivir and add DuoNebs and Pulmicort.  Son in agreement with plan.       Objective   Objective   Vital Signs  Temp:  [97.1 °F (36.2 °C)-97.8 °F (36.6 °C)] 97.2 °F (36.2 °C)  Heart Rate:  [65-83] 65  Resp:  [18] 18  BP: ()/(63-85) 120/85  SpO2:  [93 %-95 %] 95 %  on   ;   Device (Oxygen Therapy): room air  Body mass index is 24.13 kg/m².  Physical Exam  Constitutional:       General: She is not in acute distress.     Appearance: She is not toxic-appearing.      Comments: Patient with Alzheimer's dementia   HENT:      Head: Normocephalic and atraumatic.      Nose: No congestion.      Mouth/Throat:      Pharynx: Oropharynx is clear. No oropharyngeal exudate.   Eyes:      General: No scleral icterus.  Cardiovascular:      Rate and Rhythm: Normal rate and regular rhythm.      Heart sounds: No murmur heard.     No friction rub. No gallop.   Pulmonary:      Effort: No respiratory distress.      Breath sounds: No wheezing or rales.   Abdominal:      General: There is no distension.      Tenderness: There is no abdominal tenderness. There is no guarding.   Musculoskeletal:         General: No swelling, deformity or signs of injury.      Cervical back: Normal range of motion. No rigidity.   Skin:     Coloration: Skin  "is not jaundiced.      Findings: No lesion.      Comments: Bruising and swelling around left knee   Neurological:      Mental Status: Mental status is at baseline.      Motor: Weakness present.       Results Review     I reviewed the patient's new clinical results.  Results from last 7 days   Lab Units 02/11/25  0240 02/10/25  0422 02/09/25  0428 02/08/25  1415   WBC 10*3/mm3 9.82 8.83 11.83* 11.53*   HEMOGLOBIN g/dL 13.5 13.4 12.9 13.0   PLATELETS 10*3/mm3 196 168 171 177     Results from last 7 days   Lab Units 02/11/25  0240 02/10/25  0422 02/09/25 0428 02/08/25  1415   SODIUM mmol/L 138 136 136 136   POTASSIUM mmol/L 4.3 3.7 4.1 4.4   CHLORIDE mmol/L 101 99 99 101   CO2 mmol/L 24.4 25.7 24.0 24.0   BUN mg/dL 22 21 17 15   CREATININE mg/dL 1.19* 1.29* 1.16* 1.27*   GLUCOSE mg/dL 112* 99 111* 107*   EGFR mL/min/1.73 46.0* 41.8* 47.5* 42.6*     Results from last 7 days   Lab Units 02/11/25  0240 02/10/25  0422 02/09/25  0428 02/08/25  1415   ALBUMIN g/dL 4.0 3.6 4.0 4.3   BILIRUBIN mg/dL 0.5 0.6 0.6 0.8   ALK PHOS U/L 96 87 83 83   AST (SGOT) U/L 25 27 31 35*   ALT (SGPT) U/L 13 15 14 16     Results from last 7 days   Lab Units 02/11/25  0240 02/10/25  0422 02/09/25  0428 02/08/25  1415   CALCIUM mg/dL 9.5 9.0 9.3 9.4   ALBUMIN g/dL 4.0 3.6 4.0 4.3     Results from last 7 days   Lab Units 02/08/25  1415   PROCALCITONIN ng/mL 0.09   LACTATE mmol/L 2.0     No results found for: \"HGBA1C\", \"POCGLU\"    CT Chest Without Contrast Diagnostic    Result Date: 2/11/2025   1. Significant improvement in centrilobular and tree-in-bud nodules, suspect infectious bronchiolitis. 2. Resolution of lower lobe bronchovascular consolidative opacities with linear opacities in the lower lobes most consistent with subsegmental atelectasis or linear scarring. 3. Airways disease with thickened airway walls and suspected air trapping. Finding may be related underlying reactive airways disease or bronchitis/bronchiolitis. 4. Multiple solid " pulmonary nodules with a couple new solid pulmonary nodules seen in the right upper lobe. 6-month follow-up chest CT can reevaluate. 5. Moderate cardiomegaly with asymmetric right atrial enlargement.  This report was finalized on 2/11/2025 8:37 AM by Dr. Jas Muniz M.D on Workstation: [id number]       I have personally reviewed all medications:  Scheduled Medications  aspirin, 81 mg, Oral, Daily  bisoprolol, 10 mg, Oral, Daily  budesonide, 0.5 mg, Nebulization, BID - RT  dexAMETHasone, 6 mg, Intravenous, Daily  donepezil, 10 mg, Oral, Daily  furosemide, 20 mg, Intravenous, Daily  ipratropium-albuterol, 3 mL, Nebulization, 4x Daily - RT  levothyroxine, 25 mcg, Oral, Q AM  memantine, 10 mg, Oral, Q12H  pantoprazole, 40 mg, Oral, Q AM  remdesivir, 100 mg, Intravenous, Q24H  sertraline, 150 mg, Oral, QAM  sucralfate, 1 g, Oral, BID    Infusions   Diet  Diet: Cardiac; Healthy Heart (2-3 Na+); Fluid Consistency: Thin (IDDSI 0)    I have personally reviewed:  [x]  Laboratory   [x]  Microbiology   [x]  Radiology   [x]  EKG/Telemetry  [x]  Cardiology/Vascular   []  Pathology    []  Records       Assessment/Plan     Active Hospital Problems    Diagnosis  POA    **Acute exacerbation of CHF (congestive heart failure) [I50.9]  Yes      Resolved Hospital Problems   No resolved problems to display.       79 y.o. female admitted with Acute exacerbation of CHF (congestive heart failure).    #Acute on chronic HFpEF  #CAD  #A-fib, permanent    -Echo dated 1/23/2024 shows EF 56 to 60%    - HS troponin 40 > 40, delta -5, doubt acs    - proBNP 4547, repeat proBNP is 2072    - Lasix de-escalated to 20 mg IV daily, nephrology may transition to home torsemide    -Chest x-ray shows slight pulmonary vascular congestion    -Repeat echo shows EF 66 to 70%    -Intake and output, weigh daily    -Cardiology consulted    -Resume home aspirin and bisoprolol    -EKG shows rate controlled A-fib    -TSH elevated but free T4 and free T3 within  normal limits    -Status post Watchman device    #COVID  #Pulmonary nodules    -Tested positive at urgent care for COVID    -Remdesivir, patient to complete 5-day course or until improved    -Continue Decadron 6 mg IV daily, discussed with son and he is in agreement with plan     -CT chest is suggestive of bronchitis complicated by pulmonary nodules    -Patient is afebrile, on room air, and without leukocytosis, do not see any indication for antibiotics at this time    -Discussed pulmonary nodules with son, patient will need 6-month follow-up to reevaluate    -DuoNebs every 6 hours scheduled    -Pulmicort twice daily    #Status post nephrectomy    -Patient follows with nephrology, will consult    -Nephrology following    #Dementia    -Resume home Aricept and Namenda    -Sitter may be needed    #h/o fall  #Left knee injury    -Admits to fall in the past with left knee injury, was worked up by Ortho and no intervention necessary as per son    -Son states knee is still swollen and bruised, will check left knee x-ray    -Left knee x-ray without acute fracture or dislocation    -PT    #Hypothyroid    -Resume home Synthroid    #Anxiety    -Resume home Zoloft    #GERD    -Resume home Carafate    SCDs for DVT prophylaxis.  Full code.  Discussed with patient.  Anticipate discharge home with HH vs SNU facility in 2-3 days.  Expected Discharge Date: 2/12/2025; Expected Discharge Time:       Lolita Osborne DO  [facility]  02/11/25  11:00 EST

## 2025-02-11 NOTE — PLAN OF CARE
Goal Outcome Evaluation:              Outcome Evaluation: Improved activity  tolerance during PT today.  Able to ambulate 200' w/ CGA using RW, then improved from min A w/o assistive device to CGA, back and forth in room several laps.  vitals stable on room air.   Son at bedside, discussed ambulating in room w/ pt, RN agreeable.  Recommend DC to home with assist.    Anticipated Discharge Disposition (PT): home with 24/7 care, home with home health

## 2025-02-11 NOTE — PROGRESS NOTES
[facility]  Cardiology Progress Note    Patient Identification:  Name: Raina Forrest  Age: 79 y.o.  Sex: female  :  1945  MRN: [id number]                 Follow Up / Chief Complaint: Follow-up for CHF    Interval History: Nephrology diuresing.  Resting in chair.  No chest pain.  Shortness of breath is improving. On room air.           Objective:    Past Medical History:  Past Medical History:   Diagnosis Date    Atrial fibrillation     Fung's esophagus     Benign essential hypertension     Blood clot in vein     Cardiomyopathy     Chronic gastritis     Congestive heart failure     Degenerative disc disease     Dementia     Pt  states she has slight dementia    Depression with anxiety     Disease of thyroid gland     Diverticulitis of colon     Dysphagia     GERD (gastroesophageal reflux disease)     History of chest pain     History of migraine     History of migraine headaches     Left knee pain     Osteoporosis     Palpitations     Thyroid disorder     Thyroid nodule     Ulcerative colitis      Past Surgical History:  Past Surgical History:   Procedure Laterality Date    APPENDECTOMY      BLADDER SURGERY      CARDIAC CATHETERIZATION N/A 2016    Procedure: Left Heart Cath;  Surgeon: Robinson Salazar MD;  Location:  [facility];  Service:     CARDIAC ELECTROPHYSIOLOGY PROCEDURE N/A 2017    Procedure: Pacemaker DC new   [employer];  Surgeon: Robinson Salazar MD;  Location:  [facility];  Service:     CARDIOVERSION  2017    CHOLECYSTECTOMY      COLONOSCOPY      COLONOSCOPY N/A 2016    normal    ENDOSCOPY  2015    submucosal nodule in esophagus, erythematous mucosa in antrum,moderate acute gastritis, no h-pylori    ENDOSCOPY N/A 2016    Erythematous mucosa in the antrum    EYE SURGERY Left     cataract    HYSTERECTOMY      KNEE MENISCAL REPAIR Left     THYROID SURGERY Left     TONSILLECTOMY      TOTAL KNEE  ARTHROPLASTY Left 6/16/2020    Procedure: LEFT TOTAL KNEE ARTHOPLASTY+;  Surgeon: Jamal Castañeda MD;  Location: [facility] MAIN OR;  Service: Orthopedics;  Laterality: Left;        Social History:   Social History     Tobacco Use    Smoking status: Never    Smokeless tobacco: Never   Substance Use Topics    Alcohol use: No      Family History:  Family History   Problem Relation Age of Onset    Lung cancer Other     Ulcerative colitis Sister     Heart failure Mother     Heart disease Mother     Heart failure Father     Heart disease Father     Malig Hyperthermia Neg Hx           Allergies:  Allergies   Allergen Reactions    Bupivacaine Hcl Anaphylaxis    Nepafenac Itching     Eye Drops    Barium-Containing Compounds Other (See Comments)     CONTRAST CAUSES DIARRHEA    Duloxetine Other (See Comments)    Onion Other (See Comments)    Bextra [Valdecoxib] Itching    Cortisone Hives    Cymbalta [Duloxetine Hcl] Itching    Iodinated Contrast Media Unknown - Low Severity     Must be premedicated before use.  See [place] notes in care evrywhere    Medrol [Methylprednisolone] Unknown - Low Severity    Mesalamine Unknown - Low Severity    Polymyxin B-Trimethoprim Unknown - Low Severity    Rivaroxaban Unknown - Low Severity     REACTION UNKNOWN    Tetanus Toxoids Swelling    Tetanus-Diphtheria Toxoids Td Swelling     Scheduled Meds:  aspirin, 81 mg, Daily  bisoprolol, 10 mg, Daily  budesonide, 0.5 mg, BID - RT  dexAMETHasone, 6 mg, Daily  donepezil, 10 mg, Daily  ipratropium-albuterol, 3 mL, 4x Daily - RT  levothyroxine, 25 mcg, Q AM  memantine, 10 mg, Q12H  pantoprazole, 40 mg, Q AM  remdesivir, 100 mg, Q24H  sertraline, 150 mg, QAM  sucralfate, 1 g, BID  torsemide, 10 mg, Daily            INTAKE AND OUTPUT:    Intake/Output Summary (Last 24 hours) at 2/11/2025 1545  Last data filed at 2/11/2025 1000  Gross per 24 hour   Intake 240 ml   Output 1100 ml   Net -860 ml       Review of Systems:   GI: No nausea or  vomiting  Cardiac: No chest pain  Pulmonary: Improvement with shortness of breath    Constitutional:  Temp:  [97.1 °F (36.2 °C)-97.8 °F (36.6 °C)] 97.2 °F (36.2 °C)  Heart Rate:  [64-83] 70  Resp:  [18] 18  BP: ()/(63-85) 120/85      Physical Exam:  General:  Alert, cooperative, appears in no acute distress  Respiratory: Clear to auscultation.  Normal respiratory effort and rate.  Cardiovascular: S1S2 Regular rate and rhythm. No murmur, rub or gallop.   Gastrointestinal: soft, non tender. Bowel sounds present.   Extremities: BROOKS x4. No pretibial pitting edema. Adequate musculoskeletal strength.   Neuro: AAO x3 CN II-XII grossly intact        Cardiographics      ECG:   A-fib    Echocardiogram:     Interpretation Summary         Left ventricular ejection fraction appears to be 66 - 70%.    Left ventricular diastolic function was indeterminate.    The left atrial cavity is mildly dilated.    The right atrial cavity is moderately  dilated.    Estimated right ventricular systolic pressure from tricuspid regurgitation is moderately elevated (45-55 mmHg).          Lab Review   Results from last 7 days   Lab Units 02/08/25  1610 02/08/25  1415   HSTROP T ng/L 40* 45*         Results from last 7 days   Lab Units 02/11/25  0240   SODIUM mmol/L 138   POTASSIUM mmol/L 4.3   BUN mg/dL 22   CREATININE mg/dL 1.19*   CALCIUM mg/dL 9.5     @[id number]@  Results from last 7 days   Lab Units 02/11/25  0240 02/10/25  0422 02/09/25  0428   WBC 10*3/mm3 9.82 8.83 11.83*   HEMOGLOBIN g/dL 13.5 13.4 12.9   HEMATOCRIT % 40.6 39.9 37.0   PLATELETS 10*3/mm3 196 168 171             Assessment:  1.  Acute on chronic heart failure with preserved ejection fraction.  2.  Permanent atrial fibrillation: has watchmen's  3.  COVID-19  4.  Anxiety  5.  Anticoagulated: Hemoglobin stable      Plan:  -Echo with EF 6670%.  LV diastolic function is indeterminate.  LAC is mildly dilated.  RAC is moderately dilated.  RVSP is moderately  "elevated.  -Blood pressure and heart rate stable.  -Atrial fib with rate controlled.    )2/11/2025  JORY Manzano/Transcription:   \"Dictated utilizing Dragon dictation\".     "

## 2025-02-11 NOTE — PROGRESS NOTES
Patient Name: Raina Forrest  : 1945  MRN: [id number]  Primary Care Physician: Georgiana Cisneros MD  Date of admission: 2025    Patient Care Team:  Georgiana Cisneros MD as PCP - General (Pediatrics)  Nathan Johnson MD as Resident (Gastroenterology)  Robinson Salazar MD as Consulting Physician (Cardiology)        Subjective   Subjective:     Resting comfortably in bed.    Urine output over 1 L.  On room air.  Coughing up brownish sputum.    Review of systems:  All other review of system unremarkable      Allergies:    Allergies   Allergen Reactions    Bupivacaine Hcl Anaphylaxis    Nepafenac Itching     Eye Drops    Barium-Containing Compounds Other (See Comments)     CONTRAST CAUSES DIARRHEA    Duloxetine Other (See Comments)    Onion Other (See Comments)    Bextra [Valdecoxib] Itching    Cortisone Hives    Cymbalta [Duloxetine Hcl] Itching    Iodinated Contrast Media Unknown - Low Severity     Must be premedicated before use.  See [place] notes in care evrywhere    Medrol [Methylprednisolone] Unknown - Low Severity    Mesalamine Unknown - Low Severity    Polymyxin B-Trimethoprim Unknown - Low Severity    Rivaroxaban Unknown - Low Severity     REACTION UNKNOWN    Tetanus Toxoids Swelling    Tetanus-Diphtheria Toxoids Td Swelling       Objective   Exam:     Vital Signs  Temp:  [97.1 °F (36.2 °C)-97.8 °F (36.6 °C)] 97.2 °F (36.2 °C)  Heart Rate:  [64-83] 64  Resp:  [18] 18  BP: ()/(63-85) 120/85  SpO2:  [93 %-95 %] 93 %  on   ;   Device (Oxygen Therapy): room air  Body mass index is 24.13 kg/m².    GEN: Pleasant chronically ill elderly lady resting in bed  No JVD  RESP: clear to auscultation bilaterally, no crackles/wheezing  CVS: RRR  ABD: soft, nontender, nondistended  NEURO: Alert  EXT: No edema      Results Review:  I have personally reviewed most recent Data :  CBC    Results from last 7 days   Lab Units 25  0240 02/10/25  0422 25  0424  02/08/25  1415   WBC 10*3/mm3 9.82 8.83 11.83* 11.53*   HEMOGLOBIN g/dL 13.5 13.4 12.9 13.0   PLATELETS 10*3/mm3 196 168 171 177     CMP   Results from last 7 days   Lab Units 02/11/25  0240 02/10/25  0422 02/09/25  0428 02/08/25  1415   SODIUM mmol/L 138 136 136 136   POTASSIUM mmol/L 4.3 3.7 4.1 4.4   CHLORIDE mmol/L 101 99 99 101   CO2 mmol/L 24.4 25.7 24.0 24.0   BUN mg/dL 22 21 17 15   CREATININE mg/dL 1.19* 1.29* 1.16* 1.27*   GLUCOSE mg/dL 112* 99 111* 107*   ALBUMIN g/dL 4.0 3.6 4.0 4.3   BILIRUBIN mg/dL 0.5 0.6 0.6 0.8   ALK PHOS U/L 96 87 83 83   AST (SGOT) U/L 25 27 31 35*   ALT (SGPT) U/L 13 15 14 16     ABG      CT Chest Without Contrast Diagnostic    Result Date: 2/11/2025   1. Significant improvement in centrilobular and tree-in-bud nodules, suspect infectious bronchiolitis. 2. Resolution of lower lobe bronchovascular consolidative opacities with linear opacities in the lower lobes most consistent with subsegmental atelectasis or linear scarring. 3. Airways disease with thickened airway walls and suspected air trapping. Finding may be related underlying reactive airways disease or bronchitis/bronchiolitis. 4. Multiple solid pulmonary nodules with a couple new solid pulmonary nodules seen in the right upper lobe. 6-month follow-up chest CT can reevaluate. 5. Moderate cardiomegaly with asymmetric right atrial enlargement.  This report was finalized on 2/11/2025 8:37 AM by Dr. Jas Muniz M.D on Workstation: [id number]       Results for orders placed during the hospital encounter of 02/08/25    Adult Transthoracic Echo Complete W/ Cont if Necessary Per Protocol    Interpretation Summary    Left ventricular ejection fraction appears to be 66 - 70%.    Left ventricular diastolic function was indeterminate.    The left atrial cavity is mildly dilated.    The right atrial cavity is moderately  dilated.    Estimated right ventricular systolic pressure from tricuspid regurgitation is moderately elevated  (45-55 mmHg).    Scheduled Meds:aspirin, 81 mg, Oral, Daily  bisoprolol, 10 mg, Oral, Daily  budesonide, 0.5 mg, Nebulization, BID - RT  dexAMETHasone, 6 mg, Intravenous, Daily  donepezil, 10 mg, Oral, Daily  ipratropium-albuterol, 3 mL, Nebulization, 4x Daily - RT  levothyroxine, 25 mcg, Oral, Q AM  memantine, 10 mg, Oral, Q12H  pantoprazole, 40 mg, Oral, Q AM  remdesivir, 100 mg, Intravenous, Q24H  sertraline, 150 mg, Oral, QAM  sucralfate, 1 g, Oral, BID  torsemide, 10 mg, Oral, Daily      Continuous Infusions:   PRN Meds:  acetaminophen **OR** acetaminophen **OR** acetaminophen    senna-docusate sodium **AND** polyethylene glycol **AND** bisacodyl **AND** bisacodyl    melatonin    ondansetron ODT **OR** ondansetron    sodium chloride    Assessment & Plan   Assessment and Plan:         Acute exacerbation of CHF (congestive heart failure)    ASSESSMENT:  CKD, single kidney s/p left nephrectomy 2/2 RCC in 12/2023, baseline creatinine ~1.1-1.3, follows with Dr Marga Dill. Past urine studies have all been relatively bland, no significant proteinuria or hematuria.   HFpEF  COVID  Hypertension  leukocytosis  A-fib, status post Watchman device insertion  ulcerative colitis  Hypothyroidism  Osteoarthritis  dementia      echocardiogram was in January 2024, showed EF of 56 to 60%, diastolic function was indeterminate, mild pulmonary hypertension.     PLAN :      Creatinine stable at around her baseline.  Euvolemic: Will switch to home torsemide 10 mg.  Continue treatment of SARS-CoV-2 infection per primary.    Discussed with family.  Stable from renal standpoint.      Electronically signed by Bernarda Salmeron MD,   [facility] kidney consultant  [phone]  2/11/2025  11:43 EST

## 2025-02-11 NOTE — PLAN OF CARE
Goal Outcome Evaluation:  Plan of Care Reviewed With: patient, child        Progress: improving  Outcome Evaluation: VSS. A-fib on monitor. Standby assist to bathroom. Oriented to placed and knows she is in hospital. Disoriented to time and situation. Son stays at bedside. IV Remdesivir and IV Decadron continues. Continue POC.

## 2025-02-12 LAB
ALBUMIN SERPL-MCNC: 3.6 G/DL (ref 3.5–5.2)
ALBUMIN/GLOB SERPL: 1.2 G/DL
ALP SERPL-CCNC: 84 U/L (ref 39–117)
ALT SERPL W P-5'-P-CCNC: 11 U/L (ref 1–33)
ANION GAP SERPL CALCULATED.3IONS-SCNC: 12.7 MMOL/L (ref 5–15)
AST SERPL-CCNC: 28 U/L (ref 1–32)
BILIRUB SERPL-MCNC: 0.4 MG/DL (ref 0–1.2)
BUN SERPL-MCNC: 23 MG/DL (ref 8–23)
BUN/CREAT SERPL: 23 (ref 7–25)
CALCIUM SPEC-SCNC: 9.5 MG/DL (ref 8.6–10.5)
CHLORIDE SERPL-SCNC: 101 MMOL/L (ref 98–107)
CO2 SERPL-SCNC: 23.3 MMOL/L (ref 22–29)
CREAT SERPL-MCNC: 1 MG/DL (ref 0.57–1)
DEPRECATED RDW RBC AUTO: 49.8 FL (ref 37–54)
EGFRCR SERPLBLD CKD-EPI 2021: 56.7 ML/MIN/1.73
ERYTHROCYTE [DISTWIDTH] IN BLOOD BY AUTOMATED COUNT: 13.8 % (ref 12.3–15.4)
GLOBULIN UR ELPH-MCNC: 3 GM/DL
GLUCOSE SERPL-MCNC: 94 MG/DL (ref 65–99)
HCT VFR BLD AUTO: 36.8 % (ref 34–46.6)
HGB BLD-MCNC: 12.6 G/DL (ref 12–15.9)
MCH RBC QN AUTO: 33.8 PG (ref 26.6–33)
MCHC RBC AUTO-ENTMCNC: 34.2 G/DL (ref 31.5–35.7)
MCV RBC AUTO: 98.7 FL (ref 79–97)
PLATELET # BLD AUTO: 197 10*3/MM3 (ref 140–450)
PMV BLD AUTO: 11.9 FL (ref 6–12)
POTASSIUM SERPL-SCNC: 4.1 MMOL/L (ref 3.5–5.2)
PROT SERPL-MCNC: 6.6 G/DL (ref 6–8.5)
RBC # BLD AUTO: 3.73 10*6/MM3 (ref 3.77–5.28)
SODIUM SERPL-SCNC: 137 MMOL/L (ref 136–145)
WBC NRBC COR # BLD AUTO: 11.43 10*3/MM3 (ref 3.4–10.8)

## 2025-02-12 PROCEDURE — 99232 SBSQ HOSP IP/OBS MODERATE 35: CPT

## 2025-02-12 PROCEDURE — 94799 UNLISTED PULMONARY SVC/PX: CPT

## 2025-02-12 PROCEDURE — 36415 COLL VENOUS BLD VENIPUNCTURE: CPT | Performed by: INTERNAL MEDICINE

## 2025-02-12 PROCEDURE — 25810000003 SODIUM CHLORIDE 0.9 % SOLUTION 250 ML FLEX CONT: Performed by: STUDENT IN AN ORGANIZED HEALTH CARE EDUCATION/TRAINING PROGRAM

## 2025-02-12 PROCEDURE — 80053 COMPREHEN METABOLIC PANEL: CPT | Performed by: INTERNAL MEDICINE

## 2025-02-12 PROCEDURE — 25010000002 REMDESIVIR 100 MG/20ML SOLUTION 1 EACH VIAL: Performed by: STUDENT IN AN ORGANIZED HEALTH CARE EDUCATION/TRAINING PROGRAM

## 2025-02-12 PROCEDURE — 25010000002 DEXAMETHASONE PER 1 MG: Performed by: STUDENT IN AN ORGANIZED HEALTH CARE EDUCATION/TRAINING PROGRAM

## 2025-02-12 PROCEDURE — 85027 COMPLETE CBC AUTOMATED: CPT | Performed by: STUDENT IN AN ORGANIZED HEALTH CARE EDUCATION/TRAINING PROGRAM

## 2025-02-12 RX ADMIN — DONEPEZIL HYDROCHLORIDE 10 MG: 10 TABLET, FILM COATED ORAL at 09:44

## 2025-02-12 RX ADMIN — LEVOTHYROXINE SODIUM 25 MCG: 50 TABLET ORAL at 07:51

## 2025-02-12 RX ADMIN — BUDESONIDE 0.5 MG: 0.5 INHALANT RESPIRATORY (INHALATION) at 21:05

## 2025-02-12 RX ADMIN — IPRATROPIUM BROMIDE AND ALBUTEROL SULFATE 3 ML: .5; 3 SOLUTION RESPIRATORY (INHALATION) at 21:05

## 2025-02-12 RX ADMIN — IPRATROPIUM BROMIDE AND ALBUTEROL SULFATE 3 ML: .5; 3 SOLUTION RESPIRATORY (INHALATION) at 11:13

## 2025-02-12 RX ADMIN — REMDESIVIR 100 MG: 100 INJECTION, POWDER, LYOPHILIZED, FOR SOLUTION INTRAVENOUS at 20:36

## 2025-02-12 RX ADMIN — PANTOPRAZOLE SODIUM 40 MG: 40 TABLET, DELAYED RELEASE ORAL at 07:51

## 2025-02-12 RX ADMIN — MEMANTINE 10 MG: 10 TABLET ORAL at 09:52

## 2025-02-12 RX ADMIN — MEMANTINE 10 MG: 10 TABLET ORAL at 20:36

## 2025-02-12 RX ADMIN — TORSEMIDE 10 MG: 20 TABLET ORAL at 09:44

## 2025-02-12 RX ADMIN — IPRATROPIUM BROMIDE AND ALBUTEROL SULFATE 3 ML: .5; 3 SOLUTION RESPIRATORY (INHALATION) at 07:16

## 2025-02-12 RX ADMIN — BISOPROLOL FUMARATE 10 MG: 5 TABLET ORAL at 09:45

## 2025-02-12 RX ADMIN — DEXAMETHASONE SODIUM PHOSPHATE 6 MG: 10 INJECTION INTRAMUSCULAR; INTRAVENOUS at 09:42

## 2025-02-12 RX ADMIN — BUDESONIDE 0.5 MG: 0.5 INHALANT RESPIRATORY (INHALATION) at 07:16

## 2025-02-12 RX ADMIN — SERTRALINE HYDROCHLORIDE 150 MG: 50 TABLET, FILM COATED ORAL at 07:51

## 2025-02-12 RX ADMIN — ASPIRIN 81 MG CHEWABLE TABLET 81 MG: 81 TABLET CHEWABLE at 09:51

## 2025-02-12 NOTE — PLAN OF CARE
Goal Outcome Evaluation:  Plan of Care Reviewed With: patient        Progress: improving  Outcome Evaluation: Pt is Afib on tele, occasional  on tele. HR 60-80s. /63. On room air. Pt has no c/o pain at this time.

## 2025-02-12 NOTE — PROGRESS NOTES
[facility]  Cardiology Progress Note    Patient Identification:  Name: Raina Forrest  Age: 79 y.o.  Sex: female  :  1945  MRN: [id number]                 Follow Up / Chief Complaint: Follow-up for CHF    Interval History: resting in bed, no chest pain, shortness of breath improving, on room air.       Objective:    Past Medical History:  Past Medical History:   Diagnosis Date    Atrial fibrillation     Fung's esophagus     Benign essential hypertension     Blood clot in vein     Cardiomyopathy     Chronic gastritis     Congestive heart failure     Degenerative disc disease     Dementia     Pt  states she has slight dementia    Depression with anxiety     Disease of thyroid gland     Diverticulitis of colon     Dysphagia     GERD (gastroesophageal reflux disease)     History of chest pain     History of migraine     History of migraine headaches     Left knee pain     Osteoporosis     Palpitations     Thyroid disorder     Thyroid nodule     Ulcerative colitis      Past Surgical History:  Past Surgical History:   Procedure Laterality Date    APPENDECTOMY      BLADDER SURGERY      CARDIAC CATHETERIZATION N/A 2016    Procedure: Left Heart Cath;  Surgeon: Robinson Salazar MD;  Location:  [facility];  Service:     CARDIAC ELECTROPHYSIOLOGY PROCEDURE N/A 2017    Procedure: Pacemaker DC new   [employer];  Surgeon: Robinson Salazar MD;  Location:  [facility];  Service:     CARDIOVERSION  2017    CHOLECYSTECTOMY      COLONOSCOPY      COLONOSCOPY N/A 2016    normal    ENDOSCOPY  2015    submucosal nodule in esophagus, erythematous mucosa in antrum,moderate acute gastritis, no h-pylori    ENDOSCOPY N/A 2016    Erythematous mucosa in the antrum    EYE SURGERY Left     cataract    HYSTERECTOMY      KNEE MENISCAL REPAIR Left     THYROID SURGERY Left     TONSILLECTOMY      TOTAL KNEE ARTHROPLASTY Left 2020     Procedure: LEFT TOTAL KNEE ARTHOPLASTY+;  Surgeon: Jamal Castañeda MD;  Location: [facility];  Service: Orthopedics;  Laterality: Left;        Social History:   Social History     Tobacco Use    Smoking status: Never    Smokeless tobacco: Never   Substance Use Topics    Alcohol use: No      Family History:  Family History   Problem Relation Age of Onset    Lung cancer Other     Ulcerative colitis Sister     Heart failure Mother     Heart disease Mother     Heart failure Father     Heart disease Father     Malig Hyperthermia Neg Hx           Allergies:  Allergies   Allergen Reactions    Bupivacaine Hcl Anaphylaxis    Nepafenac Itching     Eye Drops    Barium-Containing Compounds Other (See Comments)     CONTRAST CAUSES DIARRHEA    Duloxetine Other (See Comments)    Onion Other (See Comments)    Bextra [Valdecoxib] Itching    Cortisone Hives    Cymbalta [Duloxetine Hcl] Itching    Iodinated Contrast Media Unknown - Low Severity     Must be premedicated before use.  See [place] notes in care evrywhere    Medrol [Methylprednisolone] Unknown - Low Severity    Mesalamine Unknown - Low Severity    Polymyxin B-Trimethoprim Unknown - Low Severity    Rivaroxaban Unknown - Low Severity     REACTION UNKNOWN    Tetanus Toxoids Swelling    Tetanus-Diphtheria Toxoids Td Swelling     Scheduled Meds:  aspirin, 81 mg, Daily  bisoprolol, 10 mg, Daily  budesonide, 0.5 mg, BID - RT  dexAMETHasone, 6 mg, Daily  donepezil, 10 mg, Daily  ipratropium-albuterol, 3 mL, 4x Daily - RT  levothyroxine, 25 mcg, Q AM  memantine, 10 mg, Q12H  pantoprazole, 40 mg, Q AM  remdesivir, 100 mg, Q24H  sertraline, 150 mg, QAM  sucralfate, 1 g, BID  torsemide, 10 mg, Daily            INTAKE AND OUTPUT:    Intake/Output Summary (Last 24 hours) at 2/12/2025 1252  Last data filed at 2/12/2025 0858  Gross per 24 hour   Intake 210 ml   Output 1100 ml   Net -890 ml       Review of Systems:   GI: no n/v or abd pain  Cardiac: no chest pain or  "palpitations  Pulmonary: no shortness of breath or cough      Constitutional:  Temp:  [97 °F (36.1 °C)-97.9 °F (36.6 °C)] 97.9 °F (36.6 °C)  Heart Rate:  [65-88] 87  Resp:  [18] 18  BP: (114-130)/(43-78) 114/69      Physical Exam:  General:  Alert, cooperative, appears in no acute distress  Respiratory: Clear to auscultation.  Normal respiratory effort and rate.  Cardiovascular: S1S2 Regular rate and rhythm. No murmur, rub or gallop.   Gastrointestinal: soft, non tender. Bowel sounds present.   Extremities: BROOKS x4. No pretibial pitting edema. Adequate musculoskeletal strength.   Neuro: AAO x3 CN II-XII grossly intact            Cardiographics  Echocardiogram:     Interpretation Summary         Left ventricular ejection fraction appears to be 66 - 70%.    Left ventricular diastolic function was indeterminate.    The left atrial cavity is mildly dilated.    The right atrial cavity is moderately  dilated.    Estimated right ventricular systolic pressure from tricuspid regurgitation is moderately elevated (45-55 mmHg).          Lab Review   Results from last 7 days   Lab Units 02/08/25  1610 02/08/25  1415   HSTROP T ng/L 40* 45*         Results from last 7 days   Lab Units 02/12/25  0247   SODIUM mmol/L 137   POTASSIUM mmol/L 4.1   BUN mg/dL 23   CREATININE mg/dL 1.00   CALCIUM mg/dL 9.5     @[id number]@  Results from last 7 days   Lab Units 02/12/25  0247 02/11/25  0240 02/10/25  0422   WBC 10*3/mm3 11.43* 9.82 8.83   HEMOGLOBIN g/dL 12.6 13.5 13.4   HEMATOCRIT % 36.8 40.6 39.9   PLATELETS 10*3/mm3 197 196 168             Assessment:  1.  Acute on chronic heart failure with preserved ejection fraction.  2.  Permanent atrial fibrillation s/p watchman  3.  COVID-19  4.  Anxiety      Plan:  -volume stable on torsemide, on room air  -BP and HR stable  -afib rate controlled  -no change from cardiac standpoint    )2/12/2025  JORY Ferminon/Transcription:   \"Dictated utilizing Dragon dictation\".     "

## 2025-02-12 NOTE — PROGRESS NOTES
Name: Raina Forrest ADMIT: 2025   : 1945  PCP: Georgiana Cisneros MD    MRN: [id number] LOS: 3 days   AGE/SEX: 79 y.o. female  ROOM:      Subjective   Subjective   2025  Case discussed with son at bedside as patient has Alzheimer's dementia.  She is afebrile, on room air, and without distress.    2/10/2025  She is afebrile and on room air but does complain of increased cough with wheezing.  They are agreeable to steroids in addition to remdesivir.  Case discussed with son we will obtain a CT chest.    25  Patient seen and examined at bedside with son present.  She remains on room air, but son does note some coughing and slight wheezing.  Will continue IV steroids and remdesivir and add DuoNebs and Pulmicort.  Son in agreement with plan.    2025  No overnight events, patient continues to improve.  Son states she is still trying to bring some stuff up but overall better.  Remains on room air.  Last dose of remdesivir today.  Diuretics transitioned to p.o.       Objective   Objective   Vital Signs  Temp:  [97 °F (36.1 °C)-97.9 °F (36.6 °C)] 97.9 °F (36.6 °C)  Heart Rate:  [64-88] 65  Resp:  [18] 18  BP: (114-130)/(43-78) 114/69  SpO2:  [93 %-100 %] 100 %  on   ;   Device (Oxygen Therapy): room air  Body mass index is 23.12 kg/m².  Physical Exam  Constitutional:       General: She is not in acute distress.     Appearance: She is not toxic-appearing.      Comments: Patient with Alzheimer's dementia   HENT:      Head: Normocephalic and atraumatic.      Nose: No congestion.      Mouth/Throat:      Pharynx: Oropharynx is clear. No oropharyngeal exudate.   Eyes:      General: No scleral icterus.  Cardiovascular:      Rate and Rhythm: Normal rate and regular rhythm.      Heart sounds: No murmur heard.     No friction rub. No gallop.   Pulmonary:      Effort: No respiratory distress.      Breath sounds: No wheezing or rales.   Abdominal:      General: There is no distension.       "Tenderness: There is no abdominal tenderness. There is no guarding.   Musculoskeletal:         General: No swelling, deformity or signs of injury.      Cervical back: Normal range of motion. No rigidity.   Skin:     Coloration: Skin is not jaundiced.      Findings: No lesion.      Comments: Bruising and swelling around left knee   Neurological:      Mental Status: Mental status is at baseline.      Motor: Weakness present.       Results Review     I reviewed the patient's new clinical results.  Results from last 7 days   Lab Units 02/12/25  0247 02/11/25  0240 02/10/25  0422 02/09/25  0428   WBC 10*3/mm3 11.43* 9.82 8.83 11.83*   HEMOGLOBIN g/dL 12.6 13.5 13.4 12.9   PLATELETS 10*3/mm3 197 196 168 171     Results from last 7 days   Lab Units 02/12/25  0247 02/11/25  0240 02/10/25  0422 02/09/25  0428   SODIUM mmol/L 137 138 136 136   POTASSIUM mmol/L 4.1 4.3 3.7 4.1   CHLORIDE mmol/L 101 101 99 99   CO2 mmol/L 23.3 24.4 25.7 24.0   BUN mg/dL 23 22 21 17   CREATININE mg/dL 1.00 1.19* 1.29* 1.16*   GLUCOSE mg/dL 94 112* 99 111*   EGFR mL/min/1.73 56.7* 46.0* 41.8* 47.5*     Results from last 7 days   Lab Units 02/12/25  0247 02/11/25  0240 02/10/25  0422 02/09/25  0428   ALBUMIN g/dL 3.6 4.0 3.6 4.0   BILIRUBIN mg/dL 0.4 0.5 0.6 0.6   ALK PHOS U/L 84 96 87 83   AST (SGOT) U/L 28 25 27 31   ALT (SGPT) U/L 11 13 15 14     Results from last 7 days   Lab Units 02/12/25  0247 02/11/25  0240 02/10/25  0422 02/09/25  0428   CALCIUM mg/dL 9.5 9.5 9.0 9.3   ALBUMIN g/dL 3.6 4.0 3.6 4.0     Results from last 7 days   Lab Units 02/08/25  1415   PROCALCITONIN ng/mL 0.09   LACTATE mmol/L 2.0     No results found for: \"HGBA1C\", \"POCGLU\"    CT Chest Without Contrast Diagnostic    Result Date: 2/11/2025   1. Significant improvement in centrilobular and tree-in-bud nodules, suspect infectious bronchiolitis. 2. Resolution of lower lobe bronchovascular consolidative opacities with linear opacities in the lower lobes most consistent with " subsegmental atelectasis or linear scarring. 3. Airways disease with thickened airway walls and suspected air trapping. Finding may be related underlying reactive airways disease or bronchitis/bronchiolitis. 4. Multiple solid pulmonary nodules with a couple new solid pulmonary nodules seen in the right upper lobe. 6-month follow-up chest CT can reevaluate. 5. Moderate cardiomegaly with asymmetric right atrial enlargement.  This report was finalized on 2/11/2025 8:37 AM by Dr. Jas Muniz M.D on Workstation: [id number]       I have personally reviewed all medications:  Scheduled Medications  aspirin, 81 mg, Oral, Daily  bisoprolol, 10 mg, Oral, Daily  budesonide, 0.5 mg, Nebulization, BID - RT  dexAMETHasone, 6 mg, Intravenous, Daily  donepezil, 10 mg, Oral, Daily  ipratropium-albuterol, 3 mL, Nebulization, 4x Daily - RT  levothyroxine, 25 mcg, Oral, Q AM  memantine, 10 mg, Oral, Q12H  pantoprazole, 40 mg, Oral, Q AM  remdesivir, 100 mg, Intravenous, Q24H  sertraline, 150 mg, Oral, QAM  sucralfate, 1 g, Oral, BID  torsemide, 10 mg, Oral, Daily    Infusions   Diet  Diet: Cardiac; Healthy Heart (2-3 Na+); Fluid Consistency: Thin (IDDSI 0)    I have personally reviewed:  [x]  Laboratory   [x]  Microbiology   [x]  Radiology   [x]  EKG/Telemetry  [x]  Cardiology/Vascular   []  Pathology    []  Records       Assessment/Plan     Active Hospital Problems    Diagnosis  POA    **Acute exacerbation of CHF (congestive heart failure) [I50.9]  Yes      Resolved Hospital Problems   No resolved problems to display.       79 y.o. female admitted with Acute exacerbation of CHF (congestive heart failure).    #Acute on chronic HFpEF  #CAD  #A-fib, permanent    -Echo dated 1/23/2024 shows EF 56 to 60%    - HS troponin 40 > 40, delta -5, doubt acs    - proBNP 4547, repeat proBNP is 2072    -IV Lasix de-escalated to home torsemide 10 mg p.o. daily    -Chest x-ray shows slight pulmonary vascular congestion    -Repeat echo shows EF 66  to 70%    -Intake and output, weigh daily    -Cardiology consulted    -Resume home aspirin and bisoprolol    -EKG shows rate controlled A-fib    -TSH elevated but free T4 and free T3 within normal limits    -Status post Watchman device    #COVID  #Pulmonary nodules    -Tested positive at urgent care for COVID    -Remdesivir, patient to complete 5-day course or until improved    -Continue Decadron 6 mg IV daily, discussed with son and he is in agreement with plan     -CT chest is suggestive of bronchitis complicated by pulmonary nodules    -Patient is afebrile, on room air,  do not see any indication for antibiotics at this time    -Slight leukocytosis is most likely due to to steroids    -Discussed pulmonary nodules with son, patient will need 6-month follow-up to reevaluate    -DuoNebs every 6 hours scheduled    -Pulmicort twice daily    -Continue aerosols as patient improving with them    -Plan discharge home with home health tomorrow or Friday    #Status post nephrectomy    -Patient follows with nephrology, will consult    -Nephrology following    -Creatinine stable    #Dementia    -Resume home Aricept and Namenda    -Sitter may be needed    -Plan Home health at discharge    #h/o fall  #Left knee injury    -Admits to fall in the past with left knee injury, was worked up by Ortho and no intervention necessary as per son    -Son states knee is still swollen and bruised, will check left knee x-ray    -Left knee x-ray without acute fracture or dislocation    -PT    #Hypothyroid    -Resume home Synthroid    #Anxiety    -Resume home Zoloft    #GERD    -Resume home Carafate    SCDs for DVT prophylaxis.  Full code.  Discussed with patient.  Anticipate discharge home with HH vs SNU facility in 2-3 days.  Expected Discharge Date: 2/12/2025; Expected Discharge Time:       DO [facility]  02/12/25  09:19 EST

## 2025-02-12 NOTE — PLAN OF CARE
Goal Outcome Evaluation:  Plan of Care Reviewed With: patient, family        Progress: improving  Outcome Evaluation: Pt admitted with COVID & CHF exacerbation, history of dementia with confusion at night. A. Fib on monitor, occasionally ventrical paced. On remdesivir IV, takes medications without issues. VSS, lab results reviewed, slightly elevated WBC, otherwise unremarkable. Will continue to monitor

## 2025-02-12 NOTE — PROGRESS NOTES
Patient Name: Raina Forrest  : 1945  MRN: [id number]  Primary Care Physician: Georgiana Cisneros MD  Date of admission: 2025    Patient Care Team:  Georgiana Cisneros MD as PCP - General (Pediatrics)  Nathan Johnson MD as Resident (Gastroenterology)  Robinson Salazar MD as Consulting Physician (Cardiology)        Subjective   Subjective:     Resting comfortably in bed.    Urine output over 1 L.  On room air.  Coughing up brownish sputum.    Review of systems:  All other review of system unremarkable      Allergies:    Allergies   Allergen Reactions    Bupivacaine Hcl Anaphylaxis    Nepafenac Itching     Eye Drops    Barium-Containing Compounds Other (See Comments)     CONTRAST CAUSES DIARRHEA    Duloxetine Other (See Comments)    Onion Other (See Comments)    Bextra [Valdecoxib] Itching    Cortisone Hives    Cymbalta [Duloxetine Hcl] Itching    Iodinated Contrast Media Unknown - Low Severity     Must be premedicated before use.  See [place] notes in care evrywhere    Medrol [Methylprednisolone] Unknown - Low Severity    Mesalamine Unknown - Low Severity    Polymyxin B-Trimethoprim Unknown - Low Severity    Rivaroxaban Unknown - Low Severity     REACTION UNKNOWN    Tetanus Toxoids Swelling    Tetanus-Diphtheria Toxoids Td Swelling       Objective   Exam:     Vital Signs  Temp:  [97 °F (36.1 °C)-97.9 °F (36.6 °C)] 97.9 °F (36.6 °C)  Heart Rate:  [65-88] 87  Resp:  [18] 18  BP: (114-130)/(43-78) 114/69  SpO2:  [94 %-100 %] 100 %  on   ;   Device (Oxygen Therapy): room air  Body mass index is 23.12 kg/m².    GEN: Pleasant chronically ill elderly lady resting in bed  No JVD  RESP: clear to auscultation bilaterally, no crackles/wheezing  CVS: RRR  ABD: soft, nontender, nondistended  NEURO: Alert  EXT: No edema      Results Review:  I have personally reviewed most recent Data :  CBC    Results from last 7 days   Lab Units 25  0247 25  0240 02/10/25  0420  02/09/25  0428 02/08/25  1415   WBC 10*3/mm3 11.43* 9.82 8.83 11.83* 11.53*   HEMOGLOBIN g/dL 12.6 13.5 13.4 12.9 13.0   PLATELETS 10*3/mm3 197 196 168 171 177     CMP   Results from last 7 days   Lab Units 02/12/25  0247 02/11/25  0240 02/10/25  0422 02/09/25  0428 02/08/25  1415   SODIUM mmol/L 137 138 136 136 136   POTASSIUM mmol/L 4.1 4.3 3.7 4.1 4.4   CHLORIDE mmol/L 101 101 99 99 101   CO2 mmol/L 23.3 24.4 25.7 24.0 24.0   BUN mg/dL 23 22 21 17 15   CREATININE mg/dL 1.00 1.19* 1.29* 1.16* 1.27*   GLUCOSE mg/dL 94 112* 99 111* 107*   ALBUMIN g/dL 3.6 4.0 3.6 4.0 4.3   BILIRUBIN mg/dL 0.4 0.5 0.6 0.6 0.8   ALK PHOS U/L 84 96 87 83 83   AST (SGOT) U/L 28 25 27 31 35*   ALT (SGPT) U/L 11 13 15 14 16     ABG      CT Chest Without Contrast Diagnostic    Result Date: 2/11/2025   1. Significant improvement in centrilobular and tree-in-bud nodules, suspect infectious bronchiolitis. 2. Resolution of lower lobe bronchovascular consolidative opacities with linear opacities in the lower lobes most consistent with subsegmental atelectasis or linear scarring. 3. Airways disease with thickened airway walls and suspected air trapping. Finding may be related underlying reactive airways disease or bronchitis/bronchiolitis. 4. Multiple solid pulmonary nodules with a couple new solid pulmonary nodules seen in the right upper lobe. 6-month follow-up chest CT can reevaluate. 5. Moderate cardiomegaly with asymmetric right atrial enlargement.  This report was finalized on 2/11/2025 8:37 AM by Dr. Jas Muniz M.D on Workstation: [id number]       Results for orders placed during the hospital encounter of 02/08/25    Adult Transthoracic Echo Complete W/ Cont if Necessary Per Protocol    Interpretation Summary    Left ventricular ejection fraction appears to be 66 - 70%.    Left ventricular diastolic function was indeterminate.    The left atrial cavity is mildly dilated.    The right atrial cavity is moderately  dilated.     Estimated right ventricular systolic pressure from tricuspid regurgitation is moderately elevated (45-55 mmHg).    Scheduled Meds:aspirin, 81 mg, Oral, Daily  bisoprolol, 10 mg, Oral, Daily  budesonide, 0.5 mg, Nebulization, BID - RT  dexAMETHasone, 6 mg, Intravenous, Daily  donepezil, 10 mg, Oral, Daily  ipratropium-albuterol, 3 mL, Nebulization, 4x Daily - RT  levothyroxine, 25 mcg, Oral, Q AM  memantine, 10 mg, Oral, Q12H  pantoprazole, 40 mg, Oral, Q AM  remdesivir, 100 mg, Intravenous, Q24H  sertraline, 150 mg, Oral, QAM  sucralfate, 1 g, Oral, BID  torsemide, 10 mg, Oral, Daily      Continuous Infusions:   PRN Meds:  acetaminophen **OR** acetaminophen **OR** acetaminophen    senna-docusate sodium **AND** polyethylene glycol **AND** bisacodyl **AND** bisacodyl    melatonin    ondansetron ODT **OR** ondansetron    sodium chloride    Assessment & Plan   Assessment and Plan:         Acute exacerbation of CHF (congestive heart failure)    ASSESSMENT:  CKD, single kidney s/p left nephrectomy 2/2 RCC in 12/2023, baseline creatinine ~1.1-1.3, follows with Dr Marga Dill. Past urine studies have all been relatively bland, no significant proteinuria or hematuria.   HFpEF  COVID  Hypertension  leukocytosis  A-fib, status post Watchman device insertion  ulcerative colitis  Hypothyroidism  Osteoarthritis  dementia      echocardiogram was in January 2024, showed EF of 56 to 60%, diastolic function was indeterminate, mild pulmonary hypertension.     PLAN :      Creatinine stable at around her baseline.  Euvolemic: Continue torsemide 10 mg.  Continue treatment of SARS-CoV-2 infection per primary.    Discussed with family.  Stable from renal standpoint.      Electronically signed by JORY Boles,   [facility] kidney consultant  [phone]  2/12/2025  12:29 EST    Patient was seen earlier by  JORY. I personally have examined the patient and interviewed the patient. I have reviewed the history, data, problems,  assessment and plan with the nurse practitioner during rounds and I concur with the history, exam, assessment and plan as described in the progress note with comments additions, revisions as noted.           Gerhard Dill MD  2/12/2025  [facility]

## 2025-02-13 ENCOUNTER — DOCUMENTATION (OUTPATIENT)
Dept: HOME HEALTH SERVICES | Facility: HOME HEALTHCARE | Age: 80
End: 2025-02-13
Payer: MEDICARE

## 2025-02-13 ENCOUNTER — READMISSION MANAGEMENT (OUTPATIENT)
Dept: CALL CENTER | Facility: HOSPITAL | Age: 80
End: 2025-02-13
Payer: MEDICARE

## 2025-02-13 ENCOUNTER — TRANSCRIBE ORDERS (OUTPATIENT)
Dept: HOME HEALTH SERVICES | Facility: HOME HEALTHCARE | Age: 80
End: 2025-02-13
Payer: MEDICARE

## 2025-02-13 ENCOUNTER — HOME HEALTH ADMISSION (OUTPATIENT)
Dept: HOME HEALTH SERVICES | Facility: HOME HEALTHCARE | Age: 80
End: 2025-02-13
Payer: MEDICARE

## 2025-02-13 VITALS
HEIGHT: 68 IN | SYSTOLIC BLOOD PRESSURE: 118 MMHG | OXYGEN SATURATION: 95 % | DIASTOLIC BLOOD PRESSURE: 81 MMHG | WEIGHT: 154 LBS | HEART RATE: 101 BPM | TEMPERATURE: 96.1 F | BODY MASS INDEX: 23.34 KG/M2 | RESPIRATION RATE: 18 BRPM

## 2025-02-13 DIAGNOSIS — I50.9 ACUTE ON CHRONIC CONGESTIVE HEART FAILURE, UNSPECIFIED HEART FAILURE TYPE: Primary | ICD-10-CM

## 2025-02-13 DIAGNOSIS — U07.1 COVID: ICD-10-CM

## 2025-02-13 PROBLEM — I50.33 ACUTE ON CHRONIC HEART FAILURE WITH PRESERVED EJECTION FRACTION (HFPEF): Status: ACTIVE | Noted: 2025-02-13

## 2025-02-13 PROBLEM — D89.832 CYTOKINE RELEASE SYNDROME, GRADE 2: Status: ACTIVE | Noted: 2025-02-13

## 2025-02-13 LAB
ALBUMIN SERPL-MCNC: 4 G/DL (ref 3.5–5.2)
ALBUMIN/GLOB SERPL: 1.4 G/DL
ALP SERPL-CCNC: 99 U/L (ref 39–117)
ALT SERPL W P-5'-P-CCNC: 14 U/L (ref 1–33)
ANION GAP SERPL CALCULATED.3IONS-SCNC: 16 MMOL/L (ref 5–15)
AST SERPL-CCNC: 32 U/L (ref 1–32)
BACTERIA SPEC AEROBE CULT: NORMAL
BACTERIA SPEC AEROBE CULT: NORMAL
BILIRUB SERPL-MCNC: 0.4 MG/DL (ref 0–1.2)
BUN SERPL-MCNC: 24 MG/DL (ref 8–23)
BUN/CREAT SERPL: 20.2 (ref 7–25)
CALCIUM SPEC-SCNC: 9.6 MG/DL (ref 8.6–10.5)
CHLORIDE SERPL-SCNC: 100 MMOL/L (ref 98–107)
CO2 SERPL-SCNC: 23 MMOL/L (ref 22–29)
CREAT SERPL-MCNC: 1.19 MG/DL (ref 0.57–1)
DEPRECATED RDW RBC AUTO: 50.5 FL (ref 37–54)
EGFRCR SERPLBLD CKD-EPI 2021: 46 ML/MIN/1.73
ERYTHROCYTE [DISTWIDTH] IN BLOOD BY AUTOMATED COUNT: 14.1 % (ref 12.3–15.4)
GLOBULIN UR ELPH-MCNC: 2.8 GM/DL
GLUCOSE SERPL-MCNC: 88 MG/DL (ref 65–99)
HCT VFR BLD AUTO: 37.7 % (ref 34–46.6)
HGB BLD-MCNC: 13.4 G/DL (ref 12–15.9)
MCH RBC QN AUTO: 35.1 PG (ref 26.6–33)
MCHC RBC AUTO-ENTMCNC: 35.5 G/DL (ref 31.5–35.7)
MCV RBC AUTO: 98.7 FL (ref 79–97)
PLATELET # BLD AUTO: 211 10*3/MM3 (ref 140–450)
PMV BLD AUTO: 11.6 FL (ref 6–12)
POTASSIUM SERPL-SCNC: 4.3 MMOL/L (ref 3.5–5.2)
PROT SERPL-MCNC: 6.8 G/DL (ref 6–8.5)
RBC # BLD AUTO: 3.82 10*6/MM3 (ref 3.77–5.28)
SODIUM SERPL-SCNC: 139 MMOL/L (ref 136–145)
WBC NRBC COR # BLD AUTO: 12.91 10*3/MM3 (ref 3.4–10.8)

## 2025-02-13 PROCEDURE — 85027 COMPLETE CBC AUTOMATED: CPT | Performed by: STUDENT IN AN ORGANIZED HEALTH CARE EDUCATION/TRAINING PROGRAM

## 2025-02-13 PROCEDURE — 25010000002 DEXAMETHASONE PER 1 MG: Performed by: STUDENT IN AN ORGANIZED HEALTH CARE EDUCATION/TRAINING PROGRAM

## 2025-02-13 PROCEDURE — 94799 UNLISTED PULMONARY SVC/PX: CPT

## 2025-02-13 PROCEDURE — 99232 SBSQ HOSP IP/OBS MODERATE 35: CPT

## 2025-02-13 PROCEDURE — 80053 COMPREHEN METABOLIC PANEL: CPT | Performed by: INTERNAL MEDICINE

## 2025-02-13 RX ORDER — IPRATROPIUM BROMIDE AND ALBUTEROL SULFATE 2.5; .5 MG/3ML; MG/3ML
3 SOLUTION RESPIRATORY (INHALATION) 4 TIMES DAILY PRN
Qty: 360 ML | Refills: 0 | Status: SHIPPED | OUTPATIENT
Start: 2025-02-13

## 2025-02-13 RX ORDER — DEXAMETHASONE 6 MG/1
6 TABLET ORAL
Qty: 4 TABLET | Refills: 0 | Status: SHIPPED | OUTPATIENT
Start: 2025-02-14 | End: 2025-02-18

## 2025-02-13 RX ORDER — BUDESONIDE 0.5 MG/2ML
0.5 INHALANT ORAL
Qty: 60 ML | Refills: 0 | Status: SHIPPED | OUTPATIENT
Start: 2025-02-13 | End: 2025-02-28

## 2025-02-13 RX ADMIN — IPRATROPIUM BROMIDE AND ALBUTEROL SULFATE 3 ML: .5; 3 SOLUTION RESPIRATORY (INHALATION) at 11:21

## 2025-02-13 RX ADMIN — LEVOTHYROXINE SODIUM 25 MCG: 50 TABLET ORAL at 06:09

## 2025-02-13 RX ADMIN — MEMANTINE 10 MG: 10 TABLET ORAL at 11:13

## 2025-02-13 RX ADMIN — DEXAMETHASONE SODIUM PHOSPHATE 6 MG: 10 INJECTION INTRAMUSCULAR; INTRAVENOUS at 11:12

## 2025-02-13 RX ADMIN — PANTOPRAZOLE SODIUM 40 MG: 40 TABLET, DELAYED RELEASE ORAL at 06:09

## 2025-02-13 RX ADMIN — BISOPROLOL FUMARATE 10 MG: 5 TABLET ORAL at 11:13

## 2025-02-13 RX ADMIN — SERTRALINE HYDROCHLORIDE 150 MG: 50 TABLET, FILM COATED ORAL at 06:09

## 2025-02-13 RX ADMIN — IPRATROPIUM BROMIDE AND ALBUTEROL SULFATE 3 ML: .5; 3 SOLUTION RESPIRATORY (INHALATION) at 06:55

## 2025-02-13 RX ADMIN — ASPIRIN 81 MG CHEWABLE TABLET 81 MG: 81 TABLET CHEWABLE at 11:13

## 2025-02-13 RX ADMIN — IPRATROPIUM BROMIDE AND ALBUTEROL SULFATE 3 ML: .5; 3 SOLUTION RESPIRATORY (INHALATION) at 14:37

## 2025-02-13 RX ADMIN — TORSEMIDE 10 MG: 20 TABLET ORAL at 11:32

## 2025-02-13 RX ADMIN — BUDESONIDE 0.5 MG: 0.5 INHALANT RESPIRATORY (INHALATION) at 06:55

## 2025-02-13 RX ADMIN — DONEPEZIL HYDROCHLORIDE 10 MG: 10 TABLET, FILM COATED ORAL at 11:12

## 2025-02-13 NOTE — CASE MANAGEMENT/SOCIAL WORK
Continued Stay Note  [facility]     Patient Name: Raina Forrest  MRN: [id number]  Today's Date: 2/13/2025    Admit Date: 2/8/2025    Plan: Home w/ [facility];  [place] delived nebulizer   Discharge Plan       Row Name 02/13/25 1620       Plan    Plan Home w/ [facility];  [place] delived nebulizer                   Discharge Codes    No documentation.                 Expected Discharge Date and Time       Expected Discharge Date Expected Discharge Time    Feb 13, 2025               Bertha Flynn RN

## 2025-02-13 NOTE — PLAN OF CARE
Goal Outcome Evaluation:  Plan of Care Reviewed With: patient, child        Progress: no change  Outcome Evaluation: Admitted with  CHF exacerbation, COVID+, alert to self, SBA, VSS, afib on the monitor with occasional Vpaced, family in room at all times with patient, possible home on Friday, continue to monitor.

## 2025-02-13 NOTE — PROGRESS NOTES
Patient is discharging today. Spouse is agreeable to home health and has no current home health. Face sheet information is correct and will transcribe home health orders per verbal order Daphne/ Dr. Cisneros for SN and PT. Thank you !

## 2025-02-13 NOTE — DISCHARGE SUMMARY
Patient Name: Raina Forrest  : 1945  MRN: [id number]    Date of Admission: 2025  Date of Discharge:  2025  Primary Care Physician: Georgiana Cisneros MD      Chief Complaint:   Shortness of Breath      Discharge Diagnoses     Active Hospital Problems    Diagnosis  POA    **Acute exacerbation of CHF (congestive heart failure) [I50.9]  Yes    Cytokine release syndrome, grade 2 [D89.832]  No    Acute on chronic heart failure with preserved ejection fraction (HFpEF) [I50.33]  Yes      Resolved Hospital Problems   No resolved problems to display.        Hospital Course     Ms. Forrest is a 79 y.o. female with a history of GERD, anxiety, hypothyroid, dementia, nephrectomy, HFpEF, CAD, A-fib who presented to [facility] initially complaining of dyspnea.  Please see the admitting history and physical for further details.  She had recently been diagnosed with COVID  She was found to have acute on chronic heart failure in the setting of COVID and was admitted to the hospital for further evaluation and treatment.  Due to history of nephrectomy, nephrology was consulted and due to heart failure exacerbation cardiology was consulted.  She was started on increased diuretics and remdesivir.  Patient had previous left knee injury which was x-ray and no acute abnormality was noted.  She remained on room air during her entire course.  Patient diuresed well, IV diuretics were eventually adjusted to p.o. diuretics.  Also due to symptoms steroids were added along with nebulizer breathing treatments.  Patient improved with these treatments.  She completed a 5-day course of remdesivir, patient and son felt she was stable to go home and at baseline.  She was cleared for discharge by nephrology and cardiology.  PT recommended home with home health.  Patient was discharged home with home health in agreement with plan below.    #Discharge plan  -Follow-up with PCP in 3 to 5 days  -Follow-up with your  nephrologist within 1 week  -Follow-up with cardiology within 1 month  -Continue torsemide 10 mg p.o. daily  -Take Decadron 6 mg p.o. daily for 4 more days  -Nebulizer supplied  -DuoNebs as needed every 6 hours   -Pulmicort nebs twice daily for 10 days  -Home health care arranged    Day of Discharge     Subjective:  2/13/2025  Patient seen and examined at bedside with son present, she is on room air and without distress.  White count elevation most likely due to steroids.  Son and patient are agreeable to discharge this evening.    Physical Exam:  Temp:  [95.9 °F (35.5 °C)-98 °F (36.7 °C)] 95.9 °F (35.5 °C)  Heart Rate:  [76-87] 85  Resp:  [16-18] 18  BP: (108-133)/(63-75) 133/72  Body mass index is 23.61 kg/m².  Physical Exam  Constitutional:       General: She is not in acute distress.     Appearance: She is not toxic-appearing.      Comments: Patient with Alzheimer's dementia   HENT:      Head: Normocephalic and atraumatic.      Nose: No congestion.      Mouth/Throat:      Pharynx: Oropharynx is clear. No oropharyngeal exudate.   Eyes:      General: No scleral icterus.  Cardiovascular:      Rate and Rhythm: Normal rate and regular rhythm.      Heart sounds: No murmur heard.     No friction rub. No gallop.   Pulmonary:      Effort: No respiratory distress.      Breath sounds: No wheezing or rales.   Abdominal:      General: There is no distension.      Tenderness: There is no abdominal tenderness. There is no guarding.   Musculoskeletal:         General: No swelling, deformity or signs of injury.      Cervical back: Normal range of motion. No rigidity.   Skin:     Coloration: Skin is not jaundiced.      Findings: No lesion.      Comments: Bruising and swelling around left knee   Neurological:      Mental Status: Mental status is at baseline.      Motor: Weakness present.   Consultants     Consult Orders (all) (From admission, onward)       Start     Ordered    02/08/25 7263  [facility] (on-call MD unless specified)  Details  Once        Specialty:  Hospitalist  Provider:  Nilam Bullock MD    02/08/25 1612    02/08/25 1613  Cardiology (on-call MD unless specified)  Once        Specialty:  Cardiology  Provider:  Robinson Salazar MD    02/08/25 1612    02/08/25 1613  Nephrology (on -call MD unless specified)  Once        Specialty:  Nephrology  Provider:  Marga Dill MD    02/08/25 1612                  Procedures     * Surgery not found *    Imaging Results (All)       Procedure Component Value Units Date/Time    CT Chest Without Contrast Diagnostic [[id number]] Collected: 02/11/25 0823     Updated: 02/11/25 0841    Narrative:      CT CHEST WO CONTRAST DIAGNOSTIC-     INDICATION: Cough, congestion, shortness of breath     COMPARISON: CT chest February 28, 2024     TECHNIQUE:  Routine CT chest without IV contrast. Coronal and sagittal reformats.  Radiation dose reduction techniques were utilized, including automated  exposure control and exposure modulation based on body size.     FINDINGS:      Chest wall: No lymphadenopathy. Left-sided pacemaker with a right atrial  and right ventricular lead.     Thyroid: Possible left thyroid lobectomy. Hypoattenuating right thyroid  lobe nodule, series 2, axial mage 4, measures 8 mm, stable.     Mediastinum: Coronary artery atherosclerotic calcifications. Moderate  cardiomegaly with asymmetric right atrial enlargement. Left atrial  appendage occlusion device. No pericardial effusion. No mediastinal or  hilar lymphadenopathy.     Lungs/pleura: No effusions. Airways wall thickening. Biapical  pleural-parenchymal thickening. Scattered linear lung opacities seen in  the bilateral lungs, consistent with subsegmental atelectasis or  scarring. Mild mosaic attenuation, suspect some air trapping. Solid  pulmonary nodule in the apical right upper lobe, series 3, axial mage  18, measures 5 mm, unchanged. Solid pulmonary nodule in the anterior  right upper lobe, series 3, axial mage  33, measures 4 mm, unchanged.  Subpleural solid pulmonary nodule in the anterior right upper lobe,  series 3, axial mage 33, measuring 4 mm, new. Solid pulmonary nodule in  the central right upper lobe, series 3, axial mage 37, measuring 7 mm,  new. Subtle groundglass centrilobular and tree-in-bud nodules seen in  the lateral segment right middle lobe. Small centrilobular and  tree-in-bud nodules seen in the lateral segment right middle lobe,  decreased. Solid pulmonary nodule the posterior basilar right lower  lobe, series 3, axial mage 60, measures 3 mm, stable. Subtle  centrilobular and tree-in-bud nodules seen in the superior segment left  lower lobe, decreased. Additional stable pulmonary nodules.     Upper abdomen: Left nephrectomy with some suspected postoperative fat  necrosis in the nephrectomy bed. Small hiatal hernia. Gastric fundal  diverticulum.     Osseous structures: Exaggerated thoracic kyphosis. Severe  spondylosis/degenerative disc disease seen at L1/L2.       Impression:         1. Significant improvement in centrilobular and tree-in-bud nodules,  suspect infectious bronchiolitis.  2. Resolution of lower lobe bronchovascular consolidative opacities with  linear opacities in the lower lobes most consistent with subsegmental  atelectasis or linear scarring.  3. Airways disease with thickened airway walls and suspected air  trapping. Finding may be related underlying reactive airways disease or  bronchitis/bronchiolitis.  4. Multiple solid pulmonary nodules with a couple new solid pulmonary  nodules seen in the right upper lobe. 6-month follow-up chest CT can  reevaluate.  5. Moderate cardiomegaly with asymmetric right atrial enlargement.     This report was finalized on 2/11/2025 8:37 AM by Dr. Jas Muniz M.D on Workstation: [id number]       XR Knee 3 View Left [[id number]] Collected: 02/09/25 1341     Updated: 02/09/25 1346    Narrative:      XR KNEE 3 VW LEFT-     Clinical: Bruising and  swelling left knee     FINDINGS: Total left knee replacement prosthesis in position without  loosening. Knee in flexion on the lateral radiograph, no joint effusion  seen. Prepatellar soft tissue swelling, no soft tissue gas or radiopaque  foreign body is demonstrated. No acute osseous abnormality seen. The  remainder is unremarkable.     This report was finalized on 2/9/2025 1:42 PM by Dr. Isaias Luna M.D  on Workstation: [id number]       XR Chest 1 View [[id number]] Collected: 02/08/25 1409     Updated: 02/08/25 1414    Narrative:      XR CHEST 1 VW-     HISTORY: Female who is 79 years-old, short of breath     TECHNIQUE: Frontal view of the chest     COMPARISON: 2/28/2024     FINDINGS: The heart is enlarged, and appears increased from the prior  exam, that could reflect increased cardiomegaly and/or pericardial  effusion. Left-sided pacemaker and cardiac leads are seen. Pulmonary  vasculature appears slightly congested. No focal pulmonary  consolidation, pleural effusion, or pneumothorax. No acute osseous  process.       Impression:      Increased apparent size of the cardiac shadow, as described.  Slight pulmonary vascular congestion. No focal pulmonary consolidation.  Follow-up/further evaluation can be obtained as indicated.     This report was finalized on 2/8/2025 2:10 PM by Dr. Saeid Lopez M.D on Workstation: [id number]             Results for orders placed during the hospital encounter of 06/15/23    Duplex Venous Lower Extremity - Right CAR    Interpretation Summary    Normal right lower extremity venous duplex scan.    Results for orders placed during the hospital encounter of 02/08/25    Adult Transthoracic Echo Complete W/ Cont if Necessary Per Protocol    Interpretation Summary    Left ventricular ejection fraction appears to be 66 - 70%.    Left ventricular diastolic function was indeterminate.    The left atrial cavity is mildly dilated.    The right atrial cavity is moderately  dilated.    " Estimated right ventricular systolic pressure from tricuspid regurgitation is moderately elevated (45-55 mmHg).    Pertinent Labs     Results from last 7 days   Lab Units 02/13/25  0232 02/12/25  0247 02/11/25  0240 02/10/25  0422   WBC 10*3/mm3 12.91* 11.43* 9.82 8.83   HEMOGLOBIN g/dL 13.4 12.6 13.5 13.4   PLATELETS 10*3/mm3 211 197 196 168     Results from last 7 days   Lab Units 02/13/25  0232 02/12/25  0247 02/11/25  0240 02/10/25  0422   SODIUM mmol/L 139 137 138 136   POTASSIUM mmol/L 4.3 4.1 4.3 3.7   CHLORIDE mmol/L 100 101 101 99   CO2 mmol/L 23.0 23.3 24.4 25.7   BUN mg/dL 24* 23 22 21   CREATININE mg/dL 1.19* 1.00 1.19* 1.29*   GLUCOSE mg/dL 88 94 112* 99   EGFR mL/min/1.73 46.0* 56.7* 46.0* 41.8*     Results from last 7 days   Lab Units 02/13/25 0232 02/12/25  0247 02/11/25  0240 02/10/25  0422   ALBUMIN g/dL 4.0 3.6 4.0 3.6   BILIRUBIN mg/dL 0.4 0.4 0.5 0.6   ALK PHOS U/L 99 84 96 87   AST (SGOT) U/L 32 28 25 27   ALT (SGPT) U/L 14 11 13 15     Results from last 7 days   Lab Units 02/13/25 0232 02/12/25  0247 02/11/25  0240 02/10/25  0422   CALCIUM mg/dL 9.6 9.5 9.5 9.0   ALBUMIN g/dL 4.0 3.6 4.0 3.6       Results from last 7 days   Lab Units 02/10/25  0422 02/08/25  1610 02/08/25  1415   HSTROP T ng/L  --  40* 45*   PROBNP pg/mL 2,072.0*  --  4,547.0*           Invalid input(s): \"LDLCALC\"  Results from last 7 days   Lab Units 02/08/25  1458   BLOODCX  No growth at 4 days  No growth at 4 days         Test Results Pending at Discharge     Pending Results       None              Discharge Details        Discharge Medications        New Medications        Instructions Start Date   budesonide 0.5 MG/2ML nebulizer solution  Commonly known as: PULMICORT   0.5 mg, Nebulization, 2 Times Daily - RT      dexAMETHasone 6 MG tablet  Commonly known as: DECADRON   6 mg, Oral, Daily With Breakfast   Start Date: February 14, 2025     ipratropium-albuterol 0.5-2.5 mg/3 ml nebulizer  Commonly known as: DUO-NEB   3 " mL, Nebulization, 4 Times Daily PRN             Continue These Medications        Instructions Start Date   albuterol sulfate  (90 Base) MCG/ACT inhaler  Commonly known as: PROVENTIL HFA;VENTOLIN HFA;PROAIR HFA   2 puffs, Inhalation, Every 4 Hours PRN      aspirin 81 MG chewable tablet   81 mg, Daily      bisoprolol 10 MG tablet  Commonly known as: ZEBeta   TAKE ONE TABLET BY MOUTH DAILY      calcium 500 mg vitamin D 5 mcg (200 UT) 500-5 MG-MCG tablet per tablet   1 tablet, Oral, Every Evening      donepezil 10 MG tablet  Commonly known as: ARICEPT   1 tablet, Daily      levothyroxine 25 MCG tablet  Commonly known as: SYNTHROID, LEVOTHROID   25 mcg, Oral, Every Early Morning      memantine 10 MG tablet  Commonly known as: NAMENDA   10 mg, 2 Times Daily      pantoprazole 40 MG EC tablet  Commonly known as: PROTONIX   Take 20 mg by mouth Daily. Indications: Gastroesophageal Reflux Disease, Heartburn      sertraline 100 MG tablet  Commonly known as: ZOLOFT   150 mg, Every Morning      sucralfate 1 g tablet  Commonly known as: CARAFATE   1 g, Oral, 2 Times Daily      torsemide 20 MG tablet  Commonly known as: DEMADEX   10 mg, Daily             Stop These Medications      Talicia 250-12.5-10 MG capsule delayed-release  Generic drug: Amoxicill-Rifabutin-Omeprazole     Womens 50+ Multi Vitamin tablet              Allergies   Allergen Reactions    Bupivacaine Hcl Anaphylaxis    Nepafenac Itching     Eye Drops    Barium-Containing Compounds Other (See Comments)     CONTRAST CAUSES DIARRHEA    Duloxetine Other (See Comments)    Onion Other (See Comments)    Bextra [Valdecoxib] Itching    Cortisone Hives    Cymbalta [Duloxetine Hcl] Itching    Iodinated Contrast Media Unknown - Low Severity     Must be premedicated before use.  See [place] notes in care evrywhere    Medrol [Methylprednisolone] Unknown - Low Severity    Mesalamine Unknown - Low Severity    Polymyxin B-Trimethoprim Unknown - Low Severity    Rivaroxaban  Unknown - Low Severity     REACTION UNKNOWN    Tetanus Toxoids Swelling    Tetanus-Diphtheria Toxoids Td Swelling       Discharge Disposition:  Home-Health Care Svc      Discharge Diet:  Diet Order   Procedures    Diet: Cardiac; Healthy Heart (2-3 Na+); Fluid Consistency: Thin (IDDSI 0)       Discharge Activity:       CODE STATUS:    Code Status and Medical Interventions: CPR (Attempt to Resuscitate); Full   Ordered at: 02/08/25 1739     Code Status (Patient has no pulse and is not breathing):    CPR (Attempt to Resuscitate)     Medical Interventions (Patient has pulse or is breathing):    Full       Future Appointments   Date Time Provider Department Center   6/3/2025 11:15 AM [facility] SPT [facility] DEVICE CHECK [facility]   7/14/2025 12:30 PM [facility] ECHO CART [facility]   7/14/2025  1:45 PM Robinson Salazar MD [facility]     Additional Instructions for the Follow-ups that You Need to Schedule       Ambulatory Referral to Home Health ([facility])   As directed      Face to Face Visit Date: 2/13/2025   Follow-up provider for Plan of Care?: I treated the patient in an acute care facility and will not continue treatment after discharge.   Follow-up provider: GEORGIANA CISNEROS [[id number]]   Reason/Clinical Findings: Weakness secondary to COVID and heart failure   Describe mobility limitations that make leaving home difficult: Weakness secondary to COVID and heart failure   Nursing/Therapeutic Services Requested: Physical Therapy   PT orders: Therapeutic exercise Strengthening   Frequency: 1 Week 1               Contact information for follow-up providers       Georgiana Cisneros MD .    Specialty: Pediatrics  Contact information:  Marina ADHIKARI [address] [place], [place] [id number] [phone]                       Contact information for after-discharge care       Home Medical Care       [facility] .    Service: Home Health Services  Contact information:  Sandrine Adhikari  [address]  [facility] [address]  [phone]                                   Additional Instructions for the Follow-ups that You Need to Schedule       Ambulatory Referral to Home Health (Hospital)   As directed      Face to Face Visit Date: 2/13/2025   Follow-up provider for Plan of Care?: I treated the patient in an acute care facility and will not continue treatment after discharge.   Follow-up provider: LD MARIEE [[id number]]   Reason/Clinical Findings: Weakness secondary to COVID and heart failure   Describe mobility limitations that make leaving home difficult: Weakness secondary to COVID and heart failure   Nursing/Therapeutic Services Requested: Physical Therapy   PT orders: Therapeutic exercise Strengthening   Frequency: 1 Week 1            Time Spent on Discharge: 40 minutes      Lolita Osborne DO  [facility]  02/13/25  10:31 EST

## 2025-02-13 NOTE — DISCHARGE PLACEMENT REQUEST
"Shannan Forrest (79 y.o. Female)       Date of Birth   1945    Social Security Number       Address   [address] [place] [address]    Home Phone   [phone]    MRN   [id number]       [facility]    Marital Status                               Admission Date   2/8/25    Admission Type   Emergency    Admitting Provider   Nilam Bullock MD    Attending Provider   Lolita Osborne DO    Department, Room/Bed   [facility] CARDIOVASC UNIT, 2201/1       Discharge Date       Discharge Disposition   Home-Health Care Svc    Discharge Destination                                 Attending Provider: Lolita Osborne DO    Allergies: Bupivacaine Hcl, Nepafenac, Barium-containing Compounds, Duloxetine, Onion, Bextra [Valdecoxib], Cortisone, Cymbalta [Duloxetine Hcl], Iodinated Contrast Media, Medrol [Methylprednisolone], Mesalamine, Polymyxin B-trimethoprim, Rivaroxaban, Tetanus Toxoids, Tetanus-diphtheria Toxoids Td    Isolation: Enh Drop/Con   Infection: COVID (confirmed) (02/10/25)   Code Status: CPR    Ht: 172 cm (67.72\")   Wt: 69.9 kg (154 lb)    Admission Cmt: None   Principal Problem: Acute exacerbation of CHF (congestive heart failure) [I50.9]                   Active Insurance as of 2/8/2025       Primary Coverage       Payor Plan Insurance Group Employer/Plan Group    MEDICARE MEDICARE A & B        Payor Plan Address Payor Plan Phone Number Payor Plan Fax Number Effective Dates    [address] [phone]  12/1/2008 - None Entered    [place] [address]         Subscriber Name Subscriber Birth Date Member ID       SHANNAN FORREST 1945 [id number]               Secondary Coverage       Payor Plan Insurance Group Employer/Plan Group    ANTHEM BLUE CROSS ANTHEM BLUE CROSS BLUE SHIELD PPO [id number]       Payor Plan Address Payor Plan Phone Number Payor Plan Fax Number Effective Dates    [address] [phone]  1/1/2024 - None Entered    " [facility] [id number]         Subscriber Name Subscriber Birth Date Member ID       MARY LIMA SR. 5/7/1941 [id number]                     Emergency Contacts        (Rel.) Home Phone Work Phone Mobile Phone    Mary Lima SR (Spouse) [phone]    MARY Lima JR (Son) [phone] -- [phone]

## 2025-02-13 NOTE — PROGRESS NOTES
Patient Name: Raina Forrest  : 1945  MRN: [id number]  Primary Care Physician: eGorgiana Cisneros MD  Date of admission: 2025    Patient Care Team:  Georgiana Cisneros MD as PCP - General (Pediatrics)  Nathan Johnson MD as Resident (Gastroenterology)  Robinson Salazar MD as Consulting Physician (Cardiology)        Subjective   Subjective:     Resting comfortably in bed.    Urine output 1.9 L.  On room air.      Review of systems:  All other review of system unremarkable      Allergies:    Allergies   Allergen Reactions    Bupivacaine Hcl Anaphylaxis    Nepafenac Itching     Eye Drops    Barium-Containing Compounds Other (See Comments)     CONTRAST CAUSES DIARRHEA    Duloxetine Other (See Comments)    Onion Other (See Comments)    Bextra [Valdecoxib] Itching    Cortisone Hives    Cymbalta [Duloxetine Hcl] Itching    Iodinated Contrast Media Unknown - Low Severity     Must be premedicated before use.  See [place] notes in care evrywhere    Medrol [Methylprednisolone] Unknown - Low Severity    Mesalamine Unknown - Low Severity    Polymyxin B-Trimethoprim Unknown - Low Severity    Rivaroxaban Unknown - Low Severity     REACTION UNKNOWN    Tetanus Toxoids Swelling    Tetanus-Diphtheria Toxoids Td Swelling       Objective   Exam:     Vital Signs  Temp:  [95.9 °F (35.5 °C)-98 °F (36.7 °C)] 95.9 °F (35.5 °C)  Heart Rate:  [71-85] 71  Resp:  [16-18] 18  BP: (108-133)/(63-75) 133/72  SpO2:  [97 %-100 %] 100 %  on   ;   Device (Oxygen Therapy): room air  Body mass index is 23.61 kg/m².    GEN: Pleasant chronically ill elderly lady resting in bed  No JVD  RESP: clear to auscultation bilaterally, no crackles/wheezing  CVS: RRR  ABD: soft, nontender, nondistended  NEURO: Alert  EXT: No edema      Results Review:  I have personally reviewed most recent Data :  CBC    Results from last 7 days   Lab Units 25  0232 25  0247 25  0240 02/10/25  0422 25  0428  02/08/25  1415   WBC 10*3/mm3 12.91* 11.43* 9.82 8.83 11.83* 11.53*   HEMOGLOBIN g/dL 13.4 12.6 13.5 13.4 12.9 13.0   PLATELETS 10*3/mm3 211 197 196 168 171 177     CMP   Results from last 7 days   Lab Units 02/13/25  0232 02/12/25  0247 02/11/25  0240 02/10/25  0422 02/09/25  0428 02/08/25  1415   SODIUM mmol/L 139 137 138 136 136 136   POTASSIUM mmol/L 4.3 4.1 4.3 3.7 4.1 4.4   CHLORIDE mmol/L 100 101 101 99 99 101   CO2 mmol/L 23.0 23.3 24.4 25.7 24.0 24.0   BUN mg/dL 24* 23 22 21 17 15   CREATININE mg/dL 1.19* 1.00 1.19* 1.29* 1.16* 1.27*   GLUCOSE mg/dL 88 94 112* 99 111* 107*   ALBUMIN g/dL 4.0 3.6 4.0 3.6 4.0 4.3   BILIRUBIN mg/dL 0.4 0.4 0.5 0.6 0.6 0.8   ALK PHOS U/L 99 84 96 87 83 83   AST (SGOT) U/L 32 28 25 27 31 35*   ALT (SGPT) U/L 14 11 13 15 14 16     ABG      No radiology results for the last day    Results for orders placed during the hospital encounter of 02/08/25    Adult Transthoracic Echo Complete W/ Cont if Necessary Per Protocol    Interpretation Summary    Left ventricular ejection fraction appears to be 66 - 70%.    Left ventricular diastolic function was indeterminate.    The left atrial cavity is mildly dilated.    The right atrial cavity is moderately  dilated.    Estimated right ventricular systolic pressure from tricuspid regurgitation is moderately elevated (45-55 mmHg).    Scheduled Meds:aspirin, 81 mg, Oral, Daily  bisoprolol, 10 mg, Oral, Daily  budesonide, 0.5 mg, Nebulization, BID - RT  dexAMETHasone, 6 mg, Intravenous, Daily  donepezil, 10 mg, Oral, Daily  ipratropium-albuterol, 3 mL, Nebulization, 4x Daily - RT  levothyroxine, 25 mcg, Oral, Q AM  memantine, 10 mg, Oral, Q12H  pantoprazole, 40 mg, Oral, Q AM  sertraline, 150 mg, Oral, QAM  sucralfate, 1 g, Oral, BID  torsemide, 10 mg, Oral, Daily      Continuous Infusions:   PRN Meds:  acetaminophen **OR** acetaminophen **OR** acetaminophen    senna-docusate sodium **AND** polyethylene glycol **AND** bisacodyl **AND**  bisacodyl    melatonin    ondansetron ODT **OR** ondansetron    sodium chloride    Assessment & Plan   Assessment and Plan:         Acute exacerbation of CHF (congestive heart failure)    Cytokine release syndrome, grade 2    Acute on chronic heart failure with preserved ejection fraction (HFpEF)    ASSESSMENT:  CKD, single kidney s/p left nephrectomy 2/2 RCC in 12/2023, baseline creatinine ~1.1-1.3, follows with Dr Marga Dill. Past urine studies have all been relatively bland, no significant proteinuria or hematuria.   HFpEF  COVID  Hypertension  leukocytosis  A-fib, status post Watchman device insertion  ulcerative colitis  Hypothyroidism  Osteoarthritis  dementia      echocardiogram was in January 2024, showed EF of 56 to 60%, diastolic function was indeterminate, mild pulmonary hypertension.     PLAN :      Creatinine stable at around her baseline.  Euvolemic: Continue torsemide 10 mg.  Continue treatment of SARS-CoV-2 infection per primary.    Discussed with family.  Stable from renal standpoint.    Note transcribed for Dr Dill    Electronically signed by JORY Boles,   [facility] kidney consultant  [phone]  2/13/2025  13:12 EST    Patient was seen earlier by  JORY. I personally have examined the patient and interviewed the patient. I have reviewed the history, data, problems, assessment and plan with the nurse practitioner during rounds and I concur with the history, exam, assessment and plan as described in the progress note with comments additions, revisions as noted.           Gerhard Dill MD  2/13/2025  [facility]

## 2025-02-13 NOTE — PROGRESS NOTES
[facility]  Cardiology Progress Note    Patient Identification:  Name: Raina Forrest  Age: 79 y.o.  Sex: female  :  1945  MRN: [id number]                 Follow Up / Chief Complaint: Follow-up for CHF    Interval History: up in bathroom, on room air, no chest pain, cough improving, no shortness of breath      Objective:    Past Medical History:  Past Medical History:   Diagnosis Date    Atrial fibrillation     Fung's esophagus     Benign essential hypertension     Blood clot in vein     Cardiomyopathy     Chronic gastritis     Congestive heart failure     Degenerative disc disease     Dementia     Pt  states she has slight dementia    Depression with anxiety     Disease of thyroid gland     Diverticulitis of colon     Dysphagia     GERD (gastroesophageal reflux disease)     History of chest pain     History of migraine     History of migraine headaches     Left knee pain     Osteoporosis     Palpitations     Thyroid disorder     Thyroid nodule     Ulcerative colitis      Past Surgical History:  Past Surgical History:   Procedure Laterality Date    APPENDECTOMY      BLADDER SURGERY      CARDIAC CATHETERIZATION N/A 2016    Procedure: Left Heart Cath;  Surgeon: Robinson Salazar MD;  Location:  [facility];  Service:     CARDIAC ELECTROPHYSIOLOGY PROCEDURE N/A 2017    Procedure: Pacemaker DC new   [employer];  Surgeon: Robinson Salazar MD;  Location:  [facility];  Service:     CARDIOVERSION  2017    CHOLECYSTECTOMY      COLONOSCOPY      COLONOSCOPY N/A 2016    normal    ENDOSCOPY  2015    submucosal nodule in esophagus, erythematous mucosa in antrum,moderate acute gastritis, no h-pylori    ENDOSCOPY N/A 2016    Erythematous mucosa in the antrum    EYE SURGERY Left     cataract    HYSTERECTOMY      KNEE MENISCAL REPAIR Left     THYROID SURGERY Left     TONSILLECTOMY      TOTAL KNEE ARTHROPLASTY Left 2020     Procedure: LEFT TOTAL KNEE ARTHOPLASTY+;  Surgeon: Jamal Castañeda MD;  Location: [facility] OR;  Service: Orthopedics;  Laterality: Left;        Social History:   Social History     Tobacco Use    Smoking status: Never    Smokeless tobacco: Never   Substance Use Topics    Alcohol use: No      Family History:  Family History   Problem Relation Age of Onset    Lung cancer Other     Ulcerative colitis Sister     Heart failure Mother     Heart disease Mother     Heart failure Father     Heart disease Father     Malig Hyperthermia Neg Hx           Allergies:  Allergies   Allergen Reactions    Bupivacaine Hcl Anaphylaxis    Nepafenac Itching     Eye Drops    Barium-Containing Compounds Other (See Comments)     CONTRAST CAUSES DIARRHEA    Duloxetine Other (See Comments)    Onion Other (See Comments)    Bextra [Valdecoxib] Itching    Cortisone Hives    Cymbalta [Duloxetine Hcl] Itching    Iodinated Contrast Media Unknown - Low Severity     Must be premedicated before use.  See [place] notes in care evrywhere    Medrol [Methylprednisolone] Unknown - Low Severity    Mesalamine Unknown - Low Severity    Polymyxin B-Trimethoprim Unknown - Low Severity    Rivaroxaban Unknown - Low Severity     REACTION UNKNOWN    Tetanus Toxoids Swelling    Tetanus-Diphtheria Toxoids Td Swelling     Scheduled Meds:  aspirin, 81 mg, Daily  bisoprolol, 10 mg, Daily  budesonide, 0.5 mg, BID - RT  dexAMETHasone, 6 mg, Daily  donepezil, 10 mg, Daily  ipratropium-albuterol, 3 mL, 4x Daily - RT  levothyroxine, 25 mcg, Q AM  memantine, 10 mg, Q12H  pantoprazole, 40 mg, Q AM  sertraline, 150 mg, QAM  sucralfate, 1 g, BID  torsemide, 10 mg, Daily            INTAKE AND OUTPUT:    Intake/Output Summary (Last 24 hours) at 2/13/2025 0965  Last data filed at 2/13/2025 0929  Gross per 24 hour   Intake 840 ml   Output 1950 ml   Net -1110 ml       Review of Systems:   GI: no n/v or abd pain  Cardiac: no chest pain or palpitations  Pulmonary: no  shortness of breath or cough        Constitutional:  Temp:  [95.9 °F (35.5 °C)-98 °F (36.7 °C)] 95.9 °F (35.5 °C)  Heart Rate:  [76-87] 85  Resp:  [16-18] 18  BP: (108-133)/(63-75) 133/72      Physical Exam:  General:  Alert, cooperative, appears in no acute distress  Respiratory: Clear to auscultation.  Normal respiratory effort and rate.  Cardiovascular: S1S2 Regular rate and rhythm. No murmur, rub or gallop.   Gastrointestinal: soft, non tender. Bowel sounds present.   Extremities: BROOKS x4. No pretibial pitting edema. Adequate musculoskeletal strength.   Neuro: AAO x3 CN II-XII grossly intact                Cardiographics  Echocardiogram:     Interpretation Summary         Left ventricular ejection fraction appears to be 66 - 70%.    Left ventricular diastolic function was indeterminate.    The left atrial cavity is mildly dilated.    The right atrial cavity is moderately  dilated.    Estimated right ventricular systolic pressure from tricuspid regurgitation is moderately elevated (45-55 mmHg).          Lab Review   Results from last 7 days   Lab Units 02/08/25  1610 02/08/25  1415   HSTROP T ng/L 40* 45*         Results from last 7 days   Lab Units 02/13/25  0232   SODIUM mmol/L 139   POTASSIUM mmol/L 4.3   BUN mg/dL 24*   CREATININE mg/dL 1.19*   CALCIUM mg/dL 9.6     @[id number]@  Results from last 7 days   Lab Units 02/13/25  0232 02/12/25  0247 02/11/25  0240   WBC 10*3/mm3 12.91* 11.43* 9.82   HEMOGLOBIN g/dL 13.4 12.6 13.5   HEMATOCRIT % 37.7 36.8 40.6   PLATELETS 10*3/mm3 211 197 196             Assessment:  1.  Acute on chronic heart failure with preserved ejection fraction.  2.  Permanent atrial fibrillation s/p watchman  3.  COVID-19  4.  Anxiety      Plan:  BP and HR stable, afib/paced rate controlled  No chest pain  Volume stable on exam  No change from cardiac standpoint    OK for discharge from cardiac standpoint. She will follow up with Dr Salazar on 1:30      )2/13/2025  Carline Patton,  "JORY Weiss/Transcription:   \"Dictated utilizing Dragon dictation\".     "

## 2025-02-13 NOTE — CASE MANAGEMENT/SOCIAL WORK
Continued Stay Note  [facility]     Patient Name: Raina Forrest  MRN: [id number]  Today's Date: 2/13/2025    Admit Date: 2/8/2025    Plan: Home w/ sp & son assist;  [facility] will follow;  Referral sent to [place] for the nebulizer order (pending)   Discharge Plan       Row Name 02/13/25 1116       Plan    Plan Home w/ sp & son assist;  [facility] will follow;  Referral sent to [facility] for the nebulizer order (pending)                   Discharge Codes    No documentation.                 Expected Discharge Date and Time       Expected Discharge Date Expected Discharge Time    Feb 13, 2025               Bertha Flynn RN

## 2025-02-14 ENCOUNTER — READMISSION MANAGEMENT (OUTPATIENT)
Dept: CALL CENTER | Facility: HOSPITAL | Age: 80
End: 2025-02-14
Payer: MEDICARE

## 2025-02-14 ENCOUNTER — HOME CARE VISIT (OUTPATIENT)
Dept: HOME HEALTH SERVICES | Facility: HOME HEALTHCARE | Age: 80
End: 2025-02-14
Payer: MEDICARE

## 2025-02-14 VITALS
SYSTOLIC BLOOD PRESSURE: 120 MMHG | DIASTOLIC BLOOD PRESSURE: 80 MMHG | RESPIRATION RATE: 18 BRPM | HEART RATE: 80 BPM | OXYGEN SATURATION: 98 %

## 2025-02-14 PROCEDURE — G0299 HHS/HOSPICE OF RN EA 15 MIN: HCPCS

## 2025-02-14 NOTE — OUTREACH NOTE
CHF Week 1 Survey      Flowsheet Row Responses   [facility] patient discharged from? [facility]   Does the patient have one of the following disease processes/diagnoses(primary or secondary)? CHF   CHF Week 1 attempt successful? Yes   Call start time 1124   Call end time 1139   Discharge diagnosis Acute exacerbation of CHF   Is patient permission given to speak with other caregiver? Yes   List who call center can speak with Benito Staples- Son   Person spoke with today (if not patient) and relationship Son   Meds reviewed with patient/caregiver? Yes   Is the patient having any side effects they believe may be caused by any medication additions or changes? No   Does the patient have all medications ordered at discharge? Yes   Is the patient taking all medications as directed (includes completed medication regime)? Yes   Comments regarding appointments CHF referral information relayed to pt's son, contact information provided to him, he plans to contact independently as HH just arrived.   Does the patient have a primary care provider?  Yes   Does the patient have an appointment with their PCP within 7 days of discharge? No   What is preventing the patient from scheduling follow up appointments within 7 days of discharge? Haven't had time   What is the Home health agency?  [facility],    Has home health visited the patient within 72 hours of discharge? Yes   Home health comments HH arrived during the call.   What DME was ordered? Referral sent to [facility] for the nebulizer order   DME comments Home nebulizer in place.   Psychosocial comments Pt has dementia   Comments Per pt's son the pt is doing well, she has been up and eaten breakfast this morning. Patient's son had questions r/t scheduling of nebs, this nurse answered during the call.   Did the patient receive a copy of their discharge instructions? Yes   Nursing interventions Reviewed instructions with patient   What is the patient's perception of their health status  since discharge? Improving   Nursing interventions Nurse provided patient education    CHF Week 1 call completed? Yes   Revoked No further contact(revokes)-requires comment   Graduated/Revoked comments HH arrived during the call, quick call with pt's son   Call end time 0164            Marley WHITE - Registered Nurse

## 2025-02-14 NOTE — SIGNIFICANT NOTE
02/14/25 1117   Plan   Final Discharge Disposition Code 06 - home with home health care   Final Note Home with Woody POSADAS

## 2025-02-14 NOTE — OUTREACH NOTE
Prep Survey      Flowsheet Row Responses   [facility] facility patient discharged from? [place]   Is LACE score < 7 ? No   Eligibility Readm Mgmt   Discharge diagnosis Acute exacerbation of CHF   Does the patient have one of the following disease processes/diagnoses(primary or secondary)? CHF   Does the patient have Home health ordered? Yes   What is the Home health agency?  [facility],    Is there a DME ordered? Yes   What DME was ordered? Referral sent to [place] for the nebulizer order   Prep survey completed? Yes            LESA BLACK - Registered Nurse

## 2025-02-14 NOTE — CASE MANAGEMENT/SOCIAL WORK
Case Management Discharge Note      Final Note: Pt discharged home, 2/13/25 with spouse and son.  [facility] is aware of Pt discharge and she states she s/w spouse via phone and told him that someone with [facility] will be calling him within 48 hours to plan their first visit.  Nebulizer was provided by [facility] prior to patient discharge.....Bertha S/RN CM    Provided Post Acute Provider Quality & Resource List?: Refused  Refused Quality and Resource List Comment: Son declined need for list    Selected Continued Care - Discharged on 2/13/2025 Admission date: 2/8/2025 - Discharge disposition: Home-Health Care Svc      Destination    No services have been selected for the patient.                Durable Medical Equipment    No services have been selected for the patient.                Dialysis/Infusion    No services have been selected for the patient.                Home Medical Care Coordination complete.      Service Provider Services Address Phone Fax Patient Preferred     [facility] [address] [place] [id number] [phone] --              Therapy    No services have been selected for the patient.                Community Resources    No services have been selected for the patient.                Community & DME    No services have been selected for the patient.                         Final Discharge Disposition Code: 06 - home with home health care

## 2025-02-15 ENCOUNTER — HOME CARE VISIT (OUTPATIENT)
Dept: HOME HEALTH SERVICES | Facility: HOME HEALTHCARE | Age: 80
End: 2025-02-15
Payer: MEDICARE

## 2025-02-15 VITALS
HEART RATE: 69 BPM | DIASTOLIC BLOOD PRESSURE: 68 MMHG | SYSTOLIC BLOOD PRESSURE: 120 MMHG | TEMPERATURE: 97.3 F | OXYGEN SATURATION: 97 %

## 2025-02-15 PROCEDURE — G0151 HHCP-SERV OF PT,EA 15 MIN: HCPCS

## 2025-02-15 NOTE — HOME HEALTH
Eval Note  Patient was admitted to [facility] 2/8 to 2/13/25 with SOB, exacerbation of CHF, COVID  PMHX anxiety, hypothyroid, dementia, nephrecomy, CAD, Afib   SHX Patient resides with spouse, 1 then 2 steps to enter home with rails.   Prior level of function Patient ambulates without assistive device, independent with ADLs, does the cleaning, spouse does the cooking and laundry, driving     Patient's goal(s): remain at prior level of function, Patient and spouse state she is at baseline     Services required to achieve goals: SN, PT eval only     Potential Issues for goal attainment: none     Problems identified:none     Describe the Functional status and safety:Patient is ambulating in the home without assistive device, is transferring in and out of the bed independently, sit to stand by pushing self to stand, did have difficulty standing from lower commode, recommended patient put BSC back over the commode. Patient is able to ascend and descend steps with distant supervision using rails.     Describe any environmental issues: none     Any equipment needs none     Plan for next visit  Physical therapy evaluation only

## 2025-02-15 NOTE — CASE COMMUNICATION
Physical therapy evaluation completed, no further PT indicated. Patient is ambulating independently, is independent with transfers except commde and recommedned patient put BSC back over the commdoe. Instructed patient to pace self with activities due to SOB, O2 sats still 95%.

## 2025-02-18 NOTE — HOME HEALTH
79F female with a history of GERD, anxiety, hypothyroid, dementia, nephrectomy, HFpEF, CAD, A-fib.  Recen hospitalization due to acute on chronic heart failure in the setting of COVID.  She was started on increased diuretics and remdesivir. Patient had previous left knee injury which was x-ray and no acute abnormality was noted. She remained on room air during her entire course. Patient diuresed well, IV diuretics were eventually adjusted to p.o. diuretics. Also due to symptoms steroids were added along with nebulizer breathing treatments. Patient improved with these treatments. She completed a 5-day course of remdesivir.  She lives at home with her spouse, and has a son who assists daily.  She does not use an assistive device in the home.  Her VSS.  She SATS WNL on RA. Medications reconciled. SN FOC CHF.  SN to teach and instruct CHF and medication regime.

## 2025-02-20 ENCOUNTER — HOME CARE VISIT (OUTPATIENT)
Dept: HOME HEALTH SERVICES | Facility: HOME HEALTHCARE | Age: 80
End: 2025-02-20
Payer: MEDICARE

## 2025-02-20 VITALS
OXYGEN SATURATION: 94 % | SYSTOLIC BLOOD PRESSURE: 116 MMHG | HEART RATE: 70 BPM | DIASTOLIC BLOOD PRESSURE: 62 MMHG | RESPIRATION RATE: 18 BRPM | TEMPERATURE: 97.3 F

## 2025-02-20 PROCEDURE — G0300 HHS/HOSPICE OF LPN EA 15 MIN: HCPCS

## 2025-02-21 ENCOUNTER — HOSPITAL ENCOUNTER (OUTPATIENT)
Dept: CARDIOLOGY | Facility: HOSPITAL | Age: 80
Discharge: HOME | End: 2025-02-21
Payer: MEDICARE

## 2025-02-21 ENCOUNTER — LAB (OUTPATIENT)
Dept: LAB | Facility: HOSPITAL | Age: 80
End: 2025-02-21
Payer: MEDICARE

## 2025-02-21 VITALS
HEIGHT: 68 IN | WEIGHT: 154.8 LBS | OXYGEN SATURATION: 99 % | DIASTOLIC BLOOD PRESSURE: 75 MMHG | BODY MASS INDEX: 23.46 KG/M2 | HEART RATE: 63 BPM | SYSTOLIC BLOOD PRESSURE: 117 MMHG

## 2025-02-21 DIAGNOSIS — I50.32 CHRONIC HEART FAILURE WITH PRESERVED EJECTION FRACTION (HFPEF): Primary | ICD-10-CM

## 2025-02-21 DIAGNOSIS — I50.32 CHRONIC HEART FAILURE WITH PRESERVED EJECTION FRACTION (HFPEF): ICD-10-CM

## 2025-02-21 LAB
ANION GAP SERPL CALCULATED.3IONS-SCNC: 12 MMOL/L (ref 5–15)
BUN SERPL-MCNC: 15 MG/DL (ref 8–23)
BUN/CREAT SERPL: 12.5 (ref 7–25)
CALCIUM SPEC-SCNC: 9.4 MG/DL (ref 8.6–10.5)
CHLORIDE SERPL-SCNC: 104 MMOL/L (ref 98–107)
CO2 SERPL-SCNC: 24 MMOL/L (ref 22–29)
CREAT SERPL-MCNC: 1.2 MG/DL (ref 0.57–1)
EGFRCR SERPLBLD CKD-EPI 2021: 45.6 ML/MIN/1.73
GLUCOSE SERPL-MCNC: 89 MG/DL (ref 65–99)
POTASSIUM SERPL-SCNC: 4.4 MMOL/L (ref 3.5–5.2)
SODIUM SERPL-SCNC: 140 MMOL/L (ref 136–145)

## 2025-02-21 PROCEDURE — 94618 PULMONARY STRESS TESTING: CPT

## 2025-02-21 PROCEDURE — 94726 PLETHYSMOGRAPHY LUNG VOLUMES: CPT | Performed by: NURSE PRACTITIONER

## 2025-02-21 PROCEDURE — 36415 COLL VENOUS BLD VENIPUNCTURE: CPT

## 2025-02-21 PROCEDURE — 99215 OFFICE O/P EST HI 40 MIN: CPT | Performed by: NURSE PRACTITIONER

## 2025-02-21 PROCEDURE — 80048 BASIC METABOLIC PNL TOTAL CA: CPT

## 2025-02-21 RX ORDER — TORSEMIDE 10 MG/1
10 TABLET ORAL DAILY
COMMUNITY

## 2025-02-21 RX ORDER — TORSEMIDE 5 MG/1
5 TABLET ORAL DAILY
Qty: 30 TABLET | Refills: 0 | Status: SHIPPED | OUTPATIENT
Start: 2025-02-21

## 2025-02-21 NOTE — LETTER
February 24, 2025     Robinson Salazar MD  [address] [id number]    Patient: Raina Forrest   YOB: 1945   Date of Visit: 2/21/2025     Dear Robinson Salazar MD:       Thank you for referring Raina Forrest to me for evaluation. Below are the relevant portions of my assessment and plan of care.    If you have questions, please do not hesitate to call me. I look forward to following Raina along with you.         Sincerely,        JORY Leon        CC: MD Georgiana Swanson MD Fife, JORY Hinton  02/24/25 1246  Signed    [facility]      Congestive Heart Failure  Pertinent negatives include no chest pain or shortness of breath.       1. Pulmonary hypertension  2. HFpEF    Subjective     Raina Forrestis a 79 y.o. female who presents today for pulmonary hypertension, HFpEF.   patient has A-fib (status post Watchman device),  HTN, CKD (status post nephrectomy due to renal cell carcinoma 2023), sick sinus syndrome (status post pacemaker), chronic HFpEF, hypothyroidism, GERD, dementia  Patient was admitted to [facility] from 2/7/2024 to 3/3/2024 for flu A, pneumonia. She was noted to have AMBROCIO on admission with creatinine of 1.2. Her Jardiance, spironolactone, and toresemide were all held. Renal function improved prior to discharge with creatinine of 0.9.   Patient had hospitalization 2/8/2025 to 2/13/2025.  She had been diagnosed with COVID-19.  Was found to be acute on chronic heart failure exacerbation.  Diuresed well with IV diuretics and discharged on p.o. diuretics torsemide 10 mg daily.  During hospitalization patient did have updated 2D TTE showed LVEF 66 to 70%, indeterminate left ventricular diastolic dysfunction, left atrial cavity mildly dilated, right atrial cavity moderately dilated, moderately elevated RVSP    Since being discharged she has gained 4 pounds.    Review  of Systems - Review of Systems   Constitutional: Positive for weight gain. Negative for weight loss.   Cardiovascular:  Negative for chest pain, dyspnea on exertion, leg swelling, orthopnea, paroxysmal nocturnal dyspnea and syncope.   Respiratory:  Negative for cough and shortness of breath.    Gastrointestinal:  Negative for bloating.   Neurological:  Negative for dizziness.          Patient Active Problem List   Diagnosis   • Chronic atrial fibrillation   • Benign essential HTN   • Bradycardia   • Chest pain   • Can't get food down   • Accumulation of fluid in tissues   • Esophageal lump   • Adynamia   • Itch of skin   • Anorexia   • Chronic nausea   • Gastroesophageal reflux disease   • Breath shortness   • Emesis   • Decreased body weight   • Anxiety   • Narrowing of intervertebral disc space   • Diverticulosis of intestine   • Primary hypertension   • Migraine   • Osteoporosis   • Palpitations   • Pituitary adenoma   • Sick sinus syndrome   • Diarrhea   • Cardiomyopathy   • Chronic anticoagulation   • Atrial fibrillation   • Anticoagulated   • Pacemaker   • Infected pacemaker   • Wound infection   • Primary osteoarthritis of left knee   • DJD (degenerative joint disease)   • Ischemic cardiomyopathy   • Acute on chronic congestive heart failure, unspecified heart failure type   • Anemia   • Presence of Watchman left atrial appendage closure device   • Pneumonia   • Pneumonia of both lungs due to infectious organism   • Stage 3a chronic kidney disease   • Influenza A   • Severe malnutrition   • H/O left radical nephrectomy   • Abdominal wall hematoma   • Peptic ulcer disease   • Hypothyroidism (acquired)   • Acute exacerbation of CHF (congestive heart failure)   • Cytokine release syndrome, grade 2   • Acute on chronic heart failure with preserved ejection fraction (HFpEF)     family history includes Heart disease in her father and mother; Heart failure in her father and mother; Lung cancer in an other family  member; Ulcerative colitis in her sister.   reports that she has never smoked. She has never used smokeless tobacco. She reports that she does not drink alcohol and does not use drugs.  Allergies   Allergen Reactions   • Bupivacaine Hcl Anaphylaxis   • Nepafenac Itching     Eye Drops   • Barium-Containing Compounds Other (See Comments)     CONTRAST CAUSES DIARRHEA   • Duloxetine Other (See Comments)   • Onion Other (See Comments)   • Bextra [Valdecoxib] Itching   • Cortisone Hives   • Cymbalta [Duloxetine Hcl] Itching   • Iodinated Contrast Media Unknown - Low Severity     Must be premedicated before use.  See [place] notes in care evrywhere   • Medrol [Methylprednisolone] Unknown - Low Severity   • Mesalamine Unknown - Low Severity   • Polymyxin B-Trimethoprim Unknown - Low Severity   • Rivaroxaban Unknown - Low Severity     REACTION UNKNOWN   • Tetanus Toxoids Swelling   • Tetanus-Diphtheria Toxoids Td Swelling     Physical Activity: Not on file          Current Outpatient Medications:   •  albuterol sulfate  (90 Base) MCG/ACT inhaler, Inhale 2 puffs Every 4 (Four) Hours As Needed for Wheezing., Disp: 6.7 g, Rfl: 0  •  aspirin 81 MG chewable tablet, Chew 1 tablet Daily. Indications: Cancer of the Colon, Disease involving Lipid Deposits in the Arteries, Kawasaki Syndrome, Disp: , Rfl:   •  bisoprolol (ZEBeta) 10 MG tablet, TAKE ONE TABLET BY MOUTH DAILY, Disp: 30 tablet, Rfl: 8  •  budesonide (PULMICORT) 0.5 MG/2ML nebulizer solution, Take 2 mL (one vial) by nebulization 2 (Two) Times a Day for 10 days., Disp: 60 mL, Rfl: 0  •  donepezil (ARICEPT) 10 MG tablet, Take 1 tablet by mouth Daily. Indications: Alzheimer's Disease, Disp: , Rfl:   •  ipratropium-albuterol (DUO-NEB) 0.5-2.5 mg/3 ml nebulizer, Take 3 mL (one vial) by nebulization 4 (Four) Times a Day As Needed for Wheezing., Disp: 360 mL, Rfl: 0  •  levothyroxine (SYNTHROID, LEVOTHROID) 25 MCG tablet, Take 1 tablet by mouth Every Morning., Disp: 30  tablet, Rfl: 11  •  memantine (NAMENDA) 10 MG tablet, Take 1 tablet by mouth 2 (Two) Times a Day. Indications: Alzheimer's Disease, Disp: , Rfl:   •  pantoprazole (PROTONIX) 40 MG EC tablet, Take 20 mg by mouth Daily. Indications: Gastroesophageal Reflux Disease, Heartburn, Disp: , Rfl:   •  sertraline (ZOLOFT) 100 MG tablet, Take 1.5 tablets by mouth Every Morning. Indications: Generalized Anxiety Disorder, Disp: , Rfl:   •  torsemide (DEMADEX) 10 MG tablet, Take 1 tablet by mouth Daily., Disp: , Rfl:   •  torsemide (DEMADEX) 5 MG tablet, Take 1 tablet by mouth Daily. In addition to 10mg tablet. She will be taking additional 5mg torsemide to equal 15mg daily  Indications: Cardiac Failure, Disp: 30 tablet, Rfl: 0    Objective  Vital Sign Review:   Vitals:    02/21/25 1455   BP: 117/75   Pulse: 63   SpO2: 99%     Body mass index is 23.73 kg/m².      02/21/25  1455   Weight: 70.2 kg (154 lb 12.8 oz) (157.6 lbs on our scale)     Physical Exam:  Constitutional:       Appearance: Normal appearance. Frail. Chronically ill-appearing.   Neck:      Vascular: No carotid bruit or JVD. JVD normal.   Pulmonary:      Effort: Pulmonary effort is normal.      Breath sounds: Normal breath sounds.   Cardiovascular:      PMI at left midclavicular line. Normal rate. Regular rhythm.   Pulses:     Intact distal pulses.   Edema:     Peripheral edema absent.   Abdominal:      Palpations: Abdomen is soft.      Tenderness: There is no abdominal tenderness.   Skin:     General: Skin is warm and dry.   Neurological:      General: No focal deficit present.      Mental Status: Alert and oriented to person, place and time.   Psychiatric:         Behavior: Behavior is cooperative.          DATA REVIEWED:   EKG:      ---------------------------------------------------  ECHO:    Results for orders placed during the hospital encounter of 02/08/25    Adult Transthoracic Echo Complete W/ Cont if Necessary Per Protocol    Interpretation Summary  •  Left  ventricular ejection fraction appears to be 66 - 70%.  •  Left ventricular diastolic function was indeterminate.  •  The left atrial cavity is mildly dilated.  •  The right atrial cavity is moderately  dilated.  •  Estimated right ventricular systolic pressure from tricuspid regurgitation is moderately elevated (45-55 mmHg).          -----------------------------------------------------  RHC/LHC    Results for orders placed during the hospital encounter of 11/15/16    Cardiac Catheterization/Vascular Study    Narrative  Table formatting from the original result was not included.    · Successful left heart catheter  · Normal coronary arteries  · LV function at lower limit of normal    November 21, 2016    Raina Forrest  1945  70 y.o.  female  [id number]    Procedures Performed    1. Left heart catheterization  2. Selective coronary angiography  3. Left ventriculography  4.      :   Robinson Salazar MD    Vascular Access Site: Right femoral    Indication for procedure: Recurrent chest pains with abnormal echocardiogram with LV dysfunction    Referring Physician:      Pertinent History  @PMH@  @PSH@    Procedure Note    After discussing the risks, benefits, and alternatives of the procedure, informed consent was obtained.  The vascular access site was prepped and draped in the usual sterile fashion.  2% lidocaine was used for local anesthesia. Appropriate landmarks were assessed.  A 5 Lao short sheath was inserted in the artery using the modified Seldinger technique.    Selective coronary angiography was performed with JL4 and JR4 diagnostic catheters. Left ventriculogram  was performed with an angled pigtail catheter.  All exchanges were performed over the wire.  There were no apparent acute or early complications.  The patient tolerated the procedure well and was transferred to the recovery area in stable condition.    Artery hemostasis was achieved with TR BAND manual  pressure.        Contrast:    70  cc  Complications:  None  Blood Loss: minimal      Hemodynamics    Pressures    Ao:    110/70 mmHg  LV:    110/10  mmHg  End-diastolic pressure:  10  mmHg  No significant aortic valvular gradient on pullback    Coronary Angiography    Left Main:  The left main originates in the left coronary cusp.  It bifurcates and gives rise to the LAD and circumflex system.  Normal    Left Anterior Descending:  Normal including the diagonal and  branches    Left Circumflex:  Codominant normal    Right Coronary Artery:  The right coronary artery originates in the right coronary cusp.  Codominant normal    Left Ventriculography:    Estimated Ejection Fraction: 50 %  Wall motion abnormalities:  None  Mitral Regurgitation:  None    Impression:    1. Normal coronary arteries  2. LV function at lower limits of normal    Recommendations:    1. Medical management        I sincerely appreciate the opportunity to participate in your patient's care. Please feel free to contact me anytime if I can be of assistance in this or any other way.    Robinson Salazar MD  11/21/2016  12:08 PM      ---------------------------------------------------------------------------    CT:   CT Chest Without Contrast Diagnostic    Result Date: 2/11/2025   1. Significant improvement in centrilobular and tree-in-bud nodules, suspect infectious bronchiolitis. 2. Resolution of lower lobe bronchovascular consolidative opacities with linear opacities in the lower lobes most consistent with subsegmental atelectasis or linear scarring. 3. Airways disease with thickened airway walls and suspected air trapping. Finding may be related underlying reactive airways disease or bronchitis/bronchiolitis. 4. Multiple solid pulmonary nodules with a couple new solid pulmonary nodules seen in the right upper lobe. 6-month follow-up chest CT can reevaluate. 5. Moderate cardiomegaly with asymmetric right atrial enlargement.  This  report was finalized on 2/11/2025 8:37 AM by Dr. Jas Muniz M.D on Workstation: [id number]      XR Chest 1 View    Result Date: 2/8/2025  Increased apparent size of the cardiac shadow, as described. Slight pulmonary vascular congestion. No focal pulmonary consolidation. Follow-up/further evaluation can be obtained as indicated.  This report was finalized on 2/8/2025 2:10 PM by Dr. Saeid Lopez M.D on Workstation: [id number]           --------------------------------------------------------------------------------------------------------------    Laboratory evaluations:  Renal Function: Estimated Creatinine Clearance: 40.7 mL/min (A) (by C-G formula based on SCr of 1.2 mg/dL (H)).    Lab Results   Component Value Date    GLUCOSE 89 02/21/2025    BUN 15 02/21/2025    CREATININE 1.20 (H) 02/21/2025    EGFRIFNONA 80 06/17/2020    EGFRIFAFRI >60 12/09/2022    BCR 12.5 02/21/2025    K 4.4 02/21/2025    CO2 24.0 02/21/2025    CALCIUM 9.4 02/21/2025    ALBUMIN 4.0 02/13/2025    LABIL2 2.1 12/09/2022    AST 32 02/13/2025    ALT 14 02/13/2025     Lab Results   Component Value Date    WBC 12.91 (H) 02/13/2025    HGB 13.4 02/13/2025    HCT 37.7 02/13/2025    MCV 98.7 (H) 02/13/2025     02/13/2025     Lab Results   Component Value Date    HGBA1C 5.40 02/10/2023     Lab Results   Component Value Date    HGBA1C 5.40 02/10/2023    HGBA1C 4.80 11/16/2016     Lab Results   Component Value Date    CREATININE 1.20 (H) 02/21/2025     Lab Results   Component Value Date    IRON 19 (L) 06/18/2023    TIBC 514 06/18/2023    FERRITIN 25.40 06/18/2023       Result Review:  I have personally reviewed the results from the time of this admission to 2/24/2025 12:42 EST and agree with these findings:  [x]  Laboratory list / accordion  []  Microbiology  [x]  Radiology  [x]  EKG/Telemetry   [x]  Cardiology/Vascular   []  Pathology  [x]  Old records  []  Other:        ReDS VOLUMETRIC ASSESSMENT:  ReDs Vest    Performed by: Cristhian  JORY Hinton  Authorized by: Hayley Morrow APRN    ReDS value:  45  ReDS Value Description:  42-50 (red) = Hypervolemic Status        Assessment & Plan     1. Chronic heart failure with preserved ejection fraction (HFpEF)      HFpEF-LVEF 66 to 70% from 2D TTE 2/9/2025.   From discharge home weight has increased by 4lbs over past 2 days, ReDs is elevated. I will have her increase her torsemide to 15mg daily. We will check BMP today.   CKD (status post left nephrectomy secondary to renal cell carcinoma 2023) baseline creatinine 1.1-1.3  To optimize GDMT I recommend starting SGLT2, her son is very hesitant to start this. She had been on SGLT2 in the past but was discontinued due to AMBROCIO. Given recent exacerbation I think it is possible to retrial, I would recommend if we decide to retrial to decrease diuretic to start. Her son would first like the input of Dr Valdivia and Dr CHRISTIANO pearson. .   Dr Valdivia f/u on 3/3--3/10  Dr ALVARADO f/u 3/13        The KCCQ-12 was updated at the visit today.  score:  55 .        NYHA stage C FC-II     Clinical status was assessed and has remained stable.  Treatment intent: curative   ReDS reading was obtained today.  ReDS result: 45       Today, Patient appears euvolemic. and with perfusion. The patient's hemodynamics are currently acceptable. HR is: normal and is at goal. BP/MAP was reviewed and there is room for medication up-titration.  The patient's clinical course has been impacted by CKD. LVEF: 66-70%.     GDMT Assessment:       GDMT changes recommended today: increase diuretic.       BB:   continue Bisoprolol  10mg daily  Monitor heart rate.  Please call the [facility] for HR<50, dizziness, lightheadedness, syncope    A/A/A:     HFpEF-not indicated at this time although possible future addition if needed    SGLT2-I:  Concerned about starting jardiance again--we will recheck renal function today. Discuss restarting at next visit.   Call for S/Sx genital mycotic infections  Do not take  when inadequate oral intake, NPO, GI illness    MRA:  The patient is FC-NYHA Class II and MRA is indicated due to recent hospitalization.   Has been held due to AMBROCIO.  I would like to reinitiate SGLT2 first and possible future retrial of spironolactone if needed      Diuretic Regimen:  -DIURETIC:   Increase to toresemide 15 mg every day  In addition to 10mg tablet. She will be taking additional 5mg torsemide to equal 15mg daily okay  -Fluid restriction:   -requested  -2000 ml  -Patient has been asked to weigh daily and was provided with a printed diuretic strategy.  -Sodium restriction:   -1,500 mg Na restriction was discussed.      Labs/Diagnostics/Referrals:    Labs -Chem-7       Lifestyle Advice:   - call office if I gain more than 2 pounds in one day or 5 pounds in one week   - keep legs up while sitting   - use salt in moderation   - watch for swelling in feet, ankles and legs every day   - weigh myself daily   -call for concerning s/sx   -- activity or exercise based on tolerance encouraged     CODE STATUS: FULL              Return in about 2 weeks (around 3/7/2025).                Thank you for allowing me to participate in the care of your patient,    Time Spent: I spent 40 minutes caring for Raina Forrest  on this date of service. This time includes time spent by me in the following activities: preparing for the visit, reviewing tests, performing a medically appropriate examination and/or evaluations, counseling and educating the patient/family/caregiver, ordering medications, tests, or procedures, documenting information in the medical record, and independently interpreting results and communicating that information with the patient/family/caregiver.        Hayley Morrow, APRN  02/24/25  14:52 EST      **Dragon Disclaimer:**  Much of this encounter note is an electronic transcription/translation of spoken language to printed text. The electronic translation of spoken language may permit erroneous,  or at times, nonsensical words or phrases to be inadvertently transcribed. Although I have reviewed the note for such errors, some may still exist.    The information in this note was reviewed and updated during the visit to be as accurate as possible. As many patients have chronic medical problems, many of their individual problems and ongoing plans do not change significantly from visit to visit.  This information is correct and is consistent with my treatment plan as of today's visit.

## 2025-02-21 NOTE — PATIENT INSTRUCTIONS
We are going to increase torsemide to 15mg daily    If kidney function is stable, we will plan to start jardiance--if we can start jardiance, we will decrease the torsemide back to 10mg daily.   We will call Monday to discuss this.     Directions for when to call the clinic reviewed with the patient to include weight gain of 2 to 3 pounds in 24 hours, weight gain of 5 to 10 pounds within 7 days; worsening shortness of breath; worsening lower extremity edema or abdominal distention.

## 2025-02-21 NOTE — PROGRESS NOTES
[facility]      Congestive Heart Failure  Pertinent negatives include no chest pain or shortness of breath.       1. Pulmonary hypertension  2. HFpEF    Subjective      Raina Andersen a 79 y.o. female who presents today for pulmonary hypertension, HFpEF.   patient has A-fib (status post Watchman device),  HTN, CKD (status post nephrectomy due to renal cell carcinoma 2023), sick sinus syndrome (status post pacemaker), chronic HFpEF, hypothyroidism, GERD, dementia  Patient was admitted to [facility] from 2/7/2024 to 3/3/2024 for flu A, pneumonia. She was noted to have AMBROCIO on admission with creatinine of 1.2. Her Jardiance, spironolactone, and toresemide were all held. Renal function improved prior to discharge with creatinine of 0.9.   Patient had hospitalization 2/8/2025 to 2/13/2025.  She had been diagnosed with COVID-19.  Was found to be acute on chronic heart failure exacerbation.  Diuresed well with IV diuretics and discharged on p.o. diuretics torsemide 10 mg daily.  During hospitalization patient did have updated 2D TTE showed LVEF 66 to 70%, indeterminate left ventricular diastolic dysfunction, left atrial cavity mildly dilated, right atrial cavity moderately dilated, moderately elevated RVSP    Since being discharged she has gained 4 pounds.    Review of Systems - Review of Systems   Constitutional: Positive for weight gain. Negative for weight loss.   Cardiovascular:  Negative for chest pain, dyspnea on exertion, leg swelling, orthopnea, paroxysmal nocturnal dyspnea and syncope.   Respiratory:  Negative for cough and shortness of breath.    Gastrointestinal:  Negative for bloating.   Neurological:  Negative for dizziness.          Patient Active Problem List   Diagnosis    Chronic atrial fibrillation    Benign essential HTN    Bradycardia    Chest pain    Can't get food down    Accumulation of fluid in tissues    Esophageal lump    Adynamia    Itch of skin     Anorexia    Chronic nausea    Gastroesophageal reflux disease    Breath shortness    Emesis    Decreased body weight    Anxiety    Narrowing of intervertebral disc space    Diverticulosis of intestine    Primary hypertension    Migraine    Osteoporosis    Palpitations    Pituitary adenoma    Sick sinus syndrome    Diarrhea    Cardiomyopathy    Chronic anticoagulation    Atrial fibrillation    Anticoagulated    Pacemaker    Infected pacemaker    Wound infection    Primary osteoarthritis of left knee    DJD (degenerative joint disease)    Ischemic cardiomyopathy    Acute on chronic congestive heart failure, unspecified heart failure type    Anemia    Presence of Watchman left atrial appendage closure device    Pneumonia    Pneumonia of both lungs due to infectious organism    Stage 3a chronic kidney disease    Influenza A    Severe malnutrition    H/O left radical nephrectomy    Abdominal wall hematoma    Peptic ulcer disease    Hypothyroidism (acquired)    Acute exacerbation of CHF (congestive heart failure)    Cytokine release syndrome, grade 2    Acute on chronic heart failure with preserved ejection fraction (HFpEF)     family history includes Heart disease in her father and mother; Heart failure in her father and mother; Lung cancer in an other family member; Ulcerative colitis in her sister.   reports that she has never smoked. She has never used smokeless tobacco. She reports that she does not drink alcohol and does not use drugs.  Allergies   Allergen Reactions    Bupivacaine Hcl Anaphylaxis    Nepafenac Itching     Eye Drops    Barium-Containing Compounds Other (See Comments)     CONTRAST CAUSES DIARRHEA    Duloxetine Other (See Comments)    Onion Other (See Comments)    Bextra [Valdecoxib] Itching    Cortisone Hives    Cymbalta [Duloxetine Hcl] Itching    Iodinated Contrast Media Unknown - Low Severity     Must be premedicated before use.  See [facility] notes in care evrywhere    Medrol [Methylprednisolone]  Unknown - Low Severity    Mesalamine Unknown - Low Severity    Polymyxin B-Trimethoprim Unknown - Low Severity    Rivaroxaban Unknown - Low Severity     REACTION UNKNOWN    Tetanus Toxoids Swelling    Tetanus-Diphtheria Toxoids Td Swelling     Physical Activity: Not on file          Current Outpatient Medications:     albuterol sulfate  (90 Base) MCG/ACT inhaler, Inhale 2 puffs Every 4 (Four) Hours As Needed for Wheezing., Disp: 6.7 g, Rfl: 0    aspirin 81 MG chewable tablet, Chew 1 tablet Daily. Indications: Cancer of the Colon, Disease involving Lipid Deposits in the Arteries, Kawasaki Syndrome, Disp: , Rfl:     bisoprolol (ZEBeta) 10 MG tablet, TAKE ONE TABLET BY MOUTH DAILY, Disp: 30 tablet, Rfl: 8    budesonide (PULMICORT) 0.5 MG/2ML nebulizer solution, Take 2 mL (one vial) by nebulization 2 (Two) Times a Day for 10 days., Disp: 60 mL, Rfl: 0    donepezil (ARICEPT) 10 MG tablet, Take 1 tablet by mouth Daily. Indications: Alzheimer's Disease, Disp: , Rfl:     ipratropium-albuterol (DUO-NEB) 0.5-2.5 mg/3 ml nebulizer, Take 3 mL (one vial) by nebulization 4 (Four) Times a Day As Needed for Wheezing., Disp: 360 mL, Rfl: 0    levothyroxine (SYNTHROID, LEVOTHROID) 25 MCG tablet, Take 1 tablet by mouth Every Morning., Disp: 30 tablet, Rfl: 11    memantine (NAMENDA) 10 MG tablet, Take 1 tablet by mouth 2 (Two) Times a Day. Indications: Alzheimer's Disease, Disp: , Rfl:     pantoprazole (PROTONIX) 40 MG EC tablet, Take 20 mg by mouth Daily. Indications: Gastroesophageal Reflux Disease, Heartburn, Disp: , Rfl:     sertraline (ZOLOFT) 100 MG tablet, Take 1.5 tablets by mouth Every Morning. Indications: Generalized Anxiety Disorder, Disp: , Rfl:     torsemide (DEMADEX) 10 MG tablet, Take 1 tablet by mouth Daily., Disp: , Rfl:     torsemide (DEMADEX) 5 MG tablet, Take 1 tablet by mouth Daily. In addition to 10mg tablet. She will be taking additional 5mg torsemide to equal 15mg daily  Indications: Cardiac Failure,  Disp: 30 tablet, Rfl: 0    Objective   Vital Sign Review:   Vitals:    02/21/25 1455   BP: 117/75   Pulse: 63   SpO2: 99%     Body mass index is 23.73 kg/m².      02/21/25  1455   Weight: 70.2 kg (154 lb 12.8 oz) (157.6 lbs on our scale)     Physical Exam:  Constitutional:       Appearance: Normal appearance. Frail. Chronically ill-appearing.   Neck:      Vascular: No carotid bruit or JVD. JVD normal.   Pulmonary:      Effort: Pulmonary effort is normal.      Breath sounds: Normal breath sounds.   Cardiovascular:      PMI at left midclavicular line. Normal rate. Regular rhythm.   Pulses:     Intact distal pulses.   Edema:     Peripheral edema absent.   Abdominal:      Palpations: Abdomen is soft.      Tenderness: There is no abdominal tenderness.   Skin:     General: Skin is warm and dry.   Neurological:      General: No focal deficit present.      Mental Status: Alert and oriented to person, place and time.   Psychiatric:         Behavior: Behavior is cooperative.          DATA REVIEWED:   EKG:      ---------------------------------------------------  ECHO:    Results for orders placed during the hospital encounter of 02/08/25    Adult Transthoracic Echo Complete W/ Cont if Necessary Per Protocol    Interpretation Summary    Left ventricular ejection fraction appears to be 66 - 70%.    Left ventricular diastolic function was indeterminate.    The left atrial cavity is mildly dilated.    The right atrial cavity is moderately  dilated.    Estimated right ventricular systolic pressure from tricuspid regurgitation is moderately elevated (45-55 mmHg).          -----------------------------------------------------  RHC/LHC    Results for orders placed during the hospital encounter of 11/15/16    Cardiac Catheterization/Vascular Study    Narrative  Table formatting from the original result was not included.    · Successful left heart catheter  · Normal coronary arteries  · LV function at lower limit of normal    November  21, 2016    Raina Forrest  1945  70 y.o.  female  [id number]    Procedures Performed    1. Left heart catheterization  2. Selective coronary angiography  3. Left ventriculography  4.      :   Robinson Salazar MD    Vascular Access Site: Right femoral    Indication for procedure: Recurrent chest pains with abnormal echocardiogram with LV dysfunction    Referring Physician:      Pertinent History  @PMH@  @PSH@    Procedure Note    After discussing the risks, benefits, and alternatives of the procedure, informed consent was obtained.  The vascular access site was prepped and draped in the usual sterile fashion.  2% lidocaine was used for local anesthesia. Appropriate landmarks were assessed.  A 5 Upper sorbian short sheath was inserted in the artery using the modified Seldinger technique.    Selective coronary angiography was performed with JL4 and JR4 diagnostic catheters. Left ventriculogram  was performed with an angled pigtail catheter.  All exchanges were performed over the wire.  There were no apparent acute or early complications.  The patient tolerated the procedure well and was transferred to the recovery area in stable condition.    Artery hemostasis was achieved with TR BAND manual pressure.        Contrast:    70  cc  Complications:  None  Blood Loss: minimal      Hemodynamics    Pressures    Ao:    110/70 mmHg  LV:    110/10  mmHg  End-diastolic pressure:  10  mmHg  No significant aortic valvular gradient on pullback    Coronary Angiography    Left Main:  The left main originates in the left coronary cusp.  It bifurcates and gives rise to the LAD and circumflex system.  Normal    Left Anterior Descending:  Normal including the diagonal and  branches    Left Circumflex:  Codominant normal    Right Coronary Artery:  The right coronary artery originates in the right coronary cusp.  Codominant normal    Left Ventriculography:    Estimated Ejection Fraction: 50 %  Wall motion  abnormalities:  None  Mitral Regurgitation:  None    Impression:    1. Normal coronary arteries  2. LV function at lower limits of normal    Recommendations:    1. Medical management        I sincerely appreciate the opportunity to participate in your patient's care. Please feel free to contact me anytime if I can be of assistance in this or any other way.    Robinson Salazar MD  11/21/2016  12:08 PM      ---------------------------------------------------------------------------    CT:   CT Chest Without Contrast Diagnostic    Result Date: 2/11/2025   1. Significant improvement in centrilobular and tree-in-bud nodules, suspect infectious bronchiolitis. 2. Resolution of lower lobe bronchovascular consolidative opacities with linear opacities in the lower lobes most consistent with subsegmental atelectasis or linear scarring. 3. Airways disease with thickened airway walls and suspected air trapping. Finding may be related underlying reactive airways disease or bronchitis/bronchiolitis. 4. Multiple solid pulmonary nodules with a couple new solid pulmonary nodules seen in the right upper lobe. 6-month follow-up chest CT can reevaluate. 5. Moderate cardiomegaly with asymmetric right atrial enlargement.  This report was finalized on 2/11/2025 8:37 AM by Dr. Jas Muniz M.D on Workstation: [id number]      XR Chest 1 View    Result Date: 2/8/2025  Increased apparent size of the cardiac shadow, as described. Slight pulmonary vascular congestion. No focal pulmonary consolidation. Follow-up/further evaluation can be obtained as indicated.  This report was finalized on 2/8/2025 2:10 PM by Dr. Saeid Lopez M.D on Workstation: [id number]           --------------------------------------------------------------------------------------------------------------    Laboratory evaluations:  Renal Function: Estimated Creatinine Clearance: 40.7 mL/min (A) (by C-G formula based on SCr of 1.2 mg/dL (H)).    Lab Results    Component Value Date    GLUCOSE 89 02/21/2025    BUN 15 02/21/2025    CREATININE 1.20 (H) 02/21/2025    EGFRIFNONA 80 06/17/2020    EGFRIFAFRI >60 12/09/2022    BCR 12.5 02/21/2025    K 4.4 02/21/2025    CO2 24.0 02/21/2025    CALCIUM 9.4 02/21/2025    ALBUMIN 4.0 02/13/2025    LABIL2 2.1 12/09/2022    AST 32 02/13/2025    ALT 14 02/13/2025     Lab Results   Component Value Date    WBC 12.91 (H) 02/13/2025    HGB 13.4 02/13/2025    HCT 37.7 02/13/2025    MCV 98.7 (H) 02/13/2025     02/13/2025     Lab Results   Component Value Date    HGBA1C 5.40 02/10/2023     Lab Results   Component Value Date    HGBA1C 5.40 02/10/2023    HGBA1C 4.80 11/16/2016     Lab Results   Component Value Date    CREATININE 1.20 (H) 02/21/2025     Lab Results   Component Value Date    IRON 19 (L) 06/18/2023    TIBC 514 06/18/2023    FERRITIN 25.40 06/18/2023       Result Review:  I have personally reviewed the results from the time of this admission to 2/24/2025 12:42 EST and agree with these findings:  [x]  Laboratory list / accordion  []  Microbiology  [x]  Radiology  [x]  EKG/Telemetry   [x]  Cardiology/Vascular   []  Pathology  [x]  Old records  []  Other:        ReDS VOLUMETRIC ASSESSMENT:  ReDs Vest    Performed by: Hayley Morrow APRN  Authorized by: Hayley Morrow APRN    ReDS value:  45  ReDS Value Description:  42-50 (red) = Hypervolemic Status        Assessment & Plan      1. Chronic heart failure with preserved ejection fraction (HFpEF)      HFpEF-LVEF 66 to 70% from 2D TTE 2/9/2025.   From discharge home weight has increased by 4lbs over past 2 days, ReDs is elevated. I will have her increase her torsemide to 15mg daily. We will check BMP today.   CKD (status post left nephrectomy secondary to renal cell carcinoma 2023) baseline creatinine 1.1-1.3  To optimize GDMT I recommend starting SGLT2, her son is very hesitant to start this. She had been on SGLT2 in the past but was discontinued due to AMBROCIO. Given  recent exacerbation I think it is possible to retrial, I would recommend if we decide to retrial to decrease diuretic to start. Her son would first like the input of Dr Valdivia and Dr CHRISTIANO pearson. .   Dr Valdivia f/u on 3/3--3/10  Dr ALVARADO f/u 3/13        The KCCQ-12 was updated at the visit today.  score:  55 .        NYHA stage C FC-II     Clinical status was assessed and has remained stable.  Treatment intent: curative   ReDS reading was obtained today.  ReDS result: 45       Today, Patient appears euvolemic. and with perfusion. The patient's hemodynamics are currently acceptable. HR is: normal and is at goal. BP/MAP was reviewed and there is room for medication up-titration.  The patient's clinical course has been impacted by CKD. LVEF: 66-70%.     GDMT Assessment:       GDMT changes recommended today: increase diuretic.       BB:   continue Bisoprolol  10mg daily  Monitor heart rate.  Please call the [facility] for HR<50, dizziness, lightheadedness, syncope    A/A/A:     HFpEF-not indicated at this time although possible future addition if needed    SGLT2-I:  Concerned about starting jardiance again--we will recheck renal function today. Discuss restarting at next visit.   Call for S/Sx genital mycotic infections  Do not take when inadequate oral intake, NPO, GI illness    MRA:  The patient is FC-NYHA Class II and MRA is indicated due to recent hospitalization.   Has been held due to AMBROCIO.  I would like to reinitiate SGLT2 first and possible future retrial of spironolactone if needed      Diuretic Regimen:  -DIURETIC:   Increase to toresemide 15 mg every day  In addition to 10mg tablet. She will be taking additional 5mg torsemide to equal 15mg daily okay  -Fluid restriction:   -requested  -2000 ml  -Patient has been asked to weigh daily and was provided with a printed diuretic strategy.  -Sodium restriction:   -1,500 mg Na restriction was discussed.      Labs/Diagnostics/Referrals:    Labs -Chem-7       Lifestyle Advice:   -  call office if I gain more than 2 pounds in one day or 5 pounds in one week   - keep legs up while sitting   - use salt in moderation   - watch for swelling in feet, ankles and legs every day   - weigh myself daily   -call for concerning s/sx   -- activity or exercise based on tolerance encouraged     CODE STATUS: FULL              Return in about 2 weeks (around 3/7/2025).                Thank you for allowing me to participate in the care of your patient,    Time Spent: I spent 40 minutes caring for Raina Forrest  on this date of service. This time includes time spent by me in the following activities: preparing for the visit, reviewing tests, performing a medically appropriate examination and/or evaluations, counseling and educating the patient/family/caregiver, ordering medications, tests, or procedures, documenting information in the medical record, and independently interpreting results and communicating that information with the patient/family/caregiver.        Hayley Morrow, APRN  02/24/25  14:52 EST      **Dragon Disclaimer:**  Much of this encounter note is an electronic transcription/translation of spoken language to printed text. The electronic translation of spoken language may permit erroneous, or at times, nonsensical words or phrases to be inadvertently transcribed. Although I have reviewed the note for such errors, some may still exist.    The information in this note was reviewed and updated during the visit to be as accurate as possible. As many patients have chronic medical problems, many of their individual problems and ongoing plans do not change significantly from visit to visit.  This information is correct and is consistent with my treatment plan as of today's visit.

## 2025-02-24 ENCOUNTER — TELEPHONE (OUTPATIENT)
Dept: CARDIOLOGY | Facility: CLINIC | Age: 80
End: 2025-02-24
Payer: MEDICARE

## 2025-02-24 NOTE — TELEPHONE ENCOUNTER
Called and spoke with the son regarding lab results.   Creatinine is stable compared to most recent.   We will continue the increased torsemide dose 15mg daily.   He was concerned about the blood pressure being low at the PCP office.   Home /66, 116/70, and 121/66  Continue to monitor blood pressure.   Follow-up as scheduled.

## 2025-02-27 ENCOUNTER — HOME CARE VISIT (OUTPATIENT)
Dept: HOME HEALTH SERVICES | Facility: HOME HEALTHCARE | Age: 80
End: 2025-02-27
Payer: MEDICARE

## 2025-02-27 PROCEDURE — G0300 HHS/HOSPICE OF LPN EA 15 MIN: HCPCS

## 2025-03-01 VITALS
TEMPERATURE: 97.6 F | SYSTOLIC BLOOD PRESSURE: 118 MMHG | OXYGEN SATURATION: 97 % | HEART RATE: 68 BPM | DIASTOLIC BLOOD PRESSURE: 62 MMHG | RESPIRATION RATE: 18 BRPM

## 2025-03-05 ENCOUNTER — HOME CARE VISIT (OUTPATIENT)
Dept: HOME HEALTH SERVICES | Facility: HOME HEALTHCARE | Age: 80
End: 2025-03-05
Payer: MEDICARE

## 2025-03-05 PROCEDURE — G0299 HHS/HOSPICE OF RN EA 15 MIN: HCPCS

## 2025-03-06 VITALS
BODY MASS INDEX: 22.72 KG/M2 | HEART RATE: 61 BPM | TEMPERATURE: 98.4 F | SYSTOLIC BLOOD PRESSURE: 122 MMHG | RESPIRATION RATE: 18 BRPM | OXYGEN SATURATION: 98 % | DIASTOLIC BLOOD PRESSURE: 63 MMHG | WEIGHT: 148.2 LBS

## 2025-03-07 ENCOUNTER — HOSPITAL ENCOUNTER (OUTPATIENT)
Dept: CARDIOLOGY | Facility: HOSPITAL | Age: 80
Discharge: HOME | End: 2025-03-07
Payer: MEDICARE

## 2025-03-07 VITALS
DIASTOLIC BLOOD PRESSURE: 70 MMHG | SYSTOLIC BLOOD PRESSURE: 125 MMHG | OXYGEN SATURATION: 98 % | BODY MASS INDEX: 23.64 KG/M2 | WEIGHT: 154.2 LBS | HEART RATE: 65 BPM

## 2025-03-07 DIAGNOSIS — I50.32 CHRONIC HEART FAILURE WITH PRESERVED EJECTION FRACTION (HFPEF): Primary | ICD-10-CM

## 2025-03-07 PROCEDURE — 94726 PLETHYSMOGRAPHY LUNG VOLUMES: CPT | Performed by: NURSE PRACTITIONER

## 2025-03-07 NOTE — PATIENT INSTRUCTIONS
Directions for when to call the clinic reviewed with the patient to include weight gain of 2 to 3 pounds in 24 hours, weight gain of 5 to 10 pounds within 7 days; worsening shortness of breath; worsening lower extremity edema or abdominal distention.    Decrease torsemide to 10mg   Ok to hold jardiance until discussing with Dr Dill.

## 2025-03-07 NOTE — PROGRESS NOTES
[facility]      Congestive Heart Failure  Pertinent negatives include no chest pain or shortness of breath.       1. Pulmonary hypertension  2. HFpEF    Subjective      Raina Andersen a 79 y.o. female who presents today for pulmonary hypertension, HFpEF.   patient has A-fib (status post Watchman device),  HTN, CKD (status post nephrectomy due to renal cell carcinoma 2023), sick sinus syndrome (status post pacemaker), chronic HFpEF, hypothyroidism, GERD, dementia  Patient was admitted to [facility] from 2/7/2024 to 3/3/2024 for flu A, pneumonia. She was noted to have AMBROCIO on admission with creatinine of 1.2. Her Jardiance, spironolactone, and toresemide were all held. Renal function improved prior to discharge with creatinine of 0.9.   Patient had hospitalization 2/8/2025 to 2/13/2025.  She had been diagnosed with COVID-19.  Was found to be acute on chronic heart failure exacerbation.  Diuresed well with IV diuretics and discharged on p.o. diuretics torsemide 10 mg daily.  During hospitalization patient did have updated 2D TTE showed LVEF 66 to 70%, indeterminate left ventricular diastolic dysfunction, left atrial cavity mildly dilated, right atrial cavity moderately dilated, moderately elevated RVSP    At last visit due to weight gain, torsemide was increased to 15mg daily.   She has tolerated this addition.     Review of Systems - Review of Systems   Constitutional: Positive for weight gain. Negative for weight loss.   Cardiovascular:  Negative for chest pain, dyspnea on exertion, leg swelling, orthopnea, paroxysmal nocturnal dyspnea and syncope.   Respiratory:  Negative for cough and shortness of breath.    Gastrointestinal:  Negative for bloating.   Neurological:  Negative for dizziness.          Patient Active Problem List   Diagnosis    Chronic atrial fibrillation    Benign essential HTN    Bradycardia    Chest pain    Can't get food down    Accumulation of fluid  in tissues    Esophageal lump    Adynamia    Itch of skin    Anorexia    Chronic nausea    Gastroesophageal reflux disease    Breath shortness    Emesis    Decreased body weight    Anxiety    Narrowing of intervertebral disc space    Diverticulosis of intestine    Primary hypertension    Migraine    Osteoporosis    Palpitations    Pituitary adenoma    Sick sinus syndrome    Diarrhea    Cardiomyopathy    Chronic anticoagulation    Atrial fibrillation    Anticoagulated    Pacemaker    Infected pacemaker    Wound infection    Primary osteoarthritis of left knee    DJD (degenerative joint disease)    Ischemic cardiomyopathy    Acute on chronic congestive heart failure, unspecified heart failure type    Anemia    Presence of Watchman left atrial appendage closure device    Pneumonia    Pneumonia of both lungs due to infectious organism    Stage 3a chronic kidney disease    Influenza A    Severe malnutrition    H/O left radical nephrectomy    Abdominal wall hematoma    Peptic ulcer disease    Hypothyroidism (acquired)    Acute exacerbation of CHF (congestive heart failure)    Cytokine release syndrome, grade 2    Acute on chronic heart failure with preserved ejection fraction (HFpEF)     family history includes Heart disease in her father and mother; Heart failure in her father and mother; Lung cancer in an other family member; Ulcerative colitis in her sister.   reports that she has never smoked. She has never used smokeless tobacco. She reports that she does not drink alcohol and does not use drugs.  Allergies   Allergen Reactions    Bupivacaine Hcl Anaphylaxis    Nepafenac Itching     Eye Drops    Barium-Containing Compounds Other (See Comments)     CONTRAST CAUSES DIARRHEA    Duloxetine Other (See Comments)    Onion Other (See Comments)    Bextra [Valdecoxib] Itching    Cortisone Hives    Cymbalta [Duloxetine Hcl] Itching    Iodinated Contrast Media Unknown - Low Severity     Must be premedicated before use.  See  [place] notes in care evrywhere    Medrol [Methylprednisolone] Unknown - Low Severity    Mesalamine Unknown - Low Severity    Polymyxin B-Trimethoprim Unknown - Low Severity    Rivaroxaban Unknown - Low Severity     REACTION UNKNOWN    Tetanus Toxoids Swelling    Tetanus-Diphtheria Toxoids Td Swelling     Physical Activity: Not on file          Current Outpatient Medications:     albuterol sulfate  (90 Base) MCG/ACT inhaler, Inhale 2 puffs Every 4 (Four) Hours As Needed for Wheezing., Disp: 6.7 g, Rfl: 0    aspirin 81 MG chewable tablet, Chew 1 tablet Daily. Indications: Cancer of the Colon, Disease involving Lipid Deposits in the Arteries, Kawasaki Syndrome, Disp: , Rfl:     bisoprolol (ZEBeta) 10 MG tablet, TAKE ONE TABLET BY MOUTH DAILY, Disp: 30 tablet, Rfl: 8    budesonide (PULMICORT) 0.5 MG/2ML nebulizer solution, Take 2 mL (one vial) by nebulization 2 (Two) Times a Day for 10 days., Disp: 60 mL, Rfl: 0    donepezil (ARICEPT) 10 MG tablet, Take 1 tablet by mouth Daily. Indications: Alzheimer's Disease, Disp: , Rfl:     empagliflozin (Jardiance) 10 MG tablet tablet, Take 1 tablet by mouth Daily. Indications: Cardiac Failure, Disp: 30 tablet, Rfl: 3    ipratropium-albuterol (DUO-NEB) 0.5-2.5 mg/3 ml nebulizer, Take 3 mL (one vial) by nebulization 4 (Four) Times a Day As Needed for Wheezing., Disp: 360 mL, Rfl: 0    levothyroxine (SYNTHROID, LEVOTHROID) 25 MCG tablet, Take 1 tablet by mouth Every Morning., Disp: 30 tablet, Rfl: 11    memantine (NAMENDA) 10 MG tablet, Take 1 tablet by mouth 2 (Two) Times a Day. Indications: Alzheimer's Disease, Disp: , Rfl:     pantoprazole (PROTONIX) 40 MG EC tablet, Take 20 mg by mouth Daily. Indications: Gastroesophageal Reflux Disease, Heartburn, Disp: , Rfl:     sertraline (ZOLOFT) 100 MG tablet, Take 1.5 tablets by mouth Every Morning. Indications: Generalized Anxiety Disorder, Disp: , Rfl:     torsemide (DEMADEX) 10 MG tablet, Take 1 tablet by mouth Daily.  Indications: Cardiac Failure, Edema, Disp: , Rfl:     Objective   Vital Sign Review:   Vitals:    03/07/25 1155   BP: 125/70   Pulse: 65   SpO2: 98%       Body mass index is 23.64 kg/m².      03/07/25  1155   Weight: 69.9 kg (154 lb 3.2 oz)       Physical Exam:  Constitutional:       Appearance: Normal appearance. Frail. Chronically ill-appearing.   Neck:      Vascular: No carotid bruit or JVD. JVD normal.   Pulmonary:      Effort: Pulmonary effort is normal.      Breath sounds: Normal breath sounds.   Cardiovascular:      PMI at left midclavicular line. Normal rate. Regular rhythm.   Pulses:     Intact distal pulses.   Edema:     Peripheral edema absent.   Abdominal:      Palpations: Abdomen is soft.      Tenderness: There is no abdominal tenderness.   Skin:     General: Skin is warm and dry.   Neurological:      General: No focal deficit present.      Mental Status: Alert and oriented to person, place and time.   Psychiatric:         Behavior: Behavior is cooperative.          DATA REVIEWED:   EKG:      ---------------------------------------------------  ECHO:    Results for orders placed during the hospital encounter of 02/08/25    Adult Transthoracic Echo Complete W/ Cont if Necessary Per Protocol    Interpretation Summary    Left ventricular ejection fraction appears to be 66 - 70%.    Left ventricular diastolic function was indeterminate.    The left atrial cavity is mildly dilated.    The right atrial cavity is moderately  dilated.    Estimated right ventricular systolic pressure from tricuspid regurgitation is moderately elevated (45-55 mmHg).          -----------------------------------------------------  RHC/LHC    Results for orders placed during the hospital encounter of 11/15/16    Cardiac Catheterization/Vascular Study    Narrative  Table formatting from the original result was not included.    · Successful left heart catheter  · Normal coronary arteries  · LV function at lower limit of  normal    November 21, 2016    Raina Forrest  1945  70 y.o.  female  [id number]    Procedures Performed    1. Left heart catheterization  2. Selective coronary angiography  3. Left ventriculography  4.      :   Robinson Salazar MD    Vascular Access Site: Right femoral    Indication for procedure: Recurrent chest pains with abnormal echocardiogram with LV dysfunction    Referring Physician:      Pertinent History  @PMH@  @PSH@    Procedure Note    After discussing the risks, benefits, and alternatives of the procedure, informed consent was obtained.  The vascular access site was prepped and draped in the usual sterile fashion.  2% lidocaine was used for local anesthesia. Appropriate landmarks were assessed.  A 5 Australian short sheath was inserted in the artery using the modified Seldinger technique.    Selective coronary angiography was performed with JL4 and JR4 diagnostic catheters. Left ventriculogram  was performed with an angled pigtail catheter.  All exchanges were performed over the wire.  There were no apparent acute or early complications.  The patient tolerated the procedure well and was transferred to the recovery area in stable condition.    Artery hemostasis was achieved with TR BAND manual pressure.        Contrast:    70  cc  Complications:  None  Blood Loss: minimal      Hemodynamics    Pressures    Ao:    110/70 mmHg  LV:    110/10  mmHg  End-diastolic pressure:  10  mmHg  No significant aortic valvular gradient on pullback    Coronary Angiography    Left Main:  The left main originates in the left coronary cusp.  It bifurcates and gives rise to the LAD and circumflex system.  Normal    Left Anterior Descending:  Normal including the diagonal and  branches    Left Circumflex:  Codominant normal    Right Coronary Artery:  The right coronary artery originates in the right coronary cusp.  Codominant normal    Left Ventriculography:    Estimated Ejection Fraction: 50  %  Wall motion abnormalities:  None  Mitral Regurgitation:  None    Impression:    1. Normal coronary arteries  2. LV function at lower limits of normal    Recommendations:    1. Medical management        I sincerely appreciate the opportunity to participate in your patient's care. Please feel free to contact me anytime if I can be of assistance in this or any other way.    Robinson Salazar MD  11/21/2016  12:08 PM      ---------------------------------------------------------------------------    CT:   CT Chest Without Contrast Diagnostic    Result Date: 2/11/2025   1. Significant improvement in centrilobular and tree-in-bud nodules, suspect infectious bronchiolitis. 2. Resolution of lower lobe bronchovascular consolidative opacities with linear opacities in the lower lobes most consistent with subsegmental atelectasis or linear scarring. 3. Airways disease with thickened airway walls and suspected air trapping. Finding may be related underlying reactive airways disease or bronchitis/bronchiolitis. 4. Multiple solid pulmonary nodules with a couple new solid pulmonary nodules seen in the right upper lobe. 6-month follow-up chest CT can reevaluate. 5. Moderate cardiomegaly with asymmetric right atrial enlargement.  This report was finalized on 2/11/2025 8:37 AM by Dr. Jas Muniz M.D on Workstation: [id number]      XR Chest 1 View    Result Date: 2/8/2025  Increased apparent size of the cardiac shadow, as described. Slight pulmonary vascular congestion. No focal pulmonary consolidation. Follow-up/further evaluation can be obtained as indicated.  This report was finalized on 2/8/2025 2:10 PM by Dr. Saeid Lopez M.D on Workstation: [id number]           --------------------------------------------------------------------------------------------------------------    Laboratory evaluations:  Renal Function: Estimated Creatinine Clearance: 40.6 mL/min (A) (by C-G formula based on SCr of 1.2 mg/dL  (H)).    Lab Results   Component Value Date    GLUCOSE 89 02/21/2025    BUN 15 02/21/2025    CREATININE 1.20 (H) 02/21/2025    EGFRIFNONA 80 06/17/2020    EGFRIFAFRI >60 12/09/2022    BCR 12.5 02/21/2025    K 4.4 02/21/2025    CO2 24.0 02/21/2025    CALCIUM 9.4 02/21/2025    ALBUMIN 4.0 02/13/2025    LABIL2 2.1 12/09/2022    AST 32 02/13/2025    ALT 14 02/13/2025     Lab Results   Component Value Date    WBC 12.91 (H) 02/13/2025    HGB 13.4 02/13/2025    HCT 37.7 02/13/2025    MCV 98.7 (H) 02/13/2025     02/13/2025     Lab Results   Component Value Date    HGBA1C 5.40 02/10/2023     Lab Results   Component Value Date    HGBA1C 5.40 02/10/2023    HGBA1C 4.80 11/16/2016     Lab Results   Component Value Date    CREATININE 1.20 (H) 02/21/2025     Lab Results   Component Value Date    IRON 19 (L) 06/18/2023    TIBC 514 06/18/2023    FERRITIN 25.40 06/18/2023       Result Review:  I have personally reviewed the results from the time of this admission to 3/7/2025 12:36 EST and agree with these findings:  [x]  Laboratory list / accordion  []  Microbiology  [x]  Radiology  [x]  EKG/Telemetry   [x]  Cardiology/Vascular   []  Pathology  [x]  Old records  []  Other:        ReDS VOLUMETRIC ASSESSMENT:  ReDs Vest    Performed by: Hayley Morrow APRN  Authorized by: Hayley Morrow APRN    ReDS value:  33  ReDS Value Description:  25-35 (green) = Optimal Volume Status        Assessment & Plan      1. Chronic heart failure with preserved ejection fraction (HFpEF)        HFpEF-LVEF 66 to 70% from 2D TTE 2/9/2025.   Home weights are trending down.   Labs reviewed from 3/3 show creatinine 1.36, GFR 39, sodium 139, potassium not reported, chloride 100, calcium 9.6  I would like to decrease torsemide back to 10mg and add jardiance 10mg daily  Dr Valdivia f/u on 3/3--3/10  Dr ALVARADO f/u 3/13    The KCCQ-12 was updated at the visit today.  score:  55 .        NYHA stage C FC-II     Clinical status was assessed and has  remained stable.  Treatment intent: curative   ReDS reading was obtained today.  ReDS result: 33       Today, Patient appears euvolemic. and with perfusion. The patient's hemodynamics are currently acceptable. HR is: normal and is at goal. BP/MAP was reviewed and there is room for medication up-titration.  The patient's clinical course has been impacted by CKD. LVEF: 66-70%.     GDMT Assessment:       GDMT changes recommended today: decrease diuretic, add SGLT2.       BB:   continue Bisoprolol  10mg daily  Monitor heart rate.  Please call the HF for HR<50, dizziness, lightheadedness, syncope    A/A/A:     HFpEF-not indicated at this time although possible future addition if needed    SGLT2-I:  Start jardiance 10mg daily.   Call for S/Sx genital mycotic infections  Do not take when inadequate oral intake, NPO, GI illness    MRA:  The patient is FC-NYHA Class II and MRA is indicated due to recent hospitalization.   Has been held due to AMBROCIO.  I would like to reinitiate SGLT2 first and possible future retrial of spironolactone if needed      Diuretic Regimen:  -DIURETIC:    toresemide 10 mg every day    -Fluid restriction:   -requested  -2000 ml  -Patient has been asked to weigh daily and was provided with a printed diuretic strategy.  -Sodium restriction:   -1,500 mg Na restriction was discussed.      Labs/Diagnostics/Referrals:    Labs -Chem-7       Lifestyle Advice:   - call office if I gain more than 2 pounds in one day or 5 pounds in one week   - keep legs up while sitting   - use salt in moderation   - watch for swelling in feet, ankles and legs every day   - weigh myself daily   -call for concerning s/sx   -- activity or exercise based on tolerance encouraged     CODE STATUS: FULL              Return in about 2 weeks (around 3/21/2025).                Thank you for allowing me to participate in the care of your patient,    Time Spent: I spent 42minutes caring for Raina Forrest  on this date of service. This  time includes time spent by me in the following activities: preparing for the visit, reviewing tests, performing a medically appropriate examination and/or evaluations, counseling and educating the patient/family/caregiver, ordering medications, tests, or procedures, documenting information in the medical record, and independently interpreting results and communicating that information with the patient/family/caregiver.     I spent 3 minutes on the separately reported service interpreting the ReDs. This time is not included in the time used to support E/M service also reported on this date of service        Hayley Morrow, APRN  03/07/25  14:52 EST      **Dragon Disclaimer:**  Much of this encounter note is an electronic transcription/translation of spoken language to printed text. The electronic translation of spoken language may permit erroneous, or at times, nonsensical words or phrases to be inadvertently transcribed. Although I have reviewed the note for such errors, some may still exist.    The information in this note was reviewed and updated during the visit to be as accurate as possible. As many patients have chronic medical problems, many of their individual problems and ongoing plans do not change significantly from visit to visit.  This information is correct and is consistent with my treatment plan as of today's visit.

## 2025-03-07 NOTE — LETTER
March 8, 2025     Robinson Salazar MD  [address] [facility] [address]    Patient: Raina Forrest   YOB: 1945   Date of Visit: 3/7/2025     Dear Robinson Salazar MD:       Thank you for referring Raina Forrest to me for evaluation. Below are the relevant portions of my assessment and plan of care.    If you have questions, please do not hesitate to call me. I look forward to following Raina along with you.         Sincerely,        JORY Leon        CC: MD Cristhian Swanson Jennifer Marie, APRN  03/08/25 1404  Signed    [facility]      Congestive Heart Failure  Pertinent negatives include no chest pain or shortness of breath.       1. Pulmonary hypertension  2. HFpEF    Subjective     Raina Forrestis a 79 y.o. female who presents today for pulmonary hypertension, HFpEF.   patient has A-fib (status post Watchman device),  HTN, CKD (status post nephrectomy due to renal cell carcinoma 2023), sick sinus syndrome (status post pacemaker), chronic HFpEF, hypothyroidism, GERD, dementia  Patient was admitted to [facility] from 2/7/2024 to 3/3/2024 for flu A, pneumonia. She was noted to have AMBROCIO on admission with creatinine of 1.2. Her Jardiance, spironolactone, and toresemide were all held. Renal function improved prior to discharge with creatinine of 0.9.   Patient had hospitalization 2/8/2025 to 2/13/2025.  She had been diagnosed with COVID-19.  Was found to be acute on chronic heart failure exacerbation.  Diuresed well with IV diuretics and discharged on p.o. diuretics torsemide 10 mg daily.  During hospitalization patient did have updated 2D TTE showed LVEF 66 to 70%, indeterminate left ventricular diastolic dysfunction, left atrial cavity mildly dilated, right atrial cavity moderately dilated, moderately elevated RVSP    At last visit due to weight gain, torsemide was increased to 15mg daily.   She has  tolerated this addition.     Review of Systems - Review of Systems   Constitutional: Positive for weight gain. Negative for weight loss.   Cardiovascular:  Negative for chest pain, dyspnea on exertion, leg swelling, orthopnea, paroxysmal nocturnal dyspnea and syncope.   Respiratory:  Negative for cough and shortness of breath.    Gastrointestinal:  Negative for bloating.   Neurological:  Negative for dizziness.          Patient Active Problem List   Diagnosis   • Chronic atrial fibrillation   • Benign essential HTN   • Bradycardia   • Chest pain   • Can't get food down   • Accumulation of fluid in tissues   • Esophageal lump   • Adynamia   • Itch of skin   • Anorexia   • Chronic nausea   • Gastroesophageal reflux disease   • Breath shortness   • Emesis   • Decreased body weight   • Anxiety   • Narrowing of intervertebral disc space   • Diverticulosis of intestine   • Primary hypertension   • Migraine   • Osteoporosis   • Palpitations   • Pituitary adenoma   • Sick sinus syndrome   • Diarrhea   • Cardiomyopathy   • Chronic anticoagulation   • Atrial fibrillation   • Anticoagulated   • Pacemaker   • Infected pacemaker   • Wound infection   • Primary osteoarthritis of left knee   • DJD (degenerative joint disease)   • Ischemic cardiomyopathy   • Acute on chronic congestive heart failure, unspecified heart failure type   • Anemia   • Presence of Watchman left atrial appendage closure device   • Pneumonia   • Pneumonia of both lungs due to infectious organism   • Stage 3a chronic kidney disease   • Influenza A   • Severe malnutrition   • H/O left radical nephrectomy   • Abdominal wall hematoma   • Peptic ulcer disease   • Hypothyroidism (acquired)   • Acute exacerbation of CHF (congestive heart failure)   • Cytokine release syndrome, grade 2   • Acute on chronic heart failure with preserved ejection fraction (HFpEF)     family history includes Heart disease in her father and mother; Heart failure in her father and  mother; Lung cancer in an other family member; Ulcerative colitis in her sister.   reports that she has never smoked. She has never used smokeless tobacco. She reports that she does not drink alcohol and does not use drugs.  Allergies   Allergen Reactions   • Bupivacaine Hcl Anaphylaxis   • Nepafenac Itching     Eye Drops   • Barium-Containing Compounds Other (See Comments)     CONTRAST CAUSES DIARRHEA   • Duloxetine Other (See Comments)   • Onion Other (See Comments)   • Bextra [Valdecoxib] Itching   • Cortisone Hives   • Cymbalta [Duloxetine Hcl] Itching   • Iodinated Contrast Media Unknown - Low Severity     Must be premedicated before use.  See [place] notes in care evrywhere   • Medrol [Methylprednisolone] Unknown - Low Severity   • Mesalamine Unknown - Low Severity   • Polymyxin B-Trimethoprim Unknown - Low Severity   • Rivaroxaban Unknown - Low Severity     REACTION UNKNOWN   • Tetanus Toxoids Swelling   • Tetanus-Diphtheria Toxoids Td Swelling     Physical Activity: Not on file          Current Outpatient Medications:   •  albuterol sulfate  (90 Base) MCG/ACT inhaler, Inhale 2 puffs Every 4 (Four) Hours As Needed for Wheezing., Disp: 6.7 g, Rfl: 0  •  aspirin 81 MG chewable tablet, Chew 1 tablet Daily. Indications: Cancer of the Colon, Disease involving Lipid Deposits in the Arteries, Kawasaki Syndrome, Disp: , Rfl:   •  bisoprolol (ZEBeta) 10 MG tablet, TAKE ONE TABLET BY MOUTH DAILY, Disp: 30 tablet, Rfl: 8  •  budesonide (PULMICORT) 0.5 MG/2ML nebulizer solution, Take 2 mL (one vial) by nebulization 2 (Two) Times a Day for 10 days., Disp: 60 mL, Rfl: 0  •  donepezil (ARICEPT) 10 MG tablet, Take 1 tablet by mouth Daily. Indications: Alzheimer's Disease, Disp: , Rfl:   •  empagliflozin (Jardiance) 10 MG tablet tablet, Take 1 tablet by mouth Daily. Indications: Cardiac Failure, Disp: 30 tablet, Rfl: 3  •  ipratropium-albuterol (DUO-NEB) 0.5-2.5 mg/3 ml nebulizer, Take 3 mL (one vial) by  nebulization 4 (Four) Times a Day As Needed for Wheezing., Disp: 360 mL, Rfl: 0  •  levothyroxine (SYNTHROID, LEVOTHROID) 25 MCG tablet, Take 1 tablet by mouth Every Morning., Disp: 30 tablet, Rfl: 11  •  memantine (NAMENDA) 10 MG tablet, Take 1 tablet by mouth 2 (Two) Times a Day. Indications: Alzheimer's Disease, Disp: , Rfl:   •  pantoprazole (PROTONIX) 40 MG EC tablet, Take 20 mg by mouth Daily. Indications: Gastroesophageal Reflux Disease, Heartburn, Disp: , Rfl:   •  sertraline (ZOLOFT) 100 MG tablet, Take 1.5 tablets by mouth Every Morning. Indications: Generalized Anxiety Disorder, Disp: , Rfl:   •  torsemide (DEMADEX) 10 MG tablet, Take 1 tablet by mouth Daily. Indications: Cardiac Failure, Edema, Disp: , Rfl:     Objective  Vital Sign Review:   Vitals:    03/07/25 1155   BP: 125/70   Pulse: 65   SpO2: 98%       Body mass index is 23.64 kg/m².      03/07/25  1155   Weight: 69.9 kg (154 lb 3.2 oz)       Physical Exam:  Constitutional:       Appearance: Normal appearance. Frail. Chronically ill-appearing.   Neck:      Vascular: No carotid bruit or JVD. JVD normal.   Pulmonary:      Effort: Pulmonary effort is normal.      Breath sounds: Normal breath sounds.   Cardiovascular:      PMI at left midclavicular line. Normal rate. Regular rhythm.   Pulses:     Intact distal pulses.   Edema:     Peripheral edema absent.   Abdominal:      Palpations: Abdomen is soft.      Tenderness: There is no abdominal tenderness.   Skin:     General: Skin is warm and dry.   Neurological:      General: No focal deficit present.      Mental Status: Alert and oriented to person, place and time.   Psychiatric:         Behavior: Behavior is cooperative.          DATA REVIEWED:   EKG:      ---------------------------------------------------  ECHO:    Results for orders placed during the hospital encounter of 02/08/25    Adult Transthoracic Echo Complete W/ Cont if Necessary Per Protocol    Interpretation Summary  •  Left ventricular  ejection fraction appears to be 66 - 70%.  •  Left ventricular diastolic function was indeterminate.  •  The left atrial cavity is mildly dilated.  •  The right atrial cavity is moderately  dilated.  •  Estimated right ventricular systolic pressure from tricuspid regurgitation is moderately elevated (45-55 mmHg).          -----------------------------------------------------  RHC/LHC    Results for orders placed during the hospital encounter of 11/15/16    Cardiac Catheterization/Vascular Study    Narrative  Table formatting from the original result was not included.    · Successful left heart catheter  · Normal coronary arteries  · LV function at lower limit of normal    November 21, 2016    Raina Forrest  1945  70 y.o.  female  [id number]    Procedures Performed    1. Left heart catheterization  2. Selective coronary angiography  3. Left ventriculography  4.      :   Robinson Salazar MD    Vascular Access Site: Right femoral    Indication for procedure: Recurrent chest pains with abnormal echocardiogram with LV dysfunction    Referring Physician:      Pertinent History  @PMH@  @PSH@    Procedure Note    After discussing the risks, benefits, and alternatives of the procedure, informed consent was obtained.  The vascular access site was prepped and draped in the usual sterile fashion.  2% lidocaine was used for local anesthesia. Appropriate landmarks were assessed.  A 5 Kazakh short sheath was inserted in the artery using the modified Seldinger technique.    Selective coronary angiography was performed with JL4 and JR4 diagnostic catheters. Left ventriculogram  was performed with an angled pigtail catheter.  All exchanges were performed over the wire.  There were no apparent acute or early complications.  The patient tolerated the procedure well and was transferred to the recovery area in stable condition.    Artery hemostasis was achieved with TR BAND manual pressure.        Contrast:    70   cc  Complications:  None  Blood Loss: minimal      Hemodynamics    Pressures    Ao:    110/70 mmHg  LV:    110/10  mmHg  End-diastolic pressure:  10  mmHg  No significant aortic valvular gradient on pullback    Coronary Angiography    Left Main:  The left main originates in the left coronary cusp.  It bifurcates and gives rise to the LAD and circumflex system.  Normal    Left Anterior Descending:  Normal including the diagonal and  branches    Left Circumflex:  Codominant normal    Right Coronary Artery:  The right coronary artery originates in the right coronary cusp.  Codominant normal    Left Ventriculography:    Estimated Ejection Fraction: 50 %  Wall motion abnormalities:  None  Mitral Regurgitation:  None    Impression:    1. Normal coronary arteries  2. LV function at lower limits of normal    Recommendations:    1. Medical management        I sincerely appreciate the opportunity to participate in your patient's care. Please feel free to contact me anytime if I can be of assistance in this or any other way.    Robinson Salazar MD  11/21/2016  12:08 PM      ---------------------------------------------------------------------------    CT:   CT Chest Without Contrast Diagnostic    Result Date: 2/11/2025   1. Significant improvement in centrilobular and tree-in-bud nodules, suspect infectious bronchiolitis. 2. Resolution of lower lobe bronchovascular consolidative opacities with linear opacities in the lower lobes most consistent with subsegmental atelectasis or linear scarring. 3. Airways disease with thickened airway walls and suspected air trapping. Finding may be related underlying reactive airways disease or bronchitis/bronchiolitis. 4. Multiple solid pulmonary nodules with a couple new solid pulmonary nodules seen in the right upper lobe. 6-month follow-up chest CT can reevaluate. 5. Moderate cardiomegaly with asymmetric right atrial enlargement.  This report was finalized on 2/11/2025 8:37  AM by Dr. Jas Muniz M.D on Workstation: [id number]      XR Chest 1 View    Result Date: 2/8/2025  Increased apparent size of the cardiac shadow, as described. Slight pulmonary vascular congestion. No focal pulmonary consolidation. Follow-up/further evaluation can be obtained as indicated.  This report was finalized on 2/8/2025 2:10 PM by Dr. Saeid Lopez M.D on Workstation: [id number]           --------------------------------------------------------------------------------------------------------------    Laboratory evaluations:  Renal Function: Estimated Creatinine Clearance: 40.6 mL/min (A) (by C-G formula based on SCr of 1.2 mg/dL (H)).    Lab Results   Component Value Date    GLUCOSE 89 02/21/2025    BUN 15 02/21/2025    CREATININE 1.20 (H) 02/21/2025    EGFRIFNONA 80 06/17/2020    EGFRIFAFRI >60 12/09/2022    BCR 12.5 02/21/2025    K 4.4 02/21/2025    CO2 24.0 02/21/2025    CALCIUM 9.4 02/21/2025    ALBUMIN 4.0 02/13/2025    LABIL2 2.1 12/09/2022    AST 32 02/13/2025    ALT 14 02/13/2025     Lab Results   Component Value Date    WBC 12.91 (H) 02/13/2025    HGB 13.4 02/13/2025    HCT 37.7 02/13/2025    MCV 98.7 (H) 02/13/2025     02/13/2025     Lab Results   Component Value Date    HGBA1C 5.40 02/10/2023     Lab Results   Component Value Date    HGBA1C 5.40 02/10/2023    HGBA1C 4.80 11/16/2016     Lab Results   Component Value Date    CREATININE 1.20 (H) 02/21/2025     Lab Results   Component Value Date    IRON 19 (L) 06/18/2023    TIBC 514 06/18/2023    FERRITIN 25.40 06/18/2023       Result Review:  I have personally reviewed the results from the time of this admission to 3/7/2025 12:36 EST and agree with these findings:  [x]  Laboratory list / accordion  []  Microbiology  [x]  Radiology  [x]  EKG/Telemetry   [x]  Cardiology/Vascular   []  Pathology  [x]  Old records  []  Other:        ReDS VOLUMETRIC ASSESSMENT:  ReDs Vest    Performed by: Hayley Morrow APRN  Authorized by: Cristhian  JORY Hinton    ReDS value:  33  ReDS Value Description:  25-35 (green) = Optimal Volume Status        Assessment & Plan     1. Chronic heart failure with preserved ejection fraction (HFpEF)        HFpEF-LVEF 66 to 70% from 2D TTE 2/9/2025.   Home weights are trending down.   Labs reviewed from 3/3 show creatinine 1.36, GFR 39, sodium 139, potassium not reported, chloride 100, calcium 9.6  I would like to decrease torsemide back to 10mg and add jardiance 10mg daily  Dr Valdivia f/u on 3/3--3/10  Dr ALVARADO f/u 3/13    The KCCQ-12 was updated at the visit today.  score:  55 .        NYHA stage C FC-II     Clinical status was assessed and has remained stable.  Treatment intent: curative   ReDS reading was obtained today.  ReDS result: 33       Today, Patient appears euvolemic. and with perfusion. The patient's hemodynamics are currently acceptable. HR is: normal and is at goal. BP/MAP was reviewed and there is room for medication up-titration.  The patient's clinical course has been impacted by CKD. LVEF: 66-70%.     GDMT Assessment:       GDMT changes recommended today: decrease diuretic, add SGLT2.       BB:   continue Bisoprolol  10mg daily  Monitor heart rate.  Please call the [facility] for HR<50, dizziness, lightheadedness, syncope    A/A/A:     HFpEF-not indicated at this time although possible future addition if needed    SGLT2-I:  Start jardiance 10mg daily.   Call for S/Sx genital mycotic infections  Do not take when inadequate oral intake, NPO, GI illness    MRA:  The patient is FC-NYHA Class II and MRA is indicated due to recent hospitalization.   Has been held due to AMBROCIO.  I would like to reinitiate SGLT2 first and possible future retrial of spironolactone if needed      Diuretic Regimen:  -DIURETIC:    toresemide 10 mg every day    -Fluid restriction:   -requested  -2000 ml  -Patient has been asked to weigh daily and was provided with a printed diuretic strategy.  -Sodium restriction:   -1,500 mg Na  restriction was discussed.      Labs/Diagnostics/Referrals:    Labs -Chem-7       Lifestyle Advice:   - call office if I gain more than 2 pounds in one day or 5 pounds in one week   - keep legs up while sitting   - use salt in moderation   - watch for swelling in feet, ankles and legs every day   - weigh myself daily   -call for concerning s/sx   -- activity or exercise based on tolerance encouraged     CODE STATUS: FULL              Return in about 2 weeks (around 3/21/2025).                Thank you for allowing me to participate in the care of your patient,    Time Spent: I spent 42minutes caring for Raina Forrest  on this date of service. This time includes time spent by me in the following activities: preparing for the visit, reviewing tests, performing a medically appropriate examination and/or evaluations, counseling and educating the patient/family/caregiver, ordering medications, tests, or procedures, documenting information in the medical record, and independently interpreting results and communicating that information with the patient/family/caregiver.     I spent 3 minutes on the separately reported service interpreting the ReDs. This time is not included in the time used to support E/M service also reported on this date of service        Hayley Annaherlinda Morrow, APRN  03/07/25  14:52 EST      **Dragon Disclaimer:**  Much of this encounter note is an electronic transcription/translation of spoken language to printed text. The electronic translation of spoken language may permit erroneous, or at times, nonsensical words or phrases to be inadvertently transcribed. Although I have reviewed the note for such errors, some may still exist.    The information in this note was reviewed and updated during the visit to be as accurate as possible. As many patients have chronic medical problems, many of their individual problems and ongoing plans do not change significantly from visit to visit.  This information is  correct and is consistent with my treatment plan as of today's visit.

## 2025-03-08 PROBLEM — I50.32 CHRONIC HEART FAILURE WITH PRESERVED EJECTION FRACTION (HFPEF): Status: ACTIVE | Noted: 2025-02-13

## 2025-03-12 ENCOUNTER — TELEPHONE (OUTPATIENT)
Dept: CARDIOLOGY | Facility: CLINIC | Age: 80
End: 2025-03-12
Payer: MEDICARE

## 2025-03-12 NOTE — TELEPHONE ENCOUNTER
----- Message from Millie ADDISON sent at 3/12/2025  1:56 PM EDT -----  Regarding: Jardiance  Patient's son Benito Staples called today. He reports that patient saw her Kidney doctor this past Monday the 10th. He okay'd her to start a 10 day trial of the Jardiance. So she started the Jardiance yesterday morning. Kidney Doctor ordered repeat labs in 10 days to check her kidney function after 10 days on the Jardiance. He also okay'd her to stay on the 10 mg of Torsemide.Thanks,Millie ADDISON [facility]

## 2025-03-13 ENCOUNTER — OFFICE VISIT (OUTPATIENT)
Dept: CARDIOLOGY | Facility: CLINIC | Age: 80
End: 2025-03-13
Payer: MEDICARE

## 2025-03-13 ENCOUNTER — HOME CARE VISIT (OUTPATIENT)
Dept: HOME HEALTH SERVICES | Facility: HOME HEALTHCARE | Age: 80
End: 2025-03-13
Payer: MEDICARE

## 2025-03-13 VITALS
RESPIRATION RATE: 17 BRPM | OXYGEN SATURATION: 95 % | DIASTOLIC BLOOD PRESSURE: 68 MMHG | SYSTOLIC BLOOD PRESSURE: 122 MMHG | HEART RATE: 67 BPM

## 2025-03-13 VITALS
SYSTOLIC BLOOD PRESSURE: 104 MMHG | HEART RATE: 73 BPM | BODY MASS INDEX: 23.04 KG/M2 | WEIGHT: 152 LBS | DIASTOLIC BLOOD PRESSURE: 64 MMHG | HEIGHT: 68 IN

## 2025-03-13 DIAGNOSIS — Z79.01 ANTICOAGULATED: ICD-10-CM

## 2025-03-13 DIAGNOSIS — I48.20 CHRONIC ATRIAL FIBRILLATION: ICD-10-CM

## 2025-03-13 DIAGNOSIS — Z95.0 PACEMAKER: Primary | ICD-10-CM

## 2025-03-13 PROCEDURE — G0299 HHS/HOSPICE OF RN EA 15 MIN: HCPCS

## 2025-03-13 RX ORDER — SUCRALFATE 1 G/1
1 TABLET ORAL
COMMUNITY

## 2025-03-13 NOTE — PROGRESS NOTES
Subjective:        Raina Forrest is a 79 y.o. female who here for follow up    CC  HOSP FOLLOW UP  HPI  79-year-old female with chronic atrial fibrillation pacemaker and anticoagulation here for the hospital follow-up denies any chest pains or tightness in the chest     Problems Addressed this Visit          Cardiac and Vasculature    Chronic atrial fibrillation    Pacemaker - Primary       Coag and Thromboembolic    Anticoagulated     Diagnoses         Codes Comments      Pacemaker    -  Primary ICD-10-CM: Z95.0  ICD-9-CM: V45.01       Anticoagulated     ICD-10-CM: Z79.01  ICD-9-CM: V58.61       Chronic atrial fibrillation     ICD-10-CM: I48.20  ICD-9-CM: 427.31           .    The following portions of the patient's history were reviewed and updated as appropriate: allergies, current medications, past family history, past medical history, past social history, past surgical history and problem list.    Past Medical History:   Diagnosis Date    Atrial fibrillation     Fung's esophagus     Benign essential hypertension     Blood clot in vein     Cardiomyopathy     Chronic gastritis     Congestive heart failure     Degenerative disc disease     Dementia     Pt  states she has slight dementia    Depression with anxiety     Disease of thyroid gland     Diverticulitis of colon     Dysphagia     GERD (gastroesophageal reflux disease)     History of chest pain     History of migraine     History of migraine headaches     Left knee pain     Osteoporosis     Palpitations     Thyroid disorder     Thyroid nodule     Ulcerative colitis      reports that she has never smoked. She has never been exposed to tobacco smoke. She has never used smokeless tobacco. She reports that she does not drink alcohol and does not use drugs.   Family History   Problem Relation Age of Onset    Lung cancer Other     Ulcerative colitis Sister     Heart failure Mother     Heart disease Mother     Heart failure Father     Heart disease  "Father     Ronaldo Hyperthermia Neg Hx        Review of Systems  Constitutional: No wt loss, fever, fatigue  Gastrointestinal: No nausea, abdominal pain  Behavioral/Psych: No insomnia or anxiety   Cardiovascular no chest pains or tightness in the chest  Objective:       Physical Exam  /64   Pulse 73   Ht 172.7 cm (68\")   Wt 68.9 kg (152 lb)   BMI 23.11 kg/m²   General appearance: No acute changes   Neck: Trachea midline; NECK, supple, no thyromegaly or lymphadenopathy   Lungs: Normal size and shape, normal breath sounds, equal distribution of air, no rales and rhonchi   CV: S1-S2 regular, no murmurs, no rub, no gallop   Abdomen: Soft, nontender; no masses , no abnormal abdominal sounds   Extremities: No deformity , normal color , no peripheral edema   Skin: Normal temperature, turgor and texture; no rash, ulcers          Procedures      Echocardiogram:    Results for orders placed during the hospital encounter of 02/08/25    Adult Transthoracic Echo Complete W/ Cont if Necessary Per Protocol    Interpretation Summary    Left ventricular ejection fraction appears to be 66 - 70%.    Left ventricular diastolic function was indeterminate.    The left atrial cavity is mildly dilated.    The right atrial cavity is moderately  dilated.    Estimated right ventricular systolic pressure from tricuspid regurgitation is moderately elevated (45-55 mmHg).          Current Outpatient Medications:     albuterol sulfate  (90 Base) MCG/ACT inhaler, Inhale 2 puffs Every 4 (Four) Hours As Needed for Wheezing., Disp: 6.7 g, Rfl: 0    aspirin 81 MG chewable tablet, Chew 1 tablet Daily. Indications: Cancer of the Colon, Disease involving Lipid Deposits in the Arteries, Kawasaki Syndrome, Disp: , Rfl:     bisoprolol (ZEBeta) 10 MG tablet, TAKE ONE TABLET BY MOUTH DAILY, Disp: 30 tablet, Rfl: 8    donepezil (ARICEPT) 10 MG tablet, Take 1 tablet by mouth Daily. Indications: Alzheimer's Disease, Disp: , Rfl:     empagliflozin " (Jardiance) 10 MG tablet tablet, Take 1 tablet by mouth Daily. Indications: Cardiac Failure, Disp: 30 tablet, Rfl: 3    ipratropium-albuterol (DUO-NEB) 0.5-2.5 mg/3 ml nebulizer, Take 3 mL (one vial) by nebulization 4 (Four) Times a Day As Needed for Wheezing., Disp: 360 mL, Rfl: 0    levothyroxine (SYNTHROID, LEVOTHROID) 25 MCG tablet, Take 1 tablet by mouth Every Morning., Disp: 30 tablet, Rfl: 11    memantine (NAMENDA) 10 MG tablet, Take 1 tablet by mouth 2 (Two) Times a Day. Indications: Alzheimer's Disease, Disp: , Rfl:     pantoprazole (PROTONIX) 40 MG EC tablet, Take 20 mg by mouth Daily. Indications: Gastroesophageal Reflux Disease, Heartburn, Disp: , Rfl:     sertraline (ZOLOFT) 100 MG tablet, Take 1.5 tablets by mouth Every Morning. Indications: Generalized Anxiety Disorder, Disp: , Rfl:     sucralfate (CARAFATE) 1 g tablet, Take 1 tablet by mouth., Disp: , Rfl:     torsemide (DEMADEX) 10 MG tablet, Take 1 tablet by mouth Daily. Indications: Cardiac Failure, Edema, Disp: , Rfl:     budesonide (PULMICORT) 0.5 MG/2ML nebulizer solution, Take 2 mL (one vial) by nebulization 2 (Two) Times a Day for 10 days., Disp: 60 mL, Rfl: 0   Assessment:                Plan:          ICD-10-CM ICD-9-CM   1. Pacemaker  Z95.0 V45.01   2. Anticoagulated  Z79.01 V58.61   3. Chronic atrial fibrillation  I48.20 427.31     1. Pacemaker  Pacemaker functioning normal    2. Anticoagulated  Continue current treatment    3. Chronic atrial fibrillation  Under control    SEE IN 1 YR    COUNSELING:    Raina Perkins was given to patient for the following topics: diagnostic results, risk factor reductions, impressions, risks and benefits of treatment options and importance of treatment compliance .       SMOKING COUNSELING:        Dictated using Dragon dictation

## 2025-03-21 ENCOUNTER — HOSPITAL ENCOUNTER (OUTPATIENT)
Dept: CARDIOLOGY | Facility: HOSPITAL | Age: 80
Discharge: HOME | End: 2025-03-21
Payer: MEDICARE

## 2025-03-21 VITALS
HEIGHT: 68 IN | HEART RATE: 71 BPM | WEIGHT: 150.6 LBS | DIASTOLIC BLOOD PRESSURE: 69 MMHG | SYSTOLIC BLOOD PRESSURE: 109 MMHG | BODY MASS INDEX: 22.82 KG/M2 | OXYGEN SATURATION: 95 %

## 2025-03-21 DIAGNOSIS — I50.32 CHRONIC HEART FAILURE WITH PRESERVED EJECTION FRACTION (HFPEF): Primary | ICD-10-CM

## 2025-03-21 PROCEDURE — 94726 PLETHYSMOGRAPHY LUNG VOLUMES: CPT | Performed by: NURSE PRACTITIONER

## 2025-03-21 NOTE — PROGRESS NOTES
[facility]      Congestive Heart Failure  Pertinent negatives include no chest pain or shortness of breath.       1. Pulmonary hypertension  2. HFpEF    Subjective      Raina Andersen a 79 y.o. female who presents today for pulmonary hypertension, HFpEF.   patient has A-fib (status post Watchman device),  HTN, CKD (status post nephrectomy due to renal cell carcinoma 2023), sick sinus syndrome (status post pacemaker), chronic HFpEF, hypothyroidism, GERD, dementia  Patient was admitted to [facility] from 2/7/2024 to 3/3/2024 for flu A, pneumonia. She was noted to have AMBROCIO on admission with creatinine of 1.2. Her Jardiance, spironolactone, and toresemide were all held. Renal function improved prior to discharge with creatinine of 0.9.   Patient had hospitalization 2/8/2025 to 2/13/2025.  She had been diagnosed with COVID-19.  Was found to be acute on chronic heart failure exacerbation.  Diuresed well with IV diuretics and discharged on p.o. diuretics torsemide 10 mg daily.  During hospitalization patient did have updated 2D TTE showed LVEF 66 to 70%, indeterminate left ventricular diastolic dysfunction, left atrial cavity mildly dilated, right atrial cavity moderately dilated, moderately elevated RVSP    Started Jardiance about 10 days ago.  She has tolerated so far.  Weight is overall down.  Tolerated the decrease in torsemide as well.    Review of Systems - Review of Systems   Constitutional: Positive for weight loss. Negative for weight gain.   Cardiovascular:  Negative for chest pain, dyspnea on exertion, leg swelling, orthopnea, paroxysmal nocturnal dyspnea and syncope.   Respiratory:  Negative for cough and shortness of breath.    Gastrointestinal:  Negative for bloating.   Neurological:  Negative for dizziness.          Patient Active Problem List   Diagnosis    Chronic atrial fibrillation    Benign essential HTN    Bradycardia    Chest pain    Can't get food down     Accumulation of fluid in tissues    Esophageal lump    Adynamia    Itch of skin    Anorexia    Chronic nausea    Gastroesophageal reflux disease    Breath shortness    Emesis    Decreased body weight    Anxiety    Narrowing of intervertebral disc space    Diverticulosis of intestine    Primary hypertension    Migraine    Osteoporosis    Palpitations    Pituitary adenoma    Sick sinus syndrome    Diarrhea    Cardiomyopathy    Chronic anticoagulation    Atrial fibrillation    Anticoagulated    Pacemaker    Infected pacemaker    Wound infection    Primary osteoarthritis of left knee    DJD (degenerative joint disease)    Ischemic cardiomyopathy    Acute on chronic congestive heart failure, unspecified heart failure type    Anemia    Presence of Watchman left atrial appendage closure device    Pneumonia    Pneumonia of both lungs due to infectious organism    Stage 3a chronic kidney disease    Influenza A    Severe malnutrition    H/O left radical nephrectomy    Abdominal wall hematoma    Peptic ulcer disease    Hypothyroidism (acquired)    Acute exacerbation of CHF (congestive heart failure)    Cytokine release syndrome, grade 2    Acute on chronic heart failure with preserved ejection fraction (HFpEF)     family history includes Heart disease in her father and mother; Heart failure in her father and mother; Lung cancer in an other family member; Ulcerative colitis in her sister.   reports that she has never smoked. She has never used smokeless tobacco. She reports that she does not drink alcohol and does not use drugs.  Allergies   Allergen Reactions    Bupivacaine Hcl Anaphylaxis    Nepafenac Itching     Eye Drops    Barium-Containing Compounds Other (See Comments)     CONTRAST CAUSES DIARRHEA    Duloxetine Other (See Comments)    Onion Other (See Comments)    Bextra [Valdecoxib] Itching    Cortisone Hives    Cymbalta [Duloxetine Hcl] Itching    Iodinated Contrast Media Unknown - Low Severity     Must be  premedicated before use.  See [facility] notes in care evrywhere    Medrol [Methylprednisolone] Unknown - Low Severity    Mesalamine Unknown - Low Severity    Polymyxin B-Trimethoprim Unknown - Low Severity    Rivaroxaban Unknown - Low Severity     REACTION UNKNOWN    Tetanus Toxoids Swelling    Tetanus-Diphtheria Toxoids Td Swelling     Physical Activity: Not on file          Current Outpatient Medications:     albuterol sulfate  (90 Base) MCG/ACT inhaler, Inhale 2 puffs Every 4 (Four) Hours As Needed for Wheezing., Disp: 6.7 g, Rfl: 0    aspirin 81 MG chewable tablet, Chew 1 tablet Daily. Indications: Cancer of the Colon, Disease involving Lipid Deposits in the Arteries, Kawasaki Syndrome, Disp: , Rfl:     bisoprolol (ZEBeta) 10 MG tablet, TAKE ONE TABLET BY MOUTH DAILY, Disp: 30 tablet, Rfl: 8    budesonide (PULMICORT) 0.5 MG/2ML nebulizer solution, Take 2 mL (one vial) by nebulization 2 (Two) Times a Day for 10 days., Disp: 60 mL, Rfl: 0    donepezil (ARICEPT) 10 MG tablet, Take 1 tablet by mouth Daily. Indications: Alzheimer's Disease, Disp: , Rfl:     empagliflozin (Jardiance) 10 MG tablet tablet, Take 1 tablet by mouth Daily. Indications: Cardiac Failure, Disp: 30 tablet, Rfl: 3    ipratropium-albuterol (DUO-NEB) 0.5-2.5 mg/3 ml nebulizer, Take 3 mL (one vial) by nebulization 4 (Four) Times a Day As Needed for Wheezing., Disp: 360 mL, Rfl: 0    levothyroxine (SYNTHROID, LEVOTHROID) 25 MCG tablet, Take 1 tablet by mouth Every Morning., Disp: 30 tablet, Rfl: 11    memantine (NAMENDA) 10 MG tablet, Take 1 tablet by mouth 2 (Two) Times a Day. Indications: Alzheimer's Disease, Disp: , Rfl:     pantoprazole (PROTONIX) 40 MG EC tablet, Take 20 mg by mouth Daily. Indications: Gastroesophageal Reflux Disease, Heartburn, Disp: , Rfl:     sertraline (ZOLOFT) 100 MG tablet, Take 1.5 tablets by mouth Every Morning. Indications: Generalized Anxiety Disorder, Disp: , Rfl:     torsemide (DEMADEX) 10 MG tablet, Take 1  tablet by mouth Daily. Indications: Cardiac Failure, Edema, Disp: , Rfl:     Objective   Vital Sign Review:   Vitals:    03/07/25 1155   BP: 125/70   Pulse: 65   SpO2: 98%       Body mass index is 23.64 kg/m².      03/07/25  1155   Weight: 69.9 kg (154 lb 3.2 oz)       Physical Exam:  Constitutional:       Appearance: Normal appearance. Frail. Chronically ill-appearing.   Neck:      Vascular: No carotid bruit or JVD. JVD normal.   Pulmonary:      Effort: Pulmonary effort is normal.      Breath sounds: Normal breath sounds.   Cardiovascular:      PMI at left midclavicular line. Normal rate. Regular rhythm.   Pulses:     Intact distal pulses.   Edema:     Peripheral edema absent.   Abdominal:      Palpations: Abdomen is soft.      Tenderness: There is no abdominal tenderness.   Skin:     General: Skin is warm and dry.   Neurological:      General: No focal deficit present.      Mental Status: Alert and oriented to person, place and time.   Psychiatric:         Behavior: Behavior is cooperative.        DATA REVIEWED:   EKG:      ---------------------------------------------------  ECHO:    Results for orders placed during the hospital encounter of 02/08/25    Adult Transthoracic Echo Complete W/ Cont if Necessary Per Protocol    Interpretation Summary    Left ventricular ejection fraction appears to be 66 - 70%.    Left ventricular diastolic function was indeterminate.    The left atrial cavity is mildly dilated.    The right atrial cavity is moderately  dilated.    Estimated right ventricular systolic pressure from tricuspid regurgitation is moderately elevated (45-55 mmHg).          -----------------------------------------------------  RHC/LHC    Results for orders placed during the hospital encounter of 11/15/16    Cardiac Catheterization/Vascular Study    Narrative  Table formatting from the original result was not included.    · Successful left heart catheter  · Normal coronary arteries  · LV function at lower  limit of normal    November 21, 2016    Raina Forrest  1945  70 y.o.  female  [id number]    Procedures Performed    1. Left heart catheterization  2. Selective coronary angiography  3. Left ventriculography  4.      :   Robinson Salazar MD    Vascular Access Site: Right femoral    Indication for procedure: Recurrent chest pains with abnormal echocardiogram with LV dysfunction    Referring Physician:      Pertinent History  @PMH@  @PSH@    Procedure Note    After discussing the risks, benefits, and alternatives of the procedure, informed consent was obtained.  The vascular access site was prepped and draped in the usual sterile fashion.  2% lidocaine was used for local anesthesia. Appropriate landmarks were assessed.  A 5 Nepalese short sheath was inserted in the artery using the modified Seldinger technique.    Selective coronary angiography was performed with JL4 and JR4 diagnostic catheters. Left ventriculogram  was performed with an angled pigtail catheter.  All exchanges were performed over the wire.  There were no apparent acute or early complications.  The patient tolerated the procedure well and was transferred to the recovery area in stable condition.    Artery hemostasis was achieved with TR BAND manual pressure.        Contrast:    70  cc  Complications:  None  Blood Loss: minimal      Hemodynamics    Pressures    Ao:    110/70 mmHg  LV:    110/10  mmHg  End-diastolic pressure:  10  mmHg  No significant aortic valvular gradient on pullback    Coronary Angiography    Left Main:  The left main originates in the left coronary cusp.  It bifurcates and gives rise to the LAD and circumflex system.  Normal    Left Anterior Descending:  Normal including the diagonal and  branches    Left Circumflex:  Codominant normal    Right Coronary Artery:  The right coronary artery originates in the right coronary cusp.  Codominant normal    Left Ventriculography:    Estimated Ejection Fraction:  50 %  Wall motion abnormalities:  None  Mitral Regurgitation:  None    Impression:    1. Normal coronary arteries  2. LV function at lower limits of normal    Recommendations:    1. Medical management        I sincerely appreciate the opportunity to participate in your patient's care. Please feel free to contact me anytime if I can be of assistance in this or any other way.    Robinson Salzaar MD  11/21/2016  12:08 PM      ---------------------------------------------------------------------------    CT:   CT Chest Without Contrast Diagnostic    Result Date: 2/11/2025   1. Significant improvement in centrilobular and tree-in-bud nodules, suspect infectious bronchiolitis. 2. Resolution of lower lobe bronchovascular consolidative opacities with linear opacities in the lower lobes most consistent with subsegmental atelectasis or linear scarring. 3. Airways disease with thickened airway walls and suspected air trapping. Finding may be related underlying reactive airways disease or bronchitis/bronchiolitis. 4. Multiple solid pulmonary nodules with a couple new solid pulmonary nodules seen in the right upper lobe. 6-month follow-up chest CT can reevaluate. 5. Moderate cardiomegaly with asymmetric right atrial enlargement.  This report was finalized on 2/11/2025 8:37 AM by Dr. Jas Muniz M.D on Workstation: [id number]      XR Chest 1 View    Result Date: 2/8/2025  Increased apparent size of the cardiac shadow, as described. Slight pulmonary vascular congestion. No focal pulmonary consolidation. Follow-up/further evaluation can be obtained as indicated.  This report was finalized on 2/8/2025 2:10 PM by Dr. Saeid Lopez M.D on Workstation: [id number]           --------------------------------------------------------------------------------------------------------------    Laboratory evaluations:  Renal Function: Estimated Creatinine Clearance: 40.6 mL/min (A) (by C-G formula based on SCr of 1.2 mg/dL  (H)).    Lab Results   Component Value Date    GLUCOSE 89 02/21/2025    BUN 15 02/21/2025    CREATININE 1.20 (H) 02/21/2025    EGFRIFNONA 80 06/17/2020    EGFRIFAFRI >60 12/09/2022    BCR 12.5 02/21/2025    K 4.4 02/21/2025    CO2 24.0 02/21/2025    CALCIUM 9.4 02/21/2025    ALBUMIN 4.0 02/13/2025    LABIL2 2.1 12/09/2022    AST 32 02/13/2025    ALT 14 02/13/2025     Lab Results   Component Value Date    WBC 12.91 (H) 02/13/2025    HGB 13.4 02/13/2025    HCT 37.7 02/13/2025    MCV 98.7 (H) 02/13/2025     02/13/2025     Lab Results   Component Value Date    HGBA1C 5.40 02/10/2023     Lab Results   Component Value Date    HGBA1C 5.40 02/10/2023    HGBA1C 4.80 11/16/2016     Lab Results   Component Value Date    CREATININE 1.20 (H) 02/21/2025     Lab Results   Component Value Date    IRON 19 (L) 06/18/2023    TIBC 514 06/18/2023    FERRITIN 25.40 06/18/2023       Result Review:  I have personally reviewed the results from the time of this admission to 3/7/2025 12:36 EST and agree with these findings:  [x]  Laboratory list / accordion  []  Microbiology  [x]  Radiology  [x]  EKG/Telemetry   [x]  Cardiology/Vascular   []  Pathology  [x]  Old records  []  Other:        ReDS VOLUMETRIC ASSESSMENT:  ReDs Vest    Performed by: Hayley Morrow APRN  Authorized by: Hayley Morrow APRN    ReDS value:  30  ReDS Value Description:  25-35 (green) = Optimal Volume Status        Assessment & Plan      1. Chronic heart failure with preserved ejection fraction (HFpEF)        HFpEF-LVEF 66 to 70% from 2D TTE 2/9/2025.   Home weights are trending down.   Labs reviewed from today creatinine 1.49  Currently on Jardiance 10mg daily--this was started 10 days ago.  Creatinine is slightly elevated 1.49.  This is within the 30% change we generally allow with the known effect SGLT2i has with initiation.  I would like to continue the Jardiance but recheck BMP in 2 weeks.  If needed we could try decreasing torsemide to 5 mg  daily in order to keep Jardiance on as she is tolerating it so far.  The KCCQ-12 was updated at the visit today.  score:  55 .        NYHA stage C FC-II     Clinical status was assessed and has remained stable.  Treatment intent: curative   ReDS reading was obtained today.  ReDS result: 30       Today, Patient appears euvolemic. and with perfusion. The patient's hemodynamics are currently acceptable. HR is: normal and is at goal. BP/MAP was reviewed and there is room for medication up-titration.  The patient's clinical course has been impacted by CKD. LVEF: 66-70%.     GDMT Assessment:       GDMT changes recommended today:       BB:   continue Bisoprolol  10mg daily  Monitor heart rate.  Please call the [facility] for HR<50, dizziness, lightheadedness, syncope    A/A/A:     HFpEF-not indicated at this time although possible future addition if needed    SGLT2-I:  continue jardiance 10mg daily.   Call for S/Sx genital mycotic infections  Do not take when inadequate oral intake, NPO, GI illness    MRA:  The patient is FC-NYHA Class II and MRA is indicated due to recent hospitalization.   Has been held due to AMBROCIO.  I would like to reinitiate SGLT2 first and possible future retrial of spironolactone if needed      Diuretic Regimen:  -DIURETIC:    toresemide 10 mg every day  -Fluid restriction:   -requested  -2000 ml  -Patient has been asked to weigh daily and was provided with a printed diuretic strategy.  -Sodium restriction:   -1,500 mg Na restriction was discussed.      Labs/Diagnostics/Referrals:    Labs -Chem-7 in 2 weeks       Lifestyle Advice:   - call office if I gain more than 2 pounds in one day or 5 pounds in one week   - keep legs up while sitting   - use salt in moderation   - watch for swelling in feet, ankles and legs every day   - weigh myself daily   -call for concerning s/sx   -- activity or exercise based on tolerance encouraged     CODE STATUS: FULL              Return in about 2 weeks (around  3/21/2025).                Thank you for allowing me to participate in the care of your patient,    Time Spent: I spent 30minutes caring for Raina Forrest  on this date of service. This time includes time spent by me in the following activities: preparing for the visit, reviewing tests, performing a medically appropriate examination and/or evaluations, counseling and educating the patient/family/caregiver, ordering medications, tests, or procedures, documenting information in the medical record, and independently interpreting results and communicating that information with the patient/family/caregiver.     I spent 1 minutes on the separately reported service interpreting the ReDs. This time is not included in the time used to support E/M service also reported on this date of service        Hayley Morrow, APRN  03/07/25  14:52 EST      **Dragon Disclaimer:**  Much of this encounter note is an electronic transcription/translation of spoken language to printed text. The electronic translation of spoken language may permit erroneous, or at times, nonsensical words or phrases to be inadvertently transcribed. Although I have reviewed the note for such errors, some may still exist.    The information in this note was reviewed and updated during the visit to be as accurate as possible. As many patients have chronic medical problems, many of their individual problems and ongoing plans do not change significantly from visit to visit.  This information is correct and is consistent with my treatment plan as of today's visit.

## 2025-03-21 NOTE — LETTER
March 21, 2025     Robinson Salazar MD  [address] [facility] [address]    Patient: Raina Forrest   YOB: 1945   Date of Visit: 3/21/2025     Dear Robinson Salazar MD:       Thank you for referring Raina Forrest to me for evaluation. Below are the relevant portions of my assessment and plan of care.    If you have questions, please do not hesitate to call me. I look forward to following Raina along with you.         Sincerely,        JORY Leon        CC: MD Cristhian Swanson Jennifer Marie, APRN  03/21/25 7224  Addendum    [facility]      Congestive Heart Failure  Pertinent negatives include no chest pain or shortness of breath.       1. Pulmonary hypertension  2. HFpEF    Subjective      Raina Forrestis a 79 y.o. female who presents today for pulmonary hypertension, HFpEF.   patient has A-fib (status post Watchman device),  HTN, CKD (status post nephrectomy due to renal cell carcinoma 2023), sick sinus syndrome (status post pacemaker), chronic HFpEF, hypothyroidism, GERD, dementia  Patient was admitted to [facility] from 2/7/2024 to 3/3/2024 for flu A, pneumonia. She was noted to have AMBROCIO on admission with creatinine of 1.2. Her Jardiance, spironolactone, and toresemide were all held. Renal function improved prior to discharge with creatinine of 0.9.   Patient had hospitalization 2/8/2025 to 2/13/2025.  She had been diagnosed with COVID-19.  Was found to be acute on chronic heart failure exacerbation.  Diuresed well with IV diuretics and discharged on p.o. diuretics torsemide 10 mg daily.  During hospitalization patient did have updated 2D TTE showed LVEF 66 to 70%, indeterminate left ventricular diastolic dysfunction, left atrial cavity mildly dilated, right atrial cavity moderately dilated, moderately elevated RVSP    Started Jardiance about 10 days ago.  She has tolerated so far.  Weight is overall  down.  Tolerated the decrease in torsemide as well.    Review of Systems - Review of Systems   Constitutional: Positive for weight loss. Negative for weight gain.   Cardiovascular:  Negative for chest pain, dyspnea on exertion, leg swelling, orthopnea, paroxysmal nocturnal dyspnea and syncope.   Respiratory:  Negative for cough and shortness of breath.    Gastrointestinal:  Negative for bloating.   Neurological:  Negative for dizziness.          Patient Active Problem List   Diagnosis   • Chronic atrial fibrillation   • Benign essential HTN   • Bradycardia   • Chest pain   • Can't get food down   • Accumulation of fluid in tissues   • Esophageal lump   • Adynamia   • Itch of skin   • Anorexia   • Chronic nausea   • Gastroesophageal reflux disease   • Breath shortness   • Emesis   • Decreased body weight   • Anxiety   • Narrowing of intervertebral disc space   • Diverticulosis of intestine   • Primary hypertension   • Migraine   • Osteoporosis   • Palpitations   • Pituitary adenoma   • Sick sinus syndrome   • Diarrhea   • Cardiomyopathy   • Chronic anticoagulation   • Atrial fibrillation   • Anticoagulated   • Pacemaker   • Infected pacemaker   • Wound infection   • Primary osteoarthritis of left knee   • DJD (degenerative joint disease)   • Ischemic cardiomyopathy   • Acute on chronic congestive heart failure, unspecified heart failure type   • Anemia   • Presence of Watchman left atrial appendage closure device   • Pneumonia   • Pneumonia of both lungs due to infectious organism   • Stage 3a chronic kidney disease   • Influenza A   • Severe malnutrition   • H/O left radical nephrectomy   • Abdominal wall hematoma   • Peptic ulcer disease   • Hypothyroidism (acquired)   • Acute exacerbation of CHF (congestive heart failure)   • Cytokine release syndrome, grade 2   • Acute on chronic heart failure with preserved ejection fraction (HFpEF)     family history includes Heart disease in her father and mother; Heart  failure in her father and mother; Lung cancer in an other family member; Ulcerative colitis in her sister.   reports that she has never smoked. She has never used smokeless tobacco. She reports that she does not drink alcohol and does not use drugs.  Allergies   Allergen Reactions   • Bupivacaine Hcl Anaphylaxis   • Nepafenac Itching     Eye Drops   • Barium-Containing Compounds Other (See Comments)     CONTRAST CAUSES DIARRHEA   • Duloxetine Other (See Comments)   • Onion Other (See Comments)   • Bextra [Valdecoxib] Itching   • Cortisone Hives   • Cymbalta [Duloxetine Hcl] Itching   • Iodinated Contrast Media Unknown - Low Severity     Must be premedicated before use.  See [facility] notes in care evrywhere   • Medrol [Methylprednisolone] Unknown - Low Severity   • Mesalamine Unknown - Low Severity   • Polymyxin B-Trimethoprim Unknown - Low Severity   • Rivaroxaban Unknown - Low Severity     REACTION UNKNOWN   • Tetanus Toxoids Swelling   • Tetanus-Diphtheria Toxoids Td Swelling     Physical Activity: Not on file          Current Outpatient Medications:   •  albuterol sulfate  (90 Base) MCG/ACT inhaler, Inhale 2 puffs Every 4 (Four) Hours As Needed for Wheezing., Disp: 6.7 g, Rfl: 0  •  aspirin 81 MG chewable tablet, Chew 1 tablet Daily. Indications: Cancer of the Colon, Disease involving Lipid Deposits in the Arteries, Kawasaki Syndrome, Disp: , Rfl:   •  bisoprolol (ZEBeta) 10 MG tablet, TAKE ONE TABLET BY MOUTH DAILY, Disp: 30 tablet, Rfl: 8  •  budesonide (PULMICORT) 0.5 MG/2ML nebulizer solution, Take 2 mL (one vial) by nebulization 2 (Two) Times a Day for 10 days., Disp: 60 mL, Rfl: 0  •  donepezil (ARICEPT) 10 MG tablet, Take 1 tablet by mouth Daily. Indications: Alzheimer's Disease, Disp: , Rfl:   •  empagliflozin (Jardiance) 10 MG tablet tablet, Take 1 tablet by mouth Daily. Indications: Cardiac Failure, Disp: 30 tablet, Rfl: 3  •  ipratropium-albuterol (DUO-NEB) 0.5-2.5 mg/3 ml nebulizer, Take 3 mL  (one vial) by nebulization 4 (Four) Times a Day As Needed for Wheezing., Disp: 360 mL, Rfl: 0  •  levothyroxine (SYNTHROID, LEVOTHROID) 25 MCG tablet, Take 1 tablet by mouth Every Morning., Disp: 30 tablet, Rfl: 11  •  memantine (NAMENDA) 10 MG tablet, Take 1 tablet by mouth 2 (Two) Times a Day. Indications: Alzheimer's Disease, Disp: , Rfl:   •  pantoprazole (PROTONIX) 40 MG EC tablet, Take 20 mg by mouth Daily. Indications: Gastroesophageal Reflux Disease, Heartburn, Disp: , Rfl:   •  sertraline (ZOLOFT) 100 MG tablet, Take 1.5 tablets by mouth Every Morning. Indications: Generalized Anxiety Disorder, Disp: , Rfl:   •  torsemide (DEMADEX) 10 MG tablet, Take 1 tablet by mouth Daily. Indications: Cardiac Failure, Edema, Disp: , Rfl:     Objective   Vital Sign Review:   Vitals:    03/07/25 1155   BP: 125/70   Pulse: 65   SpO2: 98%       Body mass index is 23.64 kg/m².      03/07/25  1155   Weight: 69.9 kg (154 lb 3.2 oz)       Physical Exam:  Constitutional:       Appearance: Normal appearance. Frail. Chronically ill-appearing.   Neck:      Vascular: No carotid bruit or JVD. JVD normal.   Pulmonary:      Effort: Pulmonary effort is normal.      Breath sounds: Normal breath sounds.   Cardiovascular:      PMI at left midclavicular line. Normal rate. Regular rhythm.   Pulses:     Intact distal pulses.   Edema:     Peripheral edema absent.   Abdominal:      Palpations: Abdomen is soft.      Tenderness: There is no abdominal tenderness.   Skin:     General: Skin is warm and dry.   Neurological:      General: No focal deficit present.      Mental Status: Alert and oriented to person, place and time.   Psychiatric:         Behavior: Behavior is cooperative.        DATA REVIEWED:   EKG:      ---------------------------------------------------  ECHO:    Results for orders placed during the hospital encounter of 02/08/25    Adult Transthoracic Echo Complete W/ Cont if Necessary Per Protocol    Interpretation Summary  •  Left  ventricular ejection fraction appears to be 66 - 70%.  •  Left ventricular diastolic function was indeterminate.  •  The left atrial cavity is mildly dilated.  •  The right atrial cavity is moderately  dilated.  •  Estimated right ventricular systolic pressure from tricuspid regurgitation is moderately elevated (45-55 mmHg).          -----------------------------------------------------  RHC/LHC    Results for orders placed during the hospital encounter of 11/15/16    Cardiac Catheterization/Vascular Study    Narrative  Table formatting from the original result was not included.    · Successful left heart catheter  · Normal coronary arteries  · LV function at lower limit of normal    November 21, 2016    Raina Forrest  1945  70 y.o.  female  [id number]    Procedures Performed    1. Left heart catheterization  2. Selective coronary angiography  3. Left ventriculography  4.      :   Robinson Salazar MD    Vascular Access Site: Right femoral    Indication for procedure: Recurrent chest pains with abnormal echocardiogram with LV dysfunction    Referring Physician:      Pertinent History  @PMH@  @PSH@    Procedure Note    After discussing the risks, benefits, and alternatives of the procedure, informed consent was obtained.  The vascular access site was prepped and draped in the usual sterile fashion.  2% lidocaine was used for local anesthesia. Appropriate landmarks were assessed.  A 5 [place] short sheath was inserted in the artery using the modified Seldinger technique.    Selective coronary angiography was performed with JL4 and JR4 diagnostic catheters. Left ventriculogram  was performed with an angled pigtail catheter.  All exchanges were performed over the wire.  There were no apparent acute or early complications.  The patient tolerated the procedure well and was transferred to the recovery area in stable condition.    Artery hemostasis was achieved with TR BAND manual  pressure.        Contrast:    70  cc  Complications:  None  Blood Loss: minimal      Hemodynamics    Pressures    Ao:    110/70 mmHg  LV:    110/10  mmHg  End-diastolic pressure:  10  mmHg  No significant aortic valvular gradient on pullback    Coronary Angiography    Left Main:  The left main originates in the left coronary cusp.  It bifurcates and gives rise to the LAD and circumflex system.  Normal    Left Anterior Descending:  Normal including the diagonal and  branches    Left Circumflex:  Codominant normal    Right Coronary Artery:  The right coronary artery originates in the right coronary cusp.  Codominant normal    Left Ventriculography:    Estimated Ejection Fraction: 50 %  Wall motion abnormalities:  None  Mitral Regurgitation:  None    Impression:    1. Normal coronary arteries  2. LV function at lower limits of normal    Recommendations:    1. Medical management        I sincerely appreciate the opportunity to participate in your patient's care. Please feel free to contact me anytime if I can be of assistance in this or any other way.    Robinson Salazar MD  11/21/2016  12:08 PM      ---------------------------------------------------------------------------    CT:   CT Chest Without Contrast Diagnostic    Result Date: 2/11/2025   1. Significant improvement in centrilobular and tree-in-bud nodules, suspect infectious bronchiolitis. 2. Resolution of lower lobe bronchovascular consolidative opacities with linear opacities in the lower lobes most consistent with subsegmental atelectasis or linear scarring. 3. Airways disease with thickened airway walls and suspected air trapping. Finding may be related underlying reactive airways disease or bronchitis/bronchiolitis. 4. Multiple solid pulmonary nodules with a couple new solid pulmonary nodules seen in the right upper lobe. 6-month follow-up chest CT can reevaluate. 5. Moderate cardiomegaly with asymmetric right atrial enlargement.  This  report was finalized on 2/11/2025 8:37 AM by Dr. Jas Muniz M.D on Workstation: [id number]      XR Chest 1 View    Result Date: 2/8/2025  Increased apparent size of the cardiac shadow, as described. Slight pulmonary vascular congestion. No focal pulmonary consolidation. Follow-up/further evaluation can be obtained as indicated.  This report was finalized on 2/8/2025 2:10 PM by Dr. Saeid Lopez M.D on Workstation: [id number]           --------------------------------------------------------------------------------------------------------------    Laboratory evaluations:  Renal Function: Estimated Creatinine Clearance: 40.6 mL/min (A) (by C-G formula based on SCr of 1.2 mg/dL (H)).    Lab Results   Component Value Date    GLUCOSE 89 02/21/2025    BUN 15 02/21/2025    CREATININE 1.20 (H) 02/21/2025    EGFRIFNONA 80 06/17/2020    EGFRIFAFRI >60 12/09/2022    BCR 12.5 02/21/2025    K 4.4 02/21/2025    CO2 24.0 02/21/2025    CALCIUM 9.4 02/21/2025    ALBUMIN 4.0 02/13/2025    LABIL2 2.1 12/09/2022    AST 32 02/13/2025    ALT 14 02/13/2025     Lab Results   Component Value Date    WBC 12.91 (H) 02/13/2025    HGB 13.4 02/13/2025    HCT 37.7 02/13/2025    MCV 98.7 (H) 02/13/2025     02/13/2025     Lab Results   Component Value Date    HGBA1C 5.40 02/10/2023     Lab Results   Component Value Date    HGBA1C 5.40 02/10/2023    HGBA1C 4.80 11/16/2016     Lab Results   Component Value Date    CREATININE 1.20 (H) 02/21/2025     Lab Results   Component Value Date    IRON 19 (L) 06/18/2023    TIBC 514 06/18/2023    FERRITIN 25.40 06/18/2023       Result Review:  I have personally reviewed the results from the time of this admission to 3/7/2025 12:36 EST and agree with these findings:  [x]  Laboratory list / accordion  []  Microbiology  [x]  Radiology  [x]  EKG/Telemetry   [x]  Cardiology/Vascular   []  Pathology  [x]  Old records  []  Other:        ReDS VOLUMETRIC ASSESSMENT:  ReDs Vest    Performed by: Cristhian  JORY Hinton  Authorized by: Hayley Morrow APRN    ReDS value:  30  ReDS Value Description:  25-35 (green) = Optimal Volume Status        Assessment & Plan      1. Chronic heart failure with preserved ejection fraction (HFpEF)        HFpEF-LVEF 66 to 70% from 2D TTE 2/9/2025.   Home weights are trending down.   Labs reviewed from today creatinine 1.49  Currently on Jardiance 10mg daily--this was started 10 days ago.  Creatinine is slightly elevated 1.49.  This is within the 30% change we generally allow with the known effect SGLT2i has with initiation.  I would like to continue the Jardiance but recheck BMP in 2 weeks.  If needed we could try decreasing torsemide to 5 mg daily in order to keep Jardiance on as she is tolerating it so far.  The KCCQ-12 was updated at the visit today.  score:  55 .        NYHA stage C FC-II     Clinical status was assessed and has remained stable.  Treatment intent: curative   ReDS reading was obtained today.  ReDS result: 30       Today, Patient appears euvolemic. and with perfusion. The patient's hemodynamics are currently acceptable. HR is: normal and is at goal. BP/MAP was reviewed and there is room for medication up-titration.  The patient's clinical course has been impacted by CKD. LVEF: 66-70%.     GDMT Assessment:       GDMT changes recommended today:       BB:   continue Bisoprolol  10mg daily  Monitor heart rate.  Please call the [facility] for HR<50, dizziness, lightheadedness, syncope    A/A/A:     HFpEF-not indicated at this time although possible future addition if needed    SGLT2-I:  continue jardiance 10mg daily.   Call for S/Sx genital mycotic infections  Do not take when inadequate oral intake, NPO, GI illness    MRA:  The patient is FC-NYHA Class II and MRA is indicated due to recent hospitalization.   Has been held due to AMBROCIO.  I would like to reinitiate SGLT2 first and possible future retrial of spironolactone if needed      Diuretic Regimen:  -DIURETIC:     toresemide 10 mg every day  -Fluid restriction:   -requested  -2000 ml  -Patient has been asked to weigh daily and was provided with a printed diuretic strategy.  -Sodium restriction:   -1,500 mg Na restriction was discussed.      Labs/Diagnostics/Referrals:    Labs -Chem-7 in 2 weeks       Lifestyle Advice:   - call office if I gain more than 2 pounds in one day or 5 pounds in one week   - keep legs up while sitting   - use salt in moderation   - watch for swelling in feet, ankles and legs every day   - weigh myself daily   -call for concerning s/sx   -- activity or exercise based on tolerance encouraged     CODE STATUS: FULL              Return in about 2 weeks (around 3/21/2025).                Thank you for allowing me to participate in the care of your patient,    Time Spent: I spent 30minutes caring for Raina Forrest  on this date of service. This time includes time spent by me in the following activities: preparing for the visit, reviewing tests, performing a medically appropriate examination and/or evaluations, counseling and educating the patient/family/caregiver, ordering medications, tests, or procedures, documenting information in the medical record, and independently interpreting results and communicating that information with the patient/family/caregiver.     I spent 1 minutes on the separately reported service interpreting the ReDs. This time is not included in the time used to support E/M service also reported on this date of service        Hayley Morrow, APRN  03/07/25  14:52 EST      **Dragon Disclaimer:**  Much of this encounter note is an electronic transcription/translation of spoken language to printed text. The electronic translation of spoken language may permit erroneous, or at times, nonsensical words or phrases to be inadvertently transcribed. Although I have reviewed the note for such errors, some may still exist.    The information in this note was reviewed and updated  during the visit to be as accurate as possible. As many patients have chronic medical problems, many of their individual problems and ongoing plans do not change significantly from visit to visit.  This information is correct and is consistent with my treatment plan as of today's visit.

## 2025-04-07 ENCOUNTER — RESULTS FOLLOW-UP (OUTPATIENT)
Dept: CARDIOLOGY | Facility: HOSPITAL | Age: 80
End: 2025-04-07
Payer: MEDICARE

## 2025-04-07 ENCOUNTER — LAB (OUTPATIENT)
Dept: LAB | Facility: HOSPITAL | Age: 80
End: 2025-04-07
Payer: MEDICARE

## 2025-04-07 DIAGNOSIS — I50.32 CHRONIC HEART FAILURE WITH PRESERVED EJECTION FRACTION (HFPEF): ICD-10-CM

## 2025-04-07 LAB
ANION GAP SERPL CALCULATED.3IONS-SCNC: 10.5 MMOL/L (ref 5–15)
BUN SERPL-MCNC: 16 MG/DL (ref 8–23)
BUN/CREAT SERPL: 13.2 (ref 7–25)
CALCIUM SPEC-SCNC: 9.7 MG/DL (ref 8.6–10.5)
CHLORIDE SERPL-SCNC: 103 MMOL/L (ref 98–107)
CO2 SERPL-SCNC: 25.5 MMOL/L (ref 22–29)
CREAT SERPL-MCNC: 1.21 MG/DL (ref 0.57–1)
EGFRCR SERPLBLD CKD-EPI 2021: 45.1 ML/MIN/1.73
GLUCOSE SERPL-MCNC: 83 MG/DL (ref 65–99)
POTASSIUM SERPL-SCNC: 4.7 MMOL/L (ref 3.5–5.2)
SODIUM SERPL-SCNC: 139 MMOL/L (ref 136–145)

## 2025-04-07 PROCEDURE — 36415 COLL VENOUS BLD VENIPUNCTURE: CPT

## 2025-04-07 PROCEDURE — 80048 BASIC METABOLIC PNL TOTAL CA: CPT

## 2025-04-22 ENCOUNTER — HOSPITAL ENCOUNTER (OUTPATIENT)
Dept: CARDIOLOGY | Facility: HOSPITAL | Age: 80
Discharge: HOME | End: 2025-04-22
Admitting: NURSE PRACTITIONER
Payer: MEDICARE

## 2025-04-22 VITALS
BODY MASS INDEX: 23.39 KG/M2 | DIASTOLIC BLOOD PRESSURE: 66 MMHG | OXYGEN SATURATION: 96 % | HEART RATE: 65 BPM | SYSTOLIC BLOOD PRESSURE: 109 MMHG | WEIGHT: 153.8 LBS

## 2025-04-22 DIAGNOSIS — I50.32 CHRONIC HEART FAILURE WITH PRESERVED EJECTION FRACTION (HFPEF): Primary | ICD-10-CM

## 2025-04-22 PROCEDURE — G0463 HOSPITAL OUTPT CLINIC VISIT: HCPCS

## 2025-04-22 PROCEDURE — 94726 PLETHYSMOGRAPHY LUNG VOLUMES: CPT | Performed by: NURSE PRACTITIONER

## 2025-04-22 NOTE — PATIENT INSTRUCTIONS
Directions for when to call the clinic reviewed with the patient to include weight gain of 2 to 3 pounds in 24 hours, weight gain of 5 to 10 pounds within 7 days; worsening shortness of breath; worsening lower extremity edema or abdominal distention.    Continue medications.   We will plan to check labs when you return in 3 months.

## 2025-04-22 NOTE — LETTER
April 22, 2025     Robinson Salazar MD  [address] [facility] [address]    Patient: Raina Forrest   YOB: 1945   Date of Visit: 4/22/2025     Dear Robinson Salazar MD:       Thank you for referring Raina Forrest to me for evaluation. Below are the relevant portions of my assessment and plan of care.    If you have questions, please do not hesitate to call me. I look forward to following Raina along with you.         Sincerely,        JORY Leon        CC: MD Cristhian Tineo Jennifer Marie, APRN  04/22/25 1422  Signed    [facility]      Congestive Heart Failure  Pertinent negatives include no chest pain or shortness of breath.       1. Pulmonary hypertension  2. HFpEF    Subjective     Raina Forrestis a 79 y.o. female who presents today for pulmonary hypertension, HFpEF.   patient has A-fib (status post Watchman device),  HTN, CKD (status post nephrectomy due to renal cell carcinoma 2023), sick sinus syndrome (status post pacemaker), chronic HFpEF, hypothyroidism, GERD, dementia  Patient was admitted to [facility] from 2/7/2024 to 3/3/2024 for flu A, pneumonia. She was noted to have AMBROCIO on admission with creatinine of 1.2. Her Jardiance, spironolactone, and toresemide were all held. Renal function improved prior to discharge with creatinine of 0.9.   Patient had hospitalization 2/8/2025 to 2/13/2025.  She had been diagnosed with COVID-19.  Was found to be acute on chronic heart failure exacerbation.  Diuresed well with IV diuretics and discharged on p.o. diuretics torsemide 10 mg daily.  During hospitalization patient did have updated 2D TTE showed LVEF 66 to 70%, indeterminate left ventricular diastolic dysfunction, left atrial cavity mildly dilated, right atrial cavity moderately dilated, moderately elevated RVSP    She has tolerated Jardiance.  Reports stable home weights.  After initiation of  Jardiance her torsemide was decreased and she has tolerated this.  Denies any edema.  Denies any orthopnea, dyspnea on exertion, or ascites.      Review of Systems - Review of Systems   Constitutional: Negative for weight gain and weight loss.   Cardiovascular:  Negative for chest pain, dyspnea on exertion, leg swelling, orthopnea, paroxysmal nocturnal dyspnea and syncope.   Respiratory:  Negative for cough and shortness of breath.    Gastrointestinal:  Negative for bloating.   Neurological:  Negative for dizziness.          Patient Active Problem List   Diagnosis   • Chronic atrial fibrillation   • Benign essential HTN   • Bradycardia   • Chest pain   • Can't get food down   • Accumulation of fluid in tissues   • Esophageal lump   • Adynamia   • Itch of skin   • Anorexia   • Chronic nausea   • Gastroesophageal reflux disease   • Breath shortness   • Emesis   • Decreased body weight   • Anxiety   • Narrowing of intervertebral disc space   • Diverticulosis of intestine   • Primary hypertension   • Migraine   • Osteoporosis   • Palpitations   • Pituitary adenoma   • Sick sinus syndrome   • Diarrhea   • Cardiomyopathy   • Chronic anticoagulation   • Atrial fibrillation   • Anticoagulated   • Pacemaker   • Infected pacemaker   • Wound infection   • Primary osteoarthritis of left knee   • DJD (degenerative joint disease)   • Ischemic cardiomyopathy   • Acute on chronic congestive heart failure, unspecified heart failure type   • Anemia   • Presence of Watchman left atrial appendage closure device   • Pneumonia   • Pneumonia of both lungs due to infectious organism   • Stage 3a chronic kidney disease   • Influenza A   • Severe malnutrition   • H/O left radical nephrectomy   • Abdominal wall hematoma   • Peptic ulcer disease   • Hypothyroidism (acquired)   • Acute exacerbation of CHF (congestive heart failure)   • Cytokine release syndrome, grade 2   • Chronic heart failure with preserved ejection fraction (HFpEF)      family history includes Heart disease in her father and mother; Heart failure in her father and mother; Lung cancer in an other family member; Ulcerative colitis in her sister.   reports that she has never smoked. She has never been exposed to tobacco smoke. She has never used smokeless tobacco. She reports that she does not drink alcohol and does not use drugs.  Allergies   Allergen Reactions   • Bupivacaine Hcl Anaphylaxis   • Nepafenac Itching     Eye Drops   • Barium-Containing Compounds Other (See Comments)     CONTRAST CAUSES DIARRHEA   • Duloxetine Other (See Comments)   • Onion Other (See Comments)   • Bextra [Valdecoxib] Itching   • Cortisone Hives   • Cymbalta [Duloxetine Hcl] Itching   • Iodinated Contrast Media Unknown - Low Severity     Must be premedicated before use.  See [facility] notes in care evrywhere   • Medrol [Methylprednisolone] Unknown - Low Severity   • Mesalamine Unknown - Low Severity   • Polymyxin B-Trimethoprim Unknown - Low Severity   • Rivaroxaban Unknown - Low Severity     REACTION UNKNOWN   • Tetanus Toxoids Swelling   • Tetanus-Diphtheria Toxoids Td Swelling     Physical Activity: Not on file          Current Outpatient Medications:   •  albuterol sulfate  (90 Base) MCG/ACT inhaler, Inhale 2 puffs Every 4 (Four) Hours As Needed for Wheezing., Disp: 6.7 g, Rfl: 0  •  aspirin 81 MG chewable tablet, Chew 1 tablet Daily. Indications: Cancer of the Colon, Disease involving Lipid Deposits in the Arteries, Kawasaki Syndrome, Disp: , Rfl:   •  bisoprolol (ZEBeta) 10 MG tablet, TAKE ONE TABLET BY MOUTH DAILY, Disp: 30 tablet, Rfl: 8  •  budesonide (PULMICORT) 0.5 MG/2ML nebulizer solution, Take 2 mL (one vial) by nebulization 2 (Two) Times a Day for 10 days., Disp: 60 mL, Rfl: 0  •  donepezil (ARICEPT) 10 MG tablet, Take 1 tablet by mouth Daily. Indications: Alzheimer's Disease, Disp: , Rfl:   •  empagliflozin (Jardiance) 10 MG tablet tablet, Take 1 tablet by mouth Daily.  Indications: Cardiac Failure, Disp: 30 tablet, Rfl: 3  •  ipratropium-albuterol (DUO-NEB) 0.5-2.5 mg/3 ml nebulizer, Take 3 mL (one vial) by nebulization 4 (Four) Times a Day As Needed for Wheezing., Disp: 360 mL, Rfl: 0  •  levothyroxine (SYNTHROID, LEVOTHROID) 25 MCG tablet, Take 1 tablet by mouth Every Morning., Disp: 30 tablet, Rfl: 11  •  memantine (NAMENDA) 10 MG tablet, Take 1 tablet by mouth 2 (Two) Times a Day. Indications: Alzheimer's Disease, Disp: , Rfl:   •  pantoprazole (PROTONIX) 40 MG EC tablet, Take 20 mg by mouth Daily. Indications: Gastroesophageal Reflux Disease, Heartburn, Disp: , Rfl:   •  sertraline (ZOLOFT) 100 MG tablet, Take 1.5 tablets by mouth Every Morning. Indications: Generalized Anxiety Disorder, Disp: , Rfl:   •  sucralfate (CARAFATE) 1 g tablet, Take 1 tablet by mouth., Disp: , Rfl:   •  torsemide (DEMADEX) 10 MG tablet, Take 1 tablet by mouth Daily. Indications: Cardiac Failure, Edema, Disp: , Rfl:     Objective  Vital Sign Review:   Vitals:    04/22/25 1404   BP: 109/66   Pulse: 65   SpO2: 96%         Body mass index is 23.39 kg/m².      04/22/25  1404   Weight: 69.8 kg (153 lb 12.8 oz)         Physical Exam:  Constitutional:       Appearance: Normal appearance. Frail. Chronically ill-appearing.   Neck:      Vascular: No carotid bruit or JVD. JVD normal.   Pulmonary:      Effort: Pulmonary effort is normal.      Breath sounds: Normal breath sounds.   Cardiovascular:      PMI at left midclavicular line. Normal rate. Regular rhythm.   Pulses:     Intact distal pulses.   Edema:     Peripheral edema absent.   Abdominal:      Palpations: Abdomen is soft.      Tenderness: There is no abdominal tenderness.   Skin:     General: Skin is warm and dry.   Neurological:      General: No focal deficit present.      Mental Status: Alert and oriented to person, place and time.   Psychiatric:         Behavior: Behavior is cooperative.          DATA REVIEWED:    EKG:      ---------------------------------------------------  ECHO:    Results for orders placed during the hospital encounter of 02/08/25    Adult Transthoracic Echo Complete W/ Cont if Necessary Per Protocol    Interpretation Summary  •  Left ventricular ejection fraction appears to be 66 - 70%.  •  Left ventricular diastolic function was indeterminate.  •  The left atrial cavity is mildly dilated.  •  The right atrial cavity is moderately  dilated.  •  Estimated right ventricular systolic pressure from tricuspid regurgitation is moderately elevated (45-55 mmHg).          -----------------------------------------------------  RHC/LHC    Results for orders placed during the hospital encounter of 11/15/16    Cardiac Catheterization/Vascular Study    Narrative  Table formatting from the original result was not included.    · Successful left heart catheter  · Normal coronary arteries  · LV function at lower limit of normal    November 21, 2016    Raina Forrest  1945  70 y.o.  female  [id number]    Procedures Performed    1. Left heart catheterization  2. Selective coronary angiography  3. Left ventriculography  4.      :   Robinson Salazar MD    Vascular Access Site: Right femoral    Indication for procedure: Recurrent chest pains with abnormal echocardiogram with LV dysfunction    Referring Physician:      Pertinent History  @PMH@  @PSH@    Procedure Note    After discussing the risks, benefits, and alternatives of the procedure, informed consent was obtained.  The vascular access site was prepped and draped in the usual sterile fashion.  2% lidocaine was used for local anesthesia. Appropriate landmarks were assessed.  A 5 Syriac short sheath was inserted in the artery using the modified Seldinger technique.    Selective coronary angiography was performed with JL4 and JR4 diagnostic catheters. Left ventriculogram  was performed with an angled pigtail catheter.  All exchanges were performed  over the wire.  There were no apparent acute or early complications.  The patient tolerated the procedure well and was transferred to the recovery area in stable condition.    Artery hemostasis was achieved with TR BAND manual pressure.        Contrast:    70  cc  Complications:  None  Blood Loss: minimal      Hemodynamics    Pressures    Ao:    110/70 mmHg  LV:    110/10  mmHg  End-diastolic pressure:  10  mmHg  No significant aortic valvular gradient on pullback    Coronary Angiography    Left Main:  The left main originates in the left coronary cusp.  It bifurcates and gives rise to the LAD and circumflex system.  Normal    Left Anterior Descending:  Normal including the diagonal and  branches    Left Circumflex:  Codominant normal    Right Coronary Artery:  The right coronary artery originates in the right coronary cusp.  Codominant normal    Left Ventriculography:    Estimated Ejection Fraction: 50 %  Wall motion abnormalities:  None  Mitral Regurgitation:  None    Impression:    1. Normal coronary arteries  2. LV function at lower limits of normal    Recommendations:    1. Medical management        I sincerely appreciate the opportunity to participate in your patient's care. Please feel free to contact me anytime if I can be of assistance in this or any other way.    Robinson Salazar MD  11/21/2016  12:08 PM      ---------------------------------------------------------------------------    CT:   No radiology results for the last 30 days.        --------------------------------------------------------------------------------------------------------------    Laboratory evaluations:  Renal Function: Estimated Creatinine Clearance: 40.2 mL/min (A) (by C-G formula based on SCr of 1.21 mg/dL (H)).    Lab Results   Component Value Date    GLUCOSE 83 04/07/2025    BUN 16 04/07/2025    CREATININE 1.21 (H) 04/07/2025    EGFRIFNONA 80 06/17/2020    EGFRIFAFRI >60 12/09/2022    BCR 13.2 04/07/2025    K 4.7  04/07/2025    CO2 25.5 04/07/2025    CALCIUM 9.7 04/07/2025    ALBUMIN 4.0 02/13/2025    LABIL2 2.1 12/09/2022    AST 32 02/13/2025    ALT 14 02/13/2025     Lab Results   Component Value Date    WBC 12.91 (H) 02/13/2025    HGB 13.4 02/13/2025    HCT 37.7 02/13/2025    MCV 98.7 (H) 02/13/2025     02/13/2025     Lab Results   Component Value Date    HGBA1C 5.40 02/10/2023     Lab Results   Component Value Date    HGBA1C 5.40 02/10/2023    HGBA1C 4.80 11/16/2016     Lab Results   Component Value Date    CREATININE 1.21 (H) 04/07/2025     Lab Results   Component Value Date    IRON 19 (L) 06/18/2023    TIBC 514 06/18/2023    FERRITIN 25.40 06/18/2023       Result Review:  I have personally reviewed the results from the time of this admission to 4/22/2025 14:05 EDT and agree with these findings:  [x]  Laboratory list / accordion  []  Microbiology  [x]  Radiology  [x]  EKG/Telemetry   [x]  Cardiology/Vascular   []  Pathology  [x]  Old records  []  Other:        ReDS VOLUMETRIC ASSESSMENT:  ReDs Vest    Performed by: Hayley Morrow APRN  Authorized by: Hayley Morrow APRN    ReDS value:  34  ReDS Value Description:  25-35 (green) = Optimal Volume Status        Assessment & Plan     1. Chronic heart failure with preserved ejection fraction (HFpEF)      HFpEF-LVEF 66 to 70% from 2D TTE 2/9/2025.   Stable home weights.  Appears euvolemic  Has tolerated jardiance well.   She is getting ready to start treatment for H.Pylori with talicia.   Directions for when to call the clinic reviewed with the patient to include weight gain of 2 to 3 pounds in 24 hours, weight gain of 5 to 10 pounds within 7 days; worsening shortness of breath; worsening lower extremity edema or abdominal distention.         NYHA stage C FC-II     Clinical status was assessed and has remained stable.  Treatment intent: curative   ReDS reading was obtained today.  ReDS result: 34       Today, Patient appears euvolemic. and with perfusion.  The patient's hemodynamics are currently acceptable. HR is: normal and is at goal. BP/MAP was reviewed and there is room for medication up-titration.  The patient's clinical course has been impacted by CKD. LVEF: 66-70%.     GDMT Assessment:       GDMT changes recommended today:       BB:   continue Bisoprolol  10mg daily  Monitor heart rate.  Please call the [facility] for HR<50, dizziness, lightheadedness, syncope    A/A/A:     HFpEF-not indicated at this time although possible future addition if needed    SGLT2-I:  continue jardiance 10mg daily.   Call for S/Sx genital mycotic infections  Do not take when inadequate oral intake, NPO, GI illness    MRA:  The patient is FC-NYHA Class II and MRA is indicated due to recent hospitalization.   Euvolemic today. Will defer initiation for now given she is HFpEF      Diuretic Regimen:  -DIURETIC:    toresemide 10 mg every day  -Fluid restriction:   -requested  -2000 ml  -Patient has been asked to weigh daily and was provided with a printed diuretic strategy.  -Sodium restriction:   -1,500 mg Na restriction was discussed.      Labs/Diagnostics/Referrals:    Labs -Chem-7 in 3 months at follow-up       Lifestyle Advice:   - call office if I gain more than 2 pounds in one day or 5 pounds in one week   - keep legs up while sitting   - use salt in moderation   - watch for swelling in feet, ankles and legs every day   - weigh myself daily   -call for concerning s/sx   -- activity or exercise based on tolerance encouraged     CODE STATUS: FULL              No follow-ups on file.                Thank you for allowing me to participate in the care of your patient,    Time Spent: I spent 28 minutes caring for Raina Forrest  on this date of service. This time includes time spent by me in the following activities: preparing for the visit, reviewing tests, performing a medically appropriate examination and/or evaluations, counseling and educating the patient/family/caregiver, ordering  medications, tests, or procedures, documenting information in the medical record, and independently interpreting results and communicating that information with the patient/family/caregiver.     I spent 1 minutes on the separately reported service interpreting the ReDs. This time is not included in the time used to support E/M service also reported on this date of service        Hayley Morrow, APRN  04/22/25  14:52 EST      **Dragon Disclaimer:**  Much of this encounter note is an electronic transcription/translation of spoken language to printed text. The electronic translation of spoken language may permit erroneous, or at times, nonsensical words or phrases to be inadvertently transcribed. Although I have reviewed the note for such errors, some may still exist.    The information in this note was reviewed and updated during the visit to be as accurate as possible. As many patients have chronic medical problems, many of their individual problems and ongoing plans do not change significantly from visit to visit.  This information is correct and is consistent with my treatment plan as of today's visit.

## 2025-04-22 NOTE — PROGRESS NOTES
[facility]      Congestive Heart Failure  Pertinent negatives include no chest pain or shortness of breath.       1. Pulmonary hypertension  2. HFpEF    Subjective      Raina Andersen a 79 y.o. female who presents today for pulmonary hypertension, HFpEF.   patient has A-fib (status post Watchman device),  HTN, CKD (status post nephrectomy due to renal cell carcinoma 2023), sick sinus syndrome (status post pacemaker), chronic HFpEF, hypothyroidism, GERD, dementia  Patient was admitted to [facility] from 2/7/2024 to 3/3/2024 for flu A, pneumonia. She was noted to have AMBROCIO on admission with creatinine of 1.2. Her Jardiance, spironolactone, and toresemide were all held. Renal function improved prior to discharge with creatinine of 0.9.   Patient had hospitalization 2/8/2025 to 2/13/2025.  She had been diagnosed with COVID-19.  Was found to be acute on chronic heart failure exacerbation.  Diuresed well with IV diuretics and discharged on p.o. diuretics torsemide 10 mg daily.  During hospitalization patient did have updated 2D TTE showed LVEF 66 to 70%, indeterminate left ventricular diastolic dysfunction, left atrial cavity mildly dilated, right atrial cavity moderately dilated, moderately elevated RVSP    She has tolerated Jardiance.  Reports stable home weights.  After initiation of Jardiance her torsemide was decreased and she has tolerated this.  Denies any edema.  Denies any orthopnea, dyspnea on exertion, or ascites.      Review of Systems - Review of Systems   Constitutional: Negative for weight gain and weight loss.   Cardiovascular:  Negative for chest pain, dyspnea on exertion, leg swelling, orthopnea, paroxysmal nocturnal dyspnea and syncope.   Respiratory:  Negative for cough and shortness of breath.    Gastrointestinal:  Negative for bloating.   Neurological:  Negative for dizziness.          Patient Active Problem List   Diagnosis    Chronic atrial  fibrillation    Benign essential HTN    Bradycardia    Chest pain    Can't get food down    Accumulation of fluid in tissues    Esophageal lump    Adynamia    Itch of skin    Anorexia    Chronic nausea    Gastroesophageal reflux disease    Breath shortness    Emesis    Decreased body weight    Anxiety    Narrowing of intervertebral disc space    Diverticulosis of intestine    Primary hypertension    Migraine    Osteoporosis    Palpitations    Pituitary adenoma    Sick sinus syndrome    Diarrhea    Cardiomyopathy    Chronic anticoagulation    Atrial fibrillation    Anticoagulated    Pacemaker    Infected pacemaker    Wound infection    Primary osteoarthritis of left knee    DJD (degenerative joint disease)    Ischemic cardiomyopathy    Acute on chronic congestive heart failure, unspecified heart failure type    Anemia    Presence of Watchman left atrial appendage closure device    Pneumonia    Pneumonia of both lungs due to infectious organism    Stage 3a chronic kidney disease    Influenza A    Severe malnutrition    H/O left radical nephrectomy    Abdominal wall hematoma    Peptic ulcer disease    Hypothyroidism (acquired)    Acute exacerbation of CHF (congestive heart failure)    Cytokine release syndrome, grade 2    Chronic heart failure with preserved ejection fraction (HFpEF)     family history includes Heart disease in her father and mother; Heart failure in her father and mother; Lung cancer in an other family member; Ulcerative colitis in her sister.   reports that she has never smoked. She has never been exposed to tobacco smoke. She has never used smokeless tobacco. She reports that she does not drink alcohol and does not use drugs.  Allergies   Allergen Reactions    Bupivacaine Hcl Anaphylaxis    Nepafenac Itching     Eye Drops    Barium-Containing Compounds Other (See Comments)     CONTRAST CAUSES DIARRHEA    Duloxetine Other (See Comments)    Onion Other (See Comments)    Bextra [Valdecoxib] Itching     Cortisone Hives    Cymbalta [Duloxetine Hcl] Itching    Iodinated Contrast Media Unknown - Low Severity     Must be premedicated before use.  See Wallace notes in care evrywhere    Medrol [Methylprednisolone] Unknown - Low Severity    Mesalamine Unknown - Low Severity    Polymyxin B-Trimethoprim Unknown - Low Severity    Rivaroxaban Unknown - Low Severity     REACTION UNKNOWN    Tetanus Toxoids Swelling    Tetanus-Diphtheria Toxoids Td Swelling     Physical Activity: Not on file          Current Outpatient Medications:     albuterol sulfate  (90 Base) MCG/ACT inhaler, Inhale 2 puffs Every 4 (Four) Hours As Needed for Wheezing., Disp: 6.7 g, Rfl: 0    aspirin 81 MG chewable tablet, Chew 1 tablet Daily. Indications: Cancer of the Colon, Disease involving Lipid Deposits in the Arteries, Kawasaki Syndrome, Disp: , Rfl:     bisoprolol (ZEBeta) 10 MG tablet, TAKE ONE TABLET BY MOUTH DAILY, Disp: 30 tablet, Rfl: 8    budesonide (PULMICORT) 0.5 MG/2ML nebulizer solution, Take 2 mL (one vial) by nebulization 2 (Two) Times a Day for 10 days., Disp: 60 mL, Rfl: 0    donepezil (ARICEPT) 10 MG tablet, Take 1 tablet by mouth Daily. Indications: Alzheimer's Disease, Disp: , Rfl:     empagliflozin (Jardiance) 10 MG tablet tablet, Take 1 tablet by mouth Daily. Indications: Cardiac Failure, Disp: 30 tablet, Rfl: 3    ipratropium-albuterol (DUO-NEB) 0.5-2.5 mg/3 ml nebulizer, Take 3 mL (one vial) by nebulization 4 (Four) Times a Day As Needed for Wheezing., Disp: 360 mL, Rfl: 0    levothyroxine (SYNTHROID, LEVOTHROID) 25 MCG tablet, Take 1 tablet by mouth Every Morning., Disp: 30 tablet, Rfl: 11    memantine (NAMENDA) 10 MG tablet, Take 1 tablet by mouth 2 (Two) Times a Day. Indications: Alzheimer's Disease, Disp: , Rfl:     pantoprazole (PROTONIX) 40 MG EC tablet, Take 20 mg by mouth Daily. Indications: Gastroesophageal Reflux Disease, Heartburn, Disp: , Rfl:     sertraline (ZOLOFT) 100 MG tablet, Take 1.5 tablets by mouth  Every Morning. Indications: Generalized Anxiety Disorder, Disp: , Rfl:     sucralfate (CARAFATE) 1 g tablet, Take 1 tablet by mouth., Disp: , Rfl:     torsemide (DEMADEX) 10 MG tablet, Take 1 tablet by mouth Daily. Indications: Cardiac Failure, Edema, Disp: , Rfl:     Objective   Vital Sign Review:   Vitals:    04/22/25 1404   BP: 109/66   Pulse: 65   SpO2: 96%         Body mass index is 23.39 kg/m².      04/22/25  1404   Weight: 69.8 kg (153 lb 12.8 oz)         Physical Exam:  Constitutional:       Appearance: Normal appearance. Frail. Chronically ill-appearing.   Neck:      Vascular: No carotid bruit or JVD. JVD normal.   Pulmonary:      Effort: Pulmonary effort is normal.      Breath sounds: Normal breath sounds.   Cardiovascular:      PMI at left midclavicular line. Normal rate. Regular rhythm.   Pulses:     Intact distal pulses.   Edema:     Peripheral edema absent.   Abdominal:      Palpations: Abdomen is soft.      Tenderness: There is no abdominal tenderness.   Skin:     General: Skin is warm and dry.   Neurological:      General: No focal deficit present.      Mental Status: Alert and oriented to person, place and time.   Psychiatric:         Behavior: Behavior is cooperative.          DATA REVIEWED:   EKG:      ---------------------------------------------------  ECHO:    Results for orders placed during the hospital encounter of 02/08/25    Adult Transthoracic Echo Complete W/ Cont if Necessary Per Protocol    Interpretation Summary    Left ventricular ejection fraction appears to be 66 - 70%.    Left ventricular diastolic function was indeterminate.    The left atrial cavity is mildly dilated.    The right atrial cavity is moderately  dilated.    Estimated right ventricular systolic pressure from tricuspid regurgitation is moderately elevated (45-55 mmHg).          -----------------------------------------------------  RHC/LHC    Results for orders placed during the hospital encounter of  11/15/16    Cardiac Catheterization/Vascular Study    Narrative  Table formatting from the original result was not included.    · Successful left heart catheter  · Normal coronary arteries  · LV function at lower limit of normal    November 21, 2016    Raina Forrest  1945  70 y.o.  female  [id number]    Procedures Performed    1. Left heart catheterization  2. Selective coronary angiography  3. Left ventriculography  4.      :   Robinson Salazar MD    Vascular Access Site: Right femoral    Indication for procedure: Recurrent chest pains with abnormal echocardiogram with LV dysfunction    Referring Physician:      Pertinent History  @PMH@  @PSH@    Procedure Note    After discussing the risks, benefits, and alternatives of the procedure, informed consent was obtained.  The vascular access site was prepped and draped in the usual sterile fashion.  2% lidocaine was used for local anesthesia. Appropriate landmarks were assessed.  A 5 [place] short sheath was inserted in the artery using the modified Seldinger technique.    Selective coronary angiography was performed with JL4 and JR4 diagnostic catheters. Left ventriculogram  was performed with an angled pigtail catheter.  All exchanges were performed over the wire.  There were no apparent acute or early complications.  The patient tolerated the procedure well and was transferred to the recovery area in stable condition.    Artery hemostasis was achieved with TR BAND manual pressure.        Contrast:    70  cc  Complications:  None  Blood Loss: minimal      Hemodynamics    Pressures    Ao:    110/70 mmHg  LV:    110/10  mmHg  End-diastolic pressure:  10  mmHg  No significant aortic valvular gradient on pullback    Coronary Angiography    Left Main:  The left main originates in the left coronary cusp.  It bifurcates and gives rise to the LAD and circumflex system.  Normal    Left Anterior Descending:  Normal including the diagonal and   branches    Left Circumflex:  Codominant normal    Right Coronary Artery:  The right coronary artery originates in the right coronary cusp.  Codominant normal    Left Ventriculography:    Estimated Ejection Fraction: 50 %  Wall motion abnormalities:  None  Mitral Regurgitation:  None    Impression:    1. Normal coronary arteries  2. LV function at lower limits of normal    Recommendations:    1. Medical management        I sincerely appreciate the opportunity to participate in your patient's care. Please feel free to contact me anytime if I can be of assistance in this or any other way.    Robinson Salazar MD  11/21/2016  12:08 PM      ---------------------------------------------------------------------------    CT:   No radiology results for the last 30 days.        --------------------------------------------------------------------------------------------------------------    Laboratory evaluations:  Renal Function: Estimated Creatinine Clearance: 40.2 mL/min (A) (by C-G formula based on SCr of 1.21 mg/dL (H)).    Lab Results   Component Value Date    GLUCOSE 83 04/07/2025    BUN 16 04/07/2025    CREATININE 1.21 (H) 04/07/2025    EGFRIFNONA 80 06/17/2020    EGFRIFAFRI >60 12/09/2022    BCR 13.2 04/07/2025    K 4.7 04/07/2025    CO2 25.5 04/07/2025    CALCIUM 9.7 04/07/2025    ALBUMIN 4.0 02/13/2025    LABIL2 2.1 12/09/2022    AST 32 02/13/2025    ALT 14 02/13/2025     Lab Results   Component Value Date    WBC 12.91 (H) 02/13/2025    HGB 13.4 02/13/2025    HCT 37.7 02/13/2025    MCV 98.7 (H) 02/13/2025     02/13/2025     Lab Results   Component Value Date    HGBA1C 5.40 02/10/2023     Lab Results   Component Value Date    HGBA1C 5.40 02/10/2023    HGBA1C 4.80 11/16/2016     Lab Results   Component Value Date    CREATININE 1.21 (H) 04/07/2025     Lab Results   Component Value Date    IRON 19 (L) 06/18/2023    TIBC 514 06/18/2023    FERRITIN 25.40 06/18/2023       Result Review:  I have personally  reviewed the results from the time of this admission to 4/22/2025 14:05 EDT and agree with these findings:  [x]  Laboratory list / accordion  []  Microbiology  [x]  Radiology  [x]  EKG/Telemetry   [x]  Cardiology/Vascular   []  Pathology  [x]  Old records  []  Other:        ReDS VOLUMETRIC ASSESSMENT:  ReDs Vest    Performed by: Hayley Morrow APRN  Authorized by: Hayley Morrow APRN    ReDS value:  34  ReDS Value Description:  25-35 (green) = Optimal Volume Status        Assessment & Plan      1. Chronic heart failure with preserved ejection fraction (HFpEF)      HFpEF-LVEF 66 to 70% from 2D TTE 2/9/2025.   Stable home weights.  Appears euvolemic  Has tolerated jardiance well.   She is getting ready to start treatment for H.Pylori with talicia.   Directions for when to call the clinic reviewed with the patient to include weight gain of 2 to 3 pounds in 24 hours, weight gain of 5 to 10 pounds within 7 days; worsening shortness of breath; worsening lower extremity edema or abdominal distention.         NYHA stage C FC-II     Clinical status was assessed and has remained stable.  Treatment intent: curative   ReDS reading was obtained today.  ReDS result: 34       Today, Patient appears euvolemic. and with perfusion. The patient's hemodynamics are currently acceptable. HR is: normal and is at goal. BP/MAP was reviewed and there is room for medication up-titration.  The patient's clinical course has been impacted by CKD. LVEF: 66-70%.     GDMT Assessment:       GDMT changes recommended today:       BB:   continue Bisoprolol  10mg daily  Monitor heart rate.  Please call the [facility] for HR<50, dizziness, lightheadedness, syncope    A/A/A:     HFpEF-not indicated at this time although possible future addition if needed    SGLT2-I:  continue jardiance 10mg daily.   Call for S/Sx genital mycotic infections  Do not take when inadequate oral intake, NPO, GI illness    MRA:  The patient is FC-NYHA Class II and MRA is  indicated due to recent hospitalization.   Euvolemic today. Will defer initiation for now given she is HFpEF      Diuretic Regimen:  -DIURETIC:    toresemide 10 mg every day  -Fluid restriction:   -requested  -2000 ml  -Patient has been asked to weigh daily and was provided with a printed diuretic strategy.  -Sodium restriction:   -1,500 mg Na restriction was discussed.      Labs/Diagnostics/Referrals:    Labs -Chem-7 in 3 months at follow-up       Lifestyle Advice:   - call office if I gain more than 2 pounds in one day or 5 pounds in one week   - keep legs up while sitting   - use salt in moderation   - watch for swelling in feet, ankles and legs every day   - weigh myself daily   -call for concerning s/sx   -- activity or exercise based on tolerance encouraged     CODE STATUS: FULL              No follow-ups on file.                Thank you for allowing me to participate in the care of your patient,    Time Spent: I spent 28 minutes caring for Raina Forrest  on this date of service. This time includes time spent by me in the following activities: preparing for the visit, reviewing tests, performing a medically appropriate examination and/or evaluations, counseling and educating the patient/family/caregiver, ordering medications, tests, or procedures, documenting information in the medical record, and independently interpreting results and communicating that information with the patient/family/caregiver.     I spent 1 minutes on the separately reported service interpreting the ReDs. This time is not included in the time used to support E/M service also reported on this date of service        Hayley Morrow, APRN  04/22/25  14:52 EST      **Dragon Disclaimer:**  Much of this encounter note is an electronic transcription/translation of spoken language to printed text. The electronic translation of spoken language may permit erroneous, or at times, nonsensical words or phrases to be inadvertently  transcribed. Although I have reviewed the note for such errors, some may still exist.    The information in this note was reviewed and updated during the visit to be as accurate as possible. As many patients have chronic medical problems, many of their individual problems and ongoing plans do not change significantly from visit to visit.  This information is correct and is consistent with my treatment plan as of today's visit.

## 2025-04-23 NOTE — ADDENDUM NOTE
Encounter addended by: Latesha Stone MA on: 4/23/2025 11:30 AM   Actions taken: Charge Capture section accepted

## 2025-04-30 ENCOUNTER — OFFICE (OUTPATIENT)
Dept: URBAN - METROPOLITAN AREA CLINIC 76 | Facility: CLINIC | Age: 80
End: 2025-04-30
Payer: MEDICARE

## 2025-04-30 VITALS
SYSTOLIC BLOOD PRESSURE: 110 MMHG | HEART RATE: 64 BPM | DIASTOLIC BLOOD PRESSURE: 64 MMHG | HEIGHT: 69 IN | OXYGEN SATURATION: 94 %

## 2025-04-30 DIAGNOSIS — K29.50 UNSPECIFIED CHRONIC GASTRITIS WITHOUT BLEEDING: ICD-10-CM

## 2025-04-30 PROCEDURE — 99213 OFFICE O/P EST LOW 20 MIN: CPT | Performed by: INTERNAL MEDICINE

## 2025-07-16 RX ORDER — BISOPROLOL FUMARATE 10 MG/1
10 TABLET, FILM COATED ORAL DAILY
Qty: 30 TABLET | Refills: 8 | Status: SHIPPED | OUTPATIENT
Start: 2025-07-16

## 2025-07-18 ENCOUNTER — LAB (OUTPATIENT)
Dept: LAB | Facility: HOSPITAL | Age: 80
End: 2025-07-18
Payer: MEDICARE

## 2025-07-18 DIAGNOSIS — I50.32 CHRONIC HEART FAILURE WITH PRESERVED EJECTION FRACTION (HFPEF): ICD-10-CM

## 2025-07-18 LAB
ANION GAP SERPL CALCULATED.3IONS-SCNC: 11 MMOL/L (ref 5–15)
BUN SERPL-MCNC: 12 MG/DL (ref 8–23)
BUN/CREAT SERPL: 9.6 (ref 7–25)
CALCIUM SPEC-SCNC: 9.3 MG/DL (ref 8.6–10.5)
CHLORIDE SERPL-SCNC: 103 MMOL/L (ref 98–107)
CO2 SERPL-SCNC: 26 MMOL/L (ref 22–29)
CREAT SERPL-MCNC: 1.25 MG/DL (ref 0.57–1)
EGFRCR SERPLBLD CKD-EPI 2021: 43.4 ML/MIN/1.73
GLUCOSE SERPL-MCNC: 88 MG/DL (ref 65–99)
POTASSIUM SERPL-SCNC: 4.6 MMOL/L (ref 3.5–5.2)
SODIUM SERPL-SCNC: 140 MMOL/L (ref 136–145)

## 2025-07-18 PROCEDURE — 80048 BASIC METABOLIC PNL TOTAL CA: CPT

## 2025-07-18 PROCEDURE — 36415 COLL VENOUS BLD VENIPUNCTURE: CPT

## 2025-07-24 ENCOUNTER — HOSPITAL ENCOUNTER (OUTPATIENT)
Dept: CARDIOLOGY | Facility: HOSPITAL | Age: 80
Discharge: HOME | End: 2025-07-24
Admitting: NURSE PRACTITIONER
Payer: MEDICARE

## 2025-07-24 VITALS
HEART RATE: 59 BPM | WEIGHT: 155 LBS | DIASTOLIC BLOOD PRESSURE: 60 MMHG | BODY MASS INDEX: 23.49 KG/M2 | SYSTOLIC BLOOD PRESSURE: 99 MMHG | HEIGHT: 68 IN | OXYGEN SATURATION: 97 %

## 2025-07-24 DIAGNOSIS — I50.32 CHRONIC HEART FAILURE WITH PRESERVED EJECTION FRACTION (HFPEF): Primary | ICD-10-CM

## 2025-07-24 PROCEDURE — 94726 PLETHYSMOGRAPHY LUNG VOLUMES: CPT | Performed by: NURSE PRACTITIONER

## 2025-07-24 PROCEDURE — G0463 HOSPITAL OUTPT CLINIC VISIT: HCPCS

## 2025-07-24 RX ORDER — UBIDECARENONE 30 MG
1 CAPSULE ORAL DAILY
COMMUNITY

## 2025-07-24 RX ORDER — BISOPROLOL FUMARATE 5 MG/1
5 TABLET, FILM COATED ORAL DAILY
Qty: 30 TABLET | Refills: 11 | Status: SHIPPED | OUTPATIENT
Start: 2025-07-24

## 2025-07-24 NOTE — LETTER
July 24, 2025     Georgiana Cisneros MD  [address]  [facility] [address]    Patient: Raina Forrest   YOB: 1945   Date of Visit: 7/24/2025     Dear Georgiana Cisneros MD:       Thank you for referring Raina Forrest to me for evaluation. Below are the relevant portions of my assessment and plan of care.    If you have questions, please do not hesitate to call me. I look forward to following Raina along with you.         Sincerely,        JORY Leon        CC: MD Cristhian Swanson, JORY Hinton  07/24/25 1251  Signed    [facility]      Congestive Heart Failure  Pertinent negatives include no chest pain or shortness of breath.       1. Pulmonary hypertension  2. HFpEF    Subjective     Raina Forrestis a 79 y.o. female who presents today for pulmonary hypertension, HFpEF.   patient has A-fib (status post Watchman device),  HTN, CKD (status post nephrectomy due to renal cell carcinoma 2023), sick sinus syndrome (status post pacemaker), chronic HFpEF, hypothyroidism, GERD, dementia  Patient was admitted to [facility] from 2/7/2024 to 3/3/2024 for flu A, pneumonia. She was noted to have AMBROCIO on admission with creatinine of 1.2. Her Jardiance, spironolactone, and toresemide were all held. Renal function improved prior to discharge with creatinine of 0.9.   Patient had hospitalization 2/8/2025 to 2/13/2025.  She had been diagnosed with COVID-19.  Was found to be acute on chronic heart failure exacerbation.  Diuresed well with IV diuretics and discharged on p.o. diuretics torsemide 10 mg daily.  During hospitalization patient did have updated 2D TTE showed LVEF 66 to 70%, indeterminate left ventricular diastolic dysfunction, left atrial cavity mildly dilated, right atrial cavity moderately dilated, moderately elevated RVSP    She has tolerated Jardiance.  Reports stable home weights.  After initiation of Jardiance  her torsemide was decreased and she has tolerated this.  Denies any edema.  Denies any orthopnea, dyspnea on exertion, or ascites.      Review of Systems - Review of Systems   Constitutional: Negative for weight gain and weight loss.   Cardiovascular:  Negative for chest pain, dyspnea on exertion, leg swelling, orthopnea, paroxysmal nocturnal dyspnea and syncope.   Respiratory:  Negative for cough and shortness of breath.    Gastrointestinal:  Negative for bloating.   Neurological:  Negative for dizziness.          Patient Active Problem List   Diagnosis   • Chronic atrial fibrillation   • Benign essential HTN   • Bradycardia   • Chest pain   • Can't get food down   • Accumulation of fluid in tissues   • Esophageal lump   • Adynamia   • Itch of skin   • Anorexia   • Chronic nausea   • Gastroesophageal reflux disease   • Breath shortness   • Emesis   • Decreased body weight   • Anxiety   • Narrowing of intervertebral disc space   • Diverticulosis of intestine   • Primary hypertension   • Migraine   • Osteoporosis   • Palpitations   • Pituitary adenoma   • Sick sinus syndrome   • Diarrhea   • Cardiomyopathy   • Chronic anticoagulation   • Atrial fibrillation   • Anticoagulated   • Pacemaker   • Infected pacemaker   • Wound infection   • Primary osteoarthritis of left knee   • DJD (degenerative joint disease)   • Ischemic cardiomyopathy   • Acute on chronic congestive heart failure, unspecified heart failure type   • Anemia   • Presence of Watchman left atrial appendage closure device   • Pneumonia   • Pneumonia of both lungs due to infectious organism   • Stage 3a chronic kidney disease   • Influenza A   • Severe malnutrition   • H/O left radical nephrectomy   • Abdominal wall hematoma   • Peptic ulcer disease   • Hypothyroidism (acquired)   • Acute exacerbation of CHF (congestive heart failure)   • Cytokine release syndrome, grade 2   • Chronic heart failure with preserved ejection fraction (HFpEF)     family history  includes Heart disease in her father and mother; Heart failure in her father and mother; Lung cancer in an other family member; Ulcerative colitis in her sister.   reports that she has never smoked. She has never been exposed to tobacco smoke. She has never used smokeless tobacco. She reports that she does not drink alcohol and does not use drugs.  Allergies   Allergen Reactions   • Bupivacaine Hcl Anaphylaxis   • Nepafenac Itching     Eye Drops   • Barium-Containing Compounds Other (See Comments)     CONTRAST CAUSES DIARRHEA   • Duloxetine Other (See Comments)   • Onion Other (See Comments)   • Bextra [Valdecoxib] Itching   • Cortisone Hives   • Cymbalta [Duloxetine Hcl] Itching   • Iodinated Contrast Media Unknown - Low Severity     Must be premedicated before use.  See [place] notes in care evrywhere   • Medrol [Methylprednisolone] Unknown - Low Severity   • Mesalamine Unknown - Low Severity   • Polymyxin B-Trimethoprim Unknown - Low Severity   • Rivaroxaban Unknown - Low Severity     REACTION UNKNOWN   • Tetanus Toxoids Swelling   • Tetanus-Diphtheria Toxoids Td Swelling     Physical Activity: Not on file          Current Outpatient Medications:   •  aspirin 81 MG chewable tablet, Chew 1 tablet Daily. Indications: Cancer of the Colon, Disease involving Lipid Deposits in the Arteries, Kawasaki Syndrome, Disp: , Rfl:   •  bisoprolol (ZEBeta) 10 MG tablet, TAKE ONE TABLET BY MOUTH DAILY, Disp: 30 tablet, Rfl: 8  •  donepezil (ARICEPT) 10 MG tablet, Take 1 tablet by mouth Daily. Indications: Alzheimer's Disease, Disp: , Rfl:   •  empagliflozin (Jardiance) 10 MG tablet tablet, Take 1 tablet by mouth Daily. Indications: Cardiac Failure, Disp: 30 tablet, Rfl: 3  •  ipratropium-albuterol (DUO-NEB) 0.5-2.5 mg/3 ml nebulizer, Take 3 mL (one vial) by nebulization 4 (Four) Times a Day As Needed for Wheezing., Disp: 360 mL, Rfl: 0  •  levothyroxine (SYNTHROID, LEVOTHROID) 25 MCG tablet, Take 1 tablet by mouth Every  Morning., Disp: 30 tablet, Rfl: 11  •  memantine (NAMENDA) 10 MG tablet, Take 1 tablet by mouth 2 (Two) Times a Day. Indications: Alzheimer's Disease, Disp: , Rfl:   •  multivitamin with minerals (Multi For Her 50+) tablet tablet, Take 1 tablet by mouth Daily., Disp: , Rfl:   •  pantoprazole (PROTONIX) 40 MG EC tablet, Take 20 mg by mouth Daily. Indications: Gastroesophageal Reflux Disease, Heartburn, Disp: , Rfl:   •  sertraline (ZOLOFT) 100 MG tablet, Take 1.5 tablets by mouth Every Morning. Indications: Generalized Anxiety Disorder, Disp: , Rfl:   •  torsemide (DEMADEX) 10 MG tablet, Take 1 tablet by mouth Daily. Indications: Cardiac Failure, Edema, Disp: , Rfl:   •  albuterol sulfate  (90 Base) MCG/ACT inhaler, Inhale 2 puffs Every 4 (Four) Hours As Needed for Wheezing. (Patient not taking: Reported on 7/24/2025), Disp: 6.7 g, Rfl: 0  •  budesonide (PULMICORT) 0.5 MG/2ML nebulizer solution, Take 2 mL (one vial) by nebulization 2 (Two) Times a Day for 10 days., Disp: 60 mL, Rfl: 0  •  sucralfate (CARAFATE) 1 g tablet, Take 1 tablet by mouth. (Patient not taking: Reported on 7/24/2025), Disp: , Rfl:     Objective  Vital Sign Review:   Vitals:    07/24/25 1105   BP: 99/60   Pulse: 59   SpO2: 97%           Body mass index is 23.57 kg/m².      07/24/25  1105   Weight: 70.3 kg (155 lb)           Physical Exam:  Constitutional:       Appearance: Normal appearance. Frail. Chronically ill-appearing.   Neck:      Vascular: No carotid bruit or JVD. JVD normal.   Pulmonary:      Effort: Pulmonary effort is normal.      Breath sounds: Normal breath sounds.   Cardiovascular:      PMI at left midclavicular line. Normal rate. Regular rhythm.   Pulses:     Intact distal pulses.   Edema:     Peripheral edema absent.   Abdominal:      Palpations: Abdomen is soft.      Tenderness: There is no abdominal tenderness.   Skin:     General: Skin is warm and dry.   Neurological:      General: No focal deficit present.      Mental  Status: Alert and oriented to person, place and time.   Psychiatric:         Behavior: Behavior is cooperative.          DATA REVIEWED:   EKG:      ---------------------------------------------------  ECHO:    Results for orders placed during the hospital encounter of 02/08/25    Adult Transthoracic Echo Complete W/ Cont if Necessary Per Protocol 02/10/2025  2:45 PM    Interpretation Summary  •  Left ventricular ejection fraction appears to be 66 - 70%.  •  Left ventricular diastolic function was indeterminate.  •  The left atrial cavity is mildly dilated.  •  The right atrial cavity is moderately  dilated.  •  Estimated right ventricular systolic pressure from tricuspid regurgitation is moderately elevated (45-55 mmHg).          -----------------------------------------------------  RHC/LHC    Results for orders placed during the hospital encounter of 11/15/16    Cardiac Catheterization/Vascular Study    Narrative  Table formatting from the original result was not included.    · Successful left heart catheter  · Normal coronary arteries  · LV function at lower limit of normal    November 21, 2016    Raina Forrest  1945  70 y.o.  female  [id number]    Procedures Performed    1. Left heart catheterization  2. Selective coronary angiography  3. Left ventriculography  4.      :   Robinson Salazar MD    Vascular Access Site: Right femoral    Indication for procedure: Recurrent chest pains with abnormal echocardiogram with LV dysfunction    Referring Physician:      Pertinent History  @PMH@  @PSH@    Procedure Note    After discussing the risks, benefits, and alternatives of the procedure, informed consent was obtained.  The vascular access site was prepped and draped in the usual sterile fashion.  2% lidocaine was used for local anesthesia. Appropriate landmarks were assessed.  A 5 Dutch short sheath was inserted in the artery using the modified Seldinger technique.    Selective coronary  angiography was performed with JL4 and JR4 diagnostic catheters. Left ventriculogram  was performed with an angled pigtail catheter.  All exchanges were performed over the wire.  There were no apparent acute or early complications.  The patient tolerated the procedure well and was transferred to the recovery area in stable condition.    Artery hemostasis was achieved with TR BAND manual pressure.        Contrast:    70  cc  Complications:  None  Blood Loss: minimal      Hemodynamics    Pressures    Ao:    110/70 mmHg  LV:    110/10  mmHg  End-diastolic pressure:  10  mmHg  No significant aortic valvular gradient on pullback    Coronary Angiography    Left Main:  The left main originates in the left coronary cusp.  It bifurcates and gives rise to the LAD and circumflex system.  Normal    Left Anterior Descending:  Normal including the diagonal and  branches    Left Circumflex:  Codominant normal    Right Coronary Artery:  The right coronary artery originates in the right coronary cusp.  Codominant normal    Left Ventriculography:    Estimated Ejection Fraction: 50 %  Wall motion abnormalities:  None  Mitral Regurgitation:  None    Impression:    1. Normal coronary arteries  2. LV function at lower limits of normal    Recommendations:    1. Medical management        I sincerely appreciate the opportunity to participate in your patient's care. Please feel free to contact me anytime if I can be of assistance in this or any other way.    Robinson Salazar MD  11/21/2016  12:08 PM      ---------------------------------------------------------------------------    CT:   No radiology results for the last 30 days.        --------------------------------------------------------------------------------------------------------------    Laboratory evaluations:  Renal Function: Estimated Creatinine Clearance: 39.2 mL/min (A) (by C-G formula based on SCr of 1.25 mg/dL (H)).    Lab Results   Component Value Date     GLUCOSE 88 07/18/2025    BUN 12.0 07/18/2025    CREATININE 1.25 (H) 07/18/2025    EGFRIFNONA 80 06/17/2020    EGFRIFAFRI >60 12/09/2022    BCR 9.6 07/18/2025    K 4.6 07/18/2025    CO2 26.0 07/18/2025    CALCIUM 9.3 07/18/2025    ALBUMIN 4.0 02/13/2025    LABIL2 2.1 12/09/2022    AST 32 02/13/2025    ALT 14 02/13/2025     Lab Results   Component Value Date    WBC 12.91 (H) 02/13/2025    HGB 13.4 02/13/2025    HCT 37.7 02/13/2025    MCV 98.7 (H) 02/13/2025     02/13/2025     Lab Results   Component Value Date    HGBA1C 5.40 02/10/2023     Lab Results   Component Value Date    HGBA1C 5.40 02/10/2023    HGBA1C 4.80 11/16/2016     Lab Results   Component Value Date    CREATININE 1.25 (H) 07/18/2025     Lab Results   Component Value Date    IRON 19 (L) 06/18/2023    TIBC 514 06/18/2023    FERRITIN 25.40 06/18/2023       Result Review:  I have personally reviewed the results from the time of this admission to 7/24/2025 11:26 EDT and agree with these findings:  [x]  Laboratory list / accordion  []  Microbiology  [x]  Radiology  [x]  EKG/Telemetry   [x]  Cardiology/Vascular   []  Pathology  [x]  Old records  []  Other:        ReDS VOLUMETRIC ASSESSMENT:  ReDs Vest    Performed by: Hayley Morrow APRN  Authorized by: Hayley Morrow APRN    ReDS value:  35  ReDS Value Description:  25-35 (green) = Optimal Volume Status        Assessment & Plan     1. Chronic heart failure with preserved ejection fraction (HFpEF)        HFpEF-LVEF 66 to 70% from 2D TTE 2/9/2025.   Stable home weights.  Appears euvolemic.   BP is marginal today, and HR is 59---I will decrease her bisoprolol to 5mg daily.   She is getting ready to start treatment for H.Pylori with talicia.   Directions for when to call the clinic reviewed with the patient to include weight gain of 2 to 3 pounds in 24 hours, weight gain of 5 to 10 pounds within 7 days; worsening shortness of breath; worsening lower extremity edema or abdominal  distention.  Labs reviewed from 7/18/2025 creatinine 1.25, GFR 43, sodium 140, potassium 4.6     NYHA stage C FC-II     Clinical status was assessed and has remained stable.  Treatment intent: curative   ReDS reading was obtained today.  ReDS result: 35       Today, Patient appears euvolemic. and with perfusion. The patient's hemodynamics are currently acceptable. HR is: normal and is at goal. BP/MAP was reviewed and there is room for medication up-titration.  The patient's clinical course has been impacted by CKD. LVEF: 66-70%.     GDMT Assessment:       GDMT changes recommended today:       BB:   decrease Bisoprolol ---decrease to 5mg daily, she has marginal BP, and HR is 59  Monitor heart rate.  Please call the [facility] for HR<50, dizziness, lightheadedness, syncope    A/A/A:     HFpEF-not indicated at this time although possible future addition if needed    SGLT2-I:  continue jardiance 10mg daily.   Call for S/Sx genital mycotic infections  Do not take when inadequate oral intake, NPO, GI illness    MRA:  The patient is FC-NYHA Class II and MRA is indicated due to recent hospitalization.   Euvolemic today. Will defer initiation for now given she is HFpEF and low normal BP      Diuretic Regimen:  -DIURETIC:    toresemide 10 mg every day  -Fluid restriction:   -requested  -2000 ml  -Patient has been asked to weigh daily and was provided with a printed diuretic strategy.  -Sodium restriction:   -1,500 mg Na restriction was discussed.      Labs/Diagnostics/Referrals:    Labs -Chem-7 in 3 months at follow-up       Lifestyle Advice:   - call office if I gain more than 2 pounds in one day or 5 pounds in one week   - keep legs up while sitting   - use salt in moderation   - watch for swelling in feet, ankles and legs every day   - weigh myself daily   -call for concerning s/sx   -- activity or exercise based on tolerance encouraged     CODE STATUS: FULL              No follow-ups on file.                Thank you for  allowing me to participate in the care of your patient,    Time Spent: I spent 28 minutes caring for Raina Forrest  on this date of service. This time includes time spent by me in the following activities: preparing for the visit, reviewing tests, performing a medically appropriate examination and/or evaluations, counseling and educating the patient/family/caregiver, ordering medications, tests, or procedures, documenting information in the medical record, and independently interpreting results and communicating that information with the patient/family/caregiver.     I spent 1 minutes on the separately reported service interpreting the ReDs. This time is not included in the time used to support E/M service also reported on this date of service        Hayley Annaherlinda Morrow, APRN  07/24/25  14:52 EST      **Dragon Disclaimer:**  Much of this encounter note is an electronic transcription/translation of spoken language to printed text. The electronic translation of spoken language may permit erroneous, or at times, nonsensical words or phrases to be inadvertently transcribed. Although I have reviewed the note for such errors, some may still exist.    The information in this note was reviewed and updated during the visit to be as accurate as possible. As many patients have chronic medical problems, many of their individual problems and ongoing plans do not change significantly from visit to visit.  This information is correct and is consistent with my treatment plan as of today's visit.

## 2025-07-24 NOTE — PROGRESS NOTES
[facility]      Congestive Heart Failure  Pertinent negatives include no chest pain or shortness of breath.       1. Pulmonary hypertension  2. HFpEF    Subjective      Raina Andersen a 79 y.o. female who presents today for pulmonary hypertension, HFpEF.   patient has A-fib (status post Watchman device),  HTN, CKD (status post nephrectomy due to renal cell carcinoma 2023), sick sinus syndrome (status post pacemaker), chronic HFpEF, hypothyroidism, GERD, dementia  Patient was admitted to [facility] from 2/7/2024 to 3/3/2024 for flu A, pneumonia. She was noted to have AMBROCIO on admission with creatinine of 1.2. Her Jardiance, spironolactone, and toresemide were all held. Renal function improved prior to discharge with creatinine of 0.9.   Patient had hospitalization 2/8/2025 to 2/13/2025.  She had been diagnosed with COVID-19.  Was found to be acute on chronic heart failure exacerbation.  Diuresed well with IV diuretics and discharged on p.o. diuretics torsemide 10 mg daily.  During hospitalization patient did have updated 2D TTE showed LVEF 66 to 70%, indeterminate left ventricular diastolic dysfunction, left atrial cavity mildly dilated, right atrial cavity moderately dilated, moderately elevated RVSP    She has tolerated Jardiance.  Reports stable home weights.  After initiation of Jardiance her torsemide was decreased and she has tolerated this.  Denies any edema.  Denies any orthopnea, dyspnea on exertion, or ascites.      Review of Systems - Review of Systems   Constitutional: Negative for weight gain and weight loss.   Cardiovascular:  Negative for chest pain, dyspnea on exertion, leg swelling, orthopnea, paroxysmal nocturnal dyspnea and syncope.   Respiratory:  Negative for cough and shortness of breath.    Gastrointestinal:  Negative for bloating.   Neurological:  Negative for dizziness.          Patient Active Problem List   Diagnosis    Chronic atrial  fibrillation    Benign essential HTN    Bradycardia    Chest pain    Can't get food down    Accumulation of fluid in tissues    Esophageal lump    Adynamia    Itch of skin    Anorexia    Chronic nausea    Gastroesophageal reflux disease    Breath shortness    Emesis    Decreased body weight    Anxiety    Narrowing of intervertebral disc space    Diverticulosis of intestine    Primary hypertension    Migraine    Osteoporosis    Palpitations    Pituitary adenoma    Sick sinus syndrome    Diarrhea    Cardiomyopathy    Chronic anticoagulation    Atrial fibrillation    Anticoagulated    Pacemaker    Infected pacemaker    Wound infection    Primary osteoarthritis of left knee    DJD (degenerative joint disease)    Ischemic cardiomyopathy    Acute on chronic congestive heart failure, unspecified heart failure type    Anemia    Presence of Watchman left atrial appendage closure device    Pneumonia    Pneumonia of both lungs due to infectious organism    Stage 3a chronic kidney disease    Influenza A    Severe malnutrition    H/O left radical nephrectomy    Abdominal wall hematoma    Peptic ulcer disease    Hypothyroidism (acquired)    Acute exacerbation of CHF (congestive heart failure)    Cytokine release syndrome, grade 2    Chronic heart failure with preserved ejection fraction (HFpEF)     family history includes Heart disease in her father and mother; Heart failure in her father and mother; Lung cancer in an other family member; Ulcerative colitis in her sister.   reports that she has never smoked. She has never been exposed to tobacco smoke. She has never used smokeless tobacco. She reports that she does not drink alcohol and does not use drugs.  Allergies   Allergen Reactions    Bupivacaine Hcl Anaphylaxis    Nepafenac Itching     Eye Drops    Barium-Containing Compounds Other (See Comments)     CONTRAST CAUSES DIARRHEA    Duloxetine Other (See Comments)    Onion Other (See Comments)    Bextra [Valdecoxib] Itching     Cortisone Hives    Cymbalta [Duloxetine Hcl] Itching    Iodinated Contrast Media Unknown - Low Severity     Must be premedicated before use.  See [place] notes in care evrywhere    Medrol [Methylprednisolone] Unknown - Low Severity    Mesalamine Unknown - Low Severity    Polymyxin B-Trimethoprim Unknown - Low Severity    Rivaroxaban Unknown - Low Severity     REACTION UNKNOWN    Tetanus Toxoids Swelling    Tetanus-Diphtheria Toxoids Td Swelling     Physical Activity: Not on file          Current Outpatient Medications:     aspirin 81 MG chewable tablet, Chew 1 tablet Daily. Indications: Cancer of the Colon, Disease involving Lipid Deposits in the Arteries, Kawasaki Syndrome, Disp: , Rfl:     bisoprolol (ZEBeta) 10 MG tablet, TAKE ONE TABLET BY MOUTH DAILY, Disp: 30 tablet, Rfl: 8    donepezil (ARICEPT) 10 MG tablet, Take 1 tablet by mouth Daily. Indications: Alzheimer's Disease, Disp: , Rfl:     empagliflozin (Jardiance) 10 MG tablet tablet, Take 1 tablet by mouth Daily. Indications: Cardiac Failure, Disp: 30 tablet, Rfl: 3    ipratropium-albuterol (DUO-NEB) 0.5-2.5 mg/3 ml nebulizer, Take 3 mL (one vial) by nebulization 4 (Four) Times a Day As Needed for Wheezing., Disp: 360 mL, Rfl: 0    levothyroxine (SYNTHROID, LEVOTHROID) 25 MCG tablet, Take 1 tablet by mouth Every Morning., Disp: 30 tablet, Rfl: 11    memantine (NAMENDA) 10 MG tablet, Take 1 tablet by mouth 2 (Two) Times a Day. Indications: Alzheimer's Disease, Disp: , Rfl:     multivitamin with minerals (Multi For Her 50+) tablet tablet, Take 1 tablet by mouth Daily., Disp: , Rfl:     pantoprazole (PROTONIX) 40 MG EC tablet, Take 20 mg by mouth Daily. Indications: Gastroesophageal Reflux Disease, Heartburn, Disp: , Rfl:     sertraline (ZOLOFT) 100 MG tablet, Take 1.5 tablets by mouth Every Morning. Indications: Generalized Anxiety Disorder, Disp: , Rfl:     torsemide (DEMADEX) 10 MG tablet, Take 1 tablet by mouth Daily. Indications: Cardiac Failure, Edema,  Disp: , Rfl:     albuterol sulfate  (90 Base) MCG/ACT inhaler, Inhale 2 puffs Every 4 (Four) Hours As Needed for Wheezing. (Patient not taking: Reported on 7/24/2025), Disp: 6.7 g, Rfl: 0    budesonide (PULMICORT) 0.5 MG/2ML nebulizer solution, Take 2 mL (one vial) by nebulization 2 (Two) Times a Day for 10 days., Disp: 60 mL, Rfl: 0    sucralfate (CARAFATE) 1 g tablet, Take 1 tablet by mouth. (Patient not taking: Reported on 7/24/2025), Disp: , Rfl:     Objective   Vital Sign Review:   Vitals:    07/24/25 1105   BP: 99/60   Pulse: 59   SpO2: 97%           Body mass index is 23.57 kg/m².      07/24/25  1105   Weight: 70.3 kg (155 lb)           Physical Exam:  Constitutional:       Appearance: Normal appearance. Frail. Chronically ill-appearing.   Neck:      Vascular: No carotid bruit or JVD. JVD normal.   Pulmonary:      Effort: Pulmonary effort is normal.      Breath sounds: Normal breath sounds.   Cardiovascular:      PMI at left midclavicular line. Normal rate. Regular rhythm.   Pulses:     Intact distal pulses.   Edema:     Peripheral edema absent.   Abdominal:      Palpations: Abdomen is soft.      Tenderness: There is no abdominal tenderness.   Skin:     General: Skin is warm and dry.   Neurological:      General: No focal deficit present.      Mental Status: Alert and oriented to person, place and time.   Psychiatric:         Behavior: Behavior is cooperative.          DATA REVIEWED:   EKG:      ---------------------------------------------------  ECHO:    Results for orders placed during the hospital encounter of 02/08/25    Adult Transthoracic Echo Complete W/ Cont if Necessary Per Protocol 02/10/2025  2:45 PM    Interpretation Summary    Left ventricular ejection fraction appears to be 66 - 70%.    Left ventricular diastolic function was indeterminate.    The left atrial cavity is mildly dilated.    The right atrial cavity is moderately  dilated.    Estimated right ventricular systolic pressure from  tricuspid regurgitation is moderately elevated (45-55 mmHg).          -----------------------------------------------------  RHC/LHC    Results for orders placed during the hospital encounter of 11/15/16    Cardiac Catheterization/Vascular Study    Narrative  Table formatting from the original result was not included.    · Successful left heart catheter  · Normal coronary arteries  · LV function at lower limit of normal    November 21, 2016    Raina Forrest  1945  70 y.o.  female  [id number]    Procedures Performed    1. Left heart catheterization  2. Selective coronary angiography  3. Left ventriculography  4.      :   Robinson Salazar MD    Vascular Access Site: Right femoral    Indication for procedure: Recurrent chest pains with abnormal echocardiogram with LV dysfunction    Referring Physician:      Pertinent History  @PMH@  @PSH@    Procedure Note    After discussing the risks, benefits, and alternatives of the procedure, informed consent was obtained.  The vascular access site was prepped and draped in the usual sterile fashion.  2% lidocaine was used for local anesthesia. Appropriate landmarks were assessed.  A 5 Swedish short sheath was inserted in the artery using the modified Seldinger technique.    Selective coronary angiography was performed with JL4 and JR4 diagnostic catheters. Left ventriculogram  was performed with an angled pigtail catheter.  All exchanges were performed over the wire.  There were no apparent acute or early complications.  The patient tolerated the procedure well and was transferred to the recovery area in stable condition.    Artery hemostasis was achieved with TR BAND manual pressure.        Contrast:    70  cc  Complications:  None  Blood Loss: minimal      Hemodynamics    Pressures    Ao:    110/70 mmHg  LV:    110/10  mmHg  End-diastolic pressure:  10  mmHg  No significant aortic valvular gradient on pullback    Coronary Angiography    Left Main:  The  left main originates in the left coronary cusp.  It bifurcates and gives rise to the LAD and circumflex system.  Normal    Left Anterior Descending:  Normal including the diagonal and  branches    Left Circumflex:  Codominant normal    Right Coronary Artery:  The right coronary artery originates in the right coronary cusp.  Codominant normal    Left Ventriculography:    Estimated Ejection Fraction: 50 %  Wall motion abnormalities:  None  Mitral Regurgitation:  None    Impression:    1. Normal coronary arteries  2. LV function at lower limits of normal    Recommendations:    1. Medical management        I sincerely appreciate the opportunity to participate in your patient's care. Please feel free to contact me anytime if I can be of assistance in this or any other way.    Robinson Salazar MD  11/21/2016  12:08 PM      ---------------------------------------------------------------------------    CT:   No radiology results for the last 30 days.        --------------------------------------------------------------------------------------------------------------    Laboratory evaluations:  Renal Function: Estimated Creatinine Clearance: 39.2 mL/min (A) (by C-G formula based on SCr of 1.25 mg/dL (H)).    Lab Results   Component Value Date    GLUCOSE 88 07/18/2025    BUN 12.0 07/18/2025    CREATININE 1.25 (H) 07/18/2025    EGFRIFNONA 80 06/17/2020    EGFRIFAFRI >60 12/09/2022    BCR 9.6 07/18/2025    K 4.6 07/18/2025    CO2 26.0 07/18/2025    CALCIUM 9.3 07/18/2025    ALBUMIN 4.0 02/13/2025    LABIL2 2.1 12/09/2022    AST 32 02/13/2025    ALT 14 02/13/2025     Lab Results   Component Value Date    WBC 12.91 (H) 02/13/2025    HGB 13.4 02/13/2025    HCT 37.7 02/13/2025    MCV 98.7 (H) 02/13/2025     02/13/2025     Lab Results   Component Value Date    HGBA1C 5.40 02/10/2023     Lab Results   Component Value Date    HGBA1C 5.40 02/10/2023    HGBA1C 4.80 11/16/2016     Lab Results   Component Value Date     CREATININE 1.25 (H) 07/18/2025     Lab Results   Component Value Date    IRON 19 (L) 06/18/2023    TIBC 514 06/18/2023    FERRITIN 25.40 06/18/2023       Result Review:  I have personally reviewed the results from the time of this admission to 7/24/2025 11:26 EDT and agree with these findings:  [x]  Laboratory list / accordion  []  Microbiology  [x]  Radiology  [x]  EKG/Telemetry   [x]  Cardiology/Vascular   []  Pathology  [x]  Old records  []  Other:        ReDS VOLUMETRIC ASSESSMENT:  ReDs Vest    Performed by: Hayley Morrow APRN  Authorized by: Hayley Morrow APRN    ReDS value:  35  ReDS Value Description:  25-35 (green) = Optimal Volume Status        Assessment & Plan      1. Chronic heart failure with preserved ejection fraction (HFpEF)        HFpEF-LVEF 66 to 70% from 2D TTE 2/9/2025.   Stable home weights.  Appears euvolemic.   BP is marginal today, and HR is 59---I will decrease her bisoprolol to 5mg daily.   She is getting ready to start treatment for H.Pylori with talicia.   Directions for when to call the clinic reviewed with the patient to include weight gain of 2 to 3 pounds in 24 hours, weight gain of 5 to 10 pounds within 7 days; worsening shortness of breath; worsening lower extremity edema or abdominal distention.  Labs reviewed from 7/18/2025 creatinine 1.25, GFR 43, sodium 140, potassium 4.6     NYHA stage C FC-II     Clinical status was assessed and has remained stable.  Treatment intent: curative   ReDS reading was obtained today.  ReDS result: 35       Today, Patient appears euvolemic. and with perfusion. The patient's hemodynamics are currently acceptable. HR is: normal and is at goal. BP/MAP was reviewed and there is room for medication up-titration.  The patient's clinical course has been impacted by CKD. LVEF: 66-70%.     GDMT Assessment:       GDMT changes recommended today:       BB:   decrease Bisoprolol ---decrease to 5mg daily, she has marginal BP, and HR is  59  Monitor heart rate.  Please call the [facility] for HR<50, dizziness, lightheadedness, syncope    A/A/A:     HFpEF-not indicated at this time although possible future addition if needed    SGLT2-I:  continue jardiance 10mg daily.   Call for S/Sx genital mycotic infections  Do not take when inadequate oral intake, NPO, GI illness    MRA:  The patient is FC-NYHA Class II and MRA is indicated due to recent hospitalization.   Euvolemic today. Will defer initiation for now given she is HFpEF and low normal BP      Diuretic Regimen:  -DIURETIC:    toresemide 10 mg every day  -Fluid restriction:   -requested  -2000 ml  -Patient has been asked to weigh daily and was provided with a printed diuretic strategy.  -Sodium restriction:   -1,500 mg Na restriction was discussed.      Labs/Diagnostics/Referrals:    Labs -Chem-7 in 3 months at follow-up       Lifestyle Advice:   - call office if I gain more than 2 pounds in one day or 5 pounds in one week   - keep legs up while sitting   - use salt in moderation   - watch for swelling in feet, ankles and legs every day   - weigh myself daily   -call for concerning s/sx   -- activity or exercise based on tolerance encouraged     CODE STATUS: FULL              No follow-ups on file.                Thank you for allowing me to participate in the care of your patient,    Time Spent: I spent 28 minutes caring for Raina Forrest  on this date of service. This time includes time spent by me in the following activities: preparing for the visit, reviewing tests, performing a medically appropriate examination and/or evaluations, counseling and educating the patient/family/caregiver, ordering medications, tests, or procedures, documenting information in the medical record, and independently interpreting results and communicating that information with the patient/family/caregiver.     I spent 1 minutes on the separately reported service interpreting the ReDs. This time is not included in the  time used to support E/M service also reported on this date of service        Hayley Morrow, APRN  07/24/25  14:52 EST      **Dragon Disclaimer:**  Much of this encounter note is an electronic transcription/translation of spoken language to printed text. The electronic translation of spoken language may permit erroneous, or at times, nonsensical words or phrases to be inadvertently transcribed. Although I have reviewed the note for such errors, some may still exist.    The information in this note was reviewed and updated during the visit to be as accurate as possible. As many patients have chronic medical problems, many of their individual problems and ongoing plans do not change significantly from visit to visit.  This information is correct and is consistent with my treatment plan as of today's visit.

## (undated) DEVICE — BNDG ELAS ELITE V/CLOSE 6IN 5YD LF STRL

## (undated) DEVICE — LIMB HOLDER, WRIST/ANKLE: Brand: DEROYAL

## (undated) DEVICE — BNDG ELAS ELITE V/CLOSE 4IN 5YD LF STRL

## (undated) DEVICE — INTRO TEAR AWAY/LVD W/SD PRT 7F 13CM

## (undated) DEVICE — CONTAINER,SPECIMEN,OR STERILE,4OZ: Brand: MEDLINE

## (undated) DEVICE — APPL CHLORAPREP HI/LITE 26ML ORNG

## (undated) DEVICE — PK KN TOTL 40

## (undated) DEVICE — ANTIBACTERIAL UNDYED BRAIDED (POLYGLACTIN 910), SYNTHETIC ABSORBABLE SUTURE: Brand: COATED VICRYL

## (undated) DEVICE — SOL IRR NACL 0.9PCT BT 1000ML

## (undated) DEVICE — KT DRN EVAC WND PVC PCH WTROC RND 10F400

## (undated) DEVICE — SPNG LAP 18X18IN LF STRL PK/5

## (undated) DEVICE — DUAL CUT SAGITTAL BLADE

## (undated) DEVICE — STPLR SKIN VISISTAT WD 35CT

## (undated) DEVICE — DRSNG GZ PETROLTM XEROFORM CURAD 1X8IN STRL

## (undated) DEVICE — SYRINGE, LUER LOCK, 60ML: Brand: MEDLINE

## (undated) DEVICE — GLV SURG PREMIERPRO ORTHO LTX PF SZ7.5 BRN

## (undated) DEVICE — GLV SURG BIOGEL LTX PF 7 1/2

## (undated) DEVICE — AIRLIFE™ NASAL OXYGEN CANNULA CURVED, NONFLARED TIP WITH 21 FOOT (6.4 M) CRUSH-RESISTANT TUBING, OVER-THE-EAR STYLE: Brand: AIRLIFE™

## (undated) DEVICE — ARM SLING II: Brand: DEROYAL

## (undated) DEVICE — UNDERCAST PADDING: Brand: DEROYAL

## (undated) DEVICE — SPNG GZ WOVN 4X4IN 12PLY 10/BX STRL

## (undated) DEVICE — PENCL E/S ULTRAVAC TELESCP NOSE HOLSTR 10FT

## (undated) DEVICE — LOU PACE DEFIB: Brand: MEDLINE INDUSTRIES, INC.

## (undated) DEVICE — PAD,ABDOMINAL,8"X10",ST,LF: Brand: MEDLINE

## (undated) DEVICE — DECANT BG O JET

## (undated) DEVICE — DRAPE,REIN 53X77,STERILE: Brand: MEDLINE

## (undated) DEVICE — TRAP FLD MINIVAC MEGADYNE 100ML

## (undated) DEVICE — NEEDLE, QUINCKE, 20GX3.5": Brand: MEDLINE

## (undated) DEVICE — Device